# Patient Record
Sex: MALE | Race: WHITE | Employment: OTHER | URBAN - METROPOLITAN AREA
[De-identification: names, ages, dates, MRNs, and addresses within clinical notes are randomized per-mention and may not be internally consistent; named-entity substitution may affect disease eponyms.]

---

## 2017-01-12 RX ORDER — CARVEDILOL 6.25 MG/1
12.5 TABLET ORAL 2 TIMES DAILY WITH MEALS
Qty: 120 TAB | Refills: 6 | Status: SHIPPED | OUTPATIENT
Start: 2017-01-12 | End: 2017-06-14 | Stop reason: SDUPTHER

## 2017-01-31 ENCOUNTER — TELEPHONE (OUTPATIENT)
Dept: CARDIOLOGY CLINIC | Age: 47
End: 2017-01-31

## 2017-03-02 ENCOUNTER — OFFICE VISIT (OUTPATIENT)
Dept: CARDIOLOGY CLINIC | Age: 47
End: 2017-03-02

## 2017-03-02 VITALS
HEIGHT: 70 IN | BODY MASS INDEX: 36.94 KG/M2 | DIASTOLIC BLOOD PRESSURE: 61 MMHG | SYSTOLIC BLOOD PRESSURE: 119 MMHG | OXYGEN SATURATION: 98 % | HEART RATE: 47 BPM | WEIGHT: 258 LBS

## 2017-03-02 DIAGNOSIS — E78.00 PURE HYPERCHOLESTEROLEMIA: ICD-10-CM

## 2017-03-02 DIAGNOSIS — I10 ESSENTIAL HYPERTENSION WITH GOAL BLOOD PRESSURE LESS THAN 140/90: ICD-10-CM

## 2017-03-02 DIAGNOSIS — I42.9 CARDIOMYOPATHY (HCC): ICD-10-CM

## 2017-03-02 DIAGNOSIS — I25.83 CORONARY ARTERY DISEASE DUE TO LIPID RICH PLAQUE: Primary | ICD-10-CM

## 2017-03-02 DIAGNOSIS — I25.10 CORONARY ARTERY DISEASE DUE TO LIPID RICH PLAQUE: Primary | ICD-10-CM

## 2017-03-02 RX ORDER — CLOTRIMAZOLE 10 MG/1
10 LOZENGE ORAL; TOPICAL
COMMUNITY
Start: 2017-02-18 | End: 2017-03-04
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
## 2017-03-02 NOTE — MR AVS SNAPSHOT
Visit Information Date & Time Provider Department Dept. Phone Encounter #  
 3/2/2017 11:15 AM Saumil Loy Dancer, MD [facility] [phone] [id number] Follow-up Instructions Return in about 9 months (around 12/2/2017). Upcoming Health Maintenance Date Due DTaP/Tdap/Td series (1 - Tdap) 6/9/1989 Pneumococcal 19-64 Highest Risk (2 of 3 - PCV13) 9/6/2015 INFLUENZA AGE 9 TO ADULT 8/1/2016 Allergies as of 3/2/2017  Review Complete On: 3/2/2017 By: Elmarie Najjar, LPN Severity Noted Reaction Type Reactions Other Food  05/18/2015    Hives Allergic to tomatoes and tomato sauce Codeine    Nausea and Vomiting Current Immunizations  Reviewed on 11/1/2015 Name Date Influenza Vaccine 9/17/2014 Influenza Vaccine (Quad) PF 11/4/2015 10:21 AM  
 Pneumococcal Polysaccharide (PPSV-23) 9/6/2014 12:59 PM,  Deferred () Not reviewed this visit Vitals BP  
  
  
  
  
  
 119/61 BMI and BSA Data Body Mass Index Body Surface Area 37.02 kg/m 2 2.4 m 2 Preferred Pharmacy Pharmacy Name Phone [id number] [phone] [phone] Your Updated Medication List  
  
   
This list is accurate as of: 3/2/17 11:23 AM.  Always use your most recent med list.  
  
  
  
  
 albuterol 90 mcg/actuation inhaler Commonly known as:  PROVENTIL HFA Take 2 Puffs by inhalation every four (4) hours as needed for Wheezing. amiodarone 400 mg tablet Commonly known as:  Cristopher Headings Take 1 Tab by mouth daily. Indications: LIFE-THREATENING VENTRICULAR TACHYCARDIA  
  
 aspirin 81 mg chewable tablet Take 162 mg by mouth two (2) times a day. atorvastatin 40 mg tablet Commonly known as:  LIPITOR Take 1 Tab by mouth daily. Indications: hypercholesterolemia  
  
 bumetanide 1 mg tablet Commonly known as:  Assunta Brink Take 1 Tab by mouth daily. calcium carbonate 200 mg calcium (500 mg) Chew Commonly known as:  TUMS Take 1 Tab by mouth daily. Indications: HYPOCALCEMIA  
  
 carvedilol 6.25 mg tablet Commonly known as:  Gaylin [place] Take 2 Tabs by mouth two (2) times daily (with meals). clopidogrel 75 mg Tab Commonly known as:  PLAVIX Take 1 Tab by mouth daily. Indications: MYOCARDIAL REINFARCTION PREVENTION  
  
 clotrimazole 10 mg meliton Commonly known as:  Mikael Clos 10 mg.  
  
 fluticasone 50 mcg/actuation nasal spray Commonly known as:  Michaelyn Drought 2 Sprays by Both Nostrils route daily as needed for Rhinitis. isosorbide mononitrate ER 60 mg CR tablet Commonly known as:  IMDUR Take 1.5 Tabs by mouth every morning. ixekizumab 80 mg/mL syringe Commonly known as:  Lawrence Gore 80 mg by SubCUTAneous route every fourteen (14) days. lisinopril 2.5 mg tablet Commonly known as:  Luis Daniel Arreolaman Take 1 Tab by mouth daily. SUBOXONE 8-2 mg Film sublingaul film Generic drug:  buprenorphine-naloxone  
dissolve 2 FILM under the tongue once daily  
  
 tiotropium 18 mcg inhalation capsule Commonly known as:  Shabbir Dux Take 1 Cap by inhalation daily. Follow-up Instructions Return in about 9 months (around 12/2/2017). Introducing [facility]! Dear Joslyn Mata: Thank you for requesting a Gydget account. Our records indicate that you already have an active Gydget account. You can access your account anytime at [email or web address] Did you know that you can access your hospital and ER discharge instructions at any time in Gydget? You can also review all of your test results from your hospital stay or ER visit. Additional Information If you have questions, please visit the Frequently Asked Questions section of the Gydget website at [email or web address] Remember, Gydget is NOT to be used for urgent needs. For medical emergencies, dial 911. Now available from your iPhone and Android! Please provide this summary of care documentation to your next provider. Your primary care clinician is listed as LONNIE FEE. If you have any questions after today's visit, please call [phone].
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
## 2017-03-02 NOTE — PROGRESS NOTES
1. Have you been to the ER, urgent care clinic since your last visit? Hospitalized since your last visit? No    2. Have you seen or consulted any other health care providers outside of the [address] [place] [address] since your last visit? Include any pap smears or colon screening.  No

## 2017-03-03 ENCOUNTER — TELEPHONE (OUTPATIENT)
Dept: CARDIOLOGY CLINIC | Age: 47
End: 2017-03-03

## 2017-03-03 RX ORDER — ALBUTEROL SULFATE 90 UG/1
2 AEROSOL, METERED RESPIRATORY (INHALATION)
Qty: 1 INHALER | Refills: 0 | Status: SHIPPED | OUTPATIENT
Start: 2017-03-03 | End: 2018-01-11 | Stop reason: SDUPTHER

## 2017-03-03 NOTE — TELEPHONE ENCOUNTER
Patient was calling today to ask about a ProAir Inhaler that he states that Dr Katie Ramos was to call in for him. Per Dr Huynh Folds note, I did not see anything about it, but told patient that I would ask Dr Katie Ramos about it. Patient verbalized understanding.         Verbal order and read back per Pradeep Arredondo MD

## 2017-03-03 NOTE — TELEPHONE ENCOUNTER
Consulted with Dr Sukumar Pedersen. Per Dr Sukumar Pedersen, he said we will send in rx for one time fill of ProAir, but after, he will need to retrieve rx from his PCP.         Verbal order and read back per Rick Urbina MD
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
## 2017-04-26 ENCOUNTER — CLINICAL SUPPORT (OUTPATIENT)
Dept: CARDIOLOGY CLINIC | Age: 47
End: 2017-04-26

## 2017-04-26 DIAGNOSIS — Z95.810 AICD (AUTOMATIC CARDIOVERTER/DEFIBRILLATOR) PRESENT: ICD-10-CM

## 2017-04-26 DIAGNOSIS — I42.9 CARDIOMYOPATHY, UNSPECIFIED TYPE (HCC): Primary | ICD-10-CM

## 2017-04-26 DIAGNOSIS — I49.01 VENTRICULAR FIBRILLATION (HCC): ICD-10-CM

## 2017-04-27 RX ORDER — AMIODARONE HYDROCHLORIDE 400 MG/1
400 TABLET ORAL DAILY
Qty: 90 TAB | Refills: 3 | Status: SHIPPED | OUTPATIENT
Start: 2017-04-27 | End: 2018-05-07 | Stop reason: SDUPTHER

## 2017-06-15 RX ORDER — CARVEDILOL 6.25 MG/1
12.5 TABLET ORAL 2 TIMES DAILY WITH MEALS
Qty: 120 TAB | Refills: 6 | Status: SHIPPED | OUTPATIENT
Start: 2017-06-15 | End: 2017-11-22 | Stop reason: SDUPTHER

## 2017-06-15 RX ORDER — ATORVASTATIN CALCIUM 40 MG/1
40 TABLET, FILM COATED ORAL DAILY
Qty: 30 TAB | Refills: 5 | Status: SHIPPED | OUTPATIENT
Start: 2017-06-15 | End: 2017-11-14 | Stop reason: SDUPTHER

## 2017-07-07 ENCOUNTER — HOSPITAL ENCOUNTER (OUTPATIENT)
Dept: LAB | Age: 47
Discharge: HOME | End: 2017-07-07
Payer: MEDICARE

## 2017-07-07 DIAGNOSIS — L40.0 PSORIASIS VULGARIS: ICD-10-CM

## 2017-07-07 PROCEDURE — 87389 HIV-1 AG W/HIV-1&-2 AB AG IA: CPT | Performed by: PHYSICIAN ASSISTANT

## 2017-07-07 PROCEDURE — 80074 ACUTE HEPATITIS PANEL: CPT | Performed by: PHYSICIAN ASSISTANT

## 2017-07-07 PROCEDURE — 36415 COLL VENOUS BLD VENIPUNCTURE: CPT | Performed by: PHYSICIAN ASSISTANT

## 2017-07-07 PROCEDURE — 86480 TB TEST CELL IMMUN MEASURE: CPT | Performed by: PHYSICIAN ASSISTANT

## 2017-07-10 LAB
HAV IGM SERPL QL IA: NEGATIVE
HBV CORE IGM SER QL: NEGATIVE
HBV SURFACE AG SER QL: <0.1 INDEX
HBV SURFACE AG SER QL: NEGATIVE
HCV AB SER IA-ACNC: 0.12 INDEX
HCV AB SERPL QL IA: NEGATIVE
HCV COMMENT,HCGAC: NORMAL
HIV 1+2 AB+HIV1 P24 AG SERPL QL IA: NONREACTIVE
HIV12 RESULT COMMENT, HHIVC: NORMAL
SP1: NORMAL
SP2: NORMAL
SP3: NORMAL

## 2017-07-11 LAB
ANNOTATION COMMENT IMP: NORMAL
M TB IFN-G CD4+ BCKGRND COR BLD-ACNC: 0.1 IU/ML
M TB IFN-G CD4+ T-CELLS BLD-ACNC: 0.27 IU/ML
M TB TUBERC IFN-G BLD QL: NEGATIVE
M TB TUBERC IGNF/MITOGEN IGNF CONTROL: 8.86 IU/ML
QUANTIFERON NIL VALUE: 0.17 IU/ML
SERVICE CMNT-IMP: NORMAL

## 2017-07-13 RX ORDER — ISOSORBIDE MONONITRATE 60 MG/1
90 TABLET, EXTENDED RELEASE ORAL
Qty: 45 TAB | Refills: 6 | Status: SHIPPED | OUTPATIENT
Start: 2017-07-13 | End: 2018-01-18 | Stop reason: SDUPTHER

## 2017-07-17 RX ORDER — CLOPIDOGREL BISULFATE 75 MG/1
75 TABLET ORAL DAILY
Qty: 90 TAB | Refills: 3 | Status: SHIPPED | OUTPATIENT
Start: 2017-07-17 | End: 2018-07-09 | Stop reason: SDUPTHER

## 2017-08-02 ENCOUNTER — HOSPITAL ENCOUNTER (EMERGENCY)
Age: 47
Discharge: HOME | End: 2017-08-02
Attending: EMERGENCY MEDICINE | Admitting: EMERGENCY MEDICINE
Payer: MEDICARE

## 2017-08-02 ENCOUNTER — APPOINTMENT (OUTPATIENT)
Dept: CT IMAGING | Age: 47
End: 2017-08-02
Attending: EMERGENCY MEDICINE
Payer: MEDICARE

## 2017-08-02 VITALS
RESPIRATION RATE: 16 BRPM | TEMPERATURE: 98 F | DIASTOLIC BLOOD PRESSURE: 75 MMHG | HEART RATE: 55 BPM | OXYGEN SATURATION: 95 % | SYSTOLIC BLOOD PRESSURE: 139 MMHG

## 2017-08-02 DIAGNOSIS — M54.6 ACUTE LEFT-SIDED THORACIC BACK PAIN: Primary | ICD-10-CM

## 2017-08-02 DIAGNOSIS — N18.9 CHRONIC RENAL INSUFFICIENCY, UNSPECIFIED STAGE: ICD-10-CM

## 2017-08-02 LAB
ALBUMIN SERPL BCP-MCNC: 3 G/DL (ref 3.4–5)
ALBUMIN/GLOB SERPL: 0.8 {RATIO} (ref 0.8–1.7)
ALP SERPL-CCNC: 96 U/L (ref 45–117)
ALT SERPL-CCNC: 28 U/L (ref 16–61)
ANION GAP BLD CALC-SCNC: 9 MMOL/L (ref 3–18)
APPEARANCE UR: CLEAR
AST SERPL W P-5'-P-CCNC: 19 U/L (ref 15–37)
BACTERIA URNS QL MICRO: NEGATIVE /HPF
BASOPHILS # BLD AUTO: 0 K/UL (ref 0–0.06)
BASOPHILS # BLD: 0 % (ref 0–2)
BILIRUB SERPL-MCNC: 0.4 MG/DL (ref 0.2–1)
BILIRUB UR QL: NEGATIVE
BUN SERPL-MCNC: 24 MG/DL (ref 7–18)
BUN/CREAT SERPL: 10 (ref 12–20)
CALCIUM SERPL-MCNC: 8.4 MG/DL (ref 8.5–10.1)
CHLORIDE SERPL-SCNC: 101 MMOL/L (ref 100–108)
CO2 SERPL-SCNC: 28 MMOL/L (ref 21–32)
COLOR UR: YELLOW
CREAT SERPL-MCNC: 2.45 MG/DL (ref 0.6–1.3)
DIFFERENTIAL METHOD BLD: ABNORMAL
EOSINOPHIL # BLD: 0.1 K/UL (ref 0–0.4)
EOSINOPHIL NFR BLD: 1 % (ref 0–5)
EPITH CASTS URNS QL MICRO: NORMAL /LPF (ref 0–5)
ERYTHROCYTE [DISTWIDTH] IN BLOOD BY AUTOMATED COUNT: 15 % (ref 11.6–14.5)
GLOBULIN SER CALC-MCNC: 3.6 G/DL (ref 2–4)
GLUCOSE SERPL-MCNC: 183 MG/DL (ref 74–99)
GLUCOSE UR STRIP.AUTO-MCNC: 100 MG/DL
HCT VFR BLD AUTO: 40.2 % (ref 36–48)
HGB BLD-MCNC: 13.2 G/DL (ref 13–16)
HGB UR QL STRIP: NEGATIVE
KETONES UR QL STRIP.AUTO: NEGATIVE MG/DL
LEUKOCYTE ESTERASE UR QL STRIP.AUTO: NEGATIVE
LIPASE SERPL-CCNC: 174 U/L (ref 73–393)
LYMPHOCYTES # BLD AUTO: 11 % (ref 21–52)
LYMPHOCYTES # BLD: 0.8 K/UL (ref 0.9–3.6)
MCH RBC QN AUTO: 29.9 PG (ref 24–34)
MCHC RBC AUTO-ENTMCNC: 32.8 G/DL (ref 31–37)
MCV RBC AUTO: 91.2 FL (ref 74–97)
MONOCYTES # BLD: 0.3 K/UL (ref 0.05–1.2)
MONOCYTES NFR BLD AUTO: 4 % (ref 3–10)
NEUTS SEG # BLD: 6.4 K/UL (ref 1.8–8)
NEUTS SEG NFR BLD AUTO: 84 % (ref 40–73)
NITRITE UR QL STRIP.AUTO: NEGATIVE
PH UR STRIP: 6 [PH] (ref 5–8)
PLATELET # BLD AUTO: 214 K/UL (ref 135–420)
PMV BLD AUTO: 9.4 FL (ref 9.2–11.8)
POTASSIUM SERPL-SCNC: 4.4 MMOL/L (ref 3.5–5.5)
PROT SERPL-MCNC: 6.6 G/DL (ref 6.4–8.2)
PROT UR STRIP-MCNC: ABNORMAL MG/DL
RBC # BLD AUTO: 4.41 M/UL (ref 4.7–5.5)
RBC #/AREA URNS HPF: 0 /HPF (ref 0–5)
SODIUM SERPL-SCNC: 138 MMOL/L (ref 136–145)
SP GR UR REFRACTOMETRY: 1.02 (ref 1–1.03)
UROBILINOGEN UR QL STRIP.AUTO: 1 EU/DL (ref 0.2–1)
WBC # BLD AUTO: 7.6 K/UL (ref 4.6–13.2)
WBC URNS QL MICRO: NORMAL /HPF (ref 0–4)

## 2017-08-02 PROCEDURE — 99284 EMERGENCY DEPT VISIT MOD MDM: CPT

## 2017-08-02 PROCEDURE — 85025 COMPLETE CBC W/AUTO DIFF WBC: CPT | Performed by: EMERGENCY MEDICINE

## 2017-08-02 PROCEDURE — 74176 CT ABD & PELVIS W/O CONTRAST: CPT

## 2017-08-02 PROCEDURE — 80053 COMPREHEN METABOLIC PANEL: CPT | Performed by: EMERGENCY MEDICINE

## 2017-08-02 PROCEDURE — 83690 ASSAY OF LIPASE: CPT | Performed by: EMERGENCY MEDICINE

## 2017-08-02 PROCEDURE — 74011250637 HC RX REV CODE- 250/637: Performed by: EMERGENCY MEDICINE

## 2017-08-02 PROCEDURE — 81001 URINALYSIS AUTO W/SCOPE: CPT | Performed by: EMERGENCY MEDICINE

## 2017-08-02 RX ORDER — LIDOCAINE 50 MG/G
1 PATCH TOPICAL
Status: DISCONTINUED | OUTPATIENT
Start: 2017-08-02 | End: 2017-08-03 | Stop reason: HOSPADM

## 2017-08-02 RX ORDER — LIDOCAINE 50 MG/G
PATCH TOPICAL
Qty: 7 EACH | Refills: 1 | Status: SHIPPED | OUTPATIENT
Start: 2017-08-02
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
## 2017-08-03 NOTE — ED NOTES
I have reviewed discharge instructions with the patient. The patient verbalized understanding. Patient armband removed and shredded. 1 prescription given. All questions answered. Pt d/c'd to home awake, alert and in NAD.

## 2017-08-03 NOTE — ED TRIAGE NOTES
Patient arrived by EMS with chief c/o flank pain and nausea that has been going on all day. Patient states \"i think its my kidneys\".  Patient states he [address]

## 2017-08-14 ENCOUNTER — TELEPHONE (OUTPATIENT)
Dept: CARDIOLOGY CLINIC | Age: 47
End: 2017-08-14

## 2017-08-14 NOTE — TELEPHONE ENCOUNTER
Patient wife called the office to let us know that patient is having problems with swelling in his bilateral lower extremities. Per wife, patient has been drinking water and a lot of gatorade. Patient wife states that he doesn't add a lot of salt to his food. Per wife, patient has been drinking a lot of Gatorade, and per nutrition facts, it has a lot of sodium, so made her aware to have him cut back on that a little. Patient wife states that she will have patient weigh daily, and if he continues to have swelling or serious weight gain, they will call us at the office. If patient has any increased shortness of breath, she said she would take him to the ER for evaluation. Will forward to Dr Colton Dunaway to make him aware.

## 2017-08-20 ENCOUNTER — APPOINTMENT (OUTPATIENT)
Dept: GENERAL RADIOLOGY | Age: 47
End: 2017-08-20
Attending: EMERGENCY MEDICINE
Payer: MEDICARE

## 2017-08-20 ENCOUNTER — HOSPITAL ENCOUNTER (EMERGENCY)
Age: 47
Discharge: HOME | End: 2017-08-20
Attending: EMERGENCY MEDICINE
Payer: MEDICARE

## 2017-08-20 VITALS
WEIGHT: 258 LBS | SYSTOLIC BLOOD PRESSURE: 115 MMHG | OXYGEN SATURATION: 97 % | RESPIRATION RATE: 12 BRPM | DIASTOLIC BLOOD PRESSURE: 57 MMHG | BODY MASS INDEX: 37.02 KG/M2 | TEMPERATURE: 98.2 F | HEART RATE: 44 BPM

## 2017-08-20 DIAGNOSIS — R60.9 CHRONIC EDEMA: ICD-10-CM

## 2017-08-20 DIAGNOSIS — I50.9 ACUTE ON CHRONIC CONGESTIVE HEART FAILURE, UNSPECIFIED CONGESTIVE HEART FAILURE TYPE: Primary | ICD-10-CM

## 2017-08-20 DIAGNOSIS — Z87.448 HISTORY OF CHRONIC KIDNEY DISEASE: ICD-10-CM

## 2017-08-20 LAB
ANION GAP BLD CALC-SCNC: 18 MMOL/L (ref 10–20)
ANION GAP SERPL CALC-SCNC: 7 MMOL/L (ref 3–18)
ATRIAL RATE: 48 BPM
BASOPHILS # BLD: 0 K/UL (ref 0–0.06)
BASOPHILS NFR BLD: 0 % (ref 0–2)
BNP SERPL-MCNC: 1034 PG/ML (ref 0–450)
BUN BLD-MCNC: 31 MG/DL (ref 7–18)
BUN SERPL-MCNC: 32 MG/DL (ref 7–18)
BUN/CREAT SERPL: 12 (ref 12–20)
CA-I BLD-MCNC: 1.08 MMOL/L (ref 1.12–1.32)
CALCIUM SERPL-MCNC: 7.5 MG/DL (ref 8.5–10.1)
CALCULATED P AXIS, ECG09: 8 DEGREES
CALCULATED R AXIS, ECG10: -18 DEGREES
CALCULATED T AXIS, ECG11: 80 DEGREES
CHLORIDE BLD-SCNC: 96 MMOL/L (ref 100–108)
CHLORIDE SERPL-SCNC: 100 MMOL/L (ref 100–108)
CO2 BLD-SCNC: 27 MMOL/L (ref 19–24)
CO2 SERPL-SCNC: 30 MMOL/L (ref 21–32)
CREAT SERPL-MCNC: 2.71 MG/DL (ref 0.6–1.3)
CREAT UR-MCNC: 2.5 MG/DL (ref 0.6–1.3)
DIAGNOSIS, 93000: NORMAL
DIFFERENTIAL METHOD BLD: ABNORMAL
EOSINOPHIL # BLD: 0.2 K/UL (ref 0–0.4)
EOSINOPHIL NFR BLD: 2 % (ref 0–5)
ERYTHROCYTE [DISTWIDTH] IN BLOOD BY AUTOMATED COUNT: 15.7 % (ref 11.6–14.5)
GLUCOSE BLD STRIP.AUTO-MCNC: 101 MG/DL (ref 74–106)
GLUCOSE SERPL-MCNC: 94 MG/DL (ref 74–99)
HCT VFR BLD AUTO: 38.8 % (ref 36–48)
HCT VFR BLD CALC: 41 % (ref 36–49)
HGB BLD-MCNC: 12.5 G/DL (ref 13–16)
HGB BLD-MCNC: 13.9 G/DL (ref 12–16)
LYMPHOCYTES # BLD: 1.9 K/UL (ref 0.9–3.6)
LYMPHOCYTES NFR BLD: 23 % (ref 21–52)
MCH RBC QN AUTO: 29.8 PG (ref 24–34)
MCHC RBC AUTO-ENTMCNC: 32.2 G/DL (ref 31–37)
MCV RBC AUTO: 92.4 FL (ref 74–97)
MONOCYTES # BLD: 0.7 K/UL (ref 0.05–1.2)
MONOCYTES NFR BLD: 9 % (ref 3–10)
NEUTS SEG # BLD: 5.4 K/UL (ref 1.8–8)
NEUTS SEG NFR BLD: 66 % (ref 40–73)
P-R INTERVAL, ECG05: 184 MS
PLATELET # BLD AUTO: 169 K/UL (ref 135–420)
PMV BLD AUTO: 9.9 FL (ref 9.2–11.8)
POTASSIUM BLD-SCNC: 3.6 MMOL/L (ref 3.5–5.5)
POTASSIUM SERPL-SCNC: 3.7 MMOL/L (ref 3.5–5.5)
Q-T INTERVAL, ECG07: 442 MS
QRS DURATION, ECG06: 98 MS
QTC CALCULATION (BEZET), ECG08: 394 MS
RBC # BLD AUTO: 4.2 M/UL (ref 4.7–5.5)
SODIUM BLD-SCNC: 137 MMOL/L (ref 136–145)
SODIUM SERPL-SCNC: 137 MMOL/L (ref 136–145)
TROPONIN I SERPL-MCNC: 0.02 NG/ML (ref 0–0.04)
VENTRICULAR RATE, ECG03: 48 BPM
WBC # BLD AUTO: 8.2 K/UL (ref 4.6–13.2)

## 2017-08-20 PROCEDURE — 80047 BASIC METABLC PNL IONIZED CA: CPT

## 2017-08-20 PROCEDURE — 71010 XR CHEST PORT: CPT

## 2017-08-20 PROCEDURE — 84484 ASSAY OF TROPONIN QUANT: CPT | Performed by: EMERGENCY MEDICINE

## 2017-08-20 PROCEDURE — 85025 COMPLETE CBC W/AUTO DIFF WBC: CPT | Performed by: EMERGENCY MEDICINE

## 2017-08-20 PROCEDURE — 80048 BASIC METABOLIC PNL TOTAL CA: CPT | Performed by: EMERGENCY MEDICINE

## 2017-08-20 PROCEDURE — 93005 ELECTROCARDIOGRAM TRACING: CPT

## 2017-08-20 PROCEDURE — 96374 THER/PROPH/DIAG INJ IV PUSH: CPT

## 2017-08-20 PROCEDURE — 99285 EMERGENCY DEPT VISIT HI MDM: CPT

## 2017-08-20 PROCEDURE — 74011000250 HC RX REV CODE- 250: Performed by: EMERGENCY MEDICINE

## 2017-08-20 PROCEDURE — 83880 ASSAY OF NATRIURETIC PEPTIDE: CPT | Performed by: EMERGENCY MEDICINE

## 2017-08-20 RX ORDER — BUMETANIDE 0.25 MG/ML
1 INJECTION INTRAMUSCULAR; INTRAVENOUS ONCE
Status: COMPLETED | OUTPATIENT
Start: 2017-08-20 | End: 2017-08-20

## 2017-08-20 RX ADMIN — BUMETANIDE 1 MG: 0.25 INJECTION INTRAMUSCULAR; INTRAVENOUS at 06:29

## 2017-08-20 NOTE — ED TRIAGE NOTES
Pt c/o bilateral foot swelling and pain that started yesterday and SOB with exertion.  Pt states he has to sleep sitting up

## 2017-08-20 NOTE — ED NOTES
Bedside and Verbal shift change report given to Orlin Cardenas RN (oncoming nurse) by Sohan Dupree RN (offgoing nurse). Report included the following information SBAR.
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
## 2017-08-20 NOTE — DISCHARGE INSTRUCTIONS
Heart Failure: Care Instructions  Your Care Instructions    Heart failure occurs when your heart does not pump as much blood as the body needs. Failure does not mean that the heart has stopped pumping but rather that it is not pumping as well as it should. Over time, this causes fluid buildup in your lungs and other parts of your body. Fluid buildup can cause shortness of breath, fatigue, swollen ankles, and other problems. By taking medicines regularly, reducing sodium (salt) in your diet, checking your weight every day, and making lifestyle changes, you can feel better and live longer. Follow-up care is a key part of your treatment and safety. Be sure to make and go to all appointments, and call your doctor if you are having problems. It's also a good idea to know your test results and keep a list of the medicines you take. How can you care for yourself at home? Medicines  · Be safe with medicines. Take your medicines exactly as prescribed. Call your doctor if you think you are having a problem with your medicine. · Do not take any vitamins, over-the-counter medicine, or herbal products without talking to your doctor first. Roxi Boys not take ibuprofen (Advil or Motrin) and naproxen (Aleve) without talking to your doctor first. They could make your heart failure worse. · You may be taking some of the following medicine. ¨ Beta-blockers can slow heart rate, decrease blood pressure, and improve your condition. Taking a beta-blocker may lower your chance of needing to be hospitalized. ¨ Angiotensin-converting enzyme inhibitors (ACEIs) reduce the heart's workload, lower blood pressure, and reduce swelling. Taking an ACEI may lower your chance of needing to be hospitalized again. ¨ Angiotensin II receptor blockers (ARBs) work like ACEIs. Your doctor may prescribe them instead of ACEIs. ¨ Diuretics, also called water pills, reduce swelling.   ¨ Potassium supplements replace this important mineral, which is sometimes lost with diuretics. ¨ Aspirin and other blood thinners prevent blood clots, which can cause a stroke or heart attack. You will get more details on the specific medicines your doctor prescribes. Diet  · Your doctor may suggest that you limit sodium to 2,000 milligrams (mg) a day or less. That is less than 1 teaspoon of salt a day, including all the salt you eat in cooking or in packaged foods. People get most of their sodium from processed foods. Fast food and restaurant meals also tend to be very high in sodium. · Ask your doctor how much liquid you can drink each day. You may have to limit liquids. Weight  · Weigh yourself without clothing at the same time each day. Record your weight. Call your doctor if you have a sudden weight gain, such as more than 2 to 3 pounds in a day or 5 pounds in a week. (Your doctor may suggest a different range of weight gain.) A sudden weight gain may mean that your heart failure is getting worse. Activity level  · Start light exercise (if your doctor says it is okay). Even if you can only do a small amount, exercise will help you get stronger, have more energy, and manage your weight and your stress. Walking is an easy way to get exercise. Start out by walking a little more than you did before. Bit by bit, increase the amount you walk. · When you exercise, watch for signs that your heart is working too hard. You are pushing yourself too hard if you cannot talk while you are exercising. If you become short of breath or dizzy or have chest pain, stop, sit down, and rest.  · If you feel \"wiped out\" the day after you exercise, walk slower or for a shorter distance until you can work up to a better pace. · Get enough rest at night. Sleeping with 1 or 2 pillows under your upper body and head may help you breathe easier. Lifestyle changes  · Do not smoke. Smoking can make a heart condition worse.  If you need help quitting, talk to your doctor about stop-smoking programs and medicines. These can increase your chances of quitting for good. Quitting smoking may be the most important step you can take to protect your heart. · Limit alcohol to 2 drinks a day for men and 1 drink a day for women. Too much alcohol can cause health problems. · Avoid getting sick from colds and the flu. Get a pneumococcal vaccine shot. If you have had one before, ask your doctor whether you need another dose. Get a flu shot each year. If you must be around people with colds or the flu, wash your hands often. When should you call for help? Call 911 if you have symptoms of sudden heart failure such as:  · You have severe trouble breathing. · You cough up pink, foamy mucus. · You have a new irregular or rapid heartbeat. Call your doctor now or seek immediate medical care if:  · You have new or increased shortness of breath. · You are dizzy or lightheaded, or you feel like you may faint. · You have sudden weight gain, such as more than 2 to 3 pounds in a day or 5 pounds in a week. (Your doctor may suggest a different range of weight gain.)  · You have increased swelling in your legs, ankles, or feet. · You are suddenly so tired or weak that you cannot do your usual activities. Watch closely for changes in your health, and be sure to contact your doctor if:  · You develop new symptoms. Where can you learn more? Go to http://berta-keyla.info/. Enter G264 in the search box to learn more about \"Heart Failure: Care Instructions. \"  Current as of: April 3, 2017  Content Version: 11.3  © 6990-4300 [employer]. Care instructions adapted under license by [employer] (which disclaims liability or warranty for this information). If you have questions about a medical condition or this instruction, always ask your healthcare professional. [place] [address] any warranty or liability for your use of this information.          Low Sodium Diet (2,000 Milligram): Care Instructions  Your Care Instructions  Too much sodium causes your body to hold on to extra water. This can raise your blood pressure and force your heart and kidneys to work harder. In very serious cases, this could cause you to be put in the hospital. It might even be life-threatening. By limiting sodium, you will feel better and lower your risk of serious problems. The most common source of sodium is salt. People get most of the salt in their diet from canned, prepared, and packaged foods. Fast food and restaurant meals also are very high in sodium. Your doctor will probably limit your sodium to less than 2,000 milligrams (mg) a day. This limit counts all the sodium in prepared and packaged foods and any salt you add to your food. Follow-up care is a key part of your treatment and safety. Be sure to make and go to all appointments, and call your doctor if you are having problems. It's also a good idea to know your test results and keep a list of the medicines you take. How can you care for yourself at home? Read food labels  · Read labels on cans and food packages. The labels tell you how much sodium is in each serving. Make sure that you look at the serving size. If you eat more than the serving size, you have eaten more sodium. · Food labels also tell you the Percent Daily Value for sodium. Choose products with low Percent Daily Values for sodium. · Be aware that sodium can come in forms other than salt, including monosodium glutamate (MSG), sodium citrate, and sodium bicarbonate (baking soda). MSG is often added to Asian food. When you eat out, you can sometimes ask for food without MSG or added salt. Buy low-sodium foods  · Buy foods that are labeled \"unsalted\" (no salt added), \"sodium-free\" (less than 5 mg of sodium per serving), or \"low-sodium\" (less than 140 mg of sodium per serving). Foods labeled \"reduced-sodium\" and \"light sodium\" may still have too much sodium.  Be sure to read the label to see how much sodium you are getting. · Buy fresh vegetables, or frozen vegetables without added sauces. Buy low-sodium versions of canned vegetables, soups, and other canned goods. Prepare low-sodium meals  · Cut back on the amount of salt you use in cooking. This will help you adjust to the taste. Do not add salt after cooking. One teaspoon of salt has about 2,300 mg of sodium. · Take the salt shaker off the table. · Flavor your food with garlic, lemon juice, onion, vinegar, herbs, and spices. Do not use soy sauce, lite soy sauce, steak sauce, onion salt, garlic salt, celery salt, mustard, or ketchup on your food. · Use low-sodium salad dressings, sauces, and ketchup. Or make your own salad dressings and sauces without adding salt. · Use less salt (or none) when recipes call for it. You can often use half the salt a recipe calls for without losing flavor. Other foods such as rice, pasta, and grains do not need added salt. · Rinse canned vegetables, and cook them in fresh water. This removes some--but not all--of the salt. · Avoid water that is naturally high in sodium or that has been treated with water softeners, which add sodium. Call your local water company to find out the sodium content of your water supply. If you buy bottled water, read the label and choose a sodium-free brand. Avoid high-sodium foods  · Avoid eating:  ¨ Smoked, cured, salted, and canned meat, fish, and poultry. ¨ Ham, bennett, hot dogs, and luncheon meats. ¨ Regular, hard, and processed cheese and regular peanut butter. ¨ Crackers with salted tops, and other salted snack foods such as pretzels, chips, and salted popcorn. ¨ Frozen prepared meals, unless labeled low-sodium. ¨ Canned and dried soups, broths, and bouillon, unless labeled sodium-free or low-sodium. ¨ Canned vegetables, unless labeled sodium-free or low-sodium. ¨ [place] fries, pizza, tacos, and other fast foods.   ¨ Pickles, olives, ketchup, and other condiments, especially soy sauce, unless labeled sodium-free or low-sodium. Where can you learn more? Go to http://berta-keyla.info/. Enter K213 in the search box to learn more about \"Low Sodium Diet (2,000 Milligram): Care Instructions. \"  Current as of: July 26, 2016  Content Version: 11.3  © 1573-0753 [employer]. Care instructions adapted under license by [employer] (which disclaims liability or warranty for this information). If you have questions about a medical condition or this instruction, always ask your healthcare professional. [place] [address] any warranty or liability for your use of this information.

## 2017-08-20 NOTE — ED NOTES
Patient requesting Ice chips/water. Per Dr. Cantu Listen patient is ok to have small cup of ice chips. Patient given ice chips. Dr. Cantu Listen at bedside educating patient about Gatorade (pt wife recently reported patient has been drinking excess amounts of gatorade as of late).

## 2017-08-20 NOTE — ED NOTES
Pt signed out by Dr. Dori Stallings pending diuresis with plan to discharge home after successful void according to Dr. Roc Connell. Pt voided without difficulty, had no new complaints and reported improvement in his condition. Discharged home with follow-up according to Dr. Roc Connell.

## 2017-08-22 ENCOUNTER — APPOINTMENT (OUTPATIENT)
Dept: GENERAL RADIOLOGY | Age: 47
End: 2017-08-22
Attending: EMERGENCY MEDICINE
Payer: MEDICARE

## 2017-08-22 ENCOUNTER — HOSPITAL ENCOUNTER (EMERGENCY)
Age: 47
Discharge: HOME | End: 2017-08-22
Attending: EMERGENCY MEDICINE
Payer: MEDICARE

## 2017-08-22 VITALS
TEMPERATURE: 98.3 F | SYSTOLIC BLOOD PRESSURE: 136 MMHG | RESPIRATION RATE: 18 BRPM | HEIGHT: 70 IN | DIASTOLIC BLOOD PRESSURE: 72 MMHG | HEART RATE: 47 BPM | BODY MASS INDEX: 40.09 KG/M2 | OXYGEN SATURATION: 98 % | WEIGHT: 280 LBS

## 2017-08-22 DIAGNOSIS — M79.671 RIGHT FOOT PAIN: Primary | ICD-10-CM

## 2017-08-22 PROCEDURE — 73630 X-RAY EXAM OF FOOT: CPT

## 2017-08-22 PROCEDURE — 74011250637 HC RX REV CODE- 250/637: Performed by: EMERGENCY MEDICINE

## 2017-08-22 PROCEDURE — 73610 X-RAY EXAM OF ANKLE: CPT

## 2017-08-22 PROCEDURE — 99283 EMERGENCY DEPT VISIT LOW MDM: CPT

## 2017-08-22 RX ORDER — PREDNISONE 50 MG/1
50 TABLET ORAL DAILY
Qty: 4 TAB | Refills: 0 | Status: SHIPPED | OUTPATIENT
Start: 2017-08-22 | End: 2017-08-27

## 2017-08-22 RX ORDER — ACETAMINOPHEN 500 MG
1000 TABLET ORAL ONCE
Status: COMPLETED | OUTPATIENT
Start: 2017-08-22 | End: 2017-08-22

## 2017-08-22 RX ADMIN — ACETAMINOPHEN 1000 MG: 500 TABLET ORAL at 10:22
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
## 2017-08-22 NOTE — ED TRIAGE NOTES
Patient with pain and swelling to right lower leg, patient stated that the pain is so bad that he passed out

## 2017-08-22 NOTE — DISCHARGE INSTRUCTIONS

## 2017-09-12 RX ORDER — BUMETANIDE 1 MG/1
1 TABLET ORAL DAILY
Qty: 90 TAB | Refills: 2 | Status: SHIPPED | OUTPATIENT
Start: 2017-09-12 | End: 2018-10-24

## 2017-11-14 RX ORDER — ATORVASTATIN CALCIUM 40 MG/1
40 TABLET, FILM COATED ORAL DAILY
Qty: 30 TAB | Refills: 5 | Status: SHIPPED | OUTPATIENT
Start: 2017-11-14 | End: 2018-08-06 | Stop reason: SDUPTHER

## 2017-11-23 RX ORDER — CARVEDILOL 6.25 MG/1
12.5 TABLET ORAL 2 TIMES DAILY WITH MEALS
Qty: 180 TAB | Refills: 3 | Status: SHIPPED | OUTPATIENT
Start: 2017-11-23 | End: 2018-08-22 | Stop reason: SDUPTHER

## 2017-12-07 ENCOUNTER — TELEPHONE (OUTPATIENT)
Dept: CARDIOLOGY CLINIC | Age: 47
End: 2017-12-07

## 2018-01-11 ENCOUNTER — OFFICE VISIT (OUTPATIENT)
Dept: FAMILY MEDICINE CLINIC | Facility: CLINIC | Age: 48
End: 2018-01-11

## 2018-01-11 ENCOUNTER — HOSPITAL ENCOUNTER (OUTPATIENT)
Dept: LAB | Age: 48
Discharge: HOME | End: 2018-01-11
Payer: MEDICARE

## 2018-01-11 VITALS
RESPIRATION RATE: 15 BRPM | HEIGHT: 70 IN | OXYGEN SATURATION: 97 % | BODY MASS INDEX: 40.94 KG/M2 | SYSTOLIC BLOOD PRESSURE: 124 MMHG | HEART RATE: 48 BPM | DIASTOLIC BLOOD PRESSURE: 86 MMHG | WEIGHT: 286 LBS | TEMPERATURE: 98.6 F

## 2018-01-11 DIAGNOSIS — I10 ESSENTIAL HYPERTENSION: ICD-10-CM

## 2018-01-11 DIAGNOSIS — R05.9 COUGH: Primary | ICD-10-CM

## 2018-01-11 DIAGNOSIS — Z86.39 H/O HYPERGLYCEMIA: ICD-10-CM

## 2018-01-11 DIAGNOSIS — N18.30 STAGE 3 CHRONIC KIDNEY DISEASE (HCC): ICD-10-CM

## 2018-01-11 DIAGNOSIS — K59.03 DRUG-INDUCED CONSTIPATION: ICD-10-CM

## 2018-01-11 DIAGNOSIS — I25.5 ISCHEMIC CARDIOMYOPATHY: ICD-10-CM

## 2018-01-11 DIAGNOSIS — Z87.898 HISTORY OF URINARY HESITANCY: ICD-10-CM

## 2018-01-11 DIAGNOSIS — J42 CHRONIC BRONCHITIS, UNSPECIFIED CHRONIC BRONCHITIS TYPE (HCC): ICD-10-CM

## 2018-01-11 PROBLEM — E66.01 OBESITY, MORBID (HCC): Status: ACTIVE | Noted: 2018-01-11

## 2018-01-11 LAB
ANION GAP SERPL CALC-SCNC: 8 MMOL/L (ref 3–18)
BUN SERPL-MCNC: 24 MG/DL (ref 7–18)
BUN/CREAT SERPL: 11 (ref 12–20)
CALCIUM SERPL-MCNC: 8.5 MG/DL (ref 8.5–10.1)
CHLORIDE SERPL-SCNC: 103 MMOL/L (ref 100–108)
CO2 SERPL-SCNC: 28 MMOL/L (ref 21–32)
CREAT SERPL-MCNC: 2.12 MG/DL (ref 0.6–1.3)
GLUCOSE SERPL-MCNC: 89 MG/DL (ref 74–99)
HBA1C MFR BLD: 5.8 % (ref 4.2–5.6)
POTASSIUM SERPL-SCNC: 4.8 MMOL/L (ref 3.5–5.5)
SODIUM SERPL-SCNC: 139 MMOL/L (ref 136–145)

## 2018-01-11 PROCEDURE — 83036 HEMOGLOBIN GLYCOSYLATED A1C: CPT | Performed by: INTERNAL MEDICINE

## 2018-01-11 PROCEDURE — 36415 COLL VENOUS BLD VENIPUNCTURE: CPT | Performed by: INTERNAL MEDICINE

## 2018-01-11 PROCEDURE — 80048 BASIC METABOLIC PNL TOTAL CA: CPT | Performed by: INTERNAL MEDICINE

## 2018-01-11 RX ORDER — DOCUSATE SODIUM 100 MG/1
100 CAPSULE, LIQUID FILLED ORAL 2 TIMES DAILY
Qty: 60 CAP | Refills: 2 | Status: SHIPPED | OUTPATIENT
Start: 2018-01-11 | End: 2018-05-25 | Stop reason: SDUPTHER

## 2018-01-11 RX ORDER — PREDNISONE 10 MG/1
TABLET ORAL
COMMUNITY
Start: 2017-07-06 | End: 2018-01-11

## 2018-01-11 RX ORDER — PREDNISONE 20 MG/1
TABLET ORAL
Qty: 10 TAB | Refills: 0 | Status: SHIPPED | OUTPATIENT
Start: 2018-01-11 | End: 2018-02-08 | Stop reason: ALTCHOICE

## 2018-01-11 RX ORDER — BUPRENORPHINE HYDROCHLORIDE, NALOXONE HYDROCHLORIDE 8; 2 MG/1; MG/1
FILM, SOLUBLE BUCCAL; SUBLINGUAL
Refills: 1 | COMMUNITY
Start: 2017-12-19 | End: 2018-05-08

## 2018-01-11 RX ORDER — ALBUTEROL SULFATE 90 UG/1
2 AEROSOL, METERED RESPIRATORY (INHALATION)
Qty: 1 INHALER | Refills: 0 | Status: SHIPPED | OUTPATIENT
Start: 2018-01-11 | End: 2018-02-08 | Stop reason: ALTCHOICE
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
## 2018-01-11 NOTE — PROGRESS NOTES
Adrienne Montesinos is a 50 y.o.  male presents today for office visit for cough and congestion for 2 weeks. Pt would lik to establish care. Pt is in Room # 4.      1. Have you been to the ER, urgent care clinic since your last visit? Hospitalized since your last visit? No    2. Have you seen or consulted any other health care providers outside of the [address] [place] [address] since your last visit? Include any pap smears or colon screening. No    Health Maintenance reviewed -dayna asked to have pneumoccocal vaccine. Pt has had Tdap vaccine through [address] [place] [address] Dermatology.       Upcoming Appts  1/2018 Cardiology    VORB: No orders of the defined types were placed in this encounter.    Mahad  MD Ramin Schmidt LPN

## 2018-01-11 NOTE — MR AVS SNAPSHOT
Visit Information Date & Time Provider Department Dept. Phone Encounter #  
 1/11/2018  2:00 PM Krupa Plummer MD [facility] [phone] [id number] Follow-up Instructions Return in about 3 months (around 4/11/2018). Upcoming Health Maintenance Date Due DTaP/Tdap/Td series (1 - Tdap) 6/9/1989 Pneumococcal 19-64 Highest Risk (2 of 3 - PCV13) 9/6/2015 Influenza Age 5 to Adult 8/1/2017 Allergies as of 1/11/2018  Review Complete On: 1/11/2018 By: Abbie Hughes Severity Noted Reaction Type Reactions Latex High 11/30/2010    Other (comments)  
 blisters Other Food  05/18/2015    Hives Allergic to tomatoes and tomato sauce Codeine    Nausea and Vomiting Current Immunizations  Reviewed on 11/1/2015 Name Date Influenza Vaccine 9/17/2014 Influenza Vaccine (Quad) PF 11/4/2015 10:21 AM  
 Pneumococcal Polysaccharide (PPSV-23) 9/6/2014 12:59 PM,  Deferred () Not reviewed this visit You Were Diagnosed With   
  
 Codes Comments Essential hypertension    -  Primary ICD-10-CM: I10 
ICD-9-CM: 401.9 Chronic bronchitis, unspecified chronic bronchitis type ([facility]     ICD-10-CM: P69 ICD-9-CM: 491.9 Cough productive of purulent sputum     ICD-10-CM: R05 ICD-9-CM: 786.2 Renal insufficiency     ICD-10-CM: N28.9 ICD-9-CM: 593.9 BMI 40.0-44.9, adult (HCC)     ICD-10-CM: Z68.41 
ICD-9-CM: V85.41   
 JESSICA (obstructive sleep apnea)     ICD-10-CM: G47.33 
ICD-9-CM: 327.23 Vitamin D deficiency     ICD-10-CM: E55.9 ICD-9-CM: 268.9 H/O hyperglycemia     ICD-10-CM: Z86.39 
ICD-9-CM: V12.29 History of urinary hesitancy     ICD-10-CM: B89.165 ICD-9-CM: V13.00 Vitals BP Pulse Temp Resp Height(growth percentile) Weight(growth percentile) 124/86 (BP 1 Location: Left arm, BP Patient Position: Sitting) (!) 48 98.6 °F (37 °C) (Oral) 15 5' 10\" (1.778 m) 286 lb (129.7 kg) SpO2 BMI Smoking Status 97% 41.04 kg/m2 Former Smoker Vitals History BMI and BSA Data Body Mass Index Body Surface Area 41.04 kg/m 2 2.53 m 2 Preferred Pharmacy Pharmacy Name Phone [facility], [place] [address] [phone] Your Updated Medication List  
  
   
This list is accurate as of: 1/11/18  2:58 PM.  Always use your most recent med list.  
  
  
  
  
 albuterol 90 mcg/actuation inhaler Commonly known as:  PROVENTIL HFA Take 2 Puffs by inhalation every four (4) hours as needed for Wheezing. amiodarone 400 mg tablet Commonly known as:  Refugio Aamir Take 1 Tab by mouth daily. Indications: LIFE-THREATENING VENTRICULAR TACHYCARDIA  
  
 aspirin 81 mg chewable tablet Take 162 mg by mouth two (2) times a day. atorvastatin 40 mg tablet Commonly known as:  LIPITOR Take 1 Tab by mouth daily. Indications: hypercholesterolemia  
  
 bumetanide 1 mg tablet Commonly known as:  Jordy Godinez Take 1 Tab by mouth daily. calcium carbonate 200 mg calcium (500 mg) Chew Commonly known as:  TUMS Take 1 Tab by mouth daily. Indications: HYPOCALCEMIA  
  
 carvedilol 6.25 mg tablet Commonly known as:  Lavone Khurram Take 2 Tabs by mouth two (2) times daily (with meals). clopidogrel 75 mg Tab Commonly known as:  PLAVIX Take 1 Tab by mouth daily. Indications: Myocardial Reinfarction Prevention  
  
 fluticasone 50 mcg/actuation nasal spray Commonly known as:  Ester Jorge 2 Sprays by Both Nostrils route daily as needed for Rhinitis. isosorbide mononitrate ER 60 mg CR tablet Commonly known as:  IMDUR Take 1.5 Tabs by mouth every morning. ixekizumab 80 mg/mL syringe Commonly known as:  Megan Tiffanie 80 mg by SubCUTAneous route every fourteen (14) days. lidocaine 5 % Commonly known as:  Sherren Gruber Apply patch to the affected area for 12 hours a day and remove for 12 hours a day. predniSONE 20 mg tablet Commonly known as:  Elizabeth Yan Take 2 tabs daily for 5 days SUBOXONE 8-2 mg Film sublingaul film Generic drug:  buprenorphine-naloxone  
place half of a film on the tongue in the morning, 1/2 a film in the afternoon and 1 film at bedtime  
  
 tiotropium 18 mcg inhalation capsule Commonly known as:  Akilah Arguello Take 1 Cap by inhalation daily. Prescriptions Sent to Pharmacy Refills  
 albuterol (PROVENTIL HFA) 90 mcg/actuation inhaler 0 Sig: Take 2 Puffs by inhalation every four (4) hours as needed for Wheezing. Class: Normal  
 Pharmacy: [address], [address] [place] [address] Ph #: [phone] Route: Inhalation  
 tiotropium (SPIRIVA) 18 mcg inhalation capsule 5 Sig: Take 1 Cap by inhalation daily. Class: Normal  
 Pharmacy: [address] Ph #: [phone] Route: Inhalation  
 predniSONE (DELTASONE) 20 mg tablet 0 Sig: Take 2 tabs daily for 5 days Class: Normal  
 Pharmacy: [address], [address] [place] [address] Ph #: [phone] We Performed the Following AMB POC URINALYSIS DIP STICK AUTO W/O MICRO [01798 CPT(R)] Follow-up Instructions Return in about 3 months (around 4/11/2018). To-Do List   
 01/11/2018 Lab:  HEMOGLOBIN A1C W/O EAG   
  
 01/11/2018 Lab:  METABOLIC PANEL, BASIC   
  
 01/11/2018 Imaging:  XR CHEST PA LAT Introducing [facility]! Dear Teresa Can: Thank you for requesting a [employer] account. Our records indicate that you already have an active [employer] account. You can access your account anytime at [email or web address] Did you know that you can access your hospital and ER discharge instructions at any time in [employer]? You can also review all of your test results from your hospital stay or ER visit. Additional Information If you have questions, please visit the Frequently Asked Questions section of the [employer] website at [email or web address] Remember, [facility] [facility] is NOT to be used for urgent needs. For medical emergencies, dial 911. Now available from your iPhone and Android! Please provide this summary of care documentation to your next provider. Your primary care clinician is listed as Terry Stubbs If you have any questions after today's visit, please call [phone].

## 2018-01-18 RX ORDER — ISOSORBIDE MONONITRATE 60 MG/1
90 TABLET, EXTENDED RELEASE ORAL
Qty: 45 TAB | Refills: 6 | Status: SHIPPED | OUTPATIENT
Start: 2018-01-18 | End: 2018-09-24 | Stop reason: SDUPTHER

## 2018-01-22 DIAGNOSIS — J42 CHRONIC BRONCHITIS, UNSPECIFIED CHRONIC BRONCHITIS TYPE (HCC): ICD-10-CM

## 2018-01-26 RX ORDER — PREDNISONE 20 MG/1
TABLET ORAL
Qty: 10 TAB | Refills: 0 | OUTPATIENT
Start: 2018-01-26

## 2018-01-29 NOTE — TELEPHONE ENCOUNTER
Received staff message stating pt is requesting a refill for  Prednisone. Called pt and left message. Call back number left and I myself or one of the other nurses will attempt to contact again. The call was to inform pt an office visit is advised for further evaluation.

## 2018-01-30 ENCOUNTER — TELEPHONE (OUTPATIENT)
Dept: FAMILY MEDICINE CLINIC | Facility: CLINIC | Age: 48
End: 2018-01-30

## 2018-01-30 NOTE — PROGRESS NOTES
Kidney function showing mild improvement. A1c is still in prediabetic range (unchanged from prior reading). Continue efforts with healthy diet and exercise for weight loss. Follow up with scheduled appointments.

## 2018-01-30 NOTE — TELEPHONE ENCOUNTER
Spoke with pt in regards to lab results and medication refill request for prednisone. Two pt identifier's and permission to release verified. Relayed 's notes, Kidney function showing mild improvement.  A1c is still in prediabetic range (unchanged from prior reading).  Continue efforts with healthy diet and exercise for weight loss. Regarding the refill request for prednisone, an office visit is advised for further evaluation. Pt acknowledges understanding and voices no concerns at this time.

## 2018-02-08 ENCOUNTER — OFFICE VISIT (OUTPATIENT)
Dept: FAMILY MEDICINE CLINIC | Facility: CLINIC | Age: 48
End: 2018-02-08

## 2018-02-08 VITALS
HEIGHT: 70 IN | SYSTOLIC BLOOD PRESSURE: 151 MMHG | WEIGHT: 250 LBS | RESPIRATION RATE: 15 BRPM | TEMPERATURE: 98.7 F | HEART RATE: 60 BPM | DIASTOLIC BLOOD PRESSURE: 80 MMHG | BODY MASS INDEX: 35.79 KG/M2 | OXYGEN SATURATION: 95 %

## 2018-02-08 DIAGNOSIS — L40.50 PSORIATIC ARTHRITIS (HCC): Primary | ICD-10-CM

## 2018-02-08 DIAGNOSIS — Z87.09 HISTORY OF ACUTE BRONCHITIS WITH BRONCHOSPASM: ICD-10-CM

## 2018-02-08 DIAGNOSIS — M25.571 ARTHRALGIA OF RIGHT ANKLE: ICD-10-CM

## 2018-02-08 RX ORDER — PREDNISONE 10 MG/1
TABLET ORAL
Qty: 42 TAB | Refills: 0 | Status: SHIPPED | OUTPATIENT
Start: 2018-02-08 | End: 2018-10-24

## 2018-02-08 RX ORDER — ALBUTEROL SULFATE 90 UG/1
2 AEROSOL, METERED RESPIRATORY (INHALATION)
Qty: 1 INHALER | Refills: 3 | Status: SHIPPED | OUTPATIENT
Start: 2018-02-08 | End: 2018-10-29

## 2018-02-08 NOTE — PROGRESS NOTES
Subjective:   Juan Francisco Edgar is a 50 y.o.  male who presents for Ankle swelling    Pt reports a 3 day history of right ankle pain and swelling. He denies recent trauma and states that symptoms ar recurrent. History is significant for psoriatic arthritis. He reports occasional flare-ups of joint pain and states that his current symptoms are similar to past episodes. He was previously followed by rheumatology, but was lost to follow up. Bronchitis:  Pt reports improvement in cough. He reports occasional wheezing that is relieved with albuterol inhaler. He request refill for albuterol. Review of Systems   Constitutional: Negative for chills and fever. Respiratory: Negative. Cardiovascular: Negative. Musculoskeletal: Positive for joint pain. Skin: Negative. Current Outpatient Prescriptions on File Prior to Visit   Medication Sig Dispense Refill    isosorbide mononitrate ER (IMDUR) 60 mg CR tablet Take 1.5 Tabs by mouth every morning. 45 Tab 6    SUBOXONE 8-2 mg film sublingaul film place half of a film on the tongue in the morning, 1/2 a film in the afternoon and 1 film at bedtime  1    tiotropium (SPIRIVA) 18 mcg inhalation capsule Take 1 Cap by inhalation daily. 30 Cap 5    docusate sodium (COLACE) 100 mg capsule Take 1 Cap by mouth two (2) times a day for 90 days. 60 Cap 2    carvedilol (COREG) 6.25 mg tablet Take 2 Tabs by mouth two (2) times daily (with meals). 180 Tab 3    atorvastatin (LIPITOR) 40 mg tablet Take 1 Tab by mouth daily. Indications: hypercholesterolemia 30 Tab 5    bumetanide (BUMEX) 1 mg tablet Take 1 Tab by mouth daily. (Patient taking differently: Take 1 mg by mouth daily as needed.) 90 Tab 2    lidocaine (LIDODERM) 5 % Apply patch to the affected area for 12 hours a day and remove for 12 hours a day. 7 Each 1    clopidogrel (PLAVIX) 75 mg tab Take 1 Tab by mouth daily.  Indications: Myocardial Reinfarction Prevention 90 Tab 3    amiodarone (PACERONE) 400 mg tablet Take 1 Tab by mouth daily. Indications: LIFE-THREATENING VENTRICULAR TACHYCARDIA 90 Tab 3    ixekizumab (TALTZ) 80 mg/mL syringe 80 mg by SubCUTAneous route every fourteen (14) days.  calcium carbonate (TUMS) 200 mg calcium (500 mg) chew Take 1 Tab by mouth daily. Indications: HYPOCALCEMIA 30 Tab 3    aspirin 81 mg chewable tablet Take 162 mg by mouth two (2) times a day.  fluticasone (FLONASE) 50 mcg/actuation nasal spray 2 Sprays by Both Nostrils route daily as needed for Rhinitis. 1 Bottle 1     No current facility-administered medications on file prior to visit. Reviewed PmHx, RxHx, FmHx, SocHx, AllgHx and updated and dated in the chart. Nurse notes were reviewed and are correct    Objective:     Vitals:    02/08/18 1048   BP: 151/80   Pulse: 60   Resp: 15   Temp: 98.7 °F (37.1 °C)   TempSrc: Oral   SpO2: 95%   Weight: 250 lb (113.4 kg)   Height: 5' 10\" (1.778 m)     Physical Exam   Constitutional: He is oriented to person, place, and time. He appears well-developed and well-nourished. HENT:   Head: Normocephalic and atraumatic. Eyes: EOM are normal. Pupils are equal, round, and reactive to light. Neck: Normal range of motion. Neck supple. Cardiovascular: Normal rate, regular rhythm, normal heart sounds and intact distal pulses. Pulmonary/Chest: Effort normal. He has wheezes (occasional expieratory wheeze ). Abdominal: Soft. Bowel sounds are normal.   Musculoskeletal:   Right ankle demonstrates mild edema, no erythema, decreased ROM, TTP diffusely   Neurological: He is alert and oriented to person, place, and time. Skin: Skin is warm and dry. Nursing note and vitals reviewed. Assessment/ Plan:     Diagnoses and all orders for this visit:    1. Psoriatic arthritis ([facility] [address]  -     REFERRAL TO RHEUMATOLOGY    2. Arthralgia of right ankle  -     predniSONE (DELTASONE) 10 mg tablet;  Take 6 tabs PO x 2 d, then 5 tabs x 2 d, 4 tabs x 2 d, 3 tabs x 2 d, 2 tabs x 2 d, 1 tab x 2 d  Indications: PSORIATIC ARTHRITIS    3. History of acute bronchitis with bronchospasm  -     albuterol (PROVENTIL HFA, VENTOLIN HFA, PROAIR HFA) 90 mcg/actuation inhaler; Take 2 Puffs by inhalation every six (6) hours as needed for Wheezing. Indications: BRONCHOSPASM PREVENTION       I have discussed the diagnosis with the patient and the intended plan as seen in the above orders. The patient verbalized understanding and agrees with the plan. Follow-up Disposition:  Return if symptoms worsen or fail to improve.     Maia Mitchell MD

## 2018-02-08 NOTE — MR AVS SNAPSHOT
[facility] 
 
 
 [address] [facility] [address] [address] 
[phone] Patient: Adrienne Montesinos MRN: [id number] :1970 Visit Information Date & Time Provider Department Dept. Phone Encounter #  
 2018 11:00 AM [address] [phone] [id number] Follow-up Instructions Return if symptoms worsen or fail to improve. Your Appointments 2018  9:30 AM  
Follow Up with Sebastián Sanchez MD  
Cardio Specialist at [facility]/[address] [place] [address]) Appt Note: 9 month f/u -kmc; 9 month f/u/r/s'd with the patient's spouse with device check [address]  
  
   
 [id number] [place] [address] [facility] 2018  9:30 AM  
PROCEDURE with Joselyn Ward Csi Cardio Specialist at [facility]/[address] [place] [address] Road) Appt Note: 9 month f/u/r/s'd with the patient's spouse with device check [address]  
  
   
 [id number] [place], [place] [address] 2018  2:00 PM  
Follow Up with Hoda Santiago MD  
[facility]) Appt Note: Return in about 3 months (around 2018). [facility] [id number]  
[address] [place] [address] Upcoming Health Maintenance Date Due DTaP/Tdap/Td series (1 - Tdap) 1989 Pneumococcal 19-64 Highest Risk (2 of 3 - PCV13) 2015 Influenza Age 5 to Adult 2017 Allergies as of 2018  Review Complete On: 2018 By: Ramin Fleming Severity Noted Reaction Type Reactions Latex High 2010    Other (comments)  
 blisters Other Food  2015    Hives Allergic to tomatoes and tomato sauce Codeine    Nausea and Vomiting Current Immunizations  Reviewed on 2015 Name Date Influenza Vaccine 9/17/2014 Influenza Vaccine (Quad) PF 11/4/2015 10:21 AM  
 Pneumococcal Polysaccharide (PPSV-23) 9/6/2014 12:59 PM,  Deferred () Not reviewed this visit You Were Diagnosed With   
  
 Codes Comments Psoriatic arthritis ([facility]    -  Primary ICD-10-CM: L40.50 ICD-9-CM: 696.0 Arthralgia of right ankle     ICD-10-CM: M25.571 ICD-9-CM: 719.47 History of acute bronchitis with bronchospasm     ICD-10-CM: Z87.09 
ICD-9-CM: V12.69 Vitals BP Pulse Temp Resp Height(growth percentile) Weight(growth percentile) 151/80 (BP 1 Location: Right arm, BP Patient Position: Sitting) 60 98.7 °F (37.1 °C) (Oral) 15 5' 10\" (1.778 m) 250 lb (113.4 kg) SpO2 BMI Smoking Status 95% 35.87 kg/m2 Former Smoker Vitals History BMI and BSA Data Body Mass Index Body Surface Area  
 35.87 kg/m 2 2.37 m 2 Preferred Pharmacy Pharmacy Name Phone [id number] [phone] [facility], [address] [place] [address] [phone] Your Updated Medication List  
  
   
This list is accurate as of: 2/8/18 11:24 AM.  Always use your most recent med list.  
  
  
  
  
 albuterol 90 mcg/actuation inhaler Commonly known as:  PROVENTIL HFA, VENTOLIN HFA, PROAIR HFA Take 2 Puffs by inhalation every six (6) hours as needed for Wheezing. Indications: BRONCHOSPASM PREVENTION  
  
 amiodarone 400 mg tablet Commonly known as:  Carlo Congo Take 1 Tab by mouth daily. Indications: LIFE-THREATENING VENTRICULAR TACHYCARDIA  
  
 aspirin 81 mg chewable tablet Take 162 mg by mouth two (2) times a day. atorvastatin 40 mg tablet Commonly known as:  LIPITOR Take 1 Tab by mouth daily. Indications: hypercholesterolemia  
  
 bumetanide 1 mg tablet Commonly known as:  Alleen Bel Take 1 Tab by mouth daily. calcium carbonate 200 mg calcium (500 mg) Chew Commonly known as:  TUMS Take 1 Tab by mouth daily. Indications: HYPOCALCEMIA carvedilol 6.25 mg tablet Commonly known as:  Selvin Nunesker Take 2 Tabs by mouth two (2) times daily (with meals). clopidogrel 75 mg Tab Commonly known as:  PLAVIX Take 1 Tab by mouth daily. Indications: Myocardial Reinfarction Prevention  
  
 docusate sodium 100 mg capsule Commonly known as:  Telly Betina Take 1 Cap by mouth two (2) times a day for 90 days. fluticasone 50 mcg/actuation nasal spray Commonly known as:  Pieter Lover 2 Sprays by Both Nostrils route daily as needed for Rhinitis. isosorbide mononitrate ER 60 mg CR tablet Commonly known as:  IMDUR Take 1.5 Tabs by mouth every morning. ixekizumab 80 mg/mL syringe Commonly known as:  Industry Cast 80 mg by SubCUTAneous route every fourteen (14) days. lidocaine 5 % Commonly known as:  Maxx Man Apply patch to the affected area for 12 hours a day and remove for 12 hours a day. predniSONE 10 mg tablet Commonly known as:  Hattie Lard Take 6 tabs PO x 2 d, then 5 tabs x 2 d, 4 tabs x 2 d, 3 tabs x 2 d, 2 tabs x 2 d, 1 tab x 2 d  Indications: PSORIATIC ARTHRITIS  
  
 SUBOXONE 8-2 mg Film sublingaul film Generic drug:  buprenorphine-naloxone  
place half of a film on the tongue in the morning, 1/2 a film in the afternoon and 1 film at bedtime  
  
 tiotropium 18 mcg inhalation capsule Commonly known as:  Mariana Ear Take 1 Cap by inhalation daily. Prescriptions Sent to Pharmacy Refills  
 predniSONE (DELTASONE) 10 mg tablet 0 Sig: Take 6 tabs PO x 2 d, then 5 tabs x 2 d, 4 tabs x 2 d, 3 tabs x 2 d, 2 tabs x 2 d, 1 tab x 2 d  Indications: PSORIATIC ARTHRITIS Class: Normal  
 Pharmacy: [id number]- [place], [address] [place] [address]  #: [phone]  
 albuterol (PROVENTIL HFA, VENTOLIN HFA, PROAIR HFA) 90 mcg/actuation inhaler 3 Sig: Take 2 Puffs by inhalation every six (6) hours as needed for Wheezing. Indications: BRONCHOSPASM PREVENTION  Class: Normal  
 Pharmacy: [facility], [address] [place] [address]  #: [phone] Route: Inhalation We Performed the Following REFERRAL TO RHEUMATOLOGY [ITO34 Custom] Comments:  
 Please evaluate for history of psoriatic arthritis. Blanca Pérez Follow-up Instructions Return if symptoms worsen or fail to improve. Referral Information Referral ID Referred By Referred To  
  
 [id number] DEMARCO LOZANO III Not Available Visits Status Start Date End Date 1 New Request 2/8/18 2/8/19 If your referral has a status of pending review or denied, additional information will be sent to support the outcome of this decision. Introducing [facility]! Dear Diann Guzman: Thank you for requesting a [employer] account. Our records indicate that you already have an active [employer] account. You can access your account anytime at [email or web address] Did you know that you can access your hospital and ER discharge instructions at any time in Motor[employer]? You can also review all of your test results from your hospital stay or ER visit. Additional Information If you have questions, please visit the Frequently Asked Questions section of the [employer] website at [email or web address] Remember, Motor2 is NOT to be used for urgent needs. For medical emergencies, dial 911. Now available from your iPhone and Android! Please provide this summary of care documentation to your next provider. Your primary care clinician is listed as Mitali Parra If you have any questions after today's visit, please call [phone].

## 2018-02-08 NOTE — PROGRESS NOTES
Maria Guadalupe Villa is a 50 y.o.  male presents today for acute care visit for right swollen ankle for 3 days. This patient is accompanied in the office by his spouse. Pt is in Room # 4.      1. Have you been to the ER, urgent care clinic since your last visit? Hospitalized since your last visit? No    2. Have you seen or consulted any other health care providers outside of the [address] [place] [address] since your last visit? Include any pap smears or colon screening. No    Health Maintenance deferred per pt.       Upcoming Appts  Dr. Laurence Villalpando - Cardiology 2/2018  Dr. Lynnda Romberg - Neprology 2/2018

## 2018-02-14 ENCOUNTER — OFFICE VISIT (OUTPATIENT)
Dept: CARDIOLOGY CLINIC | Age: 48
End: 2018-02-14

## 2018-02-14 ENCOUNTER — CLINICAL SUPPORT (OUTPATIENT)
Dept: CARDIOLOGY CLINIC | Age: 48
End: 2018-02-14

## 2018-02-14 VITALS
HEART RATE: 51 BPM | OXYGEN SATURATION: 98 % | BODY MASS INDEX: 35.79 KG/M2 | HEIGHT: 70 IN | WEIGHT: 250 LBS | DIASTOLIC BLOOD PRESSURE: 89 MMHG | SYSTOLIC BLOOD PRESSURE: 159 MMHG

## 2018-02-14 DIAGNOSIS — E66.01 MORBID OBESITY, UNSPECIFIED OBESITY TYPE (HCC): ICD-10-CM

## 2018-02-14 DIAGNOSIS — I49.01 VENTRICULAR FIBRILLATION (HCC): ICD-10-CM

## 2018-02-14 DIAGNOSIS — I25.10 CORONARY ARTERY DISEASE DUE TO LIPID RICH PLAQUE: Primary | ICD-10-CM

## 2018-02-14 DIAGNOSIS — I25.83 CORONARY ARTERY DISEASE DUE TO LIPID RICH PLAQUE: Primary | ICD-10-CM

## 2018-02-14 DIAGNOSIS — E78.00 PURE HYPERCHOLESTEROLEMIA: ICD-10-CM

## 2018-02-14 DIAGNOSIS — I10 ESSENTIAL HYPERTENSION WITH GOAL BLOOD PRESSURE LESS THAN 140/90: ICD-10-CM

## 2018-02-14 DIAGNOSIS — I47.20 VENTRICULAR TACHYCARDIA: Primary | ICD-10-CM

## 2018-02-14 DIAGNOSIS — Z45.02 ICD (IMPLANTABLE CARDIOVERTER-DEFIBRILLATOR) DISCHARGE: ICD-10-CM

## 2018-02-14 DIAGNOSIS — I42.9 CARDIOMYOPATHY, UNSPECIFIED TYPE (HCC): ICD-10-CM
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
## 2018-02-14 NOTE — PROGRESS NOTES
1. Have you been to the ER, urgent care clinic since your last visit? Hospitalized since your last visit? No    2. Have you seen or consulted any other health care providers outside of the [address] [place] [address] since your last visit? Include any pap smears or colon screening.  No

## 2018-02-14 NOTE — MR AVS SNAPSHOT
[address] [facility] [address] [address] 
[phone] Patient: Avery Roe MRN: [id number] :1970 Visit Information Date & Time Provider Department Dept. Phone Encounter #  
 2018  9:30 AM Sebastián Winter MD Cardio Specialist at [facility]/[address] [phone] [id number] Follow-up Instructions Return in about 6 months (around 2018). Your Appointments 2018  2:00 PM  
Follow Up with Iglesia Deal MD  
[facility]) Appt Note: Return in about 3 months (around 2018). [address] [facility] [address] [phone]  
[address] [address]  
  
    
 2018  1:00 PM  
Follow Up with Kamryn Ramesh MD  
Cardio Specialist at [facility]/[address]) Appt Note: 6 months [facility] [phone] [place] Upcoming Health Maintenance Date Due DTaP/Tdap/Td series (1 - Tdap) 1989 Pneumococcal 19-64 Highest Risk (2 of 3 - PCV13) 2015 Influenza Age 5 to Adult 2017 Allergies as of 2018  Review Complete On: 2018 By: Armando Cortes RN Severity Noted Reaction Type Reactions Latex High 2010    Other (comments)  
 blisters Other Food  2015    Hives Allergic to tomatoes and tomato sauce Codeine    Nausea and Vomiting Current Immunizations  Reviewed on 2015 Name Date Influenza Vaccine 2014 Influenza Vaccine (Quad) PF 2015 10:21 AM  
 Pneumococcal Polysaccharide (PPSV-23) 2014 12:59 PM,  Deferred () Not reviewed this visit Vitals BP Pulse Height(growth percentile) Weight(growth percentile) SpO2 BMI  
 159/89 (!) 51 5' 10\" (1.778 m) 250 lb (113.4 kg) 98% 35.87 kg/m2 Smoking Status Former Smoker BMI and BSA Data Body Mass Index Body Surface Area  
 35.87 kg/m 2 2.37 m 2 Preferred Pharmacy Pharmacy Name Phone [facility]/PHARMACY #[phone] Your Updated Medication List  
  
   
This list is accurate as of: 2/14/18  9:54 AM.  Always use your most recent med list.  
  
  
  
  
 albuterol 90 mcg/actuation inhaler Commonly known as:  PROVENTIL HFA, VENTOLIN HFA, PROAIR HFA Take 2 Puffs by inhalation every six (6) hours as needed for Wheezing. Indications: BRONCHOSPASM PREVENTION  
  
 amiodarone 400 mg tablet Commonly known as:  Beola Wright Take 1 Tab by mouth daily. Indications: LIFE-THREATENING VENTRICULAR TACHYCARDIA  
  
 aspirin 81 mg chewable tablet Take 162 mg by mouth two (2) times a day. atorvastatin 40 mg tablet Commonly known as:  LIPITOR Take 1 Tab by mouth daily. Indications: hypercholesterolemia  
  
 bumetanide 1 mg tablet Commonly known as:  Schley Sayer Take 1 Tab by mouth daily. calcium carbonate 200 mg calcium (500 mg) Chew Commonly known as:  TUMS Take 1 Tab by mouth daily. Indications: HYPOCALCEMIA  
  
 carvedilol 6.25 mg tablet Commonly known as:  Drena Gavel Take 2 Tabs by mouth two (2) times daily (with meals). clopidogrel 75 mg Tab Commonly known as:  PLAVIX Take 1 Tab by mouth daily. Indications: Myocardial Reinfarction Prevention  
  
 docusate sodium 100 mg capsule Commonly known as:  Annabelle Gowers Take 1 Cap by mouth two (2) times a day for 90 days. fluticasone 50 mcg/actuation nasal spray Commonly known as:  Trini Finders 2 Sprays by Both Nostrils route daily as needed for Rhinitis. isosorbide mononitrate ER 60 mg CR tablet Commonly known as:  IMDUR Take 1.5 Tabs by mouth every morning. ixekizumab 80 mg/mL syringe Commonly known as:  Blandon Guerra 80 mg by SubCUTAneous route every fourteen (14) days. lidocaine 5 % Commonly known as:  Juan Diego Forth Apply patch to the affected area for 12 hours a day and remove for 12 hours a day. predniSONE 10 mg tablet Commonly known as:  Beatris Reed Take 6 tabs PO x 2 d, then 5 tabs x 2 d, 4 tabs x 2 d, 3 tabs x 2 d, 2 tabs x 2 d, 1 tab x 2 d  Indications: PSORIATIC ARTHRITIS  
  
 SUBOXONE 8-2 mg Film sublingaul film Generic drug:  buprenorphine-naloxone  
place half of a film on the tongue in the morning, 1/2 a film in the afternoon and 1 film at bedtime  
  
 tiotropium 18 mcg inhalation capsule Commonly known as:  Hanny Armor Take 1 Cap by inhalation daily. Follow-up Instructions Return in about 6 months (around 8/14/2018). Introducing [facility]! Dear Segundo Durham: Thank you for requesting a [employer] account. Our records indicate that you already have an active [employer] account. You can access your account anytime at [email or web address] Did you know that you can access your hospital and ER discharge instructions at any time in [employer]? You can also review all of your test results from your hospital stay or ER visit. Additional Information If you have questions, please visit the Frequently Asked Questions section of the [employer] website at [email or web address] Remember, [employer] is NOT to be used for urgent needs. For medical emergencies, dial 911. Now available from your iPhone and Android! Please provide this summary of care documentation to your next provider. Your primary care clinician is listed as Celeste Learn If you have any questions after today's visit, please call [phone].

## 2018-03-28 NOTE — TELEPHONE ENCOUNTER
Called pt and left message. Call back number left and I myself or one of the other nurses will attempt to contact again. The call was to inform pt that he had 2 refills left.

## 2018-03-28 NOTE — TELEPHONE ENCOUNTER
Anjelica Borges called to ask if patient Yfn Dela Cruz can switch from albuterol to pro air. She states albuterol is not working as well. I told patient I would call her in the morning after speaking to doctor Emma Garnica.

## 2018-04-03 NOTE — TELEPHONE ENCOUNTER
Received a call from the pt re: medication change. Two pt identifier's verified and permission to release. S/w the pt's spouse re: request for inhaler change.

## 2018-05-08 RX ORDER — AMIODARONE HYDROCHLORIDE 400 MG/1
400 TABLET ORAL DAILY
Qty: 90 TAB | Refills: 3 | Status: SHIPPED | OUTPATIENT
Start: 2018-05-08 | End: 2018-05-14 | Stop reason: SDUPTHER

## 2018-05-14 RX ORDER — AMIODARONE HYDROCHLORIDE 400 MG/1
400 TABLET ORAL DAILY
Qty: 90 TAB | Refills: 3 | Status: SHIPPED | OUTPATIENT
Start: 2018-05-14

## 2018-05-25 DIAGNOSIS — K59.03 DRUG-INDUCED CONSTIPATION: ICD-10-CM

## 2018-05-25 RX ORDER — DOCUSATE SODIUM 100 MG/1
CAPSULE, LIQUID FILLED ORAL
Qty: 60 CAP | Refills: 0 | Status: SHIPPED | OUTPATIENT
Start: 2018-05-25 | End: 2018-07-24 | Stop reason: SDUPTHER

## 2018-07-09 NOTE — TELEPHONE ENCOUNTER
Refill request via fax. Last appt: 2/14/2018  Future Appointments  Date Time Provider Radha Thakkar   8/16/2018 1:00 PM Sebastián Muñiz MD [address] [place] [address]       Requested Prescriptions     Pending Prescriptions Disp Refills    clopidogrel (PLAVIX) 75 mg tab 90 Tab 3     Sig: Take 1 Tab by mouth daily.  Indications: Myocardial Reinfarction Prevention

## 2018-07-10 RX ORDER — CLOPIDOGREL BISULFATE 75 MG/1
75 TABLET ORAL DAILY
Qty: 90 TAB | Refills: 3 | Status: SHIPPED | OUTPATIENT
Start: 2018-07-10 | End: 2018-07-11 | Stop reason: SDUPTHER

## 2018-07-11 RX ORDER — CLOPIDOGREL BISULFATE 75 MG/1
75 TABLET ORAL DAILY
Qty: 90 TAB | Refills: 3 | Status: SHIPPED | OUTPATIENT
Start: 2018-07-11 | End: 2019-02-18

## 2018-07-11 NOTE — TELEPHONE ENCOUNTER
Incoming from pharmacy. Two patient Identifiers confirmed. Medication refill request.      Last appt: 2/14/2018  Future Appointments  Date Time Provider Radha Thakkar   8/16/2018 1:00 PM Sebastián Jeff MD [address] [place] [address]       Requested Prescriptions     Pending Prescriptions Disp Refills    clopidogrel (PLAVIX) 75 mg tab 90 Tab 3     Sig: Take 1 Tab by mouth daily.  Indications: Myocardial Reinfarction Prevention

## 2018-07-17 ENCOUNTER — TELEPHONE (OUTPATIENT)
Dept: CARDIOLOGY CLINIC | Age: 48
End: 2018-07-17

## 2018-07-17 NOTE — TELEPHONE ENCOUNTER
Patient wife calling to see if ok for him to take new psoriasis medicine. Called Otevroberto ? Casandra Jones Advised Dr. Caryn George out until next week and will check with him next week.      She verbalized understanding

## 2018-07-24 ENCOUNTER — TELEPHONE (OUTPATIENT)
Dept: CARDIOLOGY CLINIC | Age: 48
End: 2018-07-24

## 2018-07-24 DIAGNOSIS — K59.03 DRUG-INDUCED CONSTIPATION: ICD-10-CM

## 2018-07-24 RX ORDER — DOCUSATE SODIUM 100 MG
CAPSULE ORAL
Qty: 60 CAP | Refills: 1 | Status: SHIPPED | OUTPATIENT
Start: 2018-07-24 | End: 2018-09-27 | Stop reason: SDUPTHER

## 2018-07-24 NOTE — TELEPHONE ENCOUNTER
Patient's wife called in inquiring about an answer for their proposed question last week. I explained that the nursing staff is simply waiting on a response from Jody Zamora since he was on vacation and that a call will be returned as soon as possible.

## 2018-07-25 NOTE — TELEPHONE ENCOUNTER
Ritesh Mauricio        7/17/18 12:37 PM   Note      Patient wife calling to see if ok for him to take new psoriasis medicine. Called Oteda ?  Abimbola Godoy out until next week and will check with him next week.      She verbalized understanding

## 2018-07-26 NOTE — TELEPHONE ENCOUNTER
Verbal order and read back per Mouna Mcbride MD  From a cardiac stand point no contraindications. Will advise pt.

## 2018-07-26 NOTE — TELEPHONE ENCOUNTER
Attempted to contact pt at /cell number. Either incorrect number or not in service will send [employer] message.

## 2018-07-26 NOTE — TELEPHONE ENCOUNTER
Attempted to contact pt at  number, no answer. Lvm for pt to return call to office at [phone]. Will continue to try to contact pt.

## 2018-08-06 RX ORDER — ATORVASTATIN CALCIUM 40 MG/1
40 TABLET, FILM COATED ORAL DAILY
Qty: 90 TAB | Refills: 3 | Status: SHIPPED | OUTPATIENT
Start: 2018-08-06

## 2018-08-06 NOTE — TELEPHONE ENCOUNTER
Last appt: 2/14/2018  Future Appointments  Date Time Provider Radha Nguyễni   8/16/2018 1:00 PM Neida Galindo  [facility]       Requested Prescriptions     Pending Prescriptions Disp Refills    atorvastatin (LIPITOR) 40 mg tablet 90 Tab 3     Sig: Take 1 Tab by mouth daily.  Indications: hypercholesterolemia

## 2018-08-22 RX ORDER — CARVEDILOL 6.25 MG/1
12.5 TABLET ORAL 2 TIMES DAILY WITH MEALS
Qty: 180 TAB | Refills: 3 | Status: SHIPPED | OUTPATIENT
Start: 2018-08-22 | End: 2018-09-11 | Stop reason: SDUPTHER

## 2018-08-22 NOTE — TELEPHONE ENCOUNTER
Last appt: 2/14/2018  No future appointments. Requested Prescriptions     Pending Prescriptions Disp Refills    carvedilol (COREG) 6.25 mg tablet 180 Tab 3     Sig: Take 2 Tabs by mouth two (2) times daily (with meals).

## 2018-09-11 RX ORDER — CARVEDILOL 6.25 MG/1
12.5 TABLET ORAL 2 TIMES DAILY WITH MEALS
Qty: 180 TAB | Refills: 1 | Status: SHIPPED | OUTPATIENT
Start: 2018-09-11 | End: 2018-10-24

## 2018-09-11 NOTE — TELEPHONE ENCOUNTER
Last appt: 2/14/2018  No future appointments. Requested Prescriptions     Pending Prescriptions Disp Refills    carvedilol (COREG) 6.25 mg tablet 180 Tab 1     Sig: Take 2 Tabs by mouth two (2) times daily (with meals).

## 2018-09-24 NOTE — TELEPHONE ENCOUNTER
PCP: Maritza Odonnell MD    Last appt: 2/14/2018  Future Appointments  Date Time Provider Radha Thakkar   9/24/2018 9:45 AM Sebastián Kee MD [address] [place] [address]       Requested Prescriptions     Pending Prescriptions Disp Refills    isosorbide mononitrate ER (IMDUR) 60 mg CR tablet 45 Tab 0     Sig: Take 1.5 Tabs by mouth every morning. Comments:  Pt given 30 day supply until appt.

## 2018-09-25 RX ORDER — ISOSORBIDE MONONITRATE 60 MG/1
90 TABLET, EXTENDED RELEASE ORAL
Qty: 45 TAB | Refills: 0 | Status: ON HOLD | OUTPATIENT
Start: 2018-09-25 | End: 2020-01-01 | Stop reason: SDUPTHER

## 2018-09-28 ENCOUNTER — HOSPITAL ENCOUNTER (OUTPATIENT)
Dept: LAB | Age: 48
Discharge: HOME | End: 2018-09-28
Payer: MEDICARE

## 2018-09-28 DIAGNOSIS — L40.9 PSORIASIS: ICD-10-CM

## 2018-09-28 DIAGNOSIS — N18.30 KIDNEY DISEASE, CHRONIC, STAGE III (MODERATE, EGFR 30-59 ML/MIN) (HCC): ICD-10-CM

## 2018-09-28 LAB
25(OH)D3 SERPL-MCNC: 8.7 NG/ML (ref 30–100)
ALBUMIN SERPL-MCNC: 2.6 G/DL (ref 3.4–5)
ALBUMIN SERPL-MCNC: 2.6 G/DL (ref 3.4–5)
ALBUMIN/GLOB SERPL: 0.6 {RATIO} (ref 0.8–1.7)
ALP SERPL-CCNC: 97 U/L (ref 45–117)
ALT SERPL-CCNC: 59 U/L (ref 16–61)
ANION GAP SERPL CALC-SCNC: 12 MMOL/L (ref 3–18)
ANION GAP SERPL CALC-SCNC: 12 MMOL/L (ref 3–18)
AST SERPL-CCNC: 29 U/L (ref 15–37)
BASOPHILS # BLD: 0 K/UL (ref 0–0.1)
BASOPHILS # BLD: 0 K/UL (ref 0–0.1)
BASOPHILS NFR BLD: 0 % (ref 0–2)
BASOPHILS NFR BLD: 0 % (ref 0–2)
BILIRUB SERPL-MCNC: 0.2 MG/DL (ref 0.2–1)
BUN SERPL-MCNC: 50 MG/DL (ref 7–18)
BUN SERPL-MCNC: 52 MG/DL (ref 7–18)
BUN/CREAT SERPL: 6 (ref 12–20)
BUN/CREAT SERPL: 6 (ref 12–20)
CALCIUM SERPL-MCNC: 7.9 MG/DL (ref 8.5–10.1)
CHLORIDE SERPL-SCNC: 103 MMOL/L (ref 100–108)
CHLORIDE SERPL-SCNC: 103 MMOL/L (ref 100–108)
CO2 SERPL-SCNC: 22 MMOL/L (ref 21–32)
CO2 SERPL-SCNC: 22 MMOL/L (ref 21–32)
CREAT SERPL-MCNC: 7.89 MG/DL (ref 0.6–1.3)
CREAT SERPL-MCNC: 8.01 MG/DL (ref 0.6–1.3)
CREAT UR-MCNC: 90.1 MG/DL (ref 30–125)
DIFFERENTIAL METHOD BLD: ABNORMAL
DIFFERENTIAL METHOD BLD: ABNORMAL
EOSINOPHIL # BLD: 0.2 K/UL (ref 0–0.4)
EOSINOPHIL # BLD: 0.2 K/UL (ref 0–0.4)
EOSINOPHIL NFR BLD: 3 % (ref 0–5)
EOSINOPHIL NFR BLD: 3 % (ref 0–5)
ERYTHROCYTE [DISTWIDTH] IN BLOOD BY AUTOMATED COUNT: 15.7 % (ref 11.6–14.5)
ERYTHROCYTE [DISTWIDTH] IN BLOOD BY AUTOMATED COUNT: 15.8 % (ref 11.6–14.5)
GLOBULIN SER CALC-MCNC: 4.5 G/DL (ref 2–4)
GLUCOSE SERPL-MCNC: 88 MG/DL (ref 74–99)
GLUCOSE SERPL-MCNC: 88 MG/DL (ref 74–99)
HCT VFR BLD AUTO: 34.4 % (ref 36–48)
HCT VFR BLD AUTO: 34.6 % (ref 36–48)
HGB BLD-MCNC: 11.2 G/DL (ref 13–16)
HGB BLD-MCNC: 11.3 G/DL (ref 13–16)
LYMPHOCYTES # BLD: 1.4 K/UL (ref 0.9–3.6)
LYMPHOCYTES # BLD: 1.6 K/UL (ref 0.9–3.6)
LYMPHOCYTES NFR BLD: 21 % (ref 21–52)
LYMPHOCYTES NFR BLD: 21 % (ref 21–52)
MCH RBC QN AUTO: 27.4 PG (ref 24–34)
MCH RBC QN AUTO: 27.5 PG (ref 24–34)
MCHC RBC AUTO-ENTMCNC: 32.6 G/DL (ref 31–37)
MCHC RBC AUTO-ENTMCNC: 32.7 G/DL (ref 31–37)
MCV RBC AUTO: 84 FL (ref 74–97)
MCV RBC AUTO: 84.3 FL (ref 74–97)
MONOCYTES # BLD: 0.4 K/UL (ref 0.05–1.2)
MONOCYTES # BLD: 0.5 K/UL (ref 0.05–1.2)
MONOCYTES NFR BLD: 6 % (ref 3–10)
MONOCYTES NFR BLD: 7 % (ref 3–10)
NEUTS SEG # BLD: 4.8 K/UL (ref 1.8–8)
NEUTS SEG # BLD: 5.1 K/UL (ref 1.8–8)
NEUTS SEG NFR BLD: 69 % (ref 40–73)
NEUTS SEG NFR BLD: 70 % (ref 40–73)
PHOSPHATE SERPL-MCNC: 5.8 MG/DL (ref 2.5–4.9)
PLATELET # BLD AUTO: 231 K/UL (ref 135–420)
PLATELET # BLD AUTO: 234 K/UL (ref 135–420)
PMV BLD AUTO: 8.8 FL (ref 9.2–11.8)
PMV BLD AUTO: 8.9 FL (ref 9.2–11.8)
POTASSIUM SERPL-SCNC: 4.6 MMOL/L (ref 3.5–5.5)
POTASSIUM SERPL-SCNC: 4.6 MMOL/L (ref 3.5–5.5)
PROT SERPL-MCNC: 7.1 G/DL (ref 6.4–8.2)
PROT UR-MCNC: 311 MG/DL
PTH-INTACT SERPL-MCNC: 511.6 PG/ML (ref 18.4–88)
RBC # BLD AUTO: 4.08 M/UL (ref 4.7–5.5)
RBC # BLD AUTO: 4.12 M/UL (ref 4.7–5.5)
SODIUM SERPL-SCNC: 137 MMOL/L (ref 136–145)
SODIUM SERPL-SCNC: 137 MMOL/L (ref 136–145)
WBC # BLD AUTO: 6.9 K/UL (ref 4.6–13.2)
WBC # BLD AUTO: 7.4 K/UL (ref 4.6–13.2)

## 2018-09-28 PROCEDURE — 82306 VITAMIN D 25 HYDROXY: CPT | Performed by: INTERNAL MEDICINE

## 2018-09-28 PROCEDURE — 80053 COMPREHEN METABOLIC PANEL: CPT | Performed by: NURSE PRACTITIONER

## 2018-09-28 PROCEDURE — 36415 COLL VENOUS BLD VENIPUNCTURE: CPT | Performed by: INTERNAL MEDICINE

## 2018-09-28 PROCEDURE — 84156 ASSAY OF PROTEIN URINE: CPT | Performed by: INTERNAL MEDICINE

## 2018-09-28 PROCEDURE — 82570 ASSAY OF URINE CREATININE: CPT | Performed by: INTERNAL MEDICINE

## 2018-09-28 PROCEDURE — 80069 RENAL FUNCTION PANEL: CPT | Performed by: INTERNAL MEDICINE

## 2018-09-28 PROCEDURE — 85025 COMPLETE CBC W/AUTO DIFF WBC: CPT | Performed by: INTERNAL MEDICINE

## 2018-09-28 PROCEDURE — 86480 TB TEST CELL IMMUN MEASURE: CPT | Performed by: NURSE PRACTITIONER

## 2018-09-28 PROCEDURE — 83970 ASSAY OF PARATHORMONE: CPT | Performed by: INTERNAL MEDICINE

## 2018-10-03 ENCOUNTER — APPOINTMENT (OUTPATIENT)
Dept: CT IMAGING | Age: 48
DRG: 674 | End: 2018-10-03
Attending: EMERGENCY MEDICINE
Payer: MEDICARE

## 2018-10-03 ENCOUNTER — HOSPITAL ENCOUNTER (INPATIENT)
Age: 48
LOS: 21 days | Discharge: HOME HEALTH | DRG: 674 | End: 2018-10-24
Attending: EMERGENCY MEDICINE | Admitting: INTERNAL MEDICINE
Payer: MEDICARE

## 2018-10-03 DIAGNOSIS — N17.9 AKI (ACUTE KIDNEY INJURY) (HCC): Primary | ICD-10-CM

## 2018-10-03 DIAGNOSIS — R11.2 NAUSEA AND VOMITING, INTRACTABILITY OF VOMITING NOT SPECIFIED, UNSPECIFIED VOMITING TYPE: ICD-10-CM

## 2018-10-03 DIAGNOSIS — R10.84 ABDOMINAL PAIN, GENERALIZED: ICD-10-CM

## 2018-10-03 PROBLEM — R82.71 BACTERIA IN URINE: Status: ACTIVE | Noted: 2018-10-03

## 2018-10-03 LAB
ALBUMIN SERPL-MCNC: 2.7 G/DL (ref 3.4–5)
ALBUMIN/GLOB SERPL: 0.5 {RATIO} (ref 0.8–1.7)
ALP SERPL-CCNC: 100 U/L (ref 45–117)
ALT SERPL-CCNC: 56 U/L (ref 16–61)
ANION GAP SERPL CALC-SCNC: 13 MMOL/L (ref 3–18)
ANION GAP SERPL CALC-SCNC: 14 MMOL/L (ref 3–18)
ANNOTATION COMMENT IMP: NORMAL
APPEARANCE UR: ABNORMAL
AST SERPL-CCNC: 34 U/L (ref 15–37)
BACTERIA URNS QL MICRO: ABNORMAL /HPF
BASOPHILS # BLD: 0 K/UL (ref 0–0.1)
BASOPHILS # BLD: 0 K/UL (ref 0–0.1)
BASOPHILS NFR BLD: 0 % (ref 0–2)
BASOPHILS NFR BLD: 0 % (ref 0–2)
BILIRUB SERPL-MCNC: 0.2 MG/DL (ref 0.2–1)
BILIRUB UR QL: NEGATIVE
BUN SERPL-MCNC: 66 MG/DL (ref 7–18)
BUN SERPL-MCNC: 70 MG/DL (ref 7–18)
BUN/CREAT SERPL: 6 (ref 12–20)
BUN/CREAT SERPL: 6 (ref 12–20)
CALCIUM SERPL-MCNC: 7 MG/DL (ref 8.5–10.1)
CALCIUM SERPL-MCNC: 7.8 MG/DL (ref 8.5–10.1)
CHLORIDE SERPL-SCNC: 103 MMOL/L (ref 100–108)
CHLORIDE SERPL-SCNC: 107 MMOL/L (ref 100–108)
CO2 SERPL-SCNC: 18 MMOL/L (ref 21–32)
CO2 SERPL-SCNC: 20 MMOL/L (ref 21–32)
COLOR UR: ABNORMAL
CREAT SERPL-MCNC: 11 MG/DL (ref 0.6–1.3)
CREAT SERPL-MCNC: 11 MG/DL (ref 0.6–1.3)
DIFFERENTIAL METHOD BLD: ABNORMAL
DIFFERENTIAL METHOD BLD: ABNORMAL
EOSINOPHIL # BLD: 0.2 K/UL (ref 0–0.4)
EOSINOPHIL # BLD: 0.2 K/UL (ref 0–0.4)
EOSINOPHIL NFR BLD: 3 % (ref 0–5)
EOSINOPHIL NFR BLD: 3 % (ref 0–5)
EPITH CASTS URNS QL MICRO: ABNORMAL /LPF (ref 0–5)
ERYTHROCYTE [DISTWIDTH] IN BLOOD BY AUTOMATED COUNT: 15.8 % (ref 11.6–14.5)
ERYTHROCYTE [DISTWIDTH] IN BLOOD BY AUTOMATED COUNT: 16 % (ref 11.6–14.5)
GLOBULIN SER CALC-MCNC: 5.7 G/DL (ref 2–4)
GLUCOSE SERPL-MCNC: 107 MG/DL (ref 74–99)
GLUCOSE SERPL-MCNC: 116 MG/DL (ref 74–99)
GLUCOSE UR STRIP.AUTO-MCNC: 100 MG/DL
HCT VFR BLD AUTO: 33.7 % (ref 36–48)
HCT VFR BLD AUTO: 37.9 % (ref 36–48)
HGB BLD-MCNC: 10.9 G/DL (ref 13–16)
HGB BLD-MCNC: 12.2 G/DL (ref 13–16)
HGB UR QL STRIP: ABNORMAL
KETONES UR QL STRIP.AUTO: NEGATIVE MG/DL
LEUKOCYTE ESTERASE UR QL STRIP.AUTO: ABNORMAL
LIPASE SERPL-CCNC: 160 U/L (ref 73–393)
LYMPHOCYTES # BLD: 1.2 K/UL (ref 0.9–3.6)
LYMPHOCYTES # BLD: 1.9 K/UL (ref 0.9–3.6)
LYMPHOCYTES NFR BLD: 16 % (ref 21–52)
LYMPHOCYTES NFR BLD: 24 % (ref 21–52)
M TB IFN-G CD4+ BCKGRND COR BLD-ACNC: 0.1 IU/ML
M TB IFN-G CD4+ T-CELLS BLD-ACNC: 0.26 IU/ML
M TB TUBERC IFN-G BLD QL: NEGATIVE
M TB TUBERC IGNF/MITOGEN IGNF CONTROL: >10 IU/ML
MCH RBC QN AUTO: 27.2 PG (ref 24–34)
MCH RBC QN AUTO: 27.7 PG (ref 24–34)
MCHC RBC AUTO-ENTMCNC: 32.2 G/DL (ref 31–37)
MCHC RBC AUTO-ENTMCNC: 32.3 G/DL (ref 31–37)
MCV RBC AUTO: 84.4 FL (ref 74–97)
MCV RBC AUTO: 85.5 FL (ref 74–97)
MONOCYTES # BLD: 0.4 K/UL (ref 0.05–1.2)
MONOCYTES # BLD: 0.5 K/UL (ref 0.05–1.2)
MONOCYTES NFR BLD: 5 % (ref 3–10)
MONOCYTES NFR BLD: 7 % (ref 3–10)
NEUTS SEG # BLD: 5.2 K/UL (ref 1.8–8)
NEUTS SEG # BLD: 6 K/UL (ref 1.8–8)
NEUTS SEG NFR BLD: 66 % (ref 40–73)
NEUTS SEG NFR BLD: 76 % (ref 40–73)
NITRITE UR QL STRIP.AUTO: NEGATIVE
PH UR STRIP: 5 [PH] (ref 5–8)
PLATELET # BLD AUTO: 198 K/UL (ref 135–420)
PLATELET # BLD AUTO: 227 K/UL (ref 135–420)
PMV BLD AUTO: 8.5 FL (ref 9.2–11.8)
PMV BLD AUTO: 8.5 FL (ref 9.2–11.8)
POTASSIUM SERPL-SCNC: 4.4 MMOL/L (ref 3.5–5.5)
POTASSIUM SERPL-SCNC: 4.7 MMOL/L (ref 3.5–5.5)
PROT SERPL-MCNC: 8.4 G/DL (ref 6.4–8.2)
PROT UR STRIP-MCNC: 300 MG/DL
QUANTIFERON NIL VALUE: 0.16 IU/ML
RBC # BLD AUTO: 3.94 M/UL (ref 4.7–5.5)
RBC # BLD AUTO: 4.49 M/UL (ref 4.7–5.5)
RBC #/AREA URNS HPF: ABNORMAL /HPF (ref 0–5)
SERVICE CMNT-IMP: NORMAL
SODIUM SERPL-SCNC: 136 MMOL/L (ref 136–145)
SODIUM SERPL-SCNC: 139 MMOL/L (ref 136–145)
SP GR UR REFRACTOMETRY: 1.02 (ref 1–1.03)
UROBILINOGEN UR QL STRIP.AUTO: 1 EU/DL (ref 0.2–1)
WBC # BLD AUTO: 7.8 K/UL (ref 4.6–13.2)
WBC # BLD AUTO: 7.9 K/UL (ref 4.6–13.2)
WBC URNS QL MICRO: ABNORMAL /HPF (ref 0–4)

## 2018-10-03 PROCEDURE — 87086 URINE CULTURE/COLONY COUNT: CPT | Performed by: INTERNAL MEDICINE

## 2018-10-03 PROCEDURE — 74011250636 HC RX REV CODE- 250/636: Performed by: EMERGENCY MEDICINE

## 2018-10-03 PROCEDURE — 74011000258 HC RX REV CODE- 258: Performed by: INTERNAL MEDICINE

## 2018-10-03 PROCEDURE — 87040 BLOOD CULTURE FOR BACTERIA: CPT | Performed by: INTERNAL MEDICINE

## 2018-10-03 PROCEDURE — 65660000000 HC RM CCU STEPDOWN

## 2018-10-03 PROCEDURE — 74011250636 HC RX REV CODE- 250/636: Performed by: INTERNAL MEDICINE

## 2018-10-03 PROCEDURE — 83690 ASSAY OF LIPASE: CPT

## 2018-10-03 PROCEDURE — 96374 THER/PROPH/DIAG INJ IV PUSH: CPT

## 2018-10-03 PROCEDURE — 85025 COMPLETE CBC W/AUTO DIFF WBC: CPT

## 2018-10-03 PROCEDURE — 99284 EMERGENCY DEPT VISIT MOD MDM: CPT

## 2018-10-03 PROCEDURE — 80053 COMPREHEN METABOLIC PANEL: CPT

## 2018-10-03 PROCEDURE — 74011250637 HC RX REV CODE- 250/637: Performed by: INTERNAL MEDICINE

## 2018-10-03 PROCEDURE — 74176 CT ABD & PELVIS W/O CONTRAST: CPT

## 2018-10-03 PROCEDURE — 81001 URINALYSIS AUTO W/SCOPE: CPT

## 2018-10-03 PROCEDURE — 36415 COLL VENOUS BLD VENIPUNCTURE: CPT | Performed by: INTERNAL MEDICINE

## 2018-10-03 PROCEDURE — 77030021352 HC CBL LD SYS DISP COVD -B

## 2018-10-03 RX ORDER — ATORVASTATIN CALCIUM 40 MG/1
40 TABLET, FILM COATED ORAL
Status: DISCONTINUED | OUTPATIENT
Start: 2018-10-03 | End: 2018-10-24 | Stop reason: HOSPADM

## 2018-10-03 RX ORDER — BUPRENORPHINE AND NALOXONE 4; 1 MG/1; MG/1
1 FILM, SOLUBLE BUCCAL; SUBLINGUAL 2 TIMES DAILY
Status: DISCONTINUED | OUTPATIENT
Start: 2018-10-04 | End: 2018-10-04 | Stop reason: SDUPTHER

## 2018-10-03 RX ORDER — ONDANSETRON 2 MG/ML
8 INJECTION INTRAMUSCULAR; INTRAVENOUS
Status: DISCONTINUED | OUTPATIENT
Start: 2018-10-03 | End: 2018-10-03

## 2018-10-03 RX ORDER — GUAIFENESIN 100 MG/5ML
162 LIQUID (ML) ORAL 2 TIMES DAILY
Status: DISCONTINUED | OUTPATIENT
Start: 2018-10-03 | End: 2018-10-08

## 2018-10-03 RX ORDER — CALCIUM CARBONATE 200(500)MG
200 TABLET,CHEWABLE ORAL DAILY
Status: DISCONTINUED | OUTPATIENT
Start: 2018-10-04 | End: 2018-10-05

## 2018-10-03 RX ORDER — CLOPIDOGREL BISULFATE 75 MG/1
75 TABLET ORAL DAILY
Status: DISCONTINUED | OUTPATIENT
Start: 2018-10-04 | End: 2018-10-05

## 2018-10-03 RX ORDER — ONDANSETRON 2 MG/ML
8 INJECTION INTRAMUSCULAR; INTRAVENOUS ONCE
Status: COMPLETED | OUTPATIENT
Start: 2018-10-03 | End: 2018-10-03

## 2018-10-03 RX ORDER — BUPRENORPHINE AND NALOXONE 4; 1 MG/1; MG/1
1 FILM, SOLUBLE BUCCAL; SUBLINGUAL 2 TIMES DAILY
Status: DISCONTINUED | OUTPATIENT
Start: 2018-10-04 | End: 2018-10-16 | Stop reason: CLARIF

## 2018-10-03 RX ORDER — BUPRENORPHINE AND NALOXONE 8; 2 MG/1; MG/1
0.5 FILM, SOLUBLE BUCCAL; SUBLINGUAL 2 TIMES DAILY
COMMUNITY
End: 2019-02-18

## 2018-10-03 RX ORDER — BUPRENORPHINE AND NALOXONE 4; 1 MG/1; MG/1
1 FILM, SOLUBLE BUCCAL; SUBLINGUAL 2 TIMES DAILY
Status: DISCONTINUED | OUTPATIENT
Start: 2018-10-03 | End: 2018-10-03

## 2018-10-03 RX ORDER — SODIUM CHLORIDE 9 MG/ML
75 INJECTION, SOLUTION INTRAVENOUS CONTINUOUS
Status: DISCONTINUED | OUTPATIENT
Start: 2018-10-03 | End: 2018-10-04

## 2018-10-03 RX ORDER — AMIODARONE HYDROCHLORIDE 200 MG/1
400 TABLET ORAL DAILY
Status: DISCONTINUED | OUTPATIENT
Start: 2018-10-04 | End: 2018-10-22

## 2018-10-03 RX ORDER — AMLODIPINE BESYLATE 5 MG/1
5 TABLET ORAL DAILY
Status: DISCONTINUED | OUTPATIENT
Start: 2018-10-04 | End: 2018-10-05

## 2018-10-03 RX ORDER — FLUTICASONE PROPIONATE 50 MCG
2 SPRAY, SUSPENSION (ML) NASAL DAILY
Status: DISCONTINUED | OUTPATIENT
Start: 2018-10-04 | End: 2018-10-24 | Stop reason: HOSPADM

## 2018-10-03 RX ORDER — ONDANSETRON 2 MG/ML
4 INJECTION INTRAMUSCULAR; INTRAVENOUS
Status: DISCONTINUED | OUTPATIENT
Start: 2018-10-03 | End: 2018-10-24 | Stop reason: HOSPADM

## 2018-10-03 RX ORDER — IPRATROPIUM BROMIDE AND ALBUTEROL SULFATE 2.5; .5 MG/3ML; MG/3ML
3 SOLUTION RESPIRATORY (INHALATION)
Status: DISCONTINUED | OUTPATIENT
Start: 2018-10-03 | End: 2018-10-24 | Stop reason: HOSPADM

## 2018-10-03 RX ORDER — LIDOCAINE 4 G/100G
1 PATCH TOPICAL EVERY 24 HOURS
Status: DISCONTINUED | OUTPATIENT
Start: 2018-10-03 | End: 2018-10-14

## 2018-10-03 RX ORDER — CARVEDILOL 12.5 MG/1
12.5 TABLET ORAL 2 TIMES DAILY WITH MEALS
Status: DISCONTINUED | OUTPATIENT
Start: 2018-10-03 | End: 2018-10-03

## 2018-10-03 RX ORDER — ISOSORBIDE MONONITRATE 30 MG/1
90 TABLET, EXTENDED RELEASE ORAL
Status: DISCONTINUED | OUTPATIENT
Start: 2018-10-04 | End: 2018-10-04 | Stop reason: SDUPTHER

## 2018-10-03 RX ADMIN — SODIUM CHLORIDE 1000 ML: 900 INJECTION, SOLUTION INTRAVENOUS at 12:59

## 2018-10-03 RX ADMIN — SODIUM CHLORIDE 75 ML/HR: 900 INJECTION, SOLUTION INTRAVENOUS at 14:22

## 2018-10-03 RX ADMIN — ONDANSETRON 8 MG: 2 INJECTION INTRAMUSCULAR; INTRAVENOUS at 11:22

## 2018-10-03 RX ADMIN — ATORVASTATIN CALCIUM 40 MG: 40 TABLET, FILM COATED ORAL at 22:02

## 2018-10-03 RX ADMIN — ASPIRIN 81 MG CHEWABLE TABLET 162 MG: 81 TABLET CHEWABLE at 19:50

## 2018-10-03 RX ADMIN — ONDANSETRON 4 MG: 2 INJECTION INTRAMUSCULAR; INTRAVENOUS at 23:46

## 2018-10-03 RX ADMIN — CEFTRIAXONE SODIUM 2 G: 2 INJECTION, POWDER, FOR SOLUTION INTRAMUSCULAR; INTRAVENOUS at 19:51

## 2018-10-03 RX ADMIN — BUPRENORPHINE AND NALOXONE 1 FILM: 4; 1 FILM, SOLUBLE BUCCAL; SUBLINGUAL at 23:44

## 2018-10-03 NOTE — Clinical Note
TRANSFER - OUT REPORT:  
 
Verbal report given to: Mary Zapata. Report consisted of patient's Situation, Background, Assessment and  
Recommendations(SBAR). Opportunity for questions and clarification was provided. Patient transported with a Cardiac Cath Tech / Patient Care Tech. Patient transported to: 2100.

## 2018-10-03 NOTE — PROGRESS NOTES
met with Patient completed the initial Spiritual Assessment of the patient, and offered Pastoral Care, see flow sheets for interventions. Patient does not have any Evangelical/cultural needs that will affect patients preferences in health care. Charted reviewed. Chaplains will continue to follow and will provide pastoral care on an as needed/requested basis. Rashaun, MPH  
M ZQT
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
## 2018-10-03 NOTE — DISCHARGE INSTRUCTIONS
DISCHARGE SUMMARY from Nurse    PATIENT INSTRUCTIONS:    After general anesthesia or intravenous sedation, for 24 hours or while taking prescription Narcotics:  · Limit your activities  · Do not drive and operate hazardous machinery  · Do not make important personal or business decisions  · Do  not drink alcoholic beverages  · If you have not urinated within 8 hours after discharge, please contact your surgeon on call. Report the following to your surgeon:  · Excessive pain, swelling, redness or odor of or around the surgical area  · Temperature over 100.5  · Nausea and vomiting lasting longer than 4 hours or if unable to take medications  · Any signs of decreased circulation or nerve impairment to extremity: change in color, persistent  numbness, tingling, coldness or increase pain  · Any questions    What to do at Home:  Recommended activity: Activity as tolerated,     If you experience any of the following symptoms as listed under \" When should I call for help? \" in your discharge teaching  , please follow up with your Doctor and/ or call 911. *  Please give a list of your current medications to your Primary Care Provider. *  Please update this list whenever your medications are discontinued, doses are      changed, or new medications (including over-the-counter products) are added. *  Please carry medication information at all times in case of emergency situations. These are general instructions for a healthy lifestyle:    No smoking/ No tobacco products/ Avoid exposure to second hand smoke  Surgeon General's Warning:  Quitting smoking now greatly reduces serious risk to your health.     Obesity, smoking, and sedentary lifestyle greatly increases your risk for illness    A healthy diet, regular physical exercise & weight monitoring are important for maintaining a healthy lifestyle    You may be retaining fluid if you have a history of heart failure or if you experience any of the following symptoms:  Weight gain of 3 pounds or more overnight or 5 pounds in a week, increased swelling in our hands or feet or shortness of breath while lying flat in bed. Please call your doctor as soon as you notice any of these symptoms; do not wait until your next office visit. Recognize signs and symptoms of STROKE:    F-face looks uneven    A-arms unable to move or move unevenly    S-speech slurred or non-existent    T-time-call 911 as soon as signs and symptoms begin-DO NOT go       Back to bed or wait to see if you get better-TIME IS BRAIN. Warning Signs of HEART ATTACK     Call 911 if you have these symptoms:   Chest discomfort. Most heart attacks involve discomfort in the center of the chest that lasts more than a few minutes, or that goes away and comes back. It can feel like uncomfortable pressure, squeezing, fullness, or pain.  Discomfort in other areas of the upper body. Symptoms can include pain or discomfort in one or both arms, the back, neck, jaw, or stomach.  Shortness of breath with or without chest discomfort.  Other signs may include breaking out in a cold sweat, nausea, or lightheadedness. Don't wait more than five minutes to call 911 - MINUTES MATTER! Fast action can save your life. Calling 911 is almost always the fastest way to get lifesaving treatment. Emergency Medical Services staff can begin treatment when they arrive -- up to an hour sooner than if someone gets to the hospital by car. The discharge information has been reviewed with the patient. The patient verbalized understanding. Discharge medications reviewed with the patient and appropriate educational materials and side effects teaching were provided. Patient armband removed and shredded           Managing Side Effects of Chemotherapy: Care Instructions  Your Care Instructions    Cancer is often treated with medicines that destroy the cancer cells (chemotherapy).  These medicines may slow cancer growth and prevent or stop the spread of cancer. Chemotherapy also can affect healthy cells and cause side effects. Most people can work and do their normal activities after and even during chemotherapy, but they may need to limit their schedules. Side effects of chemotherapy may include nausea and vomiting, loss of appetite, pain, and being tired. Some medicines can cause diarrhea or mouth sores. Your doctor may prescribe medicines to treat the side effects. Your doctor will advise you to take extra care to prevent illnesses and infections, because chemotherapy weakens your natural defenses. Follow-up care is a key part of your treatment and safety. Be sure to make and go to all appointments, and call your doctor if you are having problems. It's also a good idea to know your test results and keep a list of the medicines you take. How can you care for yourself at home? Medicines    · Take your medicines exactly as prescribed. Call your doctor if you think you are having a problem with your medicine. You may get medicine for nausea and vomiting if you have these side effects.    Nausea and vomiting    · A light meal or snack before chemotherapy may help prevent nausea. If you do have nausea during your treatment, try eating earlier--at least an hour or two before your next treatment. After your treatment, you may want to wait one or more hours before you eat again.     · Drink fluids with your meals and an hour before or after meals.     · After vomiting has stopped for 1 hour, sip a rehydration drink, such as Powerade or Gatorade.     · Drink plenty of fluids to prevent dehydration. Choose water and other caffeine-free clear liquids until you feel better. Try clear fluids, such as apple or grape juice mixed to half strength with water, rehydration drinks, weak tea with sugar, clear broth, and gelatin dessert. Do not drink citrus juices.  If you have kidney, heart, or liver disease and have to limit fluids, talk with your doctor before you increase the amount of fluids you drink.     · When you are feeling better, begin eating clear soups and mild foods until all symptoms are gone for 12 to 48 hours. Other good choices include dry toast, crackers, cooked cereal, and gelatin dessert, such as Jell-O.     · If your vomiting is not getting better or is getting worse, call your doctor right away.    Loss of appetite    · It's important to eat healthy food. If you do not feel like eating, try to eat food that has protein and extra calories to keep up your strength and prevent weight loss. You can drink liquid meal replacements for extra calories and protein.     · Try eating several smaller meals throughout the day. Set a schedule for meals and snacks, and plan for times when it feels best to eat. Try to eat your main meal early.     · After treatment, you may want to wait for a while to eat. You can also try eating earlier before treatment.     · Try to eat more of the foods you like during the days and times when your appetite is good.     · When you don't feel like eating your normal foods, try clear broths/soups and mild foods like toast, crackers, cooked cereal like oatmeal, and gelatin dessert. Eating soft, bland foods may help.    Pain control    · If your doctor prescribes medicines to control pain, take them as directed. Often your doctor will have you take these medicines regularly to keep your pain under control. Medicine for pain may cause side effects. Let your doctor know if you feel constipated, have trouble urinating, or have nausea.     · Try using relaxation exercises to lower your anxiety and stress, which can increase pain.     · Keep track of your pain so you can tell your doctor what your pain is like. Write down where you feel pain, how long it lasts, what seems to bring it on, and how it feels.  Also note what makes the pain feel better or worse.     · If you have mouth pain, your doctor may prescribe a special mouth rinse that can help relieve the pain.    Weakness and feeling tired    · Get extra rest. Plan ahead so you can take breaks or naps.     · Save your energy for the most important things you want to do.     · Try to get some exercise, such as walking, but stop if you are too tired.     · Eat a balanced diet. Do not skip meals, especially breakfast.     · Do something you enjoy. Do you like to listen to music? Spend some time listening to your favorite music. Or find another way to relax by reading, watching a movie, or playing games.     · Ask family and friends to help with home chores and other tasks.    To prevent infections    · Wash your hands often during the day, especially before you eat and after you use the bathroom.     · Stay away from people who have illnesses that you might catch, such as the flu or a cold.     · Try to stay out of crowds.     · Clean cuts and scrapes right away with warm water and soap. Clean them daily until they are healed.     · Keep track of your temperature, if your doctor recommends it. You can do this by taking your temperature at regular times and writing it down.    Hair loss    · Use a mild shampoo and a soft hair brush.     · Use a low setting on your hair dryer. Do not color or perm your hair.     · Have your hair cut short. It will look thicker and pérez, and it will not be such a shock if you lose hair.     · Use sunscreen and a hat, scarf, or turban to protect your scalp from the sun.     · Ask your doctor about other treatments that you may try to prevent or minimize hair loss. These may include the use of a cooling cap. When should you call for help? Call 911 anytime you think you may need emergency care.  For example, call if:    · You passed out (lost consciousness).    Call your doctor now or seek immediate medical care if:    · You have a fever.     · You have abnormal bleeding.     · You have new or worse pain.     · You think you have an infection.     · You have new symptoms, such as a cough, belly pain, vomiting, diarrhea, or a rash.    Watch closely for changes in your health, and be sure to contact your doctor if:    · You are much more tired than usual.     · You have swollen glands in your armpits, groin, or neck.     · You do not get better as expected. Where can you learn more? Go to http://berta-keyla.info/. Enter [phone] in the search box to learn more about \"Managing Side Effects of Chemotherapy: Care Instructions. \"  Current as of: March 28, 2018  Content Version: 11.8  © 7180-2557 [employer]. Care instructions adapted under license by [employer] (which disclaims liability or warranty for this information). If you have questions about a medical condition or this instruction, always ask your healthcare professional. [address] any warranty or liability for your use of this information. Catheter for Hemodialysis: Care Instructions  Your Care Instructions    To start hemodialysis (also called dialysis) right away, your doctor will insert a soft plastic tube into a vein. This tube will carry your blood to the dialysis machine. The tube is called a central venous catheter, or CV line. It will be your vascular access until your permanent access is ready to use. If you have a kidney injury that can be healed, you may need dialysis only for a short time. But some people will need to have long-term dialysis. This includes people with chronic kidney disease. If you need long-term dialysis, it can take weeks or months for a permanent vascular access to be ready to use. You can use the catheter until a permanent access site is ready. The catheter site will be in a large vein, usually in your chest or neck. It may also be in your groin. A few stitches will hold the catheter in place.   By learning how to care for your access, you will help avoid problems and get the best results from your dialysis treatments. Follow-up care is a key part of your treatment and safety. Be sure to make and go to all appointments, and call your doctor if you are having problems. It's also a good idea to know your test results and keep a list of the medicines you take. How can you care for yourself at home? · Make sure the catheter is secured to your body and not pulling. · Avoid clothes that rub or pull on your catheter. · Never use scissors or other sharp objects around your catheter. · Don't bend or crimp your catheter. · Ask your doctor or dialysis nurse when you can take a bath or shower. You may be able to do these things after the site heals or if you cover the exit site with a waterproof bandage. · You can stay active while the catheter is in. But talk to your doctor about the kind of activities you want to do. · Review emergency instructions with your dialysis team so you know what to do if your catheter comes out. · Keep your bandage and exit site dry and clean. Change a dirty or bloody bandage. Check it every day for signs of infection. · Always wash your hands before you touch your catheter. · Keep the end of the catheter covered when it's not in use. · Wear a mask during your dialysis treatments. It can keep you from breathing germs onto your catheter. When should you call for help? Call 911 anytime you think you may need emergency care. For example, call if:    · The catheter comes out of your body.    Call your doctor now or seek immediate medical care if:    · You have signs of infection, such as:  ? Increased pain, swelling, warmth, or redness around the area. ? Red streaks leading from the area. ? Pus draining from the area.   ? A fever.     · You have liquid leaking from around the catheter.     · There are cracks or leaks in the tube.     · You have pain or swelling in your neck or arm.     · The line becomes clogged.    Watch closely for changes in your health, and be sure to contact your doctor if you have any problems. Where can you learn more? Go to http://berta-keyla.info/. Enter C210 in the search box to learn more about \"Catheter for Hemodialysis: Care Instructions. \"  Current as of: December 10, 2017  Content Version: 11.8  © 8454-5396 [employer]. Care instructions adapted under license by [employer] (which disclaims liability or warranty for this information). If you have questions about a medical condition or this instruction, always ask your healthcare professional. [place] [address] any warranty or liability for your use of this information. End-Stage Renal Disease: Care Instructions  Your Care Instructions    End-stage renal (or kidney) disease happens when your kidneys can no longer do their jobs. They can't remove waste from your blood. And they aren't able to balance your body's fluids and chemicals. This stage of the disease usually occurs after you have chronic kidney disease for years. Now the kidneys work so poorly that you need dialysis or a kidney transplant. Dialysis uses a machine to filter your waste. A transplant is surgery to give you a healthy kidney from another person. Follow-up care is a key part of your treatment and safety. Be sure to make and go to all appointments, and call your doctor if you are having problems. It's also a good idea to know your test results and keep a list of the medicines you take. How can you care for yourself at home?    · Be safe with medicines. Take your medicines exactly as prescribed. Call your doctor if you have any problems with your medicine. You also may take medicine to control your blood pressure or to treat diabetes. Many people who have diabetes take blood pressure medicine.     · If you have diabetes, do your best to keep your blood sugar in your target range.  You may do this by taking medicine, eating healthy food, and exercising.     · Follow your dialysis schedule.     · Do not take ibuprofen (Advil, Motrin), naproxen (Aleve), or similar medicines, unless your doctor tells you to. These may make chronic kidney disease worse.     · Make sure your doctor knows all of the medicines, vitamins, supplements, and herbal remedies you take.     · Do not smoke or use other tobacco products. Smoking can reduce blood flow to the kidneys. If you need help quitting, talk to your doctor about stop-smoking programs and medicines. These can increase your chances of quitting for good.     · Do not drink alcohol or use illegal drugs.     · If your doctor recommends it, get more exercise. Walking is a good choice.     · If you have an advance directive, let your doctor know. It may include a living will and a durable power of  for health care. If you don't have one, you may want to prepare one. It lets your doctor and loved ones know your health care wishes if you become unable to speak for yourself. Diet    · Talk to a registered dietitian. He or she can help you make a meal plan that is right for you. Most people with kidney disease need to limit salt (sodium), fluids, and protein. Some also have to limit potassium and phosphorus. When should you call for help? Call 911 anytime you think you may need emergency care. For example, call if:    · You passed out (lost consciousness).    Call your doctor now or seek immediate medical care if:    · You have new or worse nausea and vomiting.     · You have much less urine than normal, or you have no urine.     · You are feeling confused or cannot think clearly.     · You have new or more blood in your urine.     · You have new swelling.     · You are dizzy or lightheaded, or feel like you may faint.    Watch closely for changes in your health, and be sure to contact your doctor if you have any problems. Where can you learn more? Go to http://berta-keyla.info/.   Enter E817 in the search box to learn more about \"End-Stage Renal Disease: Care Instructions. \"  Current as of: March 15, 2018  Content Version: 11.8  © 2981-5521 [employer]. Care instructions adapted under license by [facility] (which disclaims liability or warranty for this information). If you have questions about a medical condition or this instruction, always ask your healthcare professional. [address] any warranty or liability for your use of this information. ___________________________________________________________________________________________________________________________________     Abdominal Pain: Care Instructions  Your Care Instructions    Abdominal pain has many possible causes. Some aren't serious and get better on their own in a few days. Others need more testing and treatment. If your pain continues or gets worse, you need to be rechecked and may need more tests to find out what is wrong. You may need surgery to correct the problem. Don't ignore new symptoms, such as fever, nausea and vomiting, urination problems, pain that gets worse, and dizziness. These may be signs of a more serious problem. Your doctor may have recommended a follow-up visit in the next 8 to 12 hours. If you are not getting better, you may need more tests or treatment. The doctor has checked you carefully, but problems can develop later. If you notice any problems or new symptoms, get medical treatment right away. Follow-up care is a key part of your treatment and safety. Be sure to make and go to all appointments, and call your doctor if you are having problems. It's also a good idea to know your test results and keep a list of the medicines you take. How can you care for yourself at home? · Rest until you feel better. · To prevent dehydration, drink plenty of fluids, enough so that your urine is light yellow or clear like water.  Choose water and other caffeine-free clear liquids until you feel better. If you have kidney, heart, or liver disease and have to limit fluids, talk with your doctor before you increase the amount of fluids you drink. · If your stomach is upset, eat mild foods, such as rice, dry toast or crackers, bananas, and applesauce. Try eating several small meals instead of two or three large ones. · Wait until 48 hours after all symptoms have gone away before you have spicy foods, alcohol, and drinks that contain caffeine. · Do not eat foods that are high in fat. · Avoid anti-inflammatory medicines such as aspirin, ibuprofen (Advil, Motrin), and naproxen (Aleve). These can cause stomach upset. Talk to your doctor if you take daily aspirin for another health problem. When should you call for help? Call 911 anytime you think you may need emergency care. For example, call if:    · You passed out (lost consciousness).     · You pass maroon or very bloody stools.     · You vomit blood or what looks like coffee grounds.     · You have new, severe belly pain.    Call your doctor now or seek immediate medical care if:    · Your pain gets worse, especially if it becomes focused in one area of your belly.     · You have a new or higher fever.     · Your stools are black and look like tar, or they have streaks of blood.     · You have unexpected vaginal bleeding.     · You have symptoms of a urinary tract infection. These may include:  ¨ Pain when you urinate. ¨ Urinating more often than usual.  ¨ Blood in your urine.     · You are dizzy or lightheaded, or you feel like you may faint.    Watch closely for changes in your health, and be sure to contact your doctor if:    · You are not getting better after 1 day (24 hours). Where can you learn more? Go to http://berta-keyla.info/. Enter B899 in the search box to learn more about \"Abdominal Pain: Care Instructions. \"  Current as of: November 20, 2017  Content Version: 11.7  © 7250-3976 [employer], [facility].  Care instructions adapted under license by [employer] (which disclaims liability or warranty for this information). If you have questions about a medical condition or this instruction, always ask your healthcare professional. [address] any warranty or liability for your use of this information. Suicidal Thoughts in a Family Member: Care Instructions  Your Care Instructions  Most people who think about suicide don't want to die. They think suicide will solve their problems and end their pain. People who consider suicide often feel hopeless, helpless, and worthless. These ideas can make a person feel that there is no other choice. If a person talks about suicide or about wanting to die or disappear, take him or her seriously. Do this even if the person says it in a joking way. If you feel that a family member may be thinking about suicide, don't be afraid to talk to him or her about it. After you know what the person is thinking, you may be able to help. Follow-up care is a key part of your family member's treatment and safety. Be sure to make and go to all appointments, and call your doctor if your family member is having problems. How can you care for your loved one at home? · Encourage your loved one not to drink alcohol. Tell your loved one's doctor if he or she needs help to quit. Counseling, support groups, and sometimes medicines can help your loved one stay sober. · Ask your loved one not to take any medicines unless his or her doctor says to take it. · Talk to the person often so you know how he or she feels. · Encourage the person to go to counseling. You could offer your help for getting to and from the sessions. You can even offer to go to the sessions if that will make him or her more likely to go. · Talk to other family members. Make a schedule so that someone is always with the person who is thinking about suicide. · Put away sharp or dangerous objects. Make sure there are no guns in the house. Also remove medicines that are not being used. · Keep the numbers for these national suicide hotlines: 5-589-068-TALK (5-346.483.8604) and 7-230-VUBKIAP (1-102.204.6711). · Check in with your family member often. Find out if he or she has made a plan for suicide or has figured out how to carry out a plan. If a person has a plan for suicide and a way to carry out that plan, follow these steps:  · Make sure you are safe. · Stay with the person (or ask someone you trust to stay with the person) until the crisis has passed. · Encourage the person to seek professional help. · Do not argue with the person (\"It is not as bad as you think\"). And don't challenge the person (\"You are not the type to attempt suicide\"). · Show understanding and compassion. Tell the person that you do not want him or her to die (or to harm another person). Talk about the situation as openly as you can. · Call 316 (or the police, if 349 is not available) to stop the person from carrying out the threat. When should you call for help? Call 911 anytime you think your loved one may need emergency care. For example, call if:    · Someone you know is about to attempt or is attempting suicide.     · Your family member feels that he or she cannot stop from hurting himself or herself or someone else.   [place] the doctor now or seek immediate medical care if:    · Your family member has one or more warning signs of suicide. For example, call if the person:  ? Starts to give away his or her possessions. ? Uses illegal drugs or drinks alcohol heavily. ? Talks or writes about death. This may include writing suicide notes and talking about guns, knives, or pills. ? Starts to spend a lot of time alone or spends more time alone than usual.  ?  Acts very aggressively or suddenly appears calm.     · Your family member hears voices.     · Your family member seems more depressed than usual.    Watch closely for changes in your family member's health, and be sure to contact the doctor if you have any questions. Where can you learn more? Go to http://berta-keyla.info/. Enter F411 in the search box to learn more about \"Suicidal Thoughts in a Family Member: Care Instructions. \"  Current as of: December 7, 2017  Content Version: 11.8  © 1116-9307 [employer]. Care instructions adapted under license by [employer] (which disclaims liability or warranty for this information). If you have questions about a medical condition or this instruction, always ask your healthcare professional. [place] [address] any warranty or liability for your use of this information.

## 2018-10-03 NOTE — ED NOTES
Spoke with DR Linda Cooley regarding if patient can go to remote telemetry, states that is fine. Order placed and nursing supervisor updated.
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
## 2018-10-03 NOTE — H&P
Hospitalist Admission Note NAME:  Kala Serrano :   1970 MRN:   [id number] Date/Time:  10/3/2018 5:22 PM 
 
Subjective: CHIEF COMPLAINT:   
Chief Complaint Patient presents with  Abdominal Pain  Vomiting  Lethargy HISTORY OF PRESENT ILLNESS:    
James Maloney is a 46 y.o.  male with h/o AICD,cardiac arrest,VT,ischemic cardiomyopathy EF 30%,obesity,cad,presented to ED because of weakness,nausea,vomting,abdominal pain,hematuria. Patient says that he started feeling sick 2 weeks ago when he started feeling weak. Then he started vomiting. Could not tolerate even water. In recent days ,he has had abdominal pain which he attributed to the ongoing vomiting. Patient also reports that he has seen his urine turning red for a while. Had to see his doctor in two weeks but felt very weak today and decided to come to the ED. In ED,lab showed creatinine 11.0 compared to 2.12 on 2018 and 8.01 on 18. UA showed large blood,bacteria+. CT abd/pelvis w/o hydronephrosis or urolithiasis. Patient admitted for OLIVIA,Hematuria,UTI. Started on iv fluid. Nephrolohy consulted from ER. Past Medical History:  
Diagnosis Date  AICD MEDTRONIC (single chamber)  Apical mural thrombus ECHO 3/14  Cardiac arrest ([address] 10/15 VT / VF ( ~20 ICD shocks ). No obstructive CAD on cath  Chronic back pain  Chronic kidney disease, stage III (moderate) (HCC)  Coronary artery disease LAD - 3.0 x 18mm VISION   Degenerative spine disease  Dyslipidemia  Hypertension  Ischemic cardiomyopathy EF 30%(10/15) , 40-45% (03/15),  EF 30-35% (3/14)  Ischemic VF   
  in setting of MI, DC cardioversion x4  Narcotic dependence ([address]  Noncompliance  Obesity  Psoriasis  S/P cardiac cath 10/15  Secondary hyperparathyroidism (of renal origin)  Tobacco abuse Social History Substance Use Topics  Smoking status: Former Smoker   Packs/day: 1.00  
 Years: 30.00  Smokeless tobacco: Former User Comment: Quit cigarettes after 1st MI. Smokes a cigars occasionally, \"Exposed to cigarette smoke in the home\"  Alcohol use No  
   Comment: Quit 8 years ago Family History Problem Relation Age of Onset  Heart Attack Father  Heart Disease Father  Hypertension Other  Asthma Other  Arthritis-osteo Other  Diabetes Other Allergies Allergen Reactions  Latex Other (comments)  
  blisters  Other Food Hives Allergic to tomatoes and tomato sauce  Codeine Nausea and Vomiting Prior to Admission medications Medication Sig Start Date End Date Taking? Authorizing Provider STOOL SOFTENER 100 mg capsule TAKE ONE CAPSULE BY MOUTH TWICE DAILY 9/27/18   Linda Mariano III, MD  
isosorbide mononitrate ER (IMDUR) 60 mg CR tablet Take 1.5 Tabs by mouth every morning. 9/25/18   Cheng Tapia MD  
carvedilol (COREG) 6.25 mg tablet Take 2 Tabs by mouth two (2) times daily (with meals). 9/11/18   Sebastián Greene MD  
atorvastatin (LIPITOR) 40 mg tablet Take 1 Tab by mouth daily. Indications: hypercholesterolemia 8/6/18   Cheng Tapia MD  
clopidogrel (PLAVIX) 75 mg tab Take 1 Tab by mouth daily. Indications: Myocardial Reinfarction Prevention 7/11/18   Cheng Tapia MD  
amiodarone (PACERONE) 400 mg tablet Take 1 Tab by mouth daily. Indications: LIFE-THREATENING VENTRICULAR TACHYCARDIA 5/14/18   Cheng Tapia MD  
predniSONE (DELTASONE) 10 mg tablet Take 6 tabs PO x 2 d, then 5 tabs x 2 d, 4 tabs x 2 d, 3 tabs x 2 d, 2 tabs x 2 d, 1 tab x 2 d  Indications: PSORIATIC ARTHRITIS 2/8/18   Linda Mariano III, MD  
albuterol (PROVENTIL HFA, VENTOLIN HFA, PROAIR HFA) 90 mcg/actuation inhaler Take 2 Puffs by inhalation every six (6) hours as needed for Wheezing.  Indications: BRONCHOSPASM PREVENTION 2/8/18   Linda Mariano III, MD  
tiotropium [facility]) 18 mcg inhalation capsule Take 1 Cap by inhalation daily. 1/11/18   Eunice Yu III, MD  
bumetanide (BUMEX) 1 mg tablet Take 1 Tab by mouth daily. Patient taking differently: Take 1 mg by mouth daily as needed. 9/12/17   Saumil Marice Favre, MD  
lidocaine (LIDODERM) 5 % Apply patch to the affected area for 12 hours a day and remove for 12 hours a day. 8/2/17   Shalini Lr MD  
ixekizumab (TALTZ) 80 mg/mL syringe 80 mg by SubCUTAneous route every fourteen (14) days. Historical Provider  
calcium carbonate (TUMS) 200 mg calcium (500 mg) chew Take 1 Tab by mouth daily. Indications: HYPOCALCEMIA 11/4/15   Brigid Solano MD  
aspirin 81 mg chewable tablet Take 162 mg by mouth two (2) times a day. Historical Provider  
fluticasone (FLONASE) 50 mcg/actuation nasal spray 2 Sprays by Both Nostrils route daily as needed for Rhinitis. 5/25/15   Nivia Cardenas MD  
 
 
REVIEW OF SYSTEMS:   
Constitutional:  No fever or weight loss HEENT:  No headache or visual changes Cardiovascular:  No chest pain, no palpitations. Respiratory:  No coughing, wheezing, or shortness of breath. GI:  Abdominal pain,nausea and vomiting. No diarrhea :  No hematuria or dysuria. No frequency, retention, urinary incontinence. Skin:  No rashes or moles Neuro:  No seizures or syncope Hematological:  No bruising or bleeding Endocrine:  No diabetes or thyroid disease Objective: VITALS:  
 
 
Visit Vitals  /75 (BP 1 Location: Right arm, BP Patient Position: Sitting)  Pulse (!) 47  Temp 97.5 °F (36.4 °C)  Resp 16  SpO2 99% O2 Device: Room air PHYSICAL EXAM:  
General:    Lying in bed in no acute distress. Morbid obesity. HEENT:  Pupils equal.  Sclera anicteric. Conjunctiva pink. Mucous membranes dry. Neck:  Supple. Trachea midline. No accessory muscle use. No thyromegaly. No jugular venous distention CV:                  Regular rate and rhythm. Lungs:   Clear to auscultation bilaterally. No Wheezing or Rhonchi.  No rales. 
Abdomen:   Soft, non-tender. Not distended. Bowel sounds normal. No organomegaly Extremities: No cyanosis. No edema. No clubbing Neurologic: Alert and oriented X 3. Skin:                Warm and dry. No rashes. LAB DATA REVIEWED:   
Recent Results (from the past 24 hour(s)) URINALYSIS W/ RFLX MICROSCOPIC Collection Time: 10/03/18 10:34 AM  
Result Value Ref Range Color HAKEEM Appearance CLOUDY Specific gravity 1.016 1.005 - 1.030    
 pH (UA) 5.0 5.0 - 8.0 Protein 300 (A) NEG mg/dL Glucose 100 (A) NEG mg/dL Ketone NEGATIVE  NEG mg/dL Bilirubin NEGATIVE  NEG Blood LARGE (A) NEG Urobilinogen 1.0 0.2 - 1.0 EU/dL Nitrites NEGATIVE  NEG Leukocyte Esterase TRACE (A) NEG URINE MICROSCOPIC ONLY Collection Time: 10/03/18 10:34 AM  
Result Value Ref Range WBC 4 to 10 0 - 4 /hpf  
 RBC TOO NUMEROUS TO COUNT 0 - 5 /hpf Epithelial cells 1+ 0 - 5 /lpf Bacteria 3+ (A) NEG /hpf  
CBC WITH AUTOMATED DIFF Collection Time: 10/03/18 10:55 AM  
Result Value Ref Range WBC 7.9 4.6 - 13.2 K/uL  
 RBC 4.49 (L) 4.70 - 5.50 M/uL  
 HGB 12.2 (L) 13.0 - 16.0 g/dL HCT 37.9 36.0 - 48.0 % MCV 84.4 74.0 - 97.0 FL  
 MCH 27.2 24.0 - 34.0 PG  
 MCHC 32.2 31.0 - 37.0 g/dL  
 RDW 15.8 (H) 11.6 - 14.5 % PLATELET 506 366 - 543 K/uL MPV 8.5 (L) 9.2 - 11.8 FL  
 NEUTROPHILS 76 (H) 40 - 73 % LYMPHOCYTES 16 (L) 21 - 52 % MONOCYTES 5 3 - 10 % EOSINOPHILS 3 0 - 5 % BASOPHILS 0 0 - 2 %  
 ABS. NEUTROPHILS 6.0 1.8 - 8.0 K/UL  
 ABS. LYMPHOCYTES 1.2 0.9 - 3.6 K/UL  
 ABS. MONOCYTES 0.4 0.05 - 1.2 K/UL  
 ABS. EOSINOPHILS 0.2 0.0 - 0.4 K/UL  
 ABS. BASOPHILS 0.0 0.0 - 0.1 K/UL  
 DF AUTOMATED METABOLIC PANEL, COMPREHENSIVE Collection Time: 10/03/18 10:55 AM  
Result Value Ref Range Sodium 136 136 - 145 mmol/L Potassium 4.4 3.5 - 5.5 mmol/L Chloride 103 100 - 108 mmol/L  
 CO2 20 (L) 21 - 32 mmol/L  Anion gap 13 3.0 - 18 mmol/L  
 Glucose 107 (H) 74 - 99 mg/dL BUN 66 (H) 7.0 - 18 MG/DL Creatinine 11.00 (H) 0.6 - 1.3 MG/DL  
 BUN/Creatinine ratio 6 (L) 12 - 20 GFR est AA 6 (L) >60 ml/min/1.73m2 GFR est non-AA 5 (L) >60 ml/min/1.73m2 Calcium 7.8 (L) 8.5 - 10.1 MG/DL Bilirubin, total 0.2 0.2 - 1.0 MG/DL  
 ALT (SGPT) 56 16 - 61 U/L  
 AST (SGOT) 34 15 - 37 U/L Alk. phosphatase 100 45 - 117 U/L Protein, total 8.4 (H) 6.4 - 8.2 g/dL Albumin 2.7 (L) 3.4 - 5.0 g/dL Globulin 5.7 (H) 2.0 - 4.0 g/dL A-G Ratio 0.5 (L) 0.8 - 1.7 LIPASE Collection Time: 10/03/18 10:55 AM  
Result Value Ref Range Lipase 160 73 - 393 U/L Assessment/Plan: Active Problems: 
  Obesity (1/29/2012) HTN (hypertension) (7/24/2013) Acute on chronic renal failure ([facility] (7/24/2013) JESSICA (obstructive sleep apnea) (12/17/2013) Cardiomyopathy ([facility] (4/23/2014) Overview: EF 25 to 39% this admission ICD (implantable cardioverter-defibrillator) discharge (10/29/2015) Overview: 20 shocks, battery life 8.5 years CAD (coronary artery disease) (10/29/2015) Overview: S/P PCI LAD Bacteria in urine (10/3/2018) Nausea/vomiting/abdominal pain 
  
___________________________________________________ PLAN:   
1. Acute on chronic renal failure 
 -Gentle hydration as pt with significant cardiac history. -Nephrology consulted. CT abdm/pelvis w/o hydronephrosis 2. Bacteria in urine/UTI/Hematuria 
 -Ceftriaxone iv 
 -Blood cx/Ucx 
 -Patient says that he has hematuria for a while:?possibly due Plavix use 
 -Will consult urology 3. Nausea/vomiting/abdominal pain 
 -CT reviewed,no acute process. -Zofran prn 
 
4. HTN 
 -Hydralazine prn for sbp>160 
 
5. JESSICA (obstructive sleep apnea) 
 -On oxygen 6. Cardiomyopathy EF 25-30% 7. ICD (implantable cardioverter-defibrillator) discharge 8. CAD (coronary artery disease) 
 -Continue routine meds 
 -Telemetry monitoring DVT prophylaxis:scds Full code Surrogate:wife Disposition:tbd 
 
___________________________________________________ Admitting Physician: Rosemary Martinez MD

## 2018-10-04 PROBLEM — E87.20 METABOLIC ACIDOSIS: Status: ACTIVE | Noted: 2018-10-04

## 2018-10-04 LAB
ALBUMIN SERPL-MCNC: 2.4 G/DL (ref 3.4–5)
ANION GAP SERPL CALC-SCNC: 13 MMOL/L (ref 3–18)
BASOPHILS # BLD: 0 K/UL (ref 0–0.1)
BASOPHILS NFR BLD: 0 % (ref 0–2)
BUN SERPL-MCNC: 69 MG/DL (ref 7–18)
BUN/CREAT SERPL: 6 (ref 12–20)
CALCIUM SERPL-MCNC: 7.4 MG/DL (ref 8.5–10.1)
CHLORIDE SERPL-SCNC: 105 MMOL/L (ref 100–108)
CO2 SERPL-SCNC: 20 MMOL/L (ref 21–32)
CREAT SERPL-MCNC: 11.4 MG/DL (ref 0.6–1.3)
DIFFERENTIAL METHOD BLD: ABNORMAL
EOSINOPHIL # BLD: 0.2 K/UL (ref 0–0.4)
EOSINOPHIL NFR BLD: 4 % (ref 0–5)
ERYTHROCYTE [DISTWIDTH] IN BLOOD BY AUTOMATED COUNT: 16.3 % (ref 11.6–14.5)
GLUCOSE SERPL-MCNC: 101 MG/DL (ref 74–99)
HBV SURFACE AG SER QL: <0.1 INDEX
HBV SURFACE AG SER QL: NEGATIVE
HCT VFR BLD AUTO: 33.1 % (ref 36–48)
HCV AB SER IA-ACNC: 0.07 INDEX
HCV AB SERPL QL IA: NEGATIVE
HCV COMMENT,HCGAC: NORMAL
HGB BLD-MCNC: 10.5 G/DL (ref 13–16)
LYMPHOCYTES # BLD: 1.6 K/UL (ref 0.9–3.6)
LYMPHOCYTES NFR BLD: 27 % (ref 21–52)
MCH RBC QN AUTO: 27.1 PG (ref 24–34)
MCHC RBC AUTO-ENTMCNC: 31.7 G/DL (ref 31–37)
MCV RBC AUTO: 85.5 FL (ref 74–97)
MONOCYTES # BLD: 0.4 K/UL (ref 0.05–1.2)
MONOCYTES NFR BLD: 7 % (ref 3–10)
NEUTS SEG # BLD: 3.7 K/UL (ref 1.8–8)
NEUTS SEG NFR BLD: 62 % (ref 40–73)
PHOSPHATE SERPL-MCNC: 8.4 MG/DL (ref 2.5–4.9)
PLATELET # BLD AUTO: 210 K/UL (ref 135–420)
PMV BLD AUTO: 8.5 FL (ref 9.2–11.8)
POTASSIUM SERPL-SCNC: 4.6 MMOL/L (ref 3.5–5.5)
RBC # BLD AUTO: 3.87 M/UL (ref 4.7–5.5)
SODIUM SERPL-SCNC: 138 MMOL/L (ref 136–145)
WBC # BLD AUTO: 5.9 K/UL (ref 4.6–13.2)

## 2018-10-04 PROCEDURE — 83520 IMMUNOASSAY QUANT NOS NONAB: CPT | Performed by: INTERNAL MEDICINE

## 2018-10-04 PROCEDURE — C9113 INJ PANTOPRAZOLE SODIUM, VIA: HCPCS | Performed by: INTERNAL MEDICINE

## 2018-10-04 PROCEDURE — 74011250637 HC RX REV CODE- 250/637: Performed by: INTERNAL MEDICINE

## 2018-10-04 PROCEDURE — 36415 COLL VENOUS BLD VENIPUNCTURE: CPT | Performed by: INTERNAL MEDICINE

## 2018-10-04 PROCEDURE — 86225 DNA ANTIBODY NATIVE: CPT | Performed by: INTERNAL MEDICINE

## 2018-10-04 PROCEDURE — 86160 COMPLEMENT ANTIGEN: CPT | Performed by: INTERNAL MEDICINE

## 2018-10-04 PROCEDURE — 74011000250 HC RX REV CODE- 250: Performed by: INTERNAL MEDICINE

## 2018-10-04 PROCEDURE — 87340 HEPATITIS B SURFACE AG IA: CPT | Performed by: INTERNAL MEDICINE

## 2018-10-04 PROCEDURE — 82784 ASSAY IGA/IGD/IGG/IGM EACH: CPT | Performed by: INTERNAL MEDICINE

## 2018-10-04 PROCEDURE — 83516 IMMUNOASSAY NONANTIBODY: CPT | Performed by: INTERNAL MEDICINE

## 2018-10-04 PROCEDURE — 86060 ANTISTREPTOLYSIN O TITER: CPT | Performed by: INTERNAL MEDICINE

## 2018-10-04 PROCEDURE — 80069 RENAL FUNCTION PANEL: CPT | Performed by: INTERNAL MEDICINE

## 2018-10-04 PROCEDURE — 86038 ANTINUCLEAR ANTIBODIES: CPT | Performed by: INTERNAL MEDICINE

## 2018-10-04 PROCEDURE — 85025 COMPLETE CBC W/AUTO DIFF WBC: CPT | Performed by: INTERNAL MEDICINE

## 2018-10-04 PROCEDURE — 74011250636 HC RX REV CODE- 250/636: Performed by: INTERNAL MEDICINE

## 2018-10-04 PROCEDURE — 65660000000 HC RM CCU STEPDOWN

## 2018-10-04 PROCEDURE — 86215 DEOXYRIBONUCLEASE ANTIBODY: CPT | Performed by: INTERNAL MEDICINE

## 2018-10-04 PROCEDURE — 74011000258 HC RX REV CODE- 258: Performed by: INTERNAL MEDICINE

## 2018-10-04 PROCEDURE — 86803 HEPATITIS C AB TEST: CPT | Performed by: INTERNAL MEDICINE

## 2018-10-04 RX ORDER — ISOSORBIDE MONONITRATE 30 MG/1
90 TABLET, EXTENDED RELEASE ORAL DAILY
Status: DISCONTINUED | OUTPATIENT
Start: 2018-10-04 | End: 2018-10-24 | Stop reason: HOSPADM

## 2018-10-04 RX ORDER — SEVELAMER CARBONATE 800 MG/1
800 TABLET, FILM COATED ORAL
Status: DISCONTINUED | OUTPATIENT
Start: 2018-10-04 | End: 2018-10-05

## 2018-10-04 RX ORDER — SODIUM CHLORIDE 450 MG/100ML
75 INJECTION, SOLUTION INTRAVENOUS CONTINUOUS
Status: DISCONTINUED | OUTPATIENT
Start: 2018-10-04 | End: 2018-10-05

## 2018-10-04 RX ORDER — CALCITRIOL 0.25 UG/1
0.25 CAPSULE ORAL DAILY
Status: DISCONTINUED | OUTPATIENT
Start: 2018-10-05 | End: 2018-10-06

## 2018-10-04 RX ADMIN — ONDANSETRON 4 MG: 2 INJECTION INTRAMUSCULAR; INTRAVENOUS at 15:37

## 2018-10-04 RX ADMIN — CLOPIDOGREL BISULFATE 75 MG: 75 TABLET ORAL at 09:33

## 2018-10-04 RX ADMIN — AMIODARONE HYDROCHLORIDE 400 MG: 200 TABLET ORAL at 09:33

## 2018-10-04 RX ADMIN — ONDANSETRON 4 MG: 2 INJECTION INTRAMUSCULAR; INTRAVENOUS at 05:22

## 2018-10-04 RX ADMIN — BUPRENORPHINE AND NALOXONE 1 FILM: 4; 1 FILM, SOLUBLE BUCCAL; SUBLINGUAL at 10:34

## 2018-10-04 RX ADMIN — SODIUM CHLORIDE 40 MG: 9 INJECTION INTRAMUSCULAR; INTRAVENOUS; SUBCUTANEOUS at 15:42

## 2018-10-04 RX ADMIN — BUPRENORPHINE AND NALOXONE 1 FILM: 4; 1 FILM, SOLUBLE BUCCAL; SUBLINGUAL at 17:31

## 2018-10-04 RX ADMIN — ATORVASTATIN CALCIUM 40 MG: 40 TABLET, FILM COATED ORAL at 20:41

## 2018-10-04 RX ADMIN — FLUTICASONE PROPIONATE 2 SPRAY: 50 SPRAY, METERED NASAL at 09:38

## 2018-10-04 RX ADMIN — AMLODIPINE BESYLATE 5 MG: 5 TABLET ORAL at 09:33

## 2018-10-04 RX ADMIN — SODIUM CHLORIDE 75 ML/HR: 450 INJECTION, SOLUTION INTRAVENOUS at 17:33

## 2018-10-04 RX ADMIN — ISOSORBIDE MONONITRATE 90 MG: 30 TABLET, EXTENDED RELEASE ORAL at 09:33

## 2018-10-04 RX ADMIN — ASPIRIN 81 MG CHEWABLE TABLET 162 MG: 81 TABLET CHEWABLE at 17:24

## 2018-10-04 RX ADMIN — ONDANSETRON 4 MG: 2 INJECTION INTRAMUSCULAR; INTRAVENOUS at 20:36

## 2018-10-04 RX ADMIN — CEFTRIAXONE SODIUM 2 G: 2 INJECTION, POWDER, FOR SOLUTION INTRAMUSCULAR; INTRAVENOUS at 18:45

## 2018-10-04 RX ADMIN — SODIUM CHLORIDE 75 ML/HR: 900 INJECTION, SOLUTION INTRAVENOUS at 05:21

## 2018-10-04 RX ADMIN — CALCIUM CARBONATE (ANTACID) CHEW TAB 500 MG 200 MG: 500 CHEW TAB at 09:31

## 2018-10-04 RX ADMIN — TIOTROPIUM BROMIDE 18 MCG: 18 CAPSULE ORAL; RESPIRATORY (INHALATION) at 09:37

## 2018-10-04 RX ADMIN — ONDANSETRON 4 MG: 2 INJECTION INTRAMUSCULAR; INTRAVENOUS at 09:25

## 2018-10-04 RX ADMIN — SEVELAMER CARBONATE 800 MG: 800 TABLET, FILM COATED ORAL at 20:41

## 2018-10-04 RX ADMIN — ASPIRIN 81 MG CHEWABLE TABLET 162 MG: 81 TABLET CHEWABLE at 09:32

## 2018-10-04 NOTE — PROGRESS NOTES
Hospitalist Progress Note Karlie Lucas MD 
Internal medicine/ Hospitalist 
 
Daily Progress Note: 10/4/2018 1:20 PM   
Interval history / Subjective:  
Rendall Baumgarten is a 46 y.o.  male with h/o AICD,cardiac arrest,VT,ischemic cardiomyopathy EF 30%,obesity,cad,presented to ED because of weakness,nausea,vomting,abdominal pain,hematuria. Patient says that he started feeling sick 2 weeks ago when he started feeling weak. Then he started vomiting. Could not tolerate even water. In recent days ,he has had abdominal pain which he attributed to the ongoing vomiting. Patient also reported that he has seen his urine turning red for a while. Had to see his doctor in two weeks but felt very weak and decided to come to the ED. In ED,lab showed creatinine 11.0 compared to 2.12 on 1/11/2018 and 8.01 on 9/28/18. UA showed large blood,bacteria+. CT abd/pelvis w/o hydronephrosis or urolithiasis. Patient admitted for OLIVIA,Hematuria,UTI. Started on iv fluid. Nephrolohy consulted from ER. Current Facility-Administered Medications Medication Dose Route Frequency  influenza vaccine 2018-19 (6 mos+)(PF) (FLUARIX QUAD/FLULAVAL QUAD) injection 0.5 mL  0.5 mL IntraMUSCular PRIOR TO DISCHARGE  isosorbide mononitrate ER (IMDUR) tablet 90 mg  90 mg Oral DAILY  pantoprazole (PROTONIX) 40 mg in sodium chloride 0.9% 10 mL injection  40 mg IntraVENous DAILY  amiodarone (CORDARONE) tablet 400 mg  400 mg Oral DAILY  aspirin chewable tablet 162 mg  162 mg Oral BID  atorvastatin (LIPITOR) tablet 40 mg  40 mg Oral QHS  calcium carbonate (TUMS) chewable tablet 200 mg [elemental]  200 mg Oral DAILY  clopidogrel (PLAVIX) tablet 75 mg  75 mg Oral DAILY  fluticasone (FLONASE) 50 mcg/actuation nasal spray 2 Spray  2 Spray Both Nostrils DAILY  lidocaine 4 % patch 1 Patch  1 Patch TransDERmal Q24H  
 tiotropium (SPIRIVA) inhalation capsule 18 mcg  1 Cap Inhalation DAILY  ondansetron (ZOFRAN) injection 4 mg  4 mg IntraVENous Q4H PRN  
  0.9% sodium chloride infusion  75 mL/hr IntraVENous CONTINUOUS  
 albuterol-ipratropium (DUO-NEB) 2.5 MG-0.5 MG/3 ML  3 mL Nebulization Q4H PRN  
 cefTRIAXone (ROCEPHIN) 2 g in 0.9% sodium chloride (MBP/ADV) 50 mL MBP  2 g IntraVENous Q24H  
 amLODIPine (NORVASC) tablet 5 mg  5 mg Oral DAILY  buprenorphine-naloxone (SUBOXONE) 4mg-1mg SL film (Patient Supplied)  1 Film SubLINGual BID Review of Systems Still weak and reporting ongoing nausea. Objective:  
 
Visit Vitals  /53 (BP 1 Location: Right arm, BP Patient Position: At rest)  Pulse (!) 50  Temp 97.7 °F (36.5 °C)  Resp 18  Ht 5' 10\" (1.778 m)  Wt 118.7 kg (261 lb 11 oz)  SpO2 99%  BMI 37.55 kg/m2 O2 Device: Room air Temp (24hrs), Av.9 °F (36.6 °C), Min:97.5 °F (36.4 °C), Max:98.3 °F (36.8 °C) 10/04 0701 - 10/04 1900 In: 903.8 [I.V.:903.8] Out: -  
10/02 1901 - 10/04 07 In: 411.3 [I.V.:411.3] Out: - PHYSICAL EXAM:  
General:                    Lying in bed in no acute distress. Morbid obesity. HEENT:                     Pupils equal.  Sclera anicteric. Conjunctiva pink. Mouth moist. 
Neck:                                   Supple,no jvd. CV:                  Regular rate and rhythm. Lungs:                       Clear to auscultation bilaterally. No Wheezing or Rhonchi. No rales. Abdomen:                  Soft, non-tender. Not distended. Bowel sounds normal. No organomegaly Extremities:               No cyanosis. No edema. No clubbing Neurologic:                Alert and oriented X 3. Skin:                Warm and dry. No rashes.  
  
  
 
Data Review Recent Results (from the past 12 hour(s)) CBC WITH AUTOMATED DIFF Collection Time: 10/04/18  5:30 AM  
Result Value Ref Range WBC 5.9 4.6 - 13.2 K/uL  
 RBC 3.87 (L) 4.70 - 5.50 M/uL  
 HGB 10.5 (L) 13.0 - 16.0 g/dL HCT 33.1 (L) 36.0 - 48.0 %  MCV 85.5 74.0 - 97.0 FL  
 MCH 27.1 24.0 - 34.0 PG  
 MCHC 31.7 31.0 - 37.0 g/dL  
 RDW 16.3 (H) 11.6 - 14.5 % PLATELET 036 796 - 655 K/uL MPV 8.5 (L) 9.2 - 11.8 FL  
 NEUTROPHILS 62 40 - 73 % LYMPHOCYTES 27 21 - 52 % MONOCYTES 7 3 - 10 % EOSINOPHILS 4 0 - 5 % BASOPHILS 0 0 - 2 %  
 ABS. NEUTROPHILS 3.7 1.8 - 8.0 K/UL  
 ABS. LYMPHOCYTES 1.6 0.9 - 3.6 K/UL  
 ABS. MONOCYTES 0.4 0.05 - 1.2 K/UL  
 ABS. EOSINOPHILS 0.2 0.0 - 0.4 K/UL  
 ABS. BASOPHILS 0.0 0.0 - 0.1 K/UL  
 DF AUTOMATED RENAL FUNCTION PANEL Collection Time: 10/04/18  5:30 AM  
Result Value Ref Range Sodium 138 136 - 145 mmol/L Potassium 4.6 3.5 - 5.5 mmol/L Chloride 105 100 - 108 mmol/L  
 CO2 20 (L) 21 - 32 mmol/L Anion gap 13 3.0 - 18 mmol/L Glucose 101 (H) 74 - 99 mg/dL BUN 69 (H) 7.0 - 18 MG/DL Creatinine 11.40 (H) 0.6 - 1.3 MG/DL  
 BUN/Creatinine ratio 6 (L) 12 - 20 GFR est AA 6 (L) >60 ml/min/1.73m2 GFR est non-AA 5 (L) >60 ml/min/1.73m2 Calcium 7.4 (L) 8.5 - 10.1 MG/DL Phosphorus 8.4 (H) 2.5 - 4.9 MG/DL Albumin 2.4 (L) 3.4 - 5.0 g/dL HEPATITIS C AB Collection Time: 10/04/18  5:30 AM  
Result Value Ref Range Hepatitis C virus Ab 0.07 <0.80 Index Hep C  virus Ab Interp. NEGATIVE  NEG Hep C  virus Ab comment HEP B SURFACE AG Collection Time: 10/04/18  5:30 AM  
Result Value Ref Range Hepatitis B surface Ag <0.10 <1.00 Index Hep B surface Ag Interp. NEGATIVE  NEG Assessment/Plan: Active Problems: 
  Obesity (1/29/2012) HTN (hypertension) (7/24/2013) Acute on chronic renal failure ([facility] [address] (7/24/2013) JESSICA (obstructive sleep apnea) (12/17/2013) Cardiomyopathy ([facility] [address] (4/23/2014) Overview: EF 25 to 39% this admission ICD (implantable cardioverter-defibrillator) discharge (10/29/2015) Overview: 20 shocks, battery life 8.5 years CAD (coronary artery disease) (10/29/2015) Overview: S/P PCI LAD Bacteria in urine (10/3/2018) Metabolic acidosis (88/1/6913) Care Plan 1. Acute on chronic renal failure/Metabolic acidosis -Etiology:volume depletion -?acute GN as pt also hematuria. -Gentle hydration as pt with significant cardiac history. CT abdm/pelvis w/o hydronephrosis -Metabolic acidosis improving. 
 -Nephrology consulted - will follow recommendations 
  2. Bacteria in urine/UTI/Hematuria 
 -Ceftriaxone iv 
 -Blood cx ngtd/Ucx ordered however no results in the chart. 
 -Patient says that he has hematuria for a while:?possibly due Plavix use 
  3. Nausea/vomiting/abdominal pain 
 -CT reviewed,no acute process. -Zofran prn 
 -Still nausea despite receiving zofran,will add phenergan 
  
4. HTN 
 -Hydralazine prn for sbp>160 
  
5. JESSICA (obstructive sleep apnea) 
 -On oxygen 
  
6.Cardiomyopathy EF 25-30% 7. ICD (implantable cardioverter-defibrillator) discharge 8. CAD (coronary artery disease) 
 -Continue routine meds 
 -Telemetry monitoring 
  
DVT prophylaxis:scds Full code Surrogate:wife Disposition:tbd

## 2018-10-04 NOTE — PROGRESS NOTES
Problem: Falls - Risk of 
Goal: *Absence of Falls Document Magdi Jackson Fall Risk and appropriate interventions in the flowsheet. Outcome: Progressing Towards Goal 
Fall Risk Interventions: 
  
 
  
 
Medication Interventions: Evaluate medications/consider consulting pharmacy

## 2018-10-04 NOTE — PROGRESS NOTES
Speech/Physical/Occupational Therapy Screening: 
Services are not indicated at this time. An InBasket screening referral was triggered for therapy based on results obtained during the nursing admission assessment. The patients chart was reviewed and the patient is not appropriate for a skilled therapy evaluation at this time. Please consult speech therapy if any therapy needs arise. Thank you.  
 
Kaylene Sharma, SLP

## 2018-10-04 NOTE — MED STUDENT NOTES
*ATTENTION:  This note has been created by a medical student for educational purposes only. Please do not refer to the content of this note for clinical decision-making, billing, or other purposes. Please see attending physicians note to obtain clinical information on this patient. * Student Progress Note Please refer to attendings daily rounding note for full details Patient: Susan Mann MRN: [id number]  CSN: [id number] YOB: 1970  Age: 46 y.o. Sex: male DOA: 10/3/2018 LOS:  LOS: 1 day Chief Complaint:  Weakness, Nausea, and Vomiting Subjective:  
 Pt is a 47 yo male with a Hx of AICD, Cardiac arrest, VT, ischemic cardiomyopathy EF 30%, Obesity, and CAD who presented to ED yesterday because he was experiencing weakness, N/V, abdominal pain, and hematuria. The weakness started 2 weeks ago and that is when he started to feel unwell. At that time he was vomiting and could not tolerate water. The Pt also indicated that he noticed his urin was turning red. In the ED his creatinine was 11 compared to 2.12 on 1/11/18 and 8.01 on 9/28/18. A urinalysis revealed large blood, + bacteruria, and a CT of the abdomen and pelvis was negative for hydronephrosis and urolithiasis. He was admitted for OLIVIA, hematuria, UTI, and started on IV fluid. Objective:  
  
Visit Vitals  /53 (BP 1 Location: Right arm, BP Patient Position: At rest)  Pulse (!) 50  Temp 97.7 °F (36.5 °C)  Resp 18  Ht 5' 10\" (1.778 m)  Wt 118.7 kg (261 lb 11 oz)  SpO2 99%  BMI 37.55 kg/m2 Physical Exam: 
Gen: Pt well developed, well nourished in no acute distress. HEENT: not preformed CV: RRR with no murmurs, rubs, or clicks appreciated. Resp:Slight rumbling sound in right upper lung field upon exhalation. All other lung fields CTA. Abd: CVA tenderness bilaterally Extrem:  Not preformed Skin: No significant lesions noted Neuro: Not preformed I have reviewed the patient's Labs and Radiology studies. Assessment:  
 
Acute on chronic renal failure Bacteruria Obessity Plan:  
Renal failure: Gentle hydration, consult nephrology Bacteruria: Ceftriaxone IV 1-2 g IV every 24 hrs. Await urinary cultures. Obesity: Lifestyle modification and education Marlin Rodrigues 10/4/2018 11:54 AM 
*ATTENTION:  This note has been created by a medical student for educational purposes only. Please do not refer to the content of this note for clinical decision-making, billing, or other purposes. Please see attending physicians note to obtain clinical information on this patient. *

## 2018-10-04 NOTE — PROGRESS NOTES
Problem: Falls - Risk of 
Goal: *Absence of Falls Document Judy Evens Fall Risk and appropriate interventions in the flowsheet. Outcome: Progressing Towards Goal 
Fall Risk Interventions:

## 2018-10-04 NOTE — PROGRESS NOTES
Problem: Falls - Risk of 
Goal: *Absence of Falls Document April Mina Fall Risk and appropriate interventions in the flowsheet. Outcome: Progressing Towards Goal 
Fall Risk Interventions:

## 2018-10-04 NOTE — PROGRESS NOTES
Pt received to unit via stretcher. He is A&Ox4. No s/s of distress noted. No complaints voiced at this time. Pt oriented to room and use of call bell for assistance; Pt voiced understanding. Bed locked in low position; call bell in reach.

## 2018-10-04 NOTE — PROGRESS NOTES
Reason for Admission:   Renal failure RRAT Score:          5 Plan for utilizing home health:     no  
                 
Likelihood of Readmission:  low Transition of Care Plan:     Spoke with pt, lives with wife. Independent with adls and amb. Uses cane. pcp dr Andrew Mehta. Demographics correct except ph number is  . Disabled. Plan home. Patient has designated ___________spouse_____________ to participate in his/her discharge plan and to receive any needed information. Name: jessica herrera Address: 
Phone number: [phone]

## 2018-10-04 NOTE — PROGRESS NOTES
In Patient Progress note Admit Date: 10/3/2018 Impression: 1. OLIVIA on CKD 3( 1.7-1.9 in 2017) . Etio likely  ischemic atn in the setting of dehydration/hypotension. Gross hematuria /proteinuria make possibility of GN  
HSP/IGA nephropathy may be on differential especially with GI issues, serology send , pending Volume status looks ok , improving urine output Continue 1/2 NS @ 75 cc/hrs 2. CKD 3 due to dm/htn. 3. HTN, suboptimally controlled. 4. met acidosis. Mil;d  
5. Nausea/Vx ,  Etiology unclear , defer to IM 6. Secondary hyperparathyroidism/hyperphsphotemia. pth 515 , add calcitriol 0.25 mcg daily , phos 8.4 Start renvela Please call with questions, 
 
Bradford Berkowitz MD FASN Cell [phone] Pager: [phone] Subjective:  
 
Denies SOB/CP Urinating more Current Facility-Administered Medications:  
  influenza vaccine 2018-19 (6 mos+)(PF) (FLUARIX QUAD/FLULAVAL QUAD) injection 0.5 mL, 0.5 mL, IntraMUSCular, PRIOR TO DISCHARGE, Kenny Luevano MD 
  isosorbide mononitrate ER (IMDUR) tablet 90 mg, 90 mg, Oral, DAILY, Kenny Luevano MD, 90 mg at 10/04/18 5214   pantoprazole (PROTONIX) 40 mg in sodium chloride 0.9% 10 mL injection, 40 mg, IntraVENous, DAILY, Kenyetta Osorio MD, 40 mg at 10/04/18 1542 
  amiodarone (CORDARONE) tablet 400 mg, 400 mg, Oral, DAILY, Kenny Luevano MD, 400 mg at 10/04/18 1475   aspirin chewable tablet 162 mg, 162 mg, Oral, BID, Kenny Luevano MD, 162 mg at 10/04/18 0932 
  atorvastatin (LIPITOR) tablet 40 mg, 40 mg, Oral, QHS, Kenny Luevano MD, 40 mg at 10/03/18 2202   calcium carbonate (TUMS) chewable tablet 200 mg [elemental], 200 mg, Oral, DAILY, Kenny Luevano MD, 200 mg at 10/04/18 1539   clopidogrel (PLAVIX) tablet 75 mg, 75 mg, Oral, DAILY, Kenny Luevano MD, 75 mg at 10/04/18 0347   fluticasone (FLONASE) 50 mcg/actuation nasal spray 2 Spray, 2 Spray, Both Nostrils, DAILY, Samra Marie MD, 2 [place] at 10/04/18 7891   lidocaine 4 % patch 1 Patch, 1 Patch, TransDERmal, Q24H, Samra Marie MD 
  tiotropium (SPIRIVA) inhalation capsule 18 mcg, 1 Cap, Inhalation, DAILY, Samra Marie MD, 18 mcg at 10/04/18 0937 
  ondansetron (ZOFRAN) injection 4 mg, 4 mg, IntraVENous, Q4H PRN, Samra Marie MD, 4 mg at 10/04/18 1537 
  0.9% sodium chloride infusion, 75 mL/hr, IntraVENous, CONTINUOUS, Samra Marie MD, Last Rate: 75 mL/hr at 10/04/18 0521, 75 mL/hr at 10/04/18 0521 
  albuterol-ipratropium (DUO-NEB) 2.5 MG-0.5 MG/3 ML, 3 mL, Nebulization, Q4H PRN, Samra Marie MD 
  cefTRIAXone (ROCEPHIN) 2 g in 0.9% sodium chloride (MBP/ADV) 50 mL MBP, 2 g, IntraVENous, Q24H, Samra Marie MD, Last Rate: 100 mL/hr at 10/03/18 1951, 2 g at 10/03/18 1951   amLODIPine (NORVASC) tablet 5 mg, 5 mg, Oral, DAILY, Charles BATEMAN MD, 5 mg at 10/04/18 6200   buprenorphine-naloxone (SUBOXONE) 4mg-1mg SL film (Patient Supplied), 1 Film, SubLINGual, BID, Samra Marie MD, 1 Film at 10/04/18 1034 Objective:  
 
Visit Vitals  /62 (BP 1 Location: Right arm, BP Patient Position: At rest)  Pulse (!) 51  Temp 98.2 °F (36.8 °C)  Resp 18  Ht 5' 10\" (1.778 m)  Wt 118.7 kg (261 lb 11 oz)  SpO2 98%  BMI 37.55 kg/m2 Intake/Output Summary (Last 24 hours) at 10/04/18 1605 Last data filed at 10/04/18 1523 Gross per 24 hour Intake             1315 ml Output              150 ml Net             1165 ml Physical Exam:  
 
gen NAD HENT mmm RS AEBE clear CVS s1 s2 wnl no JVD 
GI soft BS + Ext no edema Data Review: 
 
Recent Labs 10/04/18 
 0530 WBC  5.9  
RBC  3.87* HCT  33.1* MCV  85.5 MCH  27.1 MCHC  31.7 RDW  16.3* Recent Labs 10/04/18 
 0530  10/03/18 
 1829  10/03/18 
 1055 BUN  69*  70*  66* CREA  11.40*  11.00*  11.00* CA  7.4*  7.0*  7.8*  
 ALB  2.4*   --   2.7*  
K  4.6  4.7  4.4 NA  138  139  136 CL  105  107  103 CO2  20*  18*  20* PHOS  8.4*   --    --   
GLU  101*  116*  107* Arnol Guevara MD

## 2018-10-04 NOTE — PROGRESS NOTES
1955: Bedside verbal shift report received from [facility]. Pt is awake in bed, no signs of distress, instructed to press call light if assistance is needed, call light within reach. 2210: Pt has his suboxone prescription. States he takes it twice daily (one in the morning, another in the evening). States he must take this for opioid withdrawal symptoms. Paged on call hospitalist to see if this is ok for pt to take while admitted. 2220: Dr. Martha Tejeda states ok to use pt supplied suboxone. Pharmacy to verify. 2346: suboxone administered. Pt also complaining of nausea. zofran administered. 0522: Zofran administered for nausea. 8094: Bedside and Verbal shift change report given to JOHN Salcido (oncoming nurse) by Phyllis Gloria RN (offgoing nurse). Report included the following information SBAR, Kardex, Intake/Output, MAR and Recent Results.

## 2018-10-04 NOTE — PROGRESS NOTES
Patient received in bed awake. Patient A&Ox4, denies pain. No distress noted. Frequently use items within reach. Bed locked in low position. Call bell within reach and Patient verbalized understanding of use for assistance and needs. 2209-  Patient c/o nausea; see MAR for prn Zofran IV to be given. Call bell w/in reach. 0930- Dr. Bruna Mckay was called made aware that Patient has been sinus billie HR 42 to 50; has a AICD; /53 and med's as listed to [facility] said \"you can give him his medications. \" (RBV) 5059- Patient resting said that nausea \"it's getting better. \" Call bell w/in reach. 1523- Patient void 150 ml of evonne urine in urinal; urine specimen sent to lab. Patient c/o having nausea; see MAR for prn Zofran IV to be given. Call bell w/in reach. 1550-- Patient resting, no further c/o nausea. Call bell w/in reach.

## 2018-10-05 ENCOUNTER — APPOINTMENT (OUTPATIENT)
Dept: NON INVASIVE DIAGNOSTICS | Age: 48
DRG: 674 | End: 2018-10-05
Attending: INTERNAL MEDICINE
Payer: MEDICARE

## 2018-10-05 LAB
ALBUMIN SERPL-MCNC: 2.3 G/DL (ref 3.4–5)
ALBUMIN/GLOB SERPL: 0.6 {RATIO} (ref 0.8–1.7)
ALP SERPL-CCNC: 74 U/L (ref 45–117)
ALT SERPL-CCNC: 51 U/L (ref 16–61)
ANA SER QL: NEGATIVE
ANION GAP SERPL CALC-SCNC: 15 MMOL/L (ref 3–18)
ASO AB SERPL-ACNC: 49 IU/ML (ref 0–200)
AST SERPL-CCNC: 45 U/L (ref 15–37)
BASOPHILS # BLD: 0 K/UL (ref 0–0.1)
BASOPHILS NFR BLD: 0 % (ref 0–3)
BILIRUB SERPL-MCNC: 0.1 MG/DL (ref 0.2–1)
BUN SERPL-MCNC: 74 MG/DL (ref 7–18)
BUN/CREAT SERPL: 6 (ref 12–20)
C-ANCA TITR SER IF: NORMAL TITER
C3 SERPL-MCNC: 119 MG/DL (ref 82–167)
C4 SERPL-MCNC: 25 MG/DL (ref 14–44)
CALCIUM SERPL-MCNC: 7.2 MG/DL (ref 8.5–10.1)
CHLORIDE SERPL-SCNC: 106 MMOL/L (ref 100–108)
CO2 SERPL-SCNC: 18 MMOL/L (ref 21–32)
CREAT SERPL-MCNC: 11.4 MG/DL (ref 0.6–1.3)
CREAT UR-MCNC: 94.6 MG/DL (ref 30–125)
DIFFERENTIAL METHOD BLD: ABNORMAL
DSDNA AB SER-ACNC: 5 IU/ML (ref 0–9)
EOSINOPHIL # BLD: 0.2 K/UL (ref 0–0.4)
EOSINOPHIL NFR BLD: 3 % (ref 0–5)
ERYTHROCYTE [DISTWIDTH] IN BLOOD BY AUTOMATED COUNT: 16.5 % (ref 11.6–14.5)
GBM IGG SER-ACNC: 4 UNITS (ref 0–20)
GLOBULIN SER CALC-MCNC: 3.6 G/DL (ref 2–4)
GLUCOSE SERPL-MCNC: 84 MG/DL (ref 74–99)
HCT VFR BLD AUTO: 30.3 % (ref 36–48)
HGB BLD-MCNC: 9.6 G/DL (ref 13–16)
IGA SERPL-MCNC: 648 MG/DL (ref 90–386)
IGG SERPL-MCNC: 1090 MG/DL (ref 700–1600)
IGM SERPL-MCNC: 37 MG/DL (ref 20–172)
LYMPHOCYTES # BLD: 1.3 K/UL (ref 0.8–3.5)
LYMPHOCYTES NFR BLD: 18 % (ref 20–51)
MCH RBC QN AUTO: 27.7 PG (ref 24–34)
MCHC RBC AUTO-ENTMCNC: 31.7 G/DL (ref 31–37)
MCV RBC AUTO: 87.3 FL (ref 74–97)
MONOCYTES # BLD: 0.4 K/UL (ref 0–1)
MONOCYTES NFR BLD: 5 % (ref 2–9)
MYELOPEROXIDASE AB SER IA-ACNC: <9 U/ML (ref 0–9)
NEUTS SEG # BLD: 5.3 K/UL (ref 1.8–8)
NEUTS SEG NFR BLD: 74 % (ref 42–75)
P-ANCA ATYPICAL TITR SER IF: NORMAL TITER
P-ANCA TITR SER IF: NORMAL TITER
PLATELET # BLD AUTO: 156 K/UL (ref 135–420)
PLATELET COMMENTS,PCOM: ABNORMAL
PMV BLD AUTO: 8.5 FL (ref 9.2–11.8)
POTASSIUM SERPL-SCNC: 5 MMOL/L (ref 3.5–5.5)
PROT SERPL-MCNC: 5.9 G/DL (ref 6.4–8.2)
PROT UR-MCNC: 351 MG/DL
PROT/CREAT UR-RTO: 3.7
PROTEINASE3 AB SER IA-ACNC: <3.5 U/ML (ref 0–3.5)
RBC # BLD AUTO: 3.47 M/UL (ref 4.7–5.5)
RBC MORPH BLD: ABNORMAL
RBC MORPH BLD: ABNORMAL
SODIUM SERPL-SCNC: 139 MMOL/L (ref 136–145)
WBC # BLD AUTO: 7.2 K/UL (ref 4.6–13.2)

## 2018-10-05 PROCEDURE — 74011000250 HC RX REV CODE- 250: Performed by: INTERNAL MEDICINE

## 2018-10-05 PROCEDURE — 74011250636 HC RX REV CODE- 250/636: Performed by: INTERNAL MEDICINE

## 2018-10-05 PROCEDURE — 74011250637 HC RX REV CODE- 250/637: Performed by: INTERNAL MEDICINE

## 2018-10-05 PROCEDURE — 36415 COLL VENOUS BLD VENIPUNCTURE: CPT | Performed by: INTERNAL MEDICINE

## 2018-10-05 PROCEDURE — 74011000258 HC RX REV CODE- 258: Performed by: INTERNAL MEDICINE

## 2018-10-05 PROCEDURE — C9113 INJ PANTOPRAZOLE SODIUM, VIA: HCPCS | Performed by: INTERNAL MEDICINE

## 2018-10-05 PROCEDURE — 84156 ASSAY OF PROTEIN URINE: CPT | Performed by: INTERNAL MEDICINE

## 2018-10-05 PROCEDURE — 65660000000 HC RM CCU STEPDOWN

## 2018-10-05 PROCEDURE — 80053 COMPREHEN METABOLIC PANEL: CPT | Performed by: INTERNAL MEDICINE

## 2018-10-05 PROCEDURE — 85025 COMPLETE CBC W/AUTO DIFF WBC: CPT | Performed by: INTERNAL MEDICINE

## 2018-10-05 PROCEDURE — 93306 TTE W/DOPPLER COMPLETE: CPT

## 2018-10-05 RX ORDER — PROMETHAZINE HYDROCHLORIDE 25 MG/1
25 TABLET ORAL
Status: DISCONTINUED | OUTPATIENT
Start: 2018-10-05 | End: 2018-10-05 | Stop reason: SDUPTHER

## 2018-10-05 RX ORDER — CALCIUM ACETATE 667 MG/1
2 CAPSULE ORAL
Status: DISCONTINUED | OUTPATIENT
Start: 2018-10-05 | End: 2018-10-24 | Stop reason: HOSPADM

## 2018-10-05 RX ORDER — AMLODIPINE BESYLATE 5 MG/1
10 TABLET ORAL DAILY
Status: DISCONTINUED | OUTPATIENT
Start: 2018-10-06 | End: 2018-10-24 | Stop reason: HOSPADM

## 2018-10-05 RX ORDER — PROMETHAZINE HYDROCHLORIDE 25 MG/1
25 TABLET ORAL
Status: DISCONTINUED | OUTPATIENT
Start: 2018-10-05 | End: 2018-10-24 | Stop reason: HOSPADM

## 2018-10-05 RX ADMIN — SEVELAMER CARBONATE 800 MG: 800 TABLET, FILM COATED ORAL at 08:49

## 2018-10-05 RX ADMIN — SODIUM CHLORIDE: 450 INJECTION, SOLUTION INTRAVENOUS at 18:57

## 2018-10-05 RX ADMIN — ONDANSETRON 4 MG: 2 INJECTION INTRAMUSCULAR; INTRAVENOUS at 08:41

## 2018-10-05 RX ADMIN — BUPRENORPHINE AND NALOXONE 1 FILM: 4; 1 FILM, SOLUBLE BUCCAL; SUBLINGUAL at 17:46

## 2018-10-05 RX ADMIN — CALCIUM ACETATE 1334 MG: 667 CAPSULE ORAL at 17:47

## 2018-10-05 RX ADMIN — PROMETHAZINE HYDROCHLORIDE 25 MG: 25 TABLET ORAL at 20:06

## 2018-10-05 RX ADMIN — PROMETHAZINE HYDROCHLORIDE 25 MG: 25 TABLET ORAL at 11:58

## 2018-10-05 RX ADMIN — CALCITRIOL 0.25 MCG: 0.25 CAPSULE ORAL at 11:57

## 2018-10-05 RX ADMIN — CEFTRIAXONE SODIUM 2 G: 2 INJECTION, POWDER, FOR SOLUTION INTRAMUSCULAR; INTRAVENOUS at 18:30

## 2018-10-05 RX ADMIN — AMLODIPINE BESYLATE 5 MG: 5 TABLET ORAL at 08:48

## 2018-10-05 RX ADMIN — ONDANSETRON 4 MG: 2 INJECTION INTRAMUSCULAR; INTRAVENOUS at 23:55

## 2018-10-05 RX ADMIN — ASPIRIN 81 MG CHEWABLE TABLET 162 MG: 81 TABLET CHEWABLE at 17:47

## 2018-10-05 RX ADMIN — ONDANSETRON 4 MG: 2 INJECTION INTRAMUSCULAR; INTRAVENOUS at 00:55

## 2018-10-05 RX ADMIN — ATORVASTATIN CALCIUM 40 MG: 40 TABLET, FILM COATED ORAL at 20:06

## 2018-10-05 RX ADMIN — BUPRENORPHINE AND NALOXONE 1 FILM: 4; 1 FILM, SOLUBLE BUCCAL; SUBLINGUAL at 08:48

## 2018-10-05 RX ADMIN — ONDANSETRON 4 MG: 2 INJECTION INTRAMUSCULAR; INTRAVENOUS at 15:14

## 2018-10-05 RX ADMIN — SEVELAMER CARBONATE 800 MG: 800 TABLET, FILM COATED ORAL at 11:58

## 2018-10-05 RX ADMIN — FLUTICASONE PROPIONATE 2 SPRAY: 50 SPRAY, METERED NASAL at 08:48

## 2018-10-05 RX ADMIN — SODIUM CHLORIDE 40 MG: 9 INJECTION INTRAMUSCULAR; INTRAVENOUS; SUBCUTANEOUS at 08:49

## 2018-10-05 RX ADMIN — ISOSORBIDE MONONITRATE 90 MG: 30 TABLET, EXTENDED RELEASE ORAL at 08:48

## 2018-10-05 RX ADMIN — ONDANSETRON 4 MG: 2 INJECTION INTRAMUSCULAR; INTRAVENOUS at 04:40

## 2018-10-05 RX ADMIN — ASPIRIN 81 MG CHEWABLE TABLET 162 MG: 81 TABLET CHEWABLE at 08:48

## 2018-10-05 RX ADMIN — TIOTROPIUM BROMIDE 18 MCG: 18 CAPSULE ORAL; RESPIRATORY (INHALATION) at 08:51

## 2018-10-05 RX ADMIN — CLOPIDOGREL BISULFATE 75 MG: 75 TABLET ORAL at 08:48

## 2018-10-05 RX ADMIN — AMIODARONE HYDROCHLORIDE 400 MG: 200 TABLET ORAL at 08:48

## 2018-10-05 RX ADMIN — CALCIUM CARBONATE (ANTACID) CHEW TAB 500 MG 200 MG: 500 CHEW TAB at 08:49

## 2018-10-05 NOTE — PROGRESS NOTES
2000: Assumed patient care. Received report from Allen Correa, [facility] (offgoing nurse). Report included SBAR, Kardex, and MAR. Patient resting in bed in no signs of pain or distress. Call light and possessions within reach, bed in lowest setting. 2115: Emesis episode 0036: Spoke with Dr. Jacki Evans regarding patient's complaints of nausea and emesis episode, doctor instructed to administer next dose of Zofran and continue to monitor 0040: Emesis episode, zofran administered, cold refreshments given

## 2018-10-05 NOTE — PROGRESS NOTES
Assumed care of patient from [facility] (offgoing nurse). Patient is sleeping in bed, no signs of distress or discomfort noted. Bed is locked and in lowest position with call bell and frequently used items in reach. 8075- Patient had small episode of emesis. PRN Zofran IV given, ginger ale and crackers provided. 1200- Patient given PRN Promethazine PO for nausea. Encouraged to call for assistance, call bell and frequently used items in reach. 1850- Patient reports much improvement in nausea with Phenergan. 2000-Bedside and Verbal shift change report given to Ann-Marie Varma RN  (oncoming nurse) by Jacki Houser (offgoing nurse). Report included the following information SBAR, Kardex, Intake/Output, MAR and Recent Results.

## 2018-10-05 NOTE — PROGRESS NOTES
RENAL PROGRESS NOTE Roshni Gordon Assessment/Plan: · OLIVIA. Etio-volume depletion in a setting of recent onset of n/v. Likely has progressed to ischemic atn. Acute GN will be somewhat too conincidental but has to be considered since does have gross hematuria and proteinuria. 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 IgA is elevated, although non diagnostic. At this time renal function is without improvement. GN/vasculitis serologies are pending. Urine prot/creat ratio not done yet. Continue ivf. If no improvement over the weekend will need to start dialysis (may be becoming uremic). D/w patient. May also do kidney biopsy. Agree with holding plavix. Will consider high dose steroids. 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 · HTN with variable control. Increase amlodipine. · Met acidosis. Add Nabicarb to ivf. · N/V. Etio? CT finding noted. Agree with plans for gi evaluation. · Secondary hyperparathyroidism/hyperphsphotemia. Continue calcitriol, add phoslo with meals. · D/w Dr. Alaina Perez. Subjective:Patient complaints off: Continues to c/o n/v, is having difficult time keeping food down. No SOB/CP. Ambulates without difficulties. Is still having gross hematuria. Patient Active Problem List  
Diagnosis Code  ST elevation (STEMI) myocardial infarction involving left anterior descending coronary artery ([facility]) I21.02  
 Cardiac arrest - ventricular fibrillation  HTN (hypertension) I10  
 Hypertensive emergency I16.1  Hyperglycemia R73.9  Acute on chronic renal failure (HCC) N17.9, N18.9  Leukocytosis D72.829  
 Acute pulmonary edema ([facility]) J81.0  Contrast dye induced nephropathy N14.1, T50.8X5A  
 CKD (chronic kidney disease) N18.9  Arachnoiditis G03.9  Postlaminectomy syndrome, lumbar region M96.1  Psoriasis L40.9  Degeneration of lumbar or lumbosacral intervertebral disc M51.37  
 Encounter for long-term (current) use of high-risk medication Z79.899  Obesity E66.9  
 Pain in joint, lower leg M25.569  Psoriatic arthropathy ([facility] L40.50  Chronic low back pain M54.5, G89.29  Chronic pain syndrome G89.4  Lumbosacral spondylosis without myelopathy M47.817  Enthesopathy of hip M76.899  
 Sacroiliitis (HCC) M46.1  Coronary artery disease I25.10  
 JESSICA (obstructive sleep apnea) G47.33  
 UTI (lower urinary tract infection) N39.0  Cardiomyopathy ([facility] I42.9  AICD (automatic cardioverter/defibrillator) present Z95.810  
 OLIVIA (acute kidney injury) ([facility] N17.9  Renal failure N19  
 Chest pain R07.9  
 SOB (shortness of breath) R06.02  Bronchitis J40  Acute renal failure (ARF) (HCC) N17.9  ARF (acute renal failure) (HCC) N17.9  Severe sepsis (HCC) A41.9, R65.20  Epigastric abdominal pain R10.13  
 Anxiety F41.9  Ventricular tachycardia (HCC) I47.2  Ventricular fibrillation (HCC) I49.01  
 ICD (implantable cardioverter-defibrillator) discharge Z45.02  
 CAD (coronary artery disease) I25.10  Obesity, morbid ([facility] E66.01  
 Bacteria in urine E60.65  
 Metabolic acidosis Z26.5 Current Facility-Administered Medications Medication Dose Route Frequency Provider Last Rate Last Dose  promethazine (PHENERGAN) tablet 25 mg  25 mg Oral Q8H PRN Charanjit Rosario MD   25 mg at 10/05/18 1158  influenza vaccine 2018-19 (6 mos+)(PF) (FLUARIX QUAD/FLULAVAL QUAD) injection 0.5 mL  0.5 mL IntraMUSCular PRIOR TO DISCHARGE Charanjit Rosario MD      
 isosorbide mononitrate ER (IMDUR) tablet 90 mg  90 mg Oral DAILY Charanjit Rosario MD   90 mg at 10/05/18 7023  pantoprazole (PROTONIX) 40 mg in sodium chloride 0.9% 10 mL injection  40 mg IntraVENous DAILY Charanjit Rosario MD   40 mg at 10/05/18 1121  calcitRIOL (ROCALTROL) capsule 0.25 mcg  0.25 mcg Oral DAILY Hoda PIKE MD Lynne   0.25 mcg at 10/05/18 1157  sevelamer carbonate (RENVELA) tab 800 mg  800 mg Oral TID WITH MEALS Bobby Sandoval MD   800 mg at 10/05/18 1158  
 0.45% sodium chloride infusion  75 mL/hr IntraVENous CONTINUOUS Bobby Sandoval MD 75 mL/hr at 10/04/18 1733 75 mL/hr at 10/04/18 1733  
 amiodarone (CORDARONE) tablet 400 mg  400 mg Oral DAILY Nikolay Chicas MD   400 mg at 10/05/18 3702  aspirin chewable tablet 162 mg  162 mg Oral BID Nikolay Chicas MD   162 mg at 10/05/18 0848  atorvastatin (LIPITOR) tablet 40 mg  40 mg Oral QHS Nikolay Chicas MD   40 mg at 10/04/18 2041  calcium carbonate (TUMS) chewable tablet 200 mg [elemental]  200 mg Oral DAILY Nikolay Chicas MD   200 mg at 10/05/18 6655  clopidogrel (PLAVIX) tablet 75 mg  75 mg Oral DAILY Nikolay Chicas MD   75 mg at 10/05/18 4719  fluticasone (FLONASE) 50 mcg/actuation nasal spray 2 Spray  2 Spray Both Nostrils DAILY Nikolay Chicas MD   2 Spray at 10/05/18 9428  lidocaine 4 % patch 1 Patch  1 Patch TransDERmal Q24H Nikolay Chicas MD   1 Patch at 10/04/18 1723  tiotropium (SPIRIVA) inhalation capsule 18 mcg  1 Cap Inhalation DAILY Nikolay Chicas MD   18 mcg at 10/05/18 1206  ondansetron (ZOFRAN) injection 4 mg  4 mg IntraVENous Q4H PRN Nikolay Chicas MD   4 mg at 10/05/18 1514  
 albuterol-ipratropium (DUO-NEB) 2.5 MG-0.5 MG/3 ML  3 mL Nebulization Q4H PRN Nikolay Chicas MD      
 cefTRIAXone (ROCEPHIN) 2 g in 0.9% sodium chloride (MBP/ADV) 50 mL MBP  2 g IntraVENous Q24H Nikolay Chicas  mL/hr at 10/04/18 1845 2 g at 10/04/18 1845  
 amLODIPine (NORVASC) tablet 5 mg  5 mg Oral DAILY Mora BATEMAN MD   5 mg at 10/05/18 2588  buprenorphine-naloxone (SUBOXONE) 4mg-1mg SL film (Patient Supplied)  1 Film SubLINGual BID Nikolay Chicas MD   1 Film at 10/05/18 9964 Objective Vitals:  
 10/05/18 0848 10/05/18 1340 10/05/18 1343 10/05/18 1404 BP: 167/89 93/61 (!) 179/91 109/58 Pulse: 65  65 Resp: 16  16 Temp: 99.5 °F (37.5 °C)  98.9 °F (37.2 °C) SpO2: 96%  96% Weight:  118.4 kg (261 lb)  118.4 kg (261 lb) Height:  5' 8\" (1.727 m)  5' 10\" (1.778 m) Intake/Output Summary (Last 24 hours) at 10/05/18 1549 Last data filed at 10/05/18 5497 Gross per 24 hour Intake              570 ml Output              800 ml Net             -230 ml Admission weight: Weight: 118.7 kg (261 lb 11 oz) (10/04/18 4052) Last Weight Metrics: 
Weight Loss Metrics 10/5/2018 2/14/2018 2/8/2018 1/11/2018 8/22/2017 8/20/2017 3/2/2017 Today's Wt 261 lb 250 lb 250 lb 286 lb 280 lb 258 lb 258 lb BMI 37.45 kg/m2 35.87 kg/m2 35.87 kg/m2 41.04 kg/m2 40.18 kg/m2 37.02 kg/m2 37.02 kg/m2 Physical Assessment:  
 
General: NAD, alert and oriented. Neck: No jvd. LUNGS: Clear to Auscultation, No rales, rhonchi or wheezes. CVS EXM: S1, S2  RRR, no murmurs/gallops/rubs. Abdomen: mild periumbilical tenderness to palpation. Lower Extremities: 1+edema. Lab CBC w/Diff Recent Labs 10/05/18 
 0410  10/04/18 
 0530  10/03/18 
 1829 WBC  7.2  5.9  7.8  
RBC  3.47*  3.87*  3.94* HGB  9.6*  10.5*  10.9* HCT  30.3*  33.1*  33.7*  
PLT  156  210  198 GRANS  74  62  66 LYMPH  18*  27  24 EOS  3  4  3 Chemistry Recent Labs 10/05/18 
 0410  10/04/18 
 0530  10/03/18 
 1829  10/03/18 
 1055 GLU  84  101*  116*  107* NA  139  138  139  136  
K  5.0  4.6  4.7  4.4 CL  106  105  107  103 CO2  18*  20*  18*  20* BUN  74*  69*  70*  66* CREA  11.40*  11.40*  11.00*  11.00* CA  7.2*  7.4*  7.0*  7.8* AGAP  15  13  14  13 BUCR  6*  6*  6*  6* AP  74   --    --   100  
TP  5.9*   --    --   8.4* ALB  2.3*  2.4*   --   2.7*  
GLOB  3.6   --    --   5.7* AGRAT  0.6*   --    --   0.5* PHOS   --   8.4*   --    --   
  
  
Lab Results Component Value Date/Time  Iron 86 05/18/2015 03:00 AM  
 TIBC 260 05/18/2015 03:00 AM  
 Iron % saturation 33 05/18/2015 03:00 AM  
 Ferritin 151 05/18/2015 03:00 AM  
 Lab Results Component Value Date/Time Calcium 7.2 (L) 10/05/2018 04:10 AM  
 Phosphorus 8.4 (H) 10/04/2018 05:30 AM  
  
 
Laith Lancaster M.D. [facility] Phone [phone] Pager [phone]

## 2018-10-05 NOTE — PROGRESS NOTES
Problem: Falls - Risk of 
Goal: *Absence of Falls Document Mayito Kins Fall Risk and appropriate interventions in the flowsheet. Outcome: Progressing Towards Goal 
Fall Risk Interventions: 
  
 
  
 
Medication Interventions: Evaluate medications/consider consulting pharmacy History of Falls Interventions: Door open when patient unattended

## 2018-10-05 NOTE — PROGRESS NOTES
Hospitalist Progress Note Matthew Kennedy MD 
Internal medicine/ Hospitalist 
 
Daily Progress Note: 10/5/2018    
Interval history / Subjective:  
Law Robins is a 46 y.o.  male with h/o AICD,cardiac arrest,VT,ischemic cardiomyopathy EF 30%,obesity,cad,presented to ED because of weakness,nausea,vomting,abdominal pain,hematuria. Patient says that he started feeling sick 2 weeks ago when he started feeling weak. Then he started vomiting. Could not tolerate even water. In recent days ,he has had abdominal pain which he attributed to the ongoing vomiting. Patient also reported that he has seen his urine turning red for a while. Had to see his doctor in two weeks but felt very weak and decided to come to the ED. In ED,lab showed creatinine 11.0 compared to 2.12 on 1/11/2018 and 8.01 on 9/28/18. UA showed large blood,bacteria+. CT abd/pelvis w/o hydronephrosis or urolithiasis. Patient admitted for OLIVIA,Hematuria,UTI. Started on iv fluid. Nephrolohy consulted from ER. Since patient has GI complaint and renal involvement,nephrology has indicated that Henoch-Schonlein syndrome needs to be considered. Pt is on iv fluid. Serology for vasculitis work up pending. Current Facility-Administered Medications Medication Dose Route Frequency  promethazine (PHENERGAN) tablet 25 mg  25 mg Oral Q8H PRN  
 [START ON 10/6/2018] amLODIPine (NORVASC) tablet 10 mg  10 mg Oral DAILY  sodium bicarbonate (8.4%) 75 mEq in 0.45% sodium chloride 1,000 mL infusion   IntraVENous CONTINUOUS  
 calcium acetate (PHOSLO) capsule 1,334 mg  2 Cap Oral TID WITH MEALS  influenza vaccine 2018-19 (6 mos+)(PF) (FLUARIX QUAD/FLULAVAL QUAD) injection 0.5 mL  0.5 mL IntraMUSCular PRIOR TO DISCHARGE  isosorbide mononitrate ER (IMDUR) tablet 90 mg  90 mg Oral DAILY  pantoprazole (PROTONIX) 40 mg in sodium chloride 0.9% 10 mL injection  40 mg IntraVENous DAILY  calcitRIOL (ROCALTROL) capsule 0.25 mcg  0.25 mcg Oral DAILY  amiodarone (CORDARONE) tablet 400 mg  400 mg Oral DAILY  aspirin chewable tablet 162 mg  162 mg Oral BID  atorvastatin (LIPITOR) tablet 40 mg  40 mg Oral QHS  clopidogrel (PLAVIX) tablet 75 mg  75 mg Oral DAILY  fluticasone (FLONASE) 50 mcg/actuation nasal spray 2 Spray  2 Spray Both Nostrils DAILY  lidocaine 4 % patch 1 Patch  1 Patch TransDERmal Q24H  
 tiotropium (SPIRIVA) inhalation capsule 18 mcg  1 Cap Inhalation DAILY  ondansetron (ZOFRAN) injection 4 mg  4 mg IntraVENous Q4H PRN  
 albuterol-ipratropium (DUO-NEB) 2.5 MG-0.5 MG/3 ML  3 mL Nebulization Q4H PRN  
 cefTRIAXone (ROCEPHIN) 2 g in 0.9% sodium chloride (MBP/ADV) 50 mL MBP  2 g IntraVENous Q24H  
 buprenorphine-naloxone (SUBOXONE) 4mg-1mg SL film (Patient Supplied)  1 Film SubLINGual BID Review of Systems Still weak and reporting ongoing nausea. Objective:  
 
Visit Vitals  /58  Pulse 65  Temp 98.9 °F (37.2 °C)  Resp 16  
 Ht 5' 10\" (1.778 m)  Wt 118.4 kg (261 lb)  SpO2 96%  BMI 37.45 kg/m2 O2 Device: Room air Temp (24hrs), Av.6 °F (37 °C), Min:97.6 °F (36.4 °C), Max:99.5 °F (37.5 °C) 
 
 
  
10/03 1901 - 10/05 0700 In: [facility] [P.O.:480; I.V.:1405] Out: 950 [Urine:350] PHYSICAL EXAM:  
General:                    Lying in bed in no acute distress. Morbid obesity. HEENT:                     Pupils equal.  Sclera anicteric. Conjunctiva pink. Mouth moist. 
Neck:                                   Supple,no jvd. CV:                  Regular rate and rhythm. Lungs:                       Clear to auscultation bilaterally. No Wheezing or Rhonchi. No rales. Abdomen:                  Soft, non-tender. Not distended. Bowel sounds normal. No organomegaly Extremities:               No cyanosis. No edema. No clubbing Neurologic:                Alert and oriented X 3. Skin:                Warm and dry. No rashes.  
  
  
 
Data Review Recent Results (from the past 12 hour(s)) ECHO ADULT COMPLETE Collection Time: 10/05/18  2:04 PM  
Result Value Ref Range Aortic Valve Systolic Peak Velocity 9.55 cm/s AoV PG 0.0 mmHg LVIDd 6.52 (A) 4.2 - 5.9 cm  
 LVPWd 1.12 (A) 0.6 - 1.0 cm LVIDs 5.41 cm IVSd 0.97 0.6 - 1.0 cm  
 LV ED Vol A2C 223.0 mL  
 LV ES Vol A4C 185.6 mL  
 LV ES Vol .6 (A) 22 - 58 mL  
 LVOT d 2.36 cm  
 LVOT Peak Velocity 70.85 cm/s LVOT Peak Gradient 2.0 mmHg Left Ventricle Isovolumic Relaxation Time 49.9 ms  
 MVA (PHT) 1.9 cm2  
 MV A Chris 87.82 cm/s  
 MV E Chris 0.70 cm/s  
 MV E/A 0.01   
 BP EF 24.4 (A) 55 - 100 % LV Ejection Fraction MOD 4C 28 % LV Ejection Fraction MOD 2C 22 % LA Vol 4C 150.34 (A) 18 - 58 mL  
 LA Vol 2C 110.70 (A) 18 - 58 mL  
 LV Mass .0 (A) 88 - 224 g LV Mass AL Index 154.9 (A) g/m2 LV ES Vol A2C 175.0 mL  
 LVES Vol Index BP 79.0 mL/m2 LV ED Vol A4C 258.0 mL  
 LVED Vol Index .4 mL/m2 Mitral Valve E Wave Deceleration Time 392.1 ms  
 Mitral Valve Pressure Half-time 113.7 ms  
 LV ED Vol .1 (A) 67 - 155 ml  
 LA Vol Index 47.36 ml/m2 LA Vol Index 64.32 ml/m2 LVED Vol Index A4C 110.4 mL/m2 LVED Vol Index A2C 95.4 mL/m2 LVES Vol Index A4C 79.4 mL/m2 LVES Vol Index A2C 74.9 mL/m2 Assessment/Plan: Active Problems: 
  Obesity (1/29/2012) HTN (hypertension) (7/24/2013) [facility] on [facility] failure ([facility] (7/24/2013) JESSICA (obstructive sleep apnea) (12/17/2013) Cardiomyopathy ([facility] (4/23/2014) Overview: EF 25 to 39% this admission ICD (implantable cardioverter-defibrillator) discharge (10/29/2015) Overview: 20 shocks, battery life 8.5 years CAD (coronary artery disease) (10/29/2015) Overview: S/P PCI LAD Bacteria in urine (10/3/2018) Metabolic acidosis (44/3/9317) Care Plan 1. Acute on chronic renal failure/Metabolic acidosis -Etiology:volume depletion -?acute GN as pt also hematuria. -Gentle hydration as pt with significant cardiac history. CT abdm/pelvis w/o hydronephrosis -Metabolic acidosis,bicarb added to iv by nephrology -Nephrology consulted - will follow recommendations 
  2. Bacteria in urine/UTI/Hematuria 
 -Ceftriaxone iv 
 -Blood cx ngtd/Ucx negative. 
 -Patient says that he has hematuria for a while:?possibly due Plavix use or vasculitis. 
 -Will d/c plavix. -Vasculitis serology pending 
  3. Nausea/vomiting/abdominal pain 
 -CT reviewed,no acute process. -Zofran prn 
 -Still nausea despite receiving zofran,added phenergan 
 -Due to persistent GI symptoms,GI consulted - message left through answering service. 
  
4. HTN 
 -Hydralazine prn for sbp>160 
  
5. JESSICA (obstructive sleep apnea) 
 -On oxygen 
  
6.Cardiomyopathy EF 25-30% 7. ICD (implantable cardioverter-defibrillator) discharge 8. CAD (coronary artery disease) 
 -Continue routine meds 
 -Telemetry monitoring 
  
DVT prophylaxis:scds Full code Surrogate:wife Disposition:tbd

## 2018-10-05 NOTE — PROGRESS NOTES
0725: Bedside and Verbal shift change report given to Janeen Johnson RN (oncoming nurse) by Nelly Sadler RN (offgoing nurse). Report included the following information SBAR, Kardex and MAR.

## 2018-10-05 NOTE — PROGRESS NOTES
Problem: Falls - Risk of 
Goal: *Absence of Falls Document Nupur Carreon Fall Risk and appropriate interventions in the flowsheet. Outcome: Progressing Towards Goal 
Fall Risk Interventions: 
  
 
  
 
Medication Interventions: Evaluate medications/consider consulting pharmacy History of Falls Interventions: Door open when patient unattended

## 2018-10-06 LAB
ALBUMIN SERPL-MCNC: 2.2 G/DL (ref 3.4–5)
ALBUMIN/GLOB SERPL: 0.6 {RATIO} (ref 0.8–1.7)
ALP SERPL-CCNC: 73 U/L (ref 45–117)
ALT SERPL-CCNC: 71 U/L (ref 16–61)
ANION GAP SERPL CALC-SCNC: 15 MMOL/L (ref 3–18)
AST SERPL-CCNC: 67 U/L (ref 15–37)
BACTERIA SPEC CULT: NORMAL
BASOPHILS # BLD: 0 K/UL (ref 0–0.1)
BASOPHILS NFR BLD: 0 % (ref 0–2)
BILIRUB SERPL-MCNC: 0.2 MG/DL (ref 0.2–1)
BUN SERPL-MCNC: 73 MG/DL (ref 7–18)
BUN/CREAT SERPL: 6 (ref 12–20)
CALCIUM SERPL-MCNC: 6.9 MG/DL (ref 8.5–10.1)
CHLORIDE SERPL-SCNC: 103 MMOL/L (ref 100–108)
CO2 SERPL-SCNC: 18 MMOL/L (ref 21–32)
CREAT SERPL-MCNC: 11.7 MG/DL (ref 0.6–1.3)
CRP SERPL-MCNC: 10.8 MG/DL (ref 0–0.3)
DIFFERENTIAL METHOD BLD: ABNORMAL
EOSINOPHIL # BLD: 0.3 K/UL (ref 0–0.4)
EOSINOPHIL NFR BLD: 4 % (ref 0–5)
ERYTHROCYTE [DISTWIDTH] IN BLOOD BY AUTOMATED COUNT: 16.1 % (ref 11.6–14.5)
ERYTHROCYTE [SEDIMENTATION RATE] IN BLOOD: 64 MM/HR (ref 0–15)
GLOBULIN SER CALC-MCNC: 3.6 G/DL (ref 2–4)
GLUCOSE SERPL-MCNC: 82 MG/DL (ref 74–99)
HCT VFR BLD AUTO: 29 % (ref 36–48)
HGB BLD-MCNC: 9.2 G/DL (ref 13–16)
LIPASE SERPL-CCNC: 184 U/L (ref 73–393)
LYMPHOCYTES # BLD: 1.4 K/UL (ref 0.9–3.6)
LYMPHOCYTES NFR BLD: 22 % (ref 21–52)
MCH RBC QN AUTO: 26.5 PG (ref 24–34)
MCHC RBC AUTO-ENTMCNC: 31.7 G/DL (ref 31–37)
MCV RBC AUTO: 83.6 FL (ref 74–97)
MONOCYTES # BLD: 0.5 K/UL (ref 0.05–1.2)
MONOCYTES NFR BLD: 8 % (ref 3–10)
NEUTS SEG # BLD: 4.2 K/UL (ref 1.8–8)
NEUTS SEG NFR BLD: 66 % (ref 40–73)
PLATELET # BLD AUTO: 176 K/UL (ref 135–420)
PMV BLD AUTO: 8.6 FL (ref 9.2–11.8)
POTASSIUM SERPL-SCNC: 4.4 MMOL/L (ref 3.5–5.5)
PROT SERPL-MCNC: 5.8 G/DL (ref 6.4–8.2)
RBC # BLD AUTO: 3.47 M/UL (ref 4.7–5.5)
SERVICE CMNT-IMP: NORMAL
SODIUM SERPL-SCNC: 136 MMOL/L (ref 136–145)
WBC # BLD AUTO: 6.3 K/UL (ref 4.6–13.2)

## 2018-10-06 PROCEDURE — 82390 ASSAY OF CERULOPLASMIN: CPT | Performed by: INTERNAL MEDICINE

## 2018-10-06 PROCEDURE — 74011000250 HC RX REV CODE- 250: Performed by: INTERNAL MEDICINE

## 2018-10-06 PROCEDURE — 77030032490 HC SLV COMPR SCD KNE COVD -B

## 2018-10-06 PROCEDURE — 87340 HEPATITIS B SURFACE AG IA: CPT | Performed by: INTERNAL MEDICINE

## 2018-10-06 PROCEDURE — 36415 COLL VENOUS BLD VENIPUNCTURE: CPT | Performed by: INTERNAL MEDICINE

## 2018-10-06 PROCEDURE — C9113 INJ PANTOPRAZOLE SODIUM, VIA: HCPCS | Performed by: INTERNAL MEDICINE

## 2018-10-06 PROCEDURE — 85025 COMPLETE CBC W/AUTO DIFF WBC: CPT | Performed by: INTERNAL MEDICINE

## 2018-10-06 PROCEDURE — 74011000258 HC RX REV CODE- 258: Performed by: INTERNAL MEDICINE

## 2018-10-06 PROCEDURE — 80053 COMPREHEN METABOLIC PANEL: CPT | Performed by: INTERNAL MEDICINE

## 2018-10-06 PROCEDURE — 86140 C-REACTIVE PROTEIN: CPT | Performed by: INTERNAL MEDICINE

## 2018-10-06 PROCEDURE — 74011250637 HC RX REV CODE- 250/637: Performed by: INTERNAL MEDICINE

## 2018-10-06 PROCEDURE — 74011250636 HC RX REV CODE- 250/636: Performed by: INTERNAL MEDICINE

## 2018-10-06 PROCEDURE — 82787 IGG 1 2 3 OR 4 EACH: CPT | Performed by: INTERNAL MEDICINE

## 2018-10-06 PROCEDURE — 83690 ASSAY OF LIPASE: CPT | Performed by: INTERNAL MEDICINE

## 2018-10-06 PROCEDURE — 85652 RBC SED RATE AUTOMATED: CPT | Performed by: INTERNAL MEDICINE

## 2018-10-06 PROCEDURE — 65660000000 HC RM CCU STEPDOWN

## 2018-10-06 PROCEDURE — 83516 IMMUNOASSAY NONANTIBODY: CPT | Performed by: INTERNAL MEDICINE

## 2018-10-06 RX ORDER — CALCITRIOL 0.25 UG/1
0.5 CAPSULE ORAL DAILY
Status: DISCONTINUED | OUTPATIENT
Start: 2018-10-06 | End: 2018-10-23

## 2018-10-06 RX ORDER — HYDRALAZINE HYDROCHLORIDE 25 MG/1
25 TABLET, FILM COATED ORAL 3 TIMES DAILY
Status: DISCONTINUED | OUTPATIENT
Start: 2018-10-06 | End: 2018-10-07

## 2018-10-06 RX ADMIN — AMLODIPINE BESYLATE 10 MG: 5 TABLET ORAL at 09:23

## 2018-10-06 RX ADMIN — CALCITRIOL 0.5 MCG: 0.25 CAPSULE ORAL at 12:18

## 2018-10-06 RX ADMIN — AMIODARONE HYDROCHLORIDE 400 MG: 200 TABLET ORAL at 09:21

## 2018-10-06 RX ADMIN — BUPRENORPHINE AND NALOXONE 1 FILM: 4; 1 FILM, SOLUBLE BUCCAL; SUBLINGUAL at 18:35

## 2018-10-06 RX ADMIN — CEFTRIAXONE SODIUM 2 G: 2 INJECTION, POWDER, FOR SOLUTION INTRAMUSCULAR; INTRAVENOUS at 19:47

## 2018-10-06 RX ADMIN — HYDRALAZINE HYDROCHLORIDE 25 MG: 25 TABLET, FILM COATED ORAL at 11:24

## 2018-10-06 RX ADMIN — ISOSORBIDE MONONITRATE 90 MG: 30 TABLET, EXTENDED RELEASE ORAL at 09:16

## 2018-10-06 RX ADMIN — CALCIUM ACETATE 1334 MG: 667 CAPSULE ORAL at 17:47

## 2018-10-06 RX ADMIN — SODIUM CHLORIDE: 450 INJECTION, SOLUTION INTRAVENOUS at 11:36

## 2018-10-06 RX ADMIN — ASPIRIN 81 MG CHEWABLE TABLET 162 MG: 81 TABLET CHEWABLE at 17:47

## 2018-10-06 RX ADMIN — HYDRALAZINE HYDROCHLORIDE 25 MG: 25 TABLET, FILM COATED ORAL at 23:59

## 2018-10-06 RX ADMIN — ONDANSETRON 4 MG: 2 INJECTION INTRAMUSCULAR; INTRAVENOUS at 22:54

## 2018-10-06 RX ADMIN — BUPRENORPHINE AND NALOXONE 1 FILM: 4; 1 FILM, SOLUBLE BUCCAL; SUBLINGUAL at 09:45

## 2018-10-06 RX ADMIN — SODIUM CHLORIDE 40 MG: 9 INJECTION INTRAMUSCULAR; INTRAVENOUS; SUBCUTANEOUS at 09:23

## 2018-10-06 RX ADMIN — TIOTROPIUM BROMIDE 18 MCG: 18 CAPSULE ORAL; RESPIRATORY (INHALATION) at 09:26

## 2018-10-06 RX ADMIN — CALCIUM ACETATE 1334 MG: 667 CAPSULE ORAL at 12:51

## 2018-10-06 RX ADMIN — FLUTICASONE PROPIONATE 2 SPRAY: 50 SPRAY, METERED NASAL at 09:24

## 2018-10-06 RX ADMIN — ATORVASTATIN CALCIUM 40 MG: 40 TABLET, FILM COATED ORAL at 23:37

## 2018-10-06 RX ADMIN — CALCIUM ACETATE 1334 MG: 667 CAPSULE ORAL at 09:16

## 2018-10-06 RX ADMIN — HYDRALAZINE HYDROCHLORIDE 25 MG: 25 TABLET, FILM COATED ORAL at 16:54

## 2018-10-06 RX ADMIN — ASPIRIN 81 MG CHEWABLE TABLET 162 MG: 81 TABLET CHEWABLE at 09:23

## 2018-10-06 NOTE — PROGRESS NOTES
Patient received in bed awake. Patient alert and oriented 4, denies pain and discomfort. Patient resting quietly. Frequent use items within reach. Bed locked in low position. Call bell within reach and patient verbalized understanding of use for assistance and needs. Dual skin assessment conducted with Sister Feng Baez RN. Skin is intact except for psoriasis on whole body predominantly on the elbows, and legs.

## 2018-10-06 NOTE — PROGRESS NOTES
Problem: Falls - Risk of 
Goal: *Absence of Falls Document Nkechi Sanches Fall Risk and appropriate interventions in the flowsheet. Fall Risk Interventions: 
  
 
  
 
Medication Interventions: Evaluate medications/consider consulting pharmacy History of Falls Interventions: Door open when patient unattended Problem: Acute Renal Failure: Day 1 Goal: *Urinary output within identified parameters Outcome: Progressing Towards Goal

## 2018-10-06 NOTE — PROGRESS NOTES
2000- Assumed care of patient from [place]. Pt in bed awake watching tv. Pt denies pain at the moment. Pt c/o nausea. Assessment completed plan of care for the shift explained pt verbalizes understanding. IVF infusing well, call light and urinal within reach. Will continue with the care. 2006- Pt medicated for nausea with promethazine 25 mg po. 
 
0600- IV infiltrated discontinued new one gauge 22 inserted on the left hand successfully. Pt's wife arrived at bedside.

## 2018-10-06 NOTE — ROUTINE PROCESS
Bedside and Verbal shift change report given to Ngozi Mccain RN (oncoming nurse) by Freddie Jama RN (offgoing nurse). Report included the following information SBAR, Kardex, Intake/Output, MAR and Recent Results.

## 2018-10-06 NOTE — PROGRESS NOTES
Problem: Falls - Risk of 
Goal: *Absence of Falls Document Magdi Jackson Fall Risk and appropriate interventions in the flowsheet. Outcome: Progressing Towards Goal 
Fall Risk Interventions: 
  
 
  
 
Medication Interventions: Evaluate medications/consider consulting pharmacy History of Falls Interventions: Door open when patient unattended

## 2018-10-06 NOTE — CONSULTS
[facility] - Consult Note Consultation request by Thania Bergman MD for advice/opinion related to evaluating Acute on chronic renal failure ([facility] Date of  Admission: 10/3/2018 10:13 AM  
Primary Care Physician:  Bell Valencia MD 
 
 Assessment:  
 
Patient Active Problem List  
Diagnosis Code  ST elevation (STEMI) myocardial infarction involving left anterior descending coronary artery ([facility]) I21.02  
 Cardiac arrest - ventricular fibrillation  HTN (hypertension) I10  
 Hypertensive emergency I16.1  Hyperglycemia R73.9  Acute on chronic renal failure (HCC) N17.9, N18.9  Leukocytosis D72.829  
 Acute pulmonary edema ([facility]) J81.0  Contrast dye induced nephropathy N14.1, T50.8X5A  
 CKD (chronic kidney disease) N18.9  Arachnoiditis G03.9  Postlaminectomy syndrome, lumbar region M96.1  Psoriasis L40.9  Degeneration of lumbar or lumbosacral intervertebral disc M51.37  
 Encounter for long-term (current) use of high-risk medication Z79.899  Obesity E66.9  
 Pain in joint, lower leg M25.569  Psoriatic arthropathy ([facility] L40.50  Chronic low back pain M54.5, G89.29  Chronic pain syndrome G89.4  Lumbosacral spondylosis without myelopathy M47.817  Enthesopathy of hip M76.899  
 Sacroiliitis ([facility]) M46.1  Coronary artery disease I25.10  
 JESSICA (obstructive sleep apnea) G47.33  
 UTI (lower urinary tract infection) N39.0  Cardiomyopathy ([facility] I42.9  AICD (automatic cardioverter/defibrillator) present Z95.810  
 OLIVIA (acute kidney injury) ([facility] N17.9  Renal failure N19  
 Chest pain R07.9  
 SOB (shortness of breath) R06.02  Bronchitis J40  Acute renal failure (ARF) ([facility]) N17.9  ARF (acute renal failure) ([facility]) N17.9  Severe sepsis ([facility]) A41.9, R65.20  Epigastric abdominal pain R10.13  
 Anxiety F41.9  Ventricular tachycardia ([facility]) I47.2  Ventricular fibrillation ([facility]) I49.01  
  ICD (implantable cardioverter-defibrillator) discharge Z45.02  
 CAD (coronary artery disease) I25.10  Obesity, morbid ([facility] E66.01  
 Bacteria in urine N39.87  
 Metabolic acidosis U60.4 Plan:  
 
His cardiac status is stable. I would continue his home cardiac medication regimen. No new recommendations at this time. We will follow along with you. History of Present Illness: This is a 46 y.o. male admitted for Acute on chronic renal failure ([facility] Patient complains of: Nausea Patient is a 70-year-old gentleman with known history of CAD, ischemic cardiomyopathy, AICD. He presented with nausea, vomiting, and abdominal discomfort. He also had hematuria likely from UTI. From cardiac standpoint, he has no complaints. He has baseline renal insufficiency with acute renal failure. From cardiac standpoint, he has remained stable. Cardiac risk factors: dyslipidemia, diabetes mellitus, obesity, hypertension Review of Symptoms:  Except as stated above include: 
Constitutional:  negative Respiratory:  negative Cardiovascular:  negative Gastrointestinal: negative Genitourinary:  negative Musculoskeletal:  Negative Neurological:  Negative Dermatological:  Negative Endocrinological: Negative Psychological:  Negative A comprehensive review of systems was negative except for that written in the HPI. Past Medical History:  
 
Past Medical History:  
Diagnosis Date  AICD MEDTRONIC (single chamber)  Apical mural thrombus ECHO 3/14  Cardiac arrest ([facility] 10/15 VT / VF ( ~20 ICD shocks ). No obstructive CAD on cath  Chronic back pain  Chronic kidney disease, stage III (moderate) (formerly [facility])  Coronary artery disease LAD - 3.0 x 18mm VISION 7/13  Degenerative spine disease  Dyslipidemia  Hypertension  Ischemic cardiomyopathy EF 30%(10/15) , 40-45% (03/15),  EF 30-35% (3/14)  Ischemic VF   
 07/13 in setting of MI, DC cardioversion x4  Narcotic dependence ([address]  Noncompliance  Obesity  Psoriasis  S/P cardiac cath 10/15  Secondary hyperparathyroidism (of renal origin)  Tobacco abuse Social History:  
 
Social History Social History  Marital status: LEGALLY  Spouse name: N/A  
 Number of children: N/A  
 Years of education: N/A Social History Main Topics  Smoking status: Former Smoker Packs/day: 1.00 Years: 30.00  Smokeless tobacco: Former User Comment: Quit cigarettes after 1st MI. Smokes a cigars occasionally, \"Exposed to cigarette smoke in the home\"  Alcohol use No  
   Comment: Quit 8 years ago  Drug use: No  
 Sexual activity: Not Asked Other Topics Concern  None Social History Narrative ** Merged History Encounter ** Family History:  
 
Family History Problem Relation Age of Onset  Heart Attack Father  Heart Disease Father  Hypertension Other  Asthma Other  Arthritis-osteo Other  Diabetes Other Medications: Allergies Allergen Reactions  Latex Other (comments)  
  blisters  Other Food Hives Allergic to tomatoes and tomato sauce  Codeine Nausea and Vomiting Current Facility-Administered Medications Medication Dose Route Frequency  calcitRIOL (ROCALTROL) capsule 0.5 mcg  0.5 mcg Oral DAILY  hydrALAZINE (APRESOLINE) tablet 25 mg  25 mg Oral TID  promethazine (PHENERGAN) tablet 25 mg  25 mg Oral Q8H PRN  
 amLODIPine (NORVASC) tablet 10 mg  10 mg Oral DAILY  sodium bicarbonate (8.4%) 75 mEq in 0.45% sodium chloride 1,000 mL infusion   IntraVENous CONTINUOUS  
 calcium acetate (PHOSLO) capsule 1,334 mg  2 Cap Oral TID WITH MEALS  influenza vaccine 2018-19 (6 mos+)(PF) (FLUARIX QUAD/FLULAVAL QUAD) injection 0.5 mL  0.5 mL IntraMUSCular PRIOR TO DISCHARGE  
  isosorbide mononitrate ER (IMDUR) tablet 90 mg  90 mg Oral DAILY  pantoprazole (PROTONIX) 40 mg in sodium chloride 0.9% 10 mL injection  40 mg IntraVENous DAILY  amiodarone (CORDARONE) tablet 400 mg  400 mg Oral DAILY  aspirin chewable tablet 162 mg  162 mg Oral BID  atorvastatin (LIPITOR) tablet 40 mg  40 mg Oral QHS  fluticasone (FLONASE) 50 mcg/actuation nasal spray 2 Spray  2 Spray Both Nostrils DAILY  lidocaine 4 % patch 1 Patch  1 Patch TransDERmal Q24H  
 tiotropium (SPIRIVA) inhalation capsule 18 mcg  1 Cap Inhalation DAILY  ondansetron (ZOFRAN) injection 4 mg  4 mg IntraVENous Q4H PRN  
 albuterol-ipratropium (DUO-NEB) 2.5 MG-0.5 MG/3 ML  3 mL Nebulization Q4H PRN  
 cefTRIAXone (ROCEPHIN) 2 g in 0.9% sodium chloride (MBP/ADV) 50 mL MBP  2 g IntraVENous Q24H  
 buprenorphine-naloxone (SUBOXONE) 4mg-1mg SL film (Patient Supplied)  1 Film SubLINGual BID Physical Exam:  
 
Visit Vitals  /82 (BP 1 Location: Right arm, BP Patient Position: Sitting)  Pulse 64  Temp 98 °F (36.7 °C)  Resp 15  Ht 5' 10\" (1.778 m)  Wt 118.4 kg (261 lb)  SpO2 96%  BMI 37.45 kg/m2 BP Readings from Last 3 Encounters:  
10/06/18 176/82  
02/14/18 159/89  
02/08/18 151/80 Pulse Readings from Last 3 Encounters:  
10/06/18 64  
02/14/18 (!) 51  
02/08/18 60 Wt Readings from Last 3 Encounters:  
10/05/18 118.4 kg (261 lb) 02/14/18 113.4 kg (250 lb) 02/08/18 113.4 kg (250 lb) General:  alert, cooperative, no distress, appears stated age Neck:  no JVD Lungs:  clear to auscultation bilaterally Heart:  regular rate and rhythm Abdomen:  no guarding or rigidity Extremities:  no edema Skin: Warm and dry. no hyperpigmentation, vitiligo, or suspicious lesions Neuro: alert, oriented x3, affect appropriate, no focal neurological deficits, moves all extremities well, no involuntary movements Psych: non focal 
 
 Data Review:  
 
Recent Labs 10/06/18 0408  10/05/18 
 0410  10/04/18 
 0530 WBC  6.3  7.2  5.9 HGB  9.2*  9.6*  10.5* HCT  29.0*  30.3*  33.1*  
PLT  176  156  210 Recent Labs 10/06/18 
 0408  10/05/18 
 0410  10/04/18 
 0530 NA  136  139  138  
K  4.4  5.0  4.6 CL  103  106  105 CO2  18*  18*  20* GLU  82  84  101* BUN  73*  74*  69* CREA  11.70*  11.40*  11.40* CA  6.9*  7.2*  7.4* PHOS   --    --   8.4* ALB  2.2*  2.3*  2.4* SGOT  67*  45*   --   
ALT  71*  51   -- Results for orders placed or performed during the hospital encounter of 08/20/17 EKG, 12 LEAD, INITIAL Result Value Ref Range Ventricular Rate 48 BPM  
 Atrial Rate 48 BPM  
 P-R Interval 184 ms QRS Duration 98 ms Q-T Interval 442 ms QTC Calculation (Bezet) 394 ms Calculated P Axis 8 degrees Calculated R Axis -18 degrees Calculated T Axis 80 degrees Diagnosis Marked sinus bradycardia Septal infarct (cited on or before 01-DEC-2015) T wave abnormality, consider lateral ischemia Abnormal ECG When compared with ECG of 14-SEP-2016 20:57, 
Questionable change in initial forces of Anterior leads T wave inversion more evident in Lateral leads QT has shortened Confirmed by Angela Solis MD, --- ([id number]) on 8/20/2017 12:11:50 PM 
  
 
 
All Cardiac Markers in the last 24 hours:  No results found for: CPK, CK, CKMMB, CKMB, RCK3, CKMBT, CKNDX, CKND1, MARQUES, TROPT, TROIQ, NOELLE, TROPT, TNIPOC, BNP, BNPP Last Lipid:   
Lab Results Component Value Date/Time Cholesterol, total 136 04/21/2015 04:55 AM  
 HDL Cholesterol 28 (L) 04/21/2015 04:55 AM  
 LDL, calculated 60.2 04/21/2015 04:55 AM  
 Triglyceride 239 (H) 04/21/2015 04:55 AM  
 CHOL/HDL Ratio 4.9 04/21/2015 04:55 AM  
 
 
Signed By: Brady Sidhu MD   
 October 6, 2018

## 2018-10-06 NOTE — PROGRESS NOTES
Hospitalist Progress Note Sang Cee MD 
Internal medicine/ Hospitalist 
 
Daily Progress Note: 10/6/2018    
Interval history / Subjective:  
Raya Stockton is a 46 y.o.  male with h/o AICD,cardiac arrest,VT,ischemic cardiomyopathy EF 30%,obesity,cad,presented to ED because of weakness,nausea,vomting,abdominal pain,hematuria. Patient says that he started feeling sick 2 weeks ago when he started feeling weak. Then he started vomiting. Could not tolerate even water. In recent days ,he has had abdominal pain which he attributed to the ongoing vomiting. Patient also reported that he has seen his urine turning red for a while. Had to see his doctor in two weeks but felt very weak and decided to come to the ED. In ED,lab showed creatinine 11.0 compared to 2.12 on 1/11/2018 and 8.01 on 9/28/18. UA showed large blood,bacteria+. CT abd/pelvis w/o hydronephrosis or urolithiasis. Patient admitted for OLIVIA,Hematuria,UTI. Started on iv fluid. Nephrolohy consulted from ER. Since patient has GI complaint and renal involvement,nephrology has indicated that Henoch-Schonlein syndrome needs to be considered. Pt is on iv fluid. IgA elevated 648. Other serology for vasculitis including nathaniel,GBM ab,ANCA,C3,C4,heptitis profile not abnormal.Nephrology has indicated that if creatinine continue to rise over the weekend,patient will need dialysis. Today creat up to 11.70. Patient may also need kidney biopsy. Cardiology has been consulted as pt has significant cardiac history. Current Facility-Administered Medications Medication Dose Route Frequency  calcitRIOL (ROCALTROL) capsule 0.5 mcg  0.5 mcg Oral DAILY  promethazine (PHENERGAN) tablet 25 mg  25 mg Oral Q8H PRN  
 amLODIPine (NORVASC) tablet 10 mg  10 mg Oral DAILY  sodium bicarbonate (8.4%) 75 mEq in 0.45% sodium chloride 1,000 mL infusion   IntraVENous CONTINUOUS  
 calcium acetate (PHOSLO) capsule 1,334 mg  2 Cap Oral TID WITH MEALS  
  influenza vaccine 2018- (6 mos+)(PF) (FLUARIX QUAD/FLULAVAL QUAD) injection 0.5 mL  0.5 mL IntraMUSCular PRIOR TO DISCHARGE  isosorbide mononitrate ER (IMDUR) tablet 90 mg  90 mg Oral DAILY  pantoprazole (PROTONIX) 40 mg in sodium chloride 0.9% 10 mL injection  40 mg IntraVENous DAILY  amiodarone (CORDARONE) tablet 400 mg  400 mg Oral DAILY  aspirin chewable tablet 162 mg  162 mg Oral BID  atorvastatin (LIPITOR) tablet 40 mg  40 mg Oral QHS  fluticasone (FLONASE) 50 mcg/actuation nasal spray 2 Spray  2 Spray Both Nostrils DAILY  lidocaine 4 % patch 1 Patch  1 Patch TransDERmal Q24H  
 tiotropium (SPIRIVA) inhalation capsule 18 mcg  1 Cap Inhalation DAILY  ondansetron (ZOFRAN) injection 4 mg  4 mg IntraVENous Q4H PRN  
 albuterol-ipratropium (DUO-NEB) 2.5 MG-0.5 MG/3 ML  3 mL Nebulization Q4H PRN  
 cefTRIAXone (ROCEPHIN) 2 g in 0.9% sodium chloride (MBP/ADV) 50 mL MBP  2 g IntraVENous Q24H  
 buprenorphine-naloxone (SUBOXONE) 4mg-1mg SL film (Patient Supplied)  1 Film SubLINGual BID Review of Systems Still weak and reporting less nausea. Objective:  
 
Visit Vitals  BP (!) 176/99 (BP 1 Location: Right arm, BP Patient Position: At rest)  Pulse (!) 53  Temp 98.8 °F (37.1 °C)  Resp 16  
 Ht 5' 10\" (1.778 m)  Wt 118.4 kg (261 lb)  SpO2 91%  BMI 37.45 kg/m2 O2 Device: Room air Temp (24hrs), Av.6 °F (37 °C), Min:97.9 °F (36.6 °C), Max:99 °F (37.2 °C) 10/04 1901 - 10/06 0700 In: 1308.8 [P.O.:480; I.V.:828.8] Out: 1300 [Urine:700] PHYSICAL EXAM:  
General:                    Sitting in bed,eating breakfast.Morbid obesity. HEENT:                     Pupils equal.  Sclera anicteric. Conjunctiva pink. Mouth moist. 
Neck:                                   Supple,no jvd. CV:                  Regular rate and rhythm. Lungs:                       Clear to auscultation bilaterally. No Wheezing or Rhonchi. No rales. Abdomen:                  Soft, non-tender. Not distended. Bowel sounds normal. No organomegaly Extremities:               No cyanosis. No edema. No clubbing Neurologic:                Alert and oriented X 3. Skin:                Warm and dry. No rashes.  
  
  
 
Data Review Recent Results (from the past 12 hour(s)) METABOLIC PANEL, COMPREHENSIVE Collection Time: 10/06/18  4:08 AM  
Result Value Ref Range Sodium 136 136 - 145 mmol/L Potassium 4.4 3.5 - 5.5 mmol/L Chloride 103 100 - 108 mmol/L  
 CO2 18 (L) 21 - 32 mmol/L Anion gap 15 3.0 - 18 mmol/L Glucose 82 74 - 99 mg/dL BUN 73 (H) 7.0 - 18 MG/DL Creatinine 11.70 (H) 0.6 - 1.3 MG/DL  
 BUN/Creatinine ratio 6 (L) 12 - 20 GFR est AA 6 (L) >60 ml/min/1.73m2 GFR est non-AA 5 (L) >60 ml/min/1.73m2 Calcium 6.9 (L) 8.5 - 10.1 MG/DL Bilirubin, total 0.2 0.2 - 1.0 MG/DL  
 ALT (SGPT) 71 (H) 16 - 61 U/L  
 AST (SGOT) 67 (H) 15 - 37 U/L Alk. phosphatase 73 45 - 117 U/L Protein, total 5.8 (L) 6.4 - 8.2 g/dL Albumin 2.2 (L) 3.4 - 5.0 g/dL Globulin 3.6 2.0 - 4.0 g/dL A-G Ratio 0.6 (L) 0.8 - 1.7    
CBC WITH AUTOMATED DIFF Collection Time: 10/06/18  4:08 AM  
Result Value Ref Range WBC 6.3 4.6 - 13.2 K/uL  
 RBC 3.47 (L) 4.70 - 5.50 M/uL HGB 9.2 (L) 13.0 - 16.0 g/dL HCT 29.0 (L) 36.0 - 48.0 % MCV 83.6 74.0 - 97.0 FL  
 MCH 26.5 24.0 - 34.0 PG  
 MCHC 31.7 31.0 - 37.0 g/dL  
 RDW 16.1 (H) 11.6 - 14.5 % PLATELET 899 086 - 229 K/uL MPV 8.6 (L) 9.2 - 11.8 FL  
 NEUTROPHILS 66 40 - 73 % LYMPHOCYTES 22 21 - 52 % MONOCYTES 8 3 - 10 % EOSINOPHILS 4 0 - 5 % BASOPHILS 0 0 - 2 %  
 ABS. NEUTROPHILS 4.2 1.8 - 8.0 K/UL  
 ABS. LYMPHOCYTES 1.4 0.9 - 3.6 K/UL  
 ABS. MONOCYTES 0.5 0.05 - 1.2 K/UL  
 ABS. EOSINOPHILS 0.3 0.0 - 0.4 K/UL  
 ABS. BASOPHILS 0.0 0.0 - 0.1 K/UL  
 DF AUTOMATED Assessment/Plan: Active Problems: 
  Obesity (1/29/2012) HTN (hypertension) (7/24/2013) Acute on chronic renal failure ([facility] (7/24/2013) JESSICA (obstructive sleep apnea) (12/17/2013) [facility] (4/23/2014) Overview: EF 25 to 39% this admission ICD (implantable cardioverter-defibrillator) discharge (10/29/2015) Overview: 20 shocks, battery life 8.5 years CAD (coronary artery disease) (10/29/2015) Overview: S/P PCI LAD Bacteria in urine (10/3/2018) Metabolic acidosis (07/1/4445) Care Plan 1. Acute on chronic renal failure/Metabolic acidosis -Etiology:volume depletion -?acute GN as pt also hematuria. -Gentle hydration as pt with significant cardiac history. CT abdm/pelvis w/o hydronephrosis -Metabolic acidosis,bicarb added to iv by nephrology -Nephrology consulted - plaining dialysis if creat continue to rise this weekend. 
 -May need kidney biopsy. Plavix on hold 
 -Serology:IgA elevated. Other normal such as MANUELITO,ANCA,C3,C4,Hep profile. 
  
2.Bacteria in urine/UTI/Hematuria 
 -Ceftriaxone iv 
 -Blood cx ngtd/Ucx negative. 
 -Patient says that he has hematuria for a while:?possibly due Plavix use or vasculitis. 
 -Will d/c plavix. -Vasculitis serology pending 
  3. Nausea/vomiting/abdominal pain 
 -CT reviewed,no acute process. -Zofran prn 
 -Still nausea despite receiving zofran,added phenergan 
 -Due to persistent GI symptoms,GI consulted - Dr Tracie López will see patient 
  
4. HTN 
 -Hydralazine prn for sbp>160 
  
5. JESSICA (obstructive sleep apnea) 
 -On oxygen 
  
6.Cardiomyopathy EF 25-30% 7. ICD (implantable cardioverter-defibrillator) discharge 8. CAD (coronary artery disease) 
 -Continue routine meds 
 -Telemetry monitoring 
 -Cardiology has been consulted since patient will likely need dialysis and has significant heart history 
  
DVT prophylaxis:scds Full code Surrogate:wife Disposition:tbd
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
## 2018-10-06 NOTE — PROGRESS NOTES
RENAL PROGRESS NOTE [place] Number Assessment/Plan: · OLIVIA. Most likely due to prerenal azotemia/intractable N/V x 2 wks, /ATN- currently has subtle uremic signs-will need dialysis -in next day or so- called vascular surg for tdcc on Monday. Also will need renal biopsy - plan for Monday. Explained to the pt- he understands and agreeable.    
     has gross hematuria and proteinuria,  ANCA , MANUELITO- neg, complement NL. 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 · HTN- b.p elevated add Hydralaizne. · Met acidosis. Add oral bicarb. · N/V. needs gi evaluation. · Secondary hyperparathyroidism/hyperphsphotemia. increased calcitriol dose. Subjective:C/O Still nauseated and whatever he drinks comes back up after some time. no diarrhea. Patient Active Problem List  
Diagnosis Code  ST elevation (STEMI) myocardial infarction involving left anterior descending coronary artery ([facility]) I21.02  
 Cardiac arrest - ventricular fibrillation  HTN (hypertension) I10  
 Hypertensive emergency I16.1  Hyperglycemia R73.9  Acute on chronic renal failure (HCC) N17.9, N18.9  Leukocytosis D72.829  
 Acute pulmonary edema ([facility]) J81.0  Contrast dye induced nephropathy N14.1, T50.8X5A  
 CKD (chronic kidney disease) N18.9  Arachnoiditis G03.9  Postlaminectomy syndrome, lumbar region M96.1  Psoriasis L40.9  Degeneration of lumbar or lumbosacral intervertebral disc M51.37  
 Encounter for long-term (current) use of high-risk medication Z79.899  Obesity E66.9  
 Pain in joint, lower leg M25.569  Psoriatic arthropathy ([facility] L40.50  Chronic low back pain M54.5, G89.29  Chronic pain syndrome G89.4  Lumbosacral spondylosis without myelopathy M47.817  Enthesopathy of hip M76.899  
 Sacroiliitis ([facility]) M46.1  Coronary artery disease I25.10  JESSICA (obstructive sleep apnea) G47.33  
 UTI (lower urinary tract infection) N39.0  Cardiomyopathy ([facility] I42.9  AICD (automatic cardioverter/defibrillator) present Z95.810  
 OLIVIA (acute kidney injury) ([facility] N17.9  Renal failure N19  
 Chest pain R07.9  
 SOB (shortness of breath) R06.02  Bronchitis J40  Acute renal failure (ARF) ([facility] N17.9  ARF (acute renal failure) ([facility]) N17.9  Severe sepsis ([facility]) A41.9, R65.20  Epigastric abdominal pain R10.13  
 Anxiety F41.9  Ventricular tachycardia ([facility]) I47.2  Ventricular fibrillation ([facility]) I49.01  
 ICD (implantable cardioverter-defibrillator) discharge Z45.02  
 CAD (coronary artery disease) I25.10  Obesity, morbid ([facility] E66.01  
 Bacteria in urine B60.74  
 Metabolic acidosis D48.6 Current Facility-Administered Medications Medication Dose Route Frequency  promethazine (PHENERGAN) tablet 25 mg  25 mg Oral Q8H PRN  
 amLODIPine (NORVASC) tablet 10 mg  10 mg Oral DAILY  sodium bicarbonate (8.4%) 75 mEq in 0.45% sodium chloride 1,000 mL infusion   IntraVENous CONTINUOUS  
 calcium acetate (PHOSLO) capsule 1,334 mg  2 Cap Oral TID WITH MEALS  influenza vaccine 2018-19 (6 mos+)(PF) (FLUARIX QUAD/FLULAVAL QUAD) injection 0.5 mL  0.5 mL IntraMUSCular PRIOR TO DISCHARGE  isosorbide mononitrate ER (IMDUR) tablet 90 mg  90 mg Oral DAILY  pantoprazole (PROTONIX) 40 mg in sodium chloride 0.9% 10 mL injection  40 mg IntraVENous DAILY  calcitRIOL (ROCALTROL) capsule 0.25 mcg  0.25 mcg Oral DAILY  amiodarone (CORDARONE) tablet 400 mg  400 mg Oral DAILY  aspirin chewable tablet 162 mg  162 mg Oral BID  atorvastatin (LIPITOR) tablet 40 mg  40 mg Oral QHS  fluticasone (FLONASE) 50 mcg/actuation nasal spray 2 Spray  2 Spray Both Nostrils DAILY  lidocaine 4 % patch 1 Patch  1 Patch TransDERmal Q24H  
 tiotropium (SPIRIVA) inhalation capsule 18 mcg  1 Cap Inhalation DAILY  ondansetron (ZOFRAN) injection 4 mg  4 mg IntraVENous Q4H PRN  
 albuterol-ipratropium (DUO-NEB) 2.5 MG-0.5 MG/3 ML  3 mL Nebulization Q4H PRN  
 cefTRIAXone (ROCEPHIN) 2 g in 0.9% sodium chloride (MBP/ADV) 50 mL MBP  2 g IntraVENous Q24H  
 buprenorphine-naloxone (SUBOXONE) 4mg-1mg SL film (Patient Supplied)  1 Film SubLINGual BID Objective Vitals:  
 10/05/18 2256 10/06/18 0220 10/06/18 5408 10/06/18 2101 BP: 164/84 162/85 149/73 (!) 176/99 Pulse: (!) 56 (!) 53 (!) 54 (!) 53 Resp: 18 17 17 16 Temp: 97.9 °F (36.6 °C) 98.7 °F (37.1 °C) 98 °F (36.7 °C) 98.8 °F (37.1 °C) SpO2: 95% 97% 94% 91% Weight:      
Height:      
 
 
 
Intake/Output Summary (Last 24 hours) at 10/06/18 1002 Last data filed at 10/06/18 0600 Gross per 24 hour Intake           828.75 ml Output              500 ml Net           328.75 ml Admit weight: ? ??? Wt Readings from Last 3 Encounters:  
10/05/18 118.4 kg (261 lb) 02/14/18 113.4 kg (250 lb) 02/08/18 113.4 kg (250 lb) Last recorded weight: Weight: 118.4 kg (261 lb) Physical Assessment:  
 
General: NAD, alert and oriented. Neck: No jvd. LUNGS: Clear to Auscultation, No rales, rhonchi or wheezes. AICD at left side. CVS EXM: S1, S2  RRR, no murmurs/gallops/rubs. Abdomen: soft, non tender. Lower Extremities:  no edema. CNS: +Astrexis. Lab CBC w/Diff Recent Labs 10/06/18 
 0408  10/05/18 
 0410  10/04/18 
 0530 WBC  6.3  7.2  5.9  
RBC  3.47*  3.47*  3.87* HGB  9.2*  9.6*  10.5* HCT  29.0*  30.3*  33.1*  
PLT  176  156  210 GRANS  66  74  62 LYMPH  22  18*  27 EOS  4  3  4 Chemistry Recent Labs 10/06/18 
 0408  10/05/18 
 0410  10/04/18 
 0530   10/03/18 
 1055 GLU  82  84  101*   < >  107* NA  136  139  138   < >  136  
K  4.4  5.0  4.6   < >  4.4 CL  103  106  105   < >  103 CO2  18*  18*  20*   < >  20* BUN  73*  74*  69*   < >  66* CREA  11.70*  11.40*  11.40*   < >  11.00* CA  6.9*  7.2*  7.4*   < >  7.8* AGAP  15  15  13   < >  13 BUCR  6*  6*  6*   < >  6* AP  73  74   --    --   100  
TP  5.8*  5.9*   --    --   8.4* ALB  2.2*  2.3*  2.4*   --   2.7*  
GLOB  3.6  3.6   --    --   5.7* AGRAT  0.6*  0.6*   --    --   0.5* PHOS   --    --   8.4*   --    --   
 < > = values in this interval not displayed. Recent Labs 10/06/18 
 0408  10/05/18 
 0410  10/04/18 
 0530  10/03/18 
 1829  10/03/18 
 1055 BUN  73*  74*  69*  70*  66* CREA  11.70*  11.40*  11.40*  11.00*  11.00* CA  6.9*  7.2*  7.4*  7.0*  7.8* ALB  2.2*  2.3*  2.4*   --   2.7*  
K  4.4  5.0  4.6  4.7  4.4 NA  136  139  138  139  136 CL  103  106  105  107  103 CO2  18*  18*  20*  18*  20* PHOS   --    --   8.4*   --    --   
GLU  82  84  101*  116*  107* Ray Giordano MD

## 2018-10-07 ENCOUNTER — APPOINTMENT (OUTPATIENT)
Dept: ULTRASOUND IMAGING | Age: 48
DRG: 674 | End: 2018-10-07
Attending: INTERNAL MEDICINE
Payer: MEDICARE

## 2018-10-07 LAB
ALBUMIN SERPL-MCNC: 1.9 G/DL (ref 3.4–5)
ALBUMIN/GLOB SERPL: 0.5 {RATIO} (ref 0.8–1.7)
ALP SERPL-CCNC: 69 U/L (ref 45–117)
ALT SERPL-CCNC: 81 U/L (ref 16–61)
ANION GAP SERPL CALC-SCNC: 14 MMOL/L (ref 3–18)
AST SERPL-CCNC: 69 U/L (ref 15–37)
BASOPHILS # BLD: 0 K/UL (ref 0–0.1)
BASOPHILS NFR BLD: 0 % (ref 0–2)
BILIRUB SERPL-MCNC: 0.2 MG/DL (ref 0.2–1)
BUN SERPL-MCNC: 75 MG/DL (ref 7–18)
BUN/CREAT SERPL: 6 (ref 12–20)
CALCIUM SERPL-MCNC: 7.1 MG/DL (ref 8.5–10.1)
CHLORIDE SERPL-SCNC: 103 MMOL/L (ref 100–108)
CO2 SERPL-SCNC: 20 MMOL/L (ref 21–32)
CREAT SERPL-MCNC: 12 MG/DL (ref 0.6–1.3)
DIFFERENTIAL METHOD BLD: ABNORMAL
EOSINOPHIL # BLD: 0.3 K/UL (ref 0–0.4)
EOSINOPHIL NFR BLD: 5 % (ref 0–5)
ERYTHROCYTE [DISTWIDTH] IN BLOOD BY AUTOMATED COUNT: 16.2 % (ref 11.6–14.5)
GLOBULIN SER CALC-MCNC: 3.5 G/DL (ref 2–4)
GLUCOSE SERPL-MCNC: 81 MG/DL (ref 74–99)
HCT VFR BLD AUTO: 26.4 % (ref 36–48)
HGB BLD-MCNC: 8.5 G/DL (ref 13–16)
LYMPHOCYTES # BLD: 1.3 K/UL (ref 0.9–3.6)
LYMPHOCYTES NFR BLD: 22 % (ref 21–52)
MCH RBC QN AUTO: 26.7 PG (ref 24–34)
MCHC RBC AUTO-ENTMCNC: 32.2 G/DL (ref 31–37)
MCV RBC AUTO: 83 FL (ref 74–97)
MONOCYTES # BLD: 0.4 K/UL (ref 0.05–1.2)
MONOCYTES NFR BLD: 7 % (ref 3–10)
NEUTS SEG # BLD: 3.9 K/UL (ref 1.8–8)
NEUTS SEG NFR BLD: 66 % (ref 40–73)
PLATELET # BLD AUTO: 155 K/UL (ref 135–420)
PMV BLD AUTO: 8.9 FL (ref 9.2–11.8)
POTASSIUM SERPL-SCNC: 4.3 MMOL/L (ref 3.5–5.5)
PROT SERPL-MCNC: 5.4 G/DL (ref 6.4–8.2)
RBC # BLD AUTO: 3.18 M/UL (ref 4.7–5.5)
SODIUM SERPL-SCNC: 137 MMOL/L (ref 136–145)
STREP DNASE B SER-ACNC: <78 U/ML (ref 0–120)
WBC # BLD AUTO: 5.9 K/UL (ref 4.6–13.2)

## 2018-10-07 PROCEDURE — 74011250636 HC RX REV CODE- 250/636: Performed by: INTERNAL MEDICINE

## 2018-10-07 PROCEDURE — 65660000000 HC RM CCU STEPDOWN

## 2018-10-07 PROCEDURE — 74011250637 HC RX REV CODE- 250/637: Performed by: INTERNAL MEDICINE

## 2018-10-07 PROCEDURE — 80053 COMPREHEN METABOLIC PANEL: CPT | Performed by: INTERNAL MEDICINE

## 2018-10-07 PROCEDURE — 74011000250 HC RX REV CODE- 250: Performed by: INTERNAL MEDICINE

## 2018-10-07 PROCEDURE — 85025 COMPLETE CBC W/AUTO DIFF WBC: CPT | Performed by: INTERNAL MEDICINE

## 2018-10-07 PROCEDURE — 76705 ECHO EXAM OF ABDOMEN: CPT

## 2018-10-07 PROCEDURE — 74011000258 HC RX REV CODE- 258: Performed by: INTERNAL MEDICINE

## 2018-10-07 PROCEDURE — 36415 COLL VENOUS BLD VENIPUNCTURE: CPT | Performed by: INTERNAL MEDICINE

## 2018-10-07 RX ORDER — HYDRALAZINE HYDROCHLORIDE 50 MG/1
50 TABLET, FILM COATED ORAL 3 TIMES DAILY
Status: DISCONTINUED | OUTPATIENT
Start: 2018-10-07 | End: 2018-10-24 | Stop reason: HOSPADM

## 2018-10-07 RX ORDER — PANTOPRAZOLE SODIUM 40 MG/1
40 TABLET, DELAYED RELEASE ORAL
Status: DISCONTINUED | OUTPATIENT
Start: 2018-10-08 | End: 2018-10-24 | Stop reason: HOSPADM

## 2018-10-07 RX ADMIN — ASPIRIN 81 MG CHEWABLE TABLET 162 MG: 81 TABLET CHEWABLE at 18:10

## 2018-10-07 RX ADMIN — SODIUM CHLORIDE: 450 INJECTION, SOLUTION INTRAVENOUS at 14:45

## 2018-10-07 RX ADMIN — TIOTROPIUM BROMIDE 18 MCG: 18 CAPSULE ORAL; RESPIRATORY (INHALATION) at 12:47

## 2018-10-07 RX ADMIN — ATORVASTATIN CALCIUM 40 MG: 40 TABLET, FILM COATED ORAL at 22:01

## 2018-10-07 RX ADMIN — SODIUM CHLORIDE: 450 INJECTION, SOLUTION INTRAVENOUS at 01:05

## 2018-10-07 RX ADMIN — AMIODARONE HYDROCHLORIDE 400 MG: 200 TABLET ORAL at 12:44

## 2018-10-07 RX ADMIN — ONDANSETRON 4 MG: 2 INJECTION INTRAMUSCULAR; INTRAVENOUS at 22:02

## 2018-10-07 RX ADMIN — CALCIUM ACETATE 1334 MG: 667 CAPSULE ORAL at 12:44

## 2018-10-07 RX ADMIN — ASPIRIN 81 MG CHEWABLE TABLET 162 MG: 81 TABLET CHEWABLE at 12:42

## 2018-10-07 RX ADMIN — BUPRENORPHINE AND NALOXONE 1 FILM: 4; 1 FILM, SOLUBLE BUCCAL; SUBLINGUAL at 09:38

## 2018-10-07 RX ADMIN — CALCITRIOL 0.5 MCG: 0.25 CAPSULE ORAL at 12:48

## 2018-10-07 RX ADMIN — ISOSORBIDE MONONITRATE 90 MG: 30 TABLET, EXTENDED RELEASE ORAL at 12:43

## 2018-10-07 RX ADMIN — HYDRALAZINE HYDROCHLORIDE 50 MG: 50 TABLET, FILM COATED ORAL at 22:06

## 2018-10-07 RX ADMIN — BUPRENORPHINE AND NALOXONE 1 FILM: 4; 1 FILM, SOLUBLE BUCCAL; SUBLINGUAL at 18:11

## 2018-10-07 RX ADMIN — HYDRALAZINE HYDROCHLORIDE 50 MG: 50 TABLET, FILM COATED ORAL at 16:42

## 2018-10-07 RX ADMIN — CEFTRIAXONE SODIUM 2 G: 2 INJECTION, POWDER, FOR SOLUTION INTRAMUSCULAR; INTRAVENOUS at 18:19

## 2018-10-07 RX ADMIN — CALCIUM ACETATE 1334 MG: 667 CAPSULE ORAL at 18:09

## 2018-10-07 RX ADMIN — AMLODIPINE BESYLATE 10 MG: 5 TABLET ORAL at 12:43

## 2018-10-07 RX ADMIN — FLUTICASONE PROPIONATE 2 SPRAY: 50 SPRAY, METERED NASAL at 09:37

## 2018-10-07 NOTE — ROUTINE PROCESS
Bedside and Verbal shift change report given to [employer] (oncoming nurse) by stefany pabon rn (offgoing nurse). Report given with SBAR, Kardex, Intake/Output and Recent Results.

## 2018-10-07 NOTE — PROGRESS NOTES
Hospitalist Progress Note Charanjit Rosario MD 
Internal medicine/ Hospitalist 
 
Daily Progress Note: 10/7/2018    
Interval history / Subjective:  
Ventura Kohler is a 46 y.o.  male with h/o AICD,cardiac arrest,VT,ischemic cardiomyopathy EF 30%,obesity,cad,presented to ED because of weakness,nausea,vomting,abdominal pain,hematuria. Patient says that he started feeling sick 2 weeks ago when he started feeling weak. Then he started vomiting. Could not tolerate even water. In recent days ,he has had abdominal pain which he attributed to the ongoing vomiting. Patient also reported that he has seen his urine turning red for a while. Had to see his doctor in two weeks but felt very weak and decided to come to the ED. In ED,lab showed creatinine 11.0 compared to 2.12 on 1/11/2018 and 8.01 on 9/28/18. UA showed large blood,bacteria+. CT abd/pelvis w/o hydronephrosis or urolithiasis. Patient admitted for OLIVIA,Hematuria,UTI. Started on iv fluid. Nephrolohy consulted from ER. Since patient has GI complaint and renal involvement,nephrology has indicated that Henoch-Schonlein syndrome needs to be considered. Pt is on iv fluid. IgA elevated 648. Other serology for vasculitis including nathaniel,GBM ab,ANCA,C3,C4,heptitis profile not abnormal.Nephrology has indicated that if creatinine continue to rise over the weekend,patient will need dialysis. Today creat up to 12.00. Patient may also need kidney biopsy. Cardiology consulted significant cardiac history anticipating that he will have dialysis. Cardiology has determined that his cardiac status is stable. GI saw patient on 10/6 for ongoing symptoms and decided to do EGD next week. Current Facility-Administered Medications Medication Dose Route Frequency  [START ON 10/8/2018] pantoprazole (PROTONIX) tablet 40 mg  40 mg Oral ACB  calcitRIOL (ROCALTROL) capsule 0.5 mcg  0.5 mcg Oral DAILY  hydrALAZINE (APRESOLINE) tablet 25 mg  25 mg Oral TID  promethazine (PHENERGAN) tablet 25 mg  25 mg Oral Q8H PRN  
 amLODIPine (NORVASC) tablet 10 mg  10 mg Oral DAILY  sodium bicarbonate (8.4%) 75 mEq in 0.45% sodium chloride 1,000 mL infusion   IntraVENous CONTINUOUS  
 calcium acetate (PHOSLO) capsule 1,334 mg  2 Cap Oral TID WITH MEALS  influenza vaccine 2018- (6 mos+)(PF) (FLUARIX QUAD/FLULAVAL QUAD) injection 0.5 mL  0.5 mL IntraMUSCular PRIOR TO DISCHARGE  isosorbide mononitrate ER (IMDUR) tablet 90 mg  90 mg Oral DAILY  amiodarone (CORDARONE) tablet 400 mg  400 mg Oral DAILY  aspirin chewable tablet 162 mg  162 mg Oral BID  atorvastatin (LIPITOR) tablet 40 mg  40 mg Oral QHS  fluticasone (FLONASE) 50 mcg/actuation nasal spray 2 Spray  2 Spray Both Nostrils DAILY  lidocaine 4 % patch 1 Patch  1 Patch TransDERmal Q24H  
 tiotropium (SPIRIVA) inhalation capsule 18 mcg  1 Cap Inhalation DAILY  ondansetron (ZOFRAN) injection 4 mg  4 mg IntraVENous Q4H PRN  
 albuterol-ipratropium (DUO-NEB) 2.5 MG-0.5 MG/3 ML  3 mL Nebulization Q4H PRN  
 cefTRIAXone (ROCEPHIN) 2 g in 0.9% sodium chloride (MBP/ADV) 50 mL MBP  2 g IntraVENous Q24H  
 buprenorphine-naloxone (SUBOXONE) 4mg-1mg SL film (Patient Supplied)  1 Film SubLINGual BID Review of Systems Feeling better today Objective:  
 
Visit Vitals  /80 (BP 1 Location: Right arm, BP Patient Position: At rest)  Pulse (!) 56  Temp 98.2 °F (36.8 °C)  Resp 16  
 Ht 5' 10\" (1.778 m)  Wt 118.4 kg (261 lb)  SpO2 95%  BMI 37.45 kg/m2 O2 Device: Room air Temp (24hrs), Av.5 °F (36.9 °C), Min:98.2 °F (36.8 °C), Max:98.9 °F (37.2 °C) 
 
 
10/07 0701 - 10/07 1900 In: -  
Out: 750 [Urine:750] 10/05 1901 - 10/07 0700 In: 3653.8 [P.O.:960; I.V.:2693.8] Out: 2975 [Urine:2975] PHYSICAL EXAM:  
General:                    Sitting in bed,eating breakfast.Morbid obesity. HEENT:                     Pupils equal.  Sclera anicteric.   Conjunctiva pink.Mouth moist. 
Neck:                                   Supple,no jvd. CV:                  Regular rate and rhythm. Lungs:                       Clear to auscultation bilaterally. No Wheezing or Rhonchi. No rales. Abdomen:                  Soft, non-tender. Not distended. Bowel sounds normal. No organomegaly Extremities:               No cyanosis. No edema. No clubbing Neurologic:                Alert and oriented X 3. Skin:                Warm and dry. No rashes.  
  
  
 
Data Review Recent Results (from the past 12 hour(s)) METABOLIC PANEL, COMPREHENSIVE Collection Time: 10/07/18  4:20 AM  
Result Value Ref Range Sodium 137 136 - 145 mmol/L Potassium 4.3 3.5 - 5.5 mmol/L Chloride 103 100 - 108 mmol/L  
 CO2 20 (L) 21 - 32 mmol/L Anion gap 14 3.0 - 18 mmol/L Glucose 81 74 - 99 mg/dL BUN 75 (H) 7.0 - 18 MG/DL Creatinine 12.00 (H) 0.6 - 1.3 MG/DL  
 BUN/Creatinine ratio 6 (L) 12 - 20 GFR est AA 5 (L) >60 ml/min/1.73m2 GFR est non-AA 4 (L) >60 ml/min/1.73m2 Calcium 7.1 (L) 8.5 - 10.1 MG/DL Bilirubin, total 0.2 0.2 - 1.0 MG/DL  
 ALT (SGPT) 81 (H) 16 - 61 U/L  
 AST (SGOT) 69 (H) 15 - 37 U/L Alk. phosphatase 69 45 - 117 U/L Protein, total 5.4 (L) 6.4 - 8.2 g/dL Albumin 1.9 (L) 3.4 - 5.0 g/dL Globulin 3.5 2.0 - 4.0 g/dL A-G Ratio 0.5 (L) 0.8 - 1.7    
CBC WITH AUTOMATED DIFF Collection Time: 10/07/18  4:20 AM  
Result Value Ref Range WBC 5.9 4.6 - 13.2 K/uL  
 RBC 3.18 (L) 4.70 - 5.50 M/uL HGB 8.5 (L) 13.0 - 16.0 g/dL HCT 26.4 (L) 36.0 - 48.0 % MCV 83.0 74.0 - 97.0 FL  
 MCH 26.7 24.0 - 34.0 PG  
 MCHC 32.2 31.0 - 37.0 g/dL  
 RDW 16.2 (H) 11.6 - 14.5 % PLATELET 587 385 - 566 K/uL MPV 8.9 (L) 9.2 - 11.8 FL  
 NEUTROPHILS 66 40 - 73 % LYMPHOCYTES 22 21 - 52 % MONOCYTES 7 3 - 10 % EOSINOPHILS 5 0 - 5 % BASOPHILS 0 0 - 2 %  
 ABS. NEUTROPHILS 3.9 1.8 - 8.0 K/UL  
 ABS. LYMPHOCYTES 1.3 0.9 - 3.6 K/UL ABS. MONOCYTES 0.4 0.05 - 1.2 K/UL  
 ABS. EOSINOPHILS 0.3 0.0 - 0.4 K/UL  
 ABS. BASOPHILS 0.0 0.0 - 0.1 K/UL  
 DF AUTOMATED Assessment/Plan: Active Problems: 
  Obesity (1/29/2012) HTN (hypertension) (7/24/2013) Acute on chronic renal failure ([facility] (7/24/2013) JESSICA (obstructive sleep apnea) (12/17/2013) [facility] (4/23/2014) Overview: EF 25 to 39% this admission ICD (implantable cardioverter-defibrillator) discharge (10/29/2015) Overview: 20 shocks, battery life 8.5 years CAD (coronary artery disease) (10/29/2015) Overview: S/P PCI LAD Bacteria in urine (10/3/2018) Metabolic acidosis (88/0/0592) Care Plan 1. Acute on chronic renal failure/Metabolic acidosis - creatinine 11.00 POA 
 -Etiology:volume depletion -?acute GN as pt also hematuria. -Gentle hydration as pt with significant cardiac history. CT abdm/pelvis w/o hydronephrosis -Metabolic acidosis,bicarb added to iv by nephrology -Nephrology consulted - plaining dialysis if creat continue to rise this weekend. So far creatinine 12.00 today 10/7 
 -May need kidney biopsy. Plavix on hold 
 -Serology:IgA elevated. Other normal such as MANUELITO,ANCA,C3,C4,Hep profile. 
 -Vascular surgery consulted for [facility] on Monday 
  2. Bacteria in urine/UTI/Hematuria 
 -Ceftriaxone iv - last dose 10/7 
 -Blood cx ng/Ucx negative. 
 -Patient says that he has hematuria for a while:?possibly due Plavix use or vasculitis. -D/darwin plavix. -Vasculitis serology non-revealing 3. Secondary hyperparathyroidism  
 -On calcitriol 
  4. Nausea/vomiting/abdominal pain 
 -CT reviewed,no acute process. -Zofran prn 
 -Still nausea despite receiving zofran,added phenergan 
 -GI,Dr Carol Mendoza saw patient on 10/6 - plan is EGD next week. 
  
5. HTN 
 -Hydralazine prn for sbp>160 
  
6. JESSICA (obstructive sleep apnea) 
 -On oxygen 
  
7.Cardiomyopathy EF 25-30% 8.ICD (implantable cardioverter-defibrillator) discharge 9. CAD (coronary artery disease) 
 -Continue routine meds 
 -Telemetry monitoring 
 -Cardiology saw patient on 10/6 and determined that patient's cardiac status is stable and advised to continue his home meds. 
  
DVT prophylaxis:scds Full code Surrogate:wife Disposition:tbd

## 2018-10-07 NOTE — PROGRESS NOTES
RENAL PROGRESS NOTE Wale Sensor Assessment/Plan: · OLIVIA. Most likely due to prerenal azotemia/intractable N/V x 2 wks, /ATN-  Has uremic symptoms and signs-D/W Dr. Wing Aguilar for tdcc tomorrow-then will dialyze him. · Pt agreeable with plan of care. will need dialysis -in next day or so- called vascular surg for tdcc on Monday. Also will need renal biopsy -   
                has gross hematuria and proteinuria,  ANCA , MANUELITO- neg, complement NL. · CKD 3 due to dm/htn. · HTN- b.p elevated add Hydralaizne. · Met acidosis. Add oral bicarb. · N/V. Clerance Riis · Secondary hyperparathyroidism/hyperphsphotemia. increased calcitriol dose. Subjective:C/O Pt c/o nausea food tastes bad -mother at bedside. Patient Active Problem List  
Diagnosis Code  ST elevation (STEMI) myocardial infarction involving left anterior descending coronary artery ([facility]) I21.02  
 Cardiac arrest - ventricular fibrillation  HTN (hypertension) I10  
 Hypertensive emergency I16.1  Hyperglycemia R73.9  Acute on chronic renal failure (HCC) N17.9, N18.9  Leukocytosis D72.829  
 Acute pulmonary edema ([facility]) J81.0  Contrast dye induced nephropathy N14.1, T50.8X5A  
 CKD (chronic kidney disease) N18.9  Arachnoiditis G03.9  Postlaminectomy syndrome, lumbar region M96.1  Psoriasis L40.9  Degeneration of lumbar or lumbosacral intervertebral disc M51.37  
 Encounter for long-term (current) use of high-risk medication Z79.899  Obesity E66.9  
 Pain in joint, lower leg M25.569  Psoriatic arthropathy ([facility] L40.50  Chronic low back pain M54.5, G89.29  Chronic pain syndrome G89.4  Lumbosacral spondylosis without myelopathy M47.817  Enthesopathy of hip M76.899  
 Sacroiliitis ([facility]) M46.1  Coronary artery disease I25.10  JESSICA (obstructive sleep apnea) G47.33  
 UTI (lower urinary tract infection) N39.0  Cardiomyopathy ([facility] I42.9  AICD (automatic cardioverter/defibrillator) present Z95.810  
 OLIVIA (acute kidney injury) ([facility] N17.9  Renal failure N19  
 Chest pain R07.9  
 SOB (shortness of breath) R06.02  Bronchitis J40  Acute renal failure (ARF) ([facility]) N17.9  ARF (acute renal failure) ([facility]) N17.9  Severe sepsis ([facility]) A41.9, R65.20  Epigastric abdominal pain R10.13  
 Anxiety F41.9  Ventricular tachycardia ([facility]) I47.2  Ventricular fibrillation ([facility]) I49.01  
 ICD (implantable cardioverter-defibrillator) discharge Z45.02  
 CAD (coronary artery disease) I25.10  Obesity, morbid ([facility] E66.01  
 Bacteria in urine R64.17  
 Metabolic acidosis Z30.5 Current Facility-Administered Medications Medication Dose Route Frequency  [START ON 10/8/2018] pantoprazole (PROTONIX) tablet 40 mg  40 mg Oral ACB  hydrALAZINE (APRESOLINE) tablet 50 mg  50 mg Oral TID  calcitRIOL (ROCALTROL) capsule 0.5 mcg  0.5 mcg Oral DAILY  promethazine (PHENERGAN) tablet 25 mg  25 mg Oral Q8H PRN  
 amLODIPine (NORVASC) tablet 10 mg  10 mg Oral DAILY  sodium bicarbonate (8.4%) 75 mEq in 0.45% sodium chloride 1,000 mL infusion   IntraVENous CONTINUOUS  
 calcium acetate (PHOSLO) capsule 1,334 mg  2 Cap Oral TID WITH MEALS  influenza vaccine 2018-19 (6 mos+)(PF) (FLUARIX QUAD/FLULAVAL QUAD) injection 0.5 mL  0.5 mL IntraMUSCular PRIOR TO DISCHARGE  isosorbide mononitrate ER (IMDUR) tablet 90 mg  90 mg Oral DAILY  amiodarone (CORDARONE) tablet 400 mg  400 mg Oral DAILY  aspirin chewable tablet 162 mg  162 mg Oral BID  atorvastatin (LIPITOR) tablet 40 mg  40 mg Oral QHS  fluticasone (FLONASE) 50 mcg/actuation nasal spray 2 Spray  2 Spray Both Nostrils DAILY  lidocaine 4 % patch 1 Patch  1 Patch TransDERmal Q24H  
 tiotropium (SPIRIVA) inhalation capsule 18 mcg  1 Cap Inhalation DAILY  ondansetron (ZOFRAN) injection 4 mg  4 mg IntraVENous Q4H PRN  
 albuterol-ipratropium (DUO-NEB) 2.5 MG-0.5 MG/3 ML  3 mL Nebulization Q4H PRN  
 cefTRIAXone (ROCEPHIN) 2 g in 0.9% sodium chloride (MBP/ADV) 50 mL MBP  2 g IntraVENous Q24H  
 buprenorphine-naloxone (SUBOXONE) 4mg-1mg SL film (Patient Supplied)  1 Film SubLINGual BID Objective Vitals:  
 10/07/18 0155 10/07/18 0554 10/07/18 1013 10/07/18 1240 BP: 150/73 149/71 (!) 168/95 148/80 Pulse: (!) 48 (!) 52 (!) 56 (!) 56 Resp: 18 17 18 16 Temp: 98.7 °F (37.1 °C) 98.3 °F (36.8 °C) 98.3 °F (36.8 °C) 98.2 °F (36.8 °C) SpO2: 91% 91% 96% 95% Weight:      
Height:      
 
 
 
Intake/Output Summary (Last 24 hours) at 10/07/18 1413 Last data filed at 10/07/18 1353 Gross per 24 hour Intake             2705 ml Output             1550 ml Net             1155 ml Admit weight: ? ??? Wt Readings from Last 3 Encounters:  
10/05/18 118.4 kg (261 lb) 02/14/18 113.4 kg (250 lb) 02/08/18 113.4 kg (250 lb) Last recorded weight: Weight: 118.4 kg (261 lb) Physical Assessment:  
 
General: NAD, alert and oriented. Neck: No jvd. LUNGS: Clear to Auscultation, No rales, rhonchi or wheezes. CVS EXM: S1, S2  RRR, no murmurs/gallops/rubs. Abdomen: soft, non tender. Lower Extremities:  + edema. Astrexis+_ Lab CBC w/Diff Recent Labs 10/07/18 
 5804  10/06/18 
 0408  10/05/18 
 0410 WBC  5.9  6.3  7.2 RBC  3.18*  3.47*  3.47* HGB  8.5*  9.2*  9.6* HCT  26.4*  29.0*  30.3* PLT  155  176  156 GRANS  66  66  74 LYMPH  22  22  18* EOS  5  4  3 Chemistry Recent Labs 10/07/18 
 3670  10/06/18 
 0408  10/05/18 
 0410 GLU  81  82  84 NA  137  136  139  
K  4.3  4.4  5.0  
CL  103  103  106 CO2  20*  18*  18* BUN  75*  73*  74* CREA  12.00*  11.70*  11.40* CA  7.1*  6.9*  7.2* AGAP  14  15  15 BUCR  6*  6*  6* AP  69  73  74  
TP  5.4*  5.8*  5.9* ALB  1.9*  2.2*  2.3*  
 GLOB  3.5  3.6  3.6 AGRAT  0.5*  0.6*  0.6* Recent Labs 10/07/18 
 4711  10/06/18 
 0408  10/05/18 
 0410 BUN  75*  73*  74* CREA  12.00*  11.70*  11.40* CA  7.1*  6.9*  7.2* ALB  1.9*  2.2*  2.3*  
K  4.3  4.4  5.0 NA  137  136  139 CL  103  103  106 CO2  20*  18*  18* GLU  81  82  84 Alicia Amos MD

## 2018-10-07 NOTE — ROUTINE PROCESS
Bedside and Verbal shift change report given to Mayte RN (oncoming nurse) by Brooklyn Moscoso RN 
 (offgoing nurse). Report included the following information SBAR, Kardex, MAR and Recent Results.

## 2018-10-07 NOTE — PROGRESS NOTES
36- Assumed care of patient from [place], [address] [place] [address]. Pt in bed resting watching tv not in any distress. Pt denies pain  Nausea and vomiting. Pt advised to call incase he feels nauseous verbalized understanding. IVF infusing well call light and urinal within reach. 2202- Pt medicated for nausea with Zofran 4 mg iv  
/85- Pt given scheduled hydralazine 50 mg po will reassess. 0800- Notified pharmacy about pt's medication suboxone which need refill. In patient does not carry the strength, will try to call out patient pharmacy when it opens at 9 am and call back to floor with the feedback. Pt's wife present at beside made aware. 1010-Bedside and Verbal shift change report given to Madison Champion RN (oncoming nurse) by Brenna Villanueva RN (offgoing nurse). Report included the following information SBAR, Kardex, Intake/Output, MAR and Recent Results.

## 2018-10-07 NOTE — ROUTINE PROCESS
Bedside and Verbal shift change report given to Pamela Santos RN (oncoming nurse) by Rosibel Mata RN 
 (offgoing nurse). Report included the following information SBAR, Kardex, MAR and Recent Results.

## 2018-10-07 NOTE — PROGRESS NOTES
Patient received in bed awake. Patient alert and oriented 4, denies pain and discomfort. Patient resting quietly. Frequent use items within reach. Bed locked in low position. Call bell within reach and patient verbalized understanding of use for assistance and needs. Dual skin assessment completed with Mayte LOPEZ.

## 2018-10-07 NOTE — PROGRESS NOTES
Problem: Falls - Risk of 
Goal: *Absence of Falls Document Patito Ward Fall Risk and appropriate interventions in the flowsheet. Outcome: Progressing Towards Goal 
Fall Risk Interventions: 
  
 
  
 
Medication Interventions: Evaluate medications/consider consulting pharmacy History of Falls Interventions: Door open when patient unattended

## 2018-10-07 NOTE — PROGRESS NOTES
Problem: Falls - Risk of 
Goal: *Absence of Falls Document Colleen Leonardo Fall Risk and appropriate interventions in the flowsheet. Outcome: Progressing Towards Goal 
Fall Risk Interventions: 
  
 
  
 
Medication Interventions: Teach patient to arise slowly History of Falls Interventions: Door open when patient unattended

## 2018-10-07 NOTE — PROGRESS NOTES
Clinical Pharmacy Note: IV to PO Automatic Conversion Please note: Chang Raman medication (pantoprazole 40 mg) has been changed from IV to PO based on the following critiera: 
 
? Patient is taking scheduled oral medications ? Patient is tolerating tube feeds at goal rate or a full liquid, soft or regular diet This IV to PO conversion is based on the P&[employer] approved automatic conversion policy for eligible patients. Please call with questions.  
 
Thanks, 
Cele Topete, PHARMD

## 2018-10-08 LAB
ALBUMIN SERPL-MCNC: 2 G/DL (ref 3.4–5)
ALBUMIN/GLOB SERPL: 0.6 {RATIO} (ref 0.8–1.7)
ALP SERPL-CCNC: 64 U/L (ref 45–117)
ALT SERPL-CCNC: 87 U/L (ref 16–61)
ANION GAP SERPL CALC-SCNC: 14 MMOL/L (ref 3–18)
AST SERPL-CCNC: 67 U/L (ref 15–37)
BILIRUB SERPL-MCNC: 0.2 MG/DL (ref 0.2–1)
BUN SERPL-MCNC: 74 MG/DL (ref 7–18)
BUN/CREAT SERPL: 6 (ref 12–20)
CALCIUM SERPL-MCNC: 7.2 MG/DL (ref 8.5–10.1)
CHLORIDE SERPL-SCNC: 101 MMOL/L (ref 100–108)
CO2 SERPL-SCNC: 22 MMOL/L (ref 21–32)
CREAT SERPL-MCNC: 11.7 MG/DL (ref 0.6–1.3)
GLOBULIN SER CALC-MCNC: 3.1 G/DL (ref 2–4)
GLUCOSE SERPL-MCNC: 92 MG/DL (ref 74–99)
HBV SURFACE AG SER QL: <0.1 INDEX
HBV SURFACE AG SER QL: NEGATIVE
INR PPP: 1.2 (ref 0.8–1.2)
POTASSIUM SERPL-SCNC: 4.2 MMOL/L (ref 3.5–5.5)
PROT SERPL-MCNC: 5.1 G/DL (ref 6.4–8.2)
PROTHROMBIN TIME: 14.5 SEC (ref 11.5–15.2)
SODIUM SERPL-SCNC: 137 MMOL/L (ref 136–145)

## 2018-10-08 PROCEDURE — 76937 US GUIDE VASCULAR ACCESS: CPT | Performed by: SURGERY

## 2018-10-08 PROCEDURE — 74011250636 HC RX REV CODE- 250/636

## 2018-10-08 PROCEDURE — 77030035694 HC MSK BIPAP FLL FAC PERFMAX PHIL -B

## 2018-10-08 PROCEDURE — 77030002916 HC SUT ETHLN J&J -A: Performed by: SURGERY

## 2018-10-08 PROCEDURE — 85610 PROTHROMBIN TIME: CPT | Performed by: INTERNAL MEDICINE

## 2018-10-08 PROCEDURE — 74011000258 HC RX REV CODE- 258: Performed by: INTERNAL MEDICINE

## 2018-10-08 PROCEDURE — 74011000250 HC RX REV CODE- 250: Performed by: INTERNAL MEDICINE

## 2018-10-08 PROCEDURE — 74011250637 HC RX REV CODE- 250/637: Performed by: INTERNAL MEDICINE

## 2018-10-08 PROCEDURE — 02HV33Z INSERTION OF INFUSION DEVICE INTO SUPERIOR VENA CAVA, PERCUTANEOUS APPROACH: ICD-10-PCS | Performed by: SURGERY

## 2018-10-08 PROCEDURE — C1750 CATH, HEMODIALYSIS,LONG-TERM: HCPCS | Performed by: SURGERY

## 2018-10-08 PROCEDURE — 74011250636 HC RX REV CODE- 250/636: Performed by: INTERNAL MEDICINE

## 2018-10-08 PROCEDURE — 77030018719 HC DRSG PTCH ANTIMIC J&J -A: Performed by: SURGERY

## 2018-10-08 PROCEDURE — 36558 INSERT TUNNELED CV CATH: CPT | Performed by: SURGERY

## 2018-10-08 PROCEDURE — 74011250636 HC RX REV CODE- 250/636: Performed by: SURGERY

## 2018-10-08 PROCEDURE — 80053 COMPREHEN METABOLIC PANEL: CPT | Performed by: INTERNAL MEDICINE

## 2018-10-08 PROCEDURE — 77030002933 HC SUT MCRYL J&J -A: Performed by: SURGERY

## 2018-10-08 PROCEDURE — 36415 COLL VENOUS BLD VENIPUNCTURE: CPT | Performed by: INTERNAL MEDICINE

## 2018-10-08 PROCEDURE — 77030039266 HC ADH SKN EXOFIN S2SG -A: Performed by: SURGERY

## 2018-10-08 PROCEDURE — 90935 HEMODIALYSIS ONE EVALUATION: CPT

## 2018-10-08 PROCEDURE — 99152 MOD SED SAME PHYS/QHP 5/>YRS: CPT | Performed by: SURGERY

## 2018-10-08 PROCEDURE — 0JH63XZ INSERTION OF TUNNELED VASCULAR ACCESS DEVICE INTO CHEST SUBCUTANEOUS TISSUE AND FASCIA, PERCUTANEOUS APPROACH: ICD-10-PCS | Performed by: SURGERY

## 2018-10-08 PROCEDURE — 99153 MOD SED SAME PHYS/QHP EA: CPT | Performed by: SURGERY

## 2018-10-08 PROCEDURE — 65660000000 HC RM CCU STEPDOWN

## 2018-10-08 PROCEDURE — 77001 FLUOROGUIDE FOR VEIN DEVICE: CPT | Performed by: SURGERY

## 2018-10-08 PROCEDURE — 5A1D70Z PERFORMANCE OF URINARY FILTRATION, INTERMITTENT, LESS THAN 6 HOURS PER DAY: ICD-10-PCS | Performed by: FAMILY MEDICINE

## 2018-10-08 RX ORDER — SODIUM CHLORIDE 9 MG/ML
50 INJECTION, SOLUTION INTRAVENOUS
Status: DISCONTINUED | OUTPATIENT
Start: 2018-10-08 | End: 2018-10-24 | Stop reason: HOSPADM

## 2018-10-08 RX ORDER — LIDOCAINE HYDROCHLORIDE 10 MG/ML
INJECTION INFILTRATION; PERINEURAL AS NEEDED
Status: DISCONTINUED | OUTPATIENT
Start: 2018-10-08 | End: 2018-10-08 | Stop reason: HOSPADM

## 2018-10-08 RX ORDER — HEPARIN SODIUM 1000 [USP'U]/ML
INJECTION, SOLUTION INTRAVENOUS; SUBCUTANEOUS AS NEEDED
Status: DISCONTINUED | OUTPATIENT
Start: 2018-10-08 | End: 2018-10-08 | Stop reason: HOSPADM

## 2018-10-08 RX ORDER — HEPARIN SODIUM 1000 [USP'U]/ML
1000 INJECTION, SOLUTION INTRAVENOUS; SUBCUTANEOUS
Status: DISCONTINUED | OUTPATIENT
Start: 2018-10-08 | End: 2018-10-24 | Stop reason: HOSPADM

## 2018-10-08 RX ORDER — MIDAZOLAM HYDROCHLORIDE 1 MG/ML
INJECTION, SOLUTION INTRAMUSCULAR; INTRAVENOUS AS NEEDED
Status: DISCONTINUED | OUTPATIENT
Start: 2018-10-08 | End: 2018-10-08 | Stop reason: HOSPADM

## 2018-10-08 RX ADMIN — TIOTROPIUM BROMIDE 18 MCG: 18 CAPSULE ORAL; RESPIRATORY (INHALATION) at 10:16

## 2018-10-08 RX ADMIN — ONDANSETRON 4 MG: 2 INJECTION INTRAMUSCULAR; INTRAVENOUS at 05:27

## 2018-10-08 RX ADMIN — ATORVASTATIN CALCIUM 40 MG: 40 TABLET, FILM COATED ORAL at 20:49

## 2018-10-08 RX ADMIN — ISOSORBIDE MONONITRATE 90 MG: 30 TABLET, EXTENDED RELEASE ORAL at 10:13

## 2018-10-08 RX ADMIN — CALCIUM ACETATE 1334 MG: 667 CAPSULE ORAL at 16:41

## 2018-10-08 RX ADMIN — SODIUM CHLORIDE: 450 INJECTION, SOLUTION INTRAVENOUS at 05:06

## 2018-10-08 RX ADMIN — AMLODIPINE BESYLATE 10 MG: 5 TABLET ORAL at 10:14

## 2018-10-08 RX ADMIN — HYDRALAZINE HYDROCHLORIDE 50 MG: 50 TABLET, FILM COATED ORAL at 16:41

## 2018-10-08 RX ADMIN — PANTOPRAZOLE SODIUM 40 MG: 40 TABLET, DELAYED RELEASE ORAL at 10:13

## 2018-10-08 RX ADMIN — AMIODARONE HYDROCHLORIDE 400 MG: 200 TABLET ORAL at 10:14

## 2018-10-08 RX ADMIN — HYDRALAZINE HYDROCHLORIDE 50 MG: 50 TABLET, FILM COATED ORAL at 10:13

## 2018-10-08 RX ADMIN — ONDANSETRON 4 MG: 2 INJECTION INTRAMUSCULAR; INTRAVENOUS at 16:50

## 2018-10-08 RX ADMIN — ONDANSETRON 4 MG: 2 INJECTION INTRAMUSCULAR; INTRAVENOUS at 20:49

## 2018-10-08 RX ADMIN — CALCITRIOL 0.5 MCG: 0.25 CAPSULE ORAL at 10:14

## 2018-10-08 RX ADMIN — HYDRALAZINE HYDROCHLORIDE 50 MG: 50 TABLET, FILM COATED ORAL at 21:19

## 2018-10-08 RX ADMIN — ASPIRIN 81 MG CHEWABLE TABLET 162 MG: 81 TABLET CHEWABLE at 10:12

## 2018-10-08 RX ADMIN — FLUTICASONE PROPIONATE 2 SPRAY: 50 SPRAY, METERED NASAL at 10:14

## 2018-10-08 NOTE — ROUTINE PROCESS
. 
Acute HEmodialysis flow sheet Patient information Suzanna Plascencia MRN: [id number]      XIU:[id number]  [place]# UNEG#[id number] Hospital: [place] [address] DX: unknown [x]1st Time Acute  []Stat[x] Routine []Urgent []Chronic Unit  
       []Acute Room []Bedside  []ICU/CCU []ER Isolation Precautions: [x]Dialysis[] Airborne []Contact []Droplet []Reverse Special Considerations:    [] Blood Consent Verified  []N/A Allergies: 
[] NKA  [x] Reviewed Allergies Allergen Reactions  Latex Other (comments)  
  blisters  Other Food Hives Allergic to tomatoes and tomato sauce  Codeine Nausea and Vomiting Code Status [x]Full Code [] DNR  []Other Diet: renal Diabetic: []Yes []No 
  
 Hemodialysis Orders Physician: Nadia Esquivel Dialyzer Revaclear 300 Duration 2 BFR: 300 DFR:600 Dialysate: K 3 CA 2.5 Na 140 Bicarb 35 Dry Weight: UF Goal: 500 ml Heparin:  []Bolus   Units    []Hourly  Units      [x]None BP Tx:  []NS  200ml/Bolus    []Other     [x]N/A  
    Labs: Pre:  Post: [x]N/A Additional Orders: [x]N/A [x]Signed Treatment Consent   [x] Verified   [] Obtained Primary Nurse Report: First initial/ Last Name/Title Pre Dialysis: K    Time: 12 Access CATHETER ACCESS:  [] N/A  [x] RIGHT  [] LEFT  [x] IJ  [] SUBCL [] FEM [] First use X-ray  [] Tunnel     [x] Non-Tunneled [x] No S/S infection  [] Redness [] Drainage  [] Cultured [] Swelling 
                       [] Pain  
                       [x] Medical Aseptic [] Prep Dressing Changed  
                       [] Clotted [] Patent []      Flows: [x] Good [] Poor [] Reversed          If Access Problem Dr. Estrada Mckay: [] Yes [] No    Date:    [] N/A  
     GRAFT/FISTULA ACCESS:  [] N/A  [] RIGHT  [] LEFT  [] UE  [] LE   
 [] AVG  [] AVF [] BUTTONHOLE  [] +BRUIT/THRILL 
     [] MEDICAL ASEPTIC PREP [] No S/S infection  [] Redness [] Drainage  [] Cultured [] Swelling 
                       [] Pain Needle Gauge                              Length: If Access Problem Dr. Flip Sweet: [] Yes [] No    Date:     [x] N/A General Assessment LUNGS:  SaO2%  [x] Clear [] Coarse [] Crackles [] Wheezing  
            [] Diminished Location: [] RLL [] LLL [] RUL [] NIKOS   
     COUGH:  [] Productive [] Dry [x] N/A  RESPIRATIONS: [x] Easy [] Labored THERAPY: [x] RA   [] NC     L/min    Mask: [] NRB [] Venti  O2%    
             [] Ventilator [] Intubated [] Trach [] BiPap [] CPap CARDIAC: [x] Regular [] Irregular [] Pericardial Rub [] JVD [x] Monitored Rhythm: EDEMA: [] None [x] Generalized [] Facial [] Pedal [] UE [] LE 
           [] Pitting [] 1 [] 2 [] 3 [] 4    [] Right [] Left [] Bilateral  
    SKIN:    [x] Warm [] Hot [] Cold  [] Dry [] Pale [] Diaphoretic  
           [] Flushed [] Jaundiced [] Cyanotic [] Rash [] Weeping LOC:    [x] Alert  Oriented to: [x] Person [x] Place [x] Time  
          [] Confused [] Lethargic [] Medically sedated [] Non-responsive GI/ABDOMEN: [] Flat [x] Distended [x] Soft [] Firm [] Obese [] Diarrhea 
 [] Bowel Sounds     []Nausea [] Vomiting PAIN: [x] 0 [] 1 [] 2 [] 3 [] 4 [] 5 [] 6 [] 7 [] 8 [] 9 [] 10 Scale 1-10 Action/Follow Up MOBILITY: [] Amb [] Amb/Assist [x] Bed  [] Wheelchair CUrrent LABS HBsAg ONLY: Date Drawn 10/6/18 [x] Negative [] Positive  [] Unknown. HBsAb: Date Drawn 10/6/18 [x] Susceptible <10 [] Immune ?10 [] Unknown Date of Current Labs:  
    
Lab Results Component Value Date/Time  Sodium 137 10/08/2018 05:21 AM  
 Potassium 4.2 10/08/2018 05:21 AM  
 Chloride 101 10/08/2018 05:21 AM  
 CO2 22 10/08/2018 05:21 AM  
 BUN 74 (H) 10/08/2018 05:21 AM  
 Creatinine 11.70 (H) 10/08/2018 05:21 AM  
 Calcium 7.2 (L) 10/08/2018 05:21 AM  
 Magnesium 2.0 12/27/2015 04:35 AM  
 Phosphorus 8.4 (H) 10/04/2018 05:30 AM  
 HCT 26.4 (L) 10/07/2018 04:20 AM  
 HGB 8.5 (L) 10/07/2018 04:20 AM  
 PLATELET 333 64/06/5993 04:20 AM  
 INR 1.2 10/08/2018 05:21 AM  
  
Education Person Educated: [x] Patient [] Family [] Other Knowledge base: [x] None [] Minimal [] Substantial   
     Barriers to learning  [x] None Preferred method of learning: [] Written [] Oral [] Visual [] Hands on Topic: [] Access Care [] S&S of infection [] Fluid Management [] K+ 
               [x] Procedural    [] Albumin [] Medications [] Tx Options [] Transplant 
                [] Diet [] Other Teaching Tools: [x] Explain [] Demonstration [] Handout [] Teachback Ro/hemodiaylsis machine safety checks- before each treatment Machine Serial # 11 
 [] # 1 [id number] [] # 2 [id number] [] # 3 [id number] [] # 4 [id number] [] # 5 [id number] [] # [id number] [] # [phone] Alarm Test: [x] Pass Time 8060       RO Serial # 11  
[] 77789 (Large dual station RO) [] # 1  [id number]  (Portable # 1) [] # 2  [id number]  (Portable # 2) [] # 3  [id number]  (Portable # 3) [] # 4  [id number]  (Portable # 4) [] # 5  [id number]  (Portable # 5) Lot #s: Dialyzer [id number] exp. Tubing 14r82-4 exp. Saline -jt exp. [x] RO/Machine Log Complete    [x] Extracorporeal circuit Tested for integrity Dialysate: pH 7.4 Temp. 36  Conductivity: Meter 14 HD Machine 14  
chlorine testing- before each treatment and every 4 hours Total Chlorine: [x] Less than 0.1 ppm Time: 1245 2nd Check Time:  
(If greater than 0.1 ppm from Primary then every 30 minutes from Secondary) treatment Iniation-with dialysis precautions [x] All Connections Secured   [x] Saline Line Double Clamped [x] Venous Parameters Set    [x] Arterial Parameters Set    [x] Prime Given 250 ml            [x] Air Foam Detector Engaged Lizeth Nurse Signature/Title_Shiraz Ernst__ Pre-Treatment Time 1245 B/P 140/80 HR 53 Temp. 97.3 Resp Rate 18 Pre Wt  
UF Calculations: Wt to lose:500 ml(+) Oral:  ml(+) IV Meds/Fluids/Blood prods  ml(+) Prime/Rinse 500 ml 
(=)Total UF Goal 1000 mL Scale Type:[] Bed scale [] Sling Scale [] Wheel Chair Scale [x]  Not Ordered [] Unable to obtain pt on stretcher Tx Initiation Note: right IJ catheter accessed and treatment started without complication  
[x] Time Out/Safety Check  Time: 2662 Lizeth Signature/Title_Shiraz Ernst INTRADIALYTIC MONITORING 
(SEE ATTACHED FLOWSHEET) POST TREATMENT Time 1515 B/P 156/93 HR 57 Temp 97.3 Resp. Rate 18 Post wt Time Medication Dose Volume Route Initials Lizeth Signatures Title Initials Time 52 [facility] RN PL 52 [facility] Lizeth Signature/Title:_Shiraz Ernst_ Dialyzer cleared: [] Good [x] Fair [] Poor    Blood Processed 36 Liters Net UF Removed 500 mL Post Tx Access: AVF/AVG: Bleeding Stop                   Art. min Ashok min []+bruit/thrill Catheter: Locking Solution heparin     Art. 2.0 ml Ashok 2.1 ml 
 
Post Assessment:  Skin: [x]Warm []Dry []Diaphoretic []Flushed [] Pale []Cyanotic Lungs: [x]Clear []Coarse []Crackles []Wheezing Cardiac: [x]Regular []Irregular  [x]Monitored rhythm Edema: [x]None []General []Facial []Pedal  []UE []LE []RIGHT []LEFT    []Bilateral  
   Pain: [x]0 []1[]2 []3 []4 []5 []6 []7 []8 []9 []10  
POST Tx Note:removed 500cc fluid catheter locked as per MD order patient tolerated treatment Primary Nurse Report: First initial/Last name/Title Post Dialysis:_K Seal_         Time:_1515_ Abbreviations: AVG-arterial venous graft, AVF-arterial venous fistula, IJ-Internal Jugular, Subcl-Subclavian, Fem-Femoral, Tx-treatment, AP/HR-apical heart rate, DFR-dialysate flow rate, BFR-blood flow rate, AP-arterial pressure, -venous pressure, UF-ultrafiltrate, TMP-transmembrane pressure, Ashok-Venous, Art-Arterial, RO-Reverse Osmosis

## 2018-10-08 NOTE — PROGRESS NOTES
[facility]  -  Progress Note Patient: Yefri Medina MRN: [id number]  SSN: [id number] YOB: 1970  Age: 46 y.o. Sex: male Admit Date: 10/3/2018 I saw, evaluated, interviewed and examined the patient personally. I agree with the findings and plan of care as documented below with PALizbethC note Acute on CKD, now approaching to point of HD, ? ? Temporary. Defer to renal 
BP stable on current cardiac medications. Would continue current care. Kat Aranda MD 
 
 
 
Assessment: - Presented with N/V, hematuria, abdominal pain, weakness - Acute renal failure on CKD - Coronary artery disease: LAD - 3.0 x 18mm VISION 7/13  
- Ischemic VF: 07/13 in setting of MI, DC cardioversion x4 - Ischemic cardiomyopathy: EF 40-45% (03/15), EF 30-35% (ECHO 3/14) - S/p Medtronic single-chamber AICD 05/2014 (Dr. Callie Graham) - VT and ventricular fibrillation 10/27/15, approximately 20 shocks were administered via AICD. Interrogation at the time noted appropriate device function. 
- Hypertension - Dyslipidemia  
- JESSICA - Tobacco abuse - Apical mural thrombus ([address]): ECHO 3/14 
- Degenerative spine disease - Chronic back pain Plan:  
 
-Continue current cardiac medication regimen to include Amiodarone, Lipitor, Imdur. 
-Not on ACEi/ARB due to renal function, pending dialysis, appreciate nephrology assistance. 
-Coreg stopped earlier this admission due to bradycardia. Subjective:  
 
States vomiting is improving, denies any new cardiac complaints. Objective:  
  
Patient Vitals for the past 8 hrs: 
 Temp Pulse Resp BP SpO2  
10/08/18 1008 97.6 °F (36.4 °C) (!) 53 16 143/77 96 % 10/08/18 0627 97.9 °F (36.6 °C) 72 18 130/84 91 % 10/08/18 0320 97.8 °F (36.6 °C) 67 18 134/65 94 % Patient Vitals for the past 96 hrs: 
 Weight 10/05/18 1404 261 lb (118.4 kg) 10/05/18 1340 261 lb (118.4 kg) Intake/Output Summary (Last 24 hours) at 10/08/18 1059 Last data filed at 10/08/18 1021 Gross per 24 hour Intake             1580 ml Output             1750 ml Net             -170 ml Physical Exam: 
General:  alert, cooperative, no distress, appears stated age Neck:  No JVD Lungs:  clear to auscultation bilaterally Heart:  Bradycardic regular rhythm Abdomen:  abdomen is soft without significant tenderness, masses, organomegaly or guarding Extremities:  Atraumatic, no appreciable edema Data Review:  
 
Labs: Results:  
   
Chemistry Recent Labs 10/08/18 
 1840  10/07/18 
 8813  10/06/18 
 0408 GLU  92  81  82 NA  137  137  136  
K  4.2  4.3  4.4  
CL  101  103  103 CO2  22  20*  18* BUN  74*  75*  73* CREA  11.70*  12.00*  11.70* CA  7.2*  7.1*  6.9* AGAP  14  14  15 BUCR  6*  6*  6* AP  64  69  73  
TP  5.1*  5.4*  5.8* ALB  2.0*  1.9*  2.2*  
GLOB  3.1  3.5  3.6 AGRAT  0.6*  0.5*  0.6* CBC w/Diff Recent Labs 10/07/18 
 0420  10/06/18 
 0408 WBC  5.9  6.3  
RBC  3.18*  3.47* HGB  8.5*  9.2* HCT  26.4*  29.0*  
PLT  155  176 GRANS  66  66 LYMPH  22  22 EOS  5  4 Coagulation Recent Labs 10/08/18 
 0374 PTP  14.5 INR  1.2 Lipid Panel Lab Results Component Value Date/Time Cholesterol, total 136 04/21/2015 04:55 AM  
 HDL Cholesterol 28 (L) 04/21/2015 04:55 AM  
 LDL, calculated 60.2 04/21/2015 04:55 AM  
 VLDL, calculated 47.8 04/21/2015 04:55 AM  
 Triglyceride 239 (H) 04/21/2015 04:55 AM  
 CHOL/HDL Ratio 4.9 04/21/2015 04:55 AM  
  
Liver Enzymes Recent Labs 10/08/18 
 9580 TP  5.1* ALB  2.0* AP  64 SGOT  67* Thyroid Studies Lab Results Component Value Date/Time  T4, Total 7.1 04/04/2013 12:00 AM  
 T3 Uptake 27 04/04/2013 12:00 AM  
 TSH 1.42 12/27/2015 04:35 AM

## 2018-10-08 NOTE — ROUTINE PROCESS
Bedside and Verbal shift change report given to Imani Valdez RN (oncoming nurse) by Claire Jaramillo RN, BSN (offgoing nurse). Report given with SBAR, Kardex, Intake/Output, MAR and Recent Results.

## 2018-10-08 NOTE — PROGRESS NOTES
Problem: Falls - Risk of 
Goal: *Absence of Falls Document Xavier Shadow Fall Risk and appropriate interventions in the flowsheet. Outcome: Progressing Towards Goal 
Fall Risk Interventions: 
  
 
  
 
Medication Interventions: Patient to call before getting OOB History of Falls Interventions: Consult care management for discharge planning

## 2018-10-08 NOTE — PROGRESS NOTES
Patient received in bed awake. Patient A&Ox4, denies pain and discomfort. Patient NPO for [address] today. No distress noted. Frequently use items within reach. Bed locked in low position. Call bell within reach and Patient verbalized understanding of use for assistance and needs. [address] (Pharmacist) called said wife to get Suboxone prescription filled. 1145- Patient return to room from Cath lab, nurse at bedside along with Kennedy Ramirez (tech). TDC at right chest double lumen; CDI. Call bell w/in reach. [address] (Dialysis nurse) called made aware of [address] in place said would send for Patient. 1629- Patient and wife asking about Suboxone 4mg- 1mg PO twice daily. Patients wife gave information for Ms. Ida Ayon, NP for Milagrosradhika Nuñezthania Schmidt 1949 Psych[facility] # [phone] for Dr. Eusebio Torres to contact to let NP know that Patient is admitted. Dr. Eusebio Torres was called made aware said will address tomorrow. Dr. Eusebio Torres made aware that Suboxone dose not available to [facility] pharmacy, that Patient wife said could substitute SL form with Subutex 4 mg pill form T.O. received to order Subutex 4 mg PO twice daily, however [facility] pharmacy does not carry this med either; Dr. Eusebio Torres was called made aware said again will address tomorrow. 1648-  Patient c/o nausea; see MAR for prn Zofran IV to be given. Call bell w/in reach. 1715- Patient resting said that nausea \"it's getting better, some. \" Call bell w/in reach.   
 
Isauro Mcgee, RN spoke with Dr. Eusebio Torres and informed him of Patient having bloody stool per this nurse request Julio C Garcia went to Dr. Preethi Herrera location) V.O. received to for H&H in am.

## 2018-10-08 NOTE — OP NOTES
[facility], [facility] Lucio Espinosa MD 
 
Date of Surgery: 10/8/2018 Hospital: [facility] Surgeon(s): Lucio Espinosa MD 
Assistant(s): NonePre-operative Diagnosis: ESRD Post-operative Diagnosis: ESRD Procedure(s) Performed: Tunneled dialysis catheter placement with ultrasound and fluoroscopic guidance Anesthesia:  Local + Mod sedation Findings:  Catheter flushed and jaswinder well at conclusion of the procedure. Complications: None Estimated Blood Loss:  Minimal 
Tubes and Drains:  None Specimens: * No specimens in log * Procedure in Detail: After informed consent was obtained, the patient taken to the procedure room and  placed supine on the table. A surgical time-out was performed. The patient was identified and the procedure verified. The right chest was prepped with chlorhexidine and draped in a sterile manner. 0.50% Lidocaine with epinephrine and bicarbonate was used to anesthetize the skin. The right jugular vein was punctured under ultrasound guidance with an micropuncture needle. An 0.018in microwire was then placed into the superior vena cava. C-arm fluoroscopy was employed to demonstrate the position of the microwire. The skin of the pectoral area  was anesthetized and incised and the intended tunnel track anesthetized as well. The 19 cm catheter was tunneled under the skin and the Dacron cuff positioned in the subcutaneous tissue 2 cm from the skin incision. The puncture site at the neck was enlarged with a small incision. A Bentson wire was then placed to the inferior vena cava under fluorscopic guidance. The #12 Amharic sheath was inserted over the wire. The wire was removed. Through the sheath, the catheter tip was inserted, and the peel-away sheath removed. The catheter tip position within the superior vena cava was verified by fluoroscopy. The images were saved and transferred to PACS.  The catheter aspirated blood easily, was flushed with saline, and each port locked with 2mL of 1000 units/mL Heparin. The catheter was sutured to the skin with4-0 and nylon. The right incision was closed with 4-0 monocryl. The incision was covered with Dermabond. Biopatch was applied to the catheter exit site. The surgical sites were covered with gauze and Op-Site. The patient tolerated the procedure well without complications.   
 
 
Abhijit Dupree MD

## 2018-10-08 NOTE — PROGRESS NOTES
Hospitalist Progress Note Internal medicine/ Hospitalist 
 
Daily Progress Note: 10/8/2018    
Interval history / Subjective:  
Reno Rothman is a 46 y.o.  male with h/o AICD,cardiac arrest,VT,ischemic cardiomyopathy EF 30%,obesity,cad,presented to ED because of weakness,nausea,vomting,abdominal pain,hematuria. Patient says that he started feeling sick 2 weeks ago when he started feeling weak. Then he started vomiting. Could not tolerate even water. In recent days ,he has had abdominal pain which he attributed to the ongoing vomiting. Patient also reported that he has seen his urine turning red for a while. Had to see his doctor in two weeks but felt very weak and decided to come to the ED. In ED,lab showed creatinine 11.0 compared to 2.12 on 1/11/2018 and 8.01 on 9/28/18. UA showed large blood,bacteria+. CT abd/pelvis w/o hydronephrosis or urolithiasis. Patient admitted for OLIVIA,Hematuria,UTI. Started on iv fluid. Nephrolohy consulted from ER. Since patient has GI complaint and renal involvement,nephrology has indicated that Henoch-Schonlein syndrome needs to be considered. Pt is on iv fluid. IgA elevated 648. Other serology for vasculitis including nathaniel,GBM ab,ANCA,C3,C4,heptitis profile not abnormal.Nephrology has indicated that if creatinine continue to rise over the weekend,patient will need dialysis. Today creat up to 12.00. Patient may also need kidney biopsy. Cardiology consulted significant cardiac history anticipating that he will have dialysis. Cardiology has determined that his cardiac status is stable. GI saw patient on 10/6 for ongoing symptoms and decided to do EGD next week. 10/8, placed TDC catheter. Current Facility-Administered Medications Medication Dose Route Frequency  0.9% sodium chloride infusion  50 mL/hr IntraVENous DIALYSIS PRN  
 heparin (porcine) 1,000 unit/mL injection 1,000 Units  1,000 Units InterCATHeter DIALYSIS PRN  pantoprazole (PROTONIX) tablet 40 mg  40 mg Oral ACB  hydrALAZINE (APRESOLINE) tablet 50 mg  50 mg Oral TID  calcitRIOL (ROCALTROL) capsule 0.5 mcg  0.5 mcg Oral DAILY  promethazine (PHENERGAN) tablet 25 mg  25 mg Oral Q8H PRN  
 amLODIPine (NORVASC) tablet 10 mg  10 mg Oral DAILY  calcium acetate (PHOSLO) capsule 1,334 mg  2 Cap Oral TID WITH MEALS  influenza vaccine 2018- (6 mos+)(PF) (FLUARIX QUAD/FLULAVAL QUAD) injection 0.5 mL  0.5 mL IntraMUSCular PRIOR TO DISCHARGE  isosorbide mononitrate ER (IMDUR) tablet 90 mg  90 mg Oral DAILY  amiodarone (CORDARONE) tablet 400 mg  400 mg Oral DAILY  atorvastatin (LIPITOR) tablet 40 mg  40 mg Oral QHS  fluticasone (FLONASE) 50 mcg/actuation nasal spray 2 Spray  2 Spray Both Nostrils DAILY  lidocaine 4 % patch 1 Patch  1 Patch TransDERmal Q24H  
 tiotropium (SPIRIVA) inhalation capsule 18 mcg  1 Cap Inhalation DAILY  ondansetron (ZOFRAN) injection 4 mg  4 mg IntraVENous Q4H PRN  
 albuterol-ipratropium (DUO-NEB) 2.5 MG-0.5 MG/3 ML  3 mL Nebulization Q4H PRN  
 buprenorphine-naloxone (SUBOXONE) 4mg-1mg SL film (Patient Supplied)  1 Film SubLINGual BID Feeling weak otherwise no other complaints Objective:  
 
Visit Vitals  /77 (BP 1 Location: Right arm, BP Patient Position: At rest)  Pulse (!) 53  Temp 97.6 °F (36.4 °C)  Resp 16  
 Ht 5' 10\" (1.778 m)  Wt 118.4 kg (261 lb)  SpO2 96%  BMI 37.45 kg/m2 O2 Device: Room air Temp (24hrs), Av.2 °F (36.8 °C), Min:97.6 °F (36.4 °C), Max:99 °F (37.2 °C) 10/08 0701 - 10/08 1900 In: -  
Out: 427 [IAOLP:315] 10/06 1901 - 10/08 0700 In: 26 [P.O.:840; I.V.:1760] Out: 9781 [HDZVD:[id number]] PHYSICAL EXAM:  
General:                    Sitting in bed,eating breakfast.Morbid obesity. HEENT:                     Pupils equal.  Sclera anicteric. Conjunctiva pink. Mouth moist. 
Neck:                                   Supple,no jvd. CV:                  Regular rate and rhythm. Lungs:                       Clear to auscultation bilaterally. No Wheezing or Rhonchi. No rales. Abdomen:                  Soft, non-tender. Not distended. Bowel sounds normal. No organomegaly Extremities:               No cyanosis. No edema. No clubbing Neurologic:                Alert and oriented X 3. Skin:                Warm and dry. No rashes.  
  
  
 
Data Review Recent Results (from the past 12 hour(s)) METABOLIC PANEL, COMPREHENSIVE Collection Time: 10/08/18  5:21 AM  
Result Value Ref Range Sodium 137 136 - 145 mmol/L Potassium 4.2 3.5 - 5.5 mmol/L Chloride 101 100 - 108 mmol/L  
 CO2 22 21 - 32 mmol/L Anion gap 14 3.0 - 18 mmol/L Glucose 92 74 - 99 mg/dL BUN 74 (H) 7.0 - 18 MG/DL Creatinine 11.70 (H) 0.6 - 1.3 MG/DL  
 BUN/Creatinine ratio 6 (L) 12 - 20 GFR est AA 6 (L) >60 ml/min/1.73m2 GFR est non-AA 5 (L) >60 ml/min/1.73m2 Calcium 7.2 (L) 8.5 - 10.1 MG/DL Bilirubin, total 0.2 0.2 - 1.0 MG/DL  
 ALT (SGPT) 87 (H) 16 - 61 U/L  
 AST (SGOT) 67 (H) 15 - 37 U/L Alk. phosphatase 64 45 - 117 U/L Protein, total 5.1 (L) 6.4 - 8.2 g/dL Albumin 2.0 (L) 3.4 - 5.0 g/dL Globulin 3.1 2.0 - 4.0 g/dL A-G Ratio 0.6 (L) 0.8 - 1.7 PROTHROMBIN TIME + INR Collection Time: 10/08/18  5:21 AM  
Result Value Ref Range Prothrombin time 14.5 11.5 - 15.2 sec INR 1.2 0.8 - 1.2 Assessment/Plan: Active Problems: 
  Obesity (1/29/2012) HTN (hypertension) (7/24/2013) Acute on chronic renal failure ([facility] (7/24/2013) JESSICA (obstructive sleep apnea) (12/17/2013) Cardiomyopathy ([facility] (4/23/2014) Overview: EF 25 to 39% this admission ICD (implantable cardioverter-defibrillator) discharge (10/29/2015) Overview: 20 shocks, battery life 8.5 years CAD (coronary artery disease) (10/29/2015) Overview: S/P PCI LAD Bacteria in urine (10/3/2018) Metabolic acidosis (17/6/1934) Care Plan 1.Acute on chronic renal failure/Metabolic acidosis - creatinine 11.00 POA 
 -Etiology:volume depletion -?acute GN as pt also hematuria. -May need kidney biopsy. Plavix on hold 
 -Serology:IgA elevated. Other normal such as MANUELITO,ANCA,C3,C4,Hep profile. -s/p [facility] nephrology to schedule dialysis.   
  
2.Bacteria in urine/UTI/Hematuria 
 -Ceftriaxone iv - completed 
 -Blood cx ng/Ucx negative. -Vasculitis serology non-revealing 3. Secondary hyperparathyroidism  
 -On calcitriol 
  4. Nausea/vomiting/abdominal pain 
 -CT reviewed,no acute process. -GI,Dr Flanagan saw patient on 10/6 - plan is EGD as OP 
  
5. HTN 
 -Hydralazine prn for sbp>160 
  
6. JESSICA (obstructive sleep apnea) 
 -On oxygen 
  
7.Cardiomyopathy EF 25-30% 8. ICD (implantable cardioverter-defibrillator) discharge 9. CAD (coronary artery disease) 
 -Continue routine meds 
 -Telemetry monitoring 
 -Cardiology saw patient on 10/6 and determined that patient's cardiac status is stable and advised to continue his home meds.

## 2018-10-08 NOTE — PROGRESS NOTES
RENAL PROGRESS NOTE [facility] Assessment/Plan: · OLIVIA. (ischemic atn in a  setting of recent onset of n/v). Acute GN is also possible given gross hematuria and nephrotic proteinuria. GN/vasculitis serologies are neg. May need kidney biopsy- antiplt agents are on hold. Meanwhile renal function is without improvement, tdc placed this am, will proceed with 1st dialysis, 2nd dialysis tomorrow. 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 · HTN with variable control. Continue amlodipine. · Met acidosis. Corrected, d/c ivf with [place]. Dialysis will also address · N/V. Etio? CT finding noted. GI evaluation is in progress. EGD tomorrow. · Secondary hyperparathyroidism/hyperphsphotemia. Continue calcitriol and phoslo. Subjective:Patient complaints off: Feel better. No SOB/CP. No vomiting, nausea is better. Tolerates diet but was npo today for tdc placement. Is still having gross hematuria. Patient Active Problem List  
Diagnosis Code  ST elevation (STEMI) myocardial infarction involving left anterior descending coronary artery (HCC) I21.02  
 Cardiac arrest - ventricular fibrillation  HTN (hypertension) I10  
 Hypertensive emergency I16.1  Hyperglycemia R73.9  Acute on chronic renal failure (HCC) N17.9, N18.9  Leukocytosis D72.829  
 Acute pulmonary edema (HCC) J81.0  Contrast dye induced nephropathy N14.1, T50.8X5A  
 CKD (chronic kidney disease) N18.9  Arachnoiditis G03.9  Postlaminectomy syndrome, lumbar region M96.1  Psoriasis L40.9  Degeneration of lumbar or lumbosacral intervertebral disc M51.37  
 Encounter for long-term (current) use of high-risk medication Z79.899  Obesity E66.9  
 Pain in joint, lower leg M25.569  Psoriatic arthropathy ([address] L40.50  Chronic low back pain M54.5, G89.29  
  Chronic pain syndrome G89.4  Lumbosacral spondylosis without myelopathy M47.817  Enthesopathy of hip M76.899  
 Sacroiliitis (HCC) M46.1  Coronary artery disease I25.10  
 JESSICA (obstructive sleep apnea) G47.33  
 UTI (lower urinary tract infection) N39.0  Cardiomyopathy ([facility] I42.9  AICD (automatic cardioverter/defibrillator) present Z95.810  
 OLIVIA (acute kidney injury) ([facility] N17.9  Renal failure N19  
 Chest pain R07.9  
 SOB (shortness of breath) R06.02  Bronchitis J40  Acute renal failure (ARF) (HCC) N17.9  ARF (acute renal failure) ([facility]) N17.9  Severe sepsis ([facility]) A41.9, R65.20  Epigastric abdominal pain R10.13  
 Anxiety F41.9  Ventricular tachycardia ([facility]) I47.2  Ventricular fibrillation ([facility]) I49.01  
 ICD (implantable cardioverter-defibrillator) discharge Z45.02  
 CAD (coronary artery disease) I25.10  Obesity, morbid ([facility] E66.01  
 Bacteria in urine E95.13  
 Metabolic acidosis W50.7 Current Facility-Administered Medications Medication Dose Route Frequency Provider Last Rate Last Dose  
 0.9% sodium chloride infusion  50 mL/hr IntraVENous DIALYSIS PRN Jessie Cardenas MD      
 heparin (porcine) 1,000 unit/mL injection 1,000 Units  1,000 Units InterCATHeter DIALYSIS PRN Jessie Cardenas MD      
 pantoprazole (PROTONIX) tablet 40 mg  40 mg Oral ACB Diana Lepe MD   40 mg at 10/08/18 1013  hydrALAZINE (APRESOLINE) tablet 50 mg  50 mg Oral TID Valerie Solano MD   50 mg at 10/08/18 1641  calcitRIOL (ROCALTROL) capsule 0.5 mcg  0.5 mcg Oral DAILY Valerie Solano MD   0.5 mcg at 10/08/18 1014  promethazine (PHENERGAN) tablet 25 mg  25 mg Oral Q8H PRN Diana Lepe MD   25 mg at 10/05/18 2006  amLODIPine (NORVASC) tablet 10 mg  10 mg Oral DAILY Margrette Myke BATEMAN MD   10 mg at 10/08/18 1014  calcium acetate (PHOSLO) capsule 1,334 mg  2 Cap Oral TID WITH MEALS Juve BATEMAN MD   1,334 mg at 10/08/18 0066  influenza vaccine 2018-19 (6 mos+)(PF) (FLUARIX QUAD/FLULAVAL QUAD) injection 0.5 mL  0.5 mL IntraMUSCular PRIOR TO DISCHARGE Brendan Cole MD      
 isosorbide mononitrate ER (IMDUR) tablet 90 mg  90 mg Oral DAILY Brendan Cole MD   90 mg at 10/08/18 1013  
 amiodarone (CORDARONE) tablet 400 mg  400 mg Oral DAILY Brendan Cole MD   400 mg at 10/08/18 1014  
 atorvastatin (LIPITOR) tablet 40 mg  40 mg Oral QHS Brendan Cole MD   40 mg at 10/07/18 2201  fluticasone (FLONASE) 50 mcg/actuation nasal spray 2 Spray  2 Spray Both Nostrils DAILY Brendan Cole MD   2 Spray at 10/08/18 1014  lidocaine 4 % patch 1 Patch  1 Patch TransDERmal Q24H Brendan Cole MD   1 Patch at 10/07/18 1811  tiotropium (SPIRIVA) inhalation capsule 18 mcg  1 Cap Inhalation DAILY Brendan Cole MD   18 mcg at 10/08/18 1016  ondansetron (ZOFRAN) injection 4 mg  4 mg IntraVENous Q4H PRN Brendan Cole MD   4 mg at 10/08/18 1650  
 albuterol-ipratropium (DUO-NEB) 2.5 MG-0.5 MG/3 ML  3 mL Nebulization Q4H PRN Brendan Cole MD      
 buprenorphine-naloxone (SUBOXONE) 4mg-1mg SL film (Patient Supplied)  1 Film SubLINGual BID Brendan Cole MD   Stopped at 10/08/18 1014 Objective Vitals:  
 10/08/18 1445 10/08/18 1500 10/08/18 1512 10/08/18 1635 BP: 120/67 143/87 (!) 156/93 150/80 Pulse: (!) 55 71 (!) 57 82 Resp: 18 18 18 18 Temp:   97.3 °F (36.3 °C) 97.7 °F (36.5 °C) TempSrc:   Oral   
SpO2:    96% Weight:      
Height:      
 
 
 
Intake/Output Summary (Last 24 hours) at 10/08/18 1743 Last data filed at 10/08/18 1500 Gross per 24 hour Intake             1100 ml Output             1100 ml Net                0 ml Admission weight: Weight: 118.7 kg (261 lb 11 oz) (10/04/18 9468) Last Weight Metrics: 
Weight Loss Metrics 10/5/2018 2/14/2018 2/8/2018 1/11/2018 8/22/2017 8/20/2017 3/2/2017 Today's Wt 261 lb 250 lb 250 lb 286 lb 280 lb 258 lb 258 lb BMI 37.45 kg/m2 35.87 kg/m2 35.87 kg/m2 41.04 kg/m2 40.18 kg/m2 37.02 kg/m2 37.02 kg/m2 Physical Assessment:  
 
General: NAD, alert and oriented. Neck: No jvd. Rt ij tdc. LUNGS: Clear to Auscultation, No rales, rhonchi or wheezes. CVS EXM: S1, S2  RRR, no murmurs/gallops/rubs. Abdomen: mild periumbilical tenderness to palpation. Lower Extremities: 1+edema. Lab CBC w/Diff Recent Labs 10/07/18 
 0420  10/06/18 
 0408 WBC  5.9  6.3  
RBC  3.18*  3.47* HGB  8.5*  9.2* HCT  26.4*  29.0*  
PLT  155  176 GRANS  66  66 LYMPH  22  22 EOS  5  4 Chemistry Recent Labs 10/08/18 
 9261  10/07/18 
 5971  10/06/18 
 0408 GLU  92  81  82 NA  137  137  136  
K  4.2  4.3  4.4  
CL  101  103  103 CO2  22  20*  18* BUN  74*  75*  73* CREA  11.70*  12.00*  11.70* CA  7.2*  7.1*  6.9* AGAP  14  14  15 BUCR  6*  6*  6* AP  64  69  73  
TP  5.1*  5.4*  5.8* ALB  2.0*  1.9*  2.2*  
GLOB  3.1  3.5  3.6 AGRAT  0.6*  0.5*  0.6* Lab Results Component Value Date/Time Iron 86 05/18/2015 03:00 AM  
 TIBC 260 05/18/2015 03:00 AM  
 Iron % saturation 33 05/18/2015 03:00 AM  
 Ferritin 151 05/18/2015 03:00 AM  
  
Lab Results Component Value Date/Time Calcium 7.2 (L) 10/08/2018 05:21 AM  
 Phosphorus 8.4 (H) 10/04/2018 05:30 AM  
  
 
Magdalena Pichardo M.D. [facility] Phone [phone] Pager [phone]
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
## 2018-10-08 NOTE — PROGRESS NOTES
GI follow-up. Patient was in procedures and dialysis today and unable to complete EGD. Plan to keep NPO after midnight for EGD tomorrow.

## 2018-10-09 ENCOUNTER — ANESTHESIA EVENT (OUTPATIENT)
Dept: ENDOSCOPY | Age: 48
DRG: 674 | End: 2018-10-09
Payer: MEDICARE

## 2018-10-09 ENCOUNTER — ANESTHESIA (OUTPATIENT)
Dept: ENDOSCOPY | Age: 48
DRG: 674 | End: 2018-10-09
Payer: MEDICARE

## 2018-10-09 LAB
ACTIN IGG SERPL-ACNC: 14 UNITS (ref 0–19)
ALBUMIN SERPL-MCNC: 1.9 G/DL (ref 3.4–5)
ANION GAP SERPL CALC-SCNC: 11 MMOL/L (ref 3–18)
BACTERIA SPEC CULT: NORMAL
BACTERIA SPEC CULT: NORMAL
BUN SERPL-MCNC: 52 MG/DL (ref 7–18)
BUN/CREAT SERPL: 6 (ref 12–20)
CALCIUM SERPL-MCNC: 7.2 MG/DL (ref 8.5–10.1)
CERULOPLASMIN SERPL-MCNC: 19.8 MG/DL (ref 16–31)
CHLORIDE SERPL-SCNC: 103 MMOL/L (ref 100–108)
CO2 SERPL-SCNC: 26 MMOL/L (ref 21–32)
CREAT SERPL-MCNC: 9.43 MG/DL (ref 0.6–1.3)
ECHO AV PEAK GRADIENT: 0 MMHG
ECHO AV PEAK VELOCITY: 0 CM/S
ECHO LA VOL 2C: 110.7 ML (ref 18–58)
ECHO LA VOL 4C: 150.34 ML (ref 18–58)
ECHO LA VOLUME INDEX A2C: 47.36 ML/M2
ECHO LA VOLUME INDEX A4C: 64.32 ML/M2
ECHO LV EDV A2C: 223 ML
ECHO LV EDV A4C: 258 ML
ECHO LV EDV BP: 244.1 ML (ref 67–155)
ECHO LV EDV INDEX A4C: 110.4 ML/M2
ECHO LV EDV INDEX BP: 104.4 ML/M2
ECHO LV EDV NDEX A2C: 95.4 ML/M2
ECHO LV EJECTION FRACTION A2C: 22 %
ECHO LV EJECTION FRACTION A4C: 28 %
ECHO LV EJECTION FRACTION BIPLANE: 24.4 % (ref 55–100)
ECHO LV ESV A2C: 175 ML
ECHO LV ESV A4C: 185.6 ML
ECHO LV ESV BP: 184.6 ML (ref 22–58)
ECHO LV ESV INDEX A2C: 74.9 ML/M2
ECHO LV ESV INDEX A4C: 79.4 ML/M2
ECHO LV ESV INDEX BP: 79 ML/M2
ECHO LV INTERNAL DIMENSION DIASTOLIC: 6.52 CM (ref 4.2–5.9)
ECHO LV INTERNAL DIMENSION SYSTOLIC: 5.41 CM
ECHO LV ISOVOLUMETRIC RELAXATION TIME (IVRT): 49.9 MS
ECHO LV IVSD: 0.97 CM (ref 0.6–1)
ECHO LV MASS 2D: 362 G (ref 88–224)
ECHO LV MASS INDEX 2D: 154.9 G/M2
ECHO LV POSTERIOR WALL DIASTOLIC: 1.12 CM (ref 0.6–1)
ECHO LVOT DIAM: 2.36 CM
ECHO LVOT PEAK GRADIENT: 2 MMHG
ECHO LVOT PEAK VELOCITY: 70.85 CM/S
ECHO MV A VELOCITY: 87.82 CM/S
ECHO MV AREA PHT: 1.9 CM2
ECHO MV E DECELERATION TIME (DT): 392.1 MS
ECHO MV E VELOCITY: 0.7 CM/S
ECHO MV E/A RATIO: 0.01
ECHO MV PRESSURE HALF TIME (PHT): 113.7 MS
GLUCOSE SERPL-MCNC: 96 MG/DL (ref 74–99)
HCT VFR BLD AUTO: 26.9 % (ref 36–48)
HGB BLD-MCNC: 8.7 G/DL (ref 13–16)
IGG4 SER-MCNC: 50 MG/DL (ref 2–96)
PHOSPHATE SERPL-MCNC: 6.2 MG/DL (ref 2.5–4.9)
POTASSIUM SERPL-SCNC: 4.2 MMOL/L (ref 3.5–5.5)
SERVICE CMNT-IMP: NORMAL
SERVICE CMNT-IMP: NORMAL
SODIUM SERPL-SCNC: 140 MMOL/L (ref 136–145)

## 2018-10-09 PROCEDURE — 65660000000 HC RM CCU STEPDOWN

## 2018-10-09 PROCEDURE — 77030009426 HC FCPS BIOP ENDOSC BSC -B: Performed by: INTERNAL MEDICINE

## 2018-10-09 PROCEDURE — 74011250636 HC RX REV CODE- 250/636: Performed by: INTERNAL MEDICINE

## 2018-10-09 PROCEDURE — 74011250637 HC RX REV CODE- 250/637: Performed by: INTERNAL MEDICINE

## 2018-10-09 PROCEDURE — 74011250637 HC RX REV CODE- 250/637: Performed by: HOSPITALIST

## 2018-10-09 PROCEDURE — 74011250636 HC RX REV CODE- 250/636: Performed by: HOSPITALIST

## 2018-10-09 PROCEDURE — 76040000019: Performed by: INTERNAL MEDICINE

## 2018-10-09 PROCEDURE — 0DB68ZX EXCISION OF STOMACH, VIA NATURAL OR ARTIFICIAL OPENING ENDOSCOPIC, DIAGNOSTIC: ICD-10-PCS | Performed by: INTERNAL MEDICINE

## 2018-10-09 PROCEDURE — 36415 COLL VENOUS BLD VENIPUNCTURE: CPT | Performed by: INTERNAL MEDICINE

## 2018-10-09 PROCEDURE — 76060000031 HC ANESTHESIA FIRST 0.5 HR: Performed by: INTERNAL MEDICINE

## 2018-10-09 PROCEDURE — 90935 HEMODIALYSIS ONE EVALUATION: CPT

## 2018-10-09 PROCEDURE — 88342 IMHCHEM/IMCYTCHM 1ST ANTB: CPT | Performed by: INTERNAL MEDICINE

## 2018-10-09 PROCEDURE — 85018 HEMOGLOBIN: CPT | Performed by: INTERNAL MEDICINE

## 2018-10-09 PROCEDURE — 88305 TISSUE EXAM BY PATHOLOGIST: CPT | Performed by: INTERNAL MEDICINE

## 2018-10-09 PROCEDURE — 74011250636 HC RX REV CODE- 250/636

## 2018-10-09 PROCEDURE — 74011000250 HC RX REV CODE- 250: Performed by: INTERNAL MEDICINE

## 2018-10-09 PROCEDURE — 80069 RENAL FUNCTION PANEL: CPT | Performed by: INTERNAL MEDICINE

## 2018-10-09 RX ORDER — SODIUM CHLORIDE 0.9 % (FLUSH) 0.9 %
5-10 SYRINGE (ML) INJECTION EVERY 8 HOURS
Status: DISCONTINUED | OUTPATIENT
Start: 2018-10-09 | End: 2018-10-24 | Stop reason: HOSPADM

## 2018-10-09 RX ORDER — LORAZEPAM 1 MG/1
2 TABLET ORAL
Status: DISCONTINUED | OUTPATIENT
Start: 2018-10-09 | End: 2018-10-24 | Stop reason: HOSPADM

## 2018-10-09 RX ORDER — EPINEPHRINE 0.1 MG/ML
1 INJECTION INTRACARDIAC; INTRAVENOUS
Status: CANCELLED | OUTPATIENT
Start: 2018-10-09 | End: 2018-10-10

## 2018-10-09 RX ORDER — SODIUM CHLORIDE 0.9 % (FLUSH) 0.9 %
5-10 SYRINGE (ML) INJECTION AS NEEDED
Status: CANCELLED | OUTPATIENT
Start: 2018-10-09 | End: 2018-10-09

## 2018-10-09 RX ORDER — DEXTROMETHORPHAN/PSEUDOEPHED 2.5-7.5/.8
1.2 DROPS ORAL
Status: CANCELLED | OUTPATIENT
Start: 2018-10-09

## 2018-10-09 RX ORDER — PROPOFOL 10 MG/ML
INJECTION, EMULSION INTRAVENOUS AS NEEDED
Status: DISCONTINUED | OUTPATIENT
Start: 2018-10-09 | End: 2018-10-09 | Stop reason: HOSPADM

## 2018-10-09 RX ORDER — NALOXONE HYDROCHLORIDE 0.4 MG/ML
0.4 INJECTION, SOLUTION INTRAMUSCULAR; INTRAVENOUS; SUBCUTANEOUS
Status: CANCELLED | OUTPATIENT
Start: 2018-10-09 | End: 2018-10-09

## 2018-10-09 RX ORDER — FLUMAZENIL 0.1 MG/ML
0.2 INJECTION INTRAVENOUS
Status: CANCELLED | OUTPATIENT
Start: 2018-10-09 | End: 2018-10-09

## 2018-10-09 RX ORDER — SODIUM CHLORIDE 0.9 % (FLUSH) 0.9 %
5-10 SYRINGE (ML) INJECTION EVERY 8 HOURS
Status: CANCELLED | OUTPATIENT
Start: 2018-10-09 | End: 2018-10-09

## 2018-10-09 RX ORDER — MORPHINE SULFATE 4 MG/ML
2 INJECTION INTRAVENOUS
Status: DISCONTINUED | OUTPATIENT
Start: 2018-10-09 | End: 2018-10-15

## 2018-10-09 RX ORDER — LORAZEPAM 1 MG/1
1 TABLET ORAL
Status: DISCONTINUED | OUTPATIENT
Start: 2018-10-09 | End: 2018-10-24 | Stop reason: HOSPADM

## 2018-10-09 RX ORDER — LORAZEPAM 2 MG/ML
1 INJECTION INTRAMUSCULAR
Status: DISCONTINUED | OUTPATIENT
Start: 2018-10-09 | End: 2018-10-24 | Stop reason: HOSPADM

## 2018-10-09 RX ORDER — LORAZEPAM 2 MG/ML
3 INJECTION INTRAMUSCULAR
Status: DISCONTINUED | OUTPATIENT
Start: 2018-10-09 | End: 2018-10-24 | Stop reason: HOSPADM

## 2018-10-09 RX ORDER — ATROPINE SULFATE 0.1 MG/ML
0.5 INJECTION INTRAVENOUS
Status: CANCELLED | OUTPATIENT
Start: 2018-10-09 | End: 2018-10-10

## 2018-10-09 RX ORDER — LORAZEPAM 2 MG/ML
2 INJECTION INTRAMUSCULAR
Status: DISCONTINUED | OUTPATIENT
Start: 2018-10-09 | End: 2018-10-24 | Stop reason: HOSPADM

## 2018-10-09 RX ORDER — SODIUM CHLORIDE 0.9 % (FLUSH) 0.9 %
5-10 SYRINGE (ML) INJECTION AS NEEDED
Status: DISCONTINUED | OUTPATIENT
Start: 2018-10-09 | End: 2018-10-24 | Stop reason: HOSPADM

## 2018-10-09 RX ORDER — OXYCODONE HYDROCHLORIDE 5 MG/1
10 TABLET ORAL
Status: DISCONTINUED | OUTPATIENT
Start: 2018-10-09 | End: 2018-10-15

## 2018-10-09 RX ADMIN — ATORVASTATIN CALCIUM 40 MG: 40 TABLET, FILM COATED ORAL at 20:07

## 2018-10-09 RX ADMIN — ONDANSETRON 4 MG: 2 INJECTION INTRAMUSCULAR; INTRAVENOUS at 21:16

## 2018-10-09 RX ADMIN — PROPOFOL 20 MG: 10 INJECTION, EMULSION INTRAVENOUS at 12:03

## 2018-10-09 RX ADMIN — PROPOFOL 80 MG: 10 INJECTION, EMULSION INTRAVENOUS at 11:58

## 2018-10-09 RX ADMIN — Medication 10 ML: at 23:00

## 2018-10-09 RX ADMIN — HYDRALAZINE HYDROCHLORIDE 50 MG: 50 TABLET, FILM COATED ORAL at 22:57

## 2018-10-09 RX ADMIN — PANTOPRAZOLE SODIUM 40 MG: 40 TABLET, DELAYED RELEASE ORAL at 13:28

## 2018-10-09 RX ADMIN — ONDANSETRON 4 MG: 2 INJECTION INTRAMUSCULAR; INTRAVENOUS at 04:56

## 2018-10-09 RX ADMIN — LORAZEPAM 2 MG: 2 INJECTION, SOLUTION INTRAMUSCULAR; INTRAVENOUS at 23:25

## 2018-10-09 RX ADMIN — PROPOFOL 30 MG: 10 INJECTION, EMULSION INTRAVENOUS at 12:01

## 2018-10-09 RX ADMIN — SODIUM CHLORIDE: 900 INJECTION, SOLUTION INTRAVENOUS at 11:52

## 2018-10-09 RX ADMIN — CALCIUM ACETATE 1334 MG: 667 CAPSULE ORAL at 13:27

## 2018-10-09 RX ADMIN — PROMETHAZINE HYDROCHLORIDE 25 MG: 25 TABLET ORAL at 00:18

## 2018-10-09 RX ADMIN — CALCITRIOL 0.5 MCG: 0.25 CAPSULE ORAL at 13:28

## 2018-10-09 RX ADMIN — CALCIUM ACETATE 1334 MG: 667 CAPSULE ORAL at 19:40

## 2018-10-09 RX ADMIN — OXYCODONE HYDROCHLORIDE 10 MG: 5 TABLET ORAL at 20:07

## 2018-10-09 RX ADMIN — MORPHINE SULFATE 2 MG: 4 INJECTION INTRAVENOUS at 22:49

## 2018-10-09 RX ADMIN — FLUTICASONE PROPIONATE 2 SPRAY: 50 SPRAY, METERED NASAL at 10:23

## 2018-10-09 NOTE — H&P
Date of Surgery Update: 
Pati Morales was seen and examined. History and physical has been reviewed. Significant clinical changes have occurred as noted:  Improvement in nausea/vomiting. S/p HD catheter placement and initial episode of dialysis yesterday.   
 
Signed By: Roberta Hernandez MD   
 October 9, 2018 11:52 AM

## 2018-10-09 NOTE — PROGRESS NOTES
Seems to be some difficulty getting patient's suboxone, pharmacy does not have this or other similar medication. I have now talked to  at psychotherapist office twice to relay message that patient needs new script so it can be brought in by family= thus patient would be able to take the home medication. I have not had a return call from NP Georgie Alston but the  assured me she would relay the message. I can order opiates if need be but would likely break contract. However as is medically known the lack of the medication will only cause discomfort and is not life threatening

## 2018-10-09 NOTE — PROGRESS NOTES
Problem: Falls - Risk of 
Goal: *Absence of Falls Document Judy Evens Fall Risk and appropriate interventions in the flowsheet. Outcome: Progressing Towards Goal 
Fall Risk Interventions: 
  
 
  
 
Medication Interventions: Patient to call before getting OOB History of Falls Interventions: Door open when patient unattended

## 2018-10-09 NOTE — ROUTINE PROCESS
Bedside and Verbal shift change report given to Kaity Mejía RN (oncoming nurse) by Marichuy Salomon RN, BSN (offgoing nurse). Report given with SBAR, Kardex, Intake/Output, MAR and Recent Results.

## 2018-10-09 NOTE — PERIOP NOTES
Phase 2 Recovery Summary Patient arrived to Phase 2 at 1212 Report received from Gale Fontana, [address] and CRNA Vitals:  
 10/09/18 0622 10/09/18 6853 10/09/18 1212 10/09/18 1214 BP: 123/67 139/60 131/72 131/72 Pulse: (!) 54 (!) 57 (!) 50 (!) 47 Resp: 16 18 16 16 Temp: 98.1 °F (36.7 °C) 97.8 °F (36.6 °C) TempSrc:      
SpO2: 94% 95% 96% 95% Weight:      
Height:      
 
 
oriented to time, place, person and situation Lines and Drains Peripheral Intravenous Line:  
Peripheral IV 10/06/18 Anterior; Left Hand (Active) Site Assessment Clean, dry, & intact 10/9/2018 12:25 PM  
Phlebitis Assessment 0 10/9/2018 12:25 PM  
Infiltration Assessment 0 10/9/2018 12:25 PM  
Dressing Status Clean, dry, & intact 10/9/2018 12:25 PM  
Dressing Type Tape;Transparent 10/9/2018 12:25 PM  
Hub Color/Line Status Blue; Infusing 10/9/2018 12:25 PM  
Action Taken Open ports on tubing capped 10/9/2018  1:20 AM  
Alcohol Cap Used Yes 10/9/2018  1:20 AM  
 
 
Wound Patient discharged to [facility] with transport  at [facility] 31 [phone] 02 [facility]

## 2018-10-09 NOTE — PROGRESS NOTES
conducted a Follow up consultation and Spiritual Assessment for David Nathan, who is a 46 y.o.,male. The  provided the following Interventions: 
Continued the relationship of care and support. Listened empathically. Offered prayer and assurance of continued prayer on patients behalf. Chart reviewed. The following outcomes were achieved: 
Patient expressed gratitude for 's visit. Assessment: 
There are no spiritual or Rastafarian issues which require intervention at this time. Plan: 
Chaplains will continue to follow and will provide pastoral care on an as needed/requested basis.  recommends bedside caregivers page  on duty if patient shows signs of acute spiritual or emotional distress. Kel Booker M.Div. , [address] [address] [facility]: [phone]/[facility]: [phone]

## 2018-10-09 NOTE — ANESTHESIA PREPROCEDURE EVALUATION
Anesthetic History No history of anesthetic complications Review of Systems / Medical History Patient summary reviewed and pertinent labs reviewed Pulmonary Sleep apnea: CPAP Neuro/Psych Within defined limits Cardiovascular Hypertension: well controlled Dysrhythmias Pacemaker and CAD Exercise tolerance: <4 METS 
  
GI/Hepatic/Renal 
  
 
 
Renal disease: CRI Endo/Other Morbid obesity, arthritis and anemia Other Findings Comments: Documentation of current medication Current medications obtained, documented and obtained? YES Risk Factors for Postoperative nausea/vomiting: 
     History of postoperative nausea/vomiting? NO Female? NO Motion sickness? NO Intended opioid administration for postoperative analgesia? NO Smoking Abstinence: 
Current Smoker? NO Elective Surgery? YES Seen preoperatively by anesthesiologist or proxy prior to day of surgery? YES Pt abstained from smoking 24 hours prior to anesthesia? N/A Preventive care/screening for High Blood Pressure: 
Aged 18 years and older: YES Screened for high blood pressure: YES Patients with high blood pressure referred to primary care provider 
 for BP management: YES Physical Exam 
 
Airway Mallampati: III 
TM Distance: 4 - 6 cm Neck ROM: decreased range of motion Mouth opening: Normal 
 
 Cardiovascular Rhythm: regular Rate: normal 
 
 
 
 Dental 
 
Dentition: Poor dentition Pulmonary Breath sounds clear to auscultation Abdominal 
GI exam deferred Other Findings Anesthetic Plan ASA: 3 Anesthesia type: MAC Anesthetic plan and risks discussed with: Patient

## 2018-10-09 NOTE — ROUTINE PROCESS
. 
Acute HEmodialysis flow sheet Patient information Vidal Castro MRN: [id number]      HKD:[id number]  [place]# NXHN#[id number] Hospital: [place] [address] DX:   
       []1st Time Acute  []Stat[x] Routine []Urgent []Chronic Unit  
       []Acute Room []Bedside  []ICU/CCU []ER Isolation Precautions: [x]Dialysis[] Airborne []Contact []Droplet []Reverse Special Considerations:    [] Blood Consent Verified  []N/A Allergies: 
[] NKA  [x] Reviewed Allergies Allergen Reactions  Latex Other (comments)  
  blisters  Other Food Hives Allergic to tomatoes and tomato sauce  Codeine Nausea and Vomiting Code Status [x]Full Code [] DNR  []Other Diet: renal Diabetic: []Yes []No 
  
 Hemodialysis Orders Physician: Chelsey Mckeon Dialyzer Revaclear 300 Duration 3 BFR: 300 DFR:600 Dialysate: K 3 CA 2.5 Na 140 Bicarb 35 Dry Weight: UF Goal: 500 ml Heparin:  []Bolus   Units    []Hourly  Units      [x]None BP Tx:  []NS  200ml/Bolus    []Other     [x]N/A  
    Labs: Pre:  Post: [x]N/A Additional Orders: [x]N/A [x]Signed Treatment Consent   [x] Verified   [] Obtained Primary Nurse Report: First initial/ Last Name/Title Pre Dialysis: Madeline Duty    Time: 1400 Access CATHETER ACCESS:  [] N/A  [x] RIGHT  [] LEFT  [x] IJ  [] SUBCL [] FEM [] First use X-ray  [x] Tunnel     [] Non-Tunneled [x] No S/S infection  [] Redness [] Drainage  [] Cultured [] Swelling 
                       [] Pain  
                       [x] Medical Aseptic [] Prep Dressing Changed  
                       [] Clotted [x] Patent [x]      Flows: [x] Good [] Poor [] Reversed          If Access Problem Dr. Betsey Stein: [] Yes [] No    Date:    [x] N/A  
     GRAFT/FISTULA ACCESS:  [] N/A  [] RIGHT  [] LEFT  [] UE  [] LE   
 [] AVG  [] AVF [] BUTTONHOLE  [] +BRUIT/THRILL 
     [] MEDICAL ASEPTIC PREP [] No S/S infection  [] Redness [] Drainage  [] Cultured [] Swelling 
                       [] Pain Needle Gauge                              Length: If Access Problem Dr. Garcia Form: [] Yes [] No    Date:     [] N/A General Assessment LUNGS:  SaO2%  [x] Clear [] Coarse [] Crackles [] Wheezing  
            [] Diminished Location: [] RLL [] LLL [] RUL [] NIKOS   
     COUGH:  [] Productive [] Dry [x] N/A  RESPIRATIONS: [x] Easy [] Labored THERAPY: [x] RA   [] NC     L/min    Mask: [] NRB [] Venti  O2%    
             [] Ventilator [] Intubated [] Trach [] BiPap [] CPap CARDIAC: [x] Regular [] Irregular [] Pericardial Rub [] JVD [] Monitored Rhythm: EDEMA: [] None [x] Generalized [] Facial [] Pedal [] UE [] LE 
           [] Pitting [] 1 [] 2 [] 3 [] 4    [] Right [] Left [] Bilateral  
    SKIN:    [x] Warm [] Hot [] Cold  [] Dry [] Pale [] Diaphoretic  
           [] Flushed [] Jaundiced [] Cyanotic [] Rash [] Weeping LOC:    [x] Alert  Oriented to: [x] Person [x] Place [x] Time  
          [] Confused [] Lethargic [] Medically sedated [] Non-responsive GI/ABDOMEN: [] Flat [x] Distended [x] Soft [] Firm [] Obese [] Diarrhea 
 [] Bowel Sounds     []Nausea [] Vomiting PAIN: [x] 0 [] 1 [] 2 [] 3 [] 4 [] 5 [] 6 [] 7 [] 8 [] 9 [] 10 Scale 1-10 Action/Follow Up MOBILITY: [] Amb [] Amb/Assist [x] Bed  [] Wheelchair CUrrent LABS HBsAg ONLY: Date Drawn 10/6/18 [x] Negative [] Positive  [] Unknown. HBsAb: Date Drawn 10/6/18 [x] Susceptible <10 [] Immune ?10 [] Unknown Date of Current Labs:  
    
Lab Results Component Value Date/Time  Sodium 140 10/09/2018 07:45 AM  
 Potassium 4.2 10/09/2018 07:45 AM  
 Chloride 103 10/09/2018 07:45 AM  
 CO2 26 10/09/2018 07:45 AM  
 BUN 52 (H) 10/09/2018 07:45 AM  
 Creatinine 9.43 (H) 10/09/2018 07:45 AM  
 Calcium 7.2 (L) 10/09/2018 07:45 AM  
 Magnesium 2.0 12/27/2015 04:35 AM  
 Phosphorus 6.2 (H) 10/09/2018 07:45 AM  
 HCT 26.9 (L) 10/09/2018 07:45 AM  
 HGB 8.7 (L) 10/09/2018 07:45 AM  
 PLATELET 837 26/89/8950 04:20 AM  
 INR 1.2 10/08/2018 05:21 AM  
  
Education Person Educated: [x] Patient [] Family [] Other Knowledge base: [x] None [] Minimal [] Substantial   
     Barriers to learning  [x] None Preferred method of learning: [] Written [x] Oral [] Visual [] Hands on Topic: [x] Access Care [] S&S of infection [] Fluid Management [] K+ 
               [] Procedural    [] Albumin [] Medications [] Tx Options [] Transplant [x] Diet [] Other Teaching Tools: [x] Explain [] Demonstration [] Handout [] Teachback Ro/hemodiaylsis machine safety checks- before each treatment Machine Serial #13 [] # 1 [id number] [] # 2 [id number] [] # 3 [id number] [] # 4 [id number] [] # 5 [id number] [] # [phone] [] # [phone] Alarm Test: [x] Pass Time 2594       RO Serial # 13 
[] [id number] (Large dual station RO) [] # 1  [id number]  (Portable # 1) [] # 2  [id number]  (Portable # 2) [] # 3  [id number]  (Portable # 3) [] # 4  [id number]  (Portable # 4) [] # 5  [id number]  (Portable # 5) Lot #s: Dialyzer [id number] exp. Tubing [id number] exp. Saline -jt exp. [x] RO/Machine Log Complete    [x] Extracorporeal circuit Tested for integrity Dialysate: pH 7.4 Temp. 36  Conductivity: Meter 14 HD Machine 14  
chlorine testing- before each treatment and every 4 hours Total Chlorine: [x] Less than 0.1 ppm Time: 1530 2nd Check Time: 6109 (If greater than 0.1 ppm from Primary then every 30 minutes from Secondary) treatment Iniation-with dialysis precautions [x] All Connections Secured   [x] Saline Line Double Clamped [x] Venous Parameters Set    [x] Arterial Parameters Set    [x] Prime Given 250 ml            [x] Air Foam Detector Engaged Lizeth Nurse Signature/Title_Shiraz Ernts__ Pre-Treatment Time 1530 B/P 163/80 HR 61 Temp. 98.3 Resp Rate 18 Pre Wt  
UF Calculations: Wt to lose:500 ml(+) Oral:  ml(+) IV Meds/Fluids/Blood prods  ml(+) Prime/Rinse 500 ml 
(=)Total UF Goal 1000 mL Scale Type:[x] Bed scale [] Sling Scale [] Wheel Chair Scale [x]  Not Ordered [] Unable to obtain pt on stretcher Tx Initiation Note: right IJ catheter accessed and treatment started without complication  
[x] Time Out/Safety Check  Time: 7517 Lizeth Signature/Title_Shiraz Ernst INTRADIALYTIC MONITORING 
(SEE ATTACHED FLOWSHEET) POST TREATMENT Time 1845 B/P 150/80 HR 72 Temp 98.2 Resp. Rate 18 Post wt Time Medication Dose Volume Route Initials [facility] Signatures Title Initials Time 52 [facility] RN PL [id number] Alfredoita Signature/Title:_Shiraz Ernst_ Dialyzer cleared: [] Good [] Fair [] Poor    Blood Processed 62 Liters Net UF Removed 1000 mL Post Tx Access: AVF/AVG: Bleeding Stop                   Art. min Ashok min []+bruit/thrill Catheter: Locking Solution heparin     Art. 2.0 ml Ashok 2.1 ml 
 
Post Assessment:  Skin: []Warm []Dry []Diaphoretic []Flushed [] Pale []Cyanotic Lungs: [x]Clear []Coarse []Crackles []Wheezing Cardiac: [x]Regular []Irregular  []Monitored rhythm Edema: []None [x]General []Facial []Pedal  []UE []LE []RIGHT []LEFT    []Bilateral  
   Pain: [x]0 []1[]2 []3 []4 []5 []6 []7 []8 []9 []10  
POST Tx Note:removed 500cc as per order catheter locked with heparin and patient transported back to room Primary Nurse Report: First initial/Last name/Title Post Dialysis:_NIGEL Tirado_         Time:_1900_ Abbreviations: AVG-arterial venous graft, AVF-arterial venous fistula, IJ-Internal Jugular, Subcl-Subclavian, Fem-Femoral, Tx-treatment, AP/HR-apical heart rate, DFR-dialysate flow rate, BFR-blood flow rate, AP-arterial pressure, -venous pressure, UF-ultrafiltrate, TMP-transmembrane pressure, Ashok-Venous, Art-Arterial, RO-Reverse Osmosis

## 2018-10-09 NOTE — PROGRESS NOTES
Patient received in bed awake. A&Ox4, denies pain and discomfort. No distress noted. Frequently use items within reach. Bed locked in low position. Call bell within reach and Patient verbalized understanding of use for assistance and needs. 36- Patient's wife called said to have Dr. Halle Hernandez call [phone]  to speak with Dr. Gary Grubbs. Dr. Halle Hernandez to floor made aware said that [address] (DON) assisting. 1315- Patient upset about Suboxone said that he would leave AMA when his wife returns because he doesn't want to go into withdrawals. Laurence Bernstein (Nurse Manager) to nursing station already addressing issue with Suboxone.

## 2018-10-09 NOTE — ANESTHESIA POSTPROCEDURE EVALUATION
Post-Anesthesia Evaluation and Assessment Patient: Blossom Root MRN: [id number]  SSN: [id number] YOB: 1970  Age: 46 y.o. Sex: male Data from PACU flowsheet Cardiovascular Function/Vital Signs Visit Vitals  /72  Pulse (!) 47  Temp 36.6 °C (97.8 °F)  Resp 16  
 Ht 5' 10\" (1.778 m)  Wt 118.4 kg (261 lb)  SpO2 95%  BMI 37.45 kg/m2 Patient is status post anesthesia Nausea/Vomiting: controlled Postoperative hydration reviewed and adequate. Pain: 
Managed Neurological Status: At baseline Mental Status and Level of Consciousness: Alert and oriented Pulmonary Status:  
Adequate oxygenation and airway patent Complications related to anesthesia: None Post-anesthesia assessment completed. No concerns Signed By: Stevie Bartholomew CRNA October 9, 2018

## 2018-10-09 NOTE — BRIEF OP NOTE
TRANSFER - OUT REPORT: 
 
Verbal report given to Monika Muff on Jaqueline Nicole  being transferred to [facility](unit) for routine post - op Report consisted of patients Situation, Background, Assessment and  
Recommendations(SBAR). Information from the following report(s) SBAR and Procedure Summary was reviewed with the receiving nurse. Lines:  
Peripheral IV 10/06/18 Anterior; Left Hand (Active) Site Assessment Clean, dry, & intact 10/9/2018 12:25 PM  
Phlebitis Assessment 0 10/9/2018 12:25 PM  
Infiltration Assessment 0 10/9/2018 12:25 PM  
Dressing Status Clean, dry, & intact 10/9/2018 12:25 PM  
Dressing Type Tape;Transparent 10/9/2018 12:25 PM  
Hub Color/Line Status Blue; Infusing 10/9/2018 12:25 PM  
Action Taken Open ports on tubing capped 10/9/2018  1:20 AM  
Alcohol Cap Used Yes 10/9/2018  1:20 AM  
  
 
Opportunity for questions and clarification was provided. Patient transported with: 
 [employer]

## 2018-10-09 NOTE — PROCEDURES
Endoscopy Procedure Note Patient: David Coreas MRN: [id number]  SSN: [id number] YOB: 1970  Age: 46 y.o. Sex: male Date/Time:  10/9/2018 12:10 PMEsophagogastroduodenoscopy (EGD) Procedure Note Procedure: Esophagogastroduodenoscopy with biopsy IMPRESSION:  
1. Diffuse gastritis; biopsied 2. Small amount of retained food in the stomach 3. Otherwise unremarkable examination RECOMMENDATIONS: 
1. Diet as tolerated 2. Continue protonix daily 3. Await biopsies 4. Follow-up as an outpatient with Dr Caitlin Fall 5. Please call with additional questions or concerns Indication: Vomiting, persitent of unclear etilogy (now improved) :  Dana Brown MD 
Assistants: Endoscopy Technician-1: Colt Nichols Endoscopy RN-1: Loyda Stallings RN Referring Provider:   Terry Stubbs MD 
History: The history and physical exam were reviewed and updated. Endoscope: Olympus GIF-190 diagnostic endoscope Extent of Exam: third portion of the duodenum ASA: Per Anesthesia Anethesia/Sedation:  MAC anesthesia Description of the procedure: The procedure was discussed with the patient including risks, benefits, alternatives including risks of iv sedation, bleeding, perforation and aspiration. A safety timeout was performed. The patient was placed in the left lateral decubitus position. A bite block was placed. The patient was given incremental doses of intravenous sedation until moderate sedation was achieved. The patients vital signs were monitored at all times including heart rate/rhythm, blood pressure and oxygen saturation. The endoscope was then passed under direct visualization to the third portion of the duodenum. The endoscope was then slowly withdrawn while visualizing the mucosa. In the stomach a retroflexion was performed and gastric fundus and cardia visualized.   The endoscope was then slowly withdrawn. The patient was then transferred to recovery in stable condition. Findings:  
 Esophagus: The esophageal mucosa was normal with no ulceration, mass or stricture. There was no evidence of Velarde's esophagus or reflux esophagitis. Stomach: There was diffuse gastritis noted. In the antrum, there were linear erythematous streaks with nodular mucosa and few erosions without loulou ulceration. In the body, there was an erythematous mosaic pattern with mild adherent exudate. Biopsies were takne form the antrum, body, and incisura for Hpylori and from the body along the mosaic patterned mucosa. No ulceration, mass, stricture. Duodenum: The duodenum mucosa was normal with no ulceration, mass, stricture and no evidence of villous atrophy. Therapies:  None Specimens:  
ID Type Source Tests Collected by Time Destination 1 : bx r/o H. Pylori  Preservative Stomach, Antrum  Xiao Hein MD 10/9/2018 1204 Pathology 2 : bx r/o gastritis  Preservative Stomach, Body  Xiao Hein MD 10/9/2018 1205 Pathology Complications:   None; patient tolerated the procedure well. EBL:Minimal 
 
Discharge disposition:  Return to the floor Xiao Hein MD 
October 9, 2018 12:10 PM

## 2018-10-09 NOTE — PROGRESS NOTES
1945: Assumed patient care. Received report from Allen Correa, [facility] (offgoing nurse). Report included SBAR, Kardex, and MAR. Patient resting in bed, asleep at this time, in no signs of pain or distress. Call light and possessions within reach, bed in lowest setting. 2049: Zofran administered for complaints of nausea 2110: Patient's heart rate 59, patient states he has not taken suboxone  In 24 hours and is concerned of the withdrawal effects which in the past have increases and decreases in heart rate and blood pressure. This has already occurred based on 2017 vital signs compared to 2101 vital signs. Dr. Edis Green paged to consult on appropriateness of hydralazine. 2115: Spoke with Dr. Edis Green, informed doctor of patient's statement of withdrawal effects of heart rate and blood pressure, the fact that patient has not taken suboxone today, and most recent two sets of vital signs (2017 and 2101). Doctor stated it is appropriate to give hydralazine. Repeat back provided 
 
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

## 2018-10-09 NOTE — PROGRESS NOTES
Patient is not in the room- undergoing egd. Plan is to dialyze for second time later today. Will f/u tomorrow. Plan for kidney biopsy by the end of the week unless kidney function is better.

## 2018-10-09 NOTE — PROGRESS NOTES
Hospitalist Progress Note Internal medicine/ Hospitalist 
 
Daily Progress Note: 10/9/2018    
Interval history / Subjective:  
Unruly Patten is a 46 y.o.  male with h/o AICD,cardiac arrest,VT,ischemic cardiomyopathy EF 30%,obesity,cad,presented to ED because of weakness,nausea,vomting,abdominal pain,hematuria. Patient says that he started feeling sick 2 weeks ago when he started feeling weak. Then he started vomiting. Could not tolerate even water. In recent days ,he has had abdominal pain which he attributed to the ongoing vomiting. Patient also reported that he has seen his urine turning red for a while. Had to see his doctor in two weeks but felt very weak and decided to come to the ED. In ED,lab showed creatinine 11.0 compared to 2.12 on 1/11/2018 and 8.01 on 9/28/18. UA showed large blood,bacteria+. CT abd/pelvis w/o hydronephrosis or urolithiasis. Patient admitted for OLIVIA,Hematuria,UTI. Started on iv fluid. Nephrolohy consulted from ER. Since patient has GI complaint and renal involvement,nephrology has indicated that Henoch-Schonlein syndrome needs to be considered. Pt is on iv fluid. IgA elevated 648. Other serology for vasculitis including nathaniel,GBM ab,ANCA,C3,C4,heptitis profile not abnormal.Nephrology has indicated that if creatinine continue to rise over the weekend,patient will need dialysis. Today creat up to 12.00. Patient may also need kidney biopsy. Cardiology consulted significant cardiac history anticipating that he will have dialysis. Cardiology has determined that his cardiac status is stable. GI saw patient on 10/6 for ongoing symptoms and decided to do EGD next week. 10/8, placed TDC catheter. EGD decided to be done inpatient and completed on 10/9 , findings of chronic gastritis. Will need OP f/u. Current Facility-Administered Medications Medication Dose Route Frequency  0.9% sodium chloride infusion  50 mL/hr IntraVENous DIALYSIS PRN  
 heparin (porcine) 1,000 unit/mL injection 1,000 Units  1,000 Units InterCATHeter DIALYSIS PRN  pantoprazole (PROTONIX) tablet 40 mg  40 mg Oral ACB  hydrALAZINE (APRESOLINE) tablet 50 mg  50 mg Oral TID  calcitRIOL (ROCALTROL) capsule 0.5 mcg  0.5 mcg Oral DAILY  promethazine (PHENERGAN) tablet 25 mg  25 mg Oral Q8H PRN  
 amLODIPine (NORVASC) tablet 10 mg  10 mg Oral DAILY  calcium acetate (PHOSLO) capsule 1,334 mg  2 Cap Oral TID WITH MEALS  influenza vaccine 2018- (6 mos+)(PF) (FLUARIX QUAD/FLULAVAL QUAD) injection 0.5 mL  0.5 mL IntraMUSCular PRIOR TO DISCHARGE  isosorbide mononitrate ER (IMDUR) tablet 90 mg  90 mg Oral DAILY  amiodarone (CORDARONE) tablet 400 mg  400 mg Oral DAILY  atorvastatin (LIPITOR) tablet 40 mg  40 mg Oral QHS  fluticasone (FLONASE) 50 mcg/actuation nasal spray 2 Spray  2 Spray Both Nostrils DAILY  lidocaine 4 % patch 1 Patch  1 Patch TransDERmal Q24H  
 tiotropium (SPIRIVA) inhalation capsule 18 mcg  1 Cap Inhalation DAILY  ondansetron (ZOFRAN) injection 4 mg  4 mg IntraVENous Q4H PRN  
 albuterol-ipratropium (DUO-NEB) 2.5 MG-0.5 MG/3 ML  3 mL Nebulization Q4H PRN  
 buprenorphine-naloxone (SUBOXONE) 4mg-1mg SL film (Patient Supplied)  1 Film SubLINGual BID Feeling weak all over, + Nausea Objective:  
 
Visit Vitals  /90 (BP 1 Location: Right arm, BP Patient Position: At rest)  Pulse 62  Temp 98.3 °F (36.8 °C)  Resp 16  
 Ht 5' 10\" (1.778 m)  Wt 118.4 kg (261 lb)  SpO2 95%  BMI 37.45 kg/m2 O2 Device: Room air Temp (24hrs), Av.2 °F (36.8 °C), Min:97.7 °F (36.5 °C), Max:98.8 °F (37.1 °C) 
 
 
10/09 0701 - 10/09 1900 In: 26 [P.O.:360; I.V.:100] Out: 200 [Urine:200] 10/07 1901 - 10/09 0700 In: 240 [P.O.:240] Out: 1050 [Urine:550] PHYSICAL EXAM:  
General:                    Sitting in bed,eating breakfast.Morbid obesity. HEENT:                     Pupils equal.  Sclera anicteric. Conjunctiva pink. Mouth moist. 
 Neck:                                   Supple,no jvd. CV:                  Regular rate and rhythm. Lungs:                       Clear to auscultation bilaterally. No Wheezing or Rhonchi. No rales. Abdomen:                  Soft, non-tender. Not distended. Bowel sounds normal. No organomegaly Extremities:               No cyanosis. No edema. No clubbing Neurologic:                Alert and oriented X 3. Skin:                Warm and dry. No rashes.  
  
  
 
Data Review Recent Results (from the past 12 hour(s)) RENAL FUNCTION PANEL Collection Time: 10/09/18  7:45 AM  
Result Value Ref Range Sodium 140 136 - 145 mmol/L Potassium 4.2 3.5 - 5.5 mmol/L Chloride 103 100 - 108 mmol/L  
 CO2 26 21 - 32 mmol/L Anion gap 11 3.0 - 18 mmol/L Glucose 96 74 - 99 mg/dL BUN 52 (H) 7.0 - 18 MG/DL Creatinine 9.43 (H) 0.6 - 1.3 MG/DL  
 BUN/Creatinine ratio 6 (L) 12 - 20 GFR est AA 7 (L) >60 ml/min/1.73m2 GFR est non-AA 6 (L) >60 ml/min/1.73m2 Calcium 7.2 (L) 8.5 - 10.1 MG/DL Phosphorus 6.2 (H) 2.5 - 4.9 MG/DL Albumin 1.9 (L) 3.4 - 5.0 g/dL HGB & HCT Collection Time: 10/09/18  7:45 AM  
Result Value Ref Range HGB 8.7 (L) 13.0 - 16.0 g/dL HCT 26.9 (L) 36.0 - 48.0 % Assessment/Plan: Active Problems: 
  Obesity (1/29/2012) HTN (hypertension) (7/24/2013) Acute on chronic renal failure ([facility] [address] (7/24/2013) JESSICA (obstructive sleep apnea) (12/17/2013) Cardiomyopathy ([facility] [address] (4/23/2014) Overview: EF 25 to 39% this admission ICD (implantable cardioverter-defibrillator) discharge (10/29/2015) Overview: 20 shocks, battery life 8.5 years CAD (coronary artery disease) (10/29/2015) Overview: S/P PCI LAD Bacteria in urine (10/3/2018) Metabolic acidosis (73/2/6209) Care Plan 1. Acute on chronic renal failure/Metabolic acidosis - creatinine 11.00 POA 
 -Etiology:volume depletion -?acute GN as pt also hematuria. -May need kidney biopsy. Plavix on hold 
 -Serology:IgA elevated. Other normal such as MANUELITO,ANCA,C3,C4,Hep profile. -s/p [facility] nephrology to schedule dialysis.   
  
2.Bacteria in urine/UTI/Hematuria 
 -Ceftriaxone iv - completed 
 -Blood cx ng/Ucx negative. -Vasculitis serology non-revealing 3. Secondary hyperparathyroidism  
 -On calcitriol 
  4. Nausea/vomiting/abdominal pain 
 -CT reviewed,no acute process. -GI, [facility] saw patient on 10/6 - EGD today 10/9, chronic gastritis 
  
5. HTN 
 -Hydralazine prn for sbp>160 
  
6. JESSICA (obstructive sleep apnea) 
 -On oxygen 
  
7.Cardiomyopathy EF 25-30% 8. ICD (implantable cardioverter-defibrillator) discharge 9. CAD (coronary artery disease) 
 -Continue routine meds 
 -Telemetry monitoring 
 -Cardiology saw patient on 10/6 and determined that patient's cardiac status is stable and advised to continue his home meds.

## 2018-10-09 NOTE — PROGRESS NOTES
Problem: Falls - Risk of 
Goal: *Absence of Falls Document Gary Ovalle Fall Risk and appropriate interventions in the flowsheet. Outcome: Progressing Towards Goal 
Fall Risk Interventions: 
  
 
  
 
Medication Interventions: Patient to call before getting OOB History of Falls Interventions: Door open when patient unattended

## 2018-10-10 LAB
ALBUMIN SERPL-MCNC: 1.9 G/DL (ref 3.4–5)
ANION GAP SERPL CALC-SCNC: 9 MMOL/L (ref 3–18)
BUN SERPL-MCNC: 31 MG/DL (ref 7–18)
BUN/CREAT SERPL: 5 (ref 12–20)
CALCIUM SERPL-MCNC: 7.1 MG/DL (ref 8.5–10.1)
CHLORIDE SERPL-SCNC: 102 MMOL/L (ref 100–108)
CO2 SERPL-SCNC: 27 MMOL/L (ref 21–32)
CREAT SERPL-MCNC: 6.66 MG/DL (ref 0.6–1.3)
GLUCOSE SERPL-MCNC: 118 MG/DL (ref 74–99)
PHOSPHATE SERPL-MCNC: 4.6 MG/DL (ref 2.5–4.9)
POTASSIUM SERPL-SCNC: 3.7 MMOL/L (ref 3.5–5.5)
SODIUM SERPL-SCNC: 138 MMOL/L (ref 136–145)

## 2018-10-10 PROCEDURE — 77030029684 HC NEB SM VOL KT MONA -A

## 2018-10-10 PROCEDURE — 74011250637 HC RX REV CODE- 250/637: Performed by: INTERNAL MEDICINE

## 2018-10-10 PROCEDURE — 65660000000 HC RM CCU STEPDOWN

## 2018-10-10 PROCEDURE — 74011250636 HC RX REV CODE- 250/636: Performed by: HOSPITALIST

## 2018-10-10 PROCEDURE — 74011000250 HC RX REV CODE- 250: Performed by: INTERNAL MEDICINE

## 2018-10-10 PROCEDURE — 90935 HEMODIALYSIS ONE EVALUATION: CPT

## 2018-10-10 PROCEDURE — 80069 RENAL FUNCTION PANEL: CPT | Performed by: INTERNAL MEDICINE

## 2018-10-10 PROCEDURE — 74011250637 HC RX REV CODE- 250/637: Performed by: HOSPITALIST

## 2018-10-10 RX ADMIN — HYDRALAZINE HYDROCHLORIDE 50 MG: 50 TABLET, FILM COATED ORAL at 16:00

## 2018-10-10 RX ADMIN — PROMETHAZINE HYDROCHLORIDE 25 MG: 25 TABLET ORAL at 14:41

## 2018-10-10 RX ADMIN — CALCIUM ACETATE 1334 MG: 667 CAPSULE ORAL at 17:00

## 2018-10-10 RX ADMIN — CALCIUM ACETATE 1334 MG: 667 CAPSULE ORAL at 14:18

## 2018-10-10 RX ADMIN — LORAZEPAM 1 MG: 2 INJECTION, SOLUTION INTRAMUSCULAR; INTRAVENOUS at 05:46

## 2018-10-10 RX ADMIN — Medication 10 ML: at 06:00

## 2018-10-10 RX ADMIN — Medication 10 ML: at 14:00

## 2018-10-10 RX ADMIN — LORAZEPAM 1 MG: 1 TABLET ORAL at 18:26

## 2018-10-10 RX ADMIN — PROMETHAZINE HYDROCHLORIDE 25 MG: 25 TABLET ORAL at 04:09

## 2018-10-10 RX ADMIN — HYDRALAZINE HYDROCHLORIDE 50 MG: 50 TABLET, FILM COATED ORAL at 23:30

## 2018-10-10 RX ADMIN — MORPHINE SULFATE 2 MG: 4 INJECTION INTRAVENOUS at 05:04

## 2018-10-10 RX ADMIN — IPRATROPIUM BROMIDE AND ALBUTEROL SULFATE 3 ML: .5; 3 SOLUTION RESPIRATORY (INHALATION) at 20:37

## 2018-10-10 RX ADMIN — LORAZEPAM 2 MG: 2 INJECTION, SOLUTION INTRAMUSCULAR; INTRAVENOUS at 00:54

## 2018-10-10 RX ADMIN — CALCITRIOL 0.5 MCG: 0.25 CAPSULE ORAL at 14:21

## 2018-10-10 RX ADMIN — LORAZEPAM 2 MG: 1 TABLET ORAL at 14:40

## 2018-10-10 RX ADMIN — OXYCODONE HYDROCHLORIDE 10 MG: 5 TABLET ORAL at 04:09

## 2018-10-10 RX ADMIN — HYDRALAZINE HYDROCHLORIDE 50 MG: 50 TABLET, FILM COATED ORAL at 14:20

## 2018-10-10 RX ADMIN — ATORVASTATIN CALCIUM 40 MG: 40 TABLET, FILM COATED ORAL at 20:30

## 2018-10-10 RX ADMIN — MORPHINE SULFATE 2 MG: 4 INJECTION INTRAVENOUS at 14:11

## 2018-10-10 RX ADMIN — OXYCODONE HYDROCHLORIDE 10 MG: 5 TABLET ORAL at 14:40

## 2018-10-10 RX ADMIN — AMLODIPINE BESYLATE 10 MG: 5 TABLET ORAL at 14:19

## 2018-10-10 RX ADMIN — AMIODARONE HYDROCHLORIDE 400 MG: 200 TABLET ORAL at 14:19

## 2018-10-10 RX ADMIN — FLUTICASONE PROPIONATE 2 SPRAY: 50 SPRAY, METERED NASAL at 14:23

## 2018-10-10 RX ADMIN — Medication 10 ML: at 23:31

## 2018-10-10 RX ADMIN — ISOSORBIDE MONONITRATE 90 MG: 30 TABLET, EXTENDED RELEASE ORAL at 14:20

## 2018-10-10 RX ADMIN — OXYCODONE HYDROCHLORIDE 10 MG: 5 TABLET ORAL at 20:30

## 2018-10-10 RX ADMIN — TIOTROPIUM BROMIDE 18 MCG: 18 CAPSULE ORAL; RESPIRATORY (INHALATION) at 14:26

## 2018-10-10 NOTE — PROGRESS NOTES
0730: Bedside and Verbal shift change report given to Linette Victoria RN (oncoming nurse) by Yael Heath RN (offgoing nurse). Report included the following information SBAR, Kardex and MAR.

## 2018-10-10 NOTE — ROUTINE PROCESS
. 
Acute HEmodialysis flow sheet Patient information Marybeth Ballard MRN: [id number]      BVQ:[id number]  [place]# REPS#[id number] Hospital: [place] [address] DX: unknown  
       []1st Time Acute  []Stat[x] Routine []Urgent []Chronic Unit  
       []Acute Room []Bedside  []ICU/CCU []ER Isolation Precautions: [x]Dialysis[] Airborne []Contact []Droplet []Reverse Special Considerations:    [] Blood Consent Verified  [x]N/A Allergies: 
[] NKA  [x] Reviewed Allergies Allergen Reactions  Latex Other (comments)  
  blisters  Other Food Hives Allergic to tomatoes and tomato sauce  Codeine Nausea and Vomiting Code Status [x]Full Code [] DNR  []Other Diet: renal Diabetic: []Yes []No 
  
 Hemodialysis Orders Physician: Elvi Chaudhary Dialyzer Revaclear 300 Duration 3.5 BFR: 300 DFR:600 Dialysate: K 3 CA 2.5 Na 140 Bicarb 35 Dry Weight: UF Goal: 500 ml Heparin:  []Bolus   Units    []Hourly  Units      [x]None BP Tx:  []NS  200ml/Bolus    []Other     [x]N/A  
    Labs: Pre:  Post: [x]N/A Additional Orders: []N/A [x]Signed Treatment Consent   [x] Verified   [] Obtained Primary Nurse Report: First initial/ Last Name/Title Pre Dialysis: JASMIN Mosquera    Time: 900 Access CATHETER ACCESS:  [] N/A  [x] RIGHT  [] LEFT  [x] IJ  [] SUBCL [] FEM [] First use X-ray  [] Tunnel     [] Non-Tunneled [x] No S/S infection  [] Redness [] Drainage  [] Cultured [] Swelling 
                       [] Pain  
                       [x] Medical Aseptic [] Prep Dressing Changed  
                       [] Clotted [x] Patent []      Flows: [x] Good [] Poor [] Reversed          If Access Problem Dr. Daniel Cedeno: [] Yes [] No    Date:    [x] N/A  
     GRAFT/FISTULA ACCESS:  [x] N/A  [] RIGHT  [] LEFT  [] UE  [] LE   
 [] AVG  [] AVF [] BUTTONHOLE  [] +BRUIT/THRILL 
     [] MEDICAL ASEPTIC PREP [] No S/S infection  [] Redness [] Drainage  [] Cultured [] Swelling 
                       [] Pain Needle Gauge                              Length: If Access Problem Dr. Ag Ball: [] Yes [] No    Date:     [] N/A General Assessment LUNGS:  SaO2%  [x] Clear [] Coarse [] Crackles [] Wheezing  
            [] Diminished Location: [] RLL [] LLL [] RUL [] NIKOS   
     COUGH:  [] Productive [] Dry [x] N/A  RESPIRATIONS: [x] Easy [] Labored THERAPY: [x] RA   [] NC     L/min    Mask: [] NRB [] Venti  O2%    
             [] Ventilator [] Intubated [] Trach [] BiPap [] CPap CARDIAC: [x] Regular [] Irregular [] Pericardial Rub [] JVD [] Monitored Rhythm: EDEMA: [] None [x] Generalized [] Facial [] Pedal [] UE [] LE 
           [] Pitting [] 1 [] 2 [] 3 [] 4    [] Right [] Left [] Bilateral  
    SKIN:    [x] Warm [] Hot [] Cold  [] Dry [] Pale [] Diaphoretic  
           [] Flushed [] Jaundiced [] Cyanotic [] Rash [] Weeping LOC:    [x] Alert  Oriented to: [x] Person [x] Place [x] Time  
          [] Confused [] Lethargic [] Medically sedated [] Non-responsive GI/ABDOMEN: [] Flat [x] Distended [x] Soft [] Firm [] Obese [] Diarrhea 
 [] Bowel Sounds     []Nausea [] Vomiting PAIN: [x] 0 [] 1 [] 2 [] 3 [] 4 [] 5 [] 6 [] 7 [] 8 [] 9 [] 10 Scale 1-10 Action/Follow Up MOBILITY: [] Amb [] Amb/Assist [x] Bed  [] Wheelchair CUrrent LABS HBsAg ONLY: Date Drawn 10/6/18 [x] Negative [] Positive  [] Unknown. HBsAb: Date Drawn 10/6/18 [x] Susceptible <10 [] Immune ?10 [] Unknown Date of Current Labs:  
    
Lab Results Component Value Date/Time  Sodium 138 10/10/2018 05:19 AM  
 Potassium 3.7 10/10/2018 05:19 AM  
 Chloride 102 10/10/2018 05:19 AM  
 CO2 27 10/10/2018 05:19 AM  
 BUN 31 (H) 10/10/2018 05:19 AM  
 Creatinine 6.66 (H) 10/10/2018 05:19 AM  
 Calcium 7.1 (L) 10/10/2018 05:19 AM  
 Magnesium 2.0 12/27/2015 04:35 AM  
 Phosphorus 4.6 10/10/2018 05:19 AM  
 HCT 26.9 (L) 10/09/2018 07:45 AM  
 HGB 8.7 (L) 10/09/2018 07:45 AM  
 PLATELET 369 20/63/4455 04:20 AM  
 INR 1.2 10/08/2018 05:21 AM  
  
Education Person Educated: [x] Patient [] Family [] Other Knowledge base: [x] None [] Minimal [] Substantial   
     Barriers to learning  [x] None Preferred method of learning: [] Written [x] Oral [] Visual [] Hands on Topic: [] Access Care [] S&S of infection [] Fluid Management [] K+ 
               [x] Procedural    [] Albumin [] Medications [] Tx Options [] Transplant 
                [] Diet [] Other Teaching Tools: [x] Explain [] Demonstration [] Handout [] Teachback Ro/hemodiaylsis machine safety checks- before each treatment Machine Serial #12 [] # 1 [id number] [] # 2 [id number] [] # 3 [id number] [] # 4 [id number] [] # 5 [id number] [] # [phone] [] # [phone] Alarm Test: [x] Pass Time        RO Serial # 12 
[] [id number] (Large dual station RO) [] # 1  [id number]  (Portable # 1) [] # 2  [id number]  (Portable # 2) [] # 3  [id number]  (Portable # 3) [] # 4  [id number]  (Portable # 4) [] # 5  [id number]  (Portable # 5) Lot #s: Dialyzer [id number] exp. Tubing [id number] exp. Saline -jt exp. [x] RO/Machine Log Complete    [x] Extracorporeal circuit Tested for integrity Dialysate: pH 7.4 Temp. 36  Conductivity: Meter 14 HD Machine 14  
chlorine testing- before each treatment and every 4 hours Total Chlorine: [x] Less than 0.1 ppm Time: 930 2nd Check Time: 2864 (If greater than 0.1 ppm from Primary then every 30 minutes from Secondary) treatment Iniation-with dialysis precautions [x] All Connections Secured   [x] Saline Line Double Clamped [x] Venous Parameters Set    [x] Arterial Parameters Set    [x] Prime Given 250 ml            [x] Air Foam Detector Engaged Lizeth Nurse Signature/Title_Shiraz Ernst__ Pre-Treatment Time 930 B/P 145/66 HR 54 Temp. 97.6 Resp Rate 18 Pre Wt  
UF Calculations: Wt to lose:500 ml(+) Oral:  ml(+) IV Meds/Fluids/Blood prods  ml(+) Prime/Rinse 500 ml 
(=)Total UF Goal 1000 mL Scale Type:[] Bed scale [] Sling Scale [] Wheel Chair Scale [x]  Not Ordered [] Unable to obtain pt on stretcher Tx Initiation Note: right IJ catheter accessed and treatment started without complication  
[x] Time Out/Safety Check  Time: 930 Lizeth Signature/Title_Shiraz Ernst INTRADIALYTIC MONITORING 
(SEE ATTACHED FLOWSHEET) POST TREATMENT Time 1330 B/P 169/66 HR 61 Temp 97.6 Resp. Rate 18 Post wt Time Medication Dose Volume Route Initials [facility] Signatures Title Initials Time 52 [facility] RN [address] Alfredoita Signature/Title:_Shiraz Ernst_ Dialyzer cleared: [] Good [x] Fair [] Poor    Blood Processed 63 Liters Net UF Removed 500 mL Post Tx Access: AVF/AVG: Bleeding Stop                   Art. min Ashok min []+bruit/thrill Catheter: Locking Solution heparin     Art. 2.0 ml Ashok 2.1 ml 
 
Post Assessment:  Skin: [x]Warm []Dry []Diaphoretic []Flushed [] Pale []Cyanotic Lungs: [x]Clear []Coarse []Crackles []Wheezing Cardiac: [x]Regular []Irregular  []Monitored rhythm Edema: []None [x]General []Facial []Pedal  []UE []LE []RIGHT []LEFT    []Bilateral  
   Pain: [x]0 []1[]2 []3 []4 []5 []6 []7 []8 []9 []10  
POST Tx Note:removed 500cc locked catheter as per order patient transported back to room Primary Nurse Report: First initial/Last name/Title Post Dialysis:_J PINNIx_         Time:_1330_ Abbreviations: AVG-arterial venous graft, AVF-arterial venous fistula, IJ-Internal Jugular, Subcl-Subclavian, Fem-Femoral, Tx-treatment, AP/HR-apical heart rate, DFR-dialysate flow rate, BFR-blood flow rate, AP-arterial pressure, -venous pressure, UF-ultrafiltrate, TMP-transmembrane pressure, Ashok-Venous, Art-Arterial, RO-Reverse Osmosis

## 2018-10-10 NOTE — PROGRESS NOTES
Assumed patient care from [address]. Received patient asleep. No s/s of pain/ discomfort noted. . Bed is locked and in lowest position and call bell is within reach. Not in acute distress.

## 2018-10-10 NOTE — PROGRESS NOTES
Problem: Falls - Risk of 
Goal: *Absence of Falls Document April Mina Fall Risk and appropriate interventions in the flowsheet. Outcome: Progressing Towards Goal 
Fall Risk Interventions: 
  
 
  
 
Medication Interventions: Patient to call before getting OOB History of Falls Interventions: Door open when patient unattended

## 2018-10-10 NOTE — PROGRESS NOTES
[address] [place], [place] [address]ty [facility] Hospitalist Division Inpatient Daily Progress Note Daily progress Note Patient: Wale Urbina MRN: [id number]  [facility]: [id number] YOB: 1970  Age: 46 y.o. Sex: male DOA: 10/3/2018 LOS:  LOS: 7 days Chief Complaint:  Acute on chronic renal failure Interval History: 46 y.o.  male with h/o AICD,cardiac arrest,VT,ischemic cardiomyopathy EF 30%,obesity,cad,presented to ED because of weakness,nausea,vomting,abdominal pain,hematuria. Patient says that he started feeling sick 2 weeks ago when he started feeling weak. Then he started vomiting. Could not tolerate even water. In recent days ,he has had abdominal pain which he attributed to the ongoing vomiting. Patient also reported that he has seen his urine turning red for a while. Had to see his doctor in two weeks but felt very weak and decided to come to the ED. In ED,lab showed creatinine 11.0 compared to 2.12 on 1/11/2018 and 8.01 on 9/28/18. UA showed large blood,bacteria+. CT abd/pelvis w/o hydronephrosis or urolithiasis. Patient admitted for OLIVIA,Hematuria,UTI. Started on iv fluid. Nephrolohy consulted from ER. Since patient has GI complaint and renal involvement,nephrology has indicated that Henoch-Schonlein syndrome needs to be considered. Pt is on iv fluid. IgA elevated 648. Other serology for vasculitis including nathaniel,GBM ab,ANCA,C3,C4,heptitis profile not abnormal.Nephrology has indicated that if creatinine continue to rise over the weekend,patient will need dialysis. Today creat up to 12.00. Patient may also need kidney biopsy. Cardiology consulted significant cardiac history anticipating that he will have dialysis. Cardiology has determined that his cardiac status is stable. GI saw patient on 10/6 for ongoing symptoms. TDC catheter placed on 10/8. EGD on 10/9 showed chronic gastritis. HD on 10/9 and 10/10, next HD session on 10/12. Assessment/Plan: Patient Active Problem List  
Diagnosis Code  ST elevation (STEMI) myocardial infarction involving left anterior descending coronary artery ([facility]) I21.02  
 Cardiac arrest - ventricular fibrillation  HTN (hypertension) I10  
 Hypertensive emergency I16.1  Hyperglycemia R73.9  Acute on chronic renal failure (HCC) N17.9, N18.9  Leukocytosis D72.829  
 Acute pulmonary edema ([facility]) J81.0  Contrast dye induced nephropathy N14.1, T50.8X5A  
 CKD (chronic kidney disease) N18.9  Arachnoiditis G03.9  Postlaminectomy syndrome, lumbar region M96.1  Psoriasis L40.9  Degeneration of lumbar or lumbosacral intervertebral disc M51.37  
 Encounter for long-term (current) use of high-risk medication Z79.899  Obesity E66.9  
 Pain in joint, lower leg M25.569  Psoriatic arthropathy ([facility] L40.50  Chronic low back pain M54.5, G89.29  Chronic pain syndrome G89.4  Lumbosacral spondylosis without myelopathy M47.817  Enthesopathy of hip M76.899  
 Sacroiliitis ([facility]) M46.1  Coronary artery disease I25.10  
 JESSICA (obstructive sleep apnea) G47.33  
 UTI (lower urinary tract infection) N39.0  Cardiomyopathy ([facility] I42.9  AICD (automatic cardioverter/defibrillator) present Z95.810  
 OLIVIA (acute kidney injury) ([facility] N17.9  Renal failure N19  
 Chest pain R07.9  
 SOB (shortness of breath) R06.02  Bronchitis J40  Acute renal failure (ARF) ([facility]) N17.9  ARF (acute renal failure) ([facility]) N17.9  Severe sepsis ([facility]) A41.9, R65.20  Epigastric abdominal pain R10.13  
 Anxiety F41.9  Ventricular tachycardia ([facility]) I47.2  Ventricular fibrillation ([facility]) I49.01  
 ICD (implantable cardioverter-defibrillator) discharge Z45.02  
 CAD (coronary artery disease) I25.10  Obesity, morbid ([facility] E66.01  
 Bacteria in urine S64.88  
 Metabolic acidosis S60.9 A/P: 
Acute on chronic renal failure/Metabolic acidosis  
- nephrology following, appreciate assistance - creatinine 11.00 POA 
 -Etiology:volume depletion -?acute GN as pt also hematuria. -May need kidney biopsy. Plavix on hold 
 -Serology:IgA elevated. Other normal such as MANUELITO,ANCA,C3,C4,Hep profile. -s/p [facility] 10/8   
- nephrology to schedule dialysis - sp dialysis 10/9 with 500 ml fluid removed, HD today and next on Friday 
- plan for kidney biopsy at end of week unless kidney function is better- per nephrology 
   
Bacteria in urine/UTI/Hematuria 
 -Ceftriaxone iv - completed 
 -Blood cx ng/Ucx negative. -Vasculitis serology non-revealing 
  
Secondary hyperparathyroidism  
 -On calcitriol 
   
Nausea/vomiting/abdominal pain 
 -CT reviewed,no acute process. -GI, [facility] saw patient on 10/6 - EGD 10/9, chronic gastritis 
   
HTN 
 -Hydralazine prn for sbp>160 
   
JESSICA (obstructive sleep apnea) 
 -On oxygen 
   
Cardiomyopathy 
- EF 25-30% - ICD (implantable cardioverter-defibrillator) discharge CAD (coronary artery disease) 
 -Continue routine meds 
 -Telemetry monitoring 
 -Cardiology saw patient on 10/6 and determined that patient's cardiac status is stable and advised to continue his home meds. DVT Prophylaxis - SCD's BLE Page Courser, FNP-C [facility] Hospitalist Division Pager:  [phone] Office:  [phone] Subjective:  
  
Pt seen in dialysis. No acute events overnight. Objective:  
  
Visit Vitals  /87 (BP 1 Location: Right arm, BP Patient Position: At rest)  Pulse 65  Temp 97.6 °F (36.4 °C)  Resp 16  
 Ht 5' 10\" (1.778 m)  Wt 118.4 kg (261 lb)  SpO2 97%  BMI 37.45 kg/m2 Physical Exam: 
General appearance: alert, cooperative, no distress, appears stated age, [facility] cath right Head: Normocephalic, without obvious abnormality, atraumatic Lungs: clear to auscultation bilaterally Heart: regular rate and rhythm, S1, S2 normal, no murmur, click, rub or gallop Abdomen: soft, non tender, non distended. Normoactive bowel sounds. Extremities: extremities normal, atraumatic, no cyanosis or edema Skin: flaky Neurologic: Grossly normal 
PSY: Mood and affect normal, appropriately behaved Intake and Output: 
Current Shift:    
Last three shifts:  10/08 1901 - 10/10 0700 In: 8819 [P.O.:1320; I.V.:100] Out: 5650 [Urine:650] Recent Results (from the past 24 hour(s)) RENAL FUNCTION PANEL Collection Time: 10/10/18  5:19 AM  
Result Value Ref Range Sodium 138 136 - 145 mmol/L Potassium 3.7 3.5 - 5.5 mmol/L Chloride 102 100 - 108 mmol/L  
 CO2 27 21 - 32 mmol/L Anion gap 9 3.0 - 18 mmol/L Glucose 118 (H) 74 - 99 mg/dL BUN 31 (H) 7.0 - 18 MG/DL Creatinine 6.66 (H) 0.6 - 1.3 MG/DL  
 BUN/Creatinine ratio 5 (L) 12 - 20 GFR est AA 11 (L) >60 ml/min/1.73m2 GFR est non-AA 9 (L) >60 ml/min/1.73m2 Calcium 7.1 (L) 8.5 - 10.1 MG/DL Phosphorus 4.6 2.5 - 4.9 MG/DL Albumin 1.9 (L) 3.4 - 5.0 g/dL Lab Results Component Value Date/Time Glucose 118 (H) 10/10/2018 05:19 AM  
 Glucose 96 10/09/2018 07:45 AM  
 Glucose 92 10/08/2018 05:21 AM  
 Glucose 81 10/07/2018 04:20 AM  
 Glucose 82 10/06/2018 04:08 AM  
  
 
Imaging: No results found. Medication List Reviewed: 
Current Facility-Administered Medications Medication Dose Route Frequency  oxyCODONE IR (ROXICODONE) tablet 10 mg  10 mg Oral Q6H PRN  
 morphine injection 2 mg  2 mg IntraVENous Q6H PRN  
 sodium chloride (NS) flush 5-10 mL  5-10 mL IntraVENous Q8H  
 sodium chloride (NS) flush 5-10 mL  5-10 mL IntraVENous PRN  
 LORazepam (ATIVAN) tablet 1 mg  1 mg Oral Q1H PRN Or  
 LORazepam (ATIVAN) injection 1 mg  1 mg IntraVENous Q1H PRN  
 LORazepam (ATIVAN) tablet 2 mg  2 mg Oral Q1H PRN  Or  
 LORazepam (ATIVAN) injection 2 mg  2 mg IntraVENous Q1H PRN  
 LORazepam (ATIVAN) injection 3 mg  3 mg IntraVENous Q15MIN PRN  
  0.9% sodium chloride infusion  50 mL/hr IntraVENous DIALYSIS PRN  
 heparin (porcine) 1,000 unit/mL injection 1,000 Units  1,000 Units InterCATHeter DIALYSIS PRN  pantoprazole (PROTONIX) tablet 40 mg  40 mg Oral ACB  hydrALAZINE (APRESOLINE) tablet 50 mg  50 mg Oral TID  calcitRIOL (ROCALTROL) capsule 0.5 mcg  0.5 mcg Oral DAILY  promethazine (PHENERGAN) tablet 25 mg  25 mg Oral Q8H PRN  
 amLODIPine (NORVASC) tablet 10 mg  10 mg Oral DAILY  calcium acetate (PHOSLO) capsule 1,334 mg  2 Cap Oral TID WITH MEALS  influenza vaccine 2018-19 (6 mos+)(PF) (FLUARIX QUAD/FLULAVAL QUAD) injection 0.5 mL  0.5 mL IntraMUSCular PRIOR TO DISCHARGE  isosorbide mononitrate ER (IMDUR) tablet 90 mg  90 mg Oral DAILY  amiodarone (CORDARONE) tablet 400 mg  400 mg Oral DAILY  atorvastatin (LIPITOR) tablet 40 mg  40 mg Oral QHS  fluticasone (FLONASE) 50 mcg/actuation nasal spray 2 Spray  2 Spray Both Nostrils DAILY  lidocaine 4 % patch 1 Patch  1 Patch TransDERmal Q24H  
 tiotropium (SPIRIVA) inhalation capsule 18 mcg  1 Cap Inhalation DAILY  ondansetron (ZOFRAN) injection 4 mg  4 mg IntraVENous Q4H PRN  
 albuterol-ipratropium (DUO-NEB) 2.5 MG-0.5 MG/3 ML  3 mL Nebulization Q4H PRN  
 buprenorphine-naloxone (SUBOXONE) 4mg-1mg SL film (Patient Supplied)  1 Film SubLINGual BID

## 2018-10-10 NOTE — PROGRESS NOTES
Problem: Falls - Risk of 
Goal: *Absence of Falls Document Lindsay Restrepo Fall Risk and appropriate interventions in the flowsheet. Outcome: Progressing Towards Goal 
Fall Risk Interventions: 
  
 
  
 
Medication Interventions: Patient to call before getting OOB History of Falls Interventions: Door open when patient unattended

## 2018-10-10 NOTE — PROGRESS NOTES
RENAL PROGRESS NOTE Elena Osorio Assessment/Plan: · OLIVIA. (ischemic atn in a  setting of recent onset of n/v). Acute GN is also possible given gross hematuria and nephrotic proteinuria. GN/vasculitis serologies are neg. May need kidney biopsy- antiplt agents are on hold. Meanwhile continue dialysis- 3rd treatemtn today, next Friday. 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 · HTN with variable control. Continue amlodipine. Monitor bp with dialysis, volume removal.  
· N/V. Etio? CT finding noted. GI evaluation is in progress. EGD results noted- diffuse gastritis. · Secondary hyperparathyroidism/hyperphsphotemia. Continue calcitriol and phoslo. Subjective:Patient complaints off: Feel better, main problem is withdrawal from subaxon with muscle aches, nausea, fatigue. No SOB/CP. No vomiting. Patient Active Problem List  
Diagnosis Code  ST elevation (STEMI) myocardial infarction involving left anterior descending coronary artery ([facility]) I21.02  
 Cardiac arrest - ventricular fibrillation  HTN (hypertension) I10  
 Hypertensive emergency I16.1  Hyperglycemia R73.9  Acute on chronic renal failure (HCC) N17.9, N18.9  Leukocytosis D72.829  
 Acute pulmonary edema ([facility]) J81.0  Contrast dye induced nephropathy N14.1, T50.8X5A  
 CKD (chronic kidney disease) N18.9  Arachnoiditis G03.9  Postlaminectomy syndrome, lumbar region M96.1  Psoriasis L40.9  Degeneration of lumbar or lumbosacral intervertebral disc M51.37  
 Encounter for long-term (current) use of high-risk medication Z79.899  Obesity E66.9  
 Pain in joint, lower leg M25.569  Psoriatic arthropathy ([address] L40.50  Chronic low back pain M54.5, G89.29  Chronic pain syndrome G89.4  Lumbosacral spondylosis without myelopathy M47.817  Enthesopathy of hip M76.899  
  Sacroiliitis ([facility] M46.1  Coronary artery disease I25.10  
 JESSICA (obstructive sleep apnea) G47.33  
 UTI (lower urinary tract infection) N39.0  Cardiomyopathy ([facility] I42.9  AICD (automatic cardioverter/defibrillator) present Z95.810  
 OLIVIA (acute kidney injury) ([facility] N17.9  Renal failure N19  
 Chest pain R07.9  
 SOB (shortness of breath) R06.02  Bronchitis J40  Acute renal failure (ARF) ([facility]) N17.9  ARF (acute renal failure) ([facility]) N17.9  Severe sepsis ([facility]) A41.9, R65.20  Epigastric abdominal pain R10.13  
 Anxiety F41.9  Ventricular tachycardia ([facility]) I47.2  Ventricular fibrillation ([facility]) I49.01  
 ICD (implantable cardioverter-defibrillator) discharge Z45.02  
 CAD (coronary artery disease) I25.10  Obesity, morbid ([facility] E66.01  
 Bacteria in urine H75.63  
 Metabolic acidosis X53.7 Current Facility-Administered Medications Medication Dose Route Frequency Provider Last Rate Last Dose  oxyCODONE IR (ROXICODONE) tablet 10 mg  10 mg Oral Q6H PRN Yulissa Patel MD   10 mg at 10/10/18 5732  morphine injection 2 mg  2 mg IntraVENous Q6H PRN Rita Alonso MD   2 mg at 10/10/18 4461  sodium chloride (NS) flush 5-10 mL  5-10 mL IntraVENous Q8H Rita Alonso MD   10 mL at 10/10/18 0600  
 sodium chloride (NS) flush 5-10 mL  5-10 mL IntraVENous PRN Rita Alonso MD      
 LORazepam (ATIVAN) tablet 1 mg  1 mg Oral Q1H PRN Rita Alonso MD      
 Or  
 LORazepam (ATIVAN) injection 1 mg  1 mg IntraVENous Q1H PRN Rita Alonso MD   1 mg at 10/10/18 6780  LORazepam (ATIVAN) tablet 2 mg  2 mg Oral Q1H PRN Rita Alonso MD      
 Or  
 LORazepam (ATIVAN) injection 2 mg  2 mg IntraVENous Q1H PRN Rita Alonso MD   2 mg at 10/10/18 4913  LORazepam (ATIVAN) injection 3 mg  3 mg IntraVENous Q15MIN PRN Rita Alonso MD      
 0.9% sodium chloride infusion  50 mL/hr IntraVENous DIALYSIS PRN Inder Grimes MD      
  heparin (porcine) 1,000 unit/mL injection 1,000 Units  1,000 Units InterCATHeter DIALYSIS PRN Ruby Kemp MD      
 pantoprazole (PROTONIX) tablet 40 mg  40 mg Oral ACB Crystal Hammond MD   40 mg at 10/09/18 1328  hydrALAZINE (APRESOLINE) tablet 50 mg  50 mg Oral TID Kishore Negrete MD   50 mg at 10/09/18 2257  calcitRIOL (ROCALTROL) capsule 0.5 mcg  0.5 mcg Oral DAILY Kishore Negrete MD   0.5 mcg at 10/09/18 1328  promethazine (PHENERGAN) tablet 25 mg  25 mg Oral Q8H PRN Crystal Hammond MD   25 mg at 10/10/18 0409  amLODIPine (NORVASC) tablet 10 mg  10 mg Oral DAILY Ruby Kemp MD   Stopped at 10/09/18 0900  calcium acetate (PHOSLO) capsule 1,334 mg  2 Cap Oral TID WITH MEALS Emmanuel BATEMAN MD   1,334 mg at 10/09/18 1940  influenza vaccine 2018-19 (6 mos+)(PF) (FLUARIX QUAD/FLULAVAL QUAD) injection 0.5 mL  0.5 mL IntraMUSCular PRIOR TO DISCHARGE Crystal Hammond MD      
 isosorbide mononitrate ER (IMDUR) tablet 90 mg  90 mg Oral DAILY Crystal Hammond MD   Stopped at 10/09/18 0900  
 amiodarone (CORDARONE) tablet 400 mg  400 mg Oral DAILY Crystal Hammond MD   Stopped at 10/09/18 0900  
 atorvastatin (LIPITOR) tablet 40 mg  40 mg Oral QHS Crystal Hammond MD   40 mg at 10/09/18 2007  fluticasone (FLONASE) 50 mcg/actuation nasal spray 2 Spray  2 Spray Both Nostrils DAILY Crystal Hammond MD   2 [place] at 10/09/18 1023  lidocaine 4 % patch 1 Patch  1 Patch TransDERmal Q24H Crystal Hammond MD   1 Patch at 10/09/18 1940  tiotropium (SPIRIVA) inhalation capsule 18 mcg  1 Cap Inhalation DAILY Crystal Hammond MD   18 mcg at 10/08/18 1016  ondansetron (ZOFRAN) injection 4 mg  4 mg IntraVENous Q4H PRN Crystal Hammond MD   4 mg at 10/09/18 2116  albuterol-ipratropium (DUO-NEB) 2.5 MG-0.5 MG/3 ML  3 mL Nebulization Q4H PRN Crystal Hammond MD      
 buprenorphine-naloxone (SUBOXONE) 4mg-1mg SL film (Patient Supplied)  1 Film SubLINGual BID Melissa Howell MD   Stopped at 10/08/18 1014 Objective Vitals:  
 10/10/18 0939 10/10/18 1000 10/10/18 1015 10/10/18 1030 BP: 147/68 178/80 138/58 145/77 Pulse: (!) 55 (!) 52 (!) 54 (!) 56 Resp: 18 18 18 18 Temp:      
TempSrc:      
SpO2:      
Weight:      
Height:      
 
 
 
Intake/Output Summary (Last 24 hours) at 10/10/18 1109 Last data filed at 10/10/18 0149 Gross per 24 hour Intake             1180 ml Output             5200 ml Net            -4020 ml Admission weight: Weight: 118.7 kg (261 lb 11 oz) (10/04/18 9738) Last Weight Metrics: 
Weight Loss Metrics 10/5/2018 2/14/2018 2/8/2018 1/11/2018 8/22/2017 8/20/2017 3/2/2017 Today's Wt 261 lb 250 lb 250 lb 286 lb 280 lb 258 lb 258 lb BMI 37.45 kg/m2 35.87 kg/m2 35.87 kg/m2 41.04 kg/m2 40.18 kg/m2 37.02 kg/m2 37.02 kg/m2 Physical Assessment:  
 
General: NAD, alert and oriented. Neck: No jvd. Rt ij tdc. LUNGS: Clear to Auscultation, No rales, rhonchi or wheezes. CVS EXM: S1, S2  RRR, no murmurs/gallops/rubs. Abdomen: nt/nd. Lower Extremities: 1+edema. Lab CBC w/Diff Recent Labs 10/09/18 
 0745 HGB  8.7* HCT  26.9* Chemistry Recent Labs 10/10/18 
 5273  10/09/18 
 0745   10/08/18 
 0030 GLU  118*  96   --   92 NA  138  140   --   137  
K  3.7  4.2   --   4.2 CL  102  103   --   101 CO2  27  26   --   22 BUN  31*  52*   --   74* CREA  6.66*  9.43*   --   11.70* CA  7.1*  7.2*   --   7.2* AGAP  9  11   --   14  
BUCR  5*  6*   --   6* AP   --    --    --   64  
TP   --    --    --   5.1* ALB  1.9*  1.9*   --   2.0*  
GLOB   --    --    --   3.1 AGRAT   --    --    --   0.6* PHOS  4.6  6.2*   < >   --   
 < > = values in this interval not displayed. Lab Results Component Value Date/Time  Iron 86 05/18/2015 03:00 AM  
 TIBC 260 05/18/2015 03:00 AM  
 Iron % saturation 33 05/18/2015 03:00 AM  
 Ferritin 151 05/18/2015 03:00 AM  
  
Lab Results Component Value Date/Time Calcium 7.1 (L) 10/10/2018 05:19 AM  
 Phosphorus 4.6 10/10/2018 05:19 AM  
  
 
Kulwinder Yanez M.D. [facility] Phone [phone] Pager [phone]

## 2018-10-10 NOTE — PROGRESS NOTES
1950: Assumed patient care. [facility] Report included SBAR, Kardex, and MAR. Patient resting in bed, in no signs of distress. States he has pain 6/10 in neck, buttocks, and back area. Call light and possessions within reach, bed in lowest setting 2007: Roxicodone administered for pain 2105: patient states pain has not improved from roxicodone administration. States pain has actually increased to 8. 2113: States he is starting to feel \"withdrawal-y\" when asked to clarify patient states chills, nausea, nervousness. Visible tremors noted. States has mild light sensitivity. States he has been taking suboxone for two years, prior to this he was taking roxicodone 30 mg. zofran given for nausea 26: Spoke with Dr. Kelsie Gilbert, informed doctor of patient's history with suboxone and not being able to obtain while in hospital, informed doctor of chills, nausea, tremors, and anxiety. Doctor stated to place patient on CIWA protocol and additionally ordered morphine 2 mg every six hours for pain 2249: Patient given morphine for pain 7/10, CIWA score currently 24, will reassess. Patient hallucinating stating he sees a cat in the hallway 2316: Patient states pain is reduced to 6/10,  Patient scored 19 on CIWA, 2 mg ativan administered 0016: CIWA reassessment: Patient states still feeling nausea however no longer dry heaving, tremors have decreased but are still visible, no longer sweating, sensitivity to light, when asked if anxious patient states \"yeah I feel like someone is out to get me almost\", states he has headache. Chip Burton to discuss CIWA with charge nurse and retrieve ativan, when this nurse returned patient stated he saw a cat in the hallway once again. CIWA score changed to 19 
 
0150: patient sleeping, CIWA not performed 0250: CIWA score 6 
 
0409: Patient complaint of pain 8/10 in lower back, roxicodone administered 2166: Morphine given for pain 8/10 not relieved by roxicodone

## 2018-10-11 LAB
ALBUMIN SERPL-MCNC: 1.8 G/DL (ref 3.4–5)
ANION GAP SERPL CALC-SCNC: 7 MMOL/L (ref 3–18)
BUN SERPL-MCNC: 20 MG/DL (ref 7–18)
BUN/CREAT SERPL: 4 (ref 12–20)
CALCIUM SERPL-MCNC: 7.2 MG/DL (ref 8.5–10.1)
CHLORIDE SERPL-SCNC: 102 MMOL/L (ref 100–108)
CO2 SERPL-SCNC: 29 MMOL/L (ref 21–32)
CREAT SERPL-MCNC: 5.7 MG/DL (ref 0.6–1.3)
GLUCOSE SERPL-MCNC: 101 MG/DL (ref 74–99)
PHOSPHATE SERPL-MCNC: 3.4 MG/DL (ref 2.5–4.9)
POTASSIUM SERPL-SCNC: 3.8 MMOL/L (ref 3.5–5.5)
SODIUM SERPL-SCNC: 138 MMOL/L (ref 136–145)

## 2018-10-11 PROCEDURE — 36415 COLL VENOUS BLD VENIPUNCTURE: CPT | Performed by: INTERNAL MEDICINE

## 2018-10-11 PROCEDURE — 74011250637 HC RX REV CODE- 250/637: Performed by: INTERNAL MEDICINE

## 2018-10-11 PROCEDURE — 74011250637 HC RX REV CODE- 250/637: Performed by: HOSPITALIST

## 2018-10-11 PROCEDURE — 74011000250 HC RX REV CODE- 250: Performed by: INTERNAL MEDICINE

## 2018-10-11 PROCEDURE — 97162 PT EVAL MOD COMPLEX 30 MIN: CPT

## 2018-10-11 PROCEDURE — 74011250636 HC RX REV CODE- 250/636: Performed by: HOSPITALIST

## 2018-10-11 PROCEDURE — 65660000000 HC RM CCU STEPDOWN

## 2018-10-11 PROCEDURE — 80069 RENAL FUNCTION PANEL: CPT | Performed by: INTERNAL MEDICINE

## 2018-10-11 RX ADMIN — OXYCODONE HYDROCHLORIDE 10 MG: 5 TABLET ORAL at 21:36

## 2018-10-11 RX ADMIN — MORPHINE SULFATE 2 MG: 4 INJECTION INTRAVENOUS at 00:25

## 2018-10-11 RX ADMIN — HYDRALAZINE HYDROCHLORIDE 50 MG: 50 TABLET, FILM COATED ORAL at 10:09

## 2018-10-11 RX ADMIN — LORAZEPAM 2 MG: 1 TABLET ORAL at 04:36

## 2018-10-11 RX ADMIN — PANTOPRAZOLE SODIUM 40 MG: 40 TABLET, DELAYED RELEASE ORAL at 10:09

## 2018-10-11 RX ADMIN — OXYCODONE HYDROCHLORIDE 10 MG: 5 TABLET ORAL at 04:36

## 2018-10-11 RX ADMIN — MORPHINE SULFATE 2 MG: 4 INJECTION INTRAVENOUS at 17:32

## 2018-10-11 RX ADMIN — CALCIUM ACETATE 1334 MG: 667 CAPSULE ORAL at 17:28

## 2018-10-11 RX ADMIN — FLUTICASONE PROPIONATE 2 SPRAY: 50 SPRAY, METERED NASAL at 10:13

## 2018-10-11 RX ADMIN — HYDRALAZINE HYDROCHLORIDE 50 MG: 50 TABLET, FILM COATED ORAL at 21:35

## 2018-10-11 RX ADMIN — Medication 10 ML: at 21:46

## 2018-10-11 RX ADMIN — CALCITRIOL 0.5 MCG: 0.25 CAPSULE ORAL at 10:11

## 2018-10-11 RX ADMIN — ATORVASTATIN CALCIUM 40 MG: 40 TABLET, FILM COATED ORAL at 21:35

## 2018-10-11 RX ADMIN — OXYCODONE HYDROCHLORIDE 10 MG: 5 TABLET ORAL at 13:04

## 2018-10-11 RX ADMIN — HYDRALAZINE HYDROCHLORIDE 50 MG: 50 TABLET, FILM COATED ORAL at 16:40

## 2018-10-11 RX ADMIN — ISOSORBIDE MONONITRATE 90 MG: 30 TABLET, EXTENDED RELEASE ORAL at 10:09

## 2018-10-11 RX ADMIN — Medication 10 ML: at 14:16

## 2018-10-11 RX ADMIN — AMIODARONE HYDROCHLORIDE 400 MG: 200 TABLET ORAL at 10:09

## 2018-10-11 RX ADMIN — CALCIUM ACETATE 1334 MG: 667 CAPSULE ORAL at 10:09

## 2018-10-11 RX ADMIN — LORAZEPAM 2 MG: 2 INJECTION, SOLUTION INTRAMUSCULAR; INTRAVENOUS at 05:56

## 2018-10-11 RX ADMIN — Medication 10 ML: at 10:21

## 2018-10-11 RX ADMIN — TIOTROPIUM BROMIDE 18 MCG: 18 CAPSULE ORAL; RESPIRATORY (INHALATION) at 10:13

## 2018-10-11 RX ADMIN — AMLODIPINE BESYLATE 10 MG: 5 TABLET ORAL at 10:09

## 2018-10-11 RX ADMIN — MORPHINE SULFATE 2 MG: 4 INJECTION INTRAVENOUS at 04:51

## 2018-10-11 RX ADMIN — CALCIUM ACETATE 1334 MG: 667 CAPSULE ORAL at 13:00

## 2018-10-11 NOTE — PROGRESS NOTES
Patient received in bed awake. Patient alert and oriented 4, has some back pain will recheck and medicate. Patient dozing on and off. Patient resting quietly. Frequent use items within reach. Bed locked in low position. Call bell within reach and patient verbalized understanding of use for assistance and needs. Dual skin assessment completed with Radha Sanchez. Patient's skin is intact except for psoriasis that covers most of his body.

## 2018-10-11 NOTE — PROGRESS NOTES
Assumed care of pt from Brandon Soto. Alert and oriented x 4. Report included SBAR, MAR, kardex. Frequent use items within reach. Bed locked in lowest position. Call bell within reach. Pt verbalizes understanding to call for assistance. 2030 medicated for pain and also for wheezing with neb tx,with relief. Patient fell asleep. 2130 Sleeping. 0030 Morphine for pain. 0100 Patient sleeping. 
0200 Sleeping 
0300 sleeping, 
0400 Patient,s wife called and said patient was not receiving any meds for pain. Patient did not push callbell and when checking in room patient was asleep. Patient said he was having severe pain and anxiety. Medicated for both. IV infiltrated and restarted. 0600 Patient still having anxiety,medicated with relief. 
0755 Bedside shift change report given to [address] (oncoming nurse) by Sue Cates (offgoing nurse). Report included the following information SBAR, Kardex and MAR.

## 2018-10-11 NOTE — ROUTINE PROCESS
Bedside and Verbal shift change report given to Kenzie Ball RN (oncoming nurse) by Nilo Ambrosio RN, BSN (offgoing nurse). Report given with SBAR, Kardex, Intake/Output, MAR and Recent Results.

## 2018-10-11 NOTE — PROGRESS NOTES
attempted multiple times to complete a  follow up visit with patient in room 2104 today but each time found patient to be resting peacefully. No family seen at this visit. Chaplains will continue to follow and will provide pastoral care on an as needed/requested basis Gen 3 Board Certified [employer] [facility]  
[phone]
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
## 2018-10-11 NOTE — ROUTINE PROCESS
Bedside and Verbal shift change report given to [address] (oncoming nurse) by Garret Wilcox RN 
 (offgoing nurse). Report included the following information SBAR, Kardex, MAR and Recent Results.

## 2018-10-11 NOTE — PROGRESS NOTES
Nutrition initial assessment/Plan of care RECOMMENDATIONS:  
 
1. Renal diet 2. Monitor weight, labs and PO intake 3. RD to follow GOALS:  
 
1. PO intake meets >75% of protein/calorie needs by 10/16 2. Weight Maintenance (+/- 1-2 lb by 10/16) ASSESSMENT:  
 
Weight status is classified as obese per BMI of 37.5. PO intake is fair. Labs noted. Patient with hypoalbuminemia. Elevated BUN/Creatinine levels but trending down; GFR: 11. Nutrition recommendations listed. RD to follow. Nutrition Diagnoses: Altered nutrition related lab values related to ESRD as evidenced by GFR of 11. Nutrition Risk:  [] High  [x] Moderate []  Low SUBJECTIVE/OBJECTIVE:  
 LOS. Admitted with OLIVIA. S/P TDC placement on 10/8 to start HD. S/P EGD on 10/9. Plan for kidney biopsy on 10/15. Patient reports having nausea/vomiting due to withdrawal as he hasn't been able to take medication (Suboxone) while in hospital as he does at home. Observed 50% intake of sandwich for dinner meal. Patient reports at home patient was eating well and not having any nausea/vomiting. Reports weight has been stable at 265 lb. Has food allergy to tomatoes. States having a little trouble with chewing but food has been soft enough for him to tolerate. Will monitor. Information Obtained from:  
 [x] Chart Review [x] Patient 
 [] Family/Caregiver [x] Nurse/Physician 
 [] Interdisciplinary Meeting/Rounds Diet:Renal diet Medications: [x] Reviewed Allergies: [x] Reviewed (Tomatoes) Encounter Diagnoses ICD-10-CM ICD-9-CM 1. OLIVIA (acute kidney injury) ([facility] N17.9 584.9 2. Abdominal pain, generalized R10.84 789.07  
3. Nausea and vomiting, intractability of vomiting not specified, unspecified vomiting type R11.2 787.01 Past Medical History:  
Diagnosis Date  AICD MEDTRONIC (single chamber)  Apical mural thrombus ECHO 3/14  Cardiac arrest ([facility] 10/15 VT / VF ( ~20 ICD shocks ). No obstructive CAD on cath  Chronic back pain  Chronic kidney disease, stage III (moderate) ([facility])  Coronary artery disease LAD - 3.0 x 18mm VISION 7/13  Degenerative spine disease  Dyslipidemia  Hypertension  Ischemic cardiomyopathy EF 30%(10/15) , 40-45% (03/15),  EF 30-35% (3/14)  Ischemic VF   
 07/13 in setting of MI, DC cardioversion x4  Narcotic dependence ([address]  Noncompliance  Obesity  Psoriasis  S/P cardiac cath 10/15  Secondary hyperparathyroidism (of renal origin)  Tobacco abuse Labs:  Lab Results Component Value Date/Time Sodium 138 10/11/2018 06:50 AM  
 Potassium 3.8 10/11/2018 06:50 AM  
 Chloride 102 10/11/2018 06:50 AM  
 CO2 29 10/11/2018 06:50 AM  
 Anion gap 7 10/11/2018 06:50 AM  
 Glucose 101 (H) 10/11/2018 06:50 AM  
 BUN 20 (H) 10/11/2018 06:50 AM  
 Creatinine 5.70 (H) 10/11/2018 06:50 AM  
 Calcium 7.2 (L) 10/11/2018 06:50 AM  
 Magnesium 2.0 12/27/2015 04:35 AM  
 Phosphorus 3.4 10/11/2018 06:50 AM  
 Albumin 1.8 (L) 10/11/2018 06:50 AM  
 
Anthropometrics: BMI (calculated): 37.5 Last 3 Recorded Weights in this Encounter 10/04/18 5401 10/05/18 1340 10/05/18 1404 Weight: 118.7 kg (261 lb 11 oz) 118.4 kg (261 lb) 118.4 kg (261 lb) Ht Readings from Last 1 Encounters:  
10/05/18 5' 10\" (1.778 m) Patient Vitals for the past 100 hrs: 
 % Diet Eaten 10/07/18 1828 0 % 10/07/18 1353 15 % IBW: 166 lb %IBW: 157% [] Weight Loss [] Weight Gain [x] Weight Stable Estimated Nutrition Needs: [x] MSJ Calories: 2370 Kcal Based on:   [x] Actual BW   
Protein:   95 g Based on:   [x] Actual BW Fluid:       5830-6212 ml Based on:   [x] Actual BW  
 
 [x] No Cultural, Hinduism or ethnic dietary need identified. [] Cultural, Hinduism and ethnic food preferences identified and addressed Wt Status:  [] Normal (18.6 - 24.9) [] Underweight (<18.5) [] Overweight (25 - 29.9) [] Mild Obesity (30 - 34.9)  [x] Moderate Obesity (35 - 39.9) [] Morbid Obesity (40+) Nutrition Problems Identified:  
[x] Fair PO intake [x] Food Allergies (Tomatoes) [x] Difficulty chewing/swallowing/poor dentition ( declined change in diet texture) 
[] Constipation/Diarrhea [x] Nausea/Vomiting  
[] None 
[] Other:  
 
Plan:  
[x] Therapeutic Diet 
[]  Obtained/adjusted food preferences/tolerances and/or snacks options  
[]  Supplements added  
[] Occupational therapy following for feeding techniques []  HS snack added  
[]  Modify diet texture  
[x]  Modify diet for food allergies []  Assist with menu selection  
[x]  Monitor PO intake on meal rounds  
[x]  Continue inpatient monitoring and intervention  
[]  Participated in discharge planning/Interdisciplinary rounds/Team meetings  
[]  Other:  
 
Education Needs: 
 [] Not appropriate for teaching at this time due to: 
 [x] Identified and addressed Nutrition Monitoring and Evaluation: 
[x] Continue ongoing monitoring and intervention 
[] Melina Goldsmith 27

## 2018-10-11 NOTE — PROGRESS NOTES
RENAL PROGRESS NOTE Wale Sensor Assessment/Plan: · OLIVIA. (ischemic atn in a  setting of recent onset of n/v). Acute GN is also possible given gross hematuria and nephrotic proteinuria. GN/vasculitis serologies are neg. May need kidney biopsy- antiplt agents are on hold. Will tentatively plan biopsy for Monday (unless renal function is better over the weekend), Meanwhile continue dialysis- next one tomorrow. 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 · HTN with variable control. Continue amlodipine. Monitor bp with dialysis, volume removal.  
· N/V. Etio? CT finding noted. GI evaluation is in progress. EGD results noted- diffuse gastritis. Improving clinically. · Secondary hyperparathyroidism/hyperphsphotemia. Continue calcitriol and phoslo. Subjective:Patient complaints off: Feel better, main problem is withdrawal from subaxon with muscle aches, sweats, fatigue. No SOB/CP. No n/v. Tolerates diet. Patient Active Problem List  
Diagnosis Code  ST elevation (STEMI) myocardial infarction involving left anterior descending coronary artery ([facility]) I21.02  
 Cardiac arrest - ventricular fibrillation  HTN (hypertension) I10  
 Hypertensive emergency I16.1  Hyperglycemia R73.9  Acute on chronic renal failure (HCC) N17.9, N18.9  Leukocytosis D72.829  
 Acute pulmonary edema (HCC) J81.0  Contrast dye induced nephropathy N14.1, T50.8X5A  
 CKD (chronic kidney disease) N18.9  Arachnoiditis G03.9  Postlaminectomy syndrome, lumbar region M96.1  Psoriasis L40.9  Degeneration of lumbar or lumbosacral intervertebral disc M51.37  
 Encounter for long-term (current) use of high-risk medication Z79.899  Obesity E66.9  
 Pain in joint, lower leg M25.569  Psoriatic arthropathy ([address] L40.50  Chronic low back pain M54.5, G89.29  
  Chronic pain syndrome G89.4  Lumbosacral spondylosis without myelopathy M47.817  Enthesopathy of hip M76.899  
 Sacroiliitis (HCC) M46.1  Coronary artery disease I25.10  
 JESSICA (obstructive sleep apnea) G47.33  
 UTI (lower urinary tract infection) N39.0  Cardiomyopathy ([facility] I42.9  AICD (automatic cardioverter/defibrillator) present Z95.810  
 OLIVIA (acute kidney [facility]) ([facility] N17.9  Renal failure N19  
 Chest pain R07.9  
 SOB (shortness of breath) R06.02  Bronchitis J40  Acute renal failure (ARF) (HCC) N17.9  ARF (acute renal failure) ([facility]) N17.9  Severe sepsis ([facility]) A41.9, R65.20  Epigastric abdominal pain R10.13  
 Anxiety F41.9  Ventricular tachycardia ([facility]) I47.2  Ventricular fibrillation ([facility]) I49.01  
 ICD (implantable cardioverter-defibrillator) discharge Z45.02  
 CAD (coronary artery disease) I25.10  Obesity, morbid ([facility] E66.01  
 Bacteria in urine Q95.39  
 Metabolic acidosis S02.4 Current Facility-Administered Medications Medication Dose Route Frequency Provider Last Rate Last Dose  oxyCODONE IR (ROXICODONE) tablet 10 mg  10 mg Oral Q6H PRN Rachna Kirby MD   10 mg at 10/11/18 1304  morphine injection 2 mg  2 mg IntraVENous Q6H PRN Ama Wiley MD   2 mg at 10/11/18 0451  sodium chloride (NS) flush 5-10 mL  5-10 mL IntraVENous Q8H Ama Wiley MD   10 mL at 10/11/18 1416  sodium chloride (NS) flush 5-10 mL  5-10 mL IntraVENous PRN Ama Wiley MD      
 LORazepam (ATIVAN) tablet 1 mg  1 mg Oral Q1H PRN Ama Wiley MD   1 mg at 10/10/18 1826 Or  LORazepam (ATIVAN) injection 1 mg  1 mg IntraVENous Q1H PRN Ama Wiley MD   1 mg at 10/10/18 0101  LORazepam (ATIVAN) tablet 2 mg  2 mg Oral Q1H PRN Ama Wiley MD   2 mg at 10/11/18 4267 Or  LORazepam (ATIVAN) injection 2 mg  2 mg IntraVENous Q1H PRN Ama Wiley MD   2 mg at 10/11/18 4643  LORazepam (ATIVAN) injection 3 mg  3 mg IntraVENous Q15MIN PRN Genna Olmedo MD      
 0.9% sodium chloride infusion  50 mL/hr IntraVENous DIALYSIS PRN Abril Pina MD      
 heparin (porcine) 1,000 unit/mL injection 1,000 Units  1,000 Units InterCATHeter DIALYSIS PRN Abril Pina MD      
 pantoprazole (PROTONIX) tablet 40 mg  40 mg Oral ACB Kenny Luevano MD   40 mg at 10/11/18 1009  hydrALAZINE (APRESOLINE) tablet 50 mg  50 mg Oral TID Anita Randall MD   50 mg at 10/11/18 1009  calcitRIOL (ROCALTROL) capsule 0.5 mcg  0.5 mcg Oral DAILY Anita Randall MD   0.5 mcg at 10/11/18 1011  promethazine (PHENERGAN) tablet 25 mg  25 mg Oral Q8H PRN Kenny Luevano MD   25 mg at 10/10/18 1441  
 amLODIPine (NORVASC) tablet 10 mg  10 mg Oral DAILY Luis Antonio BATEMAN MD   10 mg at 10/11/18 1009  calcium acetate (PHOSLO) capsule 1,334 mg  2 Cap Oral TID WITH MEALS Luis Antonio BATEMNA MD   1,334 mg at 10/11/18 1300  
 influenza vaccine 2018-19 (6 mos+)(PF) (FLUARIX QUAD/FLULAVAL QUAD) injection 0.5 mL  0.5 mL IntraMUSCular PRIOR TO DISCHARGE Kenny Luevano MD      
 isosorbide mononitrate ER (IMDUR) tablet 90 mg  90 mg Oral DAILY Kenny Luevano MD   90 mg at 10/11/18 1009  amiodarone (CORDARONE) tablet 400 mg  400 mg Oral DAILY Kenny Luevano MD   400 mg at 10/11/18 1009  atorvastatin (LIPITOR) tablet 40 mg  40 mg Oral QHS Kenny Luevano MD   40 mg at 10/10/18 2030  fluticasone (FLONASE) 50 mcg/actuation nasal spray 2 Spray  2 Spray Both Nostrils DAILY Kenny Luevano MD   2 Spray at 10/11/18 1013  lidocaine 4 % patch 1 Patch  1 Patch TransDERmal Q24H Kenny Luevano MD   1 Patch at 10/10/18 1809  tiotropium (SPIRIVA) inhalation capsule 18 mcg  1 Cap Inhalation DAILY Kenny Luevano MD   18 mcg at 10/11/18 1013  ondansetron (ZOFRAN) injection 4 mg  4 mg IntraVENous Q4H PRN Kenny Luevano MD   4 mg at 10/09/18 4325  albuterol-ipratropium (DUO-NEB) 2.5 MG-0.5 MG/3 ML  3 mL Nebulization Q4H PRN Brendan Cole MD   3 mL at 10/10/18 2037  buprenorphine-naloxone (SUBOXONE) 4mg-1mg SL film (Patient Supplied)  1 Film SubLINGual BID Brendan Cole MD   Stopped at 10/08/18 1014 Objective Vitals:  
 10/11/18 0252 10/11/18 0704 10/11/18 0941 10/11/18 1413 BP: 151/69 162/62 160/64 129/71 Pulse: 72 62 61 60 Resp: 17 19 18 18 Temp: 98.4 °F (36.9 °C) 98.6 °F (37 °C) 98.6 °F (37 °C) 99.1 °F (37.3 °C) TempSrc:      
SpO2: 90% 92% 93% 90% Weight:      
Height:      
 
 
 
Intake/Output Summary (Last 24 hours) at 10/11/18 1603 Last data filed at 10/11/18 1350 Gross per 24 hour Intake                0 ml Output              850 ml Net             -850 ml Admission weight: Weight: 118.7 kg (261 lb 11 oz) (10/04/18 1927) Last Weight Metrics: 
Weight Loss Metrics 10/5/2018 2/14/2018 2/8/2018 1/11/2018 8/22/2017 8/20/2017 3/2/2017 Today's Wt 261 lb 250 lb 250 lb 286 lb 280 lb 258 lb 258 lb BMI 37.45 kg/m2 35.87 kg/m2 35.87 kg/m2 41.04 kg/m2 40.18 kg/m2 37.02 kg/m2 37.02 kg/m2 Physical Assessment:  
 
General: NAD, alert and oriented. Neck: No jvd. Rt ij tdc. LUNGS: Clear to Auscultation, No rales, rhonchi or wheezes. CVS EXM: S1, S2  RRR, no murmurs/gallops/rubs. Abdomen: nt/nd. Lower Extremities: 1+edema. Skin: extensive scaly rash ue/le. Lab CBC w/Diff Recent Labs 10/09/18 
 0745 HGB  8.7* HCT  26.9* Chemistry Recent Labs 10/11/18 
 3259  10/10/18 
 3955  10/09/18 
 0745 GLU  101*  118*  96 NA  138  138  140  
K  3.8  3.7  4.2 CL  102  102  103 CO2  29  27  26 BUN  20*  31*  52* CREA  5.70*  6.66*  9.43* CA  7.2*  7.1*  7.2* AGAP  7  9  11 BUCR  4*  5*  6* ALB  1.8*  1.9*  1.9*  
PHOS  3.4  4.6  6.2* Lab Results Component Value Date/Time  Iron 86 05/18/2015 03:00 AM  
 TIBC 260 05/18/2015 03:00 AM  
 Iron % saturation 33 05/18/2015 03:00 AM  
 Ferritin 151 05/18/2015 03:00 AM  
  
Lab Results Component Value Date/Time Calcium 7.2 (L) 10/11/2018 06:50 AM  
 Phosphorus 3.4 10/11/2018 06:50 AM  
  
 
Ju Prince M.D. [facility] Phone [phone] Pager [phone]

## 2018-10-11 NOTE — PROGRESS NOTES
Problem: Falls - Risk of 
Goal: *Absence of Falls Document Jill Ash Fall Risk and appropriate interventions in the flowsheet. Outcome: Progressing Towards Goal 
Fall Risk Interventions: 
  
 
  
 
Medication Interventions: Evaluate medications/consider consulting pharmacy, Patient to call before getting OOB History of Falls Interventions: Consult care management for discharge planning, Door open when patient unattended

## 2018-10-11 NOTE — PROGRESS NOTES
Problem: Falls - Risk of 
Goal: *Absence of Falls Document Colleen Leonardo Fall Risk and appropriate interventions in the flowsheet. Outcome: Progressing Towards Goal 
Fall Risk Interventions: 
  
 
  
 
Medication Interventions: Evaluate medications/consider consulting pharmacy, Patient to call before getting OOB History of Falls Interventions: Consult care management for discharge planning, Door open when patient unattended

## 2018-10-11 NOTE — PROGRESS NOTES
[address] [place], [place] [address]ty [facility] Hospitalist Division Inpatient Daily Progress Note Daily progress Note Patient: Maricarmen Ortega MRN: [id number]  [facility]: [id number] YOB: 1970  Age: 46 y.o. Sex: male DOA: 10/3/2018 LOS:  LOS: 8 days Chief Complaint:  Acute on chronic renal failure Interval History: 46 y.o.  male with h/o AICD,cardiac arrest,VT,ischemic cardiomyopathy EF 30%,obesity,cad,presented to ED because of weakness,nausea,vomting,abdominal pain,hematuria. Patient says that he started feeling sick 2 weeks ago when he started feeling weak. Then he started vomiting. Could not tolerate even water. In recent days ,he has had abdominal pain which he attributed to the ongoing vomiting. Patient also reported that he has seen his urine turning red for a while. Had to see his doctor in two weeks but felt very weak and decided to come to the ED. In ED,lab showed creatinine 11.0 compared to 2.12 on 1/11/2018 and 8.01 on 9/28/18. UA showed large blood,bacteria+. CT abd/pelvis w/o hydronephrosis or urolithiasis. Patient admitted for OLIVIA,Hematuria,UTI. Started on iv fluid. Nephrolohy consulted from ER. Since patient has GI complaint and renal involvement,nephrology has indicated that Henoch-Schonlein syndrome needs to be considered. Pt is on iv fluid. IgA elevated 648. Other serology for vasculitis including nathaniel,GBM ab,ANCA,C3,C4,heptitis profile not abnormal. Nephrology has indicated that if creatinine continue to rise over the weekend,patient will need dialysis. Today creat up to 12.00. Patient may also need kidney biopsy. Cardiology consulted significant cardiac history anticipating that he will have dialysis. Cardiology has determined that his cardiac status is stable. GI saw patient on 10/6 for ongoing symptoms. TDC catheter placed on 10/8. EGD on 10/9 showed chronic gastritis. HD on 10/9 and 10/10, next HD session on 10/12. Also plans for kidney biopsy this week. Assessment/Plan:  
 
Patient Active Problem List  
Diagnosis Code  ST elevation (STEMI) myocardial infarction involving left anterior descending coronary artery ([facility]) I21.02  
 Cardiac arrest - ventricular fibrillation  HTN (hypertension) I10  
 Hypertensive emergency I16.1  Hyperglycemia R73.9  Acute on chronic renal failure (HCC) N17.9, N18.9  Leukocytosis D72.829  
 Acute pulmonary edema ([facility]) J81.0  Contrast dye induced nephropathy N14.1, T50.8X5A  
 CKD (chronic kidney disease) N18.9  Arachnoiditis G03.9  Postlaminectomy syndrome, lumbar region M96.1  Psoriasis L40.9  Degeneration of lumbar or lumbosacral intervertebral disc M51.37  
 Encounter for long-term (current) use of high-risk medication Z79.899  Obesity E66.9  
 Pain in joint, lower leg M25.569  Psoriatic arthropathy ([facility] [address] L40.50  Chronic low back pain M54.5, G89.29  Chronic pain syndrome G89.4  Lumbosacral spondylosis without myelopathy M47.817  Enthesopathy of hip M76.899  
 Sacroiliitis ([facility]) M46.1  Coronary artery disease I25.10  
 JESSICA (obstructive sleep apnea) G47.33  
 UTI (lower urinary tract infection) N39.0  Cardiomyopathy ([address] I42.9  AICD (automatic cardioverter/defibrillator) present Z95.810  
 OLIVIA (acute kidney injury) ([facility] [address] N17.9  Renal failure N19  
 Chest pain R07.9  
 SOB (shortness of breath) R06.02  Bronchitis J40  Acute renal failure (ARF) ([facility]) N17.9  ARF (acute renal failure) ([facility]) N17.9  Severe sepsis ([facility]) A41.9, R65.20  Epigastric abdominal pain R10.13  
 Anxiety F41.9  Ventricular tachycardia ([facility]) I47.2  Ventricular fibrillation ([facility]) I49.01  
 ICD (implantable cardioverter-defibrillator) discharge Z45.02  
 CAD (coronary artery disease) I25.10  Obesity, morbid ([facility] [address] E66.01  
 Bacteria in urine K17.12  
 Metabolic acidosis D07.0 A/P: 
Acute on chronic renal failure/Metabolic acidosis - nephrology following, appreciate assistance 
- creatinine 11.00 POA 
 -Etiology:volume depletion -?acute GN as pt also hematuria. -May need kidney biopsy. Plavix on hold 
 -Serology:IgA elevated. Other normal such as MANUELITO,ANCA,C3,C4,Hep profile. -s/p [facility] 10/8   
- nephrology to schedule dialysis - sp dialysis 10/9 with 500 ml fluid removed, HD today and next on Friday 
- plan for kidney biopsy at end of week unless kidney function is better- per nephrology 
   
Bacteria in urine/UTI/Hematuria 
 -Ceftriaxone iv - completed 
 -Blood cx ng/Ucx negative. -Vasculitis serology non-revealing 
  
Secondary hyperparathyroidism  
 -On calcitriol 
   
Nausea/vomiting/abdominal pain 
 -CT reviewed,no acute process. -[facility] - EGD 10/9, chronic gastritis 
   
HTN 
 -Hydralazine prn for sbp>160 
   
JESSICA (obstructive sleep apnea) 
 -On oxygen 
   
Cardiomyopathy 
- EF 25-30% - ICD (implantable cardioverter-defibrillator) discharge CAD (coronary artery disease) 
 -Continue routine meds 
 -Telemetry monitoring 
 -Cardiology saw patient on 10/6 and determined that patient's cardiac status is stable and advised to continue his home meds. DVT Prophylaxis - SCD's BLE Page Courser, MONALISA-C [address] [place] [address] Hospitalist Division Pager:  [phone] Office:  [phone] Subjective: Interval notes reviewed. Pt c/o pain, meds not working. Understands plan for kidney biopsy this week. Objective:  
  
Visit Vitals  /62 (BP 1 Location: Right arm, BP Patient Position: At rest)  Pulse 62  Temp 98.6 °F (37 °C)  Resp 19  
 Ht 5' 10\" (1.778 m)  Wt 118.4 kg (261 lb)  SpO2 92%  BMI 37.45 kg/m2 Physical Exam: 
General appearance: alert, cooperative, no distress, appears stated age, [facility] cath right Head: Normocephalic, without obvious abnormality, atraumatic Lungs: clear to auscultation bilaterally Heart: regular rate and rhythm, S1, S2 normal, no murmur, click, rub or gallop Abdomen: soft, non tender, non distended. Normoactive bowel sounds. Extremities: extremities normal, atraumatic, no cyanosis or edema Skin: flaky Neurologic: Grossly normal 
PSY: Mood and affect normal, appropriately behaved Intake and Output: 
Current Shift:    
Last three shifts:  10/09 1901 - 10/11 0700 In: 720 [P.O.:720] Out: 700 [Urine:200] No results found for this or any previous visit (from the past 24 hour(s)). Lab Results Component Value Date/Time Glucose 118 (H) 10/10/2018 05:19 AM  
 Glucose 96 10/09/2018 07:45 AM  
 Glucose 92 10/08/2018 05:21 AM  
 Glucose 81 10/07/2018 04:20 AM  
 Glucose 82 10/06/2018 04:08 AM  
  
 
Imaging: No results found. Medication List Reviewed: 
Current Facility-Administered Medications Medication Dose Route Frequency  oxyCODONE IR (ROXICODONE) tablet 10 mg  10 mg Oral Q6H PRN  
 morphine injection 2 mg  2 mg IntraVENous Q6H PRN  
 sodium chloride (NS) flush 5-10 mL  5-10 mL IntraVENous Q8H  
 sodium chloride (NS) flush 5-10 mL  5-10 mL IntraVENous PRN  
 LORazepam (ATIVAN) tablet 1 mg  1 mg Oral Q1H PRN Or  
 LORazepam (ATIVAN) injection 1 mg  1 mg IntraVENous Q1H PRN  
 LORazepam (ATIVAN) tablet 2 mg  2 mg Oral Q1H PRN Or  
 LORazepam (ATIVAN) injection 2 mg  2 mg IntraVENous Q1H PRN  
 LORazepam (ATIVAN) injection 3 mg  3 mg IntraVENous Q15MIN PRN  
 0.9% sodium chloride infusion  50 mL/hr IntraVENous DIALYSIS PRN  
 heparin (porcine) 1,000 unit/mL injection 1,000 Units  1,000 Units InterCATHeter DIALYSIS PRN  pantoprazole (PROTONIX) tablet 40 mg  40 mg Oral ACB  hydrALAZINE (APRESOLINE) tablet 50 mg  50 mg Oral TID  calcitRIOL (ROCALTROL) capsule 0.5 mcg  0.5 mcg Oral DAILY  promethazine (PHENERGAN) tablet 25 mg  25 mg Oral Q8H PRN  
 amLODIPine (NORVASC) tablet 10 mg  10 mg Oral DAILY  calcium acetate (PHOSLO) capsule 1,334 mg  2 Cap Oral TID WITH MEALS  influenza vaccine 2018-19 (6 mos+)(PF) (FLUARIX QUAD/FLULAVAL QUAD) injection 0.5 mL  0.5 mL IntraMUSCular PRIOR TO DISCHARGE  isosorbide mononitrate ER (IMDUR) tablet 90 mg  90 mg Oral DAILY  amiodarone (CORDARONE) tablet 400 mg  400 mg Oral DAILY  atorvastatin (LIPITOR) tablet 40 mg  40 mg Oral QHS  fluticasone (FLONASE) 50 mcg/actuation nasal spray 2 Spray  2 Spray Both Nostrils DAILY  lidocaine 4 % patch 1 Patch  1 Patch TransDERmal Q24H  
 tiotropium (SPIRIVA) inhalation capsule 18 mcg  1 Cap Inhalation DAILY  ondansetron (ZOFRAN) injection 4 mg  4 mg IntraVENous Q4H PRN  
 albuterol-ipratropium (DUO-NEB) 2.5 MG-0.5 MG/3 ML  3 mL Nebulization Q4H PRN  
 buprenorphine-naloxone (SUBOXONE) 4mg-1mg SL film (Patient Supplied)  1 Film SubLINGual BID

## 2018-10-11 NOTE — PROGRESS NOTES
Patient received in bed resting, easily awaken. Patient A&Ox4, denies pain and discomfort. No distress noted. Frequently use items within reach. Bed locked in low position. Call bell within reach and Patient verbalized understanding of use for assistance and needs.

## 2018-10-12 LAB
ALBUMIN SERPL-MCNC: 1.8 G/DL (ref 3.4–5)
ANION GAP SERPL CALC-SCNC: 9 MMOL/L (ref 3–18)
BUN SERPL-MCNC: 28 MG/DL (ref 7–18)
BUN/CREAT SERPL: 4 (ref 12–20)
CALCIUM SERPL-MCNC: 7.7 MG/DL (ref 8.5–10.1)
CHLORIDE SERPL-SCNC: 99 MMOL/L (ref 100–108)
CO2 SERPL-SCNC: 27 MMOL/L (ref 21–32)
CREAT SERPL-MCNC: 7.19 MG/DL (ref 0.6–1.3)
ERYTHROCYTE [DISTWIDTH] IN BLOOD BY AUTOMATED COUNT: 16.5 % (ref 11.6–14.5)
GLUCOSE SERPL-MCNC: 93 MG/DL (ref 74–99)
HCT VFR BLD AUTO: 26.9 % (ref 36–48)
HGB BLD-MCNC: 8.4 G/DL (ref 13–16)
MCH RBC QN AUTO: 27 PG (ref 24–34)
MCHC RBC AUTO-ENTMCNC: 31.2 G/DL (ref 31–37)
MCV RBC AUTO: 86.5 FL (ref 74–97)
PHOSPHATE SERPL-MCNC: 5.1 MG/DL (ref 2.5–4.9)
PLATELET # BLD AUTO: 150 K/UL (ref 135–420)
PMV BLD AUTO: 8.3 FL (ref 9.2–11.8)
POTASSIUM SERPL-SCNC: 4 MMOL/L (ref 3.5–5.5)
RBC # BLD AUTO: 3.11 M/UL (ref 4.7–5.5)
SODIUM SERPL-SCNC: 135 MMOL/L (ref 136–145)
WBC # BLD AUTO: 7.7 K/UL (ref 4.6–13.2)

## 2018-10-12 PROCEDURE — 74011250637 HC RX REV CODE- 250/637: Performed by: INTERNAL MEDICINE

## 2018-10-12 PROCEDURE — 74011250637 HC RX REV CODE- 250/637: Performed by: HOSPITALIST

## 2018-10-12 PROCEDURE — 85027 COMPLETE CBC AUTOMATED: CPT | Performed by: INTERNAL MEDICINE

## 2018-10-12 PROCEDURE — 36415 COLL VENOUS BLD VENIPUNCTURE: CPT | Performed by: INTERNAL MEDICINE

## 2018-10-12 PROCEDURE — 74011250636 HC RX REV CODE- 250/636: Performed by: HOSPITALIST

## 2018-10-12 PROCEDURE — 80069 RENAL FUNCTION PANEL: CPT | Performed by: INTERNAL MEDICINE

## 2018-10-12 PROCEDURE — 74011250636 HC RX REV CODE- 250/636: Performed by: INTERNAL MEDICINE

## 2018-10-12 PROCEDURE — 65660000000 HC RM CCU STEPDOWN

## 2018-10-12 PROCEDURE — 74011000250 HC RX REV CODE- 250: Performed by: INTERNAL MEDICINE

## 2018-10-12 RX ADMIN — CALCIUM ACETATE 1334 MG: 667 CAPSULE ORAL at 12:23

## 2018-10-12 RX ADMIN — Medication 10 ML: at 14:00

## 2018-10-12 RX ADMIN — ONDANSETRON 4 MG: 2 INJECTION INTRAMUSCULAR; INTRAVENOUS at 05:23

## 2018-10-12 RX ADMIN — CALCIUM ACETATE 1334 MG: 667 CAPSULE ORAL at 16:25

## 2018-10-12 RX ADMIN — MORPHINE SULFATE 2 MG: 4 INJECTION INTRAVENOUS at 17:26

## 2018-10-12 RX ADMIN — HYDRALAZINE HYDROCHLORIDE 50 MG: 50 TABLET, FILM COATED ORAL at 09:29

## 2018-10-12 RX ADMIN — HYDRALAZINE HYDROCHLORIDE 50 MG: 50 TABLET, FILM COATED ORAL at 16:25

## 2018-10-12 RX ADMIN — Medication 10 ML: at 21:19

## 2018-10-12 RX ADMIN — Medication 10 ML: at 06:35

## 2018-10-12 RX ADMIN — CALCITRIOL 0.5 MCG: 0.25 CAPSULE ORAL at 09:30

## 2018-10-12 RX ADMIN — CALCIUM ACETATE 1334 MG: 667 CAPSULE ORAL at 09:29

## 2018-10-12 RX ADMIN — ATORVASTATIN CALCIUM 40 MG: 40 TABLET, FILM COATED ORAL at 21:15

## 2018-10-12 RX ADMIN — AMIODARONE HYDROCHLORIDE 400 MG: 200 TABLET ORAL at 09:29

## 2018-10-12 RX ADMIN — AMLODIPINE BESYLATE 10 MG: 5 TABLET ORAL at 09:29

## 2018-10-12 RX ADMIN — TIOTROPIUM BROMIDE 18 MCG: 18 CAPSULE ORAL; RESPIRATORY (INHALATION) at 09:31

## 2018-10-12 RX ADMIN — MORPHINE SULFATE 2 MG: 4 INJECTION INTRAVENOUS at 02:15

## 2018-10-12 RX ADMIN — MORPHINE SULFATE 2 MG: 4 INJECTION INTRAVENOUS at 09:41

## 2018-10-12 RX ADMIN — OXYCODONE HYDROCHLORIDE 10 MG: 5 TABLET ORAL at 05:23

## 2018-10-12 RX ADMIN — OXYCODONE HYDROCHLORIDE 10 MG: 5 TABLET ORAL at 13:43

## 2018-10-12 RX ADMIN — ISOSORBIDE MONONITRATE 90 MG: 30 TABLET, EXTENDED RELEASE ORAL at 09:29

## 2018-10-12 RX ADMIN — FLUTICASONE PROPIONATE 2 SPRAY: 50 SPRAY, METERED NASAL at 09:30

## 2018-10-12 RX ADMIN — OXYCODONE HYDROCHLORIDE 10 MG: 5 TABLET ORAL at 21:14

## 2018-10-12 RX ADMIN — PANTOPRAZOLE SODIUM 40 MG: 40 TABLET, DELAYED RELEASE ORAL at 12:20

## 2018-10-12 RX ADMIN — HYDRALAZINE HYDROCHLORIDE 50 MG: 50 TABLET, FILM COATED ORAL at 21:15

## 2018-10-12 NOTE — PROGRESS NOTES
0800 Patient is alert and oriented x4 Moves all extremities Complains of back pain Meds given Patient remained stable over shift 
1900. Bedside and Verbal shift change report given to 2W nurse (oncoming nurse) by Tabitha Ruiz (offgoing nurse). Report included the following information SBAR, Kardex and MAR.

## 2018-10-12 NOTE — PROGRESS NOTES
2137- In patient's chart to document vitals, pain med and other scheduled med given. Primary nurse busy with another patient will notify him.

## 2018-10-12 NOTE — PROGRESS NOTES
2670 Vincent Lai pt care from Denisse Francisco, [facility]. Pt in bed, alert and oriented x 4. Not in any form of distress. Denies pain. Frequent use items and call bell within reach. Verbalized understanding to call for assistance. Bed locked in lowest position. 0800 - Bedside and Verbal shift change report given to Hesham Doty RN (oncoming nurse) by Humaira Avilez RN (offgoing nurse). Report included the following information SBAR, Kardex, Intake/Output, MAR and Recent Results.

## 2018-10-12 NOTE — PROGRESS NOTES
[address] [place] [address] Hospitalist Division Inpatient Daily Progress Note Daily progress Note Patient: Elida Guillen MRN: [id number]  CSN: [id number] YOB: 1970  Age: 46 y.o. Sex: male DOA: 10/3/2018 LOS:  LOS: 9 days Chief Complaint:  Acute on chronic renal failure Interval History: 46 y.o.  male with h/o AICD,cardiac arrest,VT,ischemic cardiomyopathy EF 30%,obesity,cad,presented to ED because of weakness,nausea,vomting,abdominal pain,hematuria. Patient says that he started feeling sick 2 weeks ago when he started feeling weak. Then he started vomiting. Could not tolerate even water. In recent days ,he has had abdominal pain which he attributed to the ongoing vomiting. Patient also reported that he has seen his urine turning red for a while. Had to see his doctor in two weeks but felt very weak and decided to come to the ED. In ED,lab showed creatinine 11.0 compared to 2.12 on 1/11/2018 and 8.01 on 9/28/18. UA showed large blood,bacteria+. CT abd/pelvis w/o hydronephrosis or urolithiasis. Patient admitted for OLIVIA,Hematuria,UTI. Started on iv fluid. Nephrolohy consulted from ER. Since patient has GI complaint and renal involvement,nephrology has indicated that Henoch-Schonlein syndrome needs to be considered. Pt is on iv fluid. IgA elevated 648. Other serology for vasculitis including nathaniel,GBM ab,ANCA,C3,C4,heptitis profile not abnormal. Nephrology has indicated that if creatinine continue to rise over the weekend,patient will need dialysis. Today creat up to 12.00. Patient may also need kidney biopsy. Cardiology consulted significant cardiac history anticipating that he will have dialysis. Cardiology has determined that his cardiac status is stable. GI saw patient on 10/6 for ongoing symptoms. TDC catheter placed on 10/8. EGD on 10/9 showed chronic gastritis. HD on 10/9 and 10/10, next HD session on 10/12. Also plans for kidney biopsy this week. Assessment/Plan:  
 
Patient Active Problem List  
Diagnosis Code  ST elevation (STEMI) myocardial infarction involving left anterior descending coronary artery ([facility]) I21.02  
 Cardiac arrest - ventricular fibrillation  HTN (hypertension) I10  
 Hypertensive emergency I16.1  Hyperglycemia R73.9  Acute on chronic renal failure (HCC) N17.9, N18.9  Leukocytosis D72.829  
 Acute pulmonary edema ([facility]) J81.0  Contrast dye induced nephropathy N14.1, T50.8X5A  
 CKD (chronic kidney disease) N18.9  Arachnoiditis G03.9  Postlaminectomy syndrome, lumbar region M96.1  Psoriasis L40.9  Degeneration of lumbar or lumbosacral intervertebral disc M51.37  
 Encounter for long-term (current) use of high-risk medication Z79.899  Obesity E66.9  
 Pain in joint, lower leg M25.569  Psoriatic arthropathy ([facility] [address] L40.50  Chronic low back pain M54.5, G89.29  Chronic pain syndrome G89.4  Lumbosacral spondylosis without myelopathy M47.817  Enthesopathy of hip M76.899  
 Sacroiliitis ([facility]) M46.1  Coronary artery disease I25.10  
 JESSCIA (obstructive sleep apnea) G47.33  
 UTI (lower urinary tract infection) N39.0  Cardiomyopathy ([address] I42.9  AICD (automatic cardioverter/defibrillator) present Z95.810  
 OLIVIA (acute kidney injury) ([facility] [address] N17.9  Renal failure N19  
 Chest pain R07.9  
 SOB (shortness of breath) R06.02  Bronchitis J40  Acute renal failure (ARF) ([facility]) N17.9  ARF (acute renal failure) ([facility]) N17.9  Severe sepsis ([facility]) A41.9, R65.20  Epigastric abdominal pain R10.13  
 Anxiety F41.9  Ventricular tachycardia ([facility]) I47.2  Ventricular fibrillation ([facility]) I49.01  
 ICD (implantable cardioverter-defibrillator) discharge Z45.02  
 CAD (coronary artery disease) I25.10  Obesity, morbid ([facility] [address] E66.01  
 Bacteria in urine S33.64  
 Metabolic acidosis B49.8 A/P: 
Acute on chronic renal failure/Metabolic acidosis - nephrology following, appreciate assistance 
- creatinine 11.00 POA 
 -Etiology:volume depletion -?acute GN as pt also hematuria. -May need kidney biopsy. Plavix on hold 
 -Serology:IgA elevated. Other normal such as MANUELITO,ANCA,C3,C4,Hep profile. -s/p [facility] 10/8 - sp dialysis 10/9 with 500 ml fluid removed, 10/10 nd next today 10/12 
- plan for kidney biopsy tentatively on Monday, unless renal function is better over the weekend 
   
Bacteria in urine/UTI/Hematuria 
 -Ceftriaxone iv - completed 
 -Blood cx ng/Ucx negative. -Vasculitis serology non-revealing 
  
Secondary hyperparathyroidism  
 -On calcitriol 
   
Nausea/vomiting/abdominal pain 
 -CT reviewed,no acute process. -GI, [facility] saw patient on 10/6 - EGD 10/9, chronic gastritis 
   
HTN 
 -Hydralazine prn for sbp>160 
   
JESSICA (obstructive sleep apnea) 
 -On oxygen 
   
Cardiomyopathy 
- EF 25-30% - ICD (implantable cardioverter-defibrillator) discharge CAD (coronary artery disease) 
 -Continue routine meds 
 -Telemetry monitoring 
 -Cardiology saw patient on 10/6 and determined that patient's cardiac status is stable and advised to continue his home meds. DVT Prophylaxis - SCD's BLE MONALISA [facility] Hospitalist Division Pager:  [phone] Office:  [phone] Subjective:  
  
Asking for increased pain meds. For dialysis today. Family at bedside and updated on plan of care. Objective:  
  
Visit Vitals  /51  Pulse 65  Temp 98.3 °F (36.8 °C)  Resp 16  
 Ht 5' 10\" (1.778 m)  Wt 118.4 kg (261 lb)  SpO2 95%  BMI 37.45 kg/m2 Physical Exam: 
General appearance: alert, cooperative, no distress, appears stated age, [facility] cath right Head: Normocephalic, without obvious abnormality, atraumatic Lungs: clear to auscultation bilaterally Heart: regular rate and rhythm, S1, S2 normal, no murmur, click, rub or gallop Abdomen: soft, non tender, non distended. Normoactive bowel sounds. Extremities: extremities normal, atraumatic, no cyanosis or edema Skin: flaky Neurologic: Grossly normal 
PSY: Mood and affect normal, appropriately behaved Intake and Output: 
Current Shift:    
Last three shifts:  10/10 1901 - 10/12 0700 In: -  
Out: 850 [Urine:850] No results found for this or any previous visit (from the past 24 hour(s)). Lab Results Component Value Date/Time Glucose 101 (H) 10/11/2018 06:50 AM  
 Glucose 118 (H) 10/10/2018 05:19 AM  
 Glucose 96 10/09/2018 07:45 AM  
 Glucose 92 10/08/2018 05:21 AM  
 Glucose 81 10/07/2018 04:20 AM  
  
 
Imaging: No results found. Medication List Reviewed: 
Current Facility-Administered Medications Medication Dose Route Frequency  oxyCODONE IR (ROXICODONE) tablet 10 mg  10 mg Oral Q6H PRN  
 morphine injection 2 mg  2 mg IntraVENous Q6H PRN  
 sodium chloride (NS) flush 5-10 mL  5-10 mL IntraVENous Q8H  
 sodium chloride (NS) flush 5-10 mL  5-10 mL IntraVENous PRN  
 LORazepam (ATIVAN) tablet 1 mg  1 mg Oral Q1H PRN Or  
 LORazepam (ATIVAN) injection 1 mg  1 mg IntraVENous Q1H PRN  
 LORazepam (ATIVAN) tablet 2 mg  2 mg Oral Q1H PRN Or  
 LORazepam (ATIVAN) injection 2 mg  2 mg IntraVENous Q1H PRN  
 LORazepam (ATIVAN) injection 3 mg  3 mg IntraVENous Q15MIN PRN  
 0.9% sodium chloride infusion  50 mL/hr IntraVENous DIALYSIS PRN  
 heparin (porcine) 1,000 unit/mL injection 1,000 Units  1,000 Units InterCATHeter DIALYSIS PRN  pantoprazole (PROTONIX) tablet 40 mg  40 mg Oral ACB  hydrALAZINE (APRESOLINE) tablet 50 mg  50 mg Oral TID  calcitRIOL (ROCALTROL) capsule 0.5 mcg  0.5 mcg Oral DAILY  promethazine (PHENERGAN) tablet 25 mg  25 mg Oral Q8H PRN  
 amLODIPine (NORVASC) tablet 10 mg  10 mg Oral DAILY  calcium acetate (PHOSLO) capsule 1,334 mg  2 Cap Oral TID WITH MEALS  
  influenza vaccine 2018-19 (6 mos+)(PF) (FLUARIX QUAD/FLULAVAL QUAD) injection 0.5 mL  0.5 mL IntraMUSCular PRIOR TO DISCHARGE  isosorbide mononitrate ER (IMDUR) tablet 90 mg  90 mg Oral DAILY  amiodarone (CORDARONE) tablet 400 mg  400 mg Oral DAILY  atorvastatin (LIPITOR) tablet 40 mg  40 mg Oral QHS  fluticasone (FLONASE) 50 mcg/actuation nasal spray 2 Spray  2 Spray Both Nostrils DAILY  lidocaine 4 % patch 1 Patch  1 Patch TransDERmal Q24H  
 tiotropium (SPIRIVA) inhalation capsule 18 mcg  1 Cap Inhalation DAILY  ondansetron (ZOFRAN) injection 4 mg  4 mg IntraVENous Q4H PRN  
 albuterol-ipratropium (DUO-NEB) 2.5 MG-0.5 MG/3 ML  3 mL Nebulization Q4H PRN  
 buprenorphine-naloxone (SUBOXONE) 4mg-1mg SL film (Patient Supplied)  1 Film SubLINGual BID

## 2018-10-12 NOTE — PROGRESS NOTES
RENAL PROGRESS NOTE Roshni Gordon Assessment/Plan: · OLIVIA. (ischemic atn in a  setting of recent onset of n/v). Acute GN is also possible given gross hematuria and nephrotic proteinuria. GN/vasculitis serologies are neg. May need kidney biopsy- antiplt agents are on hold. Will tentatively plan biopsy for Monday (unless renal function is better over the weekend), Meanwhile continue dialysis- today and tomorrow. 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 · HTN with variable control. Continue amlodipine. Monitor bp with dialysis, volume removal.  
· N/V. Etio? CT finding noted. EGD results noted- diffuse gastritis. Improving clinically. · Secondary hyperparathyroidism/hyperphsphotemia. Continue calcitriol and phoslo. Subjective:Patient complaints off: Feel better, main problem is withdrawal from subaxon with muscle aches, sweats, fatigue. No SOB/CP. No n/v. Tolerates diet. Getting stronger. Patient Active Problem List  
Diagnosis Code  ST elevation (STEMI) myocardial infarction involving left anterior descending coronary artery ([facility]) I21.02  
 Cardiac arrest - ventricular fibrillation  HTN (hypertension) I10  
 Hypertensive emergency I16.1  Hyperglycemia R73.9  Acute on chronic renal failure (HCC) N17.9, N18.9  Leukocytosis D72.829  
 Acute pulmonary edema (HCC) J81.0  Contrast dye induced nephropathy N14.1, T50.8X5A  
 CKD (chronic kidney disease) N18.9  Arachnoiditis G03.9  Postlaminectomy syndrome, lumbar region M96.1  Psoriasis L40.9  Degeneration of lumbar or lumbosacral intervertebral disc M51.37  
 Encounter for long-term (current) use of high-risk medication Z79.899  Obesity E66.9  
 Pain in joint, lower leg M25.569  Psoriatic arthropathy ([facility] [address] L40.50  Chronic low back pain M54.5, G89.29  Chronic pain syndrome G89.4  Lumbosacral spondylosis without myelopathy M47.817  Enthesopathy of hip M76.899  
 Sacroiliitis (HCC) M46.1  Coronary artery disease I25.10  
 JESSICA (obstructive sleep apnea) G47.33  
 UTI (lower urinary tract infection) N39.0  Cardiomyopathy ([facility] I42.9  AICD (automatic cardioverter/defibrillator) present Z95.810  
 OLIVIA (acute kidney injury) ([facility] N17.9  Renal failure N19  
 Chest pain R07.9  
 SOB (shortness of breath) R06.02  Bronchitis J40  Acute renal failure (ARF) (HCC) N17.9  ARF (acute renal failure) ([facility]) N17.9  Severe sepsis ([facility]) A41.9, R65.20  Epigastric abdominal pain R10.13  
 Anxiety F41.9  Ventricular tachycardia ([facility]) I47.2  Ventricular fibrillation ([facility]) I49.01  
 ICD (implantable cardioverter-defibrillator) discharge Z45.02  
 CAD (coronary artery disease) I25.10  Obesity, morbid ([facility] E66.01  
 Bacteria in urine P96.44  
 Metabolic acidosis G34.4 Current Facility-Administered Medications Medication Dose Route Frequency Provider Last Rate Last Dose  oxyCODONE IR (ROXICODONE) tablet 10 mg  10 mg Oral Q6H PRN Kandy Gotti MD   10 mg at 10/12/18 1343  morphine injection 2 mg  2 mg IntraVENous Q6H PRN Debra Santiago MD   2 mg at 10/12/18 0941  
 sodium chloride (NS) flush 5-10 mL  5-10 mL IntraVENous Q8H Debra Santiago MD   10 mL at 10/12/18 1836  sodium chloride (NS) flush 5-10 mL  5-10 mL IntraVENous PRN Debra Santiago MD      
 LORazepam (ATIVAN) tablet 1 mg  1 mg Oral Q1H PRN Debra Santiago MD   1 mg at 10/10/18 1826 Or  LORazepam (ATIVAN) injection 1 mg  1 mg IntraVENous Q1H PRN Debra Santiago MD   1 mg at 10/10/18 9909  LORazepam (ATIVAN) tablet 2 mg  2 mg Oral Q1H PRN Debra Santiago MD   2 mg at 10/11/18 8442 Or  LORazepam (ATIVAN) injection 2 mg  2 mg IntraVENous Q1H PRN Debra Santiago MD   2 mg at 10/11/18 8292  LORazepam (ATIVAN) injection 3 mg  3 mg IntraVENous Q15MIN PRN Rashaad Robles MD Isabel      
 0.9% sodium chloride infusion  50 mL/hr IntraVENous DIALYSIS PRN Cleo Carrillo MD      
 heparin (porcine) 1,000 unit/mL injection 1,000 Units  1,000 Units InterCATHeter DIALYSIS PRN Cleo Carrillo MD      
 pantoprazole (PROTONIX) tablet 40 mg  40 mg Oral ACB Pam Child MD   40 mg at 10/12/18 1220  hydrALAZINE (APRESOLINE) tablet 50 mg  50 mg Oral TID Keena Garnica MD   50 mg at 10/12/18 8115  calcitRIOL (ROCALTROL) capsule 0.5 mcg  0.5 mcg Oral DAILY Keena Garnica MD   0.5 mcg at 10/12/18 0930  promethazine (PHENERGAN) tablet 25 mg  25 mg Oral Q8H PRN Pam Child MD   25 mg at 10/10/18 1441  
 amLODIPine (NORVASC) tablet 10 mg  10 mg Oral DAILY Cleo Carrillo MD   10 mg at 10/12/18 0025  calcium acetate (PHOSLO) capsule 1,334 mg  2 Cap Oral TID WITH MEALS Rosa Maria BATEMAN MD   1,334 mg at 10/12/18 1223  
 influenza vaccine 2018-19 (6 mos+)(PF) (FLUARIX QUAD/FLULAVAL QUAD) injection 0.5 mL  0.5 mL IntraMUSCular PRIOR TO DISCHARGE Pam Child MD      
 isosorbide mononitrate ER (IMDUR) tablet 90 mg  90 mg Oral DAILY Pam Child MD   90 mg at 10/12/18 0929  
 amiodarone (CORDARONE) tablet 400 mg  400 mg Oral DAILY Pam Child MD   400 mg at 10/12/18 0929  
 atorvastatin (LIPITOR) tablet 40 mg  40 mg Oral QHS Pam Child MD   40 mg at 10/11/18 2135  fluticasone (FLONASE) 50 mcg/actuation nasal spray 2 Spray  2 Spray Both Nostrils DAILY Pam Child MD   2 Spray at 10/12/18 0930  lidocaine 4 % patch 1 Patch  1 Patch TransDERmal Q24H Pam Child MD   1 Patch at 10/11/18 1730  tiotropium (SPIRIVA) inhalation capsule 18 mcg  1 Cap Inhalation DAILY Pam Child MD   18 mcg at 10/12/18 0931  
 ondansetron (ZOFRAN) injection 4 mg  4 mg IntraVENous Q4H PRN Pam Child MD   4 mg at 10/12/18 9821  albuterol-ipratropium (DUO-NEB) 2.5 MG-0.5 MG/3 ML  3 mL Nebulization Q4H PRN Paris Thompson MD   3 mL at 10/10/18 2037  buprenorphine-naloxone (SUBOXONE) 4mg-1mg SL film (Patient Supplied)  1 Film SubLINGual BID Paris Thompson MD   Stopped at 10/08/18 1014 Objective Vitals:  
 10/12/18 6693 10/12/18 8176 10/12/18 0933 10/12/18 1328 BP: 125/51  143/79 140/78 Pulse: 65  (!) 113 90 Resp: 16  16 16 Temp: 98.3 °F (36.8 °C)  98.8 °F (37.1 °C) 98.5 °F (36.9 °C) TempSrc:      
SpO2: (!) 88% 95% 93% 92% Weight:      
Height:      
 
 
 
Intake/Output Summary (Last 24 hours) at 10/12/18 1537 Last data filed at 10/12/18 1001 Gross per 24 hour Intake              720 ml Output              400 ml Net              320 ml Admission weight: Weight: 118.7 kg (261 lb 11 oz) (10/04/18 4063) Last Weight Metrics: 
Weight Loss Metrics 10/5/2018 2/14/2018 2/8/2018 1/11/2018 8/22/2017 8/20/2017 3/2/2017 Today's Wt 261 lb 250 lb 250 lb 286 lb 280 lb 258 lb 258 lb BMI 37.45 kg/m2 35.87 kg/m2 35.87 kg/m2 41.04 kg/m2 40.18 kg/m2 37.02 kg/m2 37.02 kg/m2 Physical Assessment:  
 
General: NAD, alert and oriented. Neck: No jvd. Rt ij tdc. LUNGS: Clear to Auscultation, No rales, rhonchi or wheezes. CVS EXM: S1, S2  RRR, no murmurs/gallops/rubs. Abdomen: nt/nd. Lower Extremities: 1+edema. Skin: extensive scaly rash ue/le. Lab CBC w/Diff No results for input(s): WBC, RBC, HGB, HCT, PLT, GRANS, LYMPH, EOS, HGBEXT, HCTEXT, PLTEXT, HGBEXT, HCTEXT, PLTEXT in the last 72 hours. Chemistry Recent Labs 10/12/18 
 2123  10/11/18 
 2271  10/10/18 
 5077 GLU  93  101*  118* NA  135*  138  138  
K  4.0  3.8  3.7 CL  99*  102  102 CO2  27  29  27 BUN  28*  20*  31* CREA  7.19*  5.70*  6.66* CA  7.7*  7.2*  7.1* AGAP  9  7  9 BUCR  4*  4*  5* ALB  1.8*  1.8*  1.9*  
PHOS  5.1*  3.4  4.6 Lab Results Component Value Date/Time  Iron 86 05/18/2015 03:00 AM  
 TIBC 260 05/18/2015 03:00 AM  
 Iron % saturation 33 05/18/2015 03:00 AM  
 Ferritin 151 05/18/2015 03:00 AM  
  
Lab Results Component Value Date/Time Calcium 7.7 (L) 10/12/2018 07:55 AM  
 Phosphorus 5.1 (H) 10/12/2018 07:55 AM  
  
 
Keena Morrow M.D. [facility] Phone [phone] Pager [phone]

## 2018-10-12 NOTE — PROGRESS NOTES
1920 - Assumed pt care from Cate Weiner, [address] [place] [address]. Pt in bed, sleeping. Not in any form of distress. No signs of pain. Frequent use items and call bell within reach. Verbalized understanding to call for assistance. Bed locked in lowest position. 0450 - IV on the R upper arm infiltrated and removed. 24 gauge IV line inserted on the left hand. Flushes with blood return. 0740 - Bedside and Verbal shift change report given to West Rodriguez RN (oncoming nurse) by Edwige Garza RN (offgoing nurse). Report included the following information SBAR, Kardex, Intake/Output, MAR and Recent Results.

## 2018-10-12 NOTE — PROGRESS NOTES
Problem: Falls - Risk of 
Goal: *Absence of Falls Document Nathan Cha Fall Risk and appropriate interventions in the flowsheet. Outcome: Progressing Towards Goal 
Fall Risk Interventions: 
  
 
  
 
Medication Interventions: Evaluate medications/consider consulting pharmacy History of Falls Interventions: Door open when patient unattended

## 2018-10-13 LAB
ALBUMIN SERPL-MCNC: 1.8 G/DL (ref 3.4–5)
ANION GAP SERPL CALC-SCNC: 9 MMOL/L (ref 3–18)
BUN SERPL-MCNC: 37 MG/DL (ref 7–18)
BUN/CREAT SERPL: 5 (ref 12–20)
CALCIUM SERPL-MCNC: 7.7 MG/DL (ref 8.5–10.1)
CHLORIDE SERPL-SCNC: 98 MMOL/L (ref 100–108)
CO2 SERPL-SCNC: 27 MMOL/L (ref 21–32)
CREAT SERPL-MCNC: 7.83 MG/DL (ref 0.6–1.3)
GLUCOSE SERPL-MCNC: 99 MG/DL (ref 74–99)
PHOSPHATE SERPL-MCNC: 5.5 MG/DL (ref 2.5–4.9)
POTASSIUM SERPL-SCNC: 4.6 MMOL/L (ref 3.5–5.5)
SODIUM SERPL-SCNC: 134 MMOL/L (ref 136–145)

## 2018-10-13 PROCEDURE — 74011000250 HC RX REV CODE- 250: Performed by: INTERNAL MEDICINE

## 2018-10-13 PROCEDURE — 90935 HEMODIALYSIS ONE EVALUATION: CPT

## 2018-10-13 PROCEDURE — 74011250636 HC RX REV CODE- 250/636: Performed by: HOSPITALIST

## 2018-10-13 PROCEDURE — 74011250637 HC RX REV CODE- 250/637: Performed by: INTERNAL MEDICINE

## 2018-10-13 PROCEDURE — 74011250637 HC RX REV CODE- 250/637: Performed by: HOSPITALIST

## 2018-10-13 PROCEDURE — 80069 RENAL FUNCTION PANEL: CPT | Performed by: INTERNAL MEDICINE

## 2018-10-13 PROCEDURE — 36415 COLL VENOUS BLD VENIPUNCTURE: CPT | Performed by: INTERNAL MEDICINE

## 2018-10-13 PROCEDURE — 65660000000 HC RM CCU STEPDOWN

## 2018-10-13 PROCEDURE — 74011250636 HC RX REV CODE- 250/636: Performed by: INTERNAL MEDICINE

## 2018-10-13 RX ORDER — DEXTROSE MONOHYDRATE AND SODIUM CHLORIDE 5; .45 G/100ML; G/100ML
50 INJECTION, SOLUTION INTRAVENOUS CONTINUOUS
Status: DISCONTINUED | OUTPATIENT
Start: 2018-10-15 | End: 2018-10-15

## 2018-10-13 RX ADMIN — CALCIUM ACETATE 1334 MG: 667 CAPSULE ORAL at 09:38

## 2018-10-13 RX ADMIN — TIOTROPIUM BROMIDE 18 MCG: 18 CAPSULE ORAL; RESPIRATORY (INHALATION) at 09:49

## 2018-10-13 RX ADMIN — Medication 10 ML: at 14:15

## 2018-10-13 RX ADMIN — OXYCODONE HYDROCHLORIDE 10 MG: 5 TABLET ORAL at 10:10

## 2018-10-13 RX ADMIN — Medication 10 ML: at 06:42

## 2018-10-13 RX ADMIN — PANTOPRAZOLE SODIUM 40 MG: 40 TABLET, DELAYED RELEASE ORAL at 09:38

## 2018-10-13 RX ADMIN — CALCIUM ACETATE 1334 MG: 667 CAPSULE ORAL at 14:18

## 2018-10-13 RX ADMIN — Medication 10 ML: at 22:00

## 2018-10-13 RX ADMIN — MORPHINE SULFATE 2 MG: 4 INJECTION INTRAVENOUS at 14:15

## 2018-10-13 RX ADMIN — FLUTICASONE PROPIONATE 2 SPRAY: 50 SPRAY, METERED NASAL at 09:49

## 2018-10-13 RX ADMIN — ONDANSETRON 4 MG: 2 INJECTION INTRAMUSCULAR; INTRAVENOUS at 22:25

## 2018-10-13 RX ADMIN — MORPHINE SULFATE 2 MG: 4 INJECTION INTRAVENOUS at 21:16

## 2018-10-13 RX ADMIN — ATORVASTATIN CALCIUM 40 MG: 40 TABLET, FILM COATED ORAL at 21:15

## 2018-10-13 RX ADMIN — OXYCODONE HYDROCHLORIDE 10 MG: 5 TABLET ORAL at 04:01

## 2018-10-13 RX ADMIN — MORPHINE SULFATE 2 MG: 4 INJECTION INTRAVENOUS at 00:44

## 2018-10-13 RX ADMIN — ISOSORBIDE MONONITRATE 90 MG: 30 TABLET, EXTENDED RELEASE ORAL at 09:37

## 2018-10-13 RX ADMIN — MORPHINE SULFATE 2 MG: 4 INJECTION INTRAVENOUS at 06:42

## 2018-10-13 RX ADMIN — ONDANSETRON 4 MG: 2 INJECTION INTRAMUSCULAR; INTRAVENOUS at 04:04

## 2018-10-13 RX ADMIN — CALCITRIOL 0.5 MCG: 0.25 CAPSULE ORAL at 09:48

## 2018-10-13 RX ADMIN — HYDRALAZINE HYDROCHLORIDE 50 MG: 50 TABLET, FILM COATED ORAL at 21:16

## 2018-10-13 NOTE — PROGRESS NOTES
[address] [place] [address] Hospitalist Division Inpatient Daily Progress Note Daily progress Note Patient: Inge Stout MRN: [id number]  [facility]: [id number] YOB: 1970  Age: 46 y.o. Sex: male DOA: 10/3/2018 LOS:  LOS: 10 days Chief Complaint:  Acute on chronic renal failure Interval History: 46 y.o.  male with h/o AICD,cardiac arrest,VT,ischemic cardiomyopathy EF 30%,obesity,cad,presented to ED because of weakness,nausea,vomting,abdominal pain,hematuria. Patient says that he started feeling sick 2 weeks ago when he started feeling weak. Then he started vomiting. Could not tolerate even water. In recent days ,he has had abdominal pain which he attributed to the ongoing vomiting. Patient also reported that he has seen his urine turning red for a while. Had to see his doctor in two weeks but felt very weak and decided to come to the ED. In ED,lab showed creatinine 11.0 compared to 2.12 on 1/11/2018 and 8.01 on 9/28/18. UA showed large blood,bacteria+. CT abd/pelvis w/o hydronephrosis or urolithiasis. Patient admitted for OLIVIA,Hematuria,UTI. Started on iv fluid. Nephrolohy consulted from ER. Since patient has GI complaint and renal involvement,nephrology has indicated that Henoch-Schonlein syndrome needs to be considered. Pt is on iv fluid. IgA elevated 648. Other serology for vasculitis including nathaniel,GBM ab,ANCA,C3,C4,heptitis profile not abnormal. Nephrology has indicated that if creatinine continue to rise over the weekend,patient will need dialysis. Today creat up to 12.00. Patient may also need kidney biopsy. Cardiology consulted significant cardiac history anticipating that he will have dialysis. Cardiology has determined that his cardiac status is stable. GI saw patient on 10/6 for ongoing symptoms. TDC catheter placed on 10/8. EGD on 10/9 showed chronic gastritis. HD on 10/9 and 10/10, next HD session on 10/12.   Also plans for kidney biopsy on Monday 10/15. Assessment/Plan:  
 
Patient Active Problem List  
Diagnosis Code  ST elevation (STEMI) myocardial infarction involving left anterior descending coronary artery ([facility]) I21.02  
 Cardiac arrest - ventricular fibrillation  HTN (hypertension) I10  
 Hypertensive emergency I16.1  Hyperglycemia R73.9  Acute on chronic renal failure (HCC) N17.9, N18.9  Leukocytosis D72.829  
 Acute pulmonary edema ([facility]) J81.0  Contrast dye induced nephropathy N14.1, T50.8X5A  
 CKD (chronic kidney disease) N18.9  Arachnoiditis G03.9  Postlaminectomy syndrome, lumbar region M96.1  Psoriasis L40.9  Degeneration of lumbar or lumbosacral intervertebral disc M51.37  
 Encounter for long-term (current) use of high-risk medication Z79.899  Obesity E66.9  
 Pain in joint, lower leg M25.569  Psoriatic arthropathy ([facility] L40.50  Chronic low back pain M54.5, G89.29  Chronic pain syndrome G89.4  Lumbosacral spondylosis without myelopathy M47.817  Enthesopathy of hip M76.899  
 Sacroiliitis ([facility]) M46.1  Coronary artery disease I25.10  
 JESSICA (obstructive sleep apnea) G47.33  
 UTI (lower urinary tract infection) N39.0  Cardiomyopathy ([address] I42.9  AICD (automatic cardioverter/defibrillator) present Z95.810  
 OLIVIA (acute kidney injury) ([facility] [facility] N17.9  Renal failure N19  
 Chest pain R07.9  
 SOB (shortness of breath) R06.02  Bronchitis J40  Acute renal failure (ARF) ([facility]) N17.9  ARF (acute renal failure) ([facility]) N17.9  Severe sepsis ([facility]) A41.9, R65.20  Epigastric abdominal pain R10.13  
 Anxiety F41.9  Ventricular tachycardia ([facility]) I47.2  Ventricular fibrillation ([facility]) I49.01  
 ICD (implantable cardioverter-defibrillator) discharge Z45.02  
 CAD (coronary artery disease) I25.10  Obesity, morbid ([address] E66.01  
 Bacteria in urine C07.38  
 Metabolic acidosis H21.3 A/P: 
 Acute on chronic renal failure/Metabolic acidosis  
- nephrology following, appreciate assistance 
- creatinine 11.00 POA 
 -Etiology:volume depletion -?acute GN as pt also hematuria. -May need kidney biopsy. Plavix on hold 
 -Serology:IgA elevated. Other normal such as MANUELITO,ANCA,C3,C4,Hep profile. -s/p [facility] 10/8 - sp dialysis 10/9 with 500 ml fluid removed, 10/10 nd next today 10/12 
- plan for kidney biopsy tentatively on Monday, unless renal function is better over the weekend 
   
Bacteria in urine/UTI/Hematuria 
 -Ceftriaxone iv - completed 
 -Blood cx ng/Ucx negative. -Vasculitis serology non-revealing 
  
Secondary hyperparathyroidism  
 -On calcitriol 
   
Nausea/vomiting/abdominal pain 
 -CT reviewed,no acute process. -GI, [facility] saw patient on 10/6 - EGD 10/9, chronic gastritis 
   
HTN 
 -Hydralazine prn for sbp>160 
   
JESSICA (obstructive sleep apnea) 
 -On oxygen 
   
Cardiomyopathy 
- EF 25-30% - ICD (implantable cardioverter-defibrillator) discharge CAD (coronary artery disease) 
 -Continue routine meds 
 -Telemetry monitoring 
 -Cardiology saw patient on 10/6 and determined that patient's cardiac status is stable and advised to continue his home meds. DVT Prophylaxis - SCD's BLE MIRIAM Barajas [facility] Hospitalist Division Pager:  [phone] Office:  [phone] Subjective: Interval notes reviewed. No acute events overnight. No complaints at this time. Was unable to do dialysis yesterday, planning for today. CT guided biopsy ordered for Monday. Objective:  
  
Visit Vitals  /67  Pulse (!) 56  Temp 98.5 °F (36.9 °C)  Resp 16  
 Ht 5' 10\" (1.778 m)  Wt 118.4 kg (261 lb)  SpO2 95%  BMI 37.45 kg/m2 Physical Exam: 
General appearance: alert, cooperative, no distress, appears stated age, [facility] cath right Head: Normocephalic, without obvious abnormality, atraumatic Lungs: clear to auscultation bilaterally Heart: regular rate and rhythm, S1, S2 normal, no murmur, click, rub or gallop Abdomen: soft, non tender, non distended. Normoactive bowel sounds. Extremities: extremities normal, atraumatic, no cyanosis or edema Skin: flaky Neurologic: Grossly normal 
PSY: Mood and affect normal, appropriately behaved Intake and Output: 
Current Shift:    
Last three shifts:  10/11 1901 - 10/13 0700 In: 720 [P.O.:720] Out: 400 [Urine:400] Recent Results (from the past 24 hour(s)) RENAL FUNCTION PANEL Collection Time: 10/12/18  7:55 AM  
Result Value Ref Range Sodium 135 (L) 136 - 145 mmol/L Potassium 4.0 3.5 - 5.5 mmol/L Chloride 99 (L) 100 - 108 mmol/L  
 CO2 27 21 - 32 mmol/L Anion gap 9 3.0 - 18 mmol/L Glucose 93 74 - 99 mg/dL BUN 28 (H) 7.0 - 18 MG/DL Creatinine 7.19 (H) 0.6 - 1.3 MG/DL  
 BUN/Creatinine ratio 4 (L) 12 - 20 GFR est AA 10 (L) >60 ml/min/1.73m2 GFR est non-AA 8 (L) >60 ml/min/1.73m2 Calcium 7.7 (L) 8.5 - 10.1 MG/DL Phosphorus 5.1 (H) 2.5 - 4.9 MG/DL Albumin 1.8 (L) 3.4 - 5.0 g/dL CBC W/O DIFF Collection Time: 10/12/18  5:02 PM  
Result Value Ref Range WBC 7.7 4.6 - 13.2 K/uL  
 RBC 3.11 (L) 4.70 - 5.50 M/uL HGB 8.4 (L) 13.0 - 16.0 g/dL HCT 26.9 (L) 36.0 - 48.0 % MCV 86.5 74.0 - 97.0 FL  
 MCH 27.0 24.0 - 34.0 PG  
 MCHC 31.2 31.0 - 37.0 g/dL  
 RDW 16.5 (H) 11.6 - 14.5 % PLATELET 055 604 - 140 K/uL MPV 8.3 (L) 9.2 - 11.8 FL  
RENAL FUNCTION PANEL Collection Time: 10/13/18  4:50 AM  
Result Value Ref Range Sodium 134 (L) 136 - 145 mmol/L Potassium 4.6 3.5 - 5.5 mmol/L Chloride 98 (L) 100 - 108 mmol/L  
 CO2 27 21 - 32 mmol/L Anion gap 9 3.0 - 18 mmol/L Glucose 99 74 - 99 mg/dL BUN 37 (H) 7.0 - 18 MG/DL Creatinine 7.83 (H) 0.6 - 1.3 MG/DL  
 BUN/Creatinine ratio 5 (L) 12 - 20 GFR est AA 9 (L) >60 ml/min/1.73m2 GFR est non-AA 7 (L) >60 ml/min/1.73m2 Calcium 7.7 (L) 8.5 - 10.1 MG/DL Phosphorus 5.5 (H) 2.5 - 4.9 MG/DL Albumin 1.8 (L) 3.4 - 5.0 g/dL Lab Results Component Value Date/Time Glucose 99 10/13/2018 04:50 AM  
 Glucose 93 10/12/2018 07:55 AM  
 Glucose 101 (H) 10/11/2018 06:50 AM  
 Glucose 118 (H) 10/10/2018 05:19 AM  
 Glucose 96 10/09/2018 07:45 AM  
  
 
Imaging: No results found. Medication List Reviewed: 
Current Facility-Administered Medications Medication Dose Route Frequency  oxyCODONE IR (ROXICODONE) tablet 10 mg  10 mg Oral Q6H PRN  
 morphine injection 2 mg  2 mg IntraVENous Q6H PRN  
 sodium chloride (NS) flush 5-10 mL  5-10 mL IntraVENous Q8H  
 sodium chloride (NS) flush 5-10 mL  5-10 mL IntraVENous PRN  
 LORazepam (ATIVAN) tablet 1 mg  1 mg Oral Q1H PRN Or  
 LORazepam (ATIVAN) injection 1 mg  1 mg IntraVENous Q1H PRN  
 LORazepam (ATIVAN) tablet 2 mg  2 mg Oral Q1H PRN Or  
 LORazepam (ATIVAN) injection 2 mg  2 mg IntraVENous Q1H PRN  
 LORazepam (ATIVAN) injection 3 mg  3 mg IntraVENous Q15MIN PRN  
 0.9% sodium chloride infusion  50 mL/hr IntraVENous DIALYSIS PRN  
 heparin (porcine) 1,000 unit/mL injection 1,000 Units  1,000 Units InterCATHeter DIALYSIS PRN  pantoprazole (PROTONIX) tablet 40 mg  40 mg Oral ACB  hydrALAZINE (APRESOLINE) tablet 50 mg  50 mg Oral TID  calcitRIOL (ROCALTROL) capsule 0.5 mcg  0.5 mcg Oral DAILY  promethazine (PHENERGAN) tablet 25 mg  25 mg Oral Q8H PRN  
 amLODIPine (NORVASC) tablet 10 mg  10 mg Oral DAILY  calcium acetate (PHOSLO) capsule 1,334 mg  2 Cap Oral TID WITH MEALS  influenza vaccine 2018-19 (6 mos+)(PF) (FLUARIX QUAD/FLULAVAL QUAD) injection 0.5 mL  0.5 mL IntraMUSCular PRIOR TO DISCHARGE  isosorbide mononitrate ER (IMDUR) tablet 90 mg  90 mg Oral DAILY  amiodarone (CORDARONE) tablet 400 mg  400 mg Oral DAILY  atorvastatin (LIPITOR) tablet 40 mg  40 mg Oral QHS  fluticasone (FLONASE) 50 mcg/actuation nasal spray 2 Spray  2 Spray Both Nostrils DAILY  lidocaine 4 % patch 1 Patch  1 Patch TransDERmal Q24H  
 tiotropium (SPIRIVA) inhalation capsule 18 mcg  1 Cap Inhalation DAILY  ondansetron (ZOFRAN) injection 4 mg  4 mg IntraVENous Q4H PRN  
 albuterol-ipratropium (DUO-NEB) 2.5 MG-0.5 MG/3 ML  3 mL Nebulization Q4H PRN  
 buprenorphine-naloxone (SUBOXONE) 4mg-1mg SL film (Patient Supplied)  1 Film SubLINGual BID

## 2018-10-13 NOTE — DIALYSIS
ACUTE HEMODIALYSIS FLOW SHEET 
 
HEMODIALYSIS ORDERS: Physician: RAFAEL Garcia MD 
  
Dialyzer: revaclear   Duration: 4 hr  BFR: 300   DFR: 800 Dialysate:  Temp 36 K+   3    Ca+  2.5 Na 140 Bicarb 35 Weight:  118.4 kg    Patient Chart []     Unable to Obtain []   Dry weight/UF Goal: 1000 ml Access Right chest catheter Needle Gauge NA Heparin []  Bolus      Units    [] Hourly       Units    []None Catheter locking solution Heparin Pre BP:   144/72    Pulse:     57     Temperature:   99.8  Respirations: 18  Tx: NS   250    ml/Bolus  Other        [] N/A Labs: Pre        Post:        [] N/A Additional Orders(medications, blood products, hypotension management):       [x] N/A [x] [facility] Consent Verified CATHETER ACCESS: []N/A   [x]Right chest  []Left   []IJ     []Fem [] First use X-ray verified     []Tunnel                [] Non Tunneled [x]No S/S infection  []Redness  []Drainage []Cultured []Swelling []Pain []Medical Aseptic Prep Utilized   []Dressing Changed  [] Biopatch  Date:      
[]Clotted   []Patent   Flows: [x]Good  []Poor  []Reversed If access problem,  notified: []Yes    [x]N/A  Date:        
 
GRAFT/FISTULA ACCESS:  [x]N/A     []Right     []Left     []UE     []LE []AVG   []AVF        []Buttonhole    []Medical Aseptic Prep Utilized []No S/S infection  []Redness  []Drainage []Cultured []Swelling []Pain Bruit:   [] Strong    [] Weak       Thrill :   [] Strong    [] Weak Needle Gauge: NA   Length: NA If access problem,  notified: []Yes     [x]N/A  Date:       
Please describe access if present and not used:  
 
 
GENERAL ASSESSMENT:   
LUNGS:  Rate 18 SaO2%        [x] N/A    [] Clear  [] Coarse  [] Crackles  [] Wheezing 
      [] Diminished     Location : []RLL   []LLL    []RUL  []NIKOS Cough: []Productive  []Dry  [x]N/A   Respirations:  []Easy  []Labored Therapy:  []RA  []NC  l/min    Mask: []NRB []Venti       O2% []Ventilator  []Intubated  [] Trach  [] BiPaP CARDIAC: [x]Regular      [] Irregular   [] Pericardial Rub  [] JVD []  Monitored  [] Bedside  [] Remotely monitored [] N/A  Rhythm: EDEMA: [] None  [x]Generalized  [] Pitting [] 1    [] 2    [] 3    [] 4 [] Facial  [] Pedal  []  UE  [] LE  
SKIN:   [x] Warm  [] Hot     [] Cold   [x] Dry (Dry, crusted lesions all over his body). [] Pale   [] Diaphoretic    
             [] Flushed  [] Jaundiced  [] Cyanotic  [x] Rash  [] Weeping LOC:    [x] Alert      [x]Oriented:    [x] Person     [x] Place  [x]Time 
             [] Confused  [] Lethargic  [] Medicated  [] Non-responsive GI / ABDOMEN:  [] Flat    [] Distended    [x] Soft    [] Firm   []  Obese 
                           [] Diarrhea  [] Bowel Sounds  [] Nausea  [] Vomiting  / URINE ASSESSMENT:[] Voiding   [] Oliguria  [] Anuria   []  Alonzo [] Incontinent    []  Incontinent Brief      []  Bathroom Privileges PAIN: [x] 0 []1  []2   []3   []4   []5   []6   []7   []8   []9   []10 Scale 0-10  Action/Follow Up: Nurse reports recent back pain. No complaints in dialysis. MOBILITY:  [] Amb    [] Amb/Assist    [x] Bed    [] Wheelchair  [] Stretcher All Vitals and Treatment Details on Attached Flowsheet Hospital: [facility] Room # 2104 [] 1st Time Acute  [] Stat  [x] Routine  [] Urgent [x] Acute Room  []  Bedside  [] ICU/CCU  [] ER Isolation Precautions:  [x] Dialysis   [] Airborne   [] Contact    [] Reverse Special Considerations:         [] Blood Consent Verified []N/A ALLERGIES:  
Latex Other (comments) High  11/30/2010  Past Updates. ..  
blisters Other Food Hives   5/18/2015  Past Updates. .. Allergic to tomatoes and tomato sauce Codeine Nausea and Vomiting     Past Updates. .. [] NKA Code Status:  [x] Full Code  [] DNR  [] Other HBsAg ONLY: Date Drawn 10/6/2018         [x]Negative []Positive []Unknown HBsAb: Date 10/6/2018    [x] Susceptible   [] [id number] []Not Drawn  [] Drawn Current Labs:    Date of Labs: 10/13/2018          Today [x] Cut and paste current labs here. Results for Romi Dumont (MRN [id number]) as of 10/13/2018 17:55 Ref. Range 10/12/2018 17:02 10/13/2018 04:50 WBC Latest Ref Range: 4.6 - 13.2 K/uL 7.7 RBC Latest Ref Range: 4.70 - 5.50 M/uL 3.11 (L) HGB Latest Ref Range: 13.0 - 16.0 g/dL 8.4 (L) HCT Latest Ref Range: 36.0 - 48.0 % 26.9 (L) MCV Latest Ref Range: 74.0 - 97.0 FL 86.5 MCH Latest Ref Range: 24.0 - 34.0 PG 27.0 MCHC Latest Ref Range: 31.0 - 37.0 g/dL 31.2 RDW Latest Ref Range: 11.6 - 14.5 % 16.5 (H) PLATELET Latest Ref Range: 135 - 420 K/uL 150 MPV Latest Ref Range: 9.2 - 11.8 FL 8.3 (L) Sodium Latest Ref Range: 136 - 145 mmol/L  134 (L) Potassium Latest Ref Range: 3.5 - 5.5 mmol/L  4.6 Chloride Latest Ref Range: 100 - 108 mmol/L  98 (L)  
CO2 Latest Ref Range: 21 - 32 mmol/L  27 Anion gap Latest Ref Range: 3.0 - 18 mmol/L  9 Glucose Latest Ref Range: 74 - 99 mg/dL  99 BUN Latest Ref Range: 7.0 - 18 MG/DL  37 (H) Creatinine Latest Ref Range: 0.6 - 1.3 MG/DL  7.83 (H) BUN/Creatinine ratio Latest Ref Range: 12 - 20    5 (L) Calcium Latest Ref Range: 8.5 - 10.1 MG/DL  7.7 (L) Phosphorus Latest Ref Range: 2.5 - 4.9 MG/DL  5.5 (H) GFR est non-AA Latest Ref Range: >60 ml/min/1.73m2  7 (L) GFR est AA Latest Ref Range: >60 ml/min/1.73m2  9 (L) Albumin Latest Ref Range: 3.4 - 5.0 g/dL  1.8 (L) DIET:  [x] Renal    [] Other     [] NPO     []  Diabetic PRIMARY NURSE REPORT: First initial/Last name/Title Pre Dialysis: Ahsan Leiva RN    Time: 3118 EDUCATION:   
[x] Patient [] Other         Knowledge Basis: []None [x]Minimal [] Substantial  
 Barriers to learning  []N/A  
[] Access Care     [] S&S of infection     [] Fluid Management     []K+     [x]Procedural   
[]Albumin     [] Medications     [] Tx Options     [] Transplant     [] Diet     [] Other Teaching Tools:  [] Explain  [] Demo  [] Handouts [] Video Patient response:   [] Verbalized understanding  [] Teach back  [] Return demonstration [] Requires follow up Inappropriate due to         
 
[x] Time Out/Safety Check  [x]Extracorporeal Circuit Tested for integrity RO/HEMODIALYSIS MACHINE SAFETY CHECKS  Before each treatment:    
Machine Number:                   [facility] 
                                [] Unit Machine #  with centralized RO 
                                [] Portable Machine #1/RO serial # [id number] [] Portable Machine #2/RO serial # [id number] [] Portable Machine #4/RO serial # [id number] [address] [place] [address] [x] Portable Machine #11/RO serial # [id number] [] Portable Machine #12/RO serial # [id number] [] Portable Machine #13/RO serial #  [id number] Alarm Test:  Pass time 1349         Other:        
[x] RO/Machine Log Complete Temp    36 Dialysate: pH  7.4 Conductivity: Meter   14     HD Machine   14.3                  TCD: 14 
Dialyzer Lot # [id number]            Blood Tubing Lot # [id number]          Saline Lot #  [id number]  
 
CHLORINE TESTING-Before each treatment and every 4 hours Total Chlorine: [] less than 0.1 ppm  Time: 1545 4 Hr/2nd Check Time: 1945  
(if greater than 0.1 ppm from Primary then every 30 minutes from Secondary) TREATMENT INITIATION  with Dialysis Precautions:  
[x] All Connections Secured                 [x] Saline Line Double Clamped [x] Venous Parameters Set                  [x] Arterial Parameters Set [x] Prime Given 250 ml                          [x]Air Foam Detector Engaged Treatment Initiation Note:Patient was brought to dialysis by nursing staff. Stable to start dialysis treatment. His catheter flows well. During Treatment Notes:  Patient voided 350 ml of evonne urine in a urinal.  
 
Medication Dose Volume Route Initials Dialyzer Cleared: [] Good [] Fair  [] Poor Blood processed:  68.5 L 
UF Removed  987 Ml Post Wt:     kg 
POst BP:   159/67       Pulse: 63      Respirations: 16  Temperature: 100.0 Post Tx Vascular Access:    N/A Catheter: Locking solution: Heparin 1:1000 Art. 2 ml  Ashok. 2.1 ml    
                              Post Assessment:  
  
                              Skin:  [x] Warm  [x] Dry (Crusted lesions all over body) [] Diaphoretic    [] Flushed  [] Pale [] Cyanotic [facility] Signatures Title Initials  Time Lungs: [x] Clear    [] Course  [] Crackles  [] Wheezing [] Diminished Brice Baptise RN SH  Cardiac: [x] Regular   [] Irregular   [] Monitor  [] N/A  Rhythm:      
    Edema:  [] None    [x] General     [] Facial   [] Pedal    [] UE    [] LE  
    Pain: [x]0  []1  []2   []3  []4   []5   []6   []7   []8   []9   []10 Post Treatment Note: 
 
 Patient tolerated dialysis well, despite a temperature of 100 at the end of treatment. His primary nurse was informed. His catheter was locked with Heparin and he was returned to his room by his primary nurse. POST TREATMENT PRIMARY NURSE HANDOFF REPORT:  
 
First initial/Last name/Title Post Dialysis: Tri Larsen RN Time:  2045 Abbreviations: AVG-arterial venous graft, AVF-arterial venous fistula, IJ-Internal Jugular, Subcl-Subclavian, Fem-Femoral, Tx-treatment, AP/HR-apical heart rate, DFR-dialysate flow rate, BFR-blood flow rate, AP-arterial pressure, -venous pressure, UF-ultrafiltrate, TMP-transmembrane pressure, Ashok-Venous, Art-Arterial, RO-Reverse Osmosis

## 2018-10-13 NOTE — PROGRESS NOTES
Patient received in bed awake. Patient A&Ox4. Reassessment of back pain 5/10; see MAR prn Morphine 2 mg given this am per night nurse. No distress noted. Frequently use items within reach. Bed locked in low position. Call bell within reach and Patient verbalized understanding of use for assistance and needs. 40 Rosalina Travis, NP was called to ask if ordered IVF was meant to start on 10/15; awaiting call back.

## 2018-10-13 NOTE — PROGRESS NOTES
Problem: Falls - Risk of 
Goal: *Absence of Falls Document [place] Fall Risk and appropriate interventions in the flowsheet. Outcome: Progressing Towards Goal 
Fall Risk Interventions: 
  
 
  
 
Medication Interventions: Bed/chair exit alarm History of Falls Interventions: Bed/chair exit alarm

## 2018-10-13 NOTE — ROUTINE PROCESS
Bedside and Verbal shift change report given to Jennifer Keyes RN (oncoming nurse) by Marion Lara RN, BSN (offgoing nurse). Report given with SBAR, Kardex, Intake/Output, MAR and Recent Results.

## 2018-10-13 NOTE — PROGRESS NOTES
Problem: Falls - Risk of 
Goal: *Absence of Falls Document Nathan Cha Fall Risk and appropriate interventions in the flowsheet. Outcome: Progressing Towards Goal 
Fall Risk Interventions: 
  
 
  
 
Medication Interventions: Bed/chair exit alarm History of Falls Interventions: Bed/chair exit alarm

## 2018-10-13 NOTE — PROGRESS NOTES
Patient dialyzing presently as already ordered by Dr. Nadia Esquivel. He is making urine (950cc recorded yesterday) but GFR is still poor with Cr rising today even in the setting of him getting HD yesterday. We'll plan on continuing dialysis support in the short term. RN notes that he's stable and tolerating the treatment just fine today. He is tentatively slated for a kidney biopsy on Monday. No plans for dialysis tomorrow.

## 2018-10-13 NOTE — PROGRESS NOTES
1920: Assumed patient care. Received report from Gagan Tomas, [facility] (offgoing nurse). Report included SBAR, Kardex, and MAR. Patient currently not on unit, patient is at dialysis at this time. 2044: Spoke to yoon in dialysis who stated 987 mls removed 
 
2100: Patient back on floor 2116: Morphine given for pain 8/10 
2210: Pain states pain now 6/10 
 
0115: Roxicodone given for pain 8/10 
0144: Patient states pain now 7/10 
0240: Patient sleeping

## 2018-10-13 NOTE — PROGRESS NOTES
Received OT eval and treat orders. Screened patient to identify level of assistance needed for ADLs and functional mobility/transfers. Patient presents to be at baseline and is independent in performing ADLs and functional mobility/transfers. Pt is not appropriate for skilled OT interventions at this time. Current OT orders weill be discontinued.  
 
Thank you for this referral. 
 
Geoff Kwon, OTR/L

## 2018-10-14 LAB
ALBUMIN SERPL-MCNC: 1.8 G/DL (ref 3.4–5)
ANION GAP SERPL CALC-SCNC: 5 MMOL/L (ref 3–18)
BUN SERPL-MCNC: 23 MG/DL (ref 7–18)
BUN/CREAT SERPL: 4 (ref 12–20)
CALCIUM SERPL-MCNC: 7.7 MG/DL (ref 8.5–10.1)
CHLORIDE SERPL-SCNC: 101 MMOL/L (ref 100–108)
CO2 SERPL-SCNC: 31 MMOL/L (ref 21–32)
CREAT SERPL-MCNC: 5.45 MG/DL (ref 0.6–1.3)
GLUCOSE SERPL-MCNC: 110 MG/DL (ref 74–99)
PHOSPHATE SERPL-MCNC: 3.4 MG/DL (ref 2.5–4.9)
POTASSIUM SERPL-SCNC: 3.9 MMOL/L (ref 3.5–5.5)
SODIUM SERPL-SCNC: 137 MMOL/L (ref 136–145)

## 2018-10-14 PROCEDURE — 65660000000 HC RM CCU STEPDOWN

## 2018-10-14 PROCEDURE — 74011000250 HC RX REV CODE- 250: Performed by: HOSPITALIST

## 2018-10-14 PROCEDURE — 74011250637 HC RX REV CODE- 250/637: Performed by: INTERNAL MEDICINE

## 2018-10-14 PROCEDURE — 74011250636 HC RX REV CODE- 250/636: Performed by: HOSPITALIST

## 2018-10-14 PROCEDURE — 74011250637 HC RX REV CODE- 250/637: Performed by: HOSPITALIST

## 2018-10-14 PROCEDURE — 36415 COLL VENOUS BLD VENIPUNCTURE: CPT | Performed by: INTERNAL MEDICINE

## 2018-10-14 PROCEDURE — 80069 RENAL FUNCTION PANEL: CPT | Performed by: INTERNAL MEDICINE

## 2018-10-14 RX ORDER — LIDOCAINE 4 G/100G
1 PATCH TOPICAL EVERY 24 HOURS
Status: DISCONTINUED | OUTPATIENT
Start: 2018-10-14 | End: 2018-10-24 | Stop reason: HOSPADM

## 2018-10-14 RX ORDER — HYDROMORPHONE HYDROCHLORIDE 1 MG/ML
1 INJECTION, SOLUTION INTRAMUSCULAR; INTRAVENOUS; SUBCUTANEOUS
Status: COMPLETED | OUTPATIENT
Start: 2018-10-14 | End: 2018-10-14

## 2018-10-14 RX ADMIN — ISOSORBIDE MONONITRATE 90 MG: 30 TABLET, EXTENDED RELEASE ORAL at 09:05

## 2018-10-14 RX ADMIN — Medication 10 ML: at 23:09

## 2018-10-14 RX ADMIN — MORPHINE SULFATE 2 MG: 4 INJECTION INTRAVENOUS at 10:03

## 2018-10-14 RX ADMIN — HYDRALAZINE HYDROCHLORIDE 50 MG: 50 TABLET, FILM COATED ORAL at 22:15

## 2018-10-14 RX ADMIN — OXYCODONE HYDROCHLORIDE 10 MG: 5 TABLET ORAL at 07:40

## 2018-10-14 RX ADMIN — CALCIUM ACETATE 1334 MG: 667 CAPSULE ORAL at 18:16

## 2018-10-14 RX ADMIN — Medication 10 ML: at 13:50

## 2018-10-14 RX ADMIN — AMIODARONE HYDROCHLORIDE 400 MG: 200 TABLET ORAL at 09:05

## 2018-10-14 RX ADMIN — HYDRALAZINE HYDROCHLORIDE 50 MG: 50 TABLET, FILM COATED ORAL at 18:16

## 2018-10-14 RX ADMIN — OXYCODONE HYDROCHLORIDE 10 MG: 5 TABLET ORAL at 01:15

## 2018-10-14 RX ADMIN — CALCIUM ACETATE 1334 MG: 667 CAPSULE ORAL at 11:53

## 2018-10-14 RX ADMIN — HYDROMORPHONE HYDROCHLORIDE 1 MG: 1 INJECTION, SOLUTION INTRAMUSCULAR; INTRAVENOUS; SUBCUTANEOUS at 23:31

## 2018-10-14 RX ADMIN — MORPHINE SULFATE 2 MG: 4 INJECTION INTRAVENOUS at 03:41

## 2018-10-14 RX ADMIN — CALCIUM ACETATE 1334 MG: 667 CAPSULE ORAL at 07:41

## 2018-10-14 RX ADMIN — ATORVASTATIN CALCIUM 40 MG: 40 TABLET, FILM COATED ORAL at 20:35

## 2018-10-14 RX ADMIN — FLUTICASONE PROPIONATE 2 SPRAY: 50 SPRAY, METERED NASAL at 09:05

## 2018-10-14 RX ADMIN — CALCITRIOL 0.5 MCG: 0.25 CAPSULE ORAL at 09:05

## 2018-10-14 RX ADMIN — MORPHINE SULFATE 2 MG: 4 INJECTION INTRAVENOUS at 22:05

## 2018-10-14 RX ADMIN — AMLODIPINE BESYLATE 10 MG: 5 TABLET ORAL at 09:05

## 2018-10-14 RX ADMIN — HYDRALAZINE HYDROCHLORIDE 50 MG: 50 TABLET, FILM COATED ORAL at 09:06

## 2018-10-14 RX ADMIN — MORPHINE SULFATE 2 MG: 4 INJECTION INTRAVENOUS at 16:34

## 2018-10-14 RX ADMIN — PANTOPRAZOLE SODIUM 40 MG: 40 TABLET, DELAYED RELEASE ORAL at 07:41

## 2018-10-14 RX ADMIN — OXYCODONE HYDROCHLORIDE 10 MG: 5 TABLET ORAL at 20:35

## 2018-10-14 RX ADMIN — TIOTROPIUM BROMIDE 18 MCG: 18 CAPSULE ORAL; RESPIRATORY (INHALATION) at 09:04

## 2018-10-14 RX ADMIN — OXYCODONE HYDROCHLORIDE 10 MG: 5 TABLET ORAL at 13:46

## 2018-10-14 NOTE — ROUTINE PROCESS
Bedside and Verbal shift change report given to tate (oncoming nurse) by Mikael Goodson RN 
 (offgoing nurse). Report included the following information Kardex, Intake/Output and Recent Results.

## 2018-10-14 NOTE — PROGRESS NOTES
[facility] Hospitalist Division Inpatient Daily Progress Note Daily progress Note Patient: Artis Jose MRN: [id number]  CSN: [id number] YOB: 1970  Age: 46 y.o. Sex: male DOA: 10/3/2018 LOS:  LOS: 11 days Chief Complaint:  Acute on chronic renal failure Interval History: 46 y.o.  male with h/o AICD,cardiac arrest,VT,ischemic cardiomyopathy EF 30%,obesity,cad,presented to ED because of weakness,nausea,vomting,abdominal pain,hematuria. Patient says that he started feeling sick 2 weeks ago when he started feeling weak. Then he started vomiting. Could not tolerate even water. In recent days ,he has had abdominal pain which he attributed to the ongoing vomiting. Patient also reported that he has seen his urine turning red for a while. Had to see his doctor in two weeks but felt very weak and decided to come to the ED. In ED,lab showed creatinine 11.0 compared to 2.12 on 1/11/2018 and 8.01 on 9/28/18. UA showed large blood,bacteria+. CT abd/pelvis w/o hydronephrosis or urolithiasis. Patient admitted for OLIVIA,Hematuria,UTI. Started on iv fluid. Nephrolohy consulted from ER. Since patient has GI complaint and renal involvement,nephrology has indicated that Henoch-Schonlein syndrome needs to be considered. Pt is on iv fluid. IgA elevated 648. Other serology for vasculitis including nathaniel,GBM ab,ANCA,C3,C4,heptitis profile not abnormal. Nephrology has indicated that if creatinine continue to rise over the weekend,patient will need dialysis. Today creat up to 12.00. Patient may also need kidney biopsy. Cardiology consulted significant cardiac history anticipating that he will have dialysis. Cardiology has determined that his cardiac status is stable. GI saw patient on 10/6 for ongoing symptoms. TDC catheter placed on 10/8. EGD on 10/9 showed chronic gastritis. HD on 10/9 and 10/10, next HD session on 10/12.   Also plans for kidney biopsy on Monday 10/15. Assessment/Plan:  
 
Patient Active Problem List  
Diagnosis Code  ST elevation (STEMI) myocardial infarction involving left anterior descending coronary artery ([facility]) I21.02  
 Cardiac arrest - ventricular fibrillation  HTN (hypertension) I10  
 Hypertensive emergency I16.1  Hyperglycemia R73.9  Acute on chronic renal failure (HCC) N17.9, N18.9  Leukocytosis D72.829  
 Acute pulmonary edema ([facility]) J81.0  Contrast dye induced nephropathy N14.1, T50.8X5A  
 CKD (chronic kidney disease) N18.9  Arachnoiditis G03.9  Postlaminectomy syndrome, lumbar region M96.1  Psoriasis L40.9  Degeneration of lumbar or lumbosacral intervertebral disc M51.37  
 Encounter for long-term (current) use of high-risk medication Z79.899  Obesity E66.9  
 Pain in joint, lower leg M25.569  Psoriatic arthropathy ([facility] L40.50  Chronic low back pain M54.5, G89.29  Chronic pain syndrome G89.4  Lumbosacral spondylosis without myelopathy M47.817  Enthesopathy of hip M76.899  
 Sacroiliitis ([facility]) M46.1  Coronary artery disease I25.10  
 JESSICA (obstructive sleep apnea) G47.33  
 UTI (lower urinary tract infection) N39.0  Cardiomyopathy ([address] I42.9  AICD (automatic cardioverter/defibrillator) present Z95.810  
 OLIVIA (acute kidney injury) ([facility] N17.9  Renal failure N19  
 Chest pain R07.9  
 SOB (shortness of breath) R06.02  Bronchitis J40  Acute renal failure (ARF) ([facility]) N17.9  ARF (acute renal failure) ([facility]) N17.9  Severe sepsis ([facility]) A41.9, R65.20  Epigastric abdominal pain R10.13  
 Anxiety F41.9  Ventricular tachycardia ([facility]) I47.2  Ventricular fibrillation ([facility]) I49.01  
 ICD (implantable cardioverter-defibrillator) discharge Z45.02  
 CAD (coronary artery disease) I25.10  Obesity, morbid ([address] E66.01  
 Bacteria in urine L62.33  
 Metabolic acidosis Z35.5 A/P: 
 Acute on chronic renal failure/Metabolic acidosis  
- nephrology following, appreciate assistance 
- creatinine 11.00 POA 
 -Etiology:volume depletion -?acute GN as pt also hematuria. -May need kidney biopsy. Plavix on hold 
 -Serology:IgA elevated. Other normal such as MANUELITO,ANCA,C3,C4,Hep profile. -s/p [facility] 10/8 - sp dialysis 10/9 with 500 ml fluid removed, 10/10 nd next today 10/12 
- plan for kidney biopsy tentatively on 10/15, unless renal function is better over the weekend 
   
Bacteria in urine/UTI/Hematuria 
 -Ceftriaxone iv - completed 
 -Blood cx ng/Ucx negative. -Vasculitis serology non-revealing 
  
Secondary hyperparathyroidism  
 -On calcitriol 
   
Nausea/vomiting/abdominal pain 
 -CT reviewed,no acute process. -GI, [facility] saw patient on 10/6 - EGD 10/9, chronic gastritis 
   
HTN 
 -Hydralazine prn for sbp>160 
   
JESSICA (obstructive sleep apnea) 
 -On oxygen 
   
Cardiomyopathy 
- EF 25-30% - ICD (implantable cardioverter-defibrillator) discharge CAD (coronary artery disease) 
 -Continue routine meds 
 -Telemetry monitoring 
 -Cardiology saw patient on 10/6 and determined that patient's cardiac status is stable and advised to continue his home meds. DVT Prophylaxis - SCD's BLE Disposition 
- no recommendations from PT/OT, plan is home 
- need to determine if pt will continue with HD at discharge, need outpatient chair secured- defer to nephrology  
- tentatively scheduled for renal biopsy tomorrow MONAL[facility] [address] [place] [address] Hospitalist Division Pager:  [phone] Office:  [phone] Subjective:  
 
Wants to take a shower- advised wash up due to right TDC. Pt agreed. Plan is for home at d/c, need to determine if will continue with HD after d/c and will need outpatient chair- defer to nephrology. No other complaints or concerns at this time. Objective:  
  
Visit Vitals  /71  Pulse 83  Temp 98.2 °F (36.8 °C)  Resp 18  Ht 5' 10\" (1.778 m)  Wt 118.4 kg (261 lb)  SpO2 97%  BMI 37.45 kg/m2 Physical Exam: 
General appearance: alert, cooperative, no distress, appears stated age, [facility] cath right Head: Normocephalic, without obvious abnormality, atraumatic Lungs: clear to auscultation bilaterally Heart: regular rate and rhythm, S1, S2 normal, no murmur, click, rub or gallop Abdomen: soft, non tender, non distended. Normoactive bowel sounds. Extremities: extremities normal, atraumatic, no cyanosis or edema Skin: flaky Neurologic: Grossly normal 
PSY: Mood and affect normal, appropriately behaved Intake and Output: 
Current Shift:    
Last three shifts:  10/12 1901 - 10/14 0700 In: 9324 [P.O.:1620] Out: 2237 [[id number]] Recent Results (from the past 24 hour(s)) RENAL FUNCTION PANEL Collection Time: 10/14/18  4:52 AM  
Result Value Ref Range Sodium 137 136 - 145 mmol/L Potassium 3.9 3.5 - 5.5 mmol/L Chloride 101 100 - 108 mmol/L  
 CO2 31 21 - 32 mmol/L Anion gap 5 3.0 - 18 mmol/L Glucose 110 (H) 74 - 99 mg/dL BUN 23 (H) 7.0 - 18 MG/DL Creatinine 5.45 (H) 0.6 - 1.3 MG/DL  
 BUN/Creatinine ratio 4 (L) 12 - 20 GFR est AA 14 (L) >60 ml/min/1.73m2 GFR est non-AA 11 (L) >60 ml/min/1.73m2 Calcium 7.7 (L) 8.5 - 10.1 MG/DL Phosphorus 3.4 2.5 - 4.9 MG/DL Albumin 1.8 (L) 3.4 - 5.0 g/dL Lab Results Component Value Date/Time Glucose 110 (H) 10/14/2018 04:52 AM  
 Glucose 99 10/13/2018 04:50 AM  
 Glucose 93 10/12/2018 07:55 AM  
 Glucose 101 (H) 10/11/2018 06:50 AM  
 Glucose 118 (H) 10/10/2018 05:19 AM  
  
 
Imaging: No results found. Medication List Reviewed: 
Current Facility-Administered Medications Medication Dose Route Frequency  [START ON 10/15/2018] dextrose 5 % - 0.45% NaCl infusion  50 mL/hr IntraVENous CONTINUOUS  
 oxyCODONE IR (ROXICODONE) tablet 10 mg  10 mg Oral Q6H PRN  
  morphine injection 2 mg  2 mg IntraVENous Q6H PRN  
 sodium chloride (NS) flush 5-10 mL  5-10 mL IntraVENous Q8H  
 sodium chloride (NS) flush 5-10 mL  5-10 mL IntraVENous PRN  
 LORazepam (ATIVAN) tablet 1 mg  1 mg Oral Q1H PRN Or  
 LORazepam (ATIVAN) injection 1 mg  1 mg IntraVENous Q1H PRN  
 LORazepam (ATIVAN) tablet 2 mg  2 mg Oral Q1H PRN Or  
 LORazepam (ATIVAN) injection 2 mg  2 mg IntraVENous Q1H PRN  
 LORazepam (ATIVAN) injection 3 mg  3 mg IntraVENous Q15MIN PRN  
 0.9% sodium chloride infusion  50 mL/hr IntraVENous DIALYSIS PRN  
 heparin (porcine) 1,000 unit/mL injection 1,000 Units  1,000 Units InterCATHeter DIALYSIS PRN  pantoprazole (PROTONIX) tablet 40 mg  40 mg Oral ACB  hydrALAZINE (APRESOLINE) tablet 50 mg  50 mg Oral TID  calcitRIOL (ROCALTROL) capsule 0.5 mcg  0.5 mcg Oral DAILY  promethazine (PHENERGAN) tablet 25 mg  25 mg Oral Q8H PRN  
 amLODIPine (NORVASC) tablet 10 mg  10 mg Oral DAILY  calcium acetate (PHOSLO) capsule 1,334 mg  2 Cap Oral TID WITH MEALS  influenza vaccine 2018-19 (6 mos+)(PF) (FLUARIX QUAD/FLULAVAL QUAD) injection 0.5 mL  0.5 mL IntraMUSCular PRIOR TO DISCHARGE  isosorbide mononitrate ER (IMDUR) tablet 90 mg  90 mg Oral DAILY  amiodarone (CORDARONE) tablet 400 mg  400 mg Oral DAILY  atorvastatin (LIPITOR) tablet 40 mg  40 mg Oral QHS  fluticasone (FLONASE) 50 mcg/actuation nasal spray 2 Spray  2 Spray Both Nostrils DAILY  lidocaine 4 % patch 1 Patch  1 Patch TransDERmal Q24H  
 tiotropium (SPIRIVA) inhalation capsule 18 mcg  1 Cap Inhalation DAILY  ondansetron (ZOFRAN) injection 4 mg  4 mg IntraVENous Q4H PRN  
 albuterol-ipratropium (DUO-NEB) 2.5 MG-0.5 MG/3 ML  3 mL Nebulization Q4H PRN  
 buprenorphine-naloxone (SUBOXONE) 4mg-1mg SL film (Patient Supplied)  1 Film SubLINGual BID

## 2018-10-14 NOTE — PROGRESS NOTES
Problem: Falls - Risk of 
Goal: *Absence of Falls Document [facility] Fall Risk and appropriate interventions in the flowsheet. Outcome: Progressing Towards Goal 
Fall Risk Interventions: 
  
 
  
 
Medication Interventions: Patient to call before getting OOB History of Falls Interventions: Door open when patient unattended

## 2018-10-14 NOTE — PROGRESS NOTES
Problem: Falls - Risk of 
Goal: *Absence of Falls Document Suman Kelley Fall Risk and appropriate interventions in the flowsheet. Outcome: Progressing Towards Goal 
Fall Risk Interventions: 
  
 
  
 
Medication Interventions: Patient to call before getting OOB History of Falls Interventions: Door open when patient unattended

## 2018-10-14 NOTE — PROGRESS NOTES
Problem: Falls - Risk of 
Goal: *Absence of Falls Document Jill Ash Fall Risk and appropriate interventions in the flowsheet. Outcome: Progressing Towards Goal 
Fall Risk Interventions: 
  
 
  
 
Medication Interventions: Patient to call before getting OOB History of Falls Interventions: Door open when patient unattended

## 2018-10-14 NOTE — PROGRESS NOTES
0720: Bedside and Verbal shift change report given to JOHN Paulson (oncoming nurse) by Salome Monreal RN (offgoing nurse). Report included the following information SBAR, Kardex and MAR.

## 2018-10-15 ENCOUNTER — APPOINTMENT (OUTPATIENT)
Dept: CT IMAGING | Age: 48
DRG: 674 | End: 2018-10-15
Attending: INTERNAL MEDICINE
Payer: MEDICARE

## 2018-10-15 LAB
ALBUMIN SERPL-MCNC: 1.8 G/DL (ref 3.4–5)
ANION GAP SERPL CALC-SCNC: 8 MMOL/L (ref 3–18)
BUN SERPL-MCNC: 31 MG/DL (ref 7–18)
BUN/CREAT SERPL: 4 (ref 12–20)
CALCIUM SERPL-MCNC: 7.5 MG/DL (ref 8.5–10.1)
CHLORIDE SERPL-SCNC: 99 MMOL/L (ref 100–108)
CO2 SERPL-SCNC: 28 MMOL/L (ref 21–32)
CREAT SERPL-MCNC: 7.07 MG/DL (ref 0.6–1.3)
GLUCOSE SERPL-MCNC: 95 MG/DL (ref 74–99)
PHOSPHATE SERPL-MCNC: 4.5 MG/DL (ref 2.5–4.9)
POTASSIUM SERPL-SCNC: 3.9 MMOL/L (ref 3.5–5.5)
SODIUM SERPL-SCNC: 135 MMOL/L (ref 136–145)

## 2018-10-15 PROCEDURE — 50200 RENAL BIOPSY PERQ: CPT

## 2018-10-15 PROCEDURE — 74011250637 HC RX REV CODE- 250/637: Performed by: INTERNAL MEDICINE

## 2018-10-15 PROCEDURE — 74011000258 HC RX REV CODE- 258: Performed by: NURSE PRACTITIONER

## 2018-10-15 PROCEDURE — 80069 RENAL FUNCTION PANEL: CPT | Performed by: INTERNAL MEDICINE

## 2018-10-15 PROCEDURE — 74011250637 HC RX REV CODE- 250/637: Performed by: HOSPITALIST

## 2018-10-15 PROCEDURE — 88313 SPECIAL STAINS GROUP 2: CPT | Performed by: INTERNAL MEDICINE

## 2018-10-15 PROCEDURE — 0TB03ZX EXCISION OF RIGHT KIDNEY, PERCUTANEOUS APPROACH, DIAGNOSTIC: ICD-10-PCS | Performed by: RADIOLOGY

## 2018-10-15 PROCEDURE — 74011250636 HC RX REV CODE- 250/636: Performed by: RADIOLOGY

## 2018-10-15 PROCEDURE — 88333 PATH CONSLTJ SURG CYTO XM 1: CPT | Performed by: INTERNAL MEDICINE

## 2018-10-15 PROCEDURE — 74011000250 HC RX REV CODE- 250: Performed by: HOSPITALIST

## 2018-10-15 PROCEDURE — 88350 IMFLUOR EA ADDL 1ANTB STN PX: CPT | Performed by: INTERNAL MEDICINE

## 2018-10-15 PROCEDURE — 88305 TISSUE EXAM BY PATHOLOGIST: CPT | Performed by: INTERNAL MEDICINE

## 2018-10-15 PROCEDURE — 88334 PATH CONSLTJ SURG CYTO XM EA: CPT | Performed by: INTERNAL MEDICINE

## 2018-10-15 PROCEDURE — 74011250636 HC RX REV CODE- 250/636: Performed by: HOSPITALIST

## 2018-10-15 PROCEDURE — 88348 ELECTRON MICROSCOPY DX: CPT | Performed by: INTERNAL MEDICINE

## 2018-10-15 PROCEDURE — 65660000000 HC RM CCU STEPDOWN

## 2018-10-15 PROCEDURE — 88346 IMFLUOR 1ST 1ANTB STAIN PX: CPT | Performed by: INTERNAL MEDICINE

## 2018-10-15 PROCEDURE — 36415 COLL VENOUS BLD VENIPUNCTURE: CPT | Performed by: INTERNAL MEDICINE

## 2018-10-15 RX ORDER — HYDROCODONE BITARTRATE AND ACETAMINOPHEN 7.5; 325 MG/1; MG/1
1 TABLET ORAL
Status: DISCONTINUED | OUTPATIENT
Start: 2018-10-15 | End: 2018-10-24 | Stop reason: HOSPADM

## 2018-10-15 RX ORDER — MIDAZOLAM HYDROCHLORIDE 1 MG/ML
.5-4 INJECTION, SOLUTION INTRAMUSCULAR; INTRAVENOUS
Status: DISCONTINUED | OUTPATIENT
Start: 2018-10-15 | End: 2018-10-15 | Stop reason: ALTCHOICE

## 2018-10-15 RX ORDER — LIDOCAINE HYDROCHLORIDE 10 MG/ML
1-20 INJECTION, SOLUTION EPIDURAL; INFILTRATION; INTRACAUDAL; PERINEURAL
Status: COMPLETED | OUTPATIENT
Start: 2018-10-15 | End: 2018-10-15

## 2018-10-15 RX ORDER — FENTANYL CITRATE 50 UG/ML
25-200 INJECTION, SOLUTION INTRAMUSCULAR; INTRAVENOUS
Status: DISCONTINUED | OUTPATIENT
Start: 2018-10-15 | End: 2018-10-15 | Stop reason: ALTCHOICE

## 2018-10-15 RX ORDER — OXYCODONE HYDROCHLORIDE 5 MG/1
10 TABLET ORAL
Status: DISCONTINUED | OUTPATIENT
Start: 2018-10-15 | End: 2018-10-24 | Stop reason: HOSPADM

## 2018-10-15 RX ADMIN — DEXTROSE MONOHYDRATE AND SODIUM CHLORIDE 50 ML/HR: 5; .45 INJECTION, SOLUTION INTRAVENOUS at 00:11

## 2018-10-15 RX ADMIN — MIDAZOLAM 1 MG: 1 INJECTION INTRAMUSCULAR; INTRAVENOUS at 14:30

## 2018-10-15 RX ADMIN — HYDRALAZINE HYDROCHLORIDE 50 MG: 50 TABLET, FILM COATED ORAL at 22:05

## 2018-10-15 RX ADMIN — HYDRALAZINE HYDROCHLORIDE 50 MG: 50 TABLET, FILM COATED ORAL at 10:26

## 2018-10-15 RX ADMIN — ISOSORBIDE MONONITRATE 90 MG: 30 TABLET, EXTENDED RELEASE ORAL at 10:35

## 2018-10-15 RX ADMIN — OXYCODONE HYDROCHLORIDE 10 MG: 5 TABLET ORAL at 22:03

## 2018-10-15 RX ADMIN — AMLODIPINE BESYLATE 10 MG: 5 TABLET ORAL at 10:26

## 2018-10-15 RX ADMIN — Medication 10 ML: at 22:05

## 2018-10-15 RX ADMIN — MIDAZOLAM 1 MG: 1 INJECTION INTRAMUSCULAR; INTRAVENOUS at 14:15

## 2018-10-15 RX ADMIN — OXYCODONE HYDROCHLORIDE 10 MG: 5 TABLET ORAL at 02:53

## 2018-10-15 RX ADMIN — FENTANYL CITRATE 50 MCG: 50 INJECTION, SOLUTION INTRAMUSCULAR; INTRAVENOUS at 14:30

## 2018-10-15 RX ADMIN — MIDAZOLAM 1 MG: 1 INJECTION INTRAMUSCULAR; INTRAVENOUS at 14:24

## 2018-10-15 RX ADMIN — Medication 15 ML: at 14:22

## 2018-10-15 RX ADMIN — ATORVASTATIN CALCIUM 40 MG: 40 TABLET, FILM COATED ORAL at 22:05

## 2018-10-15 RX ADMIN — MORPHINE SULFATE 2 MG: 4 INJECTION INTRAVENOUS at 04:16

## 2018-10-15 RX ADMIN — CALCIUM ACETATE 1334 MG: 667 CAPSULE ORAL at 16:30

## 2018-10-15 RX ADMIN — MIDAZOLAM 1 MG: 1 INJECTION INTRAMUSCULAR; INTRAVENOUS at 14:09

## 2018-10-15 RX ADMIN — FENTANYL CITRATE 50 MCG: 50 INJECTION, SOLUTION INTRAMUSCULAR; INTRAVENOUS at 14:09

## 2018-10-15 RX ADMIN — AMIODARONE HYDROCHLORIDE 400 MG: 200 TABLET ORAL at 10:26

## 2018-10-15 RX ADMIN — Medication 10 ML: at 06:00

## 2018-10-15 RX ADMIN — TIOTROPIUM BROMIDE 18 MCG: 18 CAPSULE ORAL; RESPIRATORY (INHALATION) at 10:24

## 2018-10-15 RX ADMIN — FLUTICASONE PROPIONATE 2 SPRAY: 50 SPRAY, METERED NASAL at 10:25

## 2018-10-15 RX ADMIN — FENTANYL CITRATE 50 MCG: 50 INJECTION, SOLUTION INTRAMUSCULAR; INTRAVENOUS at 14:24

## 2018-10-15 RX ADMIN — OXYCODONE HYDROCHLORIDE 10 MG: 5 TABLET ORAL at 16:30

## 2018-10-15 RX ADMIN — FENTANYL CITRATE 50 MCG: 50 INJECTION, SOLUTION INTRAMUSCULAR; INTRAVENOUS at 14:15

## 2018-10-15 RX ADMIN — PANTOPRAZOLE SODIUM 40 MG: 40 TABLET, DELAYED RELEASE ORAL at 10:26

## 2018-10-15 RX ADMIN — HYDRALAZINE HYDROCHLORIDE 50 MG: 50 TABLET, FILM COATED ORAL at 16:30

## 2018-10-15 RX ADMIN — OXYCODONE HYDROCHLORIDE 10 MG: 5 TABLET ORAL at 10:26

## 2018-10-15 NOTE — PROGRESS NOTES
[address] [place], [place] [address]ty [facility] Hospitalist Division Inpatient Daily Progress Note Daily progress Note Patient: Adrienne Glover MRN: [id number]  CSN: [id number] YOB: 1970  Age: 46 y.o. Sex: male DOA: 10/3/2018 LOS:  LOS: 12 days Chief Complaint:  Acute on chronic renal failure Interval History: 46 y.o.  male with h/o AICD,cardiac arrest,VT,ischemic cardiomyopathy EF 30%,obesity,cad,presented to ED because of weakness,nausea,vomting,abdominal pain,hematuria. Patient says that he started feeling sick 2 weeks ago when he started feeling weak. Then he started vomiting. Could not tolerate even water. In recent days ,he has had abdominal pain which he attributed to the ongoing vomiting. Patient also reported that he has seen his urine turning red for a while. Had to see his doctor in two weeks but felt very weak and decided to come to the ED. In ED,lab showed creatinine 11.0 compared to 2.12 on 1/11/2018 and 8.01 on 9/28/18. UA showed large blood,bacteria+. CT abd/pelvis w/o hydronephrosis or urolithiasis. Patient admitted for OLIVIA,Hematuria,UTI. Started on iv fluid. Nephrolohy consulted from ER. Since patient has GI complaint and renal involvement,nephrology has indicated that Henoch-Schonlein syndrome needs to be considered. Pt is on iv fluid. IgA elevated 648. Other serology for vasculitis including nathaniel,GBM ab,ANCA,C3,C4,heptitis profile not abnormal. Nephrology has indicated that if creatinine continue to rise over the weekend,patient will need dialysis. Today creat up to 12.00. Patient may also need kidney biopsy. Cardiology consulted significant cardiac history anticipating that he will have dialysis. Cardiology has determined that his cardiac status is stable. GI saw patient on 10/6 for ongoing symptoms. TDC catheter placed on 10/8. EGD on 10/9 showed chronic gastritis. HD on 10/9 and 10/10, next HD session on 10/12.   Also plans for kidney biopsy on Monday 10/15. Assessment/Plan:  
 
Patient Active Problem List  
Diagnosis Code  ST elevation (STEMI) myocardial infarction involving left anterior descending coronary artery ([facility]) I21.02  
 Cardiac arrest - ventricular fibrillation  HTN (hypertension) I10  
 Hypertensive emergency I16.1  Hyperglycemia R73.9  Acute on chronic renal failure (HCC) N17.9, N18.9  Leukocytosis D72.829  
 Acute pulmonary edema ([facility]) J81.0  Contrast dye induced nephropathy N14.1, T50.8X5A  
 CKD (chronic kidney disease) N18.9  Arachnoiditis G03.9  Postlaminectomy syndrome, lumbar region M96.1  Psoriasis L40.9  Degeneration of lumbar or lumbosacral intervertebral disc M51.37  
 Encounter for long-term (current) use of high-risk medication Z79.899  Obesity E66.9  
 Pain in joint, lower leg M25.569  Psoriatic arthropathy ([facility] L40.50  Chronic low back pain M54.5, G89.29  Chronic pain syndrome G89.4  Lumbosacral spondylosis without myelopathy M47.817  Enthesopathy of hip M76.899  
 Sacroiliitis ([facility]) M46.1  Coronary artery disease I25.10  
 JESSICA (obstructive sleep apnea) G47.33  
 UTI (lower urinary tract infection) N39.0  Cardiomyopathy ([address] I42.9  AICD (automatic cardioverter/defibrillator) present Z95.810  
 OLIVIA (acute kidney injury) ([facility] N17.9  Renal failure N19  
 Chest pain R07.9  
 SOB (shortness of breath) R06.02  Bronchitis J40  Acute renal failure (ARF) ([facility]) N17.9  ARF (acute renal failure) ([facility]) N17.9  Severe sepsis ([facility]) A41.9, R65.20  Epigastric abdominal pain R10.13  
 Anxiety F41.9  Ventricular tachycardia ([facility]) I47.2  Ventricular fibrillation ([facility]) I49.01  
 ICD (implantable cardioverter-defibrillator) discharge Z45.02  
 CAD (coronary artery disease) I25.10  Obesity, morbid ([address] E66.01  
 Bacteria in urine E09.05  
 Metabolic acidosis D94.5 A/P: 
 Acute on chronic renal failure/Metabolic acidosis  
- nephrology following, appreciate assistance 
- creatinine 11.00 POA 
 -Etiology:volume depletion -?acute GN as pt also hematuria. -May need kidney biopsy. Plavix on hold 
 -Serology:IgA elevated. Other normal such as MANUELITO,ANCA,C3,C4,Hep profile. -s/p [facility] 10/8 - sp dialysis 10/9 with 500 ml fluid removed, 10/10 nd next today 10/12 
- plan for kidney biopsy tentatively on 10/15 
   
Bacteria in urine/UTI/Hematuria 
 -Ceftriaxone iv - completed 
 -Blood cx ng/Ucx negative. -Vasculitis serology non-revealing 
  
Secondary hyperparathyroidism  
 -On calcitriol 
   
Nausea/vomiting/abdominal pain 
 -CT reviewed,no acute process. -GI,Dr Issac Jose saw patient on 10/6 - EGD 10/9, chronic gastritis 
   
HTN 
 -Hydralazine prn for sbp>160 
   
JESSICA (obstructive sleep apnea) 
 -On oxygen 
   
Cardiomyopathy 
- EF 25-30% - ICD (implantable cardioverter-defibrillator) discharge CAD (coronary artery disease) 
 -Continue routine meds 
 -Telemetry monitoring 
 -Cardiology saw patient on 10/6 and determined that patient's cardiac status is stable and advised to continue his home meds. DVT Prophylaxis - SCD's BLE Disposition 
- no recommendations from PT/OT, plan is home 
- need to determine if pt will continue with HD at discharge, need outpatient chair secured- defer to nephrology  
- tentatively scheduled for renal biopsy today MIRIAM Mendoza [address] [place] [address] Hospitalist Division Pager:  [phone] Office:  [phone] Subjective: Interval notes reviewed. Awaiting kidney biopsy. Currently NPO. Objective:  
  
Visit Vitals  /76  Pulse 61  Temp 98 °F (36.7 °C)  Resp 18  Ht 5' 10\" (1.778 m)  Wt 118.4 kg (261 lb)  SpO2 93%  BMI 37.45 kg/m2 Physical Exam: 
General appearance: alert, cooperative, no distress, appears stated age, [facility] cath right Head: Normocephalic, without obvious abnormality, atraumatic Lungs: clear to auscultation bilaterally Heart: regular rate and rhythm, S1, S2 normal, no murmur, click, rub or gallop Abdomen: soft, non tender, non distended. Normoactive bowel sounds. Extremities: extremities normal, atraumatic, no cyanosis or edema Skin: flaky Neurologic: Grossly normal 
PSY: Mood and affect normal, appropriately behaved Intake and Output: 
Current Shift:    
Last three shifts:  10/13 1901 - 10/15 0700 In: 1310 [P.O.:1310] Out: Regla Glass No results found for this or any previous visit (from the past 24 hour(s)). Lab Results Component Value Date/Time Glucose 110 (H) 10/14/2018 04:52 AM  
 Glucose 99 10/13/2018 04:50 AM  
 Glucose 93 10/12/2018 07:55 AM  
 Glucose 101 (H) 10/11/2018 06:50 AM  
 Glucose 118 (H) 10/10/2018 05:19 AM  
  
 
Imaging: No results found. Medication List Reviewed: 
Current Facility-Administered Medications Medication Dose Route Frequency  lidocaine 4 % patch 1 Patch  1 Patch TransDERmal Q24H  
 dextrose 5 % - 0.45% NaCl infusion  50 mL/hr IntraVENous CONTINUOUS  
 oxyCODONE IR (ROXICODONE) tablet 10 mg  10 mg Oral Q6H PRN  
 morphine injection 2 mg  2 mg IntraVENous Q6H PRN  
 sodium chloride (NS) flush 5-10 mL  5-10 mL IntraVENous Q8H  
 sodium chloride (NS) flush 5-10 mL  5-10 mL IntraVENous PRN  
 LORazepam (ATIVAN) tablet 1 mg  1 mg Oral Q1H PRN Or  
 LORazepam (ATIVAN) injection 1 mg  1 mg IntraVENous Q1H PRN  
 LORazepam (ATIVAN) tablet 2 mg  2 mg Oral Q1H PRN Or  
 LORazepam (ATIVAN) injection 2 mg  2 mg IntraVENous Q1H PRN  
 LORazepam (ATIVAN) injection 3 mg  3 mg IntraVENous Q15MIN PRN  
 0.9% sodium chloride infusion  50 mL/hr IntraVENous DIALYSIS PRN  
 heparin (porcine) 1,000 unit/mL injection 1,000 Units  1,000 Units InterCATHeter DIALYSIS PRN  pantoprazole (PROTONIX) tablet 40 mg  40 mg Oral ACB  hydrALAZINE (APRESOLINE) tablet 50 mg  50 mg Oral TID  calcitRIOL (ROCALTROL) capsule 0.5 mcg  0.5 mcg Oral DAILY  promethazine (PHENERGAN) tablet 25 mg  25 mg Oral Q8H PRN  
 amLODIPine (NORVASC) tablet 10 mg  10 mg Oral DAILY  calcium acetate (PHOSLO) capsule 1,334 mg  2 Cap Oral TID WITH MEALS  influenza vaccine 2018-19 (6 mos+)(PF) (FLUARIX QUAD/FLULAVAL QUAD) injection 0.5 mL  0.5 mL IntraMUSCular PRIOR TO DISCHARGE  isosorbide mononitrate ER (IMDUR) tablet 90 mg  90 mg Oral DAILY  amiodarone (CORDARONE) tablet 400 mg  400 mg Oral DAILY  atorvastatin (LIPITOR) tablet 40 mg  40 mg Oral QHS  fluticasone (FLONASE) 50 mcg/actuation nasal spray 2 Spray  2 Spray Both Nostrils DAILY  tiotropium (SPIRIVA) inhalation capsule 18 mcg  1 Cap Inhalation DAILY  ondansetron (ZOFRAN) injection 4 mg  4 mg IntraVENous Q4H PRN  
 albuterol-ipratropium (DUO-NEB) 2.5 MG-0.5 MG/3 ML  3 mL Nebulization Q4H PRN  
 buprenorphine-naloxone (SUBOXONE) 4mg-1mg SL film (Patient Supplied)  1 Film SubLINGual BID

## 2018-10-15 NOTE — PROCEDURES
Vascular & Interventional Radiology Brief Procedure Note Interventional Radiologist: Ramirez Cardozo MD 
Pre-operative Diagnosis:  OLIVIA on CRF. Post-operative Diagnosis: Same as pre-op dx Procedure(s) Performed:  Core bx lower pole right kidney. Assistant:  none Anesthesia:  Local and Moderate Sedation Findings:  3 X 16g cores, LP right kidney. [place] per pathologist. 
 
Complications: None Estimated Blood Loss:  minimal 
 
Tubes and Drains: None Specimens: to pathology. Condition: Good Plan: obseravation on floor. May resume anticoagulation after 24 hrs if no sign or symptoms of bleeding. Ramirez Cardozo MD, MD 
[facility] Vascular & Interventional Radiology 10/15/2018

## 2018-10-15 NOTE — PROGRESS NOTES
In patient's chart d/t this nurse assisting with placing peripheral IV. Wife at bedside. Call bell w/in reach.

## 2018-10-15 NOTE — PROGRESS NOTES
Problem: Falls - Risk of 
Goal: *Absence of Falls Document Jaiden Cam Fall Risk and appropriate interventions in the flowsheet. Outcome: Progressing Towards Goal 
Fall Risk Interventions: 
  
 
  
 
Medication Interventions: Patient to call before getting OOB History of Falls Interventions: Door open when patient unattended

## 2018-10-15 NOTE — PROGRESS NOTES
Pt educated about procedure and sedation, verbalizes understanding. Denies complications from sedation/anesthesia in the past. Medical history reviewed and noted, when asked how pt is doing, pt states \"I have been better\". Last dose of Plavix was on 10/7/18, and ASA 10/8/18. Lung sounds and heart tones WNL per RN Pineda. C/o back pain rated 8.5/10. Will continue to monitor. 840 [facility] imaging in progress [facility] 37 for pathologist, spoke with Lisette Mix. 1- Dr. Glenna Bowden arrived from pathology, timeout performed with pathologist 
 
5676- Needle out, manual pressure applied to right flank by RN Pineda 12- Dr. Glenna Bowden left room, all specimens with Dr. Glenna Bowden 1337- Hemostasis achieved, 4x4 with tegaderm applied to site by RN Pineda. Also, writing RN attempted to give report but primary RN discharging pt, will call back in 10 min per unit's request 
 
1444- TRANSFER - OUT REPORT: 
 
Verbal report given to [facility] RN(name) on [place] Number  being transferred to (unit) for routine progression of care Report consisted of patients Situation, Background, Assessment and  
Recommendations(SBAR). Information from the following report(s) SBAR, Procedure Summary, MAR and Cardiac Rhythm SR/SB was reviewed with the receiving nurse. Lines:  
Peripheral IV 10/15/18 Right Antecubital (Active) Site Assessment Clean, dry, & intact 10/15/2018 11:07 AM  
Phlebitis Assessment 0 10/15/2018 11:07 AM  
Infiltration Assessment 0 10/15/2018 11:07 AM  
Dressing Status New 10/15/2018 11:07 AM  
Dressing Type Tape;Transparent 10/15/2018 11:07 AM  
Hub Color/Line Status Blue;Flushed;Capped 10/15/2018 11:07 AM  
Action Taken Open ports on tubing capped 10/15/2018 11:07 AM  
Alcohol Cap Used Yes 10/15/2018 11:07 AM  
  
 
Opportunity for questions and clarification was provided.    
 
Patient transported with: 
 [phone]- Also reviewed post procedure orders with receiving RN

## 2018-10-15 NOTE — PROGRESS NOTES
RENAL PROGRESS NOTE Adrienne Medicus Assessment/Plan: · OLIVIA. (ischemic atn in a  setting of recent onset of n/v). Acute GN is also possible given gross hematuria and nephrotic proteinuria. No recovery of renal function at this time. GN/vasculitis serologies are neg. Kidney biopsy later today. Meanwhile continue dialysis, next one tomorrow. 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 · HTN with variable control. Continue current regimen. Monitor bp with dialysis, volume removal.  
· N/V. Etio? CT finding noted. EGD results noted- diffuse gastritis. Improving clinically. · Secondary hyperparathyroidism/hyperphsphotemia. Continue calcitriol and phoslo. Subjective:Patient complaints off: Feel better, main problem is withdrawal from subaxon with muscle aches, sweats, fatigue- slowly getting better. No SOB/CP. No n/v. Tolerates diet. Getting stronger. Patient Active Problem List  
Diagnosis Code  ST elevation (STEMI) myocardial infarction involving left anterior descending coronary artery (HCC) I21.02  
 Cardiac arrest - ventricular fibrillation  HTN (hypertension) I10  
 Hypertensive emergency I16.1  Hyperglycemia R73.9  Acute on chronic renal failure (HCC) N17.9, N18.9  Leukocytosis D72.829  
 Acute pulmonary edema (HCC) J81.0  Contrast dye induced nephropathy N14.1, T50.8X5A  
 CKD (chronic kidney disease) N18.9  Arachnoiditis G03.9  Postlaminectomy syndrome, lumbar region M96.1  Psoriasis L40.9  Degeneration of lumbar or lumbosacral intervertebral disc M51.37  
 Encounter for long-term (current) use of high-risk medication Z79.899  Obesity E66.9  
 Pain in joint, lower leg M25.569  Psoriatic arthropathy ([facility] L40.50  Chronic low back pain M54.5, G89.29  Chronic pain syndrome G89.4  Lumbosacral spondylosis without myelopathy M47.817  Enthesopathy of hip M76.899  
 Sacroiliitis (HCC) M46.1  Coronary artery disease I25.10  
 JESSICA (obstructive sleep apnea) G47.33  
 UTI (lower urinary tract infection) N39.0  Cardiomyopathy ([facility] I42.9  AICD (automatic cardioverter/defibrillator) present Z95.810  
 OLIVIA (acute kidney injury) ([facility] N17.9  Renal failure N19  
 Chest pain R07.9  
 SOB (shortness of breath) R06.02  Bronchitis J40  Acute renal failure (ARF) (HCC) N17.9  ARF (acute renal failure) ([facility]) N17.9  Severe sepsis ([facility]) A41.9, R65.20  Epigastric abdominal pain R10.13  
 Anxiety F41.9  Ventricular tachycardia ([facility]) I47.2  Ventricular fibrillation ([facility]) I49.01  
 ICD (implantable cardioverter-defibrillator) discharge Z45.02  
 CAD (coronary artery disease) I25.10  Obesity, morbid ([facility] E66.01  
 Bacteria in urine X35.02  
 Metabolic acidosis M78.0 Current Facility-Administered Medications Medication Dose Route Frequency Provider Last Rate Last Dose  oxyCODONE IR (ROXICODONE) tablet 10 mg  10 mg Oral Q4H PRN Idamae Loll, DO   10 mg at 10/15/18 1026  
 HYDROcodone-acetaminophen (NORCO) 7.5-325 mg per tablet 1 Tab  1 Tab Oral Q4H PRN Argenis Shaw MD      
 lidocaine 4 % patch 1 Patch  1 Patch TransDERmal Q24H Jazmin Mena MD   1 Patch at 10/14/18 2035  dextrose 5 % - 0.45% NaCl infusion  50 mL/hr IntraVENous CONTINUOUS Jordy Angel NP   Stopped at 10/15/18 1345  sodium chloride (NS) flush 5-10 mL  5-10 mL IntraVENous Q8H Ned Kat MD   Stopped at 10/15/18 1400  
 sodium chloride (NS) flush 5-10 mL  5-10 mL IntraVENous PRN Ned Kat MD      
 LORazepam (ATIVAN) tablet 1 mg  1 mg Oral Q1H PRN Ned Kat MD   1 mg at 10/10/18 1826 Or  LORazepam (ATIVAN) injection 1 mg  1 mg IntraVENous Q1H PRN Ned Kat MD   1 mg at 10/10/18 7927  LORazepam (ATIVAN) tablet 2 mg  2 mg Oral Q1H PRN Sadie Richardson MD   2 mg at 10/11/18 4917 Or  LORazepam (ATIVAN) injection 2 mg  2 mg IntraVENous Q1H PRN Sadie Richardson MD   2 mg at 10/11/18 3655  LORazepam (ATIVAN) injection 3 mg  3 mg IntraVENous Q15MIN PRN Sadie Richardson MD      
 0.9% sodium chloride infusion  50 mL/hr IntraVENous DIALYSIS PRN Laura Paige MD      
 heparin (porcine) 1,000 unit/mL injection 1,000 Units  1,000 Units InterCATHeter DIALYSIS PRN Laura Paige MD      
 pantoprazole (PROTONIX) tablet 40 mg  40 mg Oral ACB Fauzia Darden MD   40 mg at 10/15/18 1026  hydrALAZINE (APRESOLINE) tablet 50 mg  50 mg Oral TID Joanna Chino MD   50 mg at 10/15/18 1026  calcitRIOL (ROCALTROL) capsule 0.5 mcg  0.5 mcg Oral DAILY Joanna Chino MD   Stopped at 10/15/18 0900  promethazine (PHENERGAN) tablet 25 mg  25 mg Oral Q8H PRN Fauzia Darden MD   25 mg at 10/10/18 1441  
 amLODIPine (NORVASC) tablet 10 mg  10 mg Oral DAILY Tammi Kocher V, MD   10 mg at 10/15/18 1026  calcium acetate (PHOSLO) capsule 1,334 mg  2 Cap Oral TID WITH MEALS Tammi Kocher V, MD   Stopped at 10/15/18 0800  
 influenza vaccine 2018-19 (6 mos+)(PF) (FLUARIX QUAD/FLULAVAL QUAD) injection 0.5 mL  0.5 mL IntraMUSCular PRIOR TO DISCHARGE Fauzia Darden MD      
 isosorbide mononitrate ER (IMDUR) tablet 90 mg  90 mg Oral DAILY Fauzia Darden MD   90 mg at 10/15/18 1035  
 amiodarone (CORDARONE) tablet 400 mg  400 mg Oral DAILY Fauzia Darden MD   400 mg at 10/15/18 1026  
 atorvastatin (LIPITOR) tablet 40 mg  40 mg Oral QHS Fauzia Darden MD   40 mg at 10/14/18 2035  fluticasone (FLONASE) 50 mcg/actuation nasal spray 2 Spray  2 Spray Both Nostrils DAILY Fauzia Darden MD   2 Turner at 10/15/18 1025  tiotropium (SPIRIVA) inhalation capsule 18 mcg  1 Cap Inhalation DAILY Fauzia Darden MD   18 mcg at 10/15/18 1024  ondansetron (ZOFRAN) injection 4 mg  4 mg IntraVENous Q4H PRN Lan Joya MD   4 mg at 10/13/18 2225  albuterol-ipratropium (DUO-NEB) 2.5 MG-0.5 MG/3 ML  3 mL Nebulization Q4H PRN Lan Joya MD   3 mL at 10/10/18 2037  buprenorphine-naloxone (SUBOXONE) 4mg-1mg SL film (Patient Supplied)  1 Film SubLINGual BID Lan Joya MD   Stopped at 10/08/18 1014 Objective Vitals:  
 10/15/18 1502 10/15/18 1519 10/15/18 1547 10/15/18 1602 BP: 133/67 142/78 142/77 (!) 159/94 Pulse: (!) 56 (!) 51 (!) 53 (!) 58 Resp: 14 12 15 14 Temp: 98.2 °F (36.8 °C)  98.3 °F (36.8 °C) TempSrc:      
SpO2: 92% 96% 97% 100% Weight:      
Height:      
 
 
 
Intake/Output Summary (Last 24 hours) at 10/15/18 1606 Last data filed at 10/15/18 4799 Gross per 24 hour Intake                0 ml Output              650 ml Net             -650 ml Admission weight: Weight: 118.7 kg (261 lb 11 oz) (10/04/18 1436) Last Weight Metrics: 
Weight Loss Metrics 10/5/2018 2/14/2018 2/8/2018 1/11/2018 8/22/2017 8/20/2017 3/2/2017 Today's Wt 261 lb 250 lb 250 lb 286 lb 280 lb 258 lb 258 lb BMI 37.45 kg/m2 35.87 kg/m2 35.87 kg/m2 41.04 kg/m2 40.18 kg/m2 37.02 kg/m2 37.02 kg/m2 Physical Assessment:  
 
General: NAD, alert and oriented. Neck: No jvd. Rt ij tdc. LUNGS: Clear to Auscultation, No rales, rhonchi or wheezes. CVS EXM: S1, S2  RRR, no murmurs/gallops/rubs. Abdomen: nt/nd. Lower Extremities: 1+edema. Skin: extensive scaly rash ue/le. Lab CBC w/Diff Recent Labs 10/12/18 
 1702 WBC  7.7 RBC  3.11* HGB  8.4* HCT  26.9*  
PLT  150 Chemistry Recent Labs 10/15/18 
 0745  10/14/18 
 6391  10/13/18 
 4813 GLU  95  110*  99 NA  135*  137  134* K  3.9  3.9  4.6 CL  99*  101  98* CO2  28  31  27 BUN  31*  23*  37* CREA  7.07*  5.45*  7.83* CA  7.5*  7.7*  7.7* AGAP  8  5  9 BUCR  4*  4*  5* ALB  1.8*  1.8*  1.8*  
 PHOS  4.5  3.4  5.5* Lab Results Component Value Date/Time Iron 86 05/18/2015 03:00 AM  
 TIBC 260 05/18/2015 03:00 AM  
 Iron % saturation 33 05/18/2015 03:00 AM  
 Ferritin 151 05/18/2015 03:00 AM  
  
Lab Results Component Value Date/Time Calcium 7.5 (L) 10/15/2018 07:45 AM  
 Phosphorus 4.5 10/15/2018 07:45 AM  
  
 
Bertin Billings M.D. [facility] Phone [phone] Pager [phone]

## 2018-10-15 NOTE — INTERVAL H&P NOTE
VASCULAR & INTERVENTIONAL RADIOLOGY PROGRESS NOTE History and Physical reviewed; I have examined the patient and there are no pertinent changes. Pt is an appropriate candidate to undergo renal bx under moderate sedation. Ariel Bosch MD, MD 
Vascular & Interventional Radiology [facility] 10/15/2018

## 2018-10-15 NOTE — PROGRESS NOTES
1925: Assumed patient care. Received report from [place], [place] (offgoing nurse). Report included SBAR, Kardex, and MAR. Patient resting in bed, in no signs of pain or distress. Call light and possessions within reach. 2035: Roxicodone given for pain 8/10 lower back 2130: Patient states pain has reduced to 7/10- morphine not due until 2200 2205: Morphine given for pain 7/10 lower back 2317: Patient states pain level remains at 7 despite administration of Roxicodone and Morphine. Additionally telemetry called stating patient has a prolonged QT. Paged Dr. Edilma Joyce. 2319: Spoke with Dr. Edilma Joyce, informed doctor of prolonged QT and patient's pain level. Doctor ordered 1 mg Dilaudid IV once. Repeat back provided 2331: Dilaudid given for pain rated at this time 8/10 in lower back 0030: Patient now rates pain 6/10 stating pain is \"much better\". 2459: Morphine given for pain 8/10 
0445: Pain reassessment: patient states pain is doing \"better\", when asked to rate pain on scale patient states \"it still hurts a lot but it's manageable\" 0820: Bedside and Verbal shift change report given to Raya Stockton RN (oncoming nurse) by Kishore Garza RN (offgoing nurse). Report included the following information SBAR, Kardex and MAR.

## 2018-10-16 LAB
ALBUMIN SERPL-MCNC: 2 G/DL (ref 3.4–5)
ANION GAP SERPL CALC-SCNC: 9 MMOL/L (ref 3–18)
BUN SERPL-MCNC: 36 MG/DL (ref 7–18)
BUN/CREAT SERPL: 4 (ref 12–20)
CALCIUM SERPL-MCNC: 7.7 MG/DL (ref 8.5–10.1)
CHLORIDE SERPL-SCNC: 100 MMOL/L (ref 100–108)
CK SERPL-CCNC: 45 U/L (ref 39–308)
CO2 SERPL-SCNC: 28 MMOL/L (ref 21–32)
CREAT SERPL-MCNC: 8.18 MG/DL (ref 0.6–1.3)
ERYTHROCYTE [DISTWIDTH] IN BLOOD BY AUTOMATED COUNT: 16.5 % (ref 11.6–14.5)
GLUCOSE SERPL-MCNC: 90 MG/DL (ref 74–99)
HCT VFR BLD AUTO: 26.1 % (ref 36–48)
HGB BLD-MCNC: 8 G/DL (ref 13–16)
MCH RBC QN AUTO: 26.4 PG (ref 24–34)
MCHC RBC AUTO-ENTMCNC: 30.7 G/DL (ref 31–37)
MCV RBC AUTO: 86.1 FL (ref 74–97)
PHOSPHATE SERPL-MCNC: 4.8 MG/DL (ref 2.5–4.9)
PLATELET # BLD AUTO: 194 K/UL (ref 135–420)
PMV BLD AUTO: 8.8 FL (ref 9.2–11.8)
POTASSIUM SERPL-SCNC: 4 MMOL/L (ref 3.5–5.5)
RBC # BLD AUTO: 3.03 M/UL (ref 4.7–5.5)
SODIUM SERPL-SCNC: 137 MMOL/L (ref 136–145)
WBC # BLD AUTO: 5.9 K/UL (ref 4.6–13.2)

## 2018-10-16 PROCEDURE — 74011250636 HC RX REV CODE- 250/636: Performed by: HOSPITALIST

## 2018-10-16 PROCEDURE — 82550 ASSAY OF CK (CPK): CPT | Performed by: INTERNAL MEDICINE

## 2018-10-16 PROCEDURE — 74011000250 HC RX REV CODE- 250: Performed by: HOSPITALIST

## 2018-10-16 PROCEDURE — 74011250637 HC RX REV CODE- 250/637: Performed by: INTERNAL MEDICINE

## 2018-10-16 PROCEDURE — 85027 COMPLETE CBC AUTOMATED: CPT | Performed by: INTERNAL MEDICINE

## 2018-10-16 PROCEDURE — 65660000000 HC RM CCU STEPDOWN

## 2018-10-16 PROCEDURE — 80069 RENAL FUNCTION PANEL: CPT | Performed by: INTERNAL MEDICINE

## 2018-10-16 PROCEDURE — 36415 COLL VENOUS BLD VENIPUNCTURE: CPT | Performed by: INTERNAL MEDICINE

## 2018-10-16 PROCEDURE — 90935 HEMODIALYSIS ONE EVALUATION: CPT

## 2018-10-16 PROCEDURE — 74011250637 HC RX REV CODE- 250/637: Performed by: HOSPITALIST

## 2018-10-16 RX ORDER — BUPRENORPHINE AND NALOXONE 4; 1 MG/1; MG/1
1 FILM, SOLUBLE BUCCAL; SUBLINGUAL 2 TIMES DAILY
Status: DISCONTINUED | OUTPATIENT
Start: 2018-10-16 | End: 2018-10-24 | Stop reason: HOSPADM

## 2018-10-16 RX ORDER — BUPRENORPHINE AND NALOXONE 4; 1 MG/1; MG/1
1 FILM, SOLUBLE BUCCAL; SUBLINGUAL 2 TIMES DAILY
Status: DISCONTINUED | OUTPATIENT
Start: 2018-10-16 | End: 2018-10-16

## 2018-10-16 RX ORDER — POLYETHYLENE GLYCOL 3350 17 G/17G
17 POWDER, FOR SOLUTION ORAL DAILY PRN
Status: DISCONTINUED | OUTPATIENT
Start: 2018-10-16 | End: 2018-10-20

## 2018-10-16 RX ADMIN — TIOTROPIUM BROMIDE 18 MCG: 18 CAPSULE ORAL; RESPIRATORY (INHALATION) at 09:11

## 2018-10-16 RX ADMIN — FLUTICASONE PROPIONATE 2 SPRAY: 50 SPRAY, METERED NASAL at 09:10

## 2018-10-16 RX ADMIN — CALCITRIOL 0.5 MCG: 0.25 CAPSULE ORAL at 09:09

## 2018-10-16 RX ADMIN — ATORVASTATIN CALCIUM 40 MG: 40 TABLET, FILM COATED ORAL at 21:15

## 2018-10-16 RX ADMIN — HYDRALAZINE HYDROCHLORIDE 50 MG: 50 TABLET, FILM COATED ORAL at 16:41

## 2018-10-16 RX ADMIN — CALCIUM ACETATE 1334 MG: 667 CAPSULE ORAL at 09:08

## 2018-10-16 RX ADMIN — ISOSORBIDE MONONITRATE 90 MG: 30 TABLET, EXTENDED RELEASE ORAL at 09:08

## 2018-10-16 RX ADMIN — AMLODIPINE BESYLATE 10 MG: 5 TABLET ORAL at 09:08

## 2018-10-16 RX ADMIN — HYDRALAZINE HYDROCHLORIDE 50 MG: 50 TABLET, FILM COATED ORAL at 09:08

## 2018-10-16 RX ADMIN — POLYETHYLENE GLYCOL 3350 17 G: 17 POWDER, FOR SOLUTION ORAL at 15:29

## 2018-10-16 RX ADMIN — HYDRALAZINE HYDROCHLORIDE 50 MG: 50 TABLET, FILM COATED ORAL at 21:15

## 2018-10-16 RX ADMIN — Medication 10 ML: at 15:32

## 2018-10-16 RX ADMIN — CALCIUM ACETATE 1334 MG: 667 CAPSULE ORAL at 16:42

## 2018-10-16 RX ADMIN — OXYCODONE HYDROCHLORIDE 10 MG: 5 TABLET ORAL at 06:15

## 2018-10-16 RX ADMIN — Medication 10 ML: at 21:16

## 2018-10-16 RX ADMIN — OXYCODONE HYDROCHLORIDE 10 MG: 5 TABLET ORAL at 15:29

## 2018-10-16 RX ADMIN — OXYCODONE HYDROCHLORIDE 10 MG: 5 TABLET ORAL at 10:37

## 2018-10-16 RX ADMIN — BUPRENORPHINE HYDROCHLORIDE, NALOXONE HYDROCHLORIDE 1 FILM: 4; 1 FILM, SOLUBLE BUCCAL; SUBLINGUAL at 21:15

## 2018-10-16 RX ADMIN — PANTOPRAZOLE SODIUM 40 MG: 40 TABLET, DELAYED RELEASE ORAL at 09:08

## 2018-10-16 RX ADMIN — BUPRENORPHINE HYDROCHLORIDE, NALOXONE HYDROCHLORIDE 1 FILM: 4; 1 FILM, SOLUBLE BUCCAL; SUBLINGUAL at 16:43

## 2018-10-16 RX ADMIN — OXYCODONE HYDROCHLORIDE 10 MG: 5 TABLET ORAL at 02:06

## 2018-10-16 RX ADMIN — AMIODARONE HYDROCHLORIDE 400 MG: 200 TABLET ORAL at 09:08

## 2018-10-16 NOTE — PROGRESS NOTES
. 
Acute HEmodialysis flow sheet Patient information Malik Fields MRN: [id number]      VBY:[id number]  [place]# [id number] Hospital: [place] [address] DX: resp fail []1st Time Acute  []Stat[x] Routine []Urgent []Chronic Unit  
       []Acute Room []Bedside  []ICU/CCU []ER Isolation Precautions: [x]Dialysis[] Airborne []Contact []Droplet []Reverse Special Considerations:    [] Blood Consent Verified  []N/A Allergies: 
[] NKA  [] Reviewed Allergies Allergen Reactions  Latex Other (comments)  
  blisters  Other Food Hives Allergic to tomatoes and tomato sauce  Codeine Nausea and Vomiting Code Status []Full Code [] DNR  []Other Diet: renal Diabetic: []Yes []No 
  
 Hemodialysis Orders Physician: Mely Muonz Dialyzer Revaclear 300 Duration 4 BFR: 300 DFR:800 Dialysate: K 3 CA 2.5 Na 140 Bicarb 35 Dry Weight: UF Goal: 2000 ml Heparin:  []Bolus   Units    []Hourly  Units      [x]None BP Tx:  []NS  200ml/Bolus    []Other     [x]N/A  
    Labs: Pre:  Post: [x]N/A Additional Orders: []N/A [x]Signed Treatment Consent   [x] Verified   [] Obtained Primary Nurse Report: First initial/ Last Name/Title Pre Dialysis: Cornelia Garcia    Time: 611 Access CATHETER ACCESS:  [] N/A  [x] RIGHT  [] LEFT  [x] IJ  [] SUBCL [] FEM [] First use X-ray  [x] Tunnel     [] Non-Tunneled [x] No S/S infection  [] Redness [] Drainage  [] Cultured [] Swelling 
                       [] Pain  
                       [x] Medical Aseptic [x] Prep Dressing Changed  
                       [] Clotted [x] Patent []      Flows: [x] Good [] Poor [] Reversed          If Access Problem Dr. Simona Ferguson: [] Yes [] No    Date:    [x] N/A  
     GRAFT/FISTULA ACCESS:  [x] N/A  [] RIGHT  [] LEFT  [] UE  [] LE   
 [] AVG  [] AVF [] BUTTONHOLE  [] +BRUIT/THRILL 
     [] MEDICAL ASEPTIC PREP [] No S/S infection  [] Redness [] Drainage  [] Cultured [] Swelling 
                       [] Pain Needle Gauge                              Length: If Access Problem Dr. Burns [place]: [] Yes [] No    Date:     [] N/A General Assessment LUNGS:  SaO2%  [x] Clear [] Coarse [] Crackles [] Wheezing  
            [] Diminished Location: [] RLL [] LLL [] RUL [] NIKOS   
     COUGH:  [] Productive [] Dry [x] N/A  RESPIRATIONS: [x] Easy [] Labored THERAPY: [x] RA   [] NC     L/min    Mask: [] NRB [] Venti  O2%    
             [] Ventilator [] Intubated [] Trach [] BiPap [] CPap CARDIAC: [x] Regular [] Irregular [] Pericardial Rub [] JVD [] Monitored Rhythm: EDEMA: [] None [x] Generalized [] Facial [] Pedal [] UE [] LE 
           [] Pitting [] 1 [] 2 [] 3 [] 4    [] Right [] Left [] Bilateral  
    SKIN:    [x] Warm [] Hot [] Cold  [] Dry [] Pale [] Diaphoretic  
           [] Flushed [] Jaundiced [] Cyanotic [] Rash [] Weeping LOC:    [x] Alert  Oriented to: [x] Person [x] Place [x] Time  
          [] Confused [] Lethargic [] Medically sedated [] Non-responsive GI/ABDOMEN: [] Flat [x] Distended [x] Soft [] Firm [] Obese [] Diarrhea 
 [] Bowel Sounds     []Nausea [] Vomiting PAIN: [x] 0 [] 1 [] 2 [] 3 [] 4 [] 5 [] 6 [] 7 [] 8 [] 9 [] 10 Scale 1-10 Action/Follow Up MOBILITY: [] Amb [] Amb/Assist [x] Bed  [] Wheelchair CUrrent LABS HBsAg ONLY: Date Drawn 10/6/18 [x] Negative [] Positive  [] Unknown. HBsAb: Date Drawn 10/6/18 [x] Susceptible <10 [] Immune ?10 [] Unknown Date of Current Labs:  
    
Lab Results Component Value Date/Time  Sodium 137 10/16/2018 06:10 AM  
 Potassium 4.0 10/16/2018 06:10 AM  
 Chloride 100 10/16/2018 06:10 AM  
 CO2 28 10/16/2018 06:10 AM  
 BUN 36 (H) 10/16/2018 06:10 AM  
 Creatinine 8.18 (H) 10/16/2018 06:10 AM  
 Calcium 7.7 (L) 10/16/2018 06:10 AM  
 Magnesium 2.0 12/27/2015 04:35 AM  
 Phosphorus 4.8 10/16/2018 06:10 AM  
 HCT 26.9 (L) 10/12/2018 05:02 PM  
 HGB 8.4 (L) 10/12/2018 05:02 PM  
 PLATELET 746 08/97/9144 05:02 PM  
 INR 1.2 10/08/2018 05:21 AM  
  
Education Person Educated: [] Patient [] Family [] Other Knowledge base: [] None [] Minimal [] Substantial   
     Barriers to learning  [x] None Preferred method of learning: [] Written [] Oral [] Visual [] Hands on Topic: [] Access Care [] S&S of infection [] Fluid Management [] K+ 
               [] Procedural    [] Albumin [] Medications [] Tx Options [] Transplant 
                [] Diet [] Other Teaching Tools: [] Explain [] Demonstration [] Handout [] Teachback Ro/hemodiaylsis machine safety checks- before each treatment Machine Serial #13 [] # 1 [id number] [] # 2 [id number] [] # 3 [id number] [] # 4 [id number] [] # 5 [id number] [] # [id number] [] # [phone] Alarm Test: [x] Pass Time 1020       RO Serial # 13 
[] [id number] (Large dual station RO) [] # 1  [id number]  (Portable # 1) [] # 2  [id number]  (Portable # 2) [] # 3  [id number]  (Portable # 3) [] # 4  [id number]  (Portable # 4) [] # 5  [id number]  (Portable # 5) Lot #s: Dialyzer [id number] exp. Tubing [id number] exp. Saline -jt exp. [x] RO/Machine Log Complete    [x] Extracorporeal circuit Tested for integrity Dialysate: pH 7.4 Temp. 36  Conductivity: Meter 14 HD Machine 14  
chlorine testing- before each treatment and every 4 hours Total Chlorine: [x] Less than 0.1 ppm Time: 1015 2nd Check Time: 9823 (If greater than 0.1 ppm from Primary then every 30 minutes from Secondary) treatment Iniation-with dialysis precautions [x] All Connections Secured   [x] Saline Line Double Clamped [x] Venous Parameters Set    [x] Arterial Parameters Set    [x] Prime Given 250 ml            [x] Air Foam Detector Engaged Lizeth Nurse Signature/Title_Shiraz Ernst__ Pre-Treatment Time 1015 B/P 143/71 HR 61 Temp. 98.5 Resp Rate 18 Pre Wt  
UF Calculations: Wt to lose:2000 ml(+) Oral:  ml(+) IV Meds/Fluids/Blood prods  ml(+) Prime/Rinse 500 ml 
(=)Total UF Goal 250 mL Scale Type:[] Bed scale [] Sling Scale [] Wheel Chair Scale [x]  Not Ordered [] Unable to obtain pt on stretcher Tx Initiation Note: right IJ catheter accessed and treatment started without complicaiton  
[x] Time Out/Safety Check  Time: 6885 Lizeth Signature/Title_Shiraz Ernst INTRADIALYTIC MONITORING 
(SEE ATTACHED FLOWSHEET) POST TREATMENT Time 1445 B/P 135/60 HR 60 Temp 98.2 Resp. Rate 18 Post wt Time Medication Dose Volume Route Initials [facility] Signatures Title Initials Time 52 [facility] RN  848 14 90 Alfredoita Signature/Title:_Shiraz Ernst_ Dialyzer cleared: [] Good [] Fair [] Poor    Blood Processed 68 Liters Net UF Removed 2000 mL Post Tx Access: AVF/AVG: Bleeding Stop                   Art. min Ashok min []+bruit/thrill Catheter: Locking Solution heparin     Art. 2.0 ml Ashok 2.0 ml 
 
Post Assessment:  Skin: [x]Warm []Dry []Diaphoretic []Flushed [] Pale []Cyanotic Lungs: [x]Clear []Coarse []Crackles []Wheezing Cardiac: [x]Regular []Irregular  []Monitored rhythm Edema: []None [x]General []Facial []Pedal  []UE []LE []RIGHT []LEFT    []Bilateral  
   Pain: [x]0 []1[]2 []3 []4 []5 []6 []7 []8 []9 []10  
POST Tx Note: removed 2 liters locked catheter as per order and patient transported back to room Primary Nurse Report: First initial/Last name/Title Post Dialysis:_S Eusebio Agarwal RN_         Time:_1450_ Abbreviations: AVG-arterial venous graft, AVF-arterial venous fistula, IJ-Internal Jugular, Subcl-Subclavian, Fem-Femoral, Tx-treatment, AP/HR-apical heart rate, DFR-dialysate flow rate, BFR-blood flow rate, AP-arterial pressure, -venous pressure, UF-ultrafiltrate, TMP-transmembrane pressure, Ashok-Venous, Art-Arterial, RO-Reverse Osmosis

## 2018-10-16 NOTE — PROGRESS NOTES
0700 Bedside and Verbal shift change report given to Pema Cash (oncoming nurse) by Candida Winslow RN (offgoing nurse). Report included the following information SBAR, Kardex, ED Summary, Intake/Output, MAR and Recent Results. 0930 Patient off floor to Dialysis 1434 Patient off the floor in dialysis 1500 spoke with pharmacy about suboxone dose. Medication coming from another facility. 1529 Back on unit from Dialysis. 1900 Bedside and verbal shift change report given to [address] (oncoming nurse) by Pema Cash (offgoing nurse). Report included the following information SBAR, Kardex, ED Summary, Intake/Output, MAR and Recent Results.

## 2018-10-16 NOTE — PROGRESS NOTES
completed follow up visit with patient and family  In room 2104 this morning and a Spiritual assessment of patient was done. Patient feels that he has been beat up and left to die. Has lots of pain and itching. Prayer and support offered. Chaplains will continue to follow and will provide pastoral care on an as needed/requested basis Reiseñor 3 Board Certified [employer] Spiritual Care  
[phone]

## 2018-10-16 NOTE — PROGRESS NOTES
Problem: Falls - Risk of 
Goal: *Absence of Falls Document Cristiane Rivas Fall Risk and appropriate interventions in the flowsheet. Outcome: Progressing Towards Goal 
Fall Risk Interventions: 
  
 
  
 
Medication Interventions: Patient to call before getting OOB History of Falls Interventions: Door open when patient unattended

## 2018-10-16 NOTE — PROGRESS NOTES
Nutrition follow up/Plan of care RECOMMENDATIONS:  
 
1. Renal diet 2. Monitor weight, labs and PO intake 3. RD to follow GOALS:  
 
1. Ongoing: PO intake meets >75% of protein/calorie needs by 10/23 2. Ongoing: Weight Maintenance (+/- 1-2 lb by 10/23) ASSESSMENT: 
 
Weight status is classified as obese per BMI of 37.5. PO intake is fair per vitals. Labs noted. Patient with hypoalbuminemia. Elevated BUN/Creatinine levels; GFR: 7. Nutrition recommendations listed. RD to follow. Nutrition Risk:  [] High  [x] Moderate []  Low SUBJECTIVE/OBJECTIVE:  
 (10/11): LOS. Admitted with OLIVIA. S/P TDC placement on 10/8 to start HD. S/P EGD on 10/9. Plan for kidney biopsy on 10/15. Patient reports having nausea/vomiting due to withdrawal as he hasn't been able to take medication (Suboxone) while in hospital as he does at home. Observed 50% intake of sandwich for dinner meal. Patient reports at home patient was eating well and not having any nausea/vomiting. Reports weight has been stable at 265 lb. Has food allergy to tomatoes. States having a little trouble with chewing but food has been soft enough for him to tolerate. Will monitor. (10/16): S/P renal biopsy on 10/15. Patient off unit receiving HD treatment x 2 visits. 25-50% intake of meals per vitals. Will monitor. Information Obtained from:  
 [x] Chart Review 
 [] Patient 
 [] Family/Caregiver 
 [] Nurse/Physician 
 [] Interdisciplinary Meeting/Rounds Diet: Renal diet Medications: [x] Reviewed (Norvasc, Lipitor, Calcitriol, Phoslo, Protonix) Allergies: [x] Reviewed (Tomatoes) Encounter Diagnoses ICD-10-CM ICD-9-CM 1. OLIVIA (acute kidney injury) ([facility] [address] N17.9 584.9 2. Abdominal pain, generalized R10.84 789.07  
3. Nausea and vomiting, intractability of vomiting not specified, unspecified vomiting type R11.2 787.01 Past Medical History:  
Diagnosis Date  AICD MEDTRONIC (single chamber)  Apical mural thrombus ECHO 3/14  Cardiac arrest ([facility] [address] 10/15 VT / VF ( ~20 ICD shocks ). No obstructive CAD on cath  Chronic back pain  Chronic kidney disease, stage III (moderate) (HCC)  Coronary artery disease LAD - 3.0 x 18mm VISION 7/13  Degenerative spine disease  Dyslipidemia  Hypertension  Ischemic cardiomyopathy EF 30%(10/15) , 40-45% (03/15),  EF 30-35% (3/14)  Ischemic VF   
 07/13 in setting of MI, DC cardioversion x4  Narcotic dependence ([facility] [address]  Noncompliance  Obesity  Psoriasis  S/P cardiac cath 10/15  Secondary hyperparathyroidism (of renal origin)  Tobacco abuse Labs:   
Lab Results Component Value Date/Time Sodium 137 10/16/2018 06:10 AM  
 Potassium 4.0 10/16/2018 06:10 AM  
 Chloride 100 10/16/2018 06:10 AM  
 CO2 28 10/16/2018 06:10 AM  
 Anion gap 9 10/16/2018 06:10 AM  
 Glucose 90 10/16/2018 06:10 AM  
 BUN 36 (H) 10/16/2018 06:10 AM  
 Creatinine 8.18 (H) 10/16/2018 06:10 AM  
 Calcium 7.7 (L) 10/16/2018 06:10 AM  
 Magnesium 2.0 12/27/2015 04:35 AM  
 Phosphorus 4.8 10/16/2018 06:10 AM  
 Albumin 2.0 (L) 10/16/2018 06:10 AM  
 
Anthropometrics: BMI (calculated): 37.5 Last 3 Recorded Weights in this Encounter 10/04/18 5333 10/05/18 1340 10/05/18 1404 Weight: 118.7 kg (261 lb 11 oz) 118.4 kg (261 lb) 118.4 kg (261 lb) Ht Readings from Last 1 Encounters:  
10/05/18 5' 10\" (1.778 m) Patient Vitals for the past 100 hrs: 
 % Diet Eaten 10/16/18 0800 0 % 10/14/18 1411 50 % 10/14/18 1043 25 % 10/13/18 0952 25 % [x] New weight n/a on record. Estimated Nutrition Needs: [x] MSJ Calories: 2370 Kcal Based on:   [x] Actual BW   
Protein:   95 g Based on:   [x] Actual BW Fluid:       0420-6863 ml Based on:   [x] Actual BW  
 
 [x] No Cultural, Methodist or ethnic dietary need identified. [] Cultural, Methodist and ethnic food preferences identified and addressed Wt Status:  [] Normal (18.6 - 24.9) [] Underweight (<18.5) [] Overweight (25 - 29.9) [] Mild Obesity (30 - 34.9)  [x] Moderate Obesity (35 - 39.9) [] Morbid Obesity (40+) Nutrition Problems Identified:  
[x] Fair PO intake [x] Food Allergies (Tomatoes) [x] Difficulty chewing/swallowing/poor dentition ( declined change in diet texture) 
[] Constipation/Diarrhea  
[] Nausea/Vomiting  
[] None 
[] Other:  
 
Plan:  
[x] Therapeutic Diet 
[]  Obtained/adjusted food preferences/tolerances and/or snacks options  
[]  Supplements added  
[] Occupational therapy following for feeding techniques []  HS snack added  
[]  Modify diet texture  
[x]  Modify diet for food allergies []  Assist with menu selection  
[x]  Monitor PO intake on meal rounds  
[x]  Continue inpatient monitoring and intervention  
[]  Participated in discharge planning/Interdisciplinary rounds/Team meetings  
[]  Other:  
 
Education Needs: 
 [] Not appropriate for teaching at this time due to: 
 [x] Identified and addressed Nutrition Monitoring and Evaluation: 
[x] Continue ongoing monitoring and intervention 
[] Other [address]

## 2018-10-16 NOTE — PROGRESS NOTES
[address] [place], [place] [address]ty [facility] Hospitalist Division Inpatient Daily Progress Note Daily progress Note Patient: Pari Parrish MRN: [id number]  [facility]: [id number] YOB: 1970  Age: 46 y.o. Sex: male DOA: 10/3/2018 LOS:  LOS: 13 days Chief Complaint:  Acute on chronic renal failure Interval History: 46 y.o.  male with h/o AICD,cardiac arrest,VT,ischemic cardiomyopathy EF 30%,obesity,cad,presented to ED because of weakness,nausea,vomting,abdominal pain,hematuria. Patient says that he started feeling sick 2 weeks ago when he started feeling weak. Then he started vomiting. Could not tolerate even water. In recent days ,he has had abdominal pain which he attributed to the ongoing vomiting. Patient also reported that he has seen his urine turning red for a while. Had to see his doctor in two weeks but felt very weak and decided to come to the ED. In ED,lab showed creatinine 11.0 compared to 2.12 on 1/11/2018 and 8.01 on 9/28/18. UA showed large blood,bacteria+. CT abd/pelvis w/o hydronephrosis or urolithiasis. Patient admitted for OLIVIA,Hematuria,UTI. Started on iv fluid. Nephrolohy consulted from ER. Since patient has GI complaint and renal involvement,nephrology has indicated that Henoch-Schonlein syndrome needs to be considered. Pt is on iv fluid. IgA elevated 648. Other serology for vasculitis including nathaniel,GBM ab,ANCA,C3,C4,heptitis profile not abnormal. Nephrology has indicated that if creatinine continue to rise over the weekend,patient will need dialysis. Today creat up to 12.00. Patient may also need kidney biopsy. Cardiology consulted significant cardiac history anticipating that he will have dialysis. Cardiology has determined that his cardiac status is stable. GI saw patient on 10/6 for ongoing symptoms. TDC catheter placed on 10/8. EGD on 10/9 showed chronic gastritis. HD on 10/9 and 10/10, next HD session on 10/12.   Also plans for kidney biopsy on Monday 10/15. Assessment/Plan:  
 
Patient Active Problem List  
Diagnosis Code  ST elevation (STEMI) myocardial infarction involving left anterior descending coronary artery ([facility]) I21.02  
 Cardiac arrest - ventricular fibrillation  HTN (hypertension) I10  
 Hypertensive emergency I16.1  Hyperglycemia R73.9  Acute on chronic renal failure (HCC) N17.9, N18.9  Leukocytosis D72.829  
 Acute pulmonary edema ([facility]) J81.0  Contrast dye induced nephropathy N14.1, T50.8X5A  
 CKD (chronic kidney disease) N18.9  Arachnoiditis G03.9  Postlaminectomy syndrome, lumbar region M96.1  Psoriasis L40.9  Degeneration of lumbar or lumbosacral intervertebral disc M51.37  
 Encounter for long-term (current) use of high-risk medication Z79.899  Obesity E66.9  
 Pain in joint, lower leg M25.569  Psoriatic arthropathy ([address] L40.50  Chronic low back pain M54.5, G89.29  Chronic pain syndrome G89.4  Lumbosacral spondylosis without myelopathy M47.817  Enthesopathy of hip M76.899  
 Sacroiliitis ([facility]) M46.1  Coronary artery disease I25.10  
 JESSICA (obstructive sleep apnea) G47.33  
 UTI (lower urinary tract infection) N39.0  Cardiomyopathy ([address] I42.9  AICD (automatic cardioverter/defibrillator) present Z95.810  
 OLIVIA (acute kidney injury) ([address] N17.9  Renal failure N19  
 Chest pain R07.9  
 SOB (shortness of breath) R06.02  Bronchitis J40  Acute renal failure (ARF) ([facility]) N17.9  ARF (acute renal failure) ([facility]) N17.9  Severe sepsis ([facility]) A41.9, R65.20  Epigastric abdominal pain R10.13  
 Anxiety F41.9  Ventricular tachycardia ([facility]) I47.2  Ventricular fibrillation ([facility]) I49.01  
 ICD (implantable cardioverter-defibrillator) discharge Z45.02  
 CAD (coronary artery disease) I25.10  Obesity, morbid ([address] E66.01  
 Bacteria in urine O17.42  
 Metabolic acidosis P34.1 A/P: 
 Acute on chronic renal failure/Metabolic acidosis  
- nephrology following, appreciate assistance 
- creatinine 11.00 POA 
 -Etiology:volume depletion -?acute GN as pt also hematuria. -Serology:IgA elevated. Other normal such as MANUELITO,ANCA,C3,C4,Hep profile. -s/p [facility] 10/8   
- hemodialysis - s/p core biopsy lower pole right kidney on 10/15 - pathology report pending 
   
Bacteria in urine/UTI/Hematuria 
 -Ceftriaxone iv - completed 
 -Blood cx ng/Ucx negative 
 -Vasculitis serology non-revealing 
  
Secondary hyperparathyroidism  
 -On calcitriol 
   
Nausea/vomiting/abdominal pain 
 -CT reviewed,no acute process. -GI, [facility] saw patient on 10/6 - EGD 10/9, chronic gastritis 
   
HTN 
 -Hydralazine prn for sbp>160 
   
JESSICA (obstructive sleep apnea) 
 -On oxygen 
   
Cardiomyopathy 
- EF 25-30% - ICD (implantable cardioverter-defibrillator) discharge CAD (coronary artery disease) 
 -Continue routine meds 
 -Telemetry monitoring 
 -Cardiology saw patient on 10/6 and determined that patient's cardiac status is stable and advised to continue his home meds. DVT Prophylaxis - SCD's BLE Disposition 
- no recommendations from PT/OT, plan is home 
- need to determine if pt will continue with HD at discharge pending renal biopsy, if so would need outpatient chair secured- defer to nephrology Sharrell Carrel, FNP-NIGEL [address] [place] [address] Hospitalist Division Pager:  [phone] Office:  [phone] Subjective:  
 
Seen during HD. No complaints. Awaiting biopsy results. Objective:  
  
Visit Vitals  /74 (BP 1 Location: Right arm, BP Patient Position: At rest)  Pulse 66  Temp 98.5 °F (36.9 °C)  Resp 18  Ht 5' 10\" (1.778 m)  Wt 118.4 kg (261 lb)  SpO2 93%  BMI 37.45 kg/m2 Physical Exam: 
General appearance: alert, cooperative, no distress, appears stated age, [facility] cath right Head: Normocephalic, without obvious abnormality, atraumatic Lungs: clear to auscultation bilaterally Heart: regular rate and rhythm, S1, S2 normal, no murmur, click, rub or gallop Abdomen: soft, non tender, non distended. Normoactive bowel sounds. Extremities: extremities normal, atraumatic, no cyanosis or edema Skin: flaky Neurologic: Grossly normal 
PSY: Mood and affect normal, appropriately behaved Intake and Output: 
Current Shift:    
Last three shifts:  10/14 1901 - 10/16 0700 In: -  
Out: [facility] Recent Results (from the past 24 hour(s)) RENAL FUNCTION PANEL Collection Time: 10/15/18  7:45 AM  
Result Value Ref Range Sodium 135 (L) 136 - 145 mmol/L Potassium 3.9 3.5 - 5.5 mmol/L Chloride 99 (L) 100 - 108 mmol/L  
 CO2 28 21 - 32 mmol/L Anion gap 8 3.0 - 18 mmol/L Glucose 95 74 - 99 mg/dL BUN 31 (H) 7.0 - 18 MG/DL Creatinine 7.07 (H) 0.6 - 1.3 MG/DL  
 BUN/Creatinine ratio 4 (L) 12 - 20 GFR est AA 10 (L) >60 ml/min/1.73m2 GFR est non-AA 8 (L) >60 ml/min/1.73m2 Calcium 7.5 (L) 8.5 - 10.1 MG/DL Phosphorus 4.5 2.5 - 4.9 MG/DL Albumin 1.8 (L) 3.4 - 5.0 g/dL RENAL FUNCTION PANEL Collection Time: 10/16/18  6:10 AM  
Result Value Ref Range Sodium 137 136 - 145 mmol/L Potassium 4.0 3.5 - 5.5 mmol/L Chloride 100 100 - 108 mmol/L  
 CO2 28 21 - 32 mmol/L Anion gap 9 3.0 - 18 mmol/L Glucose 90 74 - 99 mg/dL BUN 36 (H) 7.0 - 18 MG/DL Creatinine 8.18 (H) 0.6 - 1.3 MG/DL  
 BUN/Creatinine ratio 4 (L) 12 - 20 GFR est AA 8 (L) >60 ml/min/1.73m2 GFR est non-AA 7 (L) >60 ml/min/1.73m2 Calcium 7.7 (L) 8.5 - 10.1 MG/DL Phosphorus 4.8 2.5 - 4.9 MG/DL Albumin 2.0 (L) 3.4 - 5.0 g/dL CK Collection Time: 10/16/18  6:10 AM  
Result Value Ref Range CK 45 39 - 308 U/L Lab Results Component Value Date/Time  Glucose 90 10/16/2018 06:10 AM  
 Glucose 95 10/15/2018 07:45 AM  
 Glucose 110 (H) 10/14/2018 04:52 AM  
 Glucose 99 10/13/2018 04:50 AM  
 Glucose 93 10/12/2018 07:55 AM  
  
 
Imaging: 
Ct Bx Renal Rt Result Date: 10/15/2018 CT GUIDED MEDICAL RENAL CORE BIOPSY Indication: Acute on chronic renal insufficiency. Technique/findings: After the procedure, as well as its risks, benefits and alternatives were explained to the patient, and all questions answered, written informed consent was obtained and witnessed. The procedure was performed under conscious sedation with constant monitoring by radiology nurse, Theo Brody, from 14:09 until 14:33 hours. Patient was placed prone and lower pole right kidney was localized with CT. Time out was performed. The skin was marked, prepped and draped in sterile fashion and lidocaine was used for local anesthesia. Using a [employer] coaxial system, a 15 gauge introducer needle was advanced into the lower pole right kidney. 3-16 gauge core biopsies were obtained. The specimen was reviewed by the pathologist and was deemed adequate and satisfactory for diagnosis. Needle was removed. Sterile dressing applied. The patient tolerated the procedure well and there were no immediate complications. GUIDANCE: CT guidance was used to position (and confirm the position of) the needle. Image(s) saved in PACS: CT. One or more dose reduction techniques were used on this CT: automated exposure control, adjustment of the mAs and/or kVp according to patient size, and iterative reconstruction techniques. The specific techniques used on this CT exam have been documented in the patient's electronic medical record. Sedation:  Versed 4 mg IV. Fentanyl 200 mcg IV. Impression: Successful CT guided medical renal core biopsy lower pole right kidney. Medication List Reviewed: 
Current Facility-Administered Medications Medication Dose Route Frequency  oxyCODONE IR (ROXICODONE) tablet 10 mg  10 mg Oral Q4H PRN  
 HYDROcodone-acetaminophen (NORCO) 7.5-325 mg per tablet 1 Tab  1 Tab Oral Q4H PRN  
  lidocaine 4 % patch 1 Patch  1 Patch TransDERmal Q24H  
 sodium chloride (NS) flush 5-10 mL  5-10 mL IntraVENous Q8H  
 sodium chloride (NS) flush 5-10 mL  5-10 mL IntraVENous PRN  
 LORazepam (ATIVAN) tablet 1 mg  1 mg Oral Q1H PRN Or  
 LORazepam (ATIVAN) injection 1 mg  1 mg IntraVENous Q1H PRN  
 LORazepam (ATIVAN) tablet 2 mg  2 mg Oral Q1H PRN Or  
 LORazepam (ATIVAN) injection 2 mg  2 mg IntraVENous Q1H PRN  
 LORazepam (ATIVAN) injection 3 mg  3 mg IntraVENous Q15MIN PRN  
 0.9% sodium chloride infusion  50 mL/hr IntraVENous DIALYSIS PRN  
 heparin (porcine) 1,000 unit/mL injection 1,000 Units  1,000 Units InterCATHeter DIALYSIS PRN  pantoprazole (PROTONIX) tablet 40 mg  40 mg Oral ACB  hydrALAZINE (APRESOLINE) tablet 50 mg  50 mg Oral TID  calcitRIOL (ROCALTROL) capsule 0.5 mcg  0.5 mcg Oral DAILY  promethazine (PHENERGAN) tablet 25 mg  25 mg Oral Q8H PRN  
 amLODIPine (NORVASC) tablet 10 mg  10 mg Oral DAILY  calcium acetate (PHOSLO) capsule 1,334 mg  2 Cap Oral TID WITH MEALS  influenza vaccine 2018-19 (6 mos+)(PF) (FLUARIX QUAD/FLULAVAL QUAD) injection 0.5 mL  0.5 mL IntraMUSCular PRIOR TO DISCHARGE  isosorbide mononitrate ER (IMDUR) tablet 90 mg  90 mg Oral DAILY  amiodarone (CORDARONE) tablet 400 mg  400 mg Oral DAILY  atorvastatin (LIPITOR) tablet 40 mg  40 mg Oral QHS  fluticasone (FLONASE) 50 mcg/actuation nasal spray 2 Spray  2 Spray Both Nostrils DAILY  tiotropium (SPIRIVA) inhalation capsule 18 mcg  1 Cap Inhalation DAILY  ondansetron (ZOFRAN) injection 4 mg  4 mg IntraVENous Q4H PRN  
 albuterol-ipratropium (DUO-NEB) 2.5 MG-0.5 MG/3 ML  3 mL Nebulization Q4H PRN  
 buprenorphine-naloxone (SUBOXONE) 4mg-1mg SL film (Patient Supplied)  1 Film SubLINGual BID

## 2018-10-16 NOTE — PROGRESS NOTES
2000-Bedside side report received from [place], [facility]. Pt in bed awake resting no s/s of distress noted. Assessment completed plan of care for the shift explained pt verbalized understanding. Right back flank ( biopsy site)  dressing intact. Pt made aware the time pain med will be due verbalized understanding. Call light and personal items within reach. 2203- Pt given scheduled med + his pain med Roxicodone 2 tab. When taking the med the  nurse noted pt holding the pain pills in his hands and was asked to take the pills. 80- Pt's wife called the primary nurse and asked how he was doing. Nurse asked her if she has called and talked to him, she said not yet. She added saying that she just wanted to check on him and also let me know that she will be coming in AM to see him. Nurse informed her that pt is awake resting watching movie and he is aware the time pain med will be due at 2 am. She verbalized understanding and hung up the phone. 5 - Informed by another RN that pt's wife called again to the floor reported that I told her nothing I could do about  patient's pain. ( not true) phone call was on hold for me to speak to her but hung up before I got to the phone. I told the patient what had happened on how the wife called the unit and the conversations I had with her. Pt said\" my wife has mental problem and so she can say what she want to say when she doesn't take her medicine. \" Pt a apologized. 1474- Pt medicated for pain. 0839- Bedside and Verbal shift change report given to  JOHN awad (oncoming nurse) by Deb Galo RN (offgoing nurse). Report included the following information SBAR, Kardex, Intake/Output, MAR and Recent Results.

## 2018-10-16 NOTE — PROGRESS NOTES
RENAL PROGRESS NOTE Arian Rodriguez Assessment/Plan: · OLIVIA. (ischemic atn in a  setting of recent onset of n/v). Acute GN is also possible given gross hematuria and nephrotic proteinuria. No recovery of renal function at this time. GN/vasculitis serologies are neg. Kidney biopsy was done 10/15, hopefully will get results tomorrow. Meanwhile continue dialysis tts, 2-3 liters today. Will be looking at op dialysis unit. 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 · HTN with variable control. Continue current regimen. Monitor bp with dialysis, volume removal.  
· N/V. Etio? CT finding noted. EGD results noted- diffuse gastritis. Resolving. · Secondary hyperparathyroidism/hyperphsphotemia. Continue calcitriol and phoslo. · Anemia of renal failure. Check cbc today. Subjective: 
Seen on dialysis. Patient complaints off: Feel better, main problem is withdrawal from subaxon with muscle aches, sweats, fatigue- slowly getting better. No SOB/CP. No n/v. Tolerates diet. Tolerated rt kidney biopsy well yesterday, biopsy site is a little sore. Patient Active Problem List  
Diagnosis Code  ST elevation (STEMI) myocardial infarction involving left anterior descending coronary artery (HCC) I21.02  
 Cardiac arrest - ventricular fibrillation  HTN (hypertension) I10  
 Hypertensive emergency I16.1  Hyperglycemia R73.9  Acute on chronic renal failure (HCC) N17.9, N18.9  Leukocytosis D72.829  
 Acute pulmonary edema (HCC) J81.0  Contrast dye induced nephropathy N14.1, T50.8X5A  
 CKD (chronic kidney disease) N18.9  Arachnoiditis G03.9  Postlaminectomy syndrome, lumbar region M96.1  Psoriasis L40.9  Degeneration of lumbar or lumbosacral intervertebral disc M51.37  
 Encounter for long-term (current) use of high-risk medication Z79.899  Obesity E66.9  Pain in joint, lower leg M25.569  Psoriatic arthropathy ([facility] L40.50  Chronic low back pain M54.5, G89.29  Chronic pain syndrome G89.4  Lumbosacral spondylosis without myelopathy M47.817  Enthesopathy of hip M76.899  
 Sacroiliitis (HCC) M46.1  Coronary artery disease I25.10  
 JESSICA (obstructive sleep apnea) G47.33  
 UTI (lower urinary tract infection) N39.0  Cardiomyopathy ([facility] I42.9  AICD (automatic cardioverter/defibrillator) present Z95.810  
 OLIVIA (acute kidney injury) ([facility] N17.9  Renal failure N19  
 Chest pain R07.9  
 SOB (shortness of breath) R06.02  Bronchitis J40  Acute renal failure (ARF) ([facility]) N17.9  ARF (acute renal failure) ([facility]) N17.9  Severe sepsis ([facility]) A41.9, R65.20  Epigastric abdominal pain R10.13  
 Anxiety F41.9  Ventricular tachycardia ([facility]) I47.2  Ventricular fibrillation ([facility]) I49.01  
 ICD (implantable cardioverter-defibrillator) discharge Z45.02  
 CAD (coronary artery disease) I25.10  Obesity, morbid ([facility] E66.01  
 Bacteria in urine S17.15  
 Metabolic acidosis L59.9 Current Facility-Administered Medications Medication Dose Route Frequency Provider Last Rate Last Dose  buprenorphine-naloxone (SUBOXONE) 4mg-1mg SL film  1 Film SubLINGual BID Orestes Lozoya DO      
 oxyCODONE IR (ROXICODONE) tablet 10 mg  10 mg Oral Q4H PRN Orestes Lozoya DO   10 mg at 10/16/18 1037  
 HYDROcodone-acetaminophen (NORCO) 7.5-325 mg per tablet 1 Tab  1 Tab Oral Q4H PRN Kaylaumar Allie Swann MD      
 lidocaine 4 % patch 1 Patch  1 Patch TransDERmal Q24H Kalyani Clemente MD   1 Patch at 10/15/18 2203  sodium chloride (NS) flush 5-10 mL  5-10 mL IntraVENous Q8H Samson Uriostegui MD   10 mL at 10/15/18 2205  sodium chloride (NS) flush 5-10 mL  5-10 mL IntraVENous PRN Samson Uriostegui MD      
 LORazepam (ATIVAN) tablet 1 mg  1 mg Oral Q1H PRN Samson Uriostegui MD   1 mg at 10/10/18 8926  Or  
  LORazepam (ATIVAN) injection 1 mg  1 mg IntraVENous Q1H PRN Dino Souza MD   1 mg at 10/10/18 2004  LORazepam (ATIVAN) tablet 2 mg  2 mg Oral Q1H PRN Dino Souza MD   2 mg at 10/11/18 0323 Or  LORazepam (ATIVAN) injection 2 mg  2 mg IntraVENous Q1H PRN Dino Souza MD   2 mg at 10/11/18 1978  LORazepam (ATIVAN) injection 3 mg  3 mg IntraVENous Q15MIN PRN Dino Souza MD      
 0.9% sodium chloride infusion  50 mL/hr IntraVENous DIALYSIS PRN Felipe Maya MD      
 heparin (porcine) 1,000 unit/mL injection 1,000 Units  1,000 Units InterCATHeter DIALYSIS PRN Felipe Maya MD      
 pantoprazole (PROTONIX) tablet 40 mg  40 mg Oral ACB Samra Marie MD   40 mg at 10/16/18 0818  hydrALAZINE (APRESOLINE) tablet 50 mg  50 mg Oral TID Fco Luis MD   50 mg at 10/16/18 4787  calcitRIOL (ROCALTROL) capsule 0.5 mcg  0.5 mcg Oral DAILY Fco Luis MD   0.5 mcg at 10/16/18 9580  promethazine (PHENERGAN) tablet 25 mg  25 mg Oral Q8H PRN Samra Marie MD   25 mg at 10/10/18 1441  
 amLODIPine (NORVASC) tablet 10 mg  10 mg Oral DAILY Felipe Maya MD   10 mg at 10/16/18 8247  calcium acetate (PHOSLO) capsule 1,334 mg  2 Cap Oral TID WITH MEALS Charles BATEMAN MD   1,334 mg at 10/16/18 0586  influenza vaccine 2018-19 (6 mos+)(PF) (FLUARIX QUAD/FLULAVAL QUAD) injection 0.5 mL  0.5 mL IntraMUSCular PRIOR TO DISCHARGE Samra Marie MD      
 isosorbide mononitrate ER (IMDUR) tablet 90 mg  90 mg Oral DAILY Samra Marie MD   90 mg at 10/16/18 9006  amiodarone (CORDARONE) tablet 400 mg  400 mg Oral DAILY Samra Marie MD   400 mg at 10/16/18 4025  atorvastatin (LIPITOR) tablet 40 mg  40 mg Oral QHS Samra Marie MD   40 mg at 10/15/18 8451  fluticasone (FLONASE) 50 mcg/actuation nasal spray 2 Spray  2 Spray Both Nostrils DAILY Samra Marie MD   2 Spray at 10/16/18 9074  tiotropium (SPIRIVA) inhalation capsule 18 mcg  1 Cap Inhalation DAILY Crystal Hammond MD   18 mcg at 10/16/18 0911  
 ondansetron (ZOFRAN) injection 4 mg  4 mg IntraVENous Q4H PRN Crystal Hammond MD   4 mg at 10/13/18 31 75 62  albuterol-ipratropium (DUO-NEB) 2.5 MG-0.5 MG/3 ML  3 mL Nebulization Q4H PRN Crystal Hammond MD   3 mL at 10/10/18 2037 Objective Vitals:  
 10/15/18 1800 10/15/18 2200 10/16/18 0214 10/16/18 9697 BP: 144/77 176/81 141/71 164/74 Pulse: (!) 55 80 60 66 Resp: 16 16 16 18 Temp: 98.4 °F (36.9 °C) 99.2 °F (37.3 °C) 98.6 °F (37 °C) 98.5 °F (36.9 °C) TempSrc:      
SpO2: 98% 98% 91% 93% Weight:      
Height:      
 
 
 
Intake/Output Summary (Last 24 hours) at 10/16/18 1147 Last data filed at 10/16/18 2549 Gross per 24 hour Intake              240 ml Output              800 ml Net             -560 ml Admission weight: Weight: 118.7 kg (261 lb 11 oz) (10/04/18 6562) Last Weight Metrics: 
Weight Loss Metrics 10/5/2018 2/14/2018 2/8/2018 1/11/2018 8/22/2017 8/20/2017 3/2/2017 Today's Wt 261 lb 250 lb 250 lb 286 lb 280 lb 258 lb 258 lb BMI 37.45 kg/m2 35.87 kg/m2 35.87 kg/m2 41.04 kg/m2 40.18 kg/m2 37.02 kg/m2 37.02 kg/m2 Physical Assessment:  
 
General: NAD, alert and oriented. Neck: No jvd. Rt ij tdc. LUNGS: Clear to Auscultation, No rales, rhonchi or wheezes. CVS EXM: S1, S2  RRR, no murmurs/gallops/rubs. Abdomen: nt/nd. Lower Extremities: 1+edema. Skin: extensive scaly rash ue/le. Lab CBC w/Diff No results for input(s): WBC, RBC, HGB, HCT, PLT, GRANS, LYMPH, EOS, HGBEXT, HCTEXT, PLTEXT, HGBEXT, HCTEXT, PLTEXT in the last 72 hours. Chemistry Recent Labs 10/16/18 
 0384  10/15/18 
 0745  10/14/18 
 8602 GLU  90  95  110* NA  137  135*  137  
K  4.0  3.9  3.9 CL  100  99*  101 CO2  28  28  31 BUN  36*  31*  23* CREA  8.18*  7.07*  5.45* CA  7.7*  7.5*  7.7* AGAP  9  8  5 BUCR  4*  4*  4*  
 ALB  2.0*  1.8*  1.8* PHOS  4.8  4.5  3.4 Lab Results Component Value Date/Time Iron 86 05/18/2015 03:00 AM  
 TIBC 260 05/18/2015 03:00 AM  
 Iron % saturation 33 05/18/2015 03:00 AM  
 Ferritin 151 05/18/2015 03:00 AM  
  
Lab Results Component Value Date/Time Calcium 7.7 (L) 10/16/2018 06:10 AM  
 Phosphorus 4.8 10/16/2018 06:10 AM  
  
 
Xander Ybarra M.D. [facility] Phone [phone] Pager [phone]

## 2018-10-16 NOTE — PROGRESS NOTES
0140: received a call from patient's wife. Pt states that her  has severe pain and needs pain medication at the moment. Patient also has some questions for this patient's nurse. Call transferred to primary RN,  [place].

## 2018-10-17 ENCOUNTER — APPOINTMENT (OUTPATIENT)
Dept: GENERAL RADIOLOGY | Age: 48
DRG: 674 | End: 2018-10-17
Attending: INTERNAL MEDICINE
Payer: MEDICARE

## 2018-10-17 LAB
ALBUMIN SERPL-MCNC: 1.9 G/DL (ref 3.4–5)
ANION GAP SERPL CALC-SCNC: 8 MMOL/L (ref 3–18)
BUN SERPL-MCNC: 21 MG/DL (ref 7–18)
BUN/CREAT SERPL: 4 (ref 12–20)
CALCIUM SERPL-MCNC: 7.6 MG/DL (ref 8.5–10.1)
CHLORIDE SERPL-SCNC: 101 MMOL/L (ref 100–108)
CO2 SERPL-SCNC: 30 MMOL/L (ref 21–32)
CREAT SERPL-MCNC: 5.64 MG/DL (ref 0.6–1.3)
GLUCOSE SERPL-MCNC: 85 MG/DL (ref 74–99)
PHOSPHATE SERPL-MCNC: 3.7 MG/DL (ref 2.5–4.9)
POTASSIUM SERPL-SCNC: 4.1 MMOL/L (ref 3.5–5.5)
SODIUM SERPL-SCNC: 139 MMOL/L (ref 136–145)

## 2018-10-17 PROCEDURE — 74011250637 HC RX REV CODE- 250/637: Performed by: INTERNAL MEDICINE

## 2018-10-17 PROCEDURE — 74011250636 HC RX REV CODE- 250/636: Performed by: INTERNAL MEDICINE

## 2018-10-17 PROCEDURE — 80069 RENAL FUNCTION PANEL: CPT | Performed by: INTERNAL MEDICINE

## 2018-10-17 PROCEDURE — 74011000258 HC RX REV CODE- 258: Performed by: INTERNAL MEDICINE

## 2018-10-17 PROCEDURE — 74011250636 HC RX REV CODE- 250/636: Performed by: HOSPITALIST

## 2018-10-17 PROCEDURE — 65660000000 HC RM CCU STEPDOWN

## 2018-10-17 PROCEDURE — 36415 COLL VENOUS BLD VENIPUNCTURE: CPT | Performed by: INTERNAL MEDICINE

## 2018-10-17 PROCEDURE — 74011000250 HC RX REV CODE- 250: Performed by: HOSPITALIST

## 2018-10-17 PROCEDURE — 71046 X-RAY EXAM CHEST 2 VIEWS: CPT

## 2018-10-17 RX ADMIN — ISOSORBIDE MONONITRATE 90 MG: 30 TABLET, EXTENDED RELEASE ORAL at 08:50

## 2018-10-17 RX ADMIN — BUPRENORPHINE HYDROCHLORIDE, NALOXONE HYDROCHLORIDE 1 FILM: 4; 1 FILM, SOLUBLE BUCCAL; SUBLINGUAL at 08:50

## 2018-10-17 RX ADMIN — ATORVASTATIN CALCIUM 40 MG: 40 TABLET, FILM COATED ORAL at 23:07

## 2018-10-17 RX ADMIN — CALCIUM ACETATE 1334 MG: 667 CAPSULE ORAL at 08:50

## 2018-10-17 RX ADMIN — FLUTICASONE PROPIONATE 2 SPRAY: 50 SPRAY, METERED NASAL at 08:55

## 2018-10-17 RX ADMIN — Medication 10 ML: at 23:06

## 2018-10-17 RX ADMIN — HYDRALAZINE HYDROCHLORIDE 50 MG: 50 TABLET, FILM COATED ORAL at 08:50

## 2018-10-17 RX ADMIN — Medication 10 ML: at 07:12

## 2018-10-17 RX ADMIN — PANTOPRAZOLE SODIUM 40 MG: 40 TABLET, DELAYED RELEASE ORAL at 08:50

## 2018-10-17 RX ADMIN — SODIUM CHLORIDE 1000 MG: 900 INJECTION, SOLUTION INTRAVENOUS at 23:06

## 2018-10-17 RX ADMIN — HYDRALAZINE HYDROCHLORIDE 50 MG: 50 TABLET, FILM COATED ORAL at 23:07

## 2018-10-17 RX ADMIN — AMIODARONE HYDROCHLORIDE 400 MG: 200 TABLET ORAL at 08:50

## 2018-10-17 RX ADMIN — AMLODIPINE BESYLATE 10 MG: 5 TABLET ORAL at 08:50

## 2018-10-17 RX ADMIN — CALCIUM ACETATE 1334 MG: 667 CAPSULE ORAL at 17:27

## 2018-10-17 RX ADMIN — TIOTROPIUM BROMIDE 18 MCG: 18 CAPSULE ORAL; RESPIRATORY (INHALATION) at 08:52

## 2018-10-17 RX ADMIN — BUPRENORPHINE HYDROCHLORIDE, NALOXONE HYDROCHLORIDE 1 FILM: 4; 1 FILM, SOLUBLE BUCCAL; SUBLINGUAL at 23:07

## 2018-10-17 RX ADMIN — HYDRALAZINE HYDROCHLORIDE 50 MG: 50 TABLET, FILM COATED ORAL at 17:28

## 2018-10-17 RX ADMIN — CALCITRIOL 0.5 MCG: 0.25 CAPSULE ORAL at 09:00

## 2018-10-17 RX ADMIN — CALCIUM ACETATE 1334 MG: 667 CAPSULE ORAL at 12:44

## 2018-10-17 RX ADMIN — Medication 10 ML: at 15:57

## 2018-10-17 NOTE — PROGRESS NOTES
Problem: Pressure Injury - Risk of 
Goal: *Prevention of pressure injury Document Pranav Scale and appropriate interventions in the flowsheet. Outcome: Progressing Towards Goal 
Pressure Injury Interventions: Activity Interventions: Increase time out of bed Mobility Interventions: Pressure redistribution bed/mattress (bed type) Nutrition Interventions: Document food/fluid/supplement intake Friction and Shear Interventions: Apply protective barrier, creams and emollients

## 2018-10-17 NOTE — PROGRESS NOTES
[address] [place] [address] Hospitalist Division Inpatient Daily Progress Note Daily progress Note Patient: Elena Osorio MRN: [id number]  CSN: [id number] YOB: 1970  Age: 46 y.o. Sex: male DOA: 10/3/2018 LOS:  LOS: 14 days Chief Complaint:  Acute on chronic renal failure Interval History: 46 y.o.  male with h/o AICD,cardiac arrest,VT,ischemic cardiomyopathy EF 30%,obesity,cad,presented to ED because of weakness,nausea,vomting,abdominal pain,hematuria. Patient says that he started feeling sick 2 weeks ago when he started feeling weak. Then he started vomiting. Could not tolerate even water. In recent days ,he has had abdominal pain which he attributed to the ongoing vomiting. Patient also reported that he has seen his urine turning red for a while. Had to see his doctor in two weeks but felt very weak and decided to come to the ED. In ED,lab showed creatinine 11.0 compared to 2.12 on 1/11/2018 and 8.01 on 9/28/18. UA showed large blood,bacteria+. CT abd/pelvis w/o hydronephrosis or urolithiasis. Patient admitted for OLIVIA,Hematuria,UTI. Started on iv fluid. Nephrolohy consulted from ER. Since patient has GI complaint and renal involvement,nephrology has indicated that Henoch-Schonlein syndrome needs to be considered. Pt is on iv fluid. IgA elevated 648. Other serology for vasculitis including nathaniel,GBM ab,ANCA,C3,C4,heptitis profile not abnormal. Nephrology has indicated that if creatinine continue to rise over the weekend,patient will need dialysis. Today creat up to 12.00. Patient may also need kidney biopsy. Cardiology consulted significant cardiac history anticipating that he will have dialysis. Cardiology has determined that his cardiac status is stable. GI saw patient on 10/6 for ongoing symptoms. TDC catheter placed on 10/8. EGD on 10/9 showed chronic gastritis. HD on 10/9 and 10/10, next HD session on 10/12.  S/p kidney biopsy on 10/15- awaiting results to determine if pt will continue with HD. Awaiting path. Assessment/Plan:  
 
Patient Active Problem List  
Diagnosis Code  ST elevation (STEMI) myocardial infarction involving left anterior descending coronary artery ([facility]) I21.02  
 Cardiac arrest - ventricular fibrillation  HTN (hypertension) I10  
 Hypertensive emergency I16.1  Hyperglycemia R73.9  Acute on chronic renal failure (HCC) N17.9, N18.9  Leukocytosis D72.829  
 Acute pulmonary edema ([facility]) J81.0  Contrast dye induced nephropathy N14.1, T50.8X5A  
 CKD (chronic kidney disease) N18.9  Arachnoiditis G03.9  Postlaminectomy syndrome, lumbar region M96.1  Psoriasis L40.9  Degeneration of lumbar or lumbosacral intervertebral disc M51.37  
 Encounter for long-term (current) use of high-risk medication Z79.899  Obesity E66.9  
 Pain in joint, lower leg M25.569  Psoriatic arthropathy ([facility] L40.50  Chronic low back pain M54.5, G89.29  Chronic pain syndrome G89.4  Lumbosacral spondylosis without myelopathy M47.817  Enthesopathy of hip M76.899  
 Sacroiliitis ([facility]) M46.1  Coronary artery disease I25.10  
 JESSICA (obstructive sleep apnea) G47.33  
 UTI (lower urinary tract infection) N39.0  Cardiomyopathy ([facility] I42.9  AICD (automatic cardioverter/defibrillator) present Z95.810  
 OLIVAI (acute kidney injury) ([facility] N17.9  Renal failure N19  
 Chest pain R07.9  
 SOB (shortness of breath) R06.02  Bronchitis J40  Acute renal failure (ARF) ([facility]) N17.9  ARF (acute renal failure) ([facility]) N17.9  Severe sepsis ([facility]) A41.9, R65.20  Epigastric abdominal pain R10.13  
 Anxiety F41.9  Ventricular tachycardia ([facility]) I47.2  Ventricular fibrillation ([facility]) I49.01  
 ICD (implantable cardioverter-defibrillator) discharge Z45.02  
 CAD (coronary artery disease) I25.10  Obesity, morbid ([facility] E66.01  
 Bacteria in urine R25.90  
 Metabolic acidosis J70.6 A/P: 
Acute on chronic renal failure/Metabolic acidosis  
- nephrology following, appreciate assistance 
- creatinine 11.00 POA 
 -Etiology:volume depletion -?acute GN as pt also hematuria. -Serology:IgA elevated. Other normal such as MANUELITO,ANCA,C3,C4,Hep profile. -s/p [facility] 10/8   
- hemodialysis - s/p core biopsy lower pole right kidney on 10/15 - pathology report pending 
   
Bacteria in urine/UTI/Hematuria 
 -Ceftriaxone iv - completed 
 -Blood cx ng/Ucx negative 
 -Vasculitis serology non-revealing 
  
Secondary hyperparathyroidism  
 -On calcitriol 
   
Nausea/vomiting/abdominal pain 
 -CT reviewed,no acute process. -GI, [facility] saw patient on 10/6 - EGD 10/9, chronic gastritis 
   
HTN 
 -Hydralazine prn for sbp>160 
   
JESSICA (obstructive sleep apnea) 
 -On oxygen 
   
Cardiomyopathy 
- EF 25-30% - ICD (implantable cardioverter-defibrillator) discharge CAD (coronary artery disease) 
 -Continue routine meds 
 -Telemetry monitoring 
 -Cardiology saw patient on 10/6 and determined that patient's cardiac status is stable and advised to continue his home meds. DVT Prophylaxis - SCD's BLE Disposition 
- no recommendations from PT/OT, plan is home 
- need to determine if pt will continue with HD at discharge pending renal biopsy, if so would need outpatient chair secured- defer to nephrology MIRIAM Man [address] [place] [address] Hospitalist Division Pager:  [phone] Office:  [phone] Subjective: Tolerated dialysis yesterday. No acute events overnight. Anticipating discharge once path results obtained. Objective:  
  
Visit Vitals /70 (BP 1 Location: Right arm, BP Patient Position: At rest) Pulse 64 Temp 98.8 °F (37.1 °C) Resp 17 Ht 5' 10\" (1.778 m) Wt 118.4 kg (261 lb) SpO2 92% BMI 37.45 kg/m² Physical Exam: 
General appearance: alert, cooperative, no distress, appears stated age, [facility] cath right Head: Normocephalic, without obvious abnormality, atraumatic Lungs: clear to auscultation bilaterally Heart: regular rate and rhythm, S1, S2 normal, no murmur, click, rub or gallop Abdomen: soft, non tender, non distended. Normoactive bowel sounds. Extremities: extremities normal, atraumatic, no cyanosis or edema Skin: flaky Neurologic: Grossly normal 
PSY: Mood and affect normal, appropriately behaved Intake and Output: 
Current Shift:  No intake/output data recorded. Last three shifts:  10/15 1901 - 10/17 0700 In: 720 [P.O.:720] Out: 3660 [HMLPT:1849] Recent Results (from the past 24 hour(s)) CBC W/O DIFF Collection Time: 10/16/18 11:30 AM  
Result Value Ref Range WBC 5.9 4.6 - 13.2 K/uL  
 RBC 3.03 (L) 4.70 - 5.50 M/uL HGB 8.0 (L) 13.0 - 16.0 g/dL HCT 26.1 (L) 36.0 - 48.0 % MCV 86.1 74.0 - 97.0 FL  
 MCH 26.4 24.0 - 34.0 PG  
 MCHC 30.7 (L) 31.0 - 37.0 g/dL  
 RDW 16.5 (H) 11.6 - 14.5 % PLATELET 992 755 - 876 K/uL MPV 8.8 (L) 9.2 - 11.8 FL  
RENAL FUNCTION PANEL Collection Time: 10/17/18  3:50 AM  
Result Value Ref Range Sodium 139 136 - 145 mmol/L Potassium 4.1 3.5 - 5.5 mmol/L Chloride 101 100 - 108 mmol/L  
 CO2 30 21 - 32 mmol/L Anion gap 8 3.0 - 18 mmol/L Glucose 85 74 - 99 mg/dL BUN 21 (H) 7.0 - 18 MG/DL Creatinine 5.64 (H) 0.6 - 1.3 MG/DL  
 BUN/Creatinine ratio 4 (L) 12 - 20 GFR est AA 13 (L) >60 ml/min/1.73m2 GFR est non-AA 11 (L) >60 ml/min/1.73m2 Calcium 7.6 (L) 8.5 - 10.1 MG/DL Phosphorus 3.7 2.5 - 4.9 MG/DL Albumin 1.9 (L) 3.4 - 5.0 g/dL Lab Results Component Value Date/Time Glucose 85 10/17/2018 03:50 AM  
 Glucose 90 10/16/2018 06:10 AM  
 Glucose 95 10/15/2018 07:45 AM  
 Glucose 110 (H) 10/14/2018 04:52 AM  
 Glucose 99 10/13/2018 04:50 AM  
  
 
Imaging: No results found. Medication List Reviewed: 
Current Facility-Administered Medications Medication Dose Route Frequency  buprenorphine-naloxone (SUBOXONE) 4mg-1mg SL film  1 Film SubLINGual BID  polyethylene glycol (MIRALAX) packet 17 g  17 g Oral DAILY PRN  
 oxyCODONE IR (ROXICODONE) tablet 10 mg  10 mg Oral Q4H PRN  
 HYDROcodone-acetaminophen (NORCO) 7.5-325 mg per tablet 1 Tab  1 Tab Oral Q4H PRN  
 lidocaine 4 % patch 1 Patch  1 Patch TransDERmal Q24H  
 sodium chloride (NS) flush 5-10 mL  5-10 mL IntraVENous Q8H  
 sodium chloride (NS) flush 5-10 mL  5-10 mL IntraVENous PRN  
 LORazepam (ATIVAN) tablet 1 mg  1 mg Oral Q1H PRN Or  
 LORazepam (ATIVAN) injection 1 mg  1 mg IntraVENous Q1H PRN  
 LORazepam (ATIVAN) tablet 2 mg  2 mg Oral Q1H PRN Or  
 LORazepam (ATIVAN) injection 2 mg  2 mg IntraVENous Q1H PRN  
 LORazepam (ATIVAN) injection 3 mg  3 mg IntraVENous Q15MIN PRN  
 0.9% sodium chloride infusion  50 mL/hr IntraVENous DIALYSIS PRN  
 heparin (porcine) 1,000 unit/mL injection 1,000 Units  1,000 Units InterCATHeter DIALYSIS PRN  pantoprazole (PROTONIX) tablet 40 mg  40 mg Oral ACB  hydrALAZINE (APRESOLINE) tablet 50 mg  50 mg Oral TID  calcitRIOL (ROCALTROL) capsule 0.5 mcg  0.5 mcg Oral DAILY  promethazine (PHENERGAN) tablet 25 mg  25 mg Oral Q8H PRN  
 amLODIPine (NORVASC) tablet 10 mg  10 mg Oral DAILY  calcium acetate (PHOSLO) capsule 1,334 mg  2 Cap Oral TID WITH MEALS  influenza vaccine 2018-19 (6 mos+)(PF) (FLUARIX QUAD/FLULAVAL QUAD) injection 0.5 mL  0.5 mL IntraMUSCular PRIOR TO DISCHARGE  isosorbide mononitrate ER (IMDUR) tablet 90 mg  90 mg Oral DAILY  amiodarone (CORDARONE) tablet 400 mg  400 mg Oral DAILY  atorvastatin (LIPITOR) tablet 40 mg  40 mg Oral QHS  fluticasone (FLONASE) 50 mcg/actuation nasal spray 2 Spray  2 Spray Both Nostrils DAILY  tiotropium (SPIRIVA) inhalation capsule 18 mcg  1 Cap Inhalation DAILY  ondansetron (ZOFRAN) injection 4 mg  4 mg IntraVENous Q4H PRN  
 albuterol-ipratropium (DUO-NEB) 2.5 MG-0.5 MG/3 ML  3 mL Nebulization Q4H PRN

## 2018-10-17 NOTE — PROGRESS NOTES
Plan is home. Will continue to follow and assess for possible home health needs. Waiting for outpatient dialysis center

## 2018-10-17 NOTE — PROGRESS NOTES
Bedside report received from  [facility]. Pt in bed resting watching tv not in any distress. Pt expressed how worried he is because of the new diagnosis. Pt reassured and verbalized understanding. Pt denies pain at the moment. Pt stated that he will call for pain med whenever he has pain. Assessment completed. Plan of care for the shift explained pt verbalizes understanding. Call light, urinal and personal items for use within reach. 0430- Pt took a shower bed linen changed clean gown provided. Pt appreciative. No concerns voiced. 3230- Bedside and Verbal shift change report given to JOHN Gauthier (oncoming nurse) by Renard Moya RN (offgoing nurse). Report included the following information SBAR, Kardex, Intake/Output, MAR and Recent Results.

## 2018-10-17 NOTE — PROGRESS NOTES
Problem: Falls - Risk of 
Goal: *Absence of Falls Document Magdiolga Jackson Fall Risk and appropriate interventions in the flowsheet. Outcome: Progressing Towards Goal 
Fall Risk Interventions: 
Mobility Interventions: Bed/chair exit alarm Medication Interventions: Bed/chair exit alarm Elimination Interventions: Call light in reach History of Falls Interventions: Bed/chair exit alarm

## 2018-10-17 NOTE — ROUTINE PROCESS
1915 Bedside and Verbal shift change report given to sister ARCENIO rn (oncoming nurse) by Anderson Magaña RN 
 
 (offgoing nurse). Report included the following information Kardex, MAR and Med Rec Status.

## 2018-10-17 NOTE — PROGRESS NOTES
[address]  pt care from [address]. Pt in bed, alert and oriented x 4. Not in any form of distress. Denies pain. Frequent use items and call bell within reach. Verbalized understanding to call for assistance. Bed locked in lowest position. Pt is peacefully sleeping throughout the night. Denies any pain for for all pain assessment after administration of sublingual Subaxone 4 mg -1 mg film. 0710 - Bedside and Verbal shift change report given to Jag Napier RN (oncoming nurse) by Thierry Adair RN (offgoing nurse). Report included the following information SBAR, Kardex, Intake/Output, MAR and Recent Results.

## 2018-10-17 NOTE — PROGRESS NOTES
RENAL PROGRESS NOTE [facility] Assessment/Plan: · OLIVIA. (ischemic atn in a  setting of recent onset of n/v). Acute GN is also possible given gross hematuria and nephrotic proteinuria. No recovery of renal function at this time. GN/vasculitis serologies are neg. Kidney biopsy was done 10/15, hopefully will get results later today. Meanwhile continue dialysis tts. We are looking for op dialysis unit. 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 · HTN with variable control. Continue current regimen. Monitor bp with dialysis, volume removal.  
· N/V. Etio? CT finding noted. EGD results noted- diffuse gastritis. Resolving. · Secondary hyperparathyroidism/hyperphsphotemia. Continue calcitriol and phoslo. · Anemia of renal failure. Add epogen. Subjective: 
Patient complaints off: Feel better, main problem is withdrawal from subaxon with muscle aches, sweats, fatigue- slowly getting better. No SOB/CP. No n/v. Tolerates diet. Ambulates in the room. Patient Active Problem List  
Diagnosis Code  ST elevation (STEMI) myocardial infarction involving left anterior descending coronary artery ([facility]) I21.02  
 Cardiac arrest - ventricular fibrillation  HTN (hypertension) I10  
 Hypertensive emergency I16.1  Hyperglycemia R73.9  Acute on chronic renal failure (HCC) N17.9, N18.9  Leukocytosis D72.829  
 Acute pulmonary edema (HCC) J81.0  Contrast dye induced nephropathy N14.1, T50.8X5A  
 CKD (chronic kidney disease) N18.9  Arachnoiditis G03.9  Postlaminectomy syndrome, lumbar region M96.1  Psoriasis L40.9  Degeneration of lumbar or lumbosacral intervertebral disc M51.37  
 Encounter for long-term (current) use of high-risk medication Z79.899  Obesity E66.9  
 Pain in joint, lower leg M25.569  Psoriatic arthropathy ([facility] [address] L40.50  Chronic low back pain M54.5, G89.29  Chronic pain syndrome G89.4  Lumbosacral spondylosis without myelopathy M47.817  Enthesopathy of hip M76.899  
 Sacroiliitis (HCC) M46.1  Coronary artery disease I25.10  
 JESSICA (obstructive sleep apnea) G47.33  
 UTI (lower urinary tract infection) N39.0  Cardiomyopathy ([facility] [address] [address] I42.9  AICD (automatic cardioverter/defibrillator) present Z95.810  
 OLIVIA (acute kidney injury) ([facility] [facility] N17.9  Renal failure N19  
 Chest pain R07.9  
 SOB (shortness of breath) R06.02  Bronchitis J40  Acute renal failure (ARF) (HCC) N17.9  ARF (acute renal failure) (HCC) N17.9  Severe sepsis ([facility]) A41.9, R65.20  Epigastric abdominal pain R10.13  
 Anxiety F41.9  Ventricular tachycardia ([facility]) I47.2  Ventricular fibrillation ([facility]) I49.01  
 ICD (implantable cardioverter-defibrillator) discharge Z45.02  
 CAD (coronary artery disease) I25.10  Obesity, morbid ([facility] E66.01  
 Bacteria in urine G67.48  
 Metabolic acidosis G10.0 Current Facility-Administered Medications Medication Dose Route Frequency Provider Last Rate Last Dose  buprenorphine-naloxone (SUBOXONE) 4mg-1mg SL film  1 Film SubLINGual BID Zondra Bonnet, DO   1 Film at 10/17/18 4836  polyethylene glycol (MIRALAX) packet 17 g  17 g Oral DAILY PRN Zondra Twannet, DO   17 g at 10/16/18 1529  
 oxyCODONE IR (ROXICODONE) tablet 10 mg  10 mg Oral Q4H PRN Franciso Rockers H, DO   10 mg at 10/16/18 1529  
 HYDROcodone-acetaminophen (NORCO) 7.5-325 mg per tablet 1 Tab  1 Tab Oral Q4H PRN Argenis Buckley MD      
 lidocaine 4 % patch 1 Patch  1 Patch TransDERmal Q24H Rose Suazo MD   1 Patch at 10/16/18 2115  sodium chloride (NS) flush 5-10 mL  5-10 mL IntraVENous Q8H Jazmyn Brown MD   10 mL at 10/17/18 4790  sodium chloride (NS) flush 5-10 mL  5-10 mL IntraVENous PRN Jazmyn Brown MD      
  LORazepam (ATIVAN) tablet 1 mg  1 mg Oral Q1H PRN Jeff Feliz MD   1 mg at 10/10/18 1826 Or  LORazepam (ATIVAN) injection 1 mg  1 mg IntraVENous Q1H PRN Jeff Feliz MD   1 mg at 10/10/18 1385  LORazepam (ATIVAN) tablet 2 mg  2 mg Oral Q1H PRN Jeff Feliz MD   2 mg at 10/11/18 5567 Or  LORazepam (ATIVAN) injection 2 mg  2 mg IntraVENous Q1H PRN Jeff Feliz MD   2 mg at 10/11/18 4634  LORazepam (ATIVAN) injection 3 mg  3 mg IntraVENous Q15MIN PRN Jeff Feliz MD      
 0.9% sodium chloride infusion  50 mL/hr IntraVENous DIALYSIS PRN Ramos Douglass MD      
 heparin (porcine) 1,000 unit/mL injection 1,000 Units  1,000 Units InterCATHeter DIALYSIS PRN Ramos Douglass MD      
 pantoprazole (PROTONIX) tablet 40 mg  40 mg Oral ACB Kaz Santacruz MD   40 mg at 10/17/18 9294  hydrALAZINE (APRESOLINE) tablet 50 mg  50 mg Oral TID Nurys Oliva MD   50 mg at 10/17/18 9885  calcitRIOL (ROCALTROL) capsule 0.5 mcg  0.5 mcg Oral DAILY Batool Kovacs MD   0.5 mcg at 10/17/18 0900  promethazine (PHENERGAN) tablet 25 mg  25 mg Oral Q8H PRN Kaz Santacruz MD   25 mg at 10/10/18 1441  
 amLODIPine (NORVASC) tablet 10 mg  10 mg Oral DAILY Jayde Bradley MD   10 mg at 10/17/18 9078  calcium acetate (PHOSLO) capsule 1,334 mg  2 Cap Oral TID WITH MEALS Ramos Douglass MD   1,334 mg at 10/17/18 1244  influenza vaccine 2018-19 (6 mos+)(PF) (FLUARIX QUAD/FLULAVAL QUAD) injection 0.5 mL  0.5 mL IntraMUSCular PRIOR TO DISCHARGE Kaz Santacruz MD      
 isosorbide mononitrate ER (IMDUR) tablet 90 mg  90 mg Oral DAILY Kaz Santacruz MD   90 mg at 10/17/18 0850  
 amiodarone (CORDARONE) tablet 400 mg  400 mg Oral DAILY Kaz Santacruz MD   400 mg at 10/17/18 0779  atorvastatin (LIPITOR) tablet 40 mg  40 mg Oral QHS Kaz Santacruz MD   40 mg at 10/16/18 4657  fluticasone (FLONASE) 50 mcg/actuation nasal spray 2 Spray  2 Spray Both Nostrils DAILY Cassie James MD   2 Spray at 10/17/18 4041  tiotropium (SPIRIVA) inhalation capsule 18 mcg  1 Cap Inhalation DAILY Cassie James MD   18 mcg at 10/17/18 9026  ondansetron (ZOFRAN) injection 4 mg  4 mg IntraVENous Q4H PRN Cassie James MD   4 mg at 10/13/18 2225  albuterol-ipratropium (DUO-NEB) 2.5 MG-0.5 MG/3 ML  3 mL Nebulization Q4H PRN Cassie James MD   3 mL at 10/10/18 2037 Objective Vitals:  
 10/16/18 2123 10/17/18 0052 10/17/18 0605 10/17/18 1314 BP: 162/78 122/68 150/70 135/70 Pulse: (!) 56 62 64 60 Resp: 16 18 17 16 Temp: 98.8 °F (37.1 °C) 98.1 °F (36.7 °C) 98.8 °F (37.1 °C) 98.8 °F (37.1 °C) TempSrc:      
SpO2: 93% 95% 92% 95% Weight:      
Height:      
 
 
 
Intake/Output Summary (Last 24 hours) at 10/17/2018 1524 Last data filed at 10/17/2018 6046 Gross per 24 hour Intake 480 ml Output 860 ml Net -380 ml Admission weight: Weight: 118.7 kg (261 lb 11 oz) (10/04/18 8047) Last Weight Metrics: 
Weight Loss Metrics 10/5/2018 2/14/2018 2/8/2018 1/11/2018 8/22/2017 8/20/2017 3/2/2017 Today's Wt 261 lb 250 lb 250 lb 286 lb 280 lb 258 lb 258 lb BMI 37.45 kg/m2 35.87 kg/m2 35.87 kg/m2 41.04 kg/m2 40.18 kg/m2 37.02 kg/m2 37.02 kg/m2 Physical Assessment:  
 
General: NAD, alert and oriented. Neck: No jvd. Rt ij tdc. LUNGS: Clear to Auscultation, No rales, rhonchi or wheezes. CVS EXM: S1, S2  RRR, no murmurs/gallops/rubs. Abdomen: nt/nd. Lower Extremities: 1+edema. Skin: extensive scaly rash ue/le. Lab CBC w/Diff Recent Labs 10/16/18 
1130 WBC 5.9  
RBC 3.03* HGB 8.0*  
HCT 26.1*  
 Chemistry Recent Labs 10/17/18 
0350 10/16/18 
8802 10/15/18 
0745 GLU 85 90 95  137 135* K 4.1 4.0 3.9  100 99* CO2 30 28 28 BUN 21* 36* 31* CREA 5.64* 8.18* 7.07* CA 7.6* 7.7* 7.5*  
 AGAP 8 9 8 BUCR 4* 4* 4* ALB 1.9* 2.0* 1.8* PHOS 3.7 4.8 4.5 Lab Results Component Value Date/Time Iron 86 05/18/2015 03:00 AM  
 TIBC 260 05/18/2015 03:00 AM  
 Iron % saturation 33 05/18/2015 03:00 AM  
 Ferritin 151 05/18/2015 03:00 AM  
  
Lab Results Component Value Date/Time Calcium 7.6 (L) 10/17/2018 03:50 AM  
 Phosphorus 3.7 10/17/2018 03:50 AM  
  
 
Joseph Mora M.D. [facility] Phone [phone] Pager [phone]

## 2018-10-17 NOTE — PROGRESS NOTES
Problem: Falls - Risk of 
Goal: *Absence of Falls Document Nupur Carreon Fall Risk and appropriate interventions in the flowsheet. Outcome: Progressing Towards Goal 
Fall Risk Interventions: 
Mobility Interventions: Bed/chair exit alarm Medication Interventions: Bed/chair exit alarm Elimination Interventions: Call light in reach History of Falls Interventions: Bed/chair exit alarm Problem: Pressure Injury - Risk of 
Goal: *Prevention of pressure injury Document Pranav Scale and appropriate interventions in the flowsheet. Outcome: Progressing Towards Goal 
Pressure Injury Interventions: Activity Interventions: Increase time out of bed Mobility Interventions: Pressure redistribution bed/mattress (bed type) Nutrition Interventions: Document food/fluid/supplement intake Friction and Shear Interventions: Apply protective barrier, creams and emollients

## 2018-10-17 NOTE — PROGRESS NOTES
I was called by [facility] pathology with kidney biopsy results: small specimen, only two glomeruli for light microscopy, none for immunofluorescence. 2 glomeruli with severe crescentic GN. 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 No significant chronic tubulointerstitial disease. At this time the plan is to start IV steroids, I cytoxan likely on Friday, especially if electron microscopy (will be available on Friday) is also consistent with IgA nephropathy. D/w patient.

## 2018-10-18 LAB
ALBUMIN SERPL-MCNC: 2.2 G/DL (ref 3.4–5)
ANION GAP SERPL CALC-SCNC: 8 MMOL/L (ref 3–18)
BUN SERPL-MCNC: 31 MG/DL (ref 7–18)
BUN/CREAT SERPL: 4 (ref 12–20)
CALCIUM SERPL-MCNC: 7.8 MG/DL (ref 8.5–10.1)
CHLORIDE SERPL-SCNC: 99 MMOL/L (ref 100–108)
CO2 SERPL-SCNC: 28 MMOL/L (ref 21–32)
CREAT SERPL-MCNC: 7.3 MG/DL (ref 0.6–1.3)
ERYTHROCYTE [DISTWIDTH] IN BLOOD BY AUTOMATED COUNT: 16.4 % (ref 11.6–14.5)
FERRITIN SERPL-MCNC: 325 NG/ML (ref 8–388)
GLUCOSE SERPL-MCNC: 158 MG/DL (ref 74–99)
HCT VFR BLD AUTO: 28.9 % (ref 36–48)
HGB BLD-MCNC: 9 G/DL (ref 13–16)
IRON SATN MFR SERPL: 33 %
IRON SERPL-MCNC: 60 UG/DL (ref 50–175)
MCH RBC QN AUTO: 26.8 PG (ref 24–34)
MCHC RBC AUTO-ENTMCNC: 31.1 G/DL (ref 31–37)
MCV RBC AUTO: 86 FL (ref 74–97)
PHOSPHATE SERPL-MCNC: 4 MG/DL (ref 2.5–4.9)
PLATELET # BLD AUTO: 235 K/UL (ref 135–420)
PMV BLD AUTO: 8.5 FL (ref 9.2–11.8)
POTASSIUM SERPL-SCNC: 4.5 MMOL/L (ref 3.5–5.5)
RBC # BLD AUTO: 3.36 M/UL (ref 4.7–5.5)
SODIUM SERPL-SCNC: 135 MMOL/L (ref 136–145)
TIBC SERPL-MCNC: 184 UG/DL (ref 250–450)
WBC # BLD AUTO: 6.7 K/UL (ref 4.6–13.2)

## 2018-10-18 PROCEDURE — 74011000250 HC RX REV CODE- 250: Performed by: HOSPITALIST

## 2018-10-18 PROCEDURE — 82728 ASSAY OF FERRITIN: CPT | Performed by: INTERNAL MEDICINE

## 2018-10-18 PROCEDURE — 77030029684 HC NEB SM VOL KT MONA -A

## 2018-10-18 PROCEDURE — 74011000250 HC RX REV CODE- 250: Performed by: INTERNAL MEDICINE

## 2018-10-18 PROCEDURE — 74011250637 HC RX REV CODE- 250/637: Performed by: INTERNAL MEDICINE

## 2018-10-18 PROCEDURE — 83540 ASSAY OF IRON: CPT | Performed by: INTERNAL MEDICINE

## 2018-10-18 PROCEDURE — 80069 RENAL FUNCTION PANEL: CPT | Performed by: INTERNAL MEDICINE

## 2018-10-18 PROCEDURE — 85027 COMPLETE CBC AUTOMATED: CPT | Performed by: INTERNAL MEDICINE

## 2018-10-18 PROCEDURE — 65660000000 HC RM CCU STEPDOWN

## 2018-10-18 PROCEDURE — 36415 COLL VENOUS BLD VENIPUNCTURE: CPT | Performed by: INTERNAL MEDICINE

## 2018-10-18 PROCEDURE — 90935 HEMODIALYSIS ONE EVALUATION: CPT

## 2018-10-18 PROCEDURE — 74011250636 HC RX REV CODE- 250/636: Performed by: INTERNAL MEDICINE

## 2018-10-18 PROCEDURE — 74011250636 HC RX REV CODE- 250/636: Performed by: HOSPITALIST

## 2018-10-18 RX ADMIN — IPRATROPIUM BROMIDE AND ALBUTEROL SULFATE 3 ML: .5; 3 SOLUTION RESPIRATORY (INHALATION) at 09:26

## 2018-10-18 RX ADMIN — PANTOPRAZOLE SODIUM 40 MG: 40 TABLET, DELAYED RELEASE ORAL at 08:25

## 2018-10-18 RX ADMIN — BUPRENORPHINE HYDROCHLORIDE, NALOXONE HYDROCHLORIDE 1 FILM: 4; 1 FILM, SOLUBLE BUCCAL; SUBLINGUAL at 21:00

## 2018-10-18 RX ADMIN — HYDRALAZINE HYDROCHLORIDE 50 MG: 50 TABLET, FILM COATED ORAL at 08:25

## 2018-10-18 RX ADMIN — CALCIUM ACETATE 1334 MG: 667 CAPSULE ORAL at 08:24

## 2018-10-18 RX ADMIN — ISOSORBIDE MONONITRATE 90 MG: 30 TABLET, EXTENDED RELEASE ORAL at 08:24

## 2018-10-18 RX ADMIN — ERYTHROPOIETIN 10000 UNITS: 10000 INJECTION, SOLUTION INTRAVENOUS; SUBCUTANEOUS at 17:50

## 2018-10-18 RX ADMIN — AMIODARONE HYDROCHLORIDE 400 MG: 200 TABLET ORAL at 08:24

## 2018-10-18 RX ADMIN — CALCIUM ACETATE 1334 MG: 667 CAPSULE ORAL at 17:47

## 2018-10-18 RX ADMIN — Medication 10 ML: at 22:00

## 2018-10-18 RX ADMIN — FLUTICASONE PROPIONATE 2 SPRAY: 50 SPRAY, METERED NASAL at 08:31

## 2018-10-18 RX ADMIN — TIOTROPIUM BROMIDE 18 MCG: 18 CAPSULE ORAL; RESPIRATORY (INHALATION) at 08:25

## 2018-10-18 RX ADMIN — Medication 10 ML: at 08:26

## 2018-10-18 RX ADMIN — AMLODIPINE BESYLATE 10 MG: 5 TABLET ORAL at 08:24

## 2018-10-18 RX ADMIN — ATORVASTATIN CALCIUM 40 MG: 40 TABLET, FILM COATED ORAL at 21:00

## 2018-10-18 RX ADMIN — HYDRALAZINE HYDROCHLORIDE 50 MG: 50 TABLET, FILM COATED ORAL at 15:13

## 2018-10-18 RX ADMIN — HYDRALAZINE HYDROCHLORIDE 50 MG: 50 TABLET, FILM COATED ORAL at 23:14

## 2018-10-18 RX ADMIN — BUPRENORPHINE HYDROCHLORIDE, NALOXONE HYDROCHLORIDE 1 FILM: 4; 1 FILM, SOLUBLE BUCCAL; SUBLINGUAL at 08:25

## 2018-10-18 RX ADMIN — CALCITRIOL 0.5 MCG: 0.25 CAPSULE ORAL at 09:00

## 2018-10-18 RX ADMIN — Medication 10 ML: at 17:50

## 2018-10-18 NOTE — PROGRESS NOTES
*ATTENTION:  This note has been created by a medical student for educational purposes only. Please do not refer to the content of this note for clinical decision-making, billing, or other purposes. Please see attending physicians note to obtain clinical information on this patient. * Student Progress Note Please refer to attendings daily rounding note for full details Patient: Roshni Gordon MRN: [id number]  CSN: [id number] YOB: 1970  Age: 46 y.o. Sex: male DOA: 10/3/2018 LOS:  LOS: 15 days Chief Complaint:  Acute on chronic renal failure Subjective: HPI:  
Silas Meek is a 47 y/o male with a history of AICD, cardiac arrest, VT, ischemic cardiomyopathy, EF30%, obesity, CAD, chronic kidney disease that presented to the ED on 10/3 for presistent weakness, nausea, vomiting, abdominal pain, and hematuria. Patient stated that he hadn't been feeling well for about 2 weeks, and a few days before he presented he stated with vomiting and being unable to tolerate any PO intake. Creatinine in the ED was 11.0, most recent result was 8.01 on 9/28. UA in the ED showed large blood, bacteria +. CT abd/pelvis w/o hydronephrosis or urolithiasis. Patient was admitted for further treatment and evaluation with a diagnosis of OLIVIA, hematuria, and UTI. Nephrology was consulted in the ED. Patient had [address] placed on 10/8 and HD on 10/9, 10/10. 10/12, 10/13, 10/16. Plan for HM today. 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 PMH:  
AICD 
cardiac arrest 
VT 
ischemic cardiomyopathy EF30% Obesity CAD 
chronic kidney disease Allergies:  
Latex Codeine ROS: 
Patient denies any current nausea, vomiting. He states that he was able to shower today and is feeling much better. Objective:  
  
Visit Vitals /78 Pulse 68 Temp 98.2 °F (36.8 °C) Resp 17  
 Ht 5' 10\" (1.778 m) Wt 118.4 kg (261 lb) SpO2 94% BMI 37.45 kg/m² Physical Exam: 
General: Well appearing, well nourished. Alert and cooperative, in NAD. Appropriate mood and affect. Skin: Eczematous skin of extremities, more prominent of the RUE. HEENT: Normocephalic, atraumatic. PERRL, EOMI, vision grossly intact. Hearing grossly intact. Neck supple, without lymphadenopathy or thyromegaly. Trachea midline. Cardiovascular: RRR. No peripheral edema, cyanosis, or pallor. Radial and dorsalis pedis pulses strong and equal bilaterally. Capillary refill <2 seconds. Respiratory: CTAB without wheezes. Diminished slightly in the bases Abdominal: Soft, non-distended, non-tender. Normoactive bowel sounds. No guarding or rebound tenderness. No CVA tenderness. Musculoskeletal: Normal muscular development. ROM spine and extremities grossly intact. Neurologic: CN II-XII intact. Strength and sensation intact and symmetric throughout. I have reviewed the patient's Labs and Radiology studies. Assessment/Plan:  
Linking Statement: 
 
1) acute on chronic renal failure/metabolic acidosis 
-HM ordered, another treatment today 10/18 
-kidney biopsy performed 10/15 prelim results are in, final should be in Friday  
-creatinine 10/18 7.30  
-BUN 10/18 31 
-nephrology onboard and managing, appreciated. -once results are back, decision for further treatment/HM will be decided by nephrology and chair will need to be obtained outpatient prior to discharge.  
-phoslo ordered  
-calcitriol ordered  
-protonix ordered 2) bacteria in urine 
-ceftriaxone completed 
-blood cultures and urine culture are negative 3) cardiomyopathy/HTN/CAD 
-ICD implanted 
 -tele monitoring  
-cardiology saw patient and determined to continue home medications. 
-amiodarone 400mg daily 
-norvasc 10mg daily  
-lipitor 40mg every bedtime  
-imdur 90mg daily  
-hydralazine 50mg TID 4) DVT prophylaxis -SCDs bilat 5) nausea/vomiting/abdominal pain 
-CT reviewed, no acute processes 
-EGD on 10/9 revealed chronic gastritis 6) JESSICA/obesity  
-on oxygen Sonny Barakat 10/18/2018 
8:44 AM

## 2018-10-18 NOTE — DIALYSIS
ACUTE HEMODIALYSIS FLOW SHEET 
 
HEMODIALYSIS ORDERS: Physician: RAFAEL Pabon MD 
  
Dialyzer: revaclear   Duration: 4 hr  BFR: 300   DFR: 800 Dialysate:  Temp 37 K+   3    Ca+  2.5 Na 140 Bicarb 35 Weight:  118.4 kg    Patient Chart [x]     Unable to Obtain []   Dry weight/UF Goal: 2000 ml Access Right chest catheter Needle Gauge NA Heparin []  Bolus      Units    [] Hourly       Units    [x]None Catheter locking solution Heparin Pre BP:   129/67    Pulse:     83     Temperature:   98  Respirations: 18  Tx: NS   250    ml/Bolus  Other        [] N/A Labs: Pre        Post:        [] N/A Additional Orders(medications, blood products, hypotension management):       [x] N/A [x] Alfredoita Consent Verified CATHETER ACCESS: []N/A   [x]Right chest   []Left   []IJ     []Fem [] First use X-ray verified     []Tunnel                [] Non Tunneled []No S/S infection  []Redness  []Drainage []Cultured []Swelling []Pain []Medical Aseptic Prep Utilized   []Dressing Changed  [] Biopatch  Date:      
[]Clotted   []Patent   Flows: [x]Good  []Poor  []Reversed If access problem,  notified: []Yes    [x]N/A  Date:        
 
GRAFT/FISTULA ACCESS:  [x]N/A     []Right     []Left     []UE     []LE []AVG   []AVF        []Buttonhole    []Medical Aseptic Prep Utilized []No S/S infection  []Redness  []Drainage []Cultured []Swelling []Pain Bruit:   [] Strong    [] Weak       Thrill :   [] Strong    [] Weak Needle Gauge:  NA  Length: NA If access problem,  notified: []Yes     [x]N/A  Date:       
Please describe access if present and not used:  
 
 
GENERAL ASSESSMENT:   
LUNGS:  Rate 18 SaO2%        [] N/A    [x] Clear  [] Coarse  [] Crackles  [] Wheezing 
      [] Diminished     Location : []RLL   []LLL    []RUL  []NIKOS Cough: []Productive  []Dry  [x]N/A   Respirations:  []Easy  []Labored Therapy:  [x]RA  []NC  l/min    Mask: []NRB []Venti       O2% []Ventilator  []Intubated  [] Trach  [] BiPaP CARDIAC: [x]Regular      [] Irregular   [] Pericardial Rub  [] JVD []  Monitored  [] Bedside  [] Remotely monitored [] N/A  Rhythm: EDEMA: [] None  [x]Generalized  [] Pitting [] 1    [] 2    [] 3    [] 4 [] Facial  [] Pedal  []  UE  [] LE  
SKIN:   [x] Warm  [] Hot     [] Cold   [x] Dry     [] Pale   [] Diaphoretic    
             [] Flushed  [] Jaundiced  [] Cyanotic  [x] Rash (Patient has several lesions in differen  [] Weeping LOC:    [x] Alert      [x]Oriented:    [x] Person     [x] Place  [x]Time 
             [] Confused  [] Lethargic  [] Medicated  [] Non-responsive GI / ABDOMEN:  [] Flat    [] Distended    [x] Soft    [] Firm   []  Obese 
                           [] Diarrhea  [] Bowel Sounds  [] Nausea  [] Vomiting  / URINE ASSESSMENT:[] Voiding   [] Oliguria  [] Anuria   []  Alonzo [] Incontinent    []  Incontinent Brief      []  Bathroom Privileges PAIN: [x] 0 []1  []2   []3   []4   []5   []6   []7   []8   []9   []10 Scale 0-10  Action/Follow Up: MOBILITY:  [] Amb    [] Amb/Assist    [x] Bed    [] Wheelchair  [] Stretcher All Vitals and Treatment Details on Attached Flowsheet Hospital: [facility] Room # 2104 [] 1st Time Acute  [] Stat  [x] Routine  [] Urgent [x] Acute Room  []  Bedside  [] ICU/CCU  [] ER Isolation Precautions:  [x] Dialysis   [] Airborne   [] Contact    [] Reverse Special Considerations:         [] Blood Consent Verified []N/A ALLERGIES:  
Latex Other (comments) High  11/30/2010  Past Updates. ..  
blisters Other Food Hives Not Specified  5/18/2015  Past Updates. .. Allergic to tomatoes and tomato sauce Codeine Nausea and Vomiting Not Specified    Past Updates. .. [] NKA Code Status:  [x] Full Code  [] DNR  [] Other HBsAg ONLY: Date Drawn 10/6/2018         [x]Negative []Positive []Unknown HBsAb: Date 10/6/2018    [x] Susceptible   [] [id number] []Not Drawn  [] Drawn Current Labs:    Date of Labs: 10/18/2018          Today [x] Cut and paste current labs here. Results for Anastasia Ramirez (MRN [id number]) as of 10/18/2018 11:35 Ref. Range 10/18/2018 08:10 WBC Latest Ref Range: 4.6 - 13.2 K/uL 6.7 RBC Latest Ref Range: 4.70 - 5.50 M/uL 3.36 (L) HGB Latest Ref Range: 13.0 - 16.0 g/dL 9.0 (L) HCT Latest Ref Range: 36.0 - 48.0 % 28.9 (L) MCV Latest Ref Range: 74.0 - 97.0 FL 86.0 MCH Latest Ref Range: 24.0 - 34.0 PG 26.8 MCHC Latest Ref Range: 31.0 - 37.0 g/dL 31.1 RDW Latest Ref Range: 11.6 - 14.5 % 16.4 (H) PLATELET Latest Ref Range: 135 - 420 K/uL 235 MPV Latest Ref Range: 9.2 - 11.8 FL 8.5 (L) Sodium Latest Ref Range: 136 - 145 mmol/L 135 (L) Potassium Latest Ref Range: 3.5 - 5.5 mmol/L 4.5 Chloride Latest Ref Range: 100 - 108 mmol/L 99 (L)  
CO2 Latest Ref Range: 21 - 32 mmol/L 28 Anion gap Latest Ref Range: 3.0 - 18 mmol/L 8 Glucose Latest Ref Range: 74 - 99 mg/dL 158 (H) BUN Latest Ref Range: 7.0 - 18 MG/DL 31 (H) Creatinine Latest Ref Range: 0.6 - 1.3 MG/DL 7.30 (H) BUN/Creatinine ratio Latest Ref Range: 12 - 20   4 (L) Calcium Latest Ref Range: 8.5 - 10.1 MG/DL 7.8 (L) Phosphorus Latest Ref Range: 2.5 - 4.9 MG/DL 4.0  
GFR est non-AA Latest Ref Range: >60 ml/min/1.73m2 8 (L) GFR est AA Latest Ref Range: >60 ml/min/1.73m2 10 (L) Albumin Latest Ref Range: 3.4 - 5.0 g/dL 2.2 (L) Iron Latest Ref Range: 50 - 175 ug/dL 60 TIBC Latest Ref Range: 250 - 450 ug/dL 184 (L) Iron % saturation Latest Units: % 33 Ferritin Latest Ref Range: 8 - 388 NG/ DIET:  [x] Renal    [] Other     [] NPO     []  Diabetic PRIMARY NURSE REPORT: First initial/Last name/Title Pre Dialysis: Castillo Angel RN    Time: 0236 EDUCATION:   
 [x] Patient [] Other         Knowledge Basis: []None [x]Minimal [] Substantial  
Barriers to learning  []N/A [x] Access Care     [] S&S of infection     [] Fluid Management     []K+     [x]Procedural   
[]Albumin     [] Medications     [] Tx Options     [] Transplant     [] Diet     [] Other Teaching Tools:  [x] Explain  [] Demo  [] Handouts [] Video Patient response:   [x] Verbalized understanding  [] Teach back  [] Return demonstration [] Requires follow up Inappropriate due to         
 
[x] Time Out/Safety Check  [x]Extracorporeal Circuit Tested for integrity RO/HEMODIALYSIS MACHINE SAFETY CHECKS  Before each treatment:    
Machine Number:                   [facility] 
                                [] Unit Machine #  with centralized RO 
                                [] Portable Machine #1/RO serial # [id number] [] Portable Machine #2/RO serial # [id number] [] Portable Machine #4/RO serial # [id number] [facility] [x] Portable Machine #11/RO serial # [id number] [] Portable Machine #12/RO serial # [id number] [] Portable Machine #13/RO serial #  [id number] Alarm Test:  Pass time 1000         Other:        
[] RO/Machine Log Complete Temp    37 Dialysate: pH  [id number] Conductivity: Meter   14.2     HD Machine   14.3                  TCD: 14 
Dialyzer Lot # [id number]            Blood Tubing Lot # [id number]          Saline Lot #  [id number]  
 
CHLORINE TESTING-Before each treatment and every 4 hours Total Chlorine: [x] less than 0.1 ppm  Time: 1000 4 Hr/2nd Check Time: 1400  
(if greater than 0.1 ppm from Primary then every 30 minutes from Secondary) TREATMENT INITIATION  with Dialysis Precautions: [x] All Connections Secured                 [x] Saline Line Double Clamped  
[x] Venous Parameters Set                  [x] Arterial Parameters Set [x] Prime Given 250                          [x]Air Foam Detector Engaged Treatment Initiation Note:Patient was brought to dialysis unit by hospital transport personnel. He is stable to begin dialysis treatment. During Treatment Notes: At 1120, patient was sitting upright in his hospital bed using his cell phone. Medication Dose Volume Route Initials Dialyzer Cleared: [] Good [] Fair  [] Poor Blood processed:  69.6 L 
UF Removed  1983 Ml Post Wt:     kg 
POst BP:   157/66       Pulse: 78      Respirations: 18  Temperature: 97.7 Post Tx Vascular Access:  N/A Catheter: Locking solution: Heparin 1:1000 Art. 2 ml  Ashok. 2 ml Post Assessment:  
  
                              Skin:  [x] Warm  [x] Dry (Dry, crusted lesions widespread) [] Diaphoretic    [] Flushed  [] Pale [] Cyanotic [facility] Signatures Title Initials  Time Lungs: [x] Clear    [] Course  [] Crackles  [] Wheezing [] Diminished Jaylen Lightning JOHN SH  Cardiac: [x] Regular   [] Irregular   [] Monitor  [] N/A  Rhythm:      
    Edema:  [] None    [x] General     [] Facial   [] Pedal    [] UE    [] LE  
    Pain: [x]0  []1  []2   []3  []4   []5   []6   []7   []8   []9   []10 Post Treatment Note: 
 
 Patient tolerated dialysis well. His catheter was locked with Heparin. He was returned to his room by hospital transport. POST TREATMENT PRIMARY NURSE HANDOFF REPORT:  
 
First initial/Last name/Title Post Dialysis: Castillo Angel RN Time:  6773 Abbreviations: AVG-arterial venous graft, AVF-arterial venous fistula, IJ-Internal Jugular, Subcl-Subclavian, Fem-Femoral, Tx-treatment, AP/HR-apical heart rate, DFR-dialysate flow rate, BFR-blood flow rate, AP-arterial pressure, -venous pressure, UF-ultrafiltrate, TMP-transmembrane pressure, Ashok-Venous, Art-Arterial, RO-Reverse Osmosis

## 2018-10-18 NOTE — PROGRESS NOTES
In Patient Progress note Admit Date: 10/3/2018 Impression: 1. OLIVIA. D/t crescentic GN, serology negative , EM pending , possibility of cresentic IgA On pulse steroids , cytoxan starting Friday HD #4 today , and TTS , tolerating well 2. CKD 3 due to dm/htn. 3. HTN, controlled 4. Secondary hyperparathyroidism/hyperphsphotemia. Continue calcitriol and phoslo. 5. Anemia of renal failure. On epogen. Please call with questions, 
 
Porter Prabhakar MD FASN Cell [phone] Pager: [phone] Subjective:  
 
Denies SOB, CP Seen on HD Current Facility-Administered Medications:  
  epoetin natanael (EPOGEN;PROCRIT) injection 10,000 Units, 10,000 Units, IntraVENous, Q TUE, THU & SAT, Jonas Douglass MD 
  methylPREDNISolone (PF) (SOLU-MEDROL) 1,000 mg in 0.9% sodium chloride 100 mL IVPB, 1,000 mg, IntraVENous, DAILY, Ronny Douglass MD, Last Rate: 100 mL/hr at 10/17/18 2306, 1,000 mg at 10/17/18 2306   buprenorphine-naloxone (SUBOXONE) 4mg-1mg SL film, 1 Film, SubLINGual, BID, Giancarlo Lund, DO, 1 Film at 10/18/18 6591   polyethylene glycol (MIRALAX) packet 17 g, 17 g, Oral, DAILY PRN, Mallissa Sloan H, DO, 17 g at 10/16/18 1529 
  oxyCODONE IR (ROXICODONE) tablet 10 mg, 10 mg, Oral, Q4H PRN, Mallissa Sloan H, DO, 10 mg at 10/16/18 1529 
  HYDROcodone-acetaminophen (NORCO) 7.5-325 mg per tablet 1 Tab, 1 Tab, Oral, Q4H PRN, Hermenia Olszewski, Ashishkumar A, MD 
  lidocaine 4 % patch 1 Patch, 1 Patch, TransDERmal, Q24H, Karlie Messer MD, 1 Patch at 10/17/18 2307   sodium chloride (NS) flush 5-10 mL, 5-10 mL, IntraVENous, Q8H, Flip Hall MD, 10 mL at 10/18/18 0826 
  sodium chloride (NS) flush 5-10 mL, 5-10 mL, IntraVENous, PRN, Miah Donis MD 
  LORazepam (ATIVAN) tablet 1 mg, 1 mg, Oral, Q1H PRN, 1 mg at 10/10/18 1826 **OR** LORazepam (ATIVAN) injection 1 mg, 1 mg, IntraVENous, Q1H PRN, Eugene Luna MD, 1 mg at 10/10/18 1215   LORazepam (ATIVAN) tablet 2 mg, 2 mg, Oral, Q1H PRN, 2 mg at 10/11/18 0436 **OR** LORazepam (ATIVAN) injection 2 mg, 2 mg, IntraVENous, Q1H PRN, Eugene Luna MD, 2 mg at 10/11/18 0239   LORazepam (ATIVAN) injection 3 mg, 3 mg, IntraVENous, Q15MIN PRN, Eugene Luna MD 
  0.9% sodium chloride infusion, 50 mL/hr, IntraVENous, DIALYSIS PRN, Denis Douglass MD 
  heparin (porcine) 1,000 unit/mL injection 1,000 Units, 1,000 Units, InterCATHeter, DIALYSIS PRN, Denis Douglass MD 
  pantoprazole (PROTONIX) tablet 40 mg, 40 mg, Oral, ACB, Kenyetta Osorio MD, 40 mg at 10/18/18 0825   hydrALAZINE (APRESOLINE) tablet 50 mg, 50 mg, Oral, TID, Benitez Kovacs MD, 50 mg at 10/18/18 0825   calcitRIOL (ROCALTROL) capsule 0.5 mcg, 0.5 mcg, Oral, DAILY, Benitez Kovacs MD, 0.5 mcg at 10/18/18 0900   promethazine (PHENERGAN) tablet 25 mg, 25 mg, Oral, Q8H PRN, Kenyetta Osorio MD, 25 mg at 10/10/18 1441 
  amLODIPine (NORVASC) tablet 10 mg, 10 mg, Oral, DAILY, Denis Douglass MD, 10 mg at 10/18/18 8350   calcium acetate (PHOSLO) capsule 1,334 mg, 2 Cap, Oral, TID WITH MEALS, Denis Douglass MD, 1,334 mg at 10/18/18 6408   influenza vaccine 2018-19 (6 mos+)(PF) (FLUARIX QUAD/FLULAVAL QUAD) injection 0.5 mL, 0.5 mL, IntraMUSCular, PRIOR TO DISCHARGE, Kenyetta Osorio MD 
  isosorbide mononitrate ER (IMDUR) tablet 90 mg, 90 mg, Oral, DAILY, Kenyetta Osorio MD, 90 mg at 10/18/18 0824 
  amiodarone (CORDARONE) tablet 400 mg, 400 mg, Oral, DAILY, Kenyetta Osorio MD, 400 mg at 10/18/18 0824 
  atorvastatin (LIPITOR) tablet 40 mg, 40 mg, Oral, QHS, Kenyetta Osorio MD, 40 mg at 10/17/18 2307   fluticasone (FLONASE) 50 mcg/actuation nasal spray 2 Spray, 2 Spray, Both Nostrils, DAILY, Kenyetta Osorio MD, 2 Russell at 10/18/18 2165   tiotropium (SPIRIVA) inhalation capsule 18 mcg, 1 Cap, Inhalation, DAILY, Ava Alves MD, 18 mcg at 10/18/18 0666   ondansetron (ZOFRAN) injection 4 mg, 4 mg, IntraVENous, Q4H PRN, Kenyetta Alvarez MD, 4 mg at 10/13/18 2225   albuterol-ipratropium (DUO-NEB) 2.5 MG-0.5 MG/3 ML, 3 mL, Nebulization, Q4H PRN, Ava Alves MD, 3 mL at 10/18/18 0282 Objective:  
 
Visit Vitals /67 Pulse 65 Temp 98 °F (36.7 °C) (Oral) Resp 18 Ht 5' 10\" (1.778 m) Wt 118.4 kg (261 lb) SpO2 94% BMI 37.45 kg/m² Intake/Output Summary (Last 24 hours) at 10/18/2018 1401 Last data filed at 10/18/2018 8772 Gross per 24 hour Intake 1080 ml Output 2050 ml Net -970 ml Physical Exam:  
 
gen NAD HENT mmm RS AEBE clear CVS s1 s2 wnl no JVD 
GI soft BS + Ext no edema Data Review: 
 
Recent Labs 10/18/18 
0810 WBC 6.7  
RBC 3.36* HCT 28.9* MCV 86.0 MCH 26.8 MCHC 31.1 RDW 16.4* Recent Labs 10/18/18 
2373 10/17/18 
0350 10/16/18 
3008 BUN 31* 21* 36* CREA 7.30* 5.64* 8.18* CA 7.8* 7.6* 7.7* ALB 2.2* 1.9* 2.0*  
K 4.5 4.1 4.0  
* 139 137 CL 99* 101 100 CO2 28 30 28 PHOS 4.0 3.7 4.8  
* 85 90 Elgin Garza MD

## 2018-10-18 NOTE — PROGRESS NOTES
Problem: Falls - Risk of 
Goal: *Absence of Falls Document Juan Manuel Bender Fall Risk and appropriate interventions in the flowsheet. Outcome: Progressing Towards Goal 
Fall Risk Interventions: 
Mobility Interventions: Bed/chair exit alarm Medication Interventions: Bed/chair exit alarm Elimination Interventions: Call light in reach History of Falls Interventions: Bed/chair exit alarm Problem: Pressure Injury - Risk of 
Goal: *Prevention of pressure injury Document Pranav Scale and appropriate interventions in the flowsheet. Outcome: Progressing Towards Goal 
Pressure Injury Interventions: Activity Interventions: Increase time out of bed Mobility Interventions: Pressure redistribution bed/mattress (bed type) Nutrition Interventions: Document food/fluid/supplement intake Friction and Shear Interventions: Apply protective barrier, creams and emollients

## 2018-10-18 NOTE — PROGRESS NOTES
[address] [place], [place] [address]ty [facility] Hospitalist Division Inpatient Daily Progress Note Daily progress Note Patient: Claudean Favor MRN: [id number]  [facility]: [id number] YOB: 1970  Age: 46 y.o. Sex: male DOA: 10/3/2018 LOS:  LOS: 15 days Chief Complaint:  Acute on chronic renal failure Interval History: 46 y.o.  male with h/o AICD,cardiac arrest,VT,ischemic cardiomyopathy EF 30%,obesity,cad,presented to ED because of weakness,nausea,vomting,abdominal pain,hematuria. Patient says that he started feeling sick 2 weeks ago when he started feeling weak. Then he started vomiting. Could not tolerate even water. In recent days ,he has had abdominal pain which he attributed to the ongoing vomiting. Patient also reported that he has seen his urine turning red for a while. Had to see his doctor in two weeks but felt very weak and decided to come to the ED. In ED,lab showed creatinine 11.0 compared to 2.12 on 1/11/2018 and 8.01 on 9/28/18. UA showed large blood,bacteria+. CT abd/pelvis w/o hydronephrosis or urolithiasis. Patient admitted for OLIVIA,Hematuria,UTI. Started on iv fluid. Nephrolohy consulted from ER. Since patient has GI complaint and renal involvement,nephrology has indicated that Henoch-Schonlein syndrome needs to be considered. Pt is on iv fluid. IgA elevated 648. Other serology for vasculitis including nathaniel,GBM ab,ANCA,C3,C4,heptitis profile not abnormal. Nephrology has indicated that if creatinine continue to rise over the weekend,patient will need dialysis. Today creat up to 12.00. Patient may also need kidney biopsy. Cardiology consulted significant cardiac history anticipating that he will have dialysis. Cardiology has determined that his cardiac status is stable. GI saw patient on 10/6 for ongoing symptoms. TDC catheter placed on 10/8. EGD on 10/9 showed chronic gastritis. HD on 10/9 and 10/10, next HD session on 10/12.  S/p kidney biopsy on 10/15- per Nephrology results- small specimen, only two glomeruli for light microscopy, none for immunofluorescence. 2 glomeruli with severe crescentic GN. 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 Patient to begin treatment as inpatient Assessment/Plan:  
 
Patient Active Problem List  
Diagnosis Code  ST elevation (STEMI) myocardial infarction involving left anterior descending coronary artery ([facility]) I21.02  
 Cardiac arrest - ventricular fibrillation  HTN (hypertension) I10  
 Hypertensive emergency I16.1  Hyperglycemia R73.9  Acute on chronic renal failure (HCC) N17.9, N18.9  Leukocytosis D72.829  
 Acute pulmonary edema ([facility]) J81.0  Contrast dye induced nephropathy N14.1, T50.8X5A  
 CKD (chronic kidney disease) N18.9  Arachnoiditis G03.9  Postlaminectomy syndrome, lumbar region M96.1  Psoriasis L40.9  Degeneration of lumbar or lumbosacral intervertebral disc M51.37  
 Encounter for long-term (current) use of high-risk medication Z79.899  Obesity E66.9  
 Pain in joint, lower leg M25.569  Psoriatic arthropathy ([facility] [address] L40.50  Chronic low back pain M54.5, G89.29  Chronic pain syndrome G89.4  Lumbosacral spondylosis without myelopathy M47.817  Enthesopathy of hip M76.899  
 Sacroiliitis ([facility]) M46.1  Coronary artery disease I25.10  
 JESSICA (obstructive sleep apnea) G47.33  
 UTI (lower urinary tract infection) N39.0  Cardiomyopathy ([address] I42.9  AICD (automatic cardioverter/defibrillator) present Z95.810  
 OLIVIA (acute kidney injury) ([facility] [address] N17.9  Renal failure N19  
 Chest pain R07.9  
 SOB (shortness of breath) R06.02  Bronchitis J40  Acute renal failure (ARF) ([facility]) N17.9  ARF (acute renal failure) ([facility]) N17.9  Severe sepsis ([facility]) A41.9, R65.20  Epigastric abdominal pain R10.13  
 Anxiety F41.9  Ventricular tachycardia ([facility]) I47.2  Ventricular fibrillation ([facility]) I49.01  
  ICD (implantable cardioverter-defibrillator) discharge Z45.02  
 CAD (coronary artery disease) I25.10  Obesity, morbid ([facility] [address] E66.01  
 Bacteria in urine D66.45  
 Metabolic acidosis D88.9 A/P: 
Acute on chronic renal failure/Metabolic acidosis  
- nephrology following, appreciate assistance 
- creatinine 11.00 POA 
 -Etiology:volume depletion -?acute GN as pt also hematuria. -Serology:IgA elevated. Other normal such as MANUELITO,ANCA,C3,C4,Hep profile. -s/p [facility] 10/8   
- hemodialysis - s/p core biopsy lower pole right kidney on 10/15 - Per Nephrology small specimen, only two glomeruli for light microscopy, none for immunofluorescence. 2 glomeruli with severe crescentic GN. 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 
 
   
Bacteria in urine/UTI/Hematuria 
 -Ceftriaxone iv - completed 
 -Blood cx ng/Ucx negative 
 -Vasculitis serology non-revealing 
  
Secondary hyperparathyroidism  
 -On calcitriol 
   
Nausea/vomiting/abdominal pain 
 -CT reviewed,no acute process. -GI, Dr Nida Moses saw patient on 10/6 - EGD 10/9, chronic gastritis 
   
HTN 
 -Hydralazine prn for sbp>160 
   
JESSICA (obstructive sleep apnea) 
 -On oxygen 
   
Cardiomyopathy 
- EF 25-30% - ICD (implantable cardioverter-defibrillator) discharge CAD (coronary artery disease) 
 -Continue routine meds 
 -Telemetry monitoring 
 -Cardiology saw patient on 10/6 and determined that patient's cardiac status is stable and advised to continue his home meds DVT Prophylaxis - SCD's BLE Disposition 
- no recommendations from PT/OT, plan is home 
- need to determine if pt will continue with HD at discharge pending renal biopsy, if so would need outpatient chair secured- defer to nephrology [facility] Pager:  [phone] Office:  [phone] Subjective: No complaints. No events overnight Objective:  
  
Visit Vitals /78 Pulse 68 Temp 98.2 °F (36.8 °C) Resp 17 Ht 5' 10\" (1.778 m) Wt 118.4 kg (261 lb) SpO2 94% BMI 37.45 kg/m² Physical Exam: 
General appearance: alert, cooperative, no distress, appears stated age, [address] cath right Lungs: clear to auscultation throughout Heart: regular rate and rhythm, S1, S2 normal, no murmur, click, rub or gallop Abdomen: soft, non tender, non distended. Normoactive bowel sounds. Extremities: extremities normal, atraumatic, no cyanosis or edema Skin: flaky, dry Neurologic: Grossly normal 
PSY: Mood and affect normal, appropriately behaved Intake and Output: 
Current Shift:  No intake/output data recorded. Last three shifts:  10/16 1901 - 10/18 0700 In: 1800 [P.O.:1800] Out: 2410 [Urine:2410] Recent Results (from the past 24 hour(s)) RENAL FUNCTION PANEL Collection Time: 10/18/18  8:10 AM  
Result Value Ref Range Sodium 135 (L) 136 - 145 mmol/L Potassium 4.5 3.5 - 5.5 mmol/L Chloride 99 (L) 100 - 108 mmol/L  
 CO2 28 21 - 32 mmol/L Anion gap 8 3.0 - 18 mmol/L Glucose 158 (H) 74 - 99 mg/dL BUN 31 (H) 7.0 - 18 MG/DL Creatinine 7.30 (H) 0.6 - 1.3 MG/DL  
 BUN/Creatinine ratio 4 (L) 12 - 20 GFR est AA 10 (L) >60 ml/min/1.73m2 GFR est non-AA 8 (L) >60 ml/min/1.73m2 Calcium 7.8 (L) 8.5 - 10.1 MG/DL Phosphorus 4.0 2.5 - 4.9 MG/DL Albumin 2.2 (L) 3.4 - 5.0 g/dL CBC W/O DIFF Collection Time: 10/18/18  8:10 AM  
Result Value Ref Range WBC 6.7 4.6 - 13.2 K/uL  
 RBC 3.36 (L) 4.70 - 5.50 M/uL HGB 9.0 (L) 13.0 - 16.0 g/dL HCT 28.9 (L) 36.0 - 48.0 % MCV 86.0 74.0 - 97.0 FL  
 MCH 26.8 24.0 - 34.0 PG  
 MCHC 31.1 31.0 - 37.0 g/dL  
 RDW 16.4 (H) 11.6 - 14.5 % PLATELET 767 571 - 725 K/uL MPV 8.5 (L) 9.2 - 11.8 FL  
IRON PROFILE Collection Time: 10/18/18  8:10 AM  
Result Value Ref Range Iron 60 50 - 175 ug/dL TIBC 184 (L) 250 - 450 ug/dL Iron % saturation 33 % FERRITIN  
 Collection Time: 10/18/18  8:10 AM  
Result Value Ref Range Ferritin 325 8 - 388 NG/ML Lab Results Component Value Date/Time Glucose 158 (H) 10/18/2018 08:10 AM  
 Glucose 85 10/17/2018 03:50 AM  
 Glucose 90 10/16/2018 06:10 AM  
 Glucose 95 10/15/2018 07:45 AM  
 Glucose 110 (H) 10/14/2018 04:52 AM  
  
 
Imaging: Xr Chest Pa Lat Result Date: 10/18/2018 EXAM: XR CHEST PA LAT CLINICAL INDICATION/HISTORY: CHF. -Additional: None COMPARISON: 8/20/2017 TECHNIQUE: Portable frontal view of the chest _______________ FINDINGS: SUPPORT DEVICES: Dialysis catheter tip at mid SVC. ACDF noted. HEART AND MEDIASTINUM: Stable cardiomediastinal silhouette. LUNGS AND PLEURAL SPACES: Evidence of small left-sided pleural effusion and mild subsegmental atelectasis in the base of the left lung. Right lung and pleural space appear clear. No overt edema. BONY THORAX AND SOFT TISSUES: Unremarkable. _______________ IMPRESSION: No evidence of edema. Evidence of small left-sided pleural effusion and mild basilar atelectasis. Medication List Reviewed: 
Current Facility-Administered Medications Medication Dose Route Frequency  epoetin natanael (EPOGEN;PROCRIT) injection 10,000 Units  10,000 Units IntraVENous Q TUE, THU & SAT  methylPREDNISolone (PF) (SOLU-MEDROL) 1,000 mg in 0.9% sodium chloride 100 mL IVPB  1,000 mg IntraVENous DAILY  buprenorphine-naloxone (SUBOXONE) 4mg-1mg SL film  1 Film SubLINGual BID  polyethylene glycol (MIRALAX) packet 17 g  17 g Oral DAILY PRN  
 oxyCODONE IR (ROXICODONE) tablet 10 mg  10 mg Oral Q4H PRN  
 HYDROcodone-acetaminophen (NORCO) 7.5-325 mg per tablet 1 Tab  1 Tab Oral Q4H PRN  
 lidocaine 4 % patch 1 Patch  1 Patch TransDERmal Q24H  
 sodium chloride (NS) flush 5-10 mL  5-10 mL IntraVENous Q8H  
 sodium chloride (NS) flush 5-10 mL  5-10 mL IntraVENous PRN  
 LORazepam (ATIVAN) tablet 1 mg  1 mg Oral Q1H PRN  Or  
  LORazepam (ATIVAN) injection 1 mg  1 mg IntraVENous Q1H PRN  
 LORazepam (ATIVAN) tablet 2 mg  2 mg Oral Q1H PRN Or  
 LORazepam (ATIVAN) injection 2 mg  2 mg IntraVENous Q1H PRN  
 LORazepam (ATIVAN) injection 3 mg  3 mg IntraVENous Q15MIN PRN  
 0.9% sodium chloride infusion  50 mL/hr IntraVENous DIALYSIS PRN  
 heparin (porcine) 1,000 unit/mL injection 1,000 Units  1,000 Units InterCATHeter DIALYSIS PRN  pantoprazole (PROTONIX) tablet 40 mg  40 mg Oral ACB  hydrALAZINE (APRESOLINE) tablet 50 mg  50 mg Oral TID  calcitRIOL (ROCALTROL) capsule 0.5 mcg  0.5 mcg Oral DAILY  promethazine (PHENERGAN) tablet 25 mg  25 mg Oral Q8H PRN  
 amLODIPine (NORVASC) tablet 10 mg  10 mg Oral DAILY  calcium acetate (PHOSLO) capsule 1,334 mg  2 Cap Oral TID WITH MEALS  influenza vaccine 2018-19 (6 mos+)(PF) (FLUARIX QUAD/FLULAVAL QUAD) injection 0.5 mL  0.5 mL IntraMUSCular PRIOR TO DISCHARGE  isosorbide mononitrate ER (IMDUR) tablet 90 mg  90 mg Oral DAILY  amiodarone (CORDARONE) tablet 400 mg  400 mg Oral DAILY  atorvastatin (LIPITOR) tablet 40 mg  40 mg Oral QHS  fluticasone (FLONASE) 50 mcg/actuation nasal spray 2 Spray  2 Spray Both Nostrils DAILY  tiotropium (SPIRIVA) inhalation capsule 18 mcg  1 Cap Inhalation DAILY  ondansetron (ZOFRAN) injection 4 mg  4 mg IntraVENous Q4H PRN  
 albuterol-ipratropium (DUO-NEB) 2.5 MG-0.5 MG/3 ML  3 mL Nebulization Q4H PRN

## 2018-10-18 NOTE — PROGRESS NOTES
Assumed care of pt from Sister , RN pt awake in bed A&O x 4 , no distress noted and denies pain. Frequently used items and call bell within reach. Patient verbalized understanding to use call bell for any needs or assistance. Bed locked in lowest position. Wife at bedside. 1925 Bedside and Verbal shift change report given to Katie Coffman RN (oncoming nurse) by Cheli Mccormick RN (offgoing nurse). Report included the following information SBAR, Kardex, Intake/Output, MAR and Recent Results.

## 2018-10-18 NOTE — PROGRESS NOTES
Into patient's chart, asked by primary RN Sister Jessica Kraft to help in administering meds. 2136: suboxone SL film if grayed out in the pyxis, sent a message to pharmacy for refill. 2140: called pharmacy talked to [address], pharmMD, notified him that the suboxone SL film is grayed out in the pt's controlled specific bin, he stated that they will bring some up, this nurse verbalized understanding. 2144: went into pt's room, pt resting in bed, informed pt that his primary RN Sister Jessica Kraft asked me to help in giving his medication, informed him that the suboxone Sl film is out of stock in the pyxis and that I have just called pharmacy to refill it, pt verbalized understanding. Asked pt if he wants to take his other medications that I already have in hand, pt stated he wants to wait until the suboxone is here to take his meds, this nurse verbalized understanding.

## 2018-10-19 LAB
ABO + RH BLD: NORMAL
ALBUMIN SERPL-MCNC: 2.1 G/DL (ref 3.4–5)
ANION GAP SERPL CALC-SCNC: 8 MMOL/L (ref 3–18)
BLOOD GROUP ANTIBODIES SERPL: NORMAL
BUN SERPL-MCNC: 32 MG/DL (ref 7–18)
BUN/CREAT SERPL: 6 (ref 12–20)
CALCIUM SERPL-MCNC: 7.7 MG/DL (ref 8.5–10.1)
CHLORIDE SERPL-SCNC: 100 MMOL/L (ref 100–108)
CO2 SERPL-SCNC: 28 MMOL/L (ref 21–32)
CREAT SERPL-MCNC: 5.72 MG/DL (ref 0.6–1.3)
GLUCOSE SERPL-MCNC: 162 MG/DL (ref 74–99)
PHOSPHATE SERPL-MCNC: 3.7 MG/DL (ref 2.5–4.9)
POTASSIUM SERPL-SCNC: 4.6 MMOL/L (ref 3.5–5.5)
SODIUM SERPL-SCNC: 136 MMOL/L (ref 136–145)
SPECIMEN EXP DATE BLD: NORMAL

## 2018-10-19 PROCEDURE — 74011250636 HC RX REV CODE- 250/636: Performed by: INTERNAL MEDICINE

## 2018-10-19 PROCEDURE — 74011250637 HC RX REV CODE- 250/637: Performed by: INTERNAL MEDICINE

## 2018-10-19 PROCEDURE — 86900 BLOOD TYPING SEROLOGIC ABO: CPT | Performed by: INTERNAL MEDICINE

## 2018-10-19 PROCEDURE — 36415 COLL VENOUS BLD VENIPUNCTURE: CPT | Performed by: INTERNAL MEDICINE

## 2018-10-19 PROCEDURE — 74011250637 HC RX REV CODE- 250/637: Performed by: HOSPITALIST

## 2018-10-19 PROCEDURE — 65660000000 HC RM CCU STEPDOWN

## 2018-10-19 PROCEDURE — 74011000258 HC RX REV CODE- 258: Performed by: INTERNAL MEDICINE

## 2018-10-19 PROCEDURE — 80069 RENAL FUNCTION PANEL: CPT | Performed by: INTERNAL MEDICINE

## 2018-10-19 PROCEDURE — 74011250636 HC RX REV CODE- 250/636: Performed by: HOSPITALIST

## 2018-10-19 PROCEDURE — 74011000250 HC RX REV CODE- 250: Performed by: HOSPITALIST

## 2018-10-19 RX ORDER — DIPHENHYDRAMINE HCL 25 MG
25 CAPSULE ORAL
Status: DISCONTINUED | OUTPATIENT
Start: 2018-10-19 | End: 2018-10-24 | Stop reason: HOSPADM

## 2018-10-19 RX ORDER — ALBUMIN HUMAN 50 G/1000ML
100 SOLUTION INTRAVENOUS ONCE
Status: DISPENSED | OUTPATIENT
Start: 2018-10-20 | End: 2018-10-20

## 2018-10-19 RX ORDER — PREDNISONE 20 MG/1
60 TABLET ORAL
Status: DISCONTINUED | OUTPATIENT
Start: 2018-10-20 | End: 2018-10-19

## 2018-10-19 RX ORDER — PREDNISONE 20 MG/1
60 TABLET ORAL
Status: DISCONTINUED | OUTPATIENT
Start: 2018-10-21 | End: 2018-10-24 | Stop reason: HOSPADM

## 2018-10-19 RX ADMIN — CALCIUM ACETATE 1334 MG: 667 CAPSULE ORAL at 12:32

## 2018-10-19 RX ADMIN — HYDRALAZINE HYDROCHLORIDE 50 MG: 50 TABLET, FILM COATED ORAL at 16:40

## 2018-10-19 RX ADMIN — CALCITRIOL 0.5 MCG: 0.25 CAPSULE ORAL at 09:00

## 2018-10-19 RX ADMIN — CALCIUM ACETATE 1334 MG: 667 CAPSULE ORAL at 09:23

## 2018-10-19 RX ADMIN — CALCIUM ACETATE 1334 MG: 667 CAPSULE ORAL at 16:40

## 2018-10-19 RX ADMIN — FLUTICASONE PROPIONATE 2 SPRAY: 50 SPRAY, METERED NASAL at 09:31

## 2018-10-19 RX ADMIN — TIOTROPIUM BROMIDE 18 MCG: 18 CAPSULE ORAL; RESPIRATORY (INHALATION) at 09:30

## 2018-10-19 RX ADMIN — ATORVASTATIN CALCIUM 40 MG: 40 TABLET, FILM COATED ORAL at 22:30

## 2018-10-19 RX ADMIN — DIPHENHYDRAMINE HYDROCHLORIDE 25 MG: 25 CAPSULE ORAL at 12:32

## 2018-10-19 RX ADMIN — BUPRENORPHINE HYDROCHLORIDE, NALOXONE HYDROCHLORIDE 1 FILM: 4; 1 FILM, SOLUBLE BUCCAL; SUBLINGUAL at 00:55

## 2018-10-19 RX ADMIN — ISOSORBIDE MONONITRATE 90 MG: 30 TABLET, EXTENDED RELEASE ORAL at 09:22

## 2018-10-19 RX ADMIN — Medication 10 ML: at 09:38

## 2018-10-19 RX ADMIN — PANTOPRAZOLE SODIUM 40 MG: 40 TABLET, DELAYED RELEASE ORAL at 09:23

## 2018-10-19 RX ADMIN — AMIODARONE HYDROCHLORIDE 400 MG: 200 TABLET ORAL at 09:23

## 2018-10-19 RX ADMIN — HYDRALAZINE HYDROCHLORIDE 50 MG: 50 TABLET, FILM COATED ORAL at 22:30

## 2018-10-19 RX ADMIN — AMLODIPINE BESYLATE 10 MG: 5 TABLET ORAL at 09:23

## 2018-10-19 RX ADMIN — BUPRENORPHINE HYDROCHLORIDE, NALOXONE HYDROCHLORIDE 1 FILM: 4; 1 FILM, SOLUBLE BUCCAL; SUBLINGUAL at 09:32

## 2018-10-19 RX ADMIN — Medication 10 ML: at 22:30

## 2018-10-19 RX ADMIN — SODIUM CHLORIDE 1000 MG: 900 INJECTION, SOLUTION INTRAVENOUS at 09:30

## 2018-10-19 RX ADMIN — Medication 10 ML: at 16:40

## 2018-10-19 RX ADMIN — HYDRALAZINE HYDROCHLORIDE 50 MG: 50 TABLET, FILM COATED ORAL at 09:23

## 2018-10-19 NOTE — PROGRESS NOTES
0730: Bedside and Verbal shift change report given to JOHN Gauthier (oncoming nurse) by Imani Valdez RN (offgoing nurse). Report included the following information SBAR, Kardex and MAR.

## 2018-10-19 NOTE — PROGRESS NOTES
Problem: Falls - Risk of 
Goal: *Absence of Falls Document Clifm Furth Fall Risk and appropriate interventions in the flowsheet. Outcome: Progressing Towards Goal 
Fall Risk Interventions: 
Mobility Interventions: Patient to call before getting OOB Medication Interventions: Evaluate medications/consider consulting pharmacy Elimination Interventions: Call light in reach History of Falls Interventions: Evaluate medications/consider consulting pharmacy Problem: Pressure Injury - Risk of 
Goal: *Prevention of pressure injury Document Pranav Scale and appropriate interventions in the flowsheet. Outcome: Progressing Towards Goal 
Pressure Injury Interventions: Activity Interventions: Pressure redistribution bed/mattress(bed type) Mobility Interventions: Pressure redistribution bed/mattress (bed type) Nutrition Interventions: Document food/fluid/supplement intake Friction and Shear Interventions: HOB 30 degrees or less

## 2018-10-19 NOTE — PROGRESS NOTES
Assumed care of pt from Satish Ellison RN pt awake in bed A&O x 4 , no distress noted and denies pain. Frequently used items and call bell within reach. Patient verbalized understanding to use call bell for any needs or assistance. Bed locked in lowest position. 1155 Pt complained of itching and some pain at his dialysis cathter site. There was no redness or warmth to site. MD made aware. Orders received for Benadryl 25 mg PO q 6 hr.  
 
1540 Orders received from [facility] for albumin 5% 2000 ml.  
 
1818 Blood bank called and stated they need a type and screen on pt. Orders received from [facility] for a type and screen RBV.  
 
1822 Give chemo medication after dialysis and blood products. RBV 
 
1918 Bedside and Verbal shift change report given to Satish Ellison RN (oncoming nurse) by Leodan Farrell RN (offgoing nurse). Report included the following information SBAR, Kardex, Intake/Output, MAR and Recent Results.

## 2018-10-19 NOTE — PROGRESS NOTES
[address] [place] [address] Hospitalist Division Inpatient Daily Progress Note Daily progress Note Patient: Elena Osorio MRN: [id number]  CSN: [id number] YOB: 1970  Age: 46 y.o. Sex: male DOA: 10/3/2018 LOS:  LOS: 16 days Chief Complaint:  Acute on chronic renal failure Interval History: 46 y.o.  male with h/o AICD,cardiac arrest,VT,ischemic cardiomyopathy EF 30%,obesity,cad,presented to ED because of weakness,nausea,vomting,abdominal pain,hematuria. Patient says that he started feeling sick 2 weeks ago when he started feeling weak. Then he started vomiting. Could not tolerate even water. In recent days ,he has had abdominal pain which he attributed to the ongoing vomiting. Patient also reported that he has seen his urine turning red for a while. Had to see his doctor in two weeks but felt very weak and decided to come to the ED. In ED,lab showed creatinine 11.0 compared to 2.12 on 1/11/2018 and 8.01 on 9/28/18. UA showed large blood,bacteria+. CT abd/pelvis w/o hydronephrosis or urolithiasis. Patient admitted for OLIVIA,Hematuria,UTI. Started on iv fluid. Nephrolohy consulted from ER. Since patient has GI complaint and renal involvement,nephrology has indicated that Henoch-Schonlein syndrome needs to be considered. Pt is on iv fluid. IgA elevated 648. Other serology for vasculitis including nathaniel,GBM ab,ANCA,C3,C4,heptitis profile not abnormal. Nephrology has indicated that if creatinine continue to rise over the weekend,patient will need dialysis. Today creat up to 12.00. Patient may also need kidney biopsy. Cardiology consulted significant cardiac history anticipating that he will have dialysis. Cardiology has determined that his cardiac status is stable. GI saw patient on 10/6 for ongoing symptoms. TDC catheter placed on 10/8. EGD on 10/9 showed chronic gastritis. HD on 10/9 and 10/10, next HD session on 10/12.  S/p kidney biopsy on 10/15- per Nephrology results- small specimen, only two glomeruli for light microscopy, none for immunofluorescence. 2 glomeruli with severe crescentic GN. 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 Patient to begin Cytoxan today per Nephrology Assessment/Plan:  
 
Patient Active Problem List  
Diagnosis Code  ST elevation (STEMI) myocardial infarction involving left anterior descending coronary artery ([facility]) I21.02  
 Cardiac arrest - ventricular fibrillation  HTN (hypertension) I10  
 Hypertensive emergency I16.1  Hyperglycemia R73.9  Acute on chronic renal failure (HCC) N17.9, N18.9  Leukocytosis D72.829  
 Acute pulmonary edema ([facility]) J81.0  Contrast dye induced nephropathy N14.1, T50.8X5A  
 CKD (chronic kidney disease) N18.9  Arachnoiditis G03.9  Postlaminectomy syndrome, lumbar region M96.1  Psoriasis L40.9  Degeneration of lumbar or lumbosacral intervertebral disc M51.37  
 Encounter for long-term (current) use of high-risk medication Z79.899  Obesity E66.9  
 Pain in joint, lower leg M25.569  Psoriatic arthropathy ([facility] L40.50  Chronic low back pain M54.5, G89.29  Chronic pain syndrome G89.4  Lumbosacral spondylosis without myelopathy M47.817  Enthesopathy of hip M76.899  
 Sacroiliitis ([facility]) M46.1  Coronary artery disease I25.10  
 JESSICA (obstructive sleep apnea) G47.33  
 UTI (lower urinary tract infection) N39.0  Cardiomyopathy ([address] I42.9  AICD (automatic cardioverter/defibrillator) present Z95.810  
 OLIVIA (acute kidney injury) ([address] N17.9  Renal failure N19  
 Chest pain R07.9  
 SOB (shortness of breath) R06.02  Bronchitis J40  Acute renal failure (ARF) ([facility]) N17.9  ARF (acute renal failure) ([facility]) N17.9  Severe sepsis ([facility]) A41.9, R65.20  Epigastric abdominal pain R10.13  
 Anxiety F41.9  Ventricular tachycardia ([facility]) I47.2  Ventricular fibrillation ([facility]) I49.01  
  ICD (implantable cardioverter-defibrillator) discharge Z45.02  
 CAD (coronary artery disease) I25.10  Obesity, morbid ([address] E66.01  
 Bacteria in urine Q85.18  
 Metabolic acidosis L38.8 A/P: 
Acute on chronic renal failure/Metabolic acidosis  
- nephrology following, appreciate assistance 
- creatinine 11.00 POA 
 -Etiology:volume depletion -?acute GN as pt also hematuria. -Serology:IgA elevated. Other normal such as MANUELITO,ANCA,C3,C4,Hep profile. -s/p [facility] 10/8   
- hemodialysis - s/p core biopsy lower pole right kidney on 10/15 - Per Nephrology small specimen, only two glomeruli for light microscopy, none for immunofluorescence. 2 glomeruli with severe crescentic GN. 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  
   
Bacteria in urine/UTI/Hematuria 
 -Ceftriaxone iv - completed 
 -Blood cx ng/Ucx negative 
 -Vasculitis serology non-revealing 
  
Secondary hyperparathyroidism  
 -On calcitriol 
   
Nausea/vomiting/abdominal pain 
 -CT reviewed,no acute process. -GI, Dr Lobo Boss saw patient on 10/6 - EGD 10/9, chronic gastritis 
   
HTN 
 -Hydralazine prn for sbp>160 
   
JESSICA (obstructive sleep apnea) 
 -On oxygen 
   
Cardiomyopathy 
- EF 25-30% - ICD (implantable cardioverter-defibrillator) discharge CAD (coronary artery disease) 
 -Continue routine meds 
 -Telemetry monitoring 
 -Cardiology saw patient on 10/6 and determined that patient's cardiac status is stable and advised to continue his home meds DVT Prophylaxis - SCD's BLE Disposition 
- no recommendations from PT/OT, plan is home 
- need to determine if pt will continue with HD at discharge pending renal biopsy, if so would need outpatient chair secured- defer to nephrology MONALISA [facility] Pager:  [phone] Office:  [phone] Subjective: Eating breakfast. No complaints of pain. Objective:  
  
Visit Vitals /69 Pulse 85 Temp 97.7 °F (36.5 °C) Resp 16 Ht 5' 10\" (1.778 m) Wt 118.4 kg (261 lb) SpO2 90% BMI 37.45 kg/m² Physical Exam: 
General appearance: alert, cooperative, no distress, appears stated age, [facility] cath right Lungs: clear to auscultation bilaterally Heart: regular rate and rhythm, S1, S2 normal, no murmur, click, rub or gallop Abdomen: soft, non tender, non distended. Normoactive bowel sounds. Extremities: extremities normal, atraumatic, no cyanosis or edema Skin: flaky, dry Neurologic: Grossly normal 
PSY: Mood and affect normal, appropriately behaved Intake and Output: 
Current Shift:  No intake/output data recorded. Last three shifts:  10/17 1901 - 10/19 0700 In: 960 [P.O.:960] Out: 4377 [Urine:1200] Recent Results (from the past 24 hour(s)) RENAL FUNCTION PANEL Collection Time: 10/18/18  8:10 AM  
Result Value Ref Range Sodium 135 (L) 136 - 145 mmol/L Potassium 4.5 3.5 - 5.5 mmol/L Chloride 99 (L) 100 - 108 mmol/L  
 CO2 28 21 - 32 mmol/L Anion gap 8 3.0 - 18 mmol/L Glucose 158 (H) 74 - 99 mg/dL BUN 31 (H) 7.0 - 18 MG/DL Creatinine 7.30 (H) 0.6 - 1.3 MG/DL  
 BUN/Creatinine ratio 4 (L) 12 - 20 GFR est AA 10 (L) >60 ml/min/1.73m2 GFR est non-AA 8 (L) >60 ml/min/1.73m2 Calcium 7.8 (L) 8.5 - 10.1 MG/DL Phosphorus 4.0 2.5 - 4.9 MG/DL Albumin 2.2 (L) 3.4 - 5.0 g/dL CBC W/O DIFF Collection Time: 10/18/18  8:10 AM  
Result Value Ref Range WBC 6.7 4.6 - 13.2 K/uL  
 RBC 3.36 (L) 4.70 - 5.50 M/uL HGB 9.0 (L) 13.0 - 16.0 g/dL HCT 28.9 (L) 36.0 - 48.0 % MCV 86.0 74.0 - 97.0 FL  
 MCH 26.8 24.0 - 34.0 PG  
 MCHC 31.1 31.0 - 37.0 g/dL  
 RDW 16.4 (H) 11.6 - 14.5 % PLATELET 100 473 - 581 K/uL MPV 8.5 (L) 9.2 - 11.8 FL  
IRON PROFILE Collection Time: 10/18/18  8:10 AM  
Result Value Ref Range Iron 60 50 - 175 ug/dL TIBC 184 (L) 250 - 450 ug/dL Iron % saturation 33 % FERRITIN Collection Time: 10/18/18  8:10 AM  
Result Value Ref Range Ferritin 325 8 - 388 NG/ML  
RENAL FUNCTION PANEL Collection Time: 10/19/18  6:40 AM  
Result Value Ref Range Sodium 136 136 - 145 mmol/L Potassium 4.6 3.5 - 5.5 mmol/L Chloride 100 100 - 108 mmol/L  
 CO2 28 21 - 32 mmol/L Anion gap 8 3.0 - 18 mmol/L Glucose 162 (H) 74 - 99 mg/dL BUN 32 (H) 7.0 - 18 MG/DL Creatinine 5.72 (H) 0.6 - 1.3 MG/DL  
 BUN/Creatinine ratio 6 (L) 12 - 20 GFR est AA 13 (L) >60 ml/min/1.73m2 GFR est non-AA 11 (L) >60 ml/min/1.73m2 Calcium 7.7 (L) 8.5 - 10.1 MG/DL Phosphorus 3.7 2.5 - 4.9 MG/DL Albumin 2.1 (L) 3.4 - 5.0 g/dL Lab Results Component Value Date/Time Glucose 162 (H) 10/19/2018 06:40 AM  
 Glucose 158 (H) 10/18/2018 08:10 AM  
 Glucose 85 10/17/2018 03:50 AM  
 Glucose 90 10/16/2018 06:10 AM  
 Glucose 95 10/15/2018 07:45 AM  
  
 
Imaging: No results found. Medication List Reviewed: 
Current Facility-Administered Medications Medication Dose Route Frequency  epoetin natanael (EPOGEN;PROCRIT) injection 10,000 Units  10,000 Units IntraVENous Q TUE, THU & SAT  methylPREDNISolone (PF) (SOLU-MEDROL) 1,000 mg in 0.9% sodium chloride 100 mL IVPB  1,000 mg IntraVENous DAILY  buprenorphine-naloxone (SUBOXONE) 4mg-1mg SL film  1 Film SubLINGual BID  polyethylene glycol (MIRALAX) packet 17 g  17 g Oral DAILY PRN  
 oxyCODONE IR (ROXICODONE) tablet 10 mg  10 mg Oral Q4H PRN  
 HYDROcodone-acetaminophen (NORCO) 7.5-325 mg per tablet 1 Tab  1 Tab Oral Q4H PRN  
 lidocaine 4 % patch 1 Patch  1 Patch TransDERmal Q24H  
 sodium chloride (NS) flush 5-10 mL  5-10 mL IntraVENous Q8H  
 sodium chloride (NS) flush 5-10 mL  5-10 mL IntraVENous PRN  
 LORazepam (ATIVAN) tablet 1 mg  1 mg Oral Q1H PRN  Or  
 LORazepam (ATIVAN) injection 1 mg  1 mg IntraVENous Q1H PRN  
  LORazepam (ATIVAN) tablet 2 mg  2 mg Oral Q1H PRN Or  
 LORazepam (ATIVAN) injection 2 mg  2 mg IntraVENous Q1H PRN  
 LORazepam (ATIVAN) injection 3 mg  3 mg IntraVENous Q15MIN PRN  
 0.9% sodium chloride infusion  50 mL/hr IntraVENous DIALYSIS PRN  
 heparin (porcine) 1,000 unit/mL injection 1,000 Units  1,000 Units InterCATHeter DIALYSIS PRN  pantoprazole (PROTONIX) tablet 40 mg  40 mg Oral ACB  hydrALAZINE (APRESOLINE) tablet 50 mg  50 mg Oral TID  calcitRIOL (ROCALTROL) capsule 0.5 mcg  0.5 mcg Oral DAILY  promethazine (PHENERGAN) tablet 25 mg  25 mg Oral Q8H PRN  
 amLODIPine (NORVASC) tablet 10 mg  10 mg Oral DAILY  calcium acetate (PHOSLO) capsule 1,334 mg  2 Cap Oral TID WITH MEALS  influenza vaccine 2018-19 (6 mos+)(PF) (FLUARIX QUAD/FLULAVAL QUAD) injection 0.5 mL  0.5 mL IntraMUSCular PRIOR TO DISCHARGE  isosorbide mononitrate ER (IMDUR) tablet 90 mg  90 mg Oral DAILY  amiodarone (CORDARONE) tablet 400 mg  400 mg Oral DAILY  atorvastatin (LIPITOR) tablet 40 mg  40 mg Oral QHS  fluticasone (FLONASE) 50 mcg/actuation nasal spray 2 Spray  2 Spray Both Nostrils DAILY  tiotropium (SPIRIVA) inhalation capsule 18 mcg  1 Cap Inhalation DAILY  ondansetron (ZOFRAN) injection 4 mg  4 mg IntraVENous Q4H PRN  
 albuterol-ipratropium (DUO-NEB) 2.5 MG-0.5 MG/3 ML  3 mL Nebulization Q4H PRN

## 2018-10-19 NOTE — PROGRESS NOTES
1925: Assumed patient care. [facility] [address] [facility] Report included SBAR, Kardex, and MAR. Patient resting in bed, denies pain, in no signs of pain or distress.

## 2018-10-19 NOTE — PROGRESS NOTES
RENAL PROGRESS NOTE Luis Baker Assessment/Plan: · OLIVIA with nephrotic proteinuria and hematuria. S/p Kidney biopsy on 10/15, I have talked to pathologist at [facility] on 10/17 and today (after electron microscopy was read). Biopsy size was small with only 2 glomeruli on light microscopy, none on immunofluorescence and good em sample. Light microscopy is with crescentic GN and little chronicity. 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 Patient was wondering if it could have been precipitated by him recently starting otezla for psoriasis but I couldn't find any cases in the literature. He has recived solumedrol 1 g daily x3, will start oral prednisone tomorrow. Will arrange plasmapheresis starting tomorrow with total course of 7 treatments- O called [facility]. Will also start rituximab 375 mg/m2 with first infusion tomorrow after plasmapheresis or on Sunday. Risks/benefits d/w patient, rituxin patient information given to the patient. Meanwhile continue dialysis tts- as an op he is set up to continue dialysis mwf at [facility] Dr. Marcus Must to be able to go home beginning next week and continue dialysis/plasmapheresis as op. 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 Will also order screening for Fabry's disease. · HTN, controlled. Continue current regimen. Monitor bp with dialysis, volume removal.  
· N/V. Etio? CT finding noted. EGD results noted- diffuse gastritis. Resolved. · Secondary hyperparathyroidism/hyperphsphotemia. Continue calcitriol and phoslo. · Anemia of renal failure. Continue epogen. Subjective: 
Patient complaints off: Feel better, states has more energy with steroids. Withdrawal symptoms have resolved with getting restarted on suboxan. No SOB/CP. No n/v. Tolerates diet. Ambulates in the room. Patient Active Problem List  
Diagnosis Code  ST elevation (STEMI) myocardial infarction involving left anterior descending coronary artery ([facility]) I21.02  
 Cardiac arrest - ventricular fibrillation  HTN (hypertension) I10  
 Hypertensive emergency I16.1  Hyperglycemia R73.9  Acute on chronic renal failure (HCC) N17.9, N18.9  Leukocytosis D72.829  
 Acute pulmonary edema ([facility]) J81.0  Contrast dye induced nephropathy N14.1, T50.8X5A  
 CKD (chronic kidney disease) N18.9  Arachnoiditis G03.9  Postlaminectomy syndrome, lumbar region M96.1  Psoriasis L40.9  Degeneration of lumbar or lumbosacral intervertebral disc M51.37  
 Encounter for long-term (current) use of high-risk medication Z79.899  Obesity E66.9  
 Pain in joint, lower leg M25.569  Psoriatic arthropathy ([facility] L40.50  Chronic low back pain M54.5, G89.29  Chronic pain syndrome G89.4  Lumbosacral spondylosis without myelopathy M47.817  Enthesopathy of hip M76.899  
 Sacroiliitis ([facility]) M46.1  Coronary artery disease I25.10  
 JESSICA (obstructive sleep apnea) G47.33  
 UTI (lower urinary tract infection) N39.0  Cardiomyopathy ([address] I42.9  AICD (automatic cardioverter/defibrillator) present Z95.810  
 OLIVIA (acute kidney injury) ([facility] N17.9  Renal failure N19  
 Chest pain R07.9  
 SOB (shortness of breath) R06.02  Bronchitis J40  Acute renal failure (ARF) ([facility]) N17.9  ARF (acute renal failure) ([facility]) N17.9  Severe sepsis ([facility]) A41.9, R65.20  Epigastric abdominal pain R10.13  
 Anxiety F41.9  Ventricular tachycardia ([facility]) I47.2  Ventricular fibrillation (HCC) I49.01  
 ICD (implantable cardioverter-defibrillator) discharge Z45.02  
 CAD (coronary artery disease) I25.10  Obesity, morbid ([address] E66.01  
 Bacteria in urine K23.75  
 Metabolic acidosis K58.0 Current Facility-Administered Medications Medication Dose Route Frequency Provider Last Rate Last Dose  diphenhydrAMINE (BENADRYL) capsule 25 mg  25 mg Oral Q6H PRN Tenisha Bear H, DO   25 mg at 10/19/18 1232  epoetin natanael (EPOGEN;PROCRIT) injection 10,000 Units  10,000 Units IntraVENous Q TUE, THU & SAT Allyson Lu MD   10,000 Units at 10/18/18 1750  methylPREDNISolone (PF) (SOLU-MEDROL) 1,000 mg in 0.9% sodium chloride 100 mL IVPB  1,000 mg IntraVENous DAILY Ronny Douglass  mL/hr at 10/19/18 0930 1,000 mg at 10/19/18 0930  buprenorphine-naloxone (SUBOXONE) 4mg-1mg SL film  1 Film SubLINGual BID [place] , DO   1 Film at 10/19/18 4713  polyethylene glycol (MIRALAX) packet 17 g  17 g Oral DAILY PRN [place] , DO   17 g at 10/16/18 1529  
 oxyCODONE IR (ROXICODONE) tablet 10 mg  10 mg Oral Q4H PRN Tenisha Bear H, DO   10 mg at 10/16/18 1529  
 HYDROcodone-acetaminophen (NORCO) 7.5-325 mg per tablet 1 Tab  1 Tab Oral Q4H PRN Argenis Byrd MD      
 lidocaine 4 % patch 1 Patch  1 Patch TransDERmal Q24H Aiden Lai MD   1 Patch at 10/18/18 2100  
 sodium chloride (NS) flush 5-10 mL  5-10 mL IntraVENous Q8H Esthela Babcock MD   10 mL at 10/19/18 1640  
 sodium chloride (NS) flush 5-10 mL  5-10 mL IntraVENous PRN Esthela Babcock MD      
 LORazepam (ATIVAN) tablet 1 mg  1 mg Oral Q1H PRN Esthela Babcock MD   1 mg at 10/10/18 1826 Or  LORazepam (ATIVAN) injection 1 mg  1 mg IntraVENous Q1H PRN Esthela Babcock MD   1 mg at 10/10/18 4324  LORazepam (ATIVAN) tablet 2 mg  2 mg Oral Q1H PRN Esthela Babcock MD   2 mg at 10/11/18 1020  Or  
  LORazepam (ATIVAN) injection 2 mg  2 mg IntraVENous Q1H PRN Morgan Painter MD   2 mg at 10/11/18 7852  LORazepam (ATIVAN) injection 3 mg  3 mg IntraVENous Q15MIN PRN Morgan Painter MD      
 0.9% sodium chloride infusion  50 mL/hr IntraVENous DIALYSIS PRN Felton Douglass MD      
 heparin (porcine) 1,000 unit/mL injection 1,000 Units  1,000 Units InterCATHeter DIALYSIS PRN Felton Douglass MD      
 pantoprazole (PROTONIX) tablet 40 mg  40 mg Oral ACB Drake Werner MD   40 mg at 10/19/18 3922  hydrALAZINE (APRESOLINE) tablet 50 mg  50 mg Oral TID Chavez Kovacs MD   50 mg at 10/19/18 1640  calcitRIOL (ROCALTROL) capsule 0.5 mcg  0.5 mcg Oral DAILY Chavez Kovacs MD   0.5 mcg at 10/19/18 0900  promethazine (PHENERGAN) tablet 25 mg  25 mg Oral Q8H PRN Drake Werner MD   25 mg at 10/10/18 1441  
 amLODIPine (NORVASC) tablet 10 mg  10 mg Oral DAILY Cyndee Moreno MD   10 mg at 10/19/18 5459  calcium acetate (PHOSLO) capsule 1,334 mg  2 Cap Oral TID WITH MEALS Felton Douglass MD   1,334 mg at 10/19/18 1640  
 influenza vaccine 2018-19 (6 mos+)(PF) (FLUARIX QUAD/FLULAVAL QUAD) injection 0.5 mL  0.5 mL IntraMUSCular PRIOR TO DISCHARGE Drake Werner MD      
 isosorbide mononitrate ER (IMDUR) tablet 90 mg  90 mg Oral DAILY Drake Werner MD   90 mg at 10/19/18 2932  
 amiodarone (CORDARONE) tablet 400 mg  400 mg Oral DAILY Drake Werner MD   400 mg at 10/19/18 0923  
 atorvastatin (LIPITOR) tablet 40 mg  40 mg Oral QHS Drake Werner MD   40 mg at 10/18/18 2100  fluticasone (FLONASE) 50 mcg/actuation nasal spray 2 Spray  2 Spray Both Nostrils DAILY Drake Werner MD   2 [place] at 10/19/18 4221  tiotropium (SPIRIVA) inhalation capsule 18 mcg  1 Cap Inhalation DAILY Drake Werner MD   18 mcg at 10/19/18 6364  
 ondansetron (ZOFRAN) injection 4 mg  4 mg IntraVENous Q4H PRN Drake Werner MD   4 mg at 10/13/18 9356  albuterol-ipratropium (DUO-NEB) 2.5 MG-0.5 MG/3 ML  3 mL Nebulization Q4H PRN Cassie James MD   3 mL at 10/18/18 0838 Objective Vitals:  
 10/19/18 4420 10/19/18 4124 10/19/18 1313 10/19/18 1638 BP: 118/69 123/73 125/73 139/70 Pulse: 85 82 80 83 Resp: 16 17 16 17 Temp: 97.7 °F (36.5 °C) 97.8 °F (36.6 °C) 97.7 °F (36.5 °C) 97.8 °F (36.6 °C) TempSrc:      
SpO2: 90% 92% 92% 94% Weight:      
Height:      
 
 
 
Intake/Output Summary (Last 24 hours) at 10/19/2018 1701 Last data filed at 10/19/2018 1026 Gross per 24 hour Intake 240 ml Output  Net 240 ml Admission weight: Weight: 118.7 kg (261 lb 11 oz) (10/04/18 1146) Last Weight Metrics: 
Weight Loss Metrics 10/5/2018 2/14/2018 2/8/2018 1/11/2018 8/22/2017 8/20/2017 3/2/2017 Today's Wt 261 lb 250 lb 250 lb 286 lb 280 lb 258 lb 258 lb BMI 37.45 kg/m2 35.87 kg/m2 35.87 kg/m2 41.04 kg/m2 40.18 kg/m2 37.02 kg/m2 37.02 kg/m2 Physical Assessment:  
 
General: NAD, alert and oriented. Neck: No jvd. Rt ij tdc. LUNGS: Clear to Auscultation, No rales, rhonchi or wheezes. CVS EXM: S1, S2  RRR, no murmurs/gallops/rubs. Abdomen: nt/nd. Lower Extremities: 1+edema. Skin: extensive scaly rash ue/le. Lab CBC w/Diff Recent Labs 10/18/18 
0810 WBC 6.7  
RBC 3.36* HGB 9.0*  
HCT 28.9*  
 Chemistry Recent Labs 10/19/18 
5018 10/18/18 
0810 10/17/18 
0350 * 158* 85  135* 139  
K 4.6 4.5 4.1  99* 101 CO2 28 28 30 BUN 32* 31* 21* CREA 5.72* 7.30* 5.64* CA 7.7* 7.8* 7.6* AGAP 8 8 8 BUCR 6* 4* 4* ALB 2.1* 2.2* 1.9*  
PHOS 3.7 4.0 3.7 Lab Results Component Value Date/Time Iron 60 10/18/2018 08:10 AM  
 TIBC 184 (L) 10/18/2018 08:10 AM  
 Iron % saturation 33 10/18/2018 08:10 AM  
 Ferritin 325 10/18/2018 08:10 AM  
  
Lab Results Component Value Date/Time  Calcium 7.7 (L) 10/19/2018 06:40 AM  
 Phosphorus 3.7 10/19/2018 06:40 AM  
  
 Bertin Billings M.D. [facility] Phone [phone] Pager [phone]

## 2018-10-19 NOTE — PROGRESS NOTES
Problem: Falls - Risk of 
Goal: *Absence of Falls Document Dorrene Evens Fall Risk and appropriate interventions in the flowsheet. Outcome: Progressing Towards Goal 
Fall Risk Interventions: 
Mobility Interventions: Patient to call before getting OOB Medication Interventions: Evaluate medications/consider consulting pharmacy Elimination Interventions: Call light in reach History of Falls Interventions: Evaluate medications/consider consulting pharmacy Problem: Pressure Injury - Risk of 
Goal: *Prevention of pressure injury Document Pranav Scale and appropriate interventions in the flowsheet. Outcome: Progressing Towards Goal 
Pressure Injury Interventions: Activity Interventions: Pressure redistribution bed/mattress(bed type) Mobility Interventions: Pressure redistribution bed/mattress (bed type) Nutrition Interventions: Document food/fluid/supplement intake Friction and Shear Interventions: HOB 30 degrees or less

## 2018-10-19 NOTE — PROGRESS NOTES
Nutrition follow up/Plan of care RECOMMENDATIONS:  
 
1. Renal diet 2. Monitor weight, labs and PO intake 3. RD to follow GOALS:  
 
1. Ongoing: PO intake meets >75% of protein/calorie needs by 10/24  
2. Ongoing: Weight Maintenance (+/- 1-2 lb) by 10/26 ASSESSMENT: 
 
Weight status is classified as obese per BMI of 37.5. PO intake is fair per vitals. Labs noted. Pt w/ hypocalcemia,  hypoalbuminemia and elevated BUN/Cr; GFR (11). Nutrition recommendations listed. RD to follow. Nutrition Risk:  [] High  [x] Moderate []  Low SUBJECTIVE/OBJECTIVE: 
 (10/11): LOS. Admitted with OLIVIA. S/P TDC placement on 10/8 to start HD. S/P EGD on 10/9. Plan for kidney biopsy on 10/15. Patient reports having nausea/vomiting due to withdrawal as he hasn't been able to take medication (Suboxone) while in hospital as he does at home. Observed 50% intake of sandwich for dinner meal. Patient reports at home patient was eating well and not having any nausea/vomiting. Reports weight has been stable at 265 lb. Has food allergy to tomatoes. States having a little trouble with chewing but food has been soft enough for him to tolerate. Will monitor. (10/16): S/P renal biopsy on 10/15. Patient off unit receiving HD treatment x 2 visits. 25-50% intake of meals per vitals. Will monitor. (10/19): PO intake is still fair per vitals. Pt seen in room. Noted to have junk food (candy, cup cakes etc) and regular soda in room while on renal diet. Explained some parameters of the renal diet and why he was placed on this diet while receiving HD. Suggested that he comply to the diet prescribed to ensure best possible outcome. Pt has had diet education for a low Na, low fat, DM diet multiple times. Reports his appetite has been fair since in the hospital. Denies N/V/ABD pain. Stated at home he usually consumes only 2 meals per day. Will monitor. Information Obtained from:  
 [x] Chart Review [x] Patient 
 [] Family/Caregiver [] Nurse/Physician 
 [] Interdisciplinary Meeting/Rounds Diet: Renal diet Medications: [x] Reviewed (Norvasc, Lipitor, Calcitriol, Phoslo, Protonix) Allergies: [x] Reviewed (Tomatoes) Encounter Diagnoses ICD-10-CM ICD-9-CM 1. OLIVIA (acute kidney injury) ([facility] [address] N17.9 584.9 2. Abdominal pain, generalized R10.84 789.07  
3. Nausea and vomiting, intractability of vomiting not specified, unspecified vomiting type R11.2 787.01 Past Medical History:  
Diagnosis Date  AICD MEDTRONIC (single chamber)  Apical mural thrombus ECHO 3/14  Cardiac arrest ([facility] [address] 10/15 VT / VF ( ~20 ICD shocks ). No obstructive CAD on cath  Chronic back pain  Chronic kidney disease, stage III (moderate) (HCC)  Coronary artery disease LAD - 3.0 x 18mm VISION 7/13  Degenerative spine disease  Dyslipidemia  Hypertension  Ischemic cardiomyopathy EF 30%(10/15) , 40-45% (03/15),  EF 30-35% (3/14)  Ischemic VF   
 07/13 in setting of MI, DC cardioversion x4  Narcotic dependence ([facility] [address]  Noncompliance  Obesity  Psoriasis  S/P cardiac cath 10/15  Secondary hyperparathyroidism (of renal origin)  Tobacco abuse Labs:   
Lab Results Component Value Date/Time Sodium 136 10/19/2018 06:40 AM  
 Potassium 4.6 10/19/2018 06:40 AM  
 Chloride 100 10/19/2018 06:40 AM  
 CO2 28 10/19/2018 06:40 AM  
 Anion gap 8 10/19/2018 06:40 AM  
 Glucose 162 (H) 10/19/2018 06:40 AM  
 BUN 32 (H) 10/19/2018 06:40 AM  
 Creatinine 5.72 (H) 10/19/2018 06:40 AM  
 Calcium 7.7 (L) 10/19/2018 06:40 AM  
 Magnesium 2.0 12/27/2015 04:35 AM  
 Phosphorus 3.7 10/19/2018 06:40 AM  
 Albumin 2.1 (L) 10/19/2018 06:40 AM  
 
Anthropometrics: BMI (calculated): 37.5 Last 3 Recorded Weights in this Encounter 10/04/18 5670 10/05/18 1340 10/05/18 1404 Weight: 118.7 kg (261 lb 11 oz) 118.4 kg (261 lb) 118.4 kg (261 lb) Ht Readings from Last 1 Encounters:  
10/05/18 5' 10\" (1.778 m) Patient Vitals for the past 100 hrs: 
 % Diet Eaten 10/17/18 1829 50 % 10/17/18 1314 0 % 10/17/18 0800 50 % 10/16/18 0800 0 % [x] New weight n/a on record. Estimated Nutrition Needs: [x] MSJ Calories: 2370 Kcal Based on:   [x] Actual BW   
Protein:   95 g Based on:   [x] Actual BW Fluid:       7675-3301 ml Based on:   [x] Actual BW  
 
 [x] No Cultural, Protestant or ethnic dietary need identified. [] Cultural, Protestant and ethnic food preferences identified and addressed Wt Status:  [] Normal (18.6 - 24.9) [] Underweight (<18.5) [] Overweight (25 - 29.9) [] Mild Obesity (30 - 34.9)  [x] Moderate Obesity (35 - 39.9) [] Morbid Obesity (40+) Nutrition Problems Identified:  
[x] Fair PO intake [x] Food Allergies (Tomatoes) [x] Difficulty chewing/swallowing/poor dentition ( declined change in diet texture) 
[] Constipation/Diarrhea  
[] Nausea/Vomiting  
[] None 
[] Other:  
 
Plan:  
[x] Therapeutic Diet 
[]  Obtained/adjusted food preferences/tolerances and/or snacks options  
[]  Supplements added  
[] Occupational therapy following for feeding techniques []  HS snack added  
[]  Modify diet texture  
[x]  Modify diet for food allergies []  Assist with menu selection  
[x]  Monitor PO intake on meal rounds  
[x]  Continue inpatient monitoring and intervention  
[]  Participated in discharge planning/Interdisciplinary rounds/Team meetings  
[]  Other:  
 
Education Needs: 
 [] Not appropriate for teaching at this time due to: 
 [x] Identified and addressed Nutrition Monitoring and Evaluation: 
[x] Continue ongoing monitoring and intervention 
[] Melina Butts

## 2018-10-20 LAB
ALBUMIN SERPL-MCNC: 2.4 G/DL (ref 3.4–5)
ANION GAP SERPL CALC-SCNC: 11 MMOL/L (ref 3–18)
BUN SERPL-MCNC: 49 MG/DL (ref 7–18)
BUN/CREAT SERPL: 7 (ref 12–20)
CA-I SERPL-SCNC: 1.06 MMOL/L (ref 1.12–1.32)
CALCIUM SERPL-MCNC: 7.7 MG/DL (ref 8.5–10.1)
CHLORIDE SERPL-SCNC: 98 MMOL/L (ref 100–108)
CO2 SERPL-SCNC: 25 MMOL/L (ref 21–32)
CREAT SERPL-MCNC: 6.84 MG/DL (ref 0.6–1.3)
ERYTHROCYTE [DISTWIDTH] IN BLOOD BY AUTOMATED COUNT: 16.9 % (ref 11.6–14.5)
FIBRINOGEN PPP-MCNC: 184 MG/DL (ref 210–451)
FIBRINOGEN PPP-MCNC: 277 MG/DL (ref 210–451)
GLUCOSE SERPL-MCNC: 152 MG/DL (ref 74–99)
HCT VFR BLD AUTO: 25.9 % (ref 36–48)
HGB BLD-MCNC: 7.9 G/DL (ref 13–16)
MCH RBC QN AUTO: 26.3 PG (ref 24–34)
MCHC RBC AUTO-ENTMCNC: 30.5 G/DL (ref 31–37)
MCV RBC AUTO: 86.3 FL (ref 74–97)
PHOSPHATE SERPL-MCNC: 4.8 MG/DL (ref 2.5–4.9)
PLATELET # BLD AUTO: 295 K/UL (ref 135–420)
PMV BLD AUTO: 8.8 FL (ref 9.2–11.8)
POTASSIUM SERPL-SCNC: 4.6 MMOL/L (ref 3.5–5.5)
RBC # BLD AUTO: 3 M/UL (ref 4.7–5.5)
SODIUM SERPL-SCNC: 134 MMOL/L (ref 136–145)
WBC # BLD AUTO: 11.6 K/UL (ref 4.6–13.2)

## 2018-10-20 PROCEDURE — 74011000250 HC RX REV CODE- 250: Performed by: HOSPITALIST

## 2018-10-20 PROCEDURE — 74011250636 HC RX REV CODE- 250/636: Performed by: HOSPITALIST

## 2018-10-20 PROCEDURE — 74011000258 HC RX REV CODE- 258: Performed by: HOSPITALIST

## 2018-10-20 PROCEDURE — P9059 PLASMA, FRZ BETWEEN 8-24HOUR: HCPCS | Performed by: HOSPITALIST

## 2018-10-20 PROCEDURE — 74011250637 HC RX REV CODE- 250/637: Performed by: INTERNAL MEDICINE

## 2018-10-20 PROCEDURE — 36415 COLL VENOUS BLD VENIPUNCTURE: CPT | Performed by: INTERNAL MEDICINE

## 2018-10-20 PROCEDURE — 74011250637 HC RX REV CODE- 250/637: Performed by: HOSPITALIST

## 2018-10-20 PROCEDURE — 85384 FIBRINOGEN ACTIVITY: CPT | Performed by: HOSPITALIST

## 2018-10-20 PROCEDURE — 65660000000 HC RM CCU STEPDOWN

## 2018-10-20 PROCEDURE — 80069 RENAL FUNCTION PANEL: CPT | Performed by: INTERNAL MEDICINE

## 2018-10-20 PROCEDURE — 90935 HEMODIALYSIS ONE EVALUATION: CPT

## 2018-10-20 PROCEDURE — 85027 COMPLETE CBC AUTOMATED: CPT | Performed by: INTERNAL MEDICINE

## 2018-10-20 PROCEDURE — 82330 ASSAY OF CALCIUM: CPT | Performed by: INTERNAL MEDICINE

## 2018-10-20 PROCEDURE — 85384 FIBRINOGEN ACTIVITY: CPT | Performed by: INTERNAL MEDICINE

## 2018-10-20 RX ORDER — DOCUSATE SODIUM 100 MG/1
CAPSULE, LIQUID FILLED ORAL
Status: DISPENSED
Start: 2018-10-20 | End: 2018-10-20

## 2018-10-20 RX ORDER — POLYETHYLENE GLYCOL 3350 17 G/17G
17 POWDER, FOR SOLUTION ORAL 2 TIMES DAILY
Status: DISCONTINUED | OUTPATIENT
Start: 2018-10-20 | End: 2018-10-21

## 2018-10-20 RX ORDER — DEXTROMETHORPHAN HYDROBROMIDE, GUAIFENESIN 5; 100 MG/5ML; MG/5ML
650 LIQUID ORAL ONCE
Status: COMPLETED | OUTPATIENT
Start: 2018-10-20 | End: 2018-10-20

## 2018-10-20 RX ORDER — BISACODYL 5 MG
10 TABLET, DELAYED RELEASE (ENTERIC COATED) ORAL
Status: COMPLETED | OUTPATIENT
Start: 2018-10-20 | End: 2018-10-20

## 2018-10-20 RX ORDER — DIPHENHYDRAMINE HCL 25 MG
50 CAPSULE ORAL
Status: COMPLETED | OUTPATIENT
Start: 2018-10-20 | End: 2018-10-20

## 2018-10-20 RX ORDER — DOCUSATE SODIUM 100 MG/1
100 CAPSULE, LIQUID FILLED ORAL 2 TIMES DAILY
Status: DISCONTINUED | OUTPATIENT
Start: 2018-10-20 | End: 2018-10-24 | Stop reason: HOSPADM

## 2018-10-20 RX ADMIN — HYDRALAZINE HYDROCHLORIDE 50 MG: 50 TABLET, FILM COATED ORAL at 14:41

## 2018-10-20 RX ADMIN — ISOSORBIDE MONONITRATE 90 MG: 30 TABLET, EXTENDED RELEASE ORAL at 14:36

## 2018-10-20 RX ADMIN — BUPRENORPHINE HYDROCHLORIDE, NALOXONE HYDROCHLORIDE 1 FILM: 4; 1 FILM, SOLUBLE BUCCAL; SUBLINGUAL at 22:58

## 2018-10-20 RX ADMIN — DIPHENHYDRAMINE HYDROCHLORIDE 50 MG: 25 CAPSULE ORAL at 14:52

## 2018-10-20 RX ADMIN — HYDRALAZINE HYDROCHLORIDE 50 MG: 50 TABLET, FILM COATED ORAL at 23:00

## 2018-10-20 RX ADMIN — POLYETHYLENE GLYCOL 3350 17 G: 17 POWDER, FOR SOLUTION ORAL at 03:44

## 2018-10-20 RX ADMIN — BUPRENORPHINE HYDROCHLORIDE, NALOXONE HYDROCHLORIDE 1 FILM: 4; 1 FILM, SOLUBLE BUCCAL; SUBLINGUAL at 14:36

## 2018-10-20 RX ADMIN — CALCITRIOL 0.5 MCG: 0.25 CAPSULE ORAL at 09:00

## 2018-10-20 RX ADMIN — AMLODIPINE BESYLATE 10 MG: 5 TABLET ORAL at 14:41

## 2018-10-20 RX ADMIN — CALCIUM ACETATE 1334 MG: 667 CAPSULE ORAL at 18:19

## 2018-10-20 RX ADMIN — AMIODARONE HYDROCHLORIDE 400 MG: 200 TABLET ORAL at 14:39

## 2018-10-20 RX ADMIN — CALCIUM ACETATE 1334 MG: 667 CAPSULE ORAL at 14:40

## 2018-10-20 RX ADMIN — ATORVASTATIN CALCIUM 40 MG: 40 TABLET, FILM COATED ORAL at 23:00

## 2018-10-20 RX ADMIN — BISACODYL 10 MG: 5 TABLET, COATED ORAL at 14:37

## 2018-10-20 RX ADMIN — DOCUSATE SODIUM 100 MG: 100 CAPSULE, LIQUID FILLED ORAL at 14:42

## 2018-10-20 RX ADMIN — HYDRALAZINE HYDROCHLORIDE 50 MG: 50 TABLET, FILM COATED ORAL at 18:19

## 2018-10-20 RX ADMIN — ACETAMINOPHEN 650 MG: 650 TABLET, FILM COATED, EXTENDED RELEASE ORAL at 14:45

## 2018-10-20 RX ADMIN — Medication 10 ML: at 14:00

## 2018-10-20 RX ADMIN — CALCIUM GLUCONATE 2 G: 94 INJECTION, SOLUTION INTRAVENOUS at 15:31

## 2018-10-20 RX ADMIN — DOCUSATE SODIUM 100 MG: 100 CAPSULE, LIQUID FILLED ORAL at 01:32

## 2018-10-20 NOTE — PROGRESS NOTES
RENAL DAILY PROGRESS NOTE Assessment: 1. 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 CKDIII-DN 3. HTN 
4.Obesity 5. Anemia of CKD , Iron stores OK 6.constipation X1 wk Plan:  
· HD TTS-HD today · Plex daily X7--first Tx today-arranged · Rituximab first dose after plex today · DINESH on dialysis days · BT to keep Hb>7 · Miralax bid · Hog enema if no BM by am  
 
 
 
Sherie Cordova MD 
10/20/2018 
1:06 PM 
Subjective:  
 
Constipated Breathing Ok Objective:  
 
Patient Vitals for the past 24 hrs: 
 Temp Pulse Resp BP SpO2  
10/20/18 1245  66 18 158/87   
10/20/18 1230  70 18 143/76   
10/20/18 1215  86 18 174/74   
10/20/18 1200  83 18 160/90   
10/20/18 1145  65 18 134/69   
10/20/18 1130  86 18 134/67   
10/20/18 1115  86 18 121/60   
10/20/18 1100  66 18 127/72   
10/20/18 1045  66 18 115/72   
10/20/18 1030  66 18 114/71   
10/20/18 1015  67 18 118/65   
10/20/18 1000  68 18 116/74   
10/20/18 0945  70 18 110/63   
10/20/18 0915  90 18 110/70   
10/20/18 0900  97 18 124/59   
10/20/18 0854  72 18 121/58   
10/20/18 0849 98.3 °F (36.8 °C) 72 18 122/65 94 % 10/20/18 0845 98.3 °F (36.8 °C) 72 18 120/66 94 % 10/20/18 0536 98.6 °F (37 °C) 81 17 147/77 92 % 10/20/18 0115  86 18    
10/19/18 2126 98 °F (36.7 °C) 71 17 131/78 92 % 10/19/18 1638 97.8 °F (36.6 °C) 83 17 139/70 94 % 10/19/18 1313 97.7 °F (36.5 °C) 80 16 125/73 92 % 10/18 1901 - 10/20 0700 In: 720 [P.O.:720] Out: - Intake/Output Summary (Last 24 hours) at 10/20/2018 1306 Last data filed at 10/20/2018 6597 Gross per 24 hour Intake 480 ml Output  Net 480 ml Physical Exam: 
NAD HEENT: No JVD Cardiovascular: S1/S2 regular HB 
C/L: CTA bhanu Abdomen: soft NT/ND Ext: no Edema Data Review:  
 
LABS:  
Hematology:  
Recent Labs 10/20/18 
5728 10/18/18 
5854 WBC 11.6 6.7 HGB 7.9* 9.0*  
HCT 25.9* 28.9* Chemistry: Recent Labs 10/20/18 
1035 10/19/18 
6412 10/18/18 
3159 BUN 49* 32* 31* CREA 6.84* 5.72* 7.30* CA 7.7* 7.7* 7.8* ALB 2.4* 2.1* 2.2*  
K 4.6 4.6 4.5 * 136 135* CL 98* 100 99* CO2 25 28 28 PHOS 4.8 3.7 4.0  
* 162* 158*

## 2018-10-20 NOTE — PROGRESS NOTES
Problem: Pressure Injury - Risk of 
Goal: *Prevention of pressure injury Document Pranav Scale and appropriate interventions in the flowsheet. Outcome: Progressing Towards Goal 
Pressure Injury Interventions: Activity Interventions: Pressure redistribution bed/mattress(bed type) Mobility Interventions: Pressure redistribution bed/mattress (bed type) Nutrition Interventions: Document food/fluid/supplement intake Friction and Shear Interventions: HOB 30 degrees or less

## 2018-10-20 NOTE — PROGRESS NOTES
Called by nurse over concern regarding abdominal pain. Patient has some mild abdominal distention. He has bowel sounds, although they are quiet. He has passed flatus. He has constipation, likely contributed to by pain medication. Will add stool softener. No evidence for bowel obstruction currently. Follow

## 2018-10-20 NOTE — PROGRESS NOTES
Problem: Falls - Risk of 
Goal: *Absence of Falls Document Katevenessa Vasquez Fall Risk and appropriate interventions in the flowsheet. Outcome: Progressing Towards Goal 
Fall Risk Interventions: 
Mobility Interventions: Patient to call before getting OOB Medication Interventions: Evaluate medications/consider consulting pharmacy Elimination Interventions: Call light in reach History of Falls Interventions: Evaluate medications/consider consulting pharmacy Problem: Pressure Injury - Risk of 
Goal: *Prevention of pressure injury Document Pranav Scale and appropriate interventions in the flowsheet. Outcome: Progressing Towards Goal 
Pressure Injury Interventions: Activity Interventions: Pressure redistribution bed/mattress(bed type) Mobility Interventions: Pressure redistribution bed/mattress (bed type) Nutrition Interventions: Document food/fluid/supplement intake Friction and Shear Interventions: HOB 30 degrees or less

## 2018-10-20 NOTE — PROGRESS NOTES
[address] [place], [place] [address]ty [facility] Hospitalist Division Inpatient Daily Progress Note Daily progress Note Patient: Jack Caro MRN: [id number]  CSN: [id number] YOB: 1970  Age: 46 y.o. Sex: male DOA: 10/3/2018 LOS:  LOS: 17 days Chief Complaint:  Acute on chronic renal failure Interval History: 46 y.o.  male with h/o AICD,cardiac arrest,VT,ischemic cardiomyopathy EF 30%,obesity,cad,presented to ED because of weakness,nausea,vomting,abdominal pain,hematuria. Patient says that he started feeling sick 2 weeks ago when he started feeling weak. Then he started vomiting. Could not tolerate even water. In recent days ,he has had abdominal pain which he attributed to the ongoing vomiting. Patient also reported that he has seen his urine turning red for a while. Had to see his doctor in two weeks but felt very weak and decided to come to the ED. In ED,lab showed creatinine 11.0 compared to 2.12 on 1/11/2018 and 8.01 on 9/28/18. UA showed large blood,bacteria+. CT abd/pelvis w/o hydronephrosis or urolithiasis. Patient admitted for OLIVIA,Hematuria,UTI. Started on iv fluid. Nephrolohy consulted from ER. Since patient has GI complaint and renal involvement,nephrology has indicated that Henoch-Schonlein syndrome needs to be considered. Pt is on iv fluid. IgA elevated 648. Other serology for vasculitis including nathaniel,GBM ab,ANCA,C3,C4,heptitis profile not abnormal. Nephrology has indicated that if creatinine continue to rise over the weekend,patient will need dialysis. Today creat up to 12.00. Patient may also need kidney biopsy. Cardiology consulted significant cardiac history anticipating that he will have dialysis. Cardiology has determined that his cardiac status is stable. GI saw patient on 10/6 for ongoing symptoms. TDC catheter placed on 10/8. EGD on 10/9 showed chronic gastritis. HD on 10/9 and 10/10, next HD session on 10/12.  S/p kidney biopsy on 10/15- per Nephrology results- small specimen, only two glomeruli for light microscopy, none for immunofluorescence. 2 glomeruli with severe crescentic GN. 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 Started Cytoxan per Nephrology Assessment/Plan:  
 
Patient Active Problem List  
Diagnosis Code  ST elevation (STEMI) myocardial infarction involving left anterior descending coronary artery ([facility]) I21.02  
 Cardiac arrest - ventricular fibrillation  HTN (hypertension) I10  
 Hypertensive emergency I16.1  Hyperglycemia R73.9  Acute on chronic renal failure (HCC) N17.9, N18.9  Leukocytosis D72.829  
 Acute pulmonary edema ([facility]) J81.0  Contrast dye induced nephropathy N14.1, T50.8X5A  
 CKD (chronic kidney disease) N18.9  Arachnoiditis G03.9  Postlaminectomy syndrome, lumbar region M96.1  Psoriasis L40.9  Degeneration of lumbar or lumbosacral intervertebral disc M51.37  
 Encounter for long-term (current) use of high-risk medication Z79.899  Obesity E66.9  
 Pain in joint, lower leg M25.569  Psoriatic arthropathy ([facility] [address] L40.50  Chronic low back pain M54.5, G89.29  Chronic pain syndrome G89.4  Lumbosacral spondylosis without myelopathy M47.817  Enthesopathy of hip M76.899  
 Sacroiliitis ([facility]) M46.1  Coronary artery disease I25.10  
 JESSICA (obstructive sleep apnea) G47.33  
 UTI (lower urinary tract infection) N39.0  Cardiomyopathy ([address] I42.9  AICD (automatic cardioverter/defibrillator) present Z95.810  
 OLIVIA (acute kidney injury) ([address] N17.9  Renal failure N19  
 Chest pain R07.9  
 SOB (shortness of breath) R06.02  Bronchitis J40  Acute renal failure (ARF) ([facility]) N17.9  ARF (acute renal failure) ([facility]) N17.9  Severe sepsis ([facility]) A41.9, R65.20  Epigastric abdominal pain R10.13  
 Anxiety F41.9  Ventricular tachycardia ([facility]) I47.2  Ventricular fibrillation ([facility]) I49.01  
  ICD (implantable cardioverter-defibrillator) discharge Z45.02  
 CAD (coronary artery disease) I25.10  Obesity, morbid ([address] E66.01  
 Bacteria in urine C33.67  
 Metabolic acidosis O97.4 A/P: 
Acute on chronic renal failure/Metabolic acidosis  
- nephrology following, appreciate assistance 
- creatinine 11.00 POA 
 -Etiology:volume depletion -?acute GN as pt also hematuria. -Serology:IgA elevated. Other normal such as MANUELITO,ANCA,C3,C4,Hep profile. -s/p [facility] 10/8   
- hemodialysis - s/p core biopsy lower pole right kidney on 10/15 - Per Nephrology small specimen, only two glomeruli for light microscopy, none for immunofluorescence. 2 glomeruli with severe crescentic GN. 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  
   
Bacteria in urine/UTI/Hematuria 
 -Ceftriaxone iv - completed 
 -Blood cx ng/Ucx negative 
 -Vasculitis serology non-revealing 
  
Secondary hyperparathyroidism  
 -On calcitriol 
   
Nausea/vomiting/abdominal pain 
 -CT reviewed,no acute process. -GI, Dr Rina Mac saw patient on 10/6 - EGD 10/9, chronic gastritis 
   
HTN 
 -Hydralazine prn for sbp>160 
   
JESSICA (obstructive sleep apnea) 
 -On oxygen 
   
Cardiomyopathy 
- EF 25-30% - ICD (implantable cardioverter-defibrillator) discharge CAD (coronary artery disease) 
 -Continue routine meds 
 -Telemetry monitoring 
 -Cardiology saw patient on 10/6 and determined that patient's cardiac status is stable and advised to continue his home meds DVT Prophylaxis - SCD's BLE Disposition 
- no recommendations from PT/OT, plan is home 
- need to determine if pt will continue with HD at discharge pending renal biopsy, if so would need outpatient chair secured- defer to nephrology [facility] [facility] Pager:  [phone] Office:  [phone] Subjective: No complaints Objective: Visit Vitals /69 Pulse 65 Temp 98.3 °F (36.8 °C) Resp 18 Ht 5' 10\" (1.778 m) Wt 118.4 kg (261 lb) SpO2 94% BMI 37.45 kg/m² Physical Exam: 
General appearance: alert, cooperative, no distress, appears stated age, [facility] cath right Lungs: clear to auscultation bilaterally Heart: regular rate and rhythm, S1, S2 normal, no murmur, click, rub or gallop Abdomen: soft, non tender, non distended. Normoactive bowel sounds. Extremities: extremities normal, atraumatic, no cyanosis or edema Skin: flaky, dry Neurologic: Grossly normal 
PSY: Mood and affect normal, appropriately behaved Intake and Output: 
Current Shift:  No intake/output data recorded. Last three shifts:  10/18 1901 - 10/20 0700 In: 720 [P.O.:720] Out: - Recent Results (from the past 24 hour(s)) TYPE & SCREEN Collection Time: 10/19/18  7:46 PM  
Result Value Ref Range Crossmatch Expiration 10/22/2018 ABO/Rh(D) A NEGATIVE Antibody screen NEG   
FFP, ALLOCATE Collection Time: 10/19/18  9:00 PM  
Result Value Ref Range Unit number [id number] Blood component type FP 24h, Thaw Unit division 00 Status of unit ALLOCATED Unit number [id number] Blood component type FP 24h, Thaw Unit division 00 Status of unit ALLOCATED Unit number [id number] Blood component type FP 24h, Thaw Unit division 00 Status of unit ALLOCATED Unit number [id number] Blood component type FP 24h, Thaw Unit division 00 Status of unit ALLOCATED Unit number [id number] Blood component type FP 24h, Thaw Unit division 00 Status of unit ALLOCATED Unit number [id number] Blood component type FP 24h, Thaw Unit division 00 Status of unit ALLOCATED RENAL FUNCTION PANEL Collection Time: 10/20/18  4:28 AM  
Result Value Ref Range Sodium 134 (L) 136 - 145 mmol/L  Potassium 4.6 3.5 - 5.5 mmol/L  
 Chloride 98 (L) 100 - 108 mmol/L  
 CO2 25 21 - 32 mmol/L Anion gap 11 3.0 - 18 mmol/L Glucose 152 (H) 74 - 99 mg/dL BUN 49 (H) 7.0 - 18 MG/DL Creatinine 6.84 (H) 0.6 - 1.3 MG/DL  
 BUN/Creatinine ratio 7 (L) 12 - 20 GFR est AA 10 (L) >60 ml/min/1.73m2 GFR est non-AA 9 (L) >60 ml/min/1.73m2 Calcium 7.7 (L) 8.5 - 10.1 MG/DL Phosphorus 4.8 2.5 - 4.9 MG/DL Albumin 2.4 (L) 3.4 - 5.0 g/dL FIBRINOGEN Collection Time: 10/20/18  4:28 AM  
Result Value Ref Range Fibrinogen 277 210 - 451 mg/dL CALCIUM, IONIZED Collection Time: 10/20/18  4:28 AM  
Result Value Ref Range Ionized Calcium 1.06 (L) 1.12 - 1.32 MMOL/L  
CBC W/O DIFF Collection Time: 10/20/18  4:28 AM  
Result Value Ref Range WBC 11.6 4.6 - 13.2 K/uL  
 RBC 3.00 (L) 4.70 - 5.50 M/uL HGB 7.9 (L) 13.0 - 16.0 g/dL HCT 25.9 (L) 36.0 - 48.0 % MCV 86.3 74.0 - 97.0 FL  
 MCH 26.3 24.0 - 34.0 PG  
 MCHC 30.5 (L) 31.0 - 37.0 g/dL  
 RDW 16.9 (H) 11.6 - 14.5 % PLATELET 380 924 - 633 K/uL MPV 8.8 (L) 9.2 - 11.8 FL Lab Results Component Value Date/Time Glucose 152 (H) 10/20/2018 04:28 AM  
 Glucose 162 (H) 10/19/2018 06:40 AM  
 Glucose 158 (H) 10/18/2018 08:10 AM  
 Glucose 85 10/17/2018 03:50 AM  
 Glucose 90 10/16/2018 06:10 AM  
  
 
Imaging: No results found. Medication List Reviewed: 
Current Facility-Administered Medications Medication Dose Route Frequency  docusate sodium (COLACE) 100 mg capsule  docusate sodium (COLACE) capsule 100 mg  100 mg Oral BID  diphenhydrAMINE (BENADRYL) capsule 25 mg  25 mg Oral Q6H PRN  
 [START ON 10/23/2018] riTUXimab (RITUXAN) 908 mg in 0.9% sodium chloride 908 mL infusion  375 mg/m2 IntraVENous ONCE TITR  
 albumin human 5% (BUMINATE) solution 100 g  100 g IntraVENous ONCE  
 [START ON 10/21/2018] predniSONE (DELTASONE) tablet 60 mg  60 mg Oral DAILY WITH BREAKFAST  epoetin natanael (EPOGEN;PROCRIT) injection 10,000 Units  10,000 Units IntraVENous Q TUE, THU & SAT  methylPREDNISolone (PF) (SOLU-MEDROL) 1,000 mg in 0.9% sodium chloride 100 mL IVPB  1,000 mg IntraVENous DAILY  buprenorphine-naloxone (SUBOXONE) 4mg-1mg SL film  1 Film SubLINGual BID  polyethylene glycol (MIRALAX) packet 17 g  17 g Oral DAILY PRN  
 oxyCODONE IR (ROXICODONE) tablet 10 mg  10 mg Oral Q4H PRN  
 HYDROcodone-acetaminophen (NORCO) 7.5-325 mg per tablet 1 Tab  1 Tab Oral Q4H PRN  
 lidocaine 4 % patch 1 Patch  1 Patch TransDERmal Q24H  
 sodium chloride (NS) flush 5-10 mL  5-10 mL IntraVENous Q8H  
 sodium chloride (NS) flush 5-10 mL  5-10 mL IntraVENous PRN  
 LORazepam (ATIVAN) tablet 1 mg  1 mg Oral Q1H PRN Or  
 LORazepam (ATIVAN) injection 1 mg  1 mg IntraVENous Q1H PRN  
 LORazepam (ATIVAN) tablet 2 mg  2 mg Oral Q1H PRN Or  
 LORazepam (ATIVAN) injection 2 mg  2 mg IntraVENous Q1H PRN  
 LORazepam (ATIVAN) injection 3 mg  3 mg IntraVENous Q15MIN PRN  
 0.9% sodium chloride infusion  50 mL/hr IntraVENous DIALYSIS PRN  
 heparin (porcine) 1,000 unit/mL injection 1,000 Units  1,000 Units InterCATHeter DIALYSIS PRN  pantoprazole (PROTONIX) tablet 40 mg  40 mg Oral ACB  hydrALAZINE (APRESOLINE) tablet 50 mg  50 mg Oral TID  calcitRIOL (ROCALTROL) capsule 0.5 mcg  0.5 mcg Oral DAILY  promethazine (PHENERGAN) tablet 25 mg  25 mg Oral Q8H PRN  
 amLODIPine (NORVASC) tablet 10 mg  10 mg Oral DAILY  calcium acetate (PHOSLO) capsule 1,334 mg  2 Cap Oral TID WITH MEALS  influenza vaccine 2018-19 (6 mos+)(PF) (FLUARIX QUAD/FLULAVAL QUAD) injection 0.5 mL  0.5 mL IntraMUSCular PRIOR TO DISCHARGE  isosorbide mononitrate ER (IMDUR) tablet 90 mg  90 mg Oral DAILY  amiodarone (CORDARONE) tablet 400 mg  400 mg Oral DAILY  atorvastatin (LIPITOR) tablet 40 mg  40 mg Oral QHS  fluticasone (FLONASE) 50 mcg/actuation nasal spray 2 Spray  2 Spray Both Nostrils DAILY  tiotropium (SPIRIVA) inhalation capsule 18 mcg  1 Cap Inhalation DAILY  ondansetron (ZOFRAN) injection 4 mg  4 mg IntraVENous Q4H PRN  
 albuterol-ipratropium (DUO-NEB) 2.5 MG-0.5 MG/3 ML  3 mL Nebulization Q4H PRN

## 2018-10-20 NOTE — PROGRESS NOTES
0750: Bedside and Verbal shift change report given to Ric Romero RN (oncoming nurse) by Imani Valdez RN (offgoing nurse). Report included the following information SBAR, Kardex and MAR.

## 2018-10-20 NOTE — PROGRESS NOTES
1918: Assumed patient care. Received report from [facility], [phone] [facility] (offgoing nurse). Report included SBAR, Kardex, and MAR. Patient resting in bed, in no signs of pain or distress. Call light and possessions within reach. Bed in lowest setting.  
 
2227: Patient's abdomen distended, complaints of nausea, absent bowel sounds. IT is listed as 10/13 as paient's las bowel movement. Dr. Chilango Paz paged [phone]: [phone]: Spoke with Dr. Chilango Paz who stated he will come to evaluate the patient 2315: Patient passing gas, states there is some relief on stomach

## 2018-10-20 NOTE — PROGRESS NOTES
. 
Acute HEmodialysis flow sheet Patient information Vidal Castro MRN: [id number]      QIX:[id number]  [place]# [facility]#[id number] Hospital: [place] [address] DX:   
       []1st Time Acute  []Stat[x] Routine []Urgent []Chronic Unit  
       []Acute Room []Bedside  []ICU/CCU []ER Isolation Precautions: [x]Dialysis[] Airborne []Contact []Droplet []Reverse Special Considerations:    [] Blood Consent Verified  []N/A Allergies: 
[] NKA  [] Reviewed Allergies Allergen Reactions  Latex Other (comments)  
  blisters  Other Food Hives Allergic to tomatoes and tomato sauce  Codeine Nausea and Vomiting Code Status []Full Code [] DNR  []Other Diet: Renal Diabetic: []Yes []No 
  
 Hemodialysis Orders Physician: Chlesey Mckeon Dialyzer Revaclear 300 Duration 4 BFR: 300 DFR:800 Dialysate: K 3 CA 2.5 Na 140 Bicarb 35 Dry Weight: UF Goal: 4000 ml Heparin:  []Bolus   Units    []Hourly  Units      [x]None BP Tx:  []NS  200ml/Bolus    []Other     [x]N/A  
    Labs: Pre:  Post: [x]N/A Additional Orders: [x]N/A [x]Signed Treatment Consent   [x] Verified   [] Obtained Primary Nurse Report: First initial/ Last Name/Title Pre Dialysis: phillip Mosquera    Time: 161 Access CATHETER ACCESS:  [] N/A  [x] RIGHT  [] LEFT  [x] IJ  [] SUBCL [] FEM [] First use X-ray  [] Tunnel     [] Non-Tunneled [x] No S/S infection  [] Redness [] Drainage  [] Cultured [] Swelling 
                       [] Pain  
                       [x] Medical Aseptic [] Prep Dressing Changed  
                       [] Clotted [x] Patent []      Flows: [x] Good [] Poor [] Reversed          If Access Problem Dr. Betsey Stein: [] Yes [] No    Date:    [] N/A  
     GRAFT/FISTULA ACCESS:  [] N/A  [] RIGHT  [] LEFT  [] UE  [] LE   
 [] AVG  [] AVF [] BUTTONHOLE  [] +BRUIT/THRILL 
     [] MEDICAL ASEPTIC PREP [] No S/S infection  [] Redness [] Drainage  [] Cultured [] Swelling 
                       [] Pain Needle Gauge                              Length: If Access Problem Dr. Wayne Rivera: [] Yes [] No    Date:     [] N/A General Assessment LUNGS:  SaO2% 94 [x] Clear [] Coarse [] Crackles [] Wheezing  
            [] Diminished Location: [] RLL [] LLL [] RUL [] NIKOS   
     COUGH:  [] Productive [] Dry [x] N/A  RESPIRATIONS: [x] Easy [] Labored THERAPY: [x] RA   [] NC     L/min    Mask: [] NRB [] Venti  O2%    
             [] Ventilator [] Intubated [] Trach [] BiPap [] CPap CARDIAC: [x] Regular [] Irregular [] Pericardial Rub [] JVD [] Monitored Rhythm: EDEMA: [] None [x] Generalized [] Facial [] Pedal [] UE [] LE 
           [] Pitting [] 1 [] 2 [] 3 [] 4    [] Right [] Left [] Bilateral  
    SKIN:    [x] Warm [] Hot [] Cold  [] Dry [] Pale [] Diaphoretic  
           [] Flushed [] Jaundiced [] Cyanotic [] Rash [] Weeping LOC:    [x] Alert  Oriented to: [x] Person [x] Place [x] Time  
          [] Confused [] Lethargic [] Medically sedated [] Non-responsive GI/ABDOMEN: [] Flat [x] Distended [x] Soft [] Firm [] Obese [] Diarrhea 
 [] Bowel Sounds     []Nausea [] Vomiting PAIN: [x] 0 [] 1 [] 2 [] 3 [] 4 [] 5 [] 6 [] 7 [] 8 [] 9 [] 10 Scale 1-10 Action/Follow Up MOBILITY: [] Amb [] Amb/Assist [x] Bed  [] Wheelchair CUrrent LABS HBsAg ONLY: Date Drawn 10/6/18 [x] Negative [] Positive  [] Unknown. HBsAb: Date Drawn 10/6/18 [x] Susceptible <10 [] Immune ?10 [] Unknown Date of Current Labs:  
    
Lab Results Component Value Date/Time  Sodium 134 (L) 10/20/2018 04:28 AM  
 Potassium 4.6 10/20/2018 04:28 AM  
 Chloride 98 (L) 10/20/2018 04:28 AM  
 CO2 25 10/20/2018 04:28 AM  
 BUN 49 (H) 10/20/2018 04:28 AM  
 Creatinine 6.84 (H) 10/20/2018 04:28 AM  
 Calcium 7.7 (L) 10/20/2018 04:28 AM  
 Magnesium 2.0 12/27/2015 04:35 AM  
 Phosphorus 4.8 10/20/2018 04:28 AM  
 HCT 25.9 (L) 10/20/2018 04:28 AM  
 HGB 7.9 (L) 10/20/2018 04:28 AM  
 PLATELET 085 64/91/1541 04:28 AM  
 INR 1.2 10/08/2018 05:21 AM  
  
Education Person Educated: [x] Patient [] Family [] Other Knowledge base: [x] None [] Minimal [] Substantial   
     Barriers to learning  [x] None Preferred method of learning: [] Written [x] Oral [] Visual [] Hands on Topic: [x] Access Care [] S&S of infection [] Fluid Management [] K+ 
               [] Procedural    [] Albumin [] Medications [] Tx Options [] Transplant 
                [] Diet [] Other Teaching Tools: [x] Explain [] Demonstration [] Handout [] Teachback Ro/hemodiaylsis machine safety checks- before each treatment Machine Serial #13 [] # 1 [id number] [] # 2 [id number] [] # 3 [id number] [] # 4 [id number] [] # 5 [id number] [] # [phone] [] # [phone] Alarm Test: [x] Pass Time        RO Serial # 13 
[] [id number] (Large dual station RO) [] # 1  [id number]  (Portable # 1) [] # 2  [id number]  (Portable # 2) [] # 3  [id number]  (Portable # 3) [] # 4  [id number]  (Portable # 4) [] # 5  [id number]  (Portable # 5) Lot #s: Dialyzer [id number] exp. Tubing [id number] exp. Saline -jt exp. [x] RO/Machine Log Complete    [x] Extracorporeal circuit Tested for integrity Dialysate: pH 7.4 Temp. 36  Conductivity: Meter 14 HD Machine 14  
chlorine testing- before each treatment and every 4 hours Total Chlorine: [x] Less than 0.1 ppm Time: 830 2nd Check Time: 2964 (If greater than 0.1 ppm from Primary then every 30 minutes from Secondary) treatment Iniation-with dialysis precautions [x] All Connections Secured   [x] Saline Line Double Clamped [x] Venous Parameters Set    [x] Arterial Parameters Set    [x] Prime Given 250 ml            [x] Air Foam Detector Engaged Lizeth Nurse Signature/Title_Shiraz Ernst__ Pre-Treatment Time 845 B/P 120/66 HR 72 Temp. 98.4 Resp Rate 18 Pre Wt  
UF Calculations: Wt to lose:4000 ml(+) Oral:  ml(+) IV Meds/Fluids/Blood prods  ml(+) Prime/Rinse 500 ml 
(=)Total UF Goal 4500 mL Scale Type:[] Bed scale [] Sling Scale [] Wheel Chair Scale [x]  Not Ordered [] Unable to obtain pt on stretcher Tx Initiation Note: right IJ catheter accessed and treatment started   
[x] Time Out/Safety Check  Time: 793 Lizeth Signature/Title_Shiraz Ernst INTRADIALYTIC MONITORING 
(SEE ATTACHED FLOWSHEET) POST TREATMENT Time 1310 B/P 158/78 HR 64 Temp 98.5 Resp. Rate 18 Post wt Time Medication Dose Volume Route Initials [facility] Signatures Title Initials Time 52 [facility] RN [address] Lizeth Signature/Title:_Shiraz Ernst_ Dialyzer cleared: [] Good [] Fair [] Poor    Blood Processed 65 Liters Net UF Removed 4000 mL Post Tx Access: AVF/AVG: Bleeding Stop                   Art. min V min []+bruit/thrill Catheter: Locking Solution heparin     Art. 2.0 ml Ashok 2.1 ml 
 
Post Assessment:  Skin: [x]Warm []Dry []Diaphoretic []Flushed [] Pale []Cyanotic Lungs: [x]Clear []Coarse []Crackles []Wheezing Cardiac: [x]Regular []Irregular  []Monitored rhythm Edema: []None [x]General []Facial []Pedal  []UE []LE []RIGHT []LEFT    []Bilateral  
   Pain: [x]0 []1[]2 []3 []4 []5 []6 []7 []8 []9 []10  
POST Tx Note: removed 4 liters patient tolerated treatement catheter locked patient transported back to room Primary Nurse Report: First initial/Last name/Title Post Dialysis:JUAN Mosquera RN_         Time:_1320_ Abbreviations: AVG-arterial venous graft, AVF-arterial venous fistula, IJ-Internal Jugular, Subcl-Subclavian, Fem-Femoral, Tx-treatment, AP/HR-apical heart rate, DFR-dialysate flow rate, BFR-blood flow rate, AP-arterial pressure, -venous pressure, UF-ultrafiltrate, TMP-transmembrane pressure, Ashok-Venous, Art-Arterial, RO-Reverse Osmosis

## 2018-10-21 LAB
ALBUMIN SERPL-MCNC: 3.1 G/DL (ref 3.4–5)
ANION GAP SERPL CALC-SCNC: 10 MMOL/L (ref 3–18)
BLD PROD TYP BPU: NORMAL
BPU ID: NORMAL
BUN SERPL-MCNC: 44 MG/DL (ref 7–18)
BUN/CREAT SERPL: 8 (ref 12–20)
CALCIUM SERPL-MCNC: 7.3 MG/DL (ref 8.5–10.1)
CHLORIDE SERPL-SCNC: 100 MMOL/L (ref 100–108)
CO2 SERPL-SCNC: 27 MMOL/L (ref 21–32)
CREAT SERPL-MCNC: 5.4 MG/DL (ref 0.6–1.3)
FIBRINOGEN PPP-MCNC: 220 MG/DL (ref 210–451)
GLUCOSE SERPL-MCNC: 138 MG/DL (ref 74–99)
PHOSPHATE SERPL-MCNC: 4.3 MG/DL (ref 2.5–4.9)
POTASSIUM SERPL-SCNC: 4 MMOL/L (ref 3.5–5.5)
SODIUM SERPL-SCNC: 137 MMOL/L (ref 136–145)
STATUS OF UNIT,%ST: NORMAL
UNIT DIVISION, %UDIV: 0

## 2018-10-21 PROCEDURE — 74011000250 HC RX REV CODE- 250: Performed by: HOSPITALIST

## 2018-10-21 PROCEDURE — 74011250637 HC RX REV CODE- 250/637: Performed by: INTERNAL MEDICINE

## 2018-10-21 PROCEDURE — 82657 ENZYME CELL ACTIVITY: CPT

## 2018-10-21 PROCEDURE — 36415 COLL VENOUS BLD VENIPUNCTURE: CPT | Performed by: INTERNAL MEDICINE

## 2018-10-21 PROCEDURE — 73060999999 HC MISC LAB CHARGE

## 2018-10-21 PROCEDURE — 74011250637 HC RX REV CODE- 250/637: Performed by: HOSPITALIST

## 2018-10-21 PROCEDURE — 80069 RENAL FUNCTION PANEL: CPT | Performed by: INTERNAL MEDICINE

## 2018-10-21 PROCEDURE — 85384 FIBRINOGEN ACTIVITY: CPT | Performed by: INTERNAL MEDICINE

## 2018-10-21 PROCEDURE — 65660000000 HC RM CCU STEPDOWN

## 2018-10-21 PROCEDURE — 74011636637 HC RX REV CODE- 636/637: Performed by: INTERNAL MEDICINE

## 2018-10-21 PROCEDURE — 74011250636 HC RX REV CODE- 250/636: Performed by: HOSPITALIST

## 2018-10-21 RX ORDER — POLYETHYLENE GLYCOL 3350 17 G/17G
17 POWDER, FOR SOLUTION ORAL DAILY PRN
Status: DISCONTINUED | OUTPATIENT
Start: 2018-10-21 | End: 2018-10-22

## 2018-10-21 RX ADMIN — CALCIUM ACETATE 1334 MG: 667 CAPSULE ORAL at 12:16

## 2018-10-21 RX ADMIN — ATORVASTATIN CALCIUM 40 MG: 40 TABLET, FILM COATED ORAL at 21:50

## 2018-10-21 RX ADMIN — BUPRENORPHINE HYDROCHLORIDE, NALOXONE HYDROCHLORIDE 1 FILM: 4; 1 FILM, SOLUBLE BUCCAL; SUBLINGUAL at 09:00

## 2018-10-21 RX ADMIN — BUPRENORPHINE HYDROCHLORIDE, NALOXONE HYDROCHLORIDE 1 FILM: 4; 1 FILM, SOLUBLE BUCCAL; SUBLINGUAL at 21:53

## 2018-10-21 RX ADMIN — CALCIUM ACETATE 1334 MG: 667 CAPSULE ORAL at 09:44

## 2018-10-21 RX ADMIN — FLUTICASONE PROPIONATE 2 SPRAY: 50 SPRAY, METERED NASAL at 09:00

## 2018-10-21 RX ADMIN — PREDNISONE 60 MG: 20 TABLET ORAL at 12:16

## 2018-10-21 RX ADMIN — PANTOPRAZOLE SODIUM 40 MG: 40 TABLET, DELAYED RELEASE ORAL at 09:46

## 2018-10-21 RX ADMIN — DOCUSATE SODIUM 100 MG: 100 CAPSULE, LIQUID FILLED ORAL at 21:50

## 2018-10-21 RX ADMIN — AMIODARONE HYDROCHLORIDE 400 MG: 200 TABLET ORAL at 09:46

## 2018-10-21 RX ADMIN — CALCITRIOL 0.5 MCG: 0.25 CAPSULE ORAL at 09:00

## 2018-10-21 RX ADMIN — CALCIUM ACETATE 1334 MG: 667 CAPSULE ORAL at 16:56

## 2018-10-21 RX ADMIN — ISOSORBIDE MONONITRATE 90 MG: 30 TABLET, EXTENDED RELEASE ORAL at 09:45

## 2018-10-21 RX ADMIN — HYDRALAZINE HYDROCHLORIDE 50 MG: 50 TABLET, FILM COATED ORAL at 21:50

## 2018-10-21 RX ADMIN — AMLODIPINE BESYLATE 10 MG: 5 TABLET ORAL at 09:46

## 2018-10-21 RX ADMIN — HYDRALAZINE HYDROCHLORIDE 50 MG: 50 TABLET, FILM COATED ORAL at 16:57

## 2018-10-21 RX ADMIN — Medication 10 ML: at 14:00

## 2018-10-21 RX ADMIN — HYDRALAZINE HYDROCHLORIDE 50 MG: 50 TABLET, FILM COATED ORAL at 09:46

## 2018-10-21 NOTE — PROGRESS NOTES
Problem: Falls - Risk of 
Goal: *Absence of Falls Document Celsa Shrestha Fall Risk and appropriate interventions in the flowsheet. Outcome: Progressing Towards Goal 
Fall Risk Interventions: 
Mobility Interventions: Bed/chair exit alarm, Patient to call before getting OOB Medication Interventions: Bed/chair exit alarm, Patient to call before getting OOB, Teach patient to arise slowly Elimination Interventions: Call light in reach History of Falls Interventions: Evaluate medications/consider consulting pharmacy Problem: Pressure Injury - Risk of 
Goal: *Prevention of pressure injury Document Pranav Scale and appropriate interventions in the flowsheet. Outcome: Progressing Towards Goal 
Pressure Injury Interventions: Activity Interventions: Increase time out of bed, Pressure redistribution bed/mattress(bed type) Mobility Interventions: Pressure redistribution bed/mattress (bed type), HOB 30 degrees or less Nutrition Interventions: Document food/fluid/supplement intake, Offer support with meals,snacks and hydration Friction and Shear Interventions: HOB 30 degrees or less

## 2018-10-21 NOTE — PROGRESS NOTES
RENAL DAILY PROGRESS NOTE Assessment: 1. 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 CKDIII-DN 3. HTN 
4.Obesity 5. Anemia of CKD , Iron stores OK 6.constipation X1 wk Plan:  
· HD TTS-HD Tuesday · Plex X7, next plex is tomorrow · Rituximab first dose scheduled on Tuesday · DINESH on dialysis days · BT to keep Hb>7 · Miralax bid prn  
 
 
 
Concetta Rahman MD 
10/21/2018 
1:06 PM 
Subjective:  
 
Constipated Breathing Ok Objective:  
 
Patient Vitals for the past 24 hrs: 
 Temp Pulse Resp BP SpO2  
10/21/18 0821 98.4 °F (36.9 °C) 74 18 140/68 99 % 10/21/18 0526 98.1 °F (36.7 °C) 78 18 122/63 96 % 10/21/18 0015 98.7 °F (37.1 °C) 72 18 136/73 94 % 10/20/18 2300    133/72   
10/20/18 2222 98.9 °F (37.2 °C) 67 18 124/69 96 % 10/20/18 1302 97.9 °F (36.6 °C) 64 18 158/78   
10/20/18 1300  64 18 166/87   
10/20/18 1245  66 18 158/87   
10/20/18 1230  70 18 143/76   
10/20/18 1215  86 18 174/74   
10/20/18 1200  83 18 160/90   
10/20/18 1145  65 18 134/69   
10/20/18 1130  86 18 134/67   
  
 
 
 10/19 1901 - 10/21 0700 In: 960 [P.O.:960] Out: 4000 Intake/Output Summary (Last 24 hours) at 10/21/2018 1116 Last data filed at 10/21/2018 1000 Gross per 24 hour Intake 630 ml Output 4000 ml Net -3370 ml Physical Exam: 
NAD HEENT: No JVD Cardiovascular: S1/S2 regular HB 
C/L: CTA bhanu Abdomen: soft NT/ND Ext: no Edema Data Review:  
 
LABS:  
Hematology:  
Recent Labs 10/20/18 
7559 WBC 11.6 HGB 7.9*  
HCT 25.9* Chemistry:  
Recent Labs 10/21/18 
9792 10/20/18 
1461 10/19/18 
7155 BUN 44* 49* 32* CREA 5.40* 6.84* 5.72* CA 7.3* 7.7* 7.7* ALB 3.1* 2.4* 2.1*  
K 4.0 4.6 4.6  134* 136  98* 100 CO2 27 25 28 PHOS 4.3 4.8 3.7 * 152* 162*

## 2018-10-21 NOTE — PROGRESS NOTES
[address] [place], [place] [address]ty [facility] Hospitalist Division Inpatient Daily Progress Note Daily progress Note Patient: Yefri Medina MRN: [id number]  CSN: [id number] YOB: 1970  Age: 46 y.o. Sex: male DOA: 10/3/2018 LOS:  LOS: 18 days Chief Complaint:  Acute on chronic renal failure Interval History: 46 y.o.  male with h/o AICD,cardiac arrest,VT,ischemic cardiomyopathy EF 30%,obesity,cad,presented to ED because of weakness,nausea,vomting,abdominal pain,hematuria. Patient says that he started feeling sick 2 weeks ago when he started feeling weak. Then he started vomiting. Could not tolerate even water. In recent days ,he has had abdominal pain which he attributed to the ongoing vomiting. Patient also reported that he has seen his urine turning red for a while. Had to see his doctor in two weeks but felt very weak and decided to come to the ED. In ED,lab showed creatinine 11.0 compared to 2.12 on 1/11/2018 and 8.01 on 9/28/18. UA showed large blood,bacteria+. CT abd/pelvis w/o hydronephrosis or urolithiasis. Patient admitted for OLIVIA,Hematuria,UTI. Started on iv fluid. Nephrolohy consulted from ER. Since patient has GI complaint and renal involvement,nephrology has indicated that Henoch-Schonlein syndrome needs to be considered. Pt is on iv fluid. IgA elevated 648. Other serology for vasculitis including nathaniel,GBM ab,ANCA,C3,C4,heptitis profile not abnormal. Nephrology has indicated that if creatinine continue to rise over the weekend,patient will need dialysis. Today creat up to 12.00. Patient may also need kidney biopsy. Cardiology consulted significant cardiac history anticipating that he will have dialysis. Cardiology has determined that his cardiac status is stable. GI saw patient on 10/6 for ongoing symptoms. TDC catheter placed on 10/8. EGD on 10/9 showed chronic gastritis. HD on 10/9 and 10/10, next HD session on 10/12.  S/p kidney biopsy on 10/15- per Nephrology results- small specimen, only two glomeruli for light microscopy, none for immunofluorescence. 2 glomeruli with severe crescentic GN. 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 Started Cytoxan per Nephrology. Continues with plasmaphereses. Assessment/Plan:  
 
Patient Active Problem List  
Diagnosis Code  ST elevation (STEMI) myocardial infarction involving left anterior descending coronary artery ([facility]) I21.02  
 Cardiac arrest - ventricular fibrillation  HTN (hypertension) I10  
 Hypertensive emergency I16.1  Hyperglycemia R73.9  Acute on chronic renal failure (HCC) N17.9, N18.9  Leukocytosis D72.829  
 Acute pulmonary edema ([facility]) J81.0  Contrast dye induced nephropathy N14.1, T50.8X5A  
 CKD (chronic kidney disease) N18.9  Arachnoiditis G03.9  Postlaminectomy syndrome, lumbar region M96.1  Psoriasis L40.9  Degeneration of lumbar or lumbosacral intervertebral disc M51.37  
 Encounter for long-term (current) use of high-risk medication Z79.899  Obesity E66.9  
 Pain in joint, lower leg M25.569  Psoriatic arthropathy ([facility] L40.50  Chronic low back pain M54.5, G89.29  Chronic pain syndrome G89.4  Lumbosacral spondylosis without myelopathy M47.817  Enthesopathy of hip M76.899  
 Sacroiliitis ([facility]) M46.1  Coronary artery disease I25.10  
 JESSICA (obstructive sleep apnea) G47.33  
 UTI (lower urinary tract infection) N39.0  Cardiomyopathy ([address] I42.9  AICD (automatic cardioverter/defibrillator) present Z95.810  
 OLIVIA (acute kidney injury) ([facility] [address] N17.9  Renal failure N19  
 Chest pain R07.9  
 SOB (shortness of breath) R06.02  Bronchitis J40  Acute renal failure (ARF) ([facility]) N17.9  ARF (acute renal failure) ([facility]) N17.9  Severe sepsis ([facility]) A41.9, R65.20  Epigastric abdominal pain R10.13  
 Anxiety F41.9  Ventricular tachycardia ([facility]) I47.2  Ventricular fibrillation (HCC) I49.01  
 ICD (implantable cardioverter-defibrillator) discharge Z45.02  
 CAD (coronary artery disease) I25.10  Obesity, morbid ([address] E66.01  
 Bacteria in urine T17.31  
 Metabolic acidosis T29.0 A/P: 
Acute on chronic renal failure/Metabolic acidosis  
- nephrology following, appreciate assistance 
- creatinine 11.00 POA 
 -Etiology:volume depletion -?acute GN as pt also hematuria. -Serology:IgA elevated. Other normal such as MANUELITO,ANCA,C3,C4,Hep profile. -s/p [facility] 10/8   
- hemodialysis - s/p core biopsy lower pole right kidney on 10/15 - Per Nephrology small specimen, only two glomeruli for light microscopy, none for immunofluorescence. 2 glomeruli with severe crescentic GN. 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  
   
Bacteria in urine/UTI/Hematuria 
 -Ceftriaxone iv - completed 
 -Blood cx ng/Ucx negative 
 -Vasculitis serology non-revealing 
  
Secondary hyperparathyroidism  
 -On calcitriol 
   
Nausea/vomiting/abdominal pain 
 -CT reviewed,no acute process. -GI, Dr Shereen Marcial saw patient on 10/6 - EGD 10/9, chronic gastritis 
   
HTN 
 -Hydralazine prn for sbp>160 
   
JESSICA (obstructive sleep apnea) 
 -On oxygen 
   
Cardiomyopathy 
- EF 25-30% - ICD (implantable cardioverter-defibrillator) discharge CAD (coronary artery disease) 
 -Continue routine meds 
 -Telemetry monitoring 
 -Cardiology saw patient on 10/6 and determined that patient's cardiac status is stable and advised to continue his home meds DVT Prophylaxis - SCD's BLE Disposition 
- no recommendations from PT/OT, plan is home 
- need to determine if pt will continue with HD at discharge pending renal biopsy, if so would need outpatient chair secured- defer to nephrology - ? Finish plasmaphereses as outpatient [facility] Pager:  [phone] Office:  [phone] Subjective: No complaints of pain. Success with enema Objective:  
  
Visit Vitals /68 (BP 1 Location: Right arm, BP Patient Position: Sitting) Pulse 74 Temp 98.4 °F (36.9 °C) Resp 18 Ht 5' 10\" (1.778 m) Wt 118.4 kg (261 lb) SpO2 99% BMI 37.45 kg/m² Physical Exam: 
General appearance: alert, cooperative, no distress, appears stated age, [facility] cath right Lungs: clear to auscultation throughout Heart: regular rate and rhythm, S1, S2 normal, no murmur, click, rub or gallop Abdomen: soft, non tender, non distended. Normoactive bowel sounds. Extremities: extremities normal, atraumatic, no cyanosis or edema Skin: flaky, dry Neurologic: Grossly normal 
PSY: Mood and affect normal, appropriately behaved Intake and Output: 
Current Shift:  No intake/output data recorded. Last three shifts:  10/19 1901 - 10/21 0700 In: 960 [P.O.:960] Out: 4000 Recent Results (from the past 24 hour(s)) FIBRINOGEN Collection Time: 10/20/18  5:45 PM  
Result Value Ref Range Fibrinogen 184 (L) 210 - 451 mg/dL RENAL FUNCTION PANEL Collection Time: 10/21/18  5:04 AM  
Result Value Ref Range Sodium 137 136 - 145 mmol/L Potassium 4.0 3.5 - 5.5 mmol/L Chloride 100 100 - 108 mmol/L  
 CO2 27 21 - 32 mmol/L Anion gap 10 3.0 - 18 mmol/L Glucose 138 (H) 74 - 99 mg/dL BUN 44 (H) 7.0 - 18 MG/DL Creatinine 5.40 (H) 0.6 - 1.3 MG/DL  
 BUN/Creatinine ratio 8 (L) 12 - 20 GFR est AA 14 (L) >60 ml/min/1.73m2 GFR est non-AA 11 (L) >60 ml/min/1.73m2 Calcium 7.3 (L) 8.5 - 10.1 MG/DL Phosphorus 4.3 2.5 - 4.9 MG/DL Albumin 3.1 (L) 3.4 - 5.0 g/dL FIBRINOGEN Collection Time: 10/21/18  5:04 AM  
Result Value Ref Range Fibrinogen 220 210 - 451 mg/dL Lab Results Component Value Date/Time  Glucose 138 (H) 10/21/2018 05:04 AM  
 Glucose 152 (H) 10/20/2018 04:28 AM  
 Glucose 162 (H) 10/19/2018 06:40 AM  
 Glucose 158 (H) 10/18/2018 08:10 AM  
 Glucose 85 10/17/2018 03:50 AM  
  
 
Imaging: No results found. Medication List Reviewed: 
Current Facility-Administered Medications Medication Dose Route Frequency  docusate sodium (COLACE) capsule 100 mg  100 mg Oral BID  boby enema  500 mL Rectal ONCE  polyethylene glycol (MIRALAX) packet 17 g  17 g Oral BID  diphenhydrAMINE (BENADRYL) capsule 25 mg  25 mg Oral Q6H PRN  
 [START ON 10/23/2018] riTUXimab (RITUXAN) 908 mg in 0.9% sodium chloride 908 mL infusion  375 mg/m2 IntraVENous ONCE TITR  predniSONE (DELTASONE) tablet 60 mg  60 mg Oral DAILY WITH BREAKFAST  epoetin natanael (EPOGEN;PROCRIT) injection 10,000 Units  10,000 Units IntraVENous Q TUE, THU & SAT  methylPREDNISolone (PF) (SOLU-MEDROL) 1,000 mg in 0.9% sodium chloride 100 mL IVPB  1,000 mg IntraVENous DAILY  buprenorphine-naloxone (SUBOXONE) 4mg-1mg SL film  1 Film SubLINGual BID  
 oxyCODONE IR (ROXICODONE) tablet 10 mg  10 mg Oral Q4H PRN  
 HYDROcodone-acetaminophen (NORCO) 7.5-325 mg per tablet 1 Tab  1 Tab Oral Q4H PRN  
 lidocaine 4 % patch 1 Patch  1 Patch TransDERmal Q24H  
 sodium chloride (NS) flush 5-10 mL  5-10 mL IntraVENous Q8H  
 sodium chloride (NS) flush 5-10 mL  5-10 mL IntraVENous PRN  
 LORazepam (ATIVAN) tablet 1 mg  1 mg Oral Q1H PRN Or  
 LORazepam (ATIVAN) injection 1 mg  1 mg IntraVENous Q1H PRN  
 LORazepam (ATIVAN) tablet 2 mg  2 mg Oral Q1H PRN Or  
 LORazepam (ATIVAN) injection 2 mg  2 mg IntraVENous Q1H PRN  
 LORazepam (ATIVAN) injection 3 mg  3 mg IntraVENous Q15MIN PRN  
 0.9% sodium chloride infusion  50 mL/hr IntraVENous DIALYSIS PRN  
 heparin (porcine) 1,000 unit/mL injection 1,000 Units  1,000 Units InterCATHeter DIALYSIS PRN  pantoprazole (PROTONIX) tablet 40 mg  40 mg Oral ACB  hydrALAZINE (APRESOLINE) tablet 50 mg  50 mg Oral TID  calcitRIOL (ROCALTROL) capsule 0.5 mcg  0.5 mcg Oral DAILY  promethazine (PHENERGAN) tablet 25 mg  25 mg Oral Q8H PRN  
 amLODIPine (NORVASC) tablet 10 mg  10 mg Oral DAILY  calcium acetate (PHOSLO) capsule 1,334 mg  2 Cap Oral TID WITH MEALS  influenza vaccine 2018-19 (6 mos+)(PF) (FLUARIX QUAD/FLULAVAL QUAD) injection 0.5 mL  0.5 mL IntraMUSCular PRIOR TO DISCHARGE  isosorbide mononitrate ER (IMDUR) tablet 90 mg  90 mg Oral DAILY  amiodarone (CORDARONE) tablet 400 mg  400 mg Oral DAILY  atorvastatin (LIPITOR) tablet 40 mg  40 mg Oral QHS  fluticasone (FLONASE) 50 mcg/actuation nasal spray 2 Spray  2 Spray Both Nostrils DAILY  tiotropium (SPIRIVA) inhalation capsule 18 mcg  1 Cap Inhalation DAILY  ondansetron (ZOFRAN) injection 4 mg  4 mg IntraVENous Q4H PRN  
 albuterol-ipratropium (DUO-NEB) 2.5 MG-0.5 MG/3 ML  3 mL Nebulization Q4H PRN

## 2018-10-21 NOTE — PROGRESS NOTES
Assumed patient care. Received patient awake, alert, oriented x4. Patient denies pain at this time. Bed is locked and in lowest position and call bell is within reach. Not in acute distress.

## 2018-10-21 NOTE — PROGRESS NOTES
Problem: Falls - Risk of 
Goal: *Absence of Falls Document [facility] Fall Risk and appropriate interventions in the flowsheet. Outcome: Progressing Towards Goal 
Fall Risk Interventions: 
Mobility Interventions: Bed/chair exit alarm, Patient to call before getting OOB Medication Interventions: Bed/chair exit alarm, Patient to call before getting OOB, Teach patient to arise slowly Elimination Interventions: Call light in reach History of Falls Interventions: Evaluate medications/consider consulting pharmacy Problem: Pressure Injury - Risk of 
Goal: *Prevention of pressure injury Document Pranav Scale and appropriate interventions in the flowsheet. Outcome: Progressing Towards Goal 
Pressure Injury Interventions: Activity Interventions: Increase time out of bed, Pressure redistribution bed/mattress(bed type) Mobility Interventions: Pressure redistribution bed/mattress (bed type), HOB 30 degrees or less Nutrition Interventions: Document food/fluid/supplement intake, Offer support with meals,snacks and hydration Friction and Shear Interventions: HOB 30 degrees or less

## 2018-10-21 NOTE — PROGRESS NOTES
Assumed patient care. Received patient awake, alert, oriented X4. Patient denies pain at this time. Bed is lowest position and call bell within reach. Not in acute distress.

## 2018-10-22 PROBLEM — M31.7 MICROSCOPIC POLYANGIITIS (HCC): Status: ACTIVE | Noted: 2018-10-22

## 2018-10-22 LAB
ALBUMIN SERPL-MCNC: 2.5 G/DL (ref 3.4–5)
ANION GAP SERPL CALC-SCNC: 10 MMOL/L (ref 3–18)
ANION GAP SERPL CALC-SCNC: 11 MMOL/L (ref 3–18)
BUN SERPL-MCNC: 60 MG/DL (ref 7–18)
BUN SERPL-MCNC: 60 MG/DL (ref 7–18)
BUN/CREAT SERPL: 9 (ref 12–20)
BUN/CREAT SERPL: 9 (ref 12–20)
CALCIUM SERPL-MCNC: 7.2 MG/DL (ref 8.5–10.1)
CALCIUM SERPL-MCNC: 7.4 MG/DL (ref 8.5–10.1)
CHLORIDE SERPL-SCNC: 99 MMOL/L (ref 100–108)
CHLORIDE SERPL-SCNC: 99 MMOL/L (ref 100–108)
CO2 SERPL-SCNC: 25 MMOL/L (ref 21–32)
CO2 SERPL-SCNC: 26 MMOL/L (ref 21–32)
CREAT SERPL-MCNC: 6.83 MG/DL (ref 0.6–1.3)
CREAT SERPL-MCNC: 6.97 MG/DL (ref 0.6–1.3)
ERYTHROCYTE [DISTWIDTH] IN BLOOD BY AUTOMATED COUNT: 16.8 % (ref 11.6–14.5)
FIBRINOGEN PPP-MCNC: 244 MG/DL (ref 210–451)
GLUCOSE SERPL-MCNC: 163 MG/DL (ref 74–99)
GLUCOSE SERPL-MCNC: 165 MG/DL (ref 74–99)
HCT VFR BLD AUTO: 24.1 % (ref 36–48)
HGB BLD-MCNC: 7.5 G/DL (ref 13–16)
MCH RBC QN AUTO: 26.8 PG (ref 24–34)
MCHC RBC AUTO-ENTMCNC: 31.1 G/DL (ref 31–37)
MCV RBC AUTO: 86.1 FL (ref 74–97)
PHOSPHATE SERPL-MCNC: 5.5 MG/DL (ref 2.5–4.9)
PLATELET # BLD AUTO: 214 K/UL (ref 135–420)
PMV BLD AUTO: 8.6 FL (ref 9.2–11.8)
POTASSIUM SERPL-SCNC: 4.3 MMOL/L (ref 3.5–5.5)
POTASSIUM SERPL-SCNC: 4.3 MMOL/L (ref 3.5–5.5)
RBC # BLD AUTO: 2.8 M/UL (ref 4.7–5.5)
SODIUM SERPL-SCNC: 135 MMOL/L (ref 136–145)
SODIUM SERPL-SCNC: 135 MMOL/L (ref 136–145)
WBC # BLD AUTO: 8 K/UL (ref 4.6–13.2)

## 2018-10-22 PROCEDURE — 74011000250 HC RX REV CODE- 250: Performed by: HOSPITALIST

## 2018-10-22 PROCEDURE — 74011000250 HC RX REV CODE- 250: Performed by: FAMILY MEDICINE

## 2018-10-22 PROCEDURE — 74011250637 HC RX REV CODE- 250/637: Performed by: INTERNAL MEDICINE

## 2018-10-22 PROCEDURE — 80069 RENAL FUNCTION PANEL: CPT | Performed by: INTERNAL MEDICINE

## 2018-10-22 PROCEDURE — 74011250636 HC RX REV CODE- 250/636: Performed by: INTERNAL MEDICINE

## 2018-10-22 PROCEDURE — 36415 COLL VENOUS BLD VENIPUNCTURE: CPT | Performed by: FAMILY MEDICINE

## 2018-10-22 PROCEDURE — 90935 HEMODIALYSIS ONE EVALUATION: CPT

## 2018-10-22 PROCEDURE — 86706 HEP B SURFACE ANTIBODY: CPT | Performed by: INTERNAL MEDICINE

## 2018-10-22 PROCEDURE — 80048 BASIC METABOLIC PNL TOTAL CA: CPT | Performed by: FAMILY MEDICINE

## 2018-10-22 PROCEDURE — 74011250636 HC RX REV CODE- 250/636: Performed by: HOSPITALIST

## 2018-10-22 PROCEDURE — 85384 FIBRINOGEN ACTIVITY: CPT | Performed by: INTERNAL MEDICINE

## 2018-10-22 PROCEDURE — 74011636637 HC RX REV CODE- 636/637: Performed by: INTERNAL MEDICINE

## 2018-10-22 PROCEDURE — 74011250637 HC RX REV CODE- 250/637: Performed by: HOSPITALIST

## 2018-10-22 PROCEDURE — 65660000000 HC RM CCU STEPDOWN

## 2018-10-22 PROCEDURE — 85027 COMPLETE CBC AUTOMATED: CPT | Performed by: FAMILY MEDICINE

## 2018-10-22 RX ORDER — ACETAMINOPHEN 325 MG/1
650 TABLET ORAL ONCE
Status: DISCONTINUED | OUTPATIENT
Start: 2018-10-23 | End: 2018-10-23 | Stop reason: SDUPTHER

## 2018-10-22 RX ORDER — DIPHENHYDRAMINE HYDROCHLORIDE 50 MG/ML
50 INJECTION, SOLUTION INTRAMUSCULAR; INTRAVENOUS
Status: DISCONTINUED | OUTPATIENT
Start: 2018-10-23 | End: 2018-10-23

## 2018-10-22 RX ORDER — ACETAMINOPHEN 325 MG/1
650 TABLET ORAL
Status: DISCONTINUED | OUTPATIENT
Start: 2018-10-23 | End: 2018-10-23

## 2018-10-22 RX ORDER — HYDRALAZINE HYDROCHLORIDE 20 MG/ML
20 INJECTION INTRAMUSCULAR; INTRAVENOUS ONCE
Status: COMPLETED | OUTPATIENT
Start: 2018-10-22 | End: 2018-10-22

## 2018-10-22 RX ORDER — ALBUMIN HUMAN 50 G/1000ML
187.5 SOLUTION INTRAVENOUS ONCE
Status: COMPLETED | OUTPATIENT
Start: 2018-10-23 | End: 2018-10-23

## 2018-10-22 RX ORDER — POLYETHYLENE GLYCOL 3350 17 G/17G
17 POWDER, FOR SOLUTION ORAL DAILY
Status: DISCONTINUED | OUTPATIENT
Start: 2018-10-22 | End: 2018-10-24 | Stop reason: HOSPADM

## 2018-10-22 RX ORDER — DIPHENHYDRAMINE HCL 25 MG
50 CAPSULE ORAL ONCE
Status: DISCONTINUED | OUTPATIENT
Start: 2018-10-23 | End: 2018-10-23

## 2018-10-22 RX ADMIN — DOCUSATE SODIUM 100 MG: 100 CAPSULE, LIQUID FILLED ORAL at 09:37

## 2018-10-22 RX ADMIN — TIOTROPIUM BROMIDE 18 MCG: 18 CAPSULE ORAL; RESPIRATORY (INHALATION) at 09:37

## 2018-10-22 RX ADMIN — CALCIUM ACETATE 1334 MG: 667 CAPSULE ORAL at 17:09

## 2018-10-22 RX ADMIN — PANTOPRAZOLE SODIUM 40 MG: 40 TABLET, DELAYED RELEASE ORAL at 09:37

## 2018-10-22 RX ADMIN — AMLODIPINE BESYLATE 10 MG: 5 TABLET ORAL at 09:00

## 2018-10-22 RX ADMIN — HYDRALAZINE HYDROCHLORIDE 50 MG: 50 TABLET, FILM COATED ORAL at 09:36

## 2018-10-22 RX ADMIN — Medication 10 ML: at 22:00

## 2018-10-22 RX ADMIN — CALCIUM ACETATE 1334 MG: 667 CAPSULE ORAL at 09:37

## 2018-10-22 RX ADMIN — LORAZEPAM 1 MG: 2 INJECTION, SOLUTION INTRAMUSCULAR; INTRAVENOUS at 20:16

## 2018-10-22 RX ADMIN — DOCUSATE SODIUM 100 MG: 100 CAPSULE, LIQUID FILLED ORAL at 17:08

## 2018-10-22 RX ADMIN — LORAZEPAM 2 MG: 2 INJECTION, SOLUTION INTRAMUSCULAR; INTRAVENOUS at 17:06

## 2018-10-22 RX ADMIN — FLUTICASONE PROPIONATE 2 SPRAY: 50 SPRAY, METERED NASAL at 09:41

## 2018-10-22 RX ADMIN — POLYETHYLENE GLYCOL 3350 17 G: 17 POWDER, FOR SOLUTION ORAL at 11:09

## 2018-10-22 RX ADMIN — HYDRALAZINE HYDROCHLORIDE 50 MG: 50 TABLET, FILM COATED ORAL at 21:47

## 2018-10-22 RX ADMIN — CALCIUM ACETATE 1334 MG: 667 CAPSULE ORAL at 11:09

## 2018-10-22 RX ADMIN — ATORVASTATIN CALCIUM 40 MG: 40 TABLET, FILM COATED ORAL at 21:47

## 2018-10-22 RX ADMIN — AMIODARONE HYDROCHLORIDE 400 MG: 200 TABLET ORAL at 09:36

## 2018-10-22 RX ADMIN — HYDRALAZINE HYDROCHLORIDE 50 MG: 50 TABLET, FILM COATED ORAL at 17:09

## 2018-10-22 RX ADMIN — PREDNISONE 60 MG: 20 TABLET ORAL at 09:37

## 2018-10-22 RX ADMIN — Medication 10 ML: at 06:30

## 2018-10-22 RX ADMIN — HYDRALAZINE HYDROCHLORIDE 20 MG: 20 INJECTION INTRAMUSCULAR; INTRAVENOUS at 06:29

## 2018-10-22 RX ADMIN — CALCITRIOL 0.5 MCG: 0.25 CAPSULE ORAL at 09:00

## 2018-10-22 RX ADMIN — LORAZEPAM 1 MG: 1 TABLET ORAL at 23:26

## 2018-10-22 RX ADMIN — Medication 10 ML: at 17:09

## 2018-10-22 RX ADMIN — BUPRENORPHINE HYDROCHLORIDE, NALOXONE HYDROCHLORIDE 1 FILM: 4; 1 FILM, SOLUBLE BUCCAL; SUBLINGUAL at 21:47

## 2018-10-22 RX ADMIN — BUPRENORPHINE HYDROCHLORIDE, NALOXONE HYDROCHLORIDE 1 FILM: 4; 1 FILM, SOLUBLE BUCCAL; SUBLINGUAL at 09:37

## 2018-10-22 RX ADMIN — AMLODIPINE BESYLATE 10 MG: 5 TABLET ORAL at 06:29

## 2018-10-22 RX ADMIN — ISOSORBIDE MONONITRATE 90 MG: 30 TABLET, EXTENDED RELEASE ORAL at 09:36

## 2018-10-22 NOTE — ROUTINE PROCESS
Bedside and Verbal shift change report given to JOHN Lira (oshncoming nurse) by Shena Ni RN (offgoing nurse). Report included the folowing information SBAR, Kardex, Intake/Output, MAR and Recent Results.

## 2018-10-22 NOTE — PROGRESS NOTES
attempted to  Complete a follow up visit with patient in room 2104 today  and a Spiritual assessment of patient but found the patient and family member resting peacefully at this time in a very dark room  Chaplains will continue to follow and will provide pastoral care on an as needed/requested basis Gen 3 Board Certified [employer] Spiritual Care  
[phone]

## 2018-10-22 NOTE — ROUTINE PROCESS
Bedside and Verbal shift change report given to Adelso Oh RN (oncoming nurse) by Alvaro Deleon RN 
 (offgoing nurse). Report included the following information SBAR, Kardex, MAR and Recent Results.

## 2018-10-22 NOTE — PROGRESS NOTES
2320 - Assumed pt care from [facility]. Pt in bed, alert and oriented x 4. Not in any form of distress. Denies pain. Frequent use items and call bell within reach. Verbalized understanding to call for assistance. Bed locked in lowest position. 0000 - Pt transferred to [facility] via wheel chair. Valuables with pt. 
 
0558 - Pt BP - 190/85, P - 85. Pt asymptomatic. Paged Dr. Fuller. 7114 - Dr. Fuller returned paged. Made aware of elevated BP than baseline. Order received to administer Hydralazine 20 mg IV once and to administer Norvasc 10 mg PO early. 4251 - Administered Hydralazine and Norvasc. 
 
0650 - BP - 133/70, P - 73.  
 
0735 - Bedside and Verbal shift change report given to JOHN Gauthier (oncoming nurse) by Booker Grimm RN (offgoing nurse). Report included the following information SBAR, Kardex, Intake/Output, MAR and Recent Results.

## 2018-10-22 NOTE — PROGRESS NOTES
[address] [place] [address] Hospitalist Division Inpatient Daily Progress Note Daily progress Note Patient: Wale Urbina MRN: [id number]  CSN: [id number] YOB: 1970  Age: 46 y.o. Sex: male DOA: 10/3/2018 LOS:  LOS: 19 days Chief Complaint:  Acute on chronic renal failure Interval History: 46 y.o.  male with h/o AICD,cardiac arrest,VT,ischemic cardiomyopathy EF 30%,obesity,cad,presented to ED because of weakness,nausea,vomting,abdominal pain,hematuria. Patient says that he started feeling sick 2 weeks ago when he started feeling weak. Then he started vomiting. Could not tolerate even water. In recent days ,he has had abdominal pain which he attributed to the ongoing vomiting. Patient also reported that he has seen his urine turning red for a while. Had to see his doctor in two weeks but felt very weak and decided to come to the ED. In ED,lab showed creatinine 11.0 compared to 2.12 on 1/11/2018 and 8.01 on 9/28/18. UA showed large blood,bacteria+. CT abd/pelvis w/o hydronephrosis or urolithiasis. Patient admitted for OLIVIA,Hematuria,UTI. Started on iv fluid. Nephrolohy consulted from ER. Since patient has GI complaint and renal involvement,nephrology has indicated that Henoch-Schonlein syndrome needs to be considered. Pt is on iv fluid. IgA elevated 648. Other serology for vasculitis including nathaniel,GBM ab,ANCA,C3,C4,heptitis profile not abnormal. Nephrology has indicated that if creatinine continue to rise over the weekend,patient will need dialysis. Today creat up to 12.00. Patient may also need kidney biopsy. Cardiology consulted significant cardiac history anticipating that he will have dialysis. Cardiology has determined that his cardiac status is stable. GI saw patient on 10/6 for ongoing symptoms. TDC catheter placed on 10/8. EGD on 10/9 showed chronic gastritis. HD on 10/9 and 10/10, next HD session on 10/12.  S/p kidney biopsy on 10/15- per Nephrology results- small specimen, only two glomeruli for light microscopy, none for immunofluorescence. 2 glomeruli with severe crescentic GN. 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 Started Cytoxan per Nephrology. Continues with plasmaphereses. Assessment/Plan:  
 
Patient Active Problem List  
Diagnosis Code  ST elevation (STEMI) myocardial infarction involving left anterior descending coronary artery ([facility]) I21.02  
 Cardiac arrest - ventricular fibrillation  HTN (hypertension) I10  
 Hypertensive emergency I16.1  Hyperglycemia R73.9  Acute on chronic renal failure (HCC) N17.9, N18.9  Leukocytosis D72.829  
 Acute pulmonary edema ([facility]) J81.0  Contrast dye induced nephropathy N14.1, T50.8X5A  
 CKD (chronic kidney disease) N18.9  Arachnoiditis G03.9  Postlaminectomy syndrome, lumbar region M96.1  Psoriasis L40.9  Degeneration of lumbar or lumbosacral intervertebral disc M51.37  
 Encounter for long-term (current) use of high-risk medication Z79.899  Obesity E66.9  
 Pain in joint, lower leg M25.569  Psoriatic arthropathy ([facility] [address] L40.50  Chronic low back pain M54.5, G89.29  Chronic pain syndrome G89.4  Lumbosacral spondylosis without myelopathy M47.817  Enthesopathy of hip M76.899  
 Sacroiliitis ([facility]) M46.1  Coronary artery disease I25.10  
 JESSICA (obstructive sleep apnea) G47.33  
 UTI (lower urinary tract infection) N39.0  Cardiomyopathy ([address] I42.9  AICD (automatic cardioverter/defibrillator) present Z95.810  
 OLIVIA (acute kidney injury) ([facility] [address] N17.9  Renal failure N19  
 Chest pain R07.9  
 SOB (shortness of breath) R06.02  Bronchitis J40  Acute renal failure (ARF) ([facility]) N17.9  ARF (acute renal failure) ([facility]) N17.9  Severe sepsis ([facility]) A41.9, R65.20  Epigastric abdominal pain R10.13  
 Anxiety F41.9  Ventricular tachycardia ([facility]) I47.2  Ventricular fibrillation (HCC) I49.01  
 ICD (implantable cardioverter-defibrillator) discharge Z45.02  
 CAD (coronary artery disease) I25.10  Obesity, morbid ([address] E66.01  
 Bacteria in urine A02.30  
 Metabolic acidosis L30.6 A/P: 
Acute on chronic renal failure/Metabolic acidosis -  
- nephrology following working diagnosis is microscopic polyangiitis  appreciate assistance 
- creatinine 11.00 POA 
 -Etiology:volume depletion -?acute GN as pt also hematuria. -Serology:IgA elevated. Other normal such as MANUELITO,ANCA,C3,C4,Hep profile. -s/p [facility] 10/8   
- hemodialysis TTS per Nephrology - s/p core biopsy lower pole right kidney on 10/15 - Per Nephrology small specimen, only two glomeruli for light microscopy, none for immunofluorescence. 2 glomeruli with severe crescentic GN. 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 
- Continue plasmapheresis tomorrow for a total of 7 courses per Nephrology  
- continue Rituximab - per Nephrology can be discharged tomorrow to continue dialysis [facility]wf at [facility] on [address] Dr. Dandre Goodrich in urine/UTI/Hematuria 
 -Ceftriaxone iv - completed 
 -Blood cx ng/Ucx negative 
 -Vasculitis serology non-revealing 
  
Secondary hyperparathyroidism  
 -On calcitriol 
   
Nausea/vomiting/abdominal pain 
 -CT reviewed,no acute process. -GI, Dr Tracie López saw patient on 10/6 - EGD 10/9, chronic gastritis 
   
HTN 
- BP uncontrolled  
 -Hydralazine - Norvasc  
   
JESSICA (obstructive sleep apnea) 
 -On oxygen 
   
Cardiomyopathy 
- EF 25-30% - ICD (implantable cardioverter-defibrillator) discharge CAD (coronary artery disease) 
 -Continue routine meds 
 -Telemetry monitoring 
 -Cardiology saw patient on 10/6 and determined that patient's cardiac status is stable and advised to continue his home meds DVT Prophylaxis - SCD's BLE Hx Psoriasis  
- on prednisone Constipation - Miralax every day Disposition - no recommendations from PT/OT, plan is home 
- need to determine if pt will continue with HD at discharge pending renal biopsy, if so would need outpatient chair secured- defer to nephrology Subjective: No complaints today Objective:  
  
Visit Vitals /85 (BP 1 Location: Left arm, BP Patient Position: At rest;Sitting) Pulse 85 Temp 97.9 °F (36.6 °C) Resp 17 Ht 5' 10\" (1.778 m) Wt 118.4 kg (261 lb) SpO2 98% BMI 37.45 kg/m² Physical Exam: 
General appearance: alert, cooperative, no distress, appears stated age, [facility] cath right Lungs: clear to auscultation throughout Heart: regular rate and rhythm, S1, S2 normal, no murmur, click, rub or gallop Abdomen: soft, non tender, non distended. Normoactive bowel sounds. Extremities: extremities normal, atraumatic, no cyanosis or edema Skin: flaky, dry Neurologic: Grossly normal 
PSY: Mood and affect normal, appropriately behaved Intake and Output: 
Current Shift:  No intake/output data recorded. Last three shifts:  10/20 1901 - 10/22 0700 In: 026 [P.O.:870] Out: - No results found for this or any previous visit (from the past 24 hour(s)). Lab Results Component Value Date/Time Glucose 138 (H) 10/21/2018 05:04 AM  
 Glucose 152 (H) 10/20/2018 04:28 AM  
 Glucose 162 (H) 10/19/2018 06:40 AM  
 Glucose 158 (H) 10/18/2018 08:10 AM  
 Glucose 85 10/17/2018 03:50 AM  
  
 
Imaging: No results found. Medication List Reviewed: 
Current Facility-Administered Medications Medication Dose Route Frequency  polyethylene glycol (MIRALAX) packet 17 g  17 g Oral DAILY PRN  
 docusate sodium (COLACE) capsule 100 mg  100 mg Oral BID  diphenhydrAMINE (BENADRYL) capsule 25 mg  25 mg Oral Q6H PRN  
 [START ON 10/23/2018] riTUXimab (RITUXAN) 908 mg in 0.9% sodium chloride 908 mL infusion  375 mg/m2 IntraVENous ONCE TITR  predniSONE (DELTASONE) tablet 60 mg  60 mg Oral DAILY WITH BREAKFAST  epoetin natanael (EPOGEN;PROCRIT) injection 10,000 Units  10,000 Units IntraVENous Q TUE, THU & SAT  buprenorphine-naloxone (SUBOXONE) 4mg-1mg SL film  1 Film SubLINGual BID  
 oxyCODONE IR (ROXICODONE) tablet 10 mg  10 mg Oral Q4H PRN  
 HYDROcodone-acetaminophen (NORCO) 7.5-325 mg per tablet 1 Tab  1 Tab Oral Q4H PRN  
 lidocaine 4 % patch 1 Patch  1 Patch TransDERmal Q24H  
 sodium chloride (NS) flush 5-10 mL  5-10 mL IntraVENous Q8H  
 sodium chloride (NS) flush 5-10 mL  5-10 mL IntraVENous PRN  
 LORazepam (ATIVAN) tablet 1 mg  1 mg Oral Q1H PRN Or  
 LORazepam (ATIVAN) injection 1 mg  1 mg IntraVENous Q1H PRN  
 LORazepam (ATIVAN) tablet 2 mg  2 mg Oral Q1H PRN Or  
 LORazepam (ATIVAN) injection 2 mg  2 mg IntraVENous Q1H PRN  
 LORazepam (ATIVAN) injection 3 mg  3 mg IntraVENous Q15MIN PRN  
 0.9% sodium chloride infusion  50 mL/hr IntraVENous DIALYSIS PRN  
 heparin (porcine) 1,000 unit/mL injection 1,000 Units  1,000 Units InterCATHeter DIALYSIS PRN  pantoprazole (PROTONIX) tablet 40 mg  40 mg Oral ACB  hydrALAZINE (APRESOLINE) tablet 50 mg  50 mg Oral TID  calcitRIOL (ROCALTROL) capsule 0.5 mcg  0.5 mcg Oral DAILY  promethazine (PHENERGAN) tablet 25 mg  25 mg Oral Q8H PRN  
 amLODIPine (NORVASC) tablet 10 mg  10 mg Oral DAILY  calcium acetate (PHOSLO) capsule 1,334 mg  2 Cap Oral TID WITH MEALS  influenza vaccine 2018-19 (6 mos+)(PF) (FLUARIX QUAD/FLULAVAL QUAD) injection 0.5 mL  0.5 mL IntraMUSCular PRIOR TO DISCHARGE  isosorbide mononitrate ER (IMDUR) tablet 90 mg  90 mg Oral DAILY  amiodarone (CORDARONE) tablet 400 mg  400 mg Oral DAILY  atorvastatin (LIPITOR) tablet 40 mg  40 mg Oral QHS  fluticasone (FLONASE) 50 mcg/actuation nasal spray 2 Spray  2 Spray Both Nostrils DAILY  tiotropium (SPIRIVA) inhalation capsule 18 mcg  1 Cap Inhalation DAILY  ondansetron (ZOFRAN) injection 4 mg  4 mg IntraVENous Q4H PRN  
  albuterol-ipratropium (DUO-NEB) 2.5 MG-0.5 MG/3 ML  3 mL Nebulization Q4H PRN

## 2018-10-22 NOTE — PROGRESS NOTES
. 
Acute HEmodialysis flow sheet Patient information Sydnie Ch MRN: [id number]      CFX:[id number]  [place]# BXME#[id number] Hospital: [place] [address] DX: unknown  
       []1st Time Acute  []Stat[x] Routine []Urgent []Chronic Unit  
       []Acute Room []Bedside  []ICU/CCU []ER Isolation Precautions: [x]Dialysis[] Airborne []Contact []Droplet []Reverse Special Considerations:    [] Blood Consent Verified  [x]N/A Allergies: 
[] NKA  [x] Reviewed Allergies Allergen Reactions  Latex Other (comments)  
  blisters  Other Food Hives Allergic to tomatoes and tomato sauce  Codeine Nausea and Vomiting Code Status [x]Full Code [] DNR  []Other Diet: renal Diabetic: [x]Yes []No 
  
 Hemodialysis Orders Physician: Diaz Garcia Dialyzer Revaclear 300 Duration 4 BFR: 350 DFR:800 Dialysate: K 3 CA 2.5 Na 140 Bicarb 35 Dry Weight: UF Goal: 4500 ml Heparin:  []Bolus   Units    []Hourly  Units      [x]None BP Tx:  []NS  200ml/Bolus    []Other     []N/A  
    Labs: Pre:  Post: []N/A Additional Orders: []N/A [x]Signed Treatment Consent   [x] Verified   [] Obtained Primary Nurse Report: First initial/ Last Name/Title Pre Dialysis: Vincenzo Norton RN    Time: 0367 Access CATHETER ACCESS:  [] N/A  [x] RIGHT  [] LEFT  [x] IJ  [] SUBCL [] FEM [] First use X-ray  [x] Tunnel     [] Non-Tunneled [x] No S/S infection  [] Redness [] Drainage  [] Cultured [] Swelling 
                       [] Pain  
                       [x] Medical Aseptic [] Prep Dressing Changed  
                       [] Clotted [] Patent []      Flows: [x] Good [] Poor [] Reversed          If Access Problem Dr. Adelaide Gillespie: [] Yes [] No    Date:    [x] N/A  
     GRAFT/FISTULA ACCESS:  [x] N/A  [] RIGHT  [] LEFT  [] UE  [] LE   
 [] AVG  [] AVF [] BUTTONHOLE  [] +BRUIT/THRILL 
     [] MEDICAL ASEPTIC PREP [] No S/S infection  [] Redness [] Drainage  [] Cultured [] Swelling 
                       [] Pain Needle Gauge                              Length: If Access Problem Dr. Ag Ball: [] Yes [] No    Date:     [] N/A General Assessment LUNGS:  SaO2%  [x] Clear [] Coarse [] Crackles [] Wheezing  
            [] Diminished Location: [] RLL [] LLL [] RUL [] NIKOS   
     COUGH:  [] Productive [] Dry [x] N/A  RESPIRATIONS: [x] Easy [] Labored THERAPY: [x] RA   [] NC     L/min    Mask: [] NRB [] Venti  O2%    
             [] Ventilator [] Intubated [] Trach [] BiPap [] CPap CARDIAC: [x] Regular [] Irregular [] Pericardial Rub [] JVD [x] Monitored Rhythm: EDEMA: [] None [x] Generalized [] Facial [] Pedal [] UE [] LE 
           [] Pitting [] 1 [] 2 [] 3 [] 4    [] Right [] Left [] Bilateral  
    SKIN:    [x] Warm [] Hot [] Cold  [] Dry [] Pale [] Diaphoretic  
           [] Flushed [] Jaundiced [] Cyanotic [] Rash [] Weeping LOC:    [x] Alert  Oriented to: [x] Person [x] Place [x] Time  
          [] Confused [] Lethargic [] Medically sedated [] Non-responsive GI/ABDOMEN: [] Flat [x] Distended [x] Soft [] Firm [] Obese [] Diarrhea 
 [] Bowel Sounds     []Nausea [] Vomiting PAIN: [x] 0 [] 1 [] 2 [] 3 [] 4 [] 5 [] 6 [] 7 [] 8 [] 9 [] 10 Scale 1-10 Action/Follow Up MOBILITY: [] Amb [] Amb/Assist [x] Bed  [] Wheelchair CUrrent LABS HBsAg ONLY: Date Drawn 10/6/18 [x] Negative [] Positive  [] Unknown. HBsAb: Date Drawn 10/6/18 [x] Susceptible <10 [] Immune ?10 [] Unknown Date of Current Labs:  
    
Lab Results Component Value Date/Time  Sodium 135 (L) 10/22/2018 08:59 AM  
 Sodium 135 (L) 10/22/2018 08:59 AM  
 Potassium 4.3 10/22/2018 08:59 AM  
 Potassium 4.3 10/22/2018 08:59 AM  
 Chloride 99 (L) 10/22/2018 08:59 AM  
 Chloride 99 (L) 10/22/2018 08:59 AM  
 CO2 26 10/22/2018 08:59 AM  
 CO2 25 10/22/2018 08:59 AM  
 BUN 60 (H) 10/22/2018 08:59 AM  
 BUN 60 (H) 10/22/2018 08:59 AM  
 Creatinine 6.83 (H) 10/22/2018 08:59 AM  
 Creatinine 6.97 (H) 10/22/2018 08:59 AM  
 Calcium 7.2 (L) 10/22/2018 08:59 AM  
 Calcium 7.4 (L) 10/22/2018 08:59 AM  
 Magnesium 2.0 12/27/2015 04:35 AM  
 Phosphorus 5.5 (H) 10/22/2018 08:59 AM  
 HCT 25.9 (L) 10/20/2018 04:28 AM  
 HGB 7.9 (L) 10/20/2018 04:28 AM  
 PLATELET 874 61/46/4721 04:28 AM  
 INR 1.2 10/08/2018 05:21 AM  
  
Education Person Educated: [x] Patient [] Family [] Other Knowledge base: [x] None [] Minimal [] Substantial   
     Barriers to learning  [x] None Preferred method of learning: [] Written [x] Oral [] Visual [] Hands on Topic: [] Access Care [] S&S of infection [] Fluid Management [] K+ 
               [x] Procedural    [] Albumin [] Medications [] Tx Options [] Transplant 
                [] Diet [] Other Teaching Tools: [x] Explain [] Demonstration [] Handout [] Teachback Ro/hemodiaylsis machine safety checks- before each treatment Machine Serial # 13 
 [] # 1 [id number] [] # 2 [id number] [] # 3 [id number] [] # 4 [id number] [] # 5 [id number] [] # [phone] [] # [phone] Alarm Test: [x] Pass Time 2034       RO Serial # 13 
[] [id number] (Large dual station RO) [] # 1  [id number]  (Portable # 1) [] # 2  [id number]  (Portable # 2) [] # 3  [id number]  (Portable # 3) [] # 4  [id number]  (Portable # 4) [] # 5  [id number]  (Portable # 5) Lot #s: Dialyzer [id number] exp. Tubing [id number] exp. Saline -jt exp. [x] RO/Machine Log Complete    [x] Extracorporeal circuit Tested for integrity Dialysate: pH 7.4 Temp. 36  Conductivity: Meter 14 HD Machine 14  
chlorine testing- before each treatment and every 4 hours Total Chlorine: [x] Less than 0.1 ppm Time: 1200 2nd Check Time: 1400 (If greater than 0.1 ppm from Primary then every 30 minutes from Secondary) treatment Iniation-with dialysis precautions [x] All Connections Secured   [x] Saline Line Double Clamped    
[x] Venous Parameters Set    [x] Arterial Parameters Set    [x] Prime Given 250 ml            [x] Air Foam Detector Engaged Lizeth Nurse Signature/Title_Shiraz Ernst__ Pre-Treatment Time 1200 B/P 132/92 HR 86 Temp. 98.5  Resp Rate 18 Pre Wt  
UF Calculations: Wt to lose:4000 ml(+) Oral:  ml(+) IV Meds/Fluids/Blood prods  ml(+) Prime/Rinse 50 ml 
(=)Total UF Goal 4500 mL Scale Type:[] Bed scale [] Sling Scale [] Wheel Chair Scale [x]  Not Ordered [] Unable to obtain pt on stretcher Tx Initiation Note: right IJ catheter accessed and treatment started without complicaton  
[x] Time Out/Safety Check  Time: 1436 Lizeth Signature/Title_Shiraz Ernst INTRADIALYTIC MONITORING 
(SEE ATTACHED FLOWSHEET) POST TREATMENT Time 1630 B/P 170/76 HR 62 Temp 98.4 Resp. Rate 18 Post wt Time Medication Dose Volume Route Initials [facility] Signatures Title Initials Time 52 [facility] RN PL 1630 Lizeth Signature/Title:Luis Ersnt_ Dialyzer cleared: [] Good [] Fair [] Poor    Blood Processed 67 Liters Net UF Removed 4000 mL Post Tx Access: AVF/AVG: Bleeding Stop                   Art. min Asohk min []+bruit/thrill Catheter: Locking Solution heparin     Art. 2.0 ml Ashok 2.1 ml 
 
Post Assessment:  Skin: [x]Warm []Dry []Diaphoretic []Flushed [] Pale []Cyanotic Lungs: [x]Clear []Coarse []Crackles []Wheezing Cardiac: [x]Regular []Irregular  []Monitored rhythm Edema: []None [x]General []Facial []Pedal  []UE []LE []RIGHT                     []LEFT    []Bilateral  
 Pain: [x]0 []1[]2 []3 []4 []5 []6 []7 []8 []9 []10  
POST Tx Note: removed 4 liters patient tolerated treatment Primary Nurse Report: First initial/Last name/Title Post Dialysis:DARELL Briones RN_         Time:_5327_ Abbreviations: AVG-arterial venous graft, AVF-arterial venous fistula, IJ-Internal Jugular, Subcl-Subclavian, Fem-Femoral, Tx-treatment, AP/HR-apical heart rate, DFR-dialysate flow rate, BFR-blood flow rate, AP-arterial pressure, -venous pressure, UF-ultrafiltrate, TMP-transmembrane pressure, Ashok-Venous, Art-Arterial, RO-Reverse Osmosis

## 2018-10-22 NOTE — PROGRESS NOTES
Problem: Falls - Risk of 
Goal: *Absence of Falls Document Jill Ash Fall Risk and appropriate interventions in the flowsheet. Outcome: Progressing Towards Goal 
Fall Risk Interventions: 
Mobility Interventions: Utilize walker, cane, or other assistive device, Patient to call before getting OOB Medication Interventions: Patient to call before getting OOB Elimination Interventions: Call light in reach, Patient to call for help with toileting needs History of Falls Interventions: Door open when patient unattended Problem: Pressure Injury - Risk of 
Goal: *Prevention of pressure injury Document Pranav Scale and appropriate interventions in the flowsheet. Outcome: Progressing Towards Goal 
Pressure Injury Interventions: Activity Interventions: Pressure redistribution bed/mattress(bed type) Mobility Interventions: Pressure redistribution bed/mattress (bed type), HOB 30 degrees or less Nutrition Interventions: Document food/fluid/supplement intake Friction and Shear Interventions: HOB 30 degrees or less

## 2018-10-22 NOTE — PROGRESS NOTES
Assumed care of pt from [facility] pt awake in bed A&O x 4 , no distress noted and denies pain. Frequently used items and call bell within reach. Patient verbalized understanding to use call bell for any needs or assistance. Bed locked in lowest position. 0935 Orders received to change Mirilax to daily. RBV. 1013 Orders received from Cesar Reilly from [facility] for albumin to be given 3750 Ml tomorrow am. RBV 
 
1658 Pt got back from dialysis and was crying and having some anxiety about his kidneys failing. Ativan to be given. 1733 Pt more relaxed and calm. 2005 Bedside and Verbal shift change report given to Jero eKrr RN (oncoming nurse) by Sven Irving RN (offgoing nurse). Report included the following information SBAR, Kardex, Intake/Output, MAR and Recent Results.

## 2018-10-22 NOTE — PROGRESS NOTES
RENAL PROGRESS NOTE Maricarmen Ortega Assessment/Plan: · OLIVIA with nephrotic proteinuria and hematuria. S/p  Kidney biopsy on 10/15. At this time the working diagnosis is ANCA neg microscopic polyangiitis. He has recived solumedrol 1 g daily x3, continue oral prednisone. Will arrange 2 nd plasmapheresis tomorrow with total course of 7 treatments. Will also start rituximab 375 mg/m2 with first infusion tomorrow after plasmapheresis. Meanwhile continue dialysis,  as an op he is set up to continue dialysis mwf at [place] Dr. Sybil Kaufman to be able to go home tomorrow after plasmapheresis and rituxan infusion. 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 Will also order screening for Fabry's disease. 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 Cases have been described of similar inclusions due to amiodarone. Will stop it, d/w Dr. Merlinda Howard. Patient does have aicd. · HTN, stable. Continue current regimen. Monitor bp with dialysis, volume removal.  
· N/V. Etio? CT finding noted. EGD results noted- diffuse gastritis. Resolved. · Secondary hyperparathyroidism/hyperphsphotemia. Continue calcitriol and phoslo. · Anemia of renal failure. Continue epogen. Subjective: 
Late entry, patient was seen at 12:30 pm on dialysis. Patient complaints off: Feel better, states has more energy with steroids. Withdrawal symptoms have resolved with getting restarted on suboxan. No SOB/CP. No n/v. Tolerates diet. Ambulates in the room. Patient Active Problem List  
Diagnosis Code  ST elevation (STEMI) myocardial infarction involving left anterior descending coronary artery (HCC) I21.02  
 Cardiac arrest - ventricular fibrillation  HTN (hypertension) I10  
 Hypertensive emergency I16.1  Hyperglycemia R73.9  Acute on chronic renal failure (HCC) N17.9, N18.9  Leukocytosis D72.829  
 Acute pulmonary edema (HCC) J81.0  Contrast dye induced nephropathy N14.1, T50.8X5A  
 CKD (chronic kidney disease) N18.9  Arachnoiditis G03.9  Postlaminectomy syndrome, lumbar region M96.1  Psoriasis L40.9  Degeneration of lumbar or lumbosacral intervertebral disc M51.37  
 Encounter for long-term (current) use of high-risk medication Z79.899  Obesity E66.9  
 Pain in joint, lower leg M25.569  Psoriatic arthropathy ([address] L40.50  Chronic low back pain M54.5, G89.29  Chronic pain syndrome G89.4  Lumbosacral spondylosis without myelopathy M47.817  Enthesopathy of hip M76.899  
 Sacroiliitis ([facility]) M46.1  Coronary artery disease I25.10  
 JESSICA (obstructive sleep apnea) G47.33  
 UTI (lower urinary tract infection) N39.0  Cardiomyopathy ([address] I42.9  AICD (automatic cardioverter/defibrillator) present Z95.810  
 OLIVIA (acute kidney injury) ([address] N17.9  Renal failure N19  
 Chest pain R07.9  
 SOB (shortness of breath) R06.02  Bronchitis J40  Acute renal failure (ARF) ([facility]) N17.9  ARF (acute renal failure) ([facility]) N17.9  Severe sepsis ([facility]) A41.9, R65.20  Epigastric abdominal pain R10.13  
 Anxiety F41.9  Ventricular tachycardia ([facility]) I47.2  Ventricular fibrillation ([facility]) I49.01  
 ICD (implantable cardioverter-defibrillator) discharge Z45.02  
 CAD (coronary artery disease) I25.10  Obesity, morbid ([address] E66.01  
 Bacteria in urine R61.17  
 Metabolic acidosis B77.0  Microscopic polyangiitis ([address] M31.7 Current Facility-Administered Medications Medication Dose Route Frequency Provider Last Rate Last Dose  polyethylene glycol (MIRALAX) packet 17 g  17 g Oral DAILY Eliane Sauceda MD   17 g at 10/22/18 1109  [START ON 10/23/2018] albumin human 5% (BUMINATE) solution 187.5 g  187.5 g IntraVENous ONCE Zenaida Sauceda MD      
  [START ON 10/23/2018] riTUXimab (RITUXAN) 900 mg in 0.9% sodium chloride 300 mL infusion  900 mg IntraVENous ONCE TITR Mónica Montes MD      
 [START ON 10/23/2018] acetaminophen (TYLENOL) tablet 650 mg  650 mg Oral ONCE Bulmaro BATEMAN MD      
 [START ON 10/23/2018] diphenhydrAMINE (BENADRYL) capsule 50 mg  50 mg Oral ONCE Bulmaro BATEMAN MD      
 [START ON 10/23/2018] meperidine (DEMEROL) injection 25 mg  25 mg IntraVENous Q20MIN PRN Mónica Montes MD      
 [START ON 10/23/2018] acetaminophen (TYLENOL) tablet 650 mg  650 mg Oral Q4H PRN Mónica Montes MD      
 [START ON 10/23/2018] diphenhydrAMINE (BENADRYL) injection 50 mg  50 mg IntraVENous Q4H PRN Michele Douglass MD      
 docusate sodium (COLACE) capsule 100 mg  100 mg Oral BID Angelita Campbell MD   100 mg at 10/22/18 1708  diphenhydrAMINE (BENADRYL) capsule 25 mg  25 mg Oral Q6H PRN Neita Lout H, DO   25 mg at 10/19/18 1232  predniSONE (DELTASONE) tablet 60 mg  60 mg Oral DAILY WITH BREAKFAST Mónica Montes MD   60 mg at 10/22/18 8439  epoetin natanael (EPOGEN;PROCRIT) injection 10,000 Units  10,000 Units IntraVENous Q TUE, THU & SAT Mónica Montes MD   10,000 Units at 10/18/18 1750  buprenorphine-naloxone (SUBOXONE) 4mg-1mg SL film  1 Film SubLINGual BID Mihaela Savin, DO   1 Film at 10/22/18 4065  
 oxyCODONE IR (ROXICODONE) tablet 10 mg  10 mg Oral Q4H PRN Mihaela Savin, DO   10 mg at 10/16/18 1529  
 HYDROcodone-acetaminophen (NORCO) 7.5-325 mg per tablet 1 Tab  1 Tab Oral Q4H PRN Argenis Schuster MD      
 lidocaine 4 % patch 1 Patch  1 Patch TransDERmal Q24H Chang Hughes MD   1 Patch at 10/21/18 2151  sodium chloride (NS) flush 5-10 mL  5-10 mL IntraVENous Q8H Bulmaro PIKE MD   10 mL at 10/22/18 1709  
 sodium chloride (NS) flush 5-10 mL  5-10 mL IntraVENous PRN Angelita Campbell MD      
  LORazepam (ATIVAN) tablet 1 mg  1 mg Oral Q1H PRN Svetlana Ray MD   1 mg at 10/10/18 1826 Or  LORazepam (ATIVAN) injection 1 mg  1 mg IntraVENous Q1H PRN Svetlana Ray MD   1 mg at 10/10/18 7151  LORazepam (ATIVAN) tablet 2 mg  2 mg Oral Q1H PRN Svetlana Ray MD   2 mg at 10/11/18 7340 Or  LORazepam (ATIVAN) injection 2 mg  2 mg IntraVENous Q1H PRN Svetlana Ray MD   2 mg at 10/22/18 1706  LORazepam (ATIVAN) injection 3 mg  3 mg IntraVENous Q15MIN PRN Svetlana Ray MD      
 0.9% sodium chloride infusion  50 mL/hr IntraVENous DIALYSIS PRN Joann Douglass MD      
 heparin (porcine) 1,000 unit/mL injection 1,000 Units  1,000 Units InterCATHeter DIALYSIS PRN Joann Douglass MD      
 pantoprazole (PROTONIX) tablet 40 mg  40 mg Oral ACB Riana Biggs MD   40 mg at 10/22/18 3510  hydrALAZINE (APRESOLINE) tablet 50 mg  50 mg Oral TID Susu Cerna MD   50 mg at 10/22/18 1709  calcitRIOL (ROCALTROL) capsule 0.5 mcg  0.5 mcg Oral DAILY Cristopher Kovacs MD   0.5 mcg at 10/22/18 0900  promethazine (PHENERGAN) tablet 25 mg  25 mg Oral Q8H PRN Riana Biggs MD   25 mg at 10/10/18 1441  
 amLODIPine (NORVASC) tablet 10 mg  10 mg Oral DAILY Petrona Ortiz MD   10 mg at 10/22/18 0900  calcium acetate (PHOSLO) capsule 1,334 mg  2 Cap Oral TID WITH MEALS Joann Douglass MD   1,334 mg at 10/22/18 1709  
 influenza vaccine 2018-19 (6 mos+)(PF) (FLUARIX QUAD/FLULAVAL QUAD) injection 0.5 mL  0.5 mL IntraMUSCular PRIOR TO DISCHARGE Riana Biggs MD      
 isosorbide mononitrate ER (IMDUR) tablet 90 mg  90 mg Oral DAILY Riana Biggs MD   90 mg at 10/22/18 0936  
 amiodarone (CORDARONE) tablet 400 mg  400 mg Oral DAILY Riana Biggs MD   400 mg at 10/22/18 0936  
 atorvastatin (LIPITOR) tablet 40 mg  40 mg Oral QHS Riana Biggs MD   40 mg at 10/21/18 2155  fluticasone (FLONASE) 50 mcg/actuation nasal spray 2 Spray  2 Spray Both Nostrils DAILY Melany Morgan MD   2 [place] at 10/22/18 8467  tiotropium (SPIRIVA) inhalation capsule 18 mcg  1 Cap Inhalation DAILY Melany Morgan MD   18 mcg at 10/22/18 0937  
 ondansetron (ZOFRAN) injection 4 mg  4 mg IntraVENous Q4H PRN Melany Morgan MD   4 mg at 10/13/18 2225  albuterol-ipratropium (DUO-NEB) 2.5 MG-0.5 MG/3 ML  3 mL Nebulization Q4H PRN Melany Morgan MD   3 mL at 10/18/18 1049 Objective Vitals:  
 10/22/18 1545 10/22/18 1600 10/22/18 1617 10/22/18 1618 BP: 162/76 159/79 166/71 170/76 Pulse: (!) 58 (!) 59 (!) 58 62 Resp: 18 18 18 18 Temp:    98.2 °F (36.8 °C) TempSrc:    Oral  
SpO2:      
Weight:      
Height:      
 
 
 
Intake/Output Summary (Last 24 hours) at 10/22/2018 1724 Last data filed at 10/22/2018 1617 Gross per 24 hour Intake  Output 4000 ml Net -4000 ml Admission weight: Weight: 118.7 kg (261 lb 11 oz) (10/04/18 0633) Last Weight Metrics: 
Weight Loss Metrics 10/5/2018 2/14/2018 2/8/2018 1/11/2018 8/22/2017 8/20/2017 3/2/2017 Today's Wt 261 lb 250 lb 250 lb 286 lb 280 lb 258 lb 258 lb BMI 37.45 kg/m2 35.87 kg/m2 35.87 kg/m2 41.04 kg/m2 40.18 kg/m2 37.02 kg/m2 37.02 kg/m2 Physical Assessment:  
 
General: NAD, alert and oriented. Neck: No jvd. Rt ij tdc. LUNGS: Clear to Auscultation, No rales, rhonchi or wheezes. CVS EXM: S1, S2  RRR, no murmurs/gallops/rubs. Abdomen: nt/nd. Lower Extremities: 1+edema. Skin: extensive scaly rash ue/le. Lab CBC w/Diff Recent Labs 10/22/18 
5943 10/20/18 
8266 WBC 8.0 11.6 RBC 2.80* 3.00* HGB 7.5* 7.9*  
HCT 24.1* 25.9*  
 295 Chemistry Recent Labs 10/22/18 
5873 10/21/18 
3475 10/20/18 
0776 *  163* 138* 152* *  135* 137 134* K 4.3  4.3 4.0 4.6 CL 99*  99* 100 98* CO2 25  26 27 25 BUN 60*  60* 44* 49* CREA 6.97*  6.83* 5.40* 6.84* CA 7.4*  7.2* 7.3* 7.7* AGAP 11  10 10 11 BUCR 9*  9* 8* 7* ALB 2.5* 3.1* 2.4* PHOS 5.5* 4.3 4.8 Lab Results Component Value Date/Time Iron 60 10/18/2018 08:10 AM  
 TIBC 184 (L) 10/18/2018 08:10 AM  
 Iron % saturation 33 10/18/2018 08:10 AM  
 Ferritin 325 10/18/2018 08:10 AM  
  
Lab Results Component Value Date/Time Calcium 7.2 (L) 10/22/2018 08:59 AM  
 Calcium 7.4 (L) 10/22/2018 08:59 AM  
 Phosphorus 5.5 (H) 10/22/2018 08:59 AM  
  
 
Yuly Mayer M.D. [facility] Phone [phone] Pager [phone]

## 2018-10-22 NOTE — PROGRESS NOTES
Care Management Interventions PCP Verified by CM: Yes 
Palliative Care Criteria Met (RRAT>21 & CHF Dx)?: No 
Mode of Transport at Discharge: Other (see comment) Transition of Care Consult (CM Consult): Discharge Planning Discharge Durable Medical Equipment: No 
Physical Therapy Consult: No 
Occupational Therapy Consult: No 
Speech Therapy Consult: No 
Current Support Network: Lives with Spouse Confirm Follow Up Transport: Family Plan discussed with Pt/Family/Caregiver: Yes Discharge Location Discharge Placement: Home Chart reviewed, pt currently at dialysis. Will be having outpatient dialysis at [facility] on [address]  ([phone])  on M-W-F.  11:30-3:30 chair time. Pt needs to arrive at 11am for first visit. Pt will need to follow up with outpatient provider for his suboxone.

## 2018-10-22 NOTE — PROGRESS NOTES
Problem: Falls - Risk of 
Goal: *Absence of Falls Document Juan Manuel Bender Fall Risk and appropriate interventions in the flowsheet. Outcome: Progressing Towards Goal 
Fall Risk Interventions: 
Mobility Interventions: Utilize walker, cane, or other assistive device, Patient to call before getting OOB Medication Interventions: Patient to call before getting OOB Elimination Interventions: Call light in reach, Patient to call for help with toileting needs History of Falls Interventions: Door open when patient unattended Problem: Pressure Injury - Risk of 
Goal: *Prevention of pressure injury Document Pranav Scale and appropriate interventions in the flowsheet. Outcome: Progressing Towards Goal 
Pressure Injury Interventions: Activity Interventions: Pressure redistribution bed/mattress(bed type) Mobility Interventions: Pressure redistribution bed/mattress (bed type), HOB 30 degrees or less Nutrition Interventions: Document food/fluid/supplement intake Friction and Shear Interventions: HOB 30 degrees or less

## 2018-10-22 NOTE — PROGRESS NOTES
Shift Progress Note: Assumed care of patient in bed awake and quiet, no complaints offered. Uneventful evening. No s/s of distress or acute discomfort. Patient Vitals for the past 8 hrs: 
 Temp Pulse Resp BP SpO2  
10/21/18 1911 98.2 °F (36.8 °C) 63 18 125/65 97 %

## 2018-10-23 PROBLEM — R45.851 SUICIDE IDEATION: Status: ACTIVE | Noted: 2018-10-23

## 2018-10-23 PROBLEM — R45.851 SUICIDAL IDEATION: Status: ACTIVE | Noted: 2018-10-23

## 2018-10-23 LAB
ALBUMIN SERPL-MCNC: 2.5 G/DL (ref 3.4–5)
ANION GAP SERPL CALC-SCNC: 8 MMOL/L (ref 3–18)
BUN SERPL-MCNC: 44 MG/DL (ref 7–18)
BUN/CREAT SERPL: 8 (ref 12–20)
CALCIUM SERPL-MCNC: 7.2 MG/DL (ref 8.5–10.1)
CHLORIDE SERPL-SCNC: 101 MMOL/L (ref 100–108)
CO2 SERPL-SCNC: 28 MMOL/L (ref 21–32)
CREAT SERPL-MCNC: 5.28 MG/DL (ref 0.6–1.3)
ERYTHROCYTE [DISTWIDTH] IN BLOOD BY AUTOMATED COUNT: 17 % (ref 11.6–14.5)
FIBRINOGEN PPP-MCNC: 225 MG/DL (ref 210–451)
GLUCOSE SERPL-MCNC: 125 MG/DL (ref 74–99)
HBV SURFACE AB SER QL IA: POSITIVE
HBV SURFACE AB SERPL IA-ACNC: 178.71 MIU/ML
HCT VFR BLD AUTO: 24.9 % (ref 36–48)
HEP BS AB COMMENT,HBSAC: NORMAL
HGB BLD-MCNC: 7.6 G/DL (ref 13–16)
MCH RBC QN AUTO: 26.3 PG (ref 24–34)
MCHC RBC AUTO-ENTMCNC: 30.5 G/DL (ref 31–37)
MCV RBC AUTO: 86.2 FL (ref 74–97)
PHOSPHATE SERPL-MCNC: 5.1 MG/DL (ref 2.5–4.9)
PLATELET # BLD AUTO: 229 K/UL (ref 135–420)
PMV BLD AUTO: 9.3 FL (ref 9.2–11.8)
POTASSIUM SERPL-SCNC: 4 MMOL/L (ref 3.5–5.5)
RBC # BLD AUTO: 2.89 M/UL (ref 4.7–5.5)
SODIUM SERPL-SCNC: 137 MMOL/L (ref 136–145)
WBC # BLD AUTO: 9.3 K/UL (ref 4.6–13.2)

## 2018-10-23 PROCEDURE — P9045 ALBUMIN (HUMAN), 5%, 250 ML: HCPCS | Performed by: FAMILY MEDICINE

## 2018-10-23 PROCEDURE — 74011250637 HC RX REV CODE- 250/637: Performed by: INTERNAL MEDICINE

## 2018-10-23 PROCEDURE — 85384 FIBRINOGEN ACTIVITY: CPT | Performed by: INTERNAL MEDICINE

## 2018-10-23 PROCEDURE — 65660000000 HC RM CCU STEPDOWN

## 2018-10-23 PROCEDURE — 74011250636 HC RX REV CODE- 250/636: Performed by: FAMILY MEDICINE

## 2018-10-23 PROCEDURE — 85027 COMPLETE CBC AUTOMATED: CPT | Performed by: INTERNAL MEDICINE

## 2018-10-23 PROCEDURE — 74011636637 HC RX REV CODE- 636/637: Performed by: INTERNAL MEDICINE

## 2018-10-23 PROCEDURE — 36430 TRANSFUSION BLD/BLD COMPNT: CPT

## 2018-10-23 PROCEDURE — 80069 RENAL FUNCTION PANEL: CPT | Performed by: INTERNAL MEDICINE

## 2018-10-23 PROCEDURE — 74011000250 HC RX REV CODE- 250: Performed by: FAMILY MEDICINE

## 2018-10-23 PROCEDURE — P9059 PLASMA, FRZ BETWEEN 8-24HOUR: HCPCS | Performed by: INTERNAL MEDICINE

## 2018-10-23 PROCEDURE — 74011250637 HC RX REV CODE- 250/637: Performed by: HOSPITALIST

## 2018-10-23 PROCEDURE — 36415 COLL VENOUS BLD VENIPUNCTURE: CPT | Performed by: INTERNAL MEDICINE

## 2018-10-23 PROCEDURE — 74011250636 HC RX REV CODE- 250/636: Performed by: INTERNAL MEDICINE

## 2018-10-23 PROCEDURE — 74011000250 HC RX REV CODE- 250: Performed by: HOSPITALIST

## 2018-10-23 PROCEDURE — 74011250636 HC RX REV CODE- 250/636: Performed by: HOSPITALIST

## 2018-10-23 PROCEDURE — 74011000258 HC RX REV CODE- 258: Performed by: INTERNAL MEDICINE

## 2018-10-23 RX ORDER — DIPHENHYDRAMINE HCL 25 MG
50 CAPSULE ORAL ONCE
Status: COMPLETED | OUTPATIENT
Start: 2018-10-23 | End: 2018-10-23

## 2018-10-23 RX ORDER — ACETAMINOPHEN 325 MG/1
650 TABLET ORAL ONCE
Status: COMPLETED | OUTPATIENT
Start: 2018-10-23 | End: 2018-10-23

## 2018-10-23 RX ORDER — SODIUM CHLORIDE 9 MG/ML
250 INJECTION, SOLUTION INTRAVENOUS AS NEEDED
Status: DISCONTINUED | OUTPATIENT
Start: 2018-10-23 | End: 2018-10-24 | Stop reason: HOSPADM

## 2018-10-23 RX ORDER — DIPHENHYDRAMINE HYDROCHLORIDE 50 MG/ML
50 INJECTION, SOLUTION INTRAMUSCULAR; INTRAVENOUS
Status: ACTIVE | OUTPATIENT
Start: 2018-10-23 | End: 2018-10-24

## 2018-10-23 RX ORDER — ACETAMINOPHEN 325 MG/1
650 TABLET ORAL
Status: ACTIVE | OUTPATIENT
Start: 2018-10-23 | End: 2018-10-24

## 2018-10-23 RX ORDER — LORAZEPAM 2 MG/ML
1 INJECTION INTRAMUSCULAR
Status: DISCONTINUED | OUTPATIENT
Start: 2018-10-23 | End: 2018-10-24 | Stop reason: HOSPADM

## 2018-10-23 RX ADMIN — DOCUSATE SODIUM 100 MG: 100 CAPSULE, LIQUID FILLED ORAL at 17:39

## 2018-10-23 RX ADMIN — CALCIUM GLUCONATE 2 G: 94 INJECTION, SOLUTION INTRAVENOUS at 12:03

## 2018-10-23 RX ADMIN — CALCIUM ACETATE 1334 MG: 667 CAPSULE ORAL at 09:08

## 2018-10-23 RX ADMIN — AMLODIPINE BESYLATE 10 MG: 5 TABLET ORAL at 09:09

## 2018-10-23 RX ADMIN — HYDRALAZINE HYDROCHLORIDE 50 MG: 50 TABLET, FILM COATED ORAL at 22:40

## 2018-10-23 RX ADMIN — DOCUSATE SODIUM 100 MG: 100 CAPSULE, LIQUID FILLED ORAL at 09:09

## 2018-10-23 RX ADMIN — ACETAMINOPHEN 650 MG: 325 TABLET, FILM COATED ORAL at 19:43

## 2018-10-23 RX ADMIN — PANTOPRAZOLE SODIUM 40 MG: 40 TABLET, DELAYED RELEASE ORAL at 09:09

## 2018-10-23 RX ADMIN — CALCIUM ACETATE 1334 MG: 667 CAPSULE ORAL at 17:39

## 2018-10-23 RX ADMIN — Medication 10 ML: at 06:00

## 2018-10-23 RX ADMIN — BUPRENORPHINE HYDROCHLORIDE, NALOXONE HYDROCHLORIDE 1 FILM: 4; 1 FILM, SOLUBLE BUCCAL; SUBLINGUAL at 09:09

## 2018-10-23 RX ADMIN — ALBUMIN (HUMAN) 187.5 G: 12.5 INJECTION, SOLUTION INTRAVENOUS at 13:16

## 2018-10-23 RX ADMIN — CALCIUM ACETATE 1334 MG: 667 CAPSULE ORAL at 12:02

## 2018-10-23 RX ADMIN — PREDNISONE 60 MG: 20 TABLET ORAL at 09:08

## 2018-10-23 RX ADMIN — POLYETHYLENE GLYCOL 3350 17 G: 17 POWDER, FOR SOLUTION ORAL at 09:09

## 2018-10-23 RX ADMIN — DIPHENHYDRAMINE HYDROCHLORIDE 50 MG: 25 CAPSULE ORAL at 19:43

## 2018-10-23 RX ADMIN — TIOTROPIUM BROMIDE 18 MCG: 18 CAPSULE ORAL; RESPIRATORY (INHALATION) at 09:14

## 2018-10-23 RX ADMIN — SODIUM CHLORIDE 250 ML: 900 INJECTION, SOLUTION INTRAVENOUS at 21:00

## 2018-10-23 RX ADMIN — ISOSORBIDE MONONITRATE 90 MG: 30 TABLET, EXTENDED RELEASE ORAL at 09:08

## 2018-10-23 RX ADMIN — ACETAMINOPHEN 650 MG: 325 TABLET, FILM COATED ORAL at 13:16

## 2018-10-23 RX ADMIN — BUPRENORPHINE HYDROCHLORIDE, NALOXONE HYDROCHLORIDE 1 FILM: 4; 1 FILM, SOLUBLE BUCCAL; SUBLINGUAL at 22:59

## 2018-10-23 RX ADMIN — FLUTICASONE PROPIONATE 2 SPRAY: 50 SPRAY, METERED NASAL at 09:11

## 2018-10-23 RX ADMIN — DIPHENHYDRAMINE HYDROCHLORIDE 25 MG: 25 CAPSULE ORAL at 13:16

## 2018-10-23 RX ADMIN — RITUXIMAB 900 MG: 10 INJECTION, SOLUTION INTRAVENOUS at 21:20

## 2018-10-23 RX ADMIN — HYDRALAZINE HYDROCHLORIDE 50 MG: 50 TABLET, FILM COATED ORAL at 17:39

## 2018-10-23 RX ADMIN — Medication 10 ML: at 13:18

## 2018-10-23 RX ADMIN — LORAZEPAM 1 MG: 2 INJECTION, SOLUTION INTRAMUSCULAR; INTRAVENOUS at 12:02

## 2018-10-23 RX ADMIN — ATORVASTATIN CALCIUM 40 MG: 40 TABLET, FILM COATED ORAL at 22:40

## 2018-10-23 RX ADMIN — HYDRALAZINE HYDROCHLORIDE 50 MG: 50 TABLET, FILM COATED ORAL at 09:08

## 2018-10-23 RX ADMIN — CALCITRIOL 0.5 MCG: 0.25 CAPSULE ORAL at 09:00

## 2018-10-23 NOTE — PROGRESS NOTES
Bedside and Verbal shift change report given to Zaid Hernandez RN (oncoming nurse) by Caleb Rose RN (offgoing nurse). Report included the following information SBAR, Kardex, Intake/Output, MAR and Recent Results.

## 2018-10-23 NOTE — PROGRESS NOTES
Chart reviewed. Met with patient and spouse at bedside. Awaiting psych recommendation as had suicidal ideation last evening. OP dialysis is set up at [facility]S. Dr. RIBERA 11:30 chair time. Run 4 hours. To have first infusion of Rituximub today. Plasmapheresis # 2 today. Pt states he believes he will be getting plasmapharesis at the dialysis center but is unsure where is is getting the Rituximab infusions. Paged Dr Nadia Esquivel. He states that pt is set up to get his plasmapharesis at the [facility] [employer] [facility]. He will get his 2nd treatment here today and they will inform him of where he needs to go once discharged from the hospital. Dr Nadia Esquivel has indicated that the pt is set up to have his Rituximab at the [facility] and Dong Garcia has arranged this. Placed call to [facility] at [address]. Pt is scheduled for Oct 30th and they will notify patient with further details. Made patient aware. Core Measure diagnosis. Placed verbal order for [facility] visit. Psych consult not indicating IP and sitter discontinued. Recommends giving patient OP providers for follow-up. List of Psychiatrists given/CSB info/Suicide Hotline number given to patient. 6620--Discussed follow-up with OP provider for suboxone resumption. Patient states that he is followed by [facility] and is concerned that he can not be seen right away for this med. Spouse was calling [facility] now to see when they can see him. 9930--Spoke with patient/spouse who states that [facility] needs DC summary to review before can work patient in in the next 1-2 weeks. Asked permission to call [facility] to assist in coordinating getting patient his OP suboxone. [employer] psych and asked how we can assist in expediting patient getting his OP suboxone to make it a smooth transition so pt doesn't experience withdrawals and return to ED. They stated that pt has a lot of no shows.  Informed them that pt has been in [facility] since 10/3/18. Will fax medical information for review by [address] [place] [address] who will then make decision if they can accept patient again for suboxone program. Pt signed consent to release information and medical info faxed to [place]. Copy of consent given to patient. Copy placed on chart. Karen Ann RN Outcomes Manager 
[phone]

## 2018-10-23 NOTE — PROGRESS NOTES
Problem: Falls - Risk of 
Goal: *Absence of Falls Document Wilma Cummings Fall Risk and appropriate interventions in the flowsheet. Outcome: Progressing Towards Goal 
Fall Risk Interventions: 
Mobility Interventions: Patient to call before getting OOB Medication Interventions: Patient to call before getting OOB Elimination Interventions: Call light in reach History of Falls Interventions: Door open when patient unattended

## 2018-10-23 NOTE — PROGRESS NOTES
0715: Bedside and Verbal shift change report given to JOHN Gauthier (oncoming nurse) by JOHN Chavez (offgoing nurse). Report included the following information SBAR, Kardex and MAR.

## 2018-10-23 NOTE — CONSULTS
Location of patient: [facility] Location of provider: [place] This evaluation was conducted via telepsychiatry with the assistance of onsite staff. Reason for consult: suicidal statement History of Present Illness: 46 y.o. male with history of AICD, cardiac arrest, VT, ischemic cardiomyopathy, HTN, CAD, admitted with acute on chronic renal failure and UTI with hematuria. 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 Records indicate that since receiving this diagnosis, pt has been depressed and anxious, and last night he made comment that he would like a gun and family would be better off without him. Pt also had mentioned owning a gun the day before. Shortly after making suicidal comment, pt stated that the thought just crossed his mind, was not seriously contemplating suicide. Per RN, pt's wife is aware of these comments at this point. Pt has not shown signs of delirium. Per records, pt is on CIWA protocol but has not required any ativan until yesterday (which per notes was for anxiety only). On exam, pt reports that his statement got blown out of proportion. He states that the nurse is very nice, he understands she was just doing her job but denies at any time intending to harm himself. He recalls asking for gun but states that he was not being serious, was more sarcastic. She then asked him more details, and he answered honestly that since receiving the bad news of his illness, \"the thought crossed my mind\" of not wanting to live, due to concern that he would become a burden on his family. \"I see where it scared her\" but states that he was just upset about the news and those thoughts were fleeting and did not include any type of intent or plan. Pt reports that he had some anxiety yesterday but mostly is just wanting to get out of the hospital and get back home.  States that he feels better about things today, will continue with treatments and do the best he can. \"I think I'm handling it rather well for the news I got\". Looking forward to being back with his family. Pt adamantly denies any suicidal or homicidal ideations at this time. He also states that if he develops depression or SI, he will seek help, emergently if needed. However, states that he does not think that will happen. Past SI/Self harm: Pt denies Past HI/Violence: Pt denies Access to firearms: Wife owns a gun, \"I'm a big strong man, I don't need a gun\". Legal: 30 years ago, was arrested for possession of marijuana. Collateral: EMR, JOHN Gauthier, pt's wife Debra Vázquez at bedside. Wife states that pt's comment must have been misconstrued, and denies any concern that pt would try to harm himself or anyone else at any time. She recalls the conversations about owning a gun, which she was present for. States that if he made other comments, it was likely out of frustration or meant as a joke. She reports feeling safe with pt going home when he is medically ready. Upon request, she agrees to have her gun locked up in neighbor's safe to remove pt's access to the weapon. She also agrees to contact emergency services if there are any future concerns for suicidality or other safety issues. Psychiatric History/Treatment History: Pt denies inpatient or outpatient treatment. Drug/Alcohol History: No alcohol use in over 25 years, denies excess use at any time. Has history of opioid dependence after being prescribed oxycodone. Reports taking it properly but went into withdrawal when he stopped. Went to substance treatment for this. Now on suboxone. Used marijuana in remote past, but none in over 25 years. Denies other drug use. Medical History:  
Past Medical History:  
Diagnosis Date  AICD MEDTRONIC (single chamber)  Apical mural thrombus ECHO 3/14  Cardiac arrest ([facility] [address] 10/15 VT / VF ( ~20 ICD shocks ). No obstructive CAD on cath  Chronic back pain  Chronic kidney disease, stage III (moderate) ([facility])  Coronary artery disease LAD - 3.0 x 18mm VISION 7/13  Degenerative spine disease  Dyslipidemia  Hypertension  Ischemic cardiomyopathy EF 30%(10/15) , 40-45% (03/15),  EF 30-35% (3/14)  Ischemic VF   
 07/13 in setting of MI, DC cardioversion x4  Narcotic dependence ([address]  Noncompliance  Obesity  Psoriasis  S/P cardiac cath 10/15  Secondary hyperparathyroidism (of renal origin)  Tobacco abuse Medications & Freq:  
 
Current Facility-Administered Medications:  
  LORazepam (ATIVAN) injection 1 mg, 1 mg, IntraVENous, Q12H PRN, Eliane Sauceda MD 
  calcium gluconate 2 g in 0.9% sodium chloride 50 mL IVPB, 2 g, IntraVENous, DAILY PRN, Erika Douglass MD 
  0.9% sodium chloride infusion 250 mL, 250 mL, IntraVENous, PRN, Erika Douglass MD 
  0.9% sodium chloride infusion 250 mL, 250 mL, IntraVENous, PRN, Erika Douglass MD 
  riTUXimab (RITUXAN) 900 mg in 0.9% sodium chloride 300 mL infusion, 900 mg, IntraVENous, ONCE TITR, Erika Douglass MD 
  polyethylene glycol (MIRALAX) packet 17 g, 17 g, Oral, DAILY, Eliane Sauceda MD, 17 g at 10/23/18 0909 
  albumin human 5% (BUMINATE) solution 187.5 g, 187.5 g, IntraVENous, ONCE, Eliane Sauceda MD 
  acetaminophen (TYLENOL) tablet 650 mg, 650 mg, Oral, ONCE, Erika Douglass MD 
  diphenhydrAMINE (BENADRYL) capsule 50 mg, 50 mg, Oral, ONCE, Erika Douglass MD 
  meperidine (DEMEROL) injection 25 mg, 25 mg, IntraVENous, Q20MIN PRN, Erika Douglass MD 
  acetaminophen (TYLENOL) tablet 650 mg, 650 mg, Oral, Q4H PRN, Erika Douglass MD 
  diphenhydrAMINE (BENADRYL) injection 50 mg, 50 mg, IntraVENous, Q4H PRN, Erika Douglass MD 
  docusate sodium (COLACE) capsule 100 mg, 100 mg, Oral, BID, Isabel Marcy Ledesma MD, 100 mg at 10/23/18 7683   diphenhydrAMINE (BENADRYL) capsule 25 mg, 25 mg, Oral, Q6H PRN, [place] , DO, 25 mg at 10/19/18 1232   predniSONE (DELTASONE) tablet 60 mg, 60 mg, Oral, DAILY WITH BREAKFAST, Kayy Douglass MD, 60 mg at 10/23/18 5276   epoetin natanael (EPOGEN;PROCRIT) injection 10,000 Units, 10,000 Units, IntraVENous, Q TUE, THU & SAT, Kayy Douglass MD, 10,000 Units at 10/18/18 1750   buprenorphine-naloxone (SUBOXONE) 4mg-1mg SL film, 1 Film, SubLINGual, BID, [place] , DO, 1 Film at 10/23/18 0810 
  oxyCODONE IR (ROXICODONE) tablet 10 mg, 10 mg, Oral, Q4H PRN, [place] , DO, 10 mg at 10/16/18 1529 
  HYDROcodone-acetaminophen (NORCO) 7.5-325 mg per tablet 1 Tab, 1 Tab, Oral, Q4H PRN, Argenis Byrd MD 
  lidocaine 4 % patch 1 Patch, 1 Patch, TransDERmal, Q24H, Aiden Lai MD, 1 Patch at 10/22/18 2147 
  sodium chloride (NS) flush 5-10 mL, 5-10 mL, IntraVENous, Q8H, Flip Hall MD, 10 mL at 10/23/18 0600 
  sodium chloride (NS) flush 5-10 mL, 5-10 mL, IntraVENous, PRN, Esthela Babcock MD 
  LORazepam (ATIVAN) tablet 1 mg, 1 mg, Oral, Q1H PRN, 1 mg at 10/22/18 2326 **OR** LORazepam (ATIVAN) injection 1 mg, 1 mg, IntraVENous, Q1H PRN, Esthela Babcock MD, 1 mg at 10/22/18 2016   LORazepam (ATIVAN) tablet 2 mg, 2 mg, Oral, Q1H PRN, 2 mg at 10/11/18 0436 **OR** LORazepam (ATIVAN) injection 2 mg, 2 mg, IntraVENous, Q1H PRN, Esthela Babcock MD, 2 mg at 10/22/18 1706   LORazepam (ATIVAN) injection 3 mg, 3 mg, IntraVENous, Q15MIN PRN, Esthela Babcock MD 
  0.9% sodium chloride infusion, 50 mL/hr, IntraVENous, DIALYSIS PRN, Kayy Douglass MD 
  heparin (porcine) 1,000 unit/mL injection 1,000 Units, 1,000 Units, InterCATHeter, DIALYSIS PRN, Kayy Douglass MD 
  pantoprazole (PROTONIX) tablet 40 mg, 40 mg, Oral, ACBDevon Nsekenene, MD, 40 mg at 10/23/18 5430   hydrALAZINE (APRESOLINE) tablet 50 mg, 50 mg, Oral, TID, Swetha Kovacs MD, 50 mg at 10/23/18 9554   calcitRIOL (ROCALTROL) capsule 0.5 mcg, 0.5 mcg, Oral, DAILY, Swetha Kovacs MD, 0.5 mcg at 10/23/18 0900   promethazine (PHENERGAN) tablet 25 mg, 25 mg, Oral, Q8H PRN, Kenyetta Osorio MD, 25 mg at 10/10/18 1441 
  amLODIPine (NORVASC) tablet 10 mg, 10 mg, Oral, DAILY, Michele Douglass MD, 10 mg at 10/23/18 2363   calcium acetate (PHOSLO) capsule 1,334 mg, 2 Cap, Oral, TID WITH MEALS, Michele Douglass MD, 1,334 mg at 10/23/18 8523   influenza vaccine 2018-19 (6 mos+)(PF) (FLUARIX QUAD/FLULAVAL QUAD) injection 0.5 mL, 0.5 mL, IntraMUSCular, PRIOR TO DISCHARGE, Kenyetta Osorio MD 
  isosorbide mononitrate ER (IMDUR) tablet 90 mg, 90 mg, Oral, DAILY, Kenyetta Osorio MD, 90 mg at 10/23/18 3325   atorvastatin (LIPITOR) tablet 40 mg, 40 mg, Oral, QHS, Kenyetta Osorio MD, 40 mg at 10/22/18 2147   fluticasone (FLONASE) 50 mcg/actuation nasal spray 2 Spray, 2 Spray, Both Nostrils, DAILY, Kenyetta Osorio MD, 2 [place] at 10/23/18 6179   tiotropium (SPIRIVA) inhalation capsule 18 mcg, 1 Cap, Inhalation, DAILY, Kenyetta Osorio MD, 18 mcg at 10/23/18 6035   ondansetron (ZOFRAN) injection 4 mg, 4 mg, IntraVENous, Q4H PRN, Kenyetta Hogue MD, 4 mg at 10/13/18 2225   albuterol-ipratropium (DUO-NEB) 2.5 MG-0.5 MG/3 ML, 3 mL, Nebulization, Q4H PRN, Janyce Hodgkin, MD, 3 mL at 10/18/18 8934 Allergies: Allergies Allergen Reactions  Latex Other (comments)  
  blisters  Other Food Hives Allergic to tomatoes and tomato sauce  Codeine Nausea and Vomiting Family Psych History/History of suicide: Pt denies Social History: , lives with wife and daughter age 16, and pt's brother. Has 2 other children ages 22 and 21, who live in [facility] [place] with their mother (pt's first wife). Education: 10th grade Employment: Worked as  for 30 years until back injury, now on disability. Stressors: Medical issues Strengths/supports: Good family support Mental Status Exam:  
Appearance and attire: well-groomed, casually dressed, reclined in bed Attitude and behavior: Pleasant, calm, cooperative; good eye contact Speech: Normal rate/volume/tone Mood: Near euthymic currently Affect: Appropriate Association and thought processes: Linear, logical, goal-directed Thought content: Denies SI or HI Perception: No evidence of delusions or hallucinations Sensorium and orientation: Alert, oriented x 4 Memory and intellectual functioning: Grossly intact Insight and judgment: Fair Impression/Risk Assessment: 46year old male with history of AICD, cardiac arrest, VT, ischemic cardiomyopathy, HTN, CAD, admitted with acute on chronic renal failure and UTI with hematuria. 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 Pt made comment about suicide but states that this was not serious. He does admit to fleeting thoughts of not wanting to live with this illness, but it passed quickly and did not involve intent or plan. Pt has no prior psychiatric history, no history of suicide attempts or violence. He is future-oriented, has excellent family support, not living alone, willing to pursue treatment if depression develops. Collateral from wife indicates no safety concerns at this time, and she reports feeling comfortable taking him home when medically clear. Wife agrees to lock up firearm at different location. Pt is [place] for safety at this time. Based on current exam and collateral obtained, imminent risk of harm to self or others is low. Diagnosis: F43.20  Unspecified adjustment disorder Treatment Recommendations: 1. Disposition: medically admitted, does not meet criteria for inpatient psychiatric treatment currently. Can discontinue sitter at this time. 2. Psychiatric medications: no recommendations at this time. 3. Staff to please provide pt with resources for outpatient psychotherapy and psychiatry services, as well as any local crisis resources, for future reference. 4. Pt/wife to call 911 or go to nearest ED if at any time pt is feeling suicidal or otherwise unsafe. The above recommendations were discussed with pt and wife who expressed understanding/agreement.

## 2018-10-23 NOTE — PROGRESS NOTES
Assumed care of pt from JOHN Chavez pt awake in bed A&O x 4 , no distress noted and denies pain. Frequently used items and call bell within reach. Patient verbalized understanding to use call bell for any needs or assistance. Bed locked in lowest position. Pt has sitter at bedside. Manuel ULLOA paged in regards to where pt is going to receive the chemo infusion. He stated that he wants it to be done in the hospital and that pharmacy is to decide. 1004 This nurse was informed of infusion later tonight around 8.  
 
[facility] staff stated that pt has 5 more transfusion to be done on Tuesday and Thursdays. [phone] Pt concerned about Suboxon prescription. He states that he wont be able to see his pain Doctor for another week or two and he is concerned he will have to suffer withdrawals when he leaves. Pharmacy stated that only certain Doctors can prescribe the medication. Md paged.

## 2018-10-23 NOTE — PROGRESS NOTES
RENAL PROGRESS NOTE Kailey Avila Assessment/Plan: · OLIVIA with nephrotic proteinuria and hematuria. S/p  Kidney biopsy on 10/15. At this time the working diagnosis is ANCA neg microscopic polyangiitis. S/p  solumedrol 1 g daily x3, continue oral prednisone. Will arrange 2 nd plasmapheresis today with total course of 7 treatments. Will also start rituximab 375 mg/m2 with first infusion today after plasmapheresis. Meanwhile continue dialysis,  as an op he is set up to continue dialysis [facility] at [address]- if d/darwin first treatment there tomorrow at 10:45 am. Unit's address and phone # given to the patient. 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 Screening for Fabry's disease is pending. 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 Cases have been described of similar inclusions due to amiodarone (stopped). · HTN, stable. Continue current regimen. Monitor bp with dialysis, volume removal.  
· N/V. Etio? CT finding noted. EGD results noted- diffuse gastritis. Resolved. · Secondary hyperparathyroidism/hyperphsphotemia. Continue phoslo. D/c calcitriol, hectorol will be given as op unit. · Anemia of renal failure. DINESH will be continued at op unit. · Suicidal ideation. Psychiatry consult is pending. Subjective: 
Patient complaints off: Feel fair. Withdrawal symptoms have resolved with getting restarted on suboxan. No SOB/CP. No n/v. Tolerates diet. Ambulates in the room. C/o feeling depressed due to his medical condition. Was suicidal yesterday evening but at this time states it was a misunderstanding. Patient Active Problem List  
Diagnosis Code  ST elevation (STEMI) myocardial infarction involving left anterior descending coronary artery (HCC) I21.02  
 Cardiac arrest - ventricular fibrillation  HTN (hypertension) I10  
 Hypertensive emergency I16.1  Hyperglycemia R73.9  Acute on chronic renal failure (HCC) N17.9, N18.9  Leukocytosis D72.829  
 Acute pulmonary edema (HCC) J81.0  Contrast dye induced nephropathy N14.1, T50.8X5A  
 CKD (chronic kidney disease) N18.9  Arachnoiditis G03.9  Postlaminectomy syndrome, lumbar region M96.1  Psoriasis L40.9  Degeneration of lumbar or lumbosacral intervertebral disc M51.37  
 Encounter for long-term (current) use of high-risk medication Z79.899  Obesity E66.9  
 Pain in joint, lower leg M25.569  Psoriatic arthropathy ([facility] [address] L40.50  Chronic low back pain M54.5, G89.29  Chronic pain syndrome G89.4  Lumbosacral spondylosis without myelopathy M47.817  Enthesopathy of hip M76.899  
 Sacroiliitis ([facility]) M46.1  Coronary artery disease I25.10  
 JESSICA (obstructive sleep apnea) G47.33  
 UTI (lower urinary tract infection) N39.0  Cardiomyopathy ([facility] [address] I42.9  AICD (automatic cardioverter/defibrillator) present Z95.810  
 OLIVIA (acute kidney injury) ([facility] [address] N17.9  Renal failure N19  
 Chest pain R07.9  
 SOB (shortness of breath) R06.02  Bronchitis J40  Acute renal failure (ARF) (HCC) N17.9  ARF (acute renal failure) ([facility]) N17.9  Severe sepsis ([facility]) A41.9, R65.20  Epigastric abdominal pain R10.13  
 Anxiety F41.9  Ventricular tachycardia ([facility]) I47.2  Ventricular fibrillation ([facility]) I49.01  
 ICD (implantable cardioverter-defibrillator) discharge Z45.02  
 CAD (coronary artery disease) I25.10  Obesity, morbid ([facility] [address] E66.01  
 Bacteria in urine H36.00  
 Metabolic acidosis G66.8  Microscopic polyangiitis ([facility] [address] M31.7  Suicide ideation R45.851  Suicidal ideation R45.851 Current Facility-Administered Medications Medication Dose Route Frequency Provider Last Rate Last Dose  LORazepam (ATIVAN) injection 1 mg  1 mg IntraVENous Q12H PRN Geeta Booze, MD      
  calcium gluconate 2 g in 0.9% sodium chloride 50 mL IVPB  2 g IntraVENous DAILY PRN Odell Douglass MD      
 0.9% sodium chloride infusion 250 mL  250 mL IntraVENous PRN Odell Douglass MD      
 0.9% sodium chloride infusion 250 mL  250 mL IntraVENous PRN Augustine BATEMAN MD      
 polyethylene glycol (MIRALAX) packet 17 g  17 g Oral DAILY Eliane Sauceda MD   17 g at 10/23/18 0909  
 albumin human 5% (BUMINATE) solution 187.5 g  187.5 g IntraVENous ONCE Eliane Sauceda MD      
 riTUXimab (RITUXAN) 900 mg in 0.9% sodium chloride 300 mL infusion  900 mg IntraVENous ONCE TITR Odell Douglass MD      
 acetaminophen (TYLENOL) tablet 650 mg  650 mg Oral ONCE Odlel Douglass MD      
 diphenhydrAMINE (BENADRYL) capsule 50 mg  50 mg Oral ONCE Odell Douglass MD      
 meperidine (DEMEROL) injection 25 mg  25 mg IntraVENous Q20MIN PRN Leoncio Hillman MD      
 acetaminophen (TYLENOL) tablet 650 mg  650 mg Oral Q4H PRN Odell Douglass MD      
 diphenhydrAMINE (BENADRYL) injection 50 mg  50 mg IntraVENous Q4H PRN Odell Douglass MD      
 docusate sodium (COLACE) capsule 100 mg  100 mg Oral BID Tyler Becerra MD   100 mg at 10/23/18 7707  diphenhydrAMINE (BENADRYL) capsule 25 mg  25 mg Oral Q6H PRN Siri Ojeda H, DO   25 mg at 10/19/18 1232  predniSONE (DELTASONE) tablet 60 mg  60 mg Oral DAILY WITH BREAKFAST Leoncio Hillman MD   60 mg at 10/23/18 8544  epoetin natanael (EPOGEN;PROCRIT) injection 10,000 Units  10,000 Units IntraVENous Q TUE, THU & SAT Leoncio Hillman MD   10,000 Units at 10/18/18 1750  buprenorphine-naloxone (SUBOXONE) 4mg-1mg SL film  1 Film SubLINGual BID Kathalene Mix, DO   1 Film at 10/23/18 6865  
 oxyCODONE IR (ROXICODONE) tablet 10 mg  10 mg Oral Q4H PRN Kathalene Mix, DO   10 mg at 10/16/18 1529  
 HYDROcodone-acetaminophen (NORCO) 7.5-325 mg per tablet 1 Tab  1 Tab Oral Q4H PRN Susanne WAITE MD      
 lidocaine 4 % patch 1 Patch  1 Patch TransDERmal Q24H Aide Disla MD   1 Patch at 10/22/18 2147  
 sodium chloride (NS) flush 5-10 mL  5-10 mL IntraVENous Q8H Salma Urrutia MD   10 mL at 10/23/18 0600  
 sodium chloride (NS) flush 5-10 mL  5-10 mL IntraVENous PRN Salma Urrutia MD      
 LORazepam (ATIVAN) tablet 1 mg  1 mg Oral Q1H PRN Salma Urrutia MD   1 mg at 10/22/18 2326 Or  LORazepam (ATIVAN) injection 1 mg  1 mg IntraVENous Q1H JADEN Salma Urrutia MD   1 mg at 10/22/18 2016  LORazepam (ATIVAN) tablet 2 mg  2 mg Oral Q1H JADEN Salma Urrutia MD   2 mg at 10/11/18 7564 Or  LORazepam (ATIVAN) injection 2 mg  2 mg IntraVENous Q1H TERESITA Urrutia MD   2 mg at 10/22/18 1706  LORazepam (ATIVAN) injection 3 mg  3 mg IntraVENous Q15MIN PRN Salma Urrutia MD      
 0.9% sodium chloride infusion  50 mL/hr IntraVENous DIALYSIS PRN Georgina Douglass MD      
 heparin (porcine) 1,000 unit/mL injection 1,000 Units  1,000 Units InterCATHeter DIALYSIS PRN Georgina Douglass MD      
 pantoprazole (PROTONIX) tablet 40 mg  40 mg Oral ACB Dorian Garcia MD   40 mg at 10/23/18 4100  hydrALAZINE (APRESOLINE) tablet 50 mg  50 mg Oral TID Roshni Baeza MD   50 mg at 10/23/18 8627  calcitRIOL (ROCALTROL) capsule 0.5 mcg  0.5 mcg Oral DAILY Charlene Kovacs MD   0.5 mcg at 10/23/18 0900  promethazine (PHENERGAN) tablet 25 mg  25 mg Oral Q8H PRN Dorian Garcia MD   25 mg at 10/10/18 1441  
 amLODIPine (NORVASC) tablet 10 mg  10 mg Oral DAILY Girish Scott MD   10 mg at 10/23/18 1724  calcium acetate (PHOSLO) capsule 1,334 mg  2 Cap Oral TID WITH MEALS Georgina Douglass MD   1,334 mg at 10/23/18 8417  influenza vaccine 2018-19 (6 mos+)(PF) (FLUARIX QUAD/FLULAVAL QUAD) injection 0.5 mL  0.5 mL IntraMUSCular PRIOR TO DISCHARGE Dorian Garcia MD      
  isosorbide mononitrate ER (IMDUR) tablet 90 mg  90 mg Oral DAILY Liz Gallegos MD   90 mg at 10/23/18 1737  atorvastatin (LIPITOR) tablet 40 mg  40 mg Oral QHS Liz Gallegos MD   40 mg at 10/22/18 2147  fluticasone (FLONASE) 50 mcg/actuation nasal spray 2 Spray  2 Spray Both Nostrils DAILY Liz Gallegos MD   2 [place] at 10/23/18 3874  tiotropium (SPIRIVA) inhalation capsule 18 mcg  1 Cap Inhalation DAILY Liz Gallegos MD   18 mcg at 10/23/18 3418  ondansetron (ZOFRAN) injection 4 mg  4 mg IntraVENous Q4H PRN Liz Gallegos MD   4 mg at 10/13/18 2225  albuterol-ipratropium (DUO-NEB) 2.5 MG-0.5 MG/3 ML  3 mL Nebulization Q4H PRN Liz Gallegos MD   3 mL at 10/18/18 2550 Objective Vitals:  
 10/22/18 6998 10/22/18 2145 10/23/18 2122 10/23/18 3502 BP: 170/76 128/72 143/71 148/73 Pulse: 62 64 73 63 Resp: 18 16 18 18 Temp: 98.2 °F (36.8 °C) 98 °F (36.7 °C) 98.2 °F (36.8 °C) 98.2 °F (36.8 °C) TempSrc: Oral     
SpO2:  98% 94% 95% Weight:      
Height:      
 
 
 
Intake/Output Summary (Last 24 hours) at 10/23/2018 7670 Last data filed at 10/22/2018 1803 Gross per 24 hour Intake 480 ml Output 4000 ml Net -3520 ml Admission weight: Weight: 118.7 kg (261 lb 11 oz) (10/04/18 0202) Last Weight Metrics: 
Weight Loss Metrics 10/5/2018 2/14/2018 2/8/2018 1/11/2018 8/22/2017 8/20/2017 3/2/2017 Today's Wt 261 lb 250 lb 250 lb 286 lb 280 lb 258 lb 258 lb BMI 37.45 kg/m2 35.87 kg/m2 35.87 kg/m2 41.04 kg/m2 40.18 kg/m2 37.02 kg/m2 37.02 kg/m2 Physical Assessment:  
 
General: NAD, alert and oriented. Neck: No jvd. Rt ij tdc. LUNGS: Clear to Auscultation, No rales, rhonchi or wheezes. CVS EXM: S1, S2  RRR, no murmurs/gallops/rubs. Abdomen: nt/nd. Lower Extremities: 1+edema. Skin: extensive scaly rash ue/le. Lab CBC w/Diff Recent Labs 10/23/18 
5368 10/22/18 
7012 WBC 9.3 8.0  
RBC 2.89* 2.80* HGB 7.6* 7.5*  
 HCT 24.9* 24.1*  
 214 Chemistry Recent Labs 10/23/18 
3672 10/22/18 
0164 10/21/18 
8967 * 165*  163* 138*  135*  135* 137  
K 4.0 4.3  4.3 4.0  
 99*  99* 100 CO2 28 25  26 27 BUN 44* 60*  60* 44* CREA 5.28* 6.97*  6.83* 5.40* CA 7.2* 7.4*  7.2* 7.3* AGAP 8 11  10 10 BUCR 8* 9*  9* 8* ALB 2.5* 2.5* 3.1*  
PHOS 5.1* 5.5* 4.3 Lab Results Component Value Date/Time Iron 60 10/18/2018 08:10 AM  
 TIBC 184 (L) 10/18/2018 08:10 AM  
 Iron % saturation 33 10/18/2018 08:10 AM  
 Ferritin 325 10/18/2018 08:10 AM  
  
Lab Results Component Value Date/Time Calcium 7.2 (L) 10/23/2018 04:20 AM  
 Phosphorus 5.1 (H) 10/23/2018 04:20 AM  
  
 
Demarco Yuen M.D. [facility] Phone [phone] Pager [phone]

## 2018-10-23 NOTE — PROGRESS NOTES
[facility] referral called to [facility]  Intake rep, Missael Bermudez , and sent to them via [address] as a pending dc. Care Management Interventions PCP Verified by CM: Yes 
Palliative Care Criteria Met (RRAT>21 & CHF Dx)?: No 
Mode of Transport at Discharge: Other (see comment) Transition of Care Consult (CM Consult): Home Health, Other([facility]) [facility] - INPATIENT: Yes Discharge Durable Medical Equipment: No 
Physical Therapy Consult: No 
Occupational Therapy Consult: No 
Speech Therapy Consult: No 
Current Support Network: Lives with Spouse Confirm Follow Up Transport: Family Plan discussed with Pt/Family/Caregiver: Yes Discharge Location Discharge Placement: Home with home health

## 2018-10-23 NOTE — PROGRESS NOTES
Informed by nurse of patient's new expression of suicidal ideation. Suicide precautions and psych consult ordered.

## 2018-10-23 NOTE — PROGRESS NOTES
Problem: Falls - Risk of 
Goal: *Absence of Falls Document Katevenessa Powerer Fall Risk and appropriate interventions in the flowsheet. Outcome: Progressing Towards Goal 
Fall Risk Interventions: 
Mobility Interventions: Patient to call before getting OOB Medication Interventions: Patient to call before getting OOB Elimination Interventions: Call light in reach History of Falls Interventions: Door open when patient unattended Problem: Pressure Injury - Risk of 
Goal: *Prevention of pressure injury Document Pranav Scale and appropriate interventions in the flowsheet. Outcome: Progressing Towards Goal 
Pressure Injury Interventions: Activity Interventions: Pressure redistribution bed/mattress(bed type) Mobility Interventions: Pressure redistribution bed/mattress (bed type) Nutrition Interventions: Document food/fluid/supplement intake Friction and Shear Interventions: HOB 30 degrees or less

## 2018-10-23 NOTE — PROGRESS NOTES
2005: Assumed patient care. Received report from [facility] 89, [facility] (offgoing nurse). Report included SBAR, Kardex, and MAR. Patient in bed, wife at bedside. Upon entering room, patient appears to be in distress. Hands visibly shaking, rigid posture, restless and agitated - drumming fingers nonstop on bedside table, facial expressions appear anxious. Wife and patient asking for medication to aid patient in relaxing 2010: Dr. Humaira Reyes at nurses [facility]. Informed doctor of patient's distress, that ativan is ordered for CIWA, asked if patient may received ativan for anxiety as well. Doctor Humaira Reyes stated this is acceptable. To give ativan as though CIWA. 2015: Patient's anxiety resulting in 14 on CIWA, 1 mg ativan given. Will reassess. 2145: Patient appears less anxious - relaxed posture, slowed breathing, slight tremor still noted. Scheduled suboxone administered. Will reassess anxiety 2327: Patient continues to appear extremely anxious exhibited by visibly shaking hands and rapid, ragged breathing. When asked if anxious patient stated yes. Patient expressing concern over what will become of family members if he is to pass and expresses \"I lived a full life, i'm worried about my wife and children, but i've lived a full life. I would have loved to meet my grandchildren though\" CIWA score 10. 1 mg ativan PO administered. Will reassess. 0015: Patient appears significantly more relaxed, slight tremor still noted. Patient states he is more relaxed, denied the need for further medication. Will continue to monitor patient 7123: When asked if this nurse could get anything for patient, patient stated \"a gun\". When asked if patient has had serious contemplations of suicide, patient stated \"I don't know at this point I feel as though my family would be better off that way. What point is there to be around like this\". Patient stated to this nurse earlier in shift that he owns a gun. This nurse asked wife to remove patient's personal swiss army knife from room. 18:  Paged Nursing supervisor and Dr. Giovanny Lima regarding above 0: Spoke with patient. Patient states he does not have any future intent \"the thought just crossed my mind. You asked and I was honest\". Explained to patient the severity of making comments suggesting suicide ideations. Additionally explained to patient the precautions taken once suicide ideations have been stated including one on one precautions and a psychiatry consultation. Patient verbalized understanding.  
 
0501: Informed Zenaida and Dr. Giovanny Lima of patient's statements. 7824: Telepsych fax and confirmation in patient's chart

## 2018-10-23 NOTE — PROGRESS NOTES
[address] [place], [place] [address]ty [facility] Hospitalist Division Inpatient Daily Progress Note Daily progress Note Patient: Mouna Damon MRN: [id number]  [facility]: [id number] YOB: 1970  Age: 46 y.o. Sex: male DOA: 10/3/2018 LOS:  LOS: 20 days Chief Complaint:  Acute on chronic renal failure Interval History: 46 y.o.  male with h/o AICD,cardiac arrest,VT,ischemic cardiomyopathy EF 30%,obesity,cad,presented to ED because of weakness,nausea,vomting,abdominal pain,hematuria. Patient says that he started feeling sick 2 weeks ago when he started feeling weak. Then he started vomiting. Could not tolerate even water. In recent days ,he has had abdominal pain which he attributed to the ongoing vomiting. Patient also reported that he has seen his urine turning red for a while. Had to see his doctor in two weeks but felt very weak and decided to come to the ED. In ED,lab showed creatinine 11.0 compared to 2.12 on 1/11/2018 and 8.01 on 9/28/18. UA showed large blood,bacteria+. CT abd/pelvis w/o hydronephrosis or urolithiasis. Patient admitted for OLIVIA,Hematuria,UTI. Started on iv fluid. Nephrolohy consulted from ER. Since patient has GI complaint and renal involvement,nephrology has indicated that Henoch-Schonlein syndrome needs to be considered. Pt is on iv fluid. IgA elevated 648. Other serology for vasculitis including nathaniel,GBM ab,ANCA,C3,C4,heptitis profile not abnormal. Nephrology has indicated that if creatinine continue to rise over the weekend,patient will need dialysis. Today creat up to 12.00. Patient may also need kidney biopsy. Cardiology consulted significant cardiac history anticipating that he will have dialysis. Cardiology has determined that his cardiac status is stable. GI saw patient on 10/6 for ongoing symptoms. TDC catheter placed on 10/8. EGD on 10/9 showed chronic gastritis. HD on 10/9 and 10/10, next HD session on 10/12.  S/p kidney biopsy on 10/15- per Nephrology results- small specimen, only two glomeruli for light microscopy, none for immunofluorescence. 2 glomeruli with severe crescentic GN. 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 Started Cytoxan per Nephrology. Continues with plasmaphereses. Patient last evening was anxious but denies depression. He expressed to the nurse that he wanted to harm himself with a gun . He denies intent to harm self to me or harm anyone else. He however is anxious due to his new diagnosis. Tele psychiatry has been consulted and patient has a 1:1 sitter. Assessment/Plan:  
 
Patient Active Problem List  
Diagnosis Code  ST elevation (STEMI) myocardial infarction involving left anterior descending coronary artery ([facility]) I21.02  
 Cardiac arrest - ventricular fibrillation  HTN (hypertension) I10  
 Hypertensive emergency I16.1  Hyperglycemia R73.9  Acute on chronic renal failure (HCC) N17.9, N18.9  Leukocytosis D72.829  
 Acute pulmonary edema ([facility]) J81.0  Contrast dye induced nephropathy N14.1, T50.8X5A  
 CKD (chronic kidney disease) N18.9  Arachnoiditis G03.9  Postlaminectomy syndrome, lumbar region M96.1  Psoriasis L40.9  Degeneration of lumbar or lumbosacral intervertebral disc M51.37  
 Encounter for long-term (current) use of high-risk medication Z79.899  Obesity E66.9  
 Pain in joint, lower leg M25.569  Psoriatic arthropathy ([address] L40.50  Chronic low back pain M54.5, G89.29  Chronic pain syndrome G89.4  Lumbosacral spondylosis without myelopathy M47.817  Enthesopathy of hip M76.899  
 Sacroiliitis ([facility]) M46.1  Coronary artery disease I25.10  
 JESSICA (obstructive sleep apnea) G47.33  
 UTI (lower urinary tract infection) N39.0  Cardiomyopathy ([address] I42.9  AICD (automatic cardioverter/defibrillator) present Z95.810  
 OLIVIA (acute kidney injury) ([address] N17.9  Renal failure N19  
 Chest pain R07.9  SOB (shortness of breath) R06.02  Bronchitis J40  Acute renal failure (ARF) ([facility]) N17.9  ARF (acute renal failure) ([facility]) N17.9  Severe sepsis ([facility]) A41.9, R65.20  Epigastric abdominal pain R10.13  
 Anxiety F41.9  Ventricular tachycardia ([facility]) I47.2  Ventricular fibrillation ([facility]) I49.01  
 ICD (implantable cardioverter-defibrillator) discharge Z45.02  
 CAD (coronary artery disease) I25.10  Obesity, morbid ([facility] E66.01  
 Bacteria in urine A67.65  
 Metabolic acidosis K70.7  Microscopic polyangiitis ([facility] M31.7  Suicide ideation R45.851  Suicidal ideation R45.851 A/P: 
Acute on chronic renal failure/Metabolic acidosis -  
- nephrology following working diagnosis is microscopic polyangiitis  appreciate assistance 
- creatinine 11.00 POA 
 -Etiology:volume depletion -?acute GN as pt also hematuria. -Serology:IgA elevated. Other normal such as MANUELITO,ANCA,C3,C4,Hep profile. -s/p [facility] 10/8   
- hemodialysis TTS per Nephrology - s/p core biopsy lower pole right kidney on 10/15 - Per Nephrology small specimen, only two glomeruli for light microscopy, none for immunofluorescence. 2 glomeruli with severe crescentic GN. 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 
- Continue plasmapheresis tomorrow for a total of 7 courses per Nephrology  
- continue Rituximab - per Nephrology can be discharged tomorrow to continue dialysis mwf at [facility] on [facility] [address]. Alfredo Ramirez in urine/UTI/Hematuria 
 -Ceftriaxone iv - completed 
 -Blood cx ng/Ucx negative 
 -Vasculitis serology non-revealing 
  
Secondary hyperparathyroidism  
 -On calcitriol 
   
Nausea/vomiting/abdominal pain 
 -CT reviewed,no acute process. -GI, Dr Carol Mendoza saw patient on 10/6 - EGD 10/9, chronic gastritis 
   
HTN 
- BP uncontrolled  
 -Hydralazine - Norvasc  
   
JESSICA (obstructive sleep apnea) 
 -On oxygen 
   
Cardiomyopathy 
- EF 25-30% - ICD (implantable cardioverter-defibrillator) discharge CAD (coronary artery disease) 
 -Continue routine meds 
 -Telemetry monitoring 
 -Cardiology saw patient on 10/6 and determined that patient's cardiac status is stable and advised to continue his home meds DVT Prophylaxis - SCD's BLE Hx Psoriasis  
- on prednisone Constipation - Miralax every day Suicidal ideation  
- sitter 1:1 
- tele psychiatry consulted Anxiety  
- ativan as needed Disposition 
- no recommendations from PT/OT, plan is home 
- home today if cleared by  psych Subjective: No complaints today Objective:  
  
Visit Vitals /71 Pulse 73 Temp 98.2 °F (36.8 °C) Resp 18 Ht 5' 10\" (1.778 m) Wt 118.4 kg (261 lb) SpO2 94% BMI 37.45 kg/m² Physical Exam: 
General appearance: alert, cooperative, no distress, appears stated age, [facility] cath right Lungs: clear to auscultation throughout Heart: regular rate and rhythm, S1, S2 normal, no murmur, click, rub or gallop Abdomen: soft, non tender, non distended. Normoactive bowel sounds. Extremities: extremities normal, atraumatic, no cyanosis or edema Skin: flaky, dry Neurologic: Grossly normal 
PSY: Mood and affect normal, appropriately behaved Intake and Output: 
Current Shift:  No intake/output data recorded. Last three shifts:  10/21 1901 - 10/23 0700 In: 480 [P.O.:480] Out: 4000 Recent Results (from the past 24 hour(s)) RENAL FUNCTION PANEL Collection Time: 10/22/18  8:59 AM  
Result Value Ref Range Sodium 135 (L) 136 - 145 mmol/L Potassium 4.3 3.5 - 5.5 mmol/L Chloride 99 (L) 100 - 108 mmol/L  
 CO2 26 21 - 32 mmol/L Anion gap 10 3.0 - 18 mmol/L Glucose 163 (H) 74 - 99 mg/dL BUN 60 (H) 7.0 - 18 MG/DL Creatinine 6.83 (H) 0.6 - 1.3 MG/DL  
 BUN/Creatinine ratio 9 (L) 12 - 20 GFR est AA 10 (L) >60 ml/min/1.73m2 GFR est non-AA 9 (L) >60 ml/min/1.73m2 Calcium 7.2 (L) 8.5 - 10.1 MG/DL Phosphorus 5.5 (H) 2.5 - 4.9 MG/DL Albumin 2.5 (L) 3.4 - 5.0 g/dL FIBRINOGEN Collection Time: 10/22/18  8:59 AM  
Result Value Ref Range Fibrinogen 244 210 - 451 mg/dL CBC W/O DIFF Collection Time: 10/22/18  8:59 AM  
Result Value Ref Range WBC 8.0 4.6 - 13.2 K/uL  
 RBC 2.80 (L) 4.70 - 5.50 M/uL HGB 7.5 (L) 13.0 - 16.0 g/dL HCT 24.1 (L) 36.0 - 48.0 % MCV 86.1 74.0 - 97.0 FL  
 MCH 26.8 24.0 - 34.0 PG  
 MCHC 31.1 31.0 - 37.0 g/dL  
 RDW 16.8 (H) 11.6 - 14.5 % PLATELET 078 010 - 264 K/uL MPV 8.6 (L) 9.2 - 24.2 FL  
METABOLIC PANEL, BASIC Collection Time: 10/22/18  8:59 AM  
Result Value Ref Range Sodium 135 (L) 136 - 145 mmol/L Potassium 4.3 3.5 - 5.5 mmol/L Chloride 99 (L) 100 - 108 mmol/L  
 CO2 25 21 - 32 mmol/L Anion gap 11 3.0 - 18 mmol/L Glucose 165 (H) 74 - 99 mg/dL BUN 60 (H) 7.0 - 18 MG/DL Creatinine 6.97 (H) 0.6 - 1.3 MG/DL  
 BUN/Creatinine ratio 9 (L) 12 - 20 GFR est AA 10 (L) >60 ml/min/1.73m2 GFR est non-AA 8 (L) >60 ml/min/1.73m2 Calcium 7.4 (L) 8.5 - 10.1 MG/DL RENAL FUNCTION PANEL Collection Time: 10/23/18  4:20 AM  
Result Value Ref Range Sodium 137 136 - 145 mmol/L Potassium 4.0 3.5 - 5.5 mmol/L Chloride 101 100 - 108 mmol/L  
 CO2 28 21 - 32 mmol/L Anion gap 8 3.0 - 18 mmol/L Glucose 125 (H) 74 - 99 mg/dL BUN 44 (H) 7.0 - 18 MG/DL Creatinine 5.28 (H) 0.6 - 1.3 MG/DL  
 BUN/Creatinine ratio 8 (L) 12 - 20 GFR est AA 14 (L) >60 ml/min/1.73m2 GFR est non-AA 12 (L) >60 ml/min/1.73m2 Calcium 7.2 (L) 8.5 - 10.1 MG/DL Phosphorus 5.1 (H) 2.5 - 4.9 MG/DL Albumin 2.5 (L) 3.4 - 5.0 g/dL FIBRINOGEN Collection Time: 10/23/18  4:20 AM  
Result Value Ref Range Fibrinogen 225 210 - 451 mg/dL CBC W/O DIFF Collection Time: 10/23/18  4:20 AM  
Result Value Ref Range WBC 9.3 4.6 - 13.2 K/uL  
 RBC 2.89 (L) 4.70 - 5.50 M/uL HGB 7.6 (L) 13.0 - 16.0 g/dL HCT 24.9 (L) 36.0 - 48.0 % MCV 86.2 74.0 - 97.0 FL  
 MCH 26.3 24.0 - 34.0 PG  
 MCHC 30.5 (L) 31.0 - 37.0 g/dL  
 RDW 17.0 (H) 11.6 - 14.5 % PLATELET 634 381 - 164 K/uL MPV 9.3 9.2 - 11.8 FL Lab Results Component Value Date/Time Glucose 125 (H) 10/23/2018 04:20 AM  
 Glucose 163 (H) 10/22/2018 08:59 AM  
 Glucose 165 (H) 10/22/2018 08:59 AM  
 Glucose 138 (H) 10/21/2018 05:04 AM  
 Glucose 152 (H) 10/20/2018 04:28 AM  
  
 
Imaging: No results found. Medication List Reviewed: 
Current Facility-Administered Medications Medication Dose Route Frequency  polyethylene glycol (MIRALAX) packet 17 g  17 g Oral DAILY  albumin human 5% (BUMINATE) solution 187.5 g  187.5 g IntraVENous ONCE  riTUXimab (RITUXAN) 900 mg in 0.9% sodium chloride 300 mL infusion  900 mg IntraVENous ONCE TITR  
 acetaminophen (TYLENOL) tablet 650 mg  650 mg Oral ONCE  
 diphenhydrAMINE (BENADRYL) capsule 50 mg  50 mg Oral ONCE  
 meperidine (DEMEROL) injection 25 mg  25 mg IntraVENous Q20MIN PRN  
 acetaminophen (TYLENOL) tablet 650 mg  650 mg Oral Q4H PRN  
 diphenhydrAMINE (BENADRYL) injection 50 mg  50 mg IntraVENous Q4H PRN  
 docusate sodium (COLACE) capsule 100 mg  100 mg Oral BID  diphenhydrAMINE (BENADRYL) capsule 25 mg  25 mg Oral Q6H PRN  predniSONE (DELTASONE) tablet 60 mg  60 mg Oral DAILY WITH BREAKFAST  epoetin natanael (EPOGEN;PROCRIT) injection 10,000 Units  10,000 Units IntraVENous Q TUE, THU & SAT  buprenorphine-naloxone (SUBOXONE) 4mg-1mg SL film  1 Film SubLINGual BID  
 oxyCODONE IR (ROXICODONE) tablet 10 mg  10 mg Oral Q4H PRN  
 HYDROcodone-acetaminophen (NORCO) 7.5-325 mg per tablet 1 Tab  1 Tab Oral Q4H PRN  
 lidocaine 4 % patch 1 Patch  1 Patch TransDERmal Q24H  
 sodium chloride (NS) flush 5-10 mL  5-10 mL IntraVENous Q8H  
 sodium chloride (NS) flush 5-10 mL  5-10 mL IntraVENous PRN  
  LORazepam (ATIVAN) tablet 1 mg  1 mg Oral Q1H PRN Or  
 LORazepam (ATIVAN) injection 1 mg  1 mg IntraVENous Q1H PRN  
 LORazepam (ATIVAN) tablet 2 mg  2 mg Oral Q1H PRN Or  
 LORazepam (ATIVAN) injection 2 mg  2 mg IntraVENous Q1H PRN  
 LORazepam (ATIVAN) injection 3 mg  3 mg IntraVENous Q15MIN PRN  
 0.9% sodium chloride infusion  50 mL/hr IntraVENous DIALYSIS PRN  
 heparin (porcine) 1,000 unit/mL injection 1,000 Units  1,000 Units InterCATHeter DIALYSIS PRN  pantoprazole (PROTONIX) tablet 40 mg  40 mg Oral ACB  hydrALAZINE (APRESOLINE) tablet 50 mg  50 mg Oral TID  calcitRIOL (ROCALTROL) capsule 0.5 mcg  0.5 mcg Oral DAILY  promethazine (PHENERGAN) tablet 25 mg  25 mg Oral Q8H PRN  
 amLODIPine (NORVASC) tablet 10 mg  10 mg Oral DAILY  calcium acetate (PHOSLO) capsule 1,334 mg  2 Cap Oral TID WITH MEALS  influenza vaccine 2018-19 (6 mos+)(PF) (FLUARIX QUAD/FLULAVAL QUAD) injection 0.5 mL  0.5 mL IntraMUSCular PRIOR TO DISCHARGE  isosorbide mononitrate ER (IMDUR) tablet 90 mg  90 mg Oral DAILY  atorvastatin (LIPITOR) tablet 40 mg  40 mg Oral QHS  fluticasone (FLONASE) 50 mcg/actuation nasal spray 2 Spray  2 Spray Both Nostrils DAILY  tiotropium (SPIRIVA) inhalation capsule 18 mcg  1 Cap Inhalation DAILY  ondansetron (ZOFRAN) injection 4 mg  4 mg IntraVENous Q4H PRN  
 albuterol-ipratropium (DUO-NEB) 2.5 MG-0.5 MG/3 ML  3 mL Nebulization Q4H PRN

## 2018-10-24 ENCOUNTER — HOME HEALTH ADMISSION (OUTPATIENT)
Dept: HOME HEALTH SERVICES | Facility: HOME HEALTH | Age: 48
End: 2018-10-24

## 2018-10-24 VITALS
HEIGHT: 70 IN | DIASTOLIC BLOOD PRESSURE: 80 MMHG | RESPIRATION RATE: 18 BRPM | BODY MASS INDEX: 37.97 KG/M2 | WEIGHT: 265.21 LBS | SYSTOLIC BLOOD PRESSURE: 152 MMHG | OXYGEN SATURATION: 95 % | HEART RATE: 78 BPM | TEMPERATURE: 97.7 F

## 2018-10-24 PROBLEM — K29.70 GASTRITIS: Status: ACTIVE | Noted: 2018-10-24

## 2018-10-24 LAB
ALBUMIN SERPL-MCNC: 3.1 G/DL (ref 3.4–5)
ANION GAP SERPL CALC-SCNC: 11 MMOL/L (ref 3–18)
BUN SERPL-MCNC: 51 MG/DL (ref 7–18)
BUN/CREAT SERPL: 8 (ref 12–20)
CALCIUM SERPL-MCNC: 6.9 MG/DL (ref 8.5–10.1)
CHLORIDE SERPL-SCNC: 103 MMOL/L (ref 100–108)
CO2 SERPL-SCNC: 24 MMOL/L (ref 21–32)
CREAT SERPL-MCNC: 6.1 MG/DL (ref 0.6–1.3)
ERYTHROCYTE [DISTWIDTH] IN BLOOD BY AUTOMATED COUNT: 17.3 % (ref 11.6–14.5)
EST. AVERAGE GLUCOSE BLD GHB EST-MCNC: 105 MG/DL
FIBRINOGEN PPP-MCNC: 160 MG/DL (ref 210–451)
GLUCOSE SERPL-MCNC: 208 MG/DL (ref 74–99)
HBA1C MFR BLD: 5.3 % (ref 4.2–5.6)
HCT VFR BLD AUTO: 23.7 % (ref 36–48)
HGB BLD-MCNC: 7.3 G/DL (ref 13–16)
MCH RBC QN AUTO: 26.5 PG (ref 24–34)
MCHC RBC AUTO-ENTMCNC: 30.8 G/DL (ref 31–37)
MCV RBC AUTO: 86.2 FL (ref 74–97)
PHOSPHATE SERPL-MCNC: 5.5 MG/DL (ref 2.5–4.9)
PLATELET # BLD AUTO: 211 K/UL (ref 135–420)
PMV BLD AUTO: 9.5 FL (ref 9.2–11.8)
POTASSIUM SERPL-SCNC: 3.9 MMOL/L (ref 3.5–5.5)
RBC # BLD AUTO: 2.75 M/UL (ref 4.7–5.5)
SODIUM SERPL-SCNC: 138 MMOL/L (ref 136–145)
WBC # BLD AUTO: 10.5 K/UL (ref 4.6–13.2)

## 2018-10-24 PROCEDURE — 80069 RENAL FUNCTION PANEL: CPT | Performed by: INTERNAL MEDICINE

## 2018-10-24 PROCEDURE — 74011000250 HC RX REV CODE- 250: Performed by: INTERNAL MEDICINE

## 2018-10-24 PROCEDURE — 85384 FIBRINOGEN ACTIVITY: CPT | Performed by: INTERNAL MEDICINE

## 2018-10-24 PROCEDURE — 74011250637 HC RX REV CODE- 250/637: Performed by: INTERNAL MEDICINE

## 2018-10-24 PROCEDURE — 74011250636 HC RX REV CODE- 250/636: Performed by: FAMILY MEDICINE

## 2018-10-24 PROCEDURE — 74011250636 HC RX REV CODE- 250/636: Performed by: HOSPITALIST

## 2018-10-24 PROCEDURE — 90935 HEMODIALYSIS ONE EVALUATION: CPT

## 2018-10-24 PROCEDURE — 74011250636 HC RX REV CODE- 250/636: Performed by: INTERNAL MEDICINE

## 2018-10-24 PROCEDURE — 83036 HEMOGLOBIN GLYCOSYLATED A1C: CPT | Performed by: FAMILY MEDICINE

## 2018-10-24 PROCEDURE — 90686 IIV4 VACC NO PRSV 0.5 ML IM: CPT | Performed by: INTERNAL MEDICINE

## 2018-10-24 PROCEDURE — 74011636637 HC RX REV CODE- 636/637: Performed by: INTERNAL MEDICINE

## 2018-10-24 PROCEDURE — 36415 COLL VENOUS BLD VENIPUNCTURE: CPT | Performed by: INTERNAL MEDICINE

## 2018-10-24 PROCEDURE — 85027 COMPLETE CBC AUTOMATED: CPT | Performed by: INTERNAL MEDICINE

## 2018-10-24 PROCEDURE — 74011000250 HC RX REV CODE- 250: Performed by: FAMILY MEDICINE

## 2018-10-24 PROCEDURE — 77030029684 HC NEB SM VOL KT MONA -A

## 2018-10-24 PROCEDURE — 74011250637 HC RX REV CODE- 250/637: Performed by: HOSPITALIST

## 2018-10-24 PROCEDURE — 90471 IMMUNIZATION ADMIN: CPT

## 2018-10-24 RX ORDER — CALCIUM ACETATE 667 MG/1
2 CAPSULE ORAL
Qty: 90 CAP | Refills: 0 | Status: SHIPPED | OUTPATIENT
Start: 2018-10-24

## 2018-10-24 RX ORDER — AMLODIPINE BESYLATE 10 MG/1
10 TABLET ORAL DAILY
Qty: 30 TAB | Refills: 0 | Status: SHIPPED | OUTPATIENT
Start: 2018-10-24

## 2018-10-24 RX ORDER — POLYETHYLENE GLYCOL 3350 17 G/17G
17 POWDER, FOR SOLUTION ORAL DAILY
Qty: 30 PACKET | Refills: 0 | Status: SHIPPED | OUTPATIENT
Start: 2018-10-24

## 2018-10-24 RX ORDER — MAGNESIUM SULFATE 100 %
4 CRYSTALS MISCELLANEOUS AS NEEDED
Status: DISCONTINUED | OUTPATIENT
Start: 2018-10-24 | End: 2018-10-24 | Stop reason: HOSPADM

## 2018-10-24 RX ORDER — DEXTROSE MONOHYDRATE 25 G/50ML
25-50 INJECTION, SOLUTION INTRAVENOUS AS NEEDED
Status: DISCONTINUED | OUTPATIENT
Start: 2018-10-24 | End: 2018-10-24 | Stop reason: HOSPADM

## 2018-10-24 RX ORDER — HYDRALAZINE HYDROCHLORIDE 50 MG/1
50 TABLET, FILM COATED ORAL 3 TIMES DAILY
Qty: 90 TAB | Refills: 0 | Status: SHIPPED | OUTPATIENT
Start: 2018-10-24

## 2018-10-24 RX ORDER — INSULIN LISPRO 100 [IU]/ML
INJECTION, SOLUTION INTRAVENOUS; SUBCUTANEOUS
Status: DISCONTINUED | OUTPATIENT
Start: 2018-10-24 | End: 2018-10-24 | Stop reason: HOSPADM

## 2018-10-24 RX ORDER — DOCUSATE SODIUM 100 MG/1
100 CAPSULE, LIQUID FILLED ORAL 2 TIMES DAILY
Qty: 60 CAP | Refills: 2 | Status: SHIPPED | OUTPATIENT
Start: 2018-10-24 | End: 2019-01-22

## 2018-10-24 RX ORDER — PANTOPRAZOLE SODIUM 40 MG/1
40 TABLET, DELAYED RELEASE ORAL
Qty: 30 TAB | Refills: 0 | Status: SHIPPED | OUTPATIENT
Start: 2018-10-24

## 2018-10-24 RX ORDER — PREDNISONE 20 MG/1
60 TABLET ORAL
Qty: 30 TAB | Refills: 0 | Status: ON HOLD | OUTPATIENT
Start: 2018-10-24 | End: 2020-01-01

## 2018-10-24 RX ORDER — SULFAMETHOXAZOLE AND TRIMETHOPRIM 400; 80 MG/1; MG/1
0.5 TABLET ORAL
Status: DISCONTINUED | OUTPATIENT
Start: 2018-10-24 | End: 2018-10-24 | Stop reason: HOSPADM

## 2018-10-24 RX ADMIN — PANTOPRAZOLE SODIUM 40 MG: 40 TABLET, DELAYED RELEASE ORAL at 09:24

## 2018-10-24 RX ADMIN — TIOTROPIUM BROMIDE 18 MCG: 18 CAPSULE ORAL; RESPIRATORY (INHALATION) at 09:22

## 2018-10-24 RX ADMIN — POLYETHYLENE GLYCOL 3350 17 G: 17 POWDER, FOR SOLUTION ORAL at 09:23

## 2018-10-24 RX ADMIN — BUPRENORPHINE HYDROCHLORIDE, NALOXONE HYDROCHLORIDE 1 FILM: 4; 1 FILM, SOLUBLE BUCCAL; SUBLINGUAL at 09:23

## 2018-10-24 RX ADMIN — HYDRALAZINE HYDROCHLORIDE 50 MG: 50 TABLET, FILM COATED ORAL at 18:04

## 2018-10-24 RX ADMIN — INFLUENZA VIRUS VACCINE 0.5 ML: 15; 15; 15; 15 SUSPENSION INTRAMUSCULAR at 09:22

## 2018-10-24 RX ADMIN — AMLODIPINE BESYLATE 10 MG: 5 TABLET ORAL at 09:22

## 2018-10-24 RX ADMIN — Medication 10 ML: at 01:07

## 2018-10-24 RX ADMIN — IPRATROPIUM BROMIDE AND ALBUTEROL SULFATE 3 ML: .5; 3 SOLUTION RESPIRATORY (INHALATION) at 00:06

## 2018-10-24 RX ADMIN — Medication 10 ML: at 09:24

## 2018-10-24 RX ADMIN — PREDNISONE 60 MG: 20 TABLET ORAL at 09:22

## 2018-10-24 RX ADMIN — CALCIUM ACETATE 1334 MG: 667 CAPSULE ORAL at 09:22

## 2018-10-24 RX ADMIN — FLUTICASONE PROPIONATE 2 SPRAY: 50 SPRAY, METERED NASAL at 09:23

## 2018-10-24 RX ADMIN — CALCIUM ACETATE 1334 MG: 667 CAPSULE ORAL at 18:04

## 2018-10-24 RX ADMIN — ISOSORBIDE MONONITRATE 90 MG: 30 TABLET, EXTENDED RELEASE ORAL at 09:22

## 2018-10-24 RX ADMIN — LORAZEPAM 1 MG: 2 INJECTION, SOLUTION INTRAMUSCULAR; INTRAVENOUS at 01:35

## 2018-10-24 RX ADMIN — BUPRENORPHINE HYDROCHLORIDE, NALOXONE HYDROCHLORIDE 1 FILM: 4; 1 FILM, SOLUBLE BUCCAL; SUBLINGUAL at 18:03

## 2018-10-24 RX ADMIN — DOCUSATE SODIUM 100 MG: 100 CAPSULE, LIQUID FILLED ORAL at 18:04

## 2018-10-24 RX ADMIN — DOCUSATE SODIUM 100 MG: 100 CAPSULE, LIQUID FILLED ORAL at 09:22

## 2018-10-24 RX ADMIN — HYDRALAZINE HYDROCHLORIDE 50 MG: 50 TABLET, FILM COATED ORAL at 09:22

## 2018-10-24 NOTE — PROGRESS NOTES
Progress Note Date: 2018 Name: Jose Luis Peck MRN: [id number] : 1970 Chemotherapy Cycle: Rituximab 1 of 1 Mr. Guille Strauss  is currently inpatient at [facility]. He l is receiving his first dose of Rituxmab. Patient was educated on rituximab, as to the s/e and use of this drug due to diagnosis of microscopic polyangiitis. Mr Guille Strauss verbal agreed to treatment. Chemo consent form in patient hard chart. Mr. Schmitt's vitals were reviewed. 22 G inserted  In right wrist by writer. 2 attempts made by two other nurse. Iv  flushed easily and had brisk blood return. . 
 
Premedication given 30 mins prior to infusion to include tylenol 650 mg po and benadryl 50 mg po. Patient has received prednisone 60 mg today. Hydration of Normal Saline as ordered. Lab results were obtained and reviewed. Chemo dosages verified with today's BSA and found to be within 10% of ordered dosages. Patient was given a prn albutrol treatment , ryder stated he gets SOb during the night at home in which he uses his pro air recuse inhaler . Patient dose  not have pro air ordered at this time, as his nurse Scott was informed and stated She would pass the request to the next shift to have pro-air ordered for the patient. Rituxan was infused at  32 ml/hr fo 30mins, 48 ml for for 30 mins , 64 ml/hr for 30 min's, 80 ml/hr for 30 min's, 96 ml/hr for 30 min's, 112 ml/ hr for 30 min's, 128 ml/hr for 30 min's, max f infusion rate of 133 ml/hr. VS stable at end of infusion and pt denied complaints. Line flushed with NS and blood return from PIV. Infusion completed at 1300. Mr. Guille Strauss tolerated infusion, and had no complaints at this time. Cedrick Bates RN 2018

## 2018-10-24 NOTE — PROGRESS NOTES
Notified [facility] intake nurse,  Huan Montes, the pt is dc'd home today.

## 2018-10-24 NOTE — PROGRESS NOTES
Called [facility] in follow-up of information faxed yesterday afternoon. Patient needs appt for resumption of his suboxone as patient is discharged today. Provider relations indicated that she would call me back shortly.

## 2018-10-24 NOTE — DISCHARGE SUMMARY
DISCHARGE SUMMARY PATIENT ID: Elena Osorio MRN: [id number] YOB: 1970   AGE: 46 y.o. DATE OF ADMISSION: 10/3/2018 10:13 AM   
DATE OF DISCHARGE: 10/26/2018 PRIMARY CARE PROVIDER: Monae Avery MD  
 
Procedure Performed: 
Procedure(s) with comments: 
ESOPHAGOGASTRODUODENOSCOPY (EGD) - EGD with bx   Surgical 
 
 
 
DISCHARGING PHYSICIAN: Jese Suggs MD   
Call hospitalist office [phone]. Discharge Diagnosis:  
Patient Active Problem List  
 Diagnosis Date Noted  Gastritis 10/24/2018  Suicide ideation 10/23/2018  Suicidal ideation 10/23/2018  Microscopic polyangiitis ([address] 10/22/2018  Metabolic acidosis 49/64/7144  Bacteria in urine 10/03/2018  Obesity, morbid ([address] 01/11/2018  Anxiety 10/29/2015  Ventricular tachycardia ([address] 10/29/2015  Ventricular fibrillation ([address] 10/29/2015  ICD (implantable cardioverter-defibrillator) discharge 10/29/2015  CAD (coronary artery disease) 10/29/2015  Epigastric abdominal pain 05/22/2015  Severe sepsis ([address] 05/17/2015  Acute renal failure (ARF) ([address] 03/21/2015  ARF (acute renal failure) ([address] 03/21/2015  Chest pain 11/03/2014  
 SOB (shortness of breath) 11/03/2014  Bronchitis 11/03/2014  OLIVIA (acute kidney injury) ([address] 09/03/2014  Renal failure 09/03/2014  AICD (automatic cardioverter/defibrillator) present 05/21/2014  Cardiomyopathy ([address] 04/23/2014  UTI (lower urinary tract infection) 03/17/2014  JESSICA (obstructive sleep apnea) 12/17/2013  Coronary artery disease  Contrast dye induced nephropathy 07/26/2013  CKD (chronic kidney disease) 07/26/2013  ST elevation (STEMI) myocardial infarction involving left anterior descending coronary artery ([address] 07/24/2013  Cardiac arrest - ventricular fibrillation 07/24/2013  
 HTN (hypertension) 07/24/2013  Hypertensive emergency 07/24/2013  Hyperglycemia 07/24/2013  Acute on chronic renal failure ([facility] 07/24/2013  Leukocytosis 07/24/2013  Acute pulmonary edema ([facility] 07/24/2013  Enthesopathy of hip 07/01/2013  Sacroiliitis ([facility] [address] 07/01/2013  Chronic pain syndrome 07/30/2012  Lumbosacral spondylosis without myelopathy 07/30/2012  Psoriatic arthropathy ([facility] [address] 07/23/2012  Chronic low back pain 07/23/2012  Obesity 01/29/2012  Pain in joint, lower leg 01/29/2012  Psoriasis 01/22/2012  Degeneration of lumbar or lumbosacral intervertebral disc 01/22/2012  Encounter for long-term (current) use of high-risk medication 01/22/2012  Arachnoiditis 01/17/2012  Postlaminectomy syndrome, lumbar region 01/17/2012 CONSULTATIONS: IP CONSULT TO GASTROENTEROLOGY 
IP CONSULT TO NEPHROLOGY 
IP CONSULT TO PSYCHIATRY History of Present Ilness: 
 
 
 
Hospital Course:  
46 y.o.  male with h/o AICD,cardiac arrest,VT,ischemic cardiomyopathy EF 30%,obesity,cad,presented to ED because of weakness,nausea,vomting,abdominal pain,hematuria. Patient says that he started feeling sick 2 weeks ago when he started feeling weak. Then he started vomiting. Could not tolerate even water. In recent days ,he has had abdominal pain which he attributed to the ongoing vomiting. Patient also reported that he has seen his urine turning red for a while. Had to see his doctor in two weeks but felt very weak and decided to come to the ED. In ED,lab showed creatinine 11.0 compared to 2.12 on 1/11/2018 and 8.01 on 9/28/18. UA showed large blood,bacteria+. CT abd/pelvis w/o hydronephrosis or urolithiasis. Patient admitted for OLIVIA,Hematuria,UTI. Started on iv fluid. Nephrolohy consulted from ER. Since patient has GI complaint and renal involvement,nephrology has indicated that Henoch-Schonlein syndrome needs to be considered. Pt is on iv fluid. IgA elevated 648. Other serology for vasculitis including nathaniel,GBM ab,ANCA,C3,C4,heptitis profile not abnormal. Nephrology has indicated that if creatinine continue to rise over the weekend,patient will need dialysis. Today creat up to 12.00. Patient may also need kidney biopsy. Cardiology consulted significant cardiac history anticipating that he will have dialysis. Cardiology has determined that his cardiac status is stable. GI saw patient on 10/6 for ongoing symptoms. TDC catheter placed on 10/8. EGD on 10/9 showed chronic gastritis. HD on 10/9 and 10/10, next HD session on 10/12. S/p kidney biopsy on 10/15- per Nephrology results- small specimen, only two glomeruli for light microscopy, none for immunofluorescence. 2 glomeruli with severe crescentic GN. 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 Started Cytoxan per Nephrology. Continues with plasmaphereses. Patient last evening was anxious but denies depression. He expressed to the nurse that he wanted to harm himself with a gun . He denies intent to harm self to me or harm anyone else. He however is anxious due to his new diagnosis. Tele psychiatry has been consulted and patient has a 1:1 sitter. [facility] after evaluation determined patient was not a suicide risk and did not meet criteria for in patient psych. Suicide precaution was discontinued. Patient was scheduled for plasmapheresis and Rituximab treatment out patient per nephrology after initial treatment . He is to have a total of 7 courses of plasmapheresis and weekly infusion of ritximab for 4 infusions. DINESH will be continued out patient. Patient at time of discharge was stable. Physical Exam on Discharge: 
Visit Vitals /80 Pulse 78 Temp 97.7 °F (36.5 °C) (Oral) Resp 18 Ht 5' 10\" (1.778 m) Wt 120.3 kg (265 lb 3.4 oz) SpO2 95% BMI 38.05 kg/m² General:  Alert, cooperative, no distress, appears stated age. Lungs:   Clear to auscultation bilaterally. Chest wall:  No tenderness or deformity. Heart:  Regular rate and rhythm, S1, S2 normal, no murmur, click, rub or gallop. Abdomen:   Soft, non-tender. Bowel sounds normal. No masses,  No organomegaly. Extremities: Extremities normal, atraumatic, no cyanosis or edema. Pulses: 2+ and symmetric all extremities. Skin: Skin color, texture, turgor normal. No rashes or lesions Neurologic: CNII-XII intact. Pertinent Results:   
Recent Labs 10/24/18 
2835 BUN 51*  CO2 24 Recent Labs 10/24/18 
5733 WBC 10.5 RBC 2.75* HCT 23.7* MCV 86.2 MCH 26.5 MCHC 30.8*  
RDW 17.3* All Micro Results Procedure Component Value Units Date/Time CULTURE, BLOOD [[id number]] Collected:  10/03/18 1829 Order Status:  Completed Specimen:  Blood Updated:  10/09/18 2453 Special Requests: NO SPECIAL REQUESTS Culture result: NO GROWTH 6 DAYS     
 CULTURE, BLOOD [[id number]] Collected:  10/03/18 1829 Order Status:  Completed Specimen:  Blood Updated:  10/09/18 4622 Special Requests: NO SPECIAL REQUESTS Culture result: NO GROWTH 6 DAYS     
 CULTURE, URINE [[id number]] Collected:  10/03/18 1532 Order Status:  Completed Specimen:  Urine Updated:  10/06/18 8095 Special Requests: NO SPECIAL REQUESTS Culture result: NO GROWTH 2 DAYS     
 CULTURE, URINE [[id number]] Collected:  10/03/18 1800 Order Status:  Canceled Specimen:  Urine CT Results  (Last 48 hours) None EXAM: Limited right upper quadrant abdominal ultrasound 
  INDICATION: Nausea, vomiting, epigastric abdominal pain. 
  
COMPARISON: CT 10/3/2018. 
  
TECHNIQUE: Real-time abdomen/right upper quadrant sonography in multiple planes 
was performed with image documentation. Grayscale, color flow Doppler imaging, 
and velocity spectral waveform analysis of the portal vein was performed (duplex 
imaging). 
  
_______________ 
  
FINDINGS: 
  
LIVER: Normal in echotexture. No focal mass Color flow Doppler and velocity spectral waveform analysis of the portal vein shows normal (hepatopedal) 
direction of flow. . 
  
BILIARY SYSTEM: No intrahepatic biliary dilatation. Common bile duct is normal 
in caliber measuring 0.4 cm. 
  
GALLBLADDER: No gallstones or gallbladder wall thickening. No pericholecystic 
fluid. Sonographic Castro sign assessed as negative by the performing 
sonographer. 
  
RIGHT KIDNEY: 10.4 cm in length. No hydronephrosis or renal mass. No visible 
calculi. Renal parenchymal echogenicity is mildly increased. 
  
PANCREAS: Head and body are unremarkable in appearance though the tail is 
obscured by overlying bowel gas. 
  
IVC: Visualized portions are unremarkable in appearance. 
  
OTHER: No free intraperitoneal fluid. 
  
_______________ 
  
IMPRESSION IMPRESSION: 
  
No acute sonographic abnormality demonstrated. 
  
Slightly increased renal parenchymal echogenicity; a finding which can be seen 
in the context of medical renal disease. 
  
Note: Preliminary report sent to the patient care division by the radiology 
resident at the time of the study. DISCHARGE MEDICATIONS:  
Discharge Medication List as of 10/24/2018  4:39 PM  
  
START taking these medications Details  
amLODIPine (NORVASC) 10 mg tablet Take 1 Tab by mouth daily. , Print, Disp-30 Tab, R-0  
  
calcium acetate (PHOSLO) 667 mg cap Take 2 Caps by mouth three (3) times daily (with meals). , Print, Disp-90 Cap, R-0  
  
epoetin natanael (EPOGEN;PROCRIT) 10,000 unit/mL injection 1 mL by SubCUTAneous route Every Tues, Thur & Sat., Print, Disp-12 Vial, R-0  
  
hydrALAZINE (APRESOLINE) 50 mg tablet Take 1 Tab by mouth three (3) times daily. , Print, Disp-90 Tab, R-0  
  
pantoprazole (PROTONIX) 40 mg tablet Take 1 Tab by mouth Daily (before breakfast). , Print, Disp-30 Tab, R-0  
  
polyethylene glycol (MIRALAX) 17 gram packet Take 1 Packet by mouth daily. , Print, Disp-30 Packet, R-0  
  
  
CONTINUE these medications which have CHANGED Details predniSONE (DELTASONE) 20 mg tablet Take 3 Tabs by mouth daily (with breakfast). , Print, Disp-30 Tab, R-0  
  
docusate sodium (COLACE) 100 mg capsule Take 1 Cap by mouth two (2) times a day for 90 days. , Print, Disp-60 Cap, R-2  
  
  
CONTINUE these medications which have NOT CHANGED Details  
buprenorphine-naloxone (SUBOXONE) 8-2 mg film sublingaul film 0.5 Film by SubLINGual route two (2) times a day. Indications: Opioid Withdrawal Symptoms, Historical Med  
  
isosorbide mononitrate ER (IMDUR) 60 mg CR tablet Take 1.5 Tabs by mouth every morning., Normal, Disp-45 Tab, R-0  
  
atorvastatin (LIPITOR) 40 mg tablet Take 1 Tab by mouth daily. Indications: hypercholesterolemia, Normal, Disp-90 Tab, R-3  
  
clopidogrel (PLAVIX) 75 mg tab Take 1 Tab by mouth daily. Indications: Myocardial Reinfarction Prevention, Normal, Disp-90 Tab, R-3  
  
amiodarone (PACERONE) 400 mg tablet Take 1 Tab by mouth daily. Indications: LIFE-THREATENING VENTRICULAR TACHYCARDIA, Normal, Disp-90 Tab, R-3  
  
albuterol (PROVENTIL HFA, VENTOLIN HFA, PROAIR HFA) 90 mcg/actuation inhaler Take 2 Puffs by inhalation every six (6) hours as needed for Wheezing. Indications: BRONCHOSPASM PREVENTION, Normal, Disp-1 Inhaler, R-3  
  
tiotropium (SPIRIVA) 18 mcg inhalation capsule Take 1 Cap by inhalation daily. , Normal, Disp-30 Cap, R-5  
  
lidocaine (LIDODERM) 5 % Apply patch to the affected area for 12 hours a day and remove for 12 hours a day., Print, Disp-7 Each, R-1  
  
aspirin 81 mg chewable tablet Take 162 mg by mouth two (2) times a day., Historical Med  
  
fluticasone (FLONASE) 50 mcg/actuation nasal spray 2 Sprays by Both Nostrils route daily as needed for Rhinitis. , Print, Disp-1 Bottle, R-1  
  
  
STOP taking these medications  
  
 carvedilol (COREG) 6.25 mg tablet Comments:  
Reason for Stopping:   
   
 bumetanide (BUMEX) 1 mg tablet Comments:  
Reason for Stopping:   
   
 ixekizumab (TALTZ) 80 mg/mL syringe Comments: Reason for Stopping:   
   
 calcium carbonate (TUMS) 200 mg calcium (500 mg) chew Comments:  
Reason for Stopping:   
   
  
 
 
Condition at discharge:  Afebrile Ambulating Eating, Drinking, Voiding Improved Stable FOLLOW UP APPOINTMENTS:   
Follow-up Information Follow up With Specialties Details Why Contact Info Samy Martinez MD Internal Medicine On 10/29/2018 8:30am [address] [facility] [phone] 
[phone] Disposition:  Home with home health Total discharge preparation time was > 30  minutes.

## 2018-10-24 NOTE — PROGRESS NOTES
Assumed care from [facility] , [address] [place] [address]. Pt resting in bed with no signs of pain or distress. Bed locked and in lowest position with call bell in reach. 0902 Pt made aware of DC and stated he would like to speak to the Doctor before he goes in regards to his medications. MD paged. He also was schedule to have dialysis outpatient but stated he will miss. Inpatient dialysis was called and pt is to go to dialysis. [phone] This nurse called and got pt a appointment for pt to see Dr. Kam Tinoco for dosage. 46 MD paged and asked about later dosage of Suboxone for tonight. MD gave okay for pt to have medication prior to DC around 6 pm. RBV  
 
1831 Pt discharged home with no distress noted, accompanied by this nurse  via  w/c to front entrance to vehicle. Pt has recieved discharge instructions, along with  prescription and belongings. Voiced understanding of discharge instructions.

## 2018-10-24 NOTE — PROGRESS NOTES
RENAL PROGRESS NOTE [place] Assessment/Plan: · OLIVIA with nephrotic proteinuria and hematuria. S/p  Kidney biopsy on 10/15. At this time the working diagnosis is ANCA neg microscopic polyangiitis. S/p  solumedrol 1 g daily x3, continue oral prednisone. S/c  2 nd plasmapheresis 10/23, plan  total course of 7 treatments. S/p rituximab 375 mg/m2 10/23 with the plans of weekly infusion with the total course of 4 infusions. Started on bactrim for pcp prophylaxis. Meanwhile continue dialysis,  as an op he is set up to continue dialysis [facility] at [address]-  first treatment there  at 10:45 am on Friday. Unit's address and phone # given to the patient. 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 Screening for Fabry's disease is pending. 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 Cases have been described of similar inclusions due to amiodarone (stopped). · HTN, stable. Continue current regimen. Monitor bp with dialysis, volume removal.  
· N/V. Etio? CT finding noted. EGD results noted- diffuse gastritis. Resolved. · Secondary hyperparathyroidism/hyperphsphotemia. Continue phoslo. D/c calcitriol, hectorol will be given as op unit. · Anemia of renal failure. DINESH will be continued at op unit. · Suicidal ideation. Resolved, going home. Subjective: 
Patient complaints off: Feel better. No SOB/CP. No n/v. Tolerates diet. Ambulates in the room. Tolerated dialysis this am.  
 
 
Patient Active Problem List  
Diagnosis Code  ST elevation (STEMI) myocardial infarction involving left anterior descending coronary artery ([facility]) I21.02  
 Cardiac arrest - ventricular fibrillation  HTN (hypertension) I10  
 Hypertensive emergency I16.1  Hyperglycemia R73.9  Acute on chronic renal failure (HCC) N17.9, N18.9  Leukocytosis D72.829  
 Acute pulmonary edema (HCC) J81.0  Contrast dye induced nephropathy N14.1, T50.8X5A  
 CKD (chronic kidney disease) N18.9  Arachnoiditis G03.9  Postlaminectomy syndrome, lumbar region M96.1  Psoriasis L40.9  Degeneration of lumbar or lumbosacral intervertebral disc M51.37  
 Encounter for long-term (current) use of high-risk medication Z79.899  Obesity E66.9  
 Pain in joint, lower leg M25.569  Psoriatic arthropathy ([facility] L40.50  Chronic low back pain M54.5, G89.29  Chronic pain syndrome G89.4  Lumbosacral spondylosis without myelopathy M47.817  Enthesopathy of hip M76.899  
 Sacroiliitis (HCC) M46.1  Coronary artery disease I25.10  
 JESSICA (obstructive sleep apnea) G47.33  
 UTI (lower urinary tract infection) N39.0  Cardiomyopathy ([facility] [address] [address] I42.9  AICD (automatic cardioverter/defibrillator) present Z95.810  
 OLIVIA (acute kidney injury) ([facility] [address] N17.9  Renal failure N19  
 Chest pain R07.9  
 SOB (shortness of breath) R06.02  Bronchitis J40  Acute renal failure (ARF) ([facility] N17.9  ARF (acute renal failure) ([facility]) N17.9  Severe sepsis ([facility]) A41.9, R65.20  Epigastric abdominal pain R10.13  
 Anxiety F41.9  Ventricular tachycardia ([facility]) I47.2  Ventricular fibrillation ([facility]) I49.01  
 ICD (implantable cardioverter-defibrillator) discharge Z45.02  
 CAD (coronary artery disease) I25.10  Obesity, morbid ([facility] E66.01  
 Bacteria in urine A91.70  
 Metabolic acidosis N01.8  Microscopic polyangiitis ([facility] [address] M31.7  Suicide ideation R45.851  Suicidal ideation R45.851  Gastritis K29.70 Current Facility-Administered Medications Medication Dose Route Frequency Provider Last Rate Last Dose  insulin lispro (HUMALOG) injection   SubCUTAneous AC&HS Eliane Sauceda MD      
 glucose chewable tablet 16 g  4 Tab Oral PRN Geeta Fontana MD      
  glucagon (GLUCAGEN) injection 1 mg  1 mg IntraMUSCular PRN Eliane Sauceda MD      
 dextrose (D50) infusion 12.5-25 g  25-50 mL IntraVENous PRN Eliane Sauceda MD      
 trimethoprim-sulfamethoxazole (BACTRIM, SEPTRA)  mg per tablet 0.5 Tab  0.5 Tab Oral Q MON, WED & FRI Antonietta Douglass MD      
 LORazepam (ATIVAN) injection 1 mg  1 mg IntraVENous Q12H PRN Eliane Sauceda MD   1 mg at 10/24/18 0135  calcium gluconate 2 g in 0.9% sodium chloride 50 mL IVPB  2 g IntraVENous DAILY PRN Kaity López MD   2 g at 10/23/18 1203  
 0.9% sodium chloride infusion 250 mL  250 mL IntraVENous PRN Antonietta Douglass MD      
 0.9% sodium chloride infusion 250 mL  250 mL IntraVENous PRN Kaity López MD 15 mL/hr at 10/23/18 2100 250 mL at 10/23/18 2100  polyethylene glycol (MIRALAX) packet 17 g  17 g Oral DAILY Eliane Sauceda MD   17 g at 10/24/18 2487  docusate sodium (COLACE) capsule 100 mg  100 mg Oral BID Maureen Correa MD   100 mg at 10/24/18 9070  diphenhydrAMINE (BENADRYL) capsule 25 mg  25 mg Oral Q6H PRN Ailyn Chi H, DO   25 mg at 10/23/18 1316  predniSONE (DELTASONE) tablet 60 mg  60 mg Oral DAILY WITH BREAKFAST Kaity López MD   60 mg at 10/24/18 5318  epoetin natanael (EPOGEN;PROCRIT) injection 10,000 Units  10,000 Units IntraVENous Q TUE, THU & SAT Kaity López MD   10,000 Units at 10/18/18 1750  buprenorphine-naloxone (SUBOXONE) 4mg-1mg SL film  1 Film SubLINGual BID Tan [place], DO   1 Film at 10/24/18 9889  oxyCODONE IR (ROXICODONE) tablet 10 mg  10 mg Oral Q4H PRN Ailyn Chi H, DO   10 mg at 10/16/18 1529  
 HYDROcodone-acetaminophen (NORCO) 7.5-325 mg per tablet 1 Tab  1 Tab Oral Q4H PRN Argenis Mario MD      
 lidocaine 4 % patch 1 Patch  1 Patch TransDERmal Q24H Leonard Church MD   1 Patch at 10/23/18 5670  sodium chloride (NS) flush 5-10 mL  5-10 mL IntraVENous Q8H Keren Piña MD   10 mL at 10/24/18 1368  sodium chloride (NS) flush 5-10 mL  5-10 mL IntraVENous PRN Keren Piña MD   10 mL at 10/24/18 0107  LORazepam (ATIVAN) tablet 1 mg  1 mg Oral Q1H PRN Keren Piña MD   1 mg at 10/22/18 2326 Or  LORazepam (ATIVAN) injection 1 mg  1 mg IntraVENous Q1H PRN Keren Piña MD   1 mg at 10/22/18 2016  LORazepam (ATIVAN) tablet 2 mg  2 mg Oral Q1H PRN Keren Piña MD   2 mg at 10/11/18 9864 Or  LORazepam (ATIVAN) injection 2 mg  2 mg IntraVENous Q1H PRN Keren Piña MD   2 mg at 10/22/18 1706  LORazepam (ATIVAN) injection 3 mg  3 mg IntraVENous Q15MIN PRN Keren Piña MD      
 0.9% sodium chloride infusion  50 mL/hr IntraVENous DIALYSIS PRN Marie Douglass MD      
 heparin (porcine) 1,000 unit/mL injection 1,000 Units  1,000 Units InterCATHeter DIALYSIS PRN Marie Douglass MD      
 pantoprazole (PROTONIX) tablet 40 mg  40 mg Oral ACB Andrea Robin MD   40 mg at 10/24/18 3803  hydrALAZINE (APRESOLINE) tablet 50 mg  50 mg Oral TID Bharath Mosley MD   50 mg at 10/24/18 6517  promethazine (PHENERGAN) tablet 25 mg  25 mg Oral Q8H PRN Andrea Robin MD   25 mg at 10/10/18 1441  
 amLODIPine (NORVASC) tablet 10 mg  10 mg Oral DAILY Angeles Price MD   10 mg at 10/24/18 2449  calcium acetate (PHOSLO) capsule 1,334 mg  2 Cap Oral TID WITH MEALS Marie Douglass MD   1,334 mg at 10/24/18 1449  isosorbide mononitrate ER (IMDUR) tablet 90 mg  90 mg Oral DAILY Andrea Robin MD   90 mg at 10/24/18 5135  
 atorvastatin (LIPITOR) tablet 40 mg  40 mg Oral QHS Andrea Robin MD   40 mg at 10/23/18 2240  fluticasone (FLONASE) 50 mcg/actuation nasal spray 2 Spray  2 Spray Both Nostrils DAILY Andrea Robin MD   2 [place] at 10/24/18 0224  tiotropium (SPIRIVA) inhalation capsule 18 mcg  1 Cap Inhalation DAILY Andrea Robin MD   18 mcg at 10/24/18 0845  ondansetron (ZOFRAN) injection 4 mg  4 mg IntraVENous Q4H PRN Andrea Robin MD   4 mg at 10/13/18 2225  albuterol-ipratropium (DUO-NEB) 2.5 MG-0.5 MG/3 ML  3 mL Nebulization Q4H PRN Andrea Robin MD   3 mL at 10/24/18 0006 Objective Vitals:  
 10/24/18 1445 10/24/18 1500 10/24/18 1507 10/24/18 1518 BP: 156/80 161/78 143/58 148/75 Pulse: 67 67 69 70 Resp: 18 18 18 18 Temp:    97.7 °F (36.5 °C) TempSrc:    Oral  
SpO2:      
Weight:      
Height:      
 
 
 
Intake/Output Summary (Last 24 hours) at 10/24/2018 1736 Last data filed at 10/24/2018 7728 Gross per 24 hour Intake 960 ml Output 4000 ml Net -3040 ml Admission weight: Weight: 118.7 kg (261 lb 11 oz) (10/04/18 8580) Last Weight Metrics: 
Weight Loss Metrics 10/24/2018 2/14/2018 2/8/2018 1/11/2018 8/22/2017 8/20/2017 3/2/2017 Today's Wt 265 lb 3.4 oz 250 lb 250 lb 286 lb 280 lb 258 lb 258 lb BMI 38.05 kg/m2 35.87 kg/m2 35.87 kg/m2 41.04 kg/m2 40.18 kg/m2 37.02 kg/m2 37.02 kg/m2 Physical Assessment:  
 
General: NAD, alert and oriented. Neck: No jvd. Rt ij tdc. LUNGS: Clear to Auscultation, No rales, rhonchi or wheezes. CVS EXM: S1, S2  RRR, no murmurs/gallops/rubs. Abdomen: nt/nd. Lower Extremities: 1+edema. Skin: extensive scaly rash ue/le. Lab CBC w/Diff Recent Labs 10/24/18 
4836 10/23/18 
8564 10/22/18 
7912 WBC 10.5 9.3 8.0  
RBC 2.75* 2.89* 2.80* HGB 7.3* 7.6* 7.5* HCT 23.7* 24.9* 24.1*  
 229 214 Chemistry Recent Labs 10/24/18 
4006 10/23/18 
1730 10/22/18 
6205 * 125* 165*  163*  137 135*  135* K 3.9 4.0 4.3  4.3  101 99*  99* CO2 24 28 25  26 BUN 51* 44* 60*  60* CREA 6.10* 5.28* 6.97*  6.83* CA 6.9* 7.2* 7.4*  7.2* AGAP 11 8 11  10 BUCR 8* 8* 9*  9* ALB 3.1* 2.5* 2.5* PHOS 5.5* 5.1* 5.5* Lab Results Component Value Date/Time Iron 60 10/18/2018 08:10 AM  
 TIBC 184 (L) 10/18/2018 08:10 AM  
 Iron % saturation 33 10/18/2018 08:10 AM  
 Ferritin 325 10/18/2018 08:10 AM  
  
Lab Results Component Value Date/Time Calcium 6.9 (L) 10/24/2018 06:26 AM  
 Phosphorus 5.5 (H) 10/24/2018 06:26 AM  
  
 
Matilde Flores M.D. [facility] Phone [phone] Pager [phone]

## 2018-10-24 NOTE — ANCILLARY DISCHARGE INSTRUCTIONS
Patient and/or next of kin has been given the [facility] Important Message From Medicare About Your Rights\" letter and all questions were answered. Patient at dialysis. Copy left at bedside.

## 2018-10-24 NOTE — DIALYSIS
ACUTE HEMODIALYSIS FLOW SHEET 
 
HEMODIALYSIS ORDERS: Physician: Elvira Schneider Dialyzer: revaclear   Duration: 4 hr  BFR: 300   DFR: 800 Dialysate:  Temp 97.7 K+   3    Ca+  2.5 Na 140 Bicarb 35 Weight:   kg    Patient Chart []     Unable to Obtain []   Dry weight/UF Goal: CVC Access cvc Needle Gauge Heparin []  Bolus      Units    [] Hourly       Units    [x]None Catheter locking solution heparin Pre BP:   124/60    Pulse:     87     Temperature:   97.7  Respirations: 18  Tx: NS       ml/Bolus  Other        [x] N/A Labs: Pre        Post:        [x] N/A Additional Orders(medications, blood products, hypotension management):       [x] N/A [x] [facility] Consent Verified CATHETER ACCESS: []N/A   [x]Right   []Left   [x]IJ     []Fem [] First use X-ray verified     []Tunnel                [] Non Tunneled [x]No S/S infection  []Redness  []Drainage []Cultured []Swelling []Pain  
[x]Medical Aseptic Prep Utilized   []Dressing Changed  [] Biopatch  Date:      
[]Clotted   [x]Patent   Flows: [x]Good  []Poor  []Reversed If access problem,  notified: []Yes    [x]N/A  Date:        
 
GRAFT/FISTULA ACCESS:  [x]N/A     []Right     []Left     []UE     []LE []AVG   []AVF        []Buttonhole    []Medical Aseptic Prep Utilized []No S/S infection  []Redness  []Drainage []Cultured []Swelling []Pain Bruit:   [] Strong    [] Weak       Thrill :   [] Strong    [] Weak Needle Gauge:    Length: If access problem,  notified: []Yes     []N/A  Date:       
Please describe access if present and not used:  
 
 
GENERAL ASSESSMENT: 25 LUNGS:  Rate 18 SaO2%        [] N/A    [x] Clear  [] Coarse  [] Crackles  [] Wheezing 
      [] Diminished     Location : []RLL   []LLL    []RUL  []NIKOS Cough: []Productive  []Dry  [x]N/A   Respirations:  [x]Easy  []Labored Therapy:  [x]RA  []NC  l/min    Mask: []NRB []Venti       O2% []Ventilator  []Intubated  [] Trach  [] BiPaP CARDIAC: [x]Regular      [] Irregular   [] Pericardial Rub  [] JVD [x]  Monitored  [] Bedside  [] Remotely monitored [] N/A  Rhythm: EDEMA: [] None  [x]Generalized  [] Pitting [] 1    [] 2    [] 3    [] 4 [] Facial  [] Pedal  []  UE  [] LE  
SKIN:   [] Warm  [] Hot     [] Cold   [] Dry     [] Pale   [] Diaphoretic    
             [] Flushed  [] Jaundiced  [] Cyanotic  [] Rash  [] Weeping LOC:    [] Alert      []Oriented:    [] Person     [] Place  []Time 
             [] Confused  [] Lethargic  [] Medicated  [] Non-responsive GI / ABDOMEN:  [] Flat    [x] Distended    [x] Soft    [] Firm   []  Obese 
                           [] Diarrhea  [] Bowel Sounds  [] Nausea  [] Vomiting  / URINE ASSESSMENT:[] Voiding   [x] Oliguria  [] Anuria   []  Alonzo [] Incontinent    []  Incontinent Brief      []  Bathroom Privileges PAIN: [x] 0 []1  []2   []3   []4   []5   []6   []7   []8   []9   []10 Scale 0-10  Action/Follow Up: MOBILITY:  [] Amb    [] Amb/Assist    [x] Bed    [] Wheelchair  [] Stretcher All Vitals and Treatment Details on Attached Flowsheet Hospital: [facility] Room # 2104 [] 1st Time Acute  [] Stat  [x] Routine  [] Urgent [x] Acute Room  []  Bedside  [] ICU/CCU  [] ER Isolation Precautions:  [x] Dialysis   [] Airborne   [] Contact    [] Reverse Special Considerations:         [] Blood Consent Verified [x]N/A ALLERGIES:   [x] NKA Code Status:  [x] Full Code  [] DNR  [] Other HBsAg ONLY: Date Drawn 10/22/18         [x]Negative []Positive []Unknown HBsAb: Date 10/22/18    [] Susceptible   [x] [id number] []Not Drawn  [] Drawn Current Labs:    Date of Labs: Today [x] Cut and paste current labs here. Results for Nicholas Llanes (MRN [id number]) as of 10/24/2018 11:15 Ref. Range 10/24/2018 06:26 Sodium Latest Ref Range: 136 - 145 mmol/L 138 Potassium Latest Ref Range: 3.5 - 5.5 mmol/L 3.9 Chloride Latest Ref Range: 100 - 108 mmol/L 103 CO2 Latest Ref Range: 21 - 32 mmol/L 24 Anion gap Latest Ref Range: 3.0 - 18 mmol/L 11 Glucose Latest Ref Range: 74 - 99 mg/dL 208 (H) BUN Latest Ref Range: 7.0 - 18 MG/DL 51 (H) Creatinine Latest Ref Range: 0.6 - 1.3 MG/DL 6.10 (H) BUN/Creatinine ratio Latest Ref Range: 12 - 20   8 (L) Calcium Latest Ref Range: 8.5 - 10.1 MG/DL 6.9 (L) Phosphorus Latest Ref Range: 2.5 - 4.9 MG/DL 5.5 (H) GFR est non-AA Latest Ref Range: >60 ml/min/1.73m2 10 (L) GFR est AA Latest Ref Range: >60 ml/min/1.73m2 12 (L) Albumin Latest Ref Range: 3.4 - 5.0 g/dL 3.1 (L) DIET:  [x] Renal    [] Other     [] NPO     []  Diabetic PRIMARY NURSE REPORT: First initial/Last name/Title Pre Dialysis: Gagandeep Montiel RN    Time: 2474 EDUCATION:   
[x] Patient [] Other         Knowledge Basis: []None []Minimal [] Substantial  
Barriers to learning  [x]N/A [x] Access Care     [] S&S of infection     [] Fluid Management     []K+     []Procedural   
[]Albumin     [] Medications     [] Tx Options     [] Transplant     [] Diet     [] Other Teaching Tools:  [x] Explain  [] Demo  [] Handouts [] Video Patient response:   [x] Verbalized understanding  [] Teach back  [] Return demonstration [] Requires follow up Inappropriate due to         
 
[x]1045 Time Out/Safety Check  [x]Extracorporeal Circuit Tested for integrity RO/HEMODIALYSIS MACHINE SAFETY CHECKS  Before each treatment:    
Machine Number:                   [facility]  
                                [] Unit Machine #  with centralized RO 
                                [] Portable Machine #1/RO serial # [id number] [] Portable Machine #2/RO serial # [id number] [] Portable Machine #4/RO serial # [id number] [facility] [x] Portable Machine #11/RO serial # [id number] [] Portable Machine #12/RO serial # [id number] [] Portable Machine #13/RO serial #  [id number] Alarm Test:  Pass time 9499         Other:        
[x] RO/Machine Log Complete Temp    36 Dialysate: pH  7.4 Conductivity: Meter   14     HD Machine   14                  TCD: 13.9 Dialyzer Lot # [id number]            Blood Tubing Lot # [id number]          Saline Lot #  [id number] CHLORINE TESTING-Before each treatment and every 4 hours Total Chlorine: [] less than 0.1 ppm  Time: 1045 4 Hr/2nd Check Time: 7838  
(if greater than 0.1 ppm from Primary then every 30 minutes from Secondary) TREATMENT INITIATION  with Dialysis Precautions:  
[x] All Connections Secured                 [x] Saline Line Double Clamped  
[x] Venous Parameters Set                  [x] Arterial Parameters Set [x] Prime Given 250                          [x]Air Foam Detector Engaged Treatment Initiation Note:right IJ catheter accessed and treatment started without complication During Treatment Notes:  
 
Medication Dose Volume Route Initials Dialyzer Cleared: [] Good [x] Fair  [] Poor Blood processed:  69 L 
UF Removed  4000 Ml Post Wt:     kg 
POst BP:   148/75       Pulse: 70      Respirations: 18  Temperature: 97.7 Post Tx Vascular Access: AVF/AVG: Bleeding stopped Art  min. Ashok. Min   N/A Catheter: Locking solution: Heparin 1:1000 Art. 2.0  Ashok. 2.1  N/A Post Assessment:  
  
                              Skin:  [x] Warm  [] Dry [] Diaphoretic    [] Flushed  [] Pale [] Cyanotic Lizeth Signatures Title Initials  Time Lungs: [] Clear    [] Course  [] Crackles  [] Wheezing [] Diminished Stacy Galvez RN [address] Cardiac: [x] Regular   [] Irregular   [x] Monitor  [] N/A  Rhythm:      
    Edema:  [] None    [x] General     [] Facial   [] Pedal    [] UE    [] LE  
    Pain []0  []1  []2   []3  []4   []5   []6   []7   []8   []9   []10 Post Treatment Note: 
 
 removed 4 liters patient tolerated treatment well POST TREATMENT PRIMARY NURSE HANDOFF REPORT:  
 
First initial/Last name/Title Post Dialysis: Dana Hylton RN Time:  4242 Abbreviations: AVG-arterial venous graft, AVF-arterial venous fistula, IJ-Internal Jugular, Subcl-Subclavian, Fem-Femoral, Tx-treatment, AP/HR-apical heart rate, DFR-dialysate flow rate, BFR-blood flow rate, AP-arterial pressure, -venous pressure, UF-ultrafiltrate, TMP-transmembrane pressure, Ashok-Venous, Art-Arterial, RO-Reverse Osmosis

## 2018-10-24 NOTE — PROGRESS NOTES
Problem: Falls - Risk of 
Goal: *Absence of Falls Document Genevive Camera Fall Risk and appropriate interventions in the flowsheet. Outcome: Progressing Towards Goal 
Fall Risk Interventions: 
Mobility Interventions: Patient to call before getting OOB Medication Interventions: Teach patient to arise slowly Elimination Interventions: Call light in reach History of Falls Interventions: Door open when patient unattended

## 2018-10-24 NOTE — PROGRESS NOTES
1910: Assumed patient care. Received report from [facility], [address] (offgoing nurse). Report included SBAR, Kardex, and MAR. Patient resting in bed, in no signs of pain or distress. Asleep at this claudia.e  
 
2100: Patient receiving chemotherapy at this time. 6131: Ativan administered for subjective feelings of restlessness and anxiety as well as visible tremors noted in hands. 0207: Reassessed patient. Patient states he feels significantly more relaxed. Tremors reduced in hands.

## 2018-10-24 NOTE — PROGRESS NOTES
Problem: Falls - Risk of 
Goal: *Absence of Falls Document [place] Fall Risk and appropriate interventions in the flowsheet. Outcome: Progressing Towards Goal 
Fall Risk Interventions: 
Mobility Interventions: Patient to call before getting OOB Medication Interventions: Teach patient to arise slowly Elimination Interventions: Call light in reach History of Falls Interventions: Door open when patient unattended Problem: Pressure Injury - Risk of 
Goal: *Prevention of pressure injury Document Pranav Scale and appropriate interventions in the flowsheet. Outcome: Progressing Towards Goal 
Pressure Injury Interventions: Activity Interventions: Pressure redistribution bed/mattress(bed type) Mobility Interventions: Pressure redistribution bed/mattress (bed type) Nutrition Interventions: Document food/fluid/supplement intake Friction and Shear Interventions: Foam dressings/transparent film/skin sealants, HOB 30 degrees or less

## 2018-10-24 NOTE — PROGRESS NOTES
0725: Bedside and Verbal shift change report given to JOHN Gauthier (oncoming nurse) by Rodney Spicer RN (offgoing nurse). Report included the following information SBAR, Kardex and MAR.

## 2018-10-25 ENCOUNTER — HOME CARE VISIT (OUTPATIENT)
Dept: HOME HEALTH SERVICES | Facility: HOME HEALTH | Age: 48
End: 2018-10-25

## 2018-10-25 LAB
BLD PROD TYP BPU: NORMAL
BLD PROD TYP BPU: NORMAL
BPU ID: NORMAL
BPU ID: NORMAL
CALLED TO:,BCALL1: NORMAL
STATUS OF UNIT,%ST: NORMAL
STATUS OF UNIT,%ST: NORMAL
UNIT DIVISION, %UDIV: 0
UNIT DIVISION, %UDIV: 0

## 2018-10-28 ENCOUNTER — HOSPITAL ENCOUNTER (EMERGENCY)
Age: 48
Discharge: HOME | End: 2018-10-29
Attending: EMERGENCY MEDICINE | Admitting: EMERGENCY MEDICINE
Payer: MEDICARE

## 2018-10-28 ENCOUNTER — APPOINTMENT (OUTPATIENT)
Dept: NUCLEAR MEDICINE | Age: 48
End: 2018-10-28
Attending: EMERGENCY MEDICINE
Payer: MEDICARE

## 2018-10-28 ENCOUNTER — APPOINTMENT (OUTPATIENT)
Dept: GENERAL RADIOLOGY | Age: 48
End: 2018-10-28
Attending: EMERGENCY MEDICINE
Payer: MEDICARE

## 2018-10-28 DIAGNOSIS — Z87.09 HISTORY OF ACUTE BRONCHITIS WITH BRONCHOSPASM: ICD-10-CM

## 2018-10-28 DIAGNOSIS — R06.02 SOB (SHORTNESS OF BREATH): ICD-10-CM

## 2018-10-28 DIAGNOSIS — J81.0 FLASH PULMONARY EDEMA (HCC): Primary | ICD-10-CM

## 2018-10-28 DIAGNOSIS — J44.1 COPD WITH ACUTE EXACERBATION (HCC): ICD-10-CM

## 2018-10-28 DIAGNOSIS — D72.829 LEUKOCYTOSIS, UNSPECIFIED TYPE: ICD-10-CM

## 2018-10-28 LAB
ALBUMIN SERPL-MCNC: 3.3 G/DL (ref 3.4–5)
ALBUMIN/GLOB SERPL: 1.4 {RATIO} (ref 0.8–1.7)
ALP SERPL-CCNC: 48 U/L (ref 45–117)
ALT SERPL-CCNC: 54 U/L (ref 16–61)
ANION GAP SERPL CALC-SCNC: 8 MMOL/L (ref 3–18)
AST SERPL-CCNC: 30 U/L (ref 15–37)
BASOPHILS # BLD: 0 K/UL (ref 0–0.1)
BASOPHILS NFR BLD: 0 % (ref 0–2)
BILIRUB SERPL-MCNC: 0.6 MG/DL (ref 0.2–1)
BNP SERPL-MCNC: ABNORMAL PG/ML (ref 0–900)
BUN SERPL-MCNC: 50 MG/DL (ref 7–18)
BUN/CREAT SERPL: 8 (ref 12–20)
CALCIUM SERPL-MCNC: 7.6 MG/DL (ref 8.5–10.1)
CHLORIDE SERPL-SCNC: 101 MMOL/L (ref 100–108)
CO2 SERPL-SCNC: 28 MMOL/L (ref 21–32)
CREAT SERPL-MCNC: 6.07 MG/DL (ref 0.6–1.3)
DIFFERENTIAL METHOD BLD: ABNORMAL
EOSINOPHIL # BLD: 0.3 K/UL (ref 0–0.4)
EOSINOPHIL NFR BLD: 2 % (ref 0–5)
ERYTHROCYTE [DISTWIDTH] IN BLOOD BY AUTOMATED COUNT: 17.1 % (ref 11.6–14.5)
GLOBULIN SER CALC-MCNC: 2.4 G/DL (ref 2–4)
GLUCOSE SERPL-MCNC: 106 MG/DL (ref 74–99)
HCT VFR BLD AUTO: 25.4 % (ref 36–48)
HGB BLD-MCNC: 8.1 G/DL (ref 13–16)
LACTATE BLD-SCNC: 1.4 MMOL/L (ref 0.4–2)
LYMPHOCYTES # BLD: 1.7 K/UL (ref 0.9–3.6)
LYMPHOCYTES NFR BLD: 10 % (ref 21–52)
MCH RBC QN AUTO: 27.5 PG (ref 24–34)
MCHC RBC AUTO-ENTMCNC: 31.9 G/DL (ref 31–37)
MCV RBC AUTO: 86.1 FL (ref 74–97)
MONOCYTES # BLD: 0.7 K/UL (ref 0.05–1.2)
MONOCYTES NFR BLD: 4 % (ref 3–10)
NEUTS SEG # BLD: 13.6 K/UL (ref 1.8–8)
NEUTS SEG NFR BLD: 84 % (ref 40–73)
PLATELET # BLD AUTO: 207 K/UL (ref 135–420)
PMV BLD AUTO: 8.9 FL (ref 9.2–11.8)
POTASSIUM SERPL-SCNC: 4 MMOL/L (ref 3.5–5.5)
PROT SERPL-MCNC: 5.7 G/DL (ref 6.4–8.2)
RBC # BLD AUTO: 2.95 M/UL (ref 4.7–5.5)
SODIUM SERPL-SCNC: 137 MMOL/L (ref 136–145)
TROPONIN I SERPL-MCNC: 0.05 NG/ML (ref 0–0.04)
WBC # BLD AUTO: 16.1 K/UL (ref 4.6–13.2)

## 2018-10-28 PROCEDURE — 83605 ASSAY OF LACTIC ACID: CPT

## 2018-10-28 PROCEDURE — 77030029684 HC NEB SM VOL KT MONA -A

## 2018-10-28 PROCEDURE — 84484 ASSAY OF TROPONIN QUANT: CPT

## 2018-10-28 PROCEDURE — 85025 COMPLETE CBC W/AUTO DIFF WBC: CPT

## 2018-10-28 PROCEDURE — 99285 EMERGENCY DEPT VISIT HI MDM: CPT

## 2018-10-28 PROCEDURE — 80053 COMPREHEN METABOLIC PANEL: CPT

## 2018-10-28 PROCEDURE — 83880 ASSAY OF NATRIURETIC PEPTIDE: CPT

## 2018-10-28 PROCEDURE — 71045 X-RAY EXAM CHEST 1 VIEW: CPT

## 2018-10-28 PROCEDURE — 90935 HEMODIALYSIS ONE EVALUATION: CPT

## 2018-10-28 PROCEDURE — A9540 TC99M MAA: HCPCS

## 2018-10-28 PROCEDURE — 93005 ELECTROCARDIOGRAM TRACING: CPT

## 2018-10-28 PROCEDURE — 94640 AIRWAY INHALATION TREATMENT: CPT

## 2018-10-28 NOTE — ED NOTES
Bedside shift change report given to Nel (oncoming nurse) by [facility] (offgoing nurse). Report included the following information SBAR.
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
## 2018-10-28 NOTE — PROGRESS NOTES
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 Ordered HD with UF for volume overload, informed on call HD RN Ms. Lange. Full consult in morning.

## 2018-10-28 NOTE — ED NOTES
Vitals: 
Patient Vitals for the past 12 hrs: 
 Temp Pulse Resp BP SpO2  
10/28/18 1700  74 14 134/62 98 % 10/28/18 1634  77 18 144/66 98 % 10/28/18 1515  71 13 (!) 178/154 98 % 10/28/18 1500  75 15 190/77 97 % 10/28/18 1445  78 17 189/70 97 % 10/28/18 1430  76 18 167/87 96 % 10/28/18 1405 98.1 °F (36.7 °C) 87 15 177/64 94 % Medications ordered:  
Medications  
technetium pentetate (DTPA) aerosol 1 millicurie (1 millicurie Inhalation Given 10/28/18 1539) technetium albumin aggregated (MAA) solution 5.8 millicurie (5.8 millicuries IntraVENous Given 10/28/18 1617) Lab findings: 
Recent Results (from the past 12 hour(s)) CBC WITH AUTOMATED DIFF Collection Time: 10/28/18  2:10 PM  
Result Value Ref Range WBC 16.1 (H) 4.6 - 13.2 K/uL  
 RBC 2.95 (L) 4.70 - 5.50 M/uL HGB 8.1 (L) 13.0 - 16.0 g/dL HCT 25.4 (L) 36.0 - 48.0 % MCV 86.1 74.0 - 97.0 FL  
 MCH 27.5 24.0 - 34.0 PG  
 MCHC 31.9 31.0 - 37.0 g/dL  
 RDW 17.1 (H) 11.6 - 14.5 % PLATELET 871 399 - 193 K/uL MPV 8.9 (L) 9.2 - 11.8 FL  
 NEUTROPHILS 84 (H) 40 - 73 % LYMPHOCYTES 10 (L) 21 - 52 % MONOCYTES 4 3 - 10 % EOSINOPHILS 2 0 - 5 % BASOPHILS 0 0 - 2 %  
 ABS. NEUTROPHILS 13.6 (H) 1.8 - 8.0 K/UL  
 ABS. LYMPHOCYTES 1.7 0.9 - 3.6 K/UL  
 ABS. MONOCYTES 0.7 0.05 - 1.2 K/UL  
 ABS. EOSINOPHILS 0.3 0.0 - 0.4 K/UL  
 ABS. BASOPHILS 0.0 0.0 - 0.1 K/UL  
 DF AUTOMATED METABOLIC PANEL, COMPREHENSIVE Collection Time: 10/28/18  2:10 PM  
Result Value Ref Range Sodium 137 136 - 145 mmol/L Potassium 4.0 3.5 - 5.5 mmol/L Chloride 101 100 - 108 mmol/L  
 CO2 28 21 - 32 mmol/L Anion gap 8 3.0 - 18 mmol/L Glucose 106 (H) 74 - 99 mg/dL BUN 50 (H) 7.0 - 18 MG/DL Creatinine 6.07 (H) 0.6 - 1.3 MG/DL  
 BUN/Creatinine ratio 8 (L) 12 - 20 GFR est AA 12 (L) >60 ml/min/1.73m2 GFR est non-AA 10 (L) >60 ml/min/1.73m2 Calcium 7.6 (L) 8.5 - 10.1 MG/DL  Bilirubin, total 0.6 0.2 - 1.0 MG/DL  
 ALT (SGPT) 54 16 - 61 U/L  
 AST (SGOT) 30 15 - 37 U/L Alk. phosphatase 48 45 - 117 U/L Protein, total 5.7 (L) 6.4 - 8.2 g/dL Albumin 3.3 (L) 3.4 - 5.0 g/dL Globulin 2.4 2.0 - 4.0 g/dL A-G Ratio 1.4 0.8 - 1.7 NT-PRO BNP Collection Time: 10/28/18  2:10 PM  
Result Value Ref Range NT pro-BNP 21,333 (H) 0 - 900 PG/ML  
TROPONIN I Collection Time: 10/28/18  2:10 PM  
Result Value Ref Range Troponin-I, Qt. 0.05 (H) 0.0 - 0.045 NG/ML  
EKG, 12 LEAD, INITIAL Collection Time: 10/28/18  2:30 PM  
Result Value Ref Range Ventricular Rate 77 BPM  
 Atrial Rate 77 BPM  
 P-R Interval 164 ms QRS Duration 92 ms Q-T Interval 448 ms QTC Calculation (Bezet) 506 ms Calculated P Axis 23 degrees Calculated R Axis -22 degrees Calculated T Axis 72 degrees Diagnosis Normal sinus rhythm Septal infarct (cited on or before 01-DEC-2015) Prolonged QT Abnormal ECG When compared with ECG of 20-AUG-2017 05:44, 
Vent. rate has increased BY  29 BPM 
T wave inversion no longer evident in Lateral leads QT has lengthened POC LACTIC ACID Collection Time: 10/28/18  2:32 PM  
Result Value Ref Range Lactic Acid (POC) 1.4 0.4 - 2.0 mmol/L  
 
 
EKG interpretation by ED Physician: X-Ray, CT or other radiology findings or impressions: 
XR CHEST PORT Final Result NM LUNG PERFUSION W VENT    (Results Pending) Progress notes, Consult notes or additional Procedure notes:  
T/o from dr coello to recheck after dialysis Pt wheezing after dialysis. Given treatment with improvement No hypoxia. Already on prednisone. Will add doxy, refill inhaler I have discussed with patient and/or family/sig other the results, interpretation of any imaging if performed, suspected diagnosis and treatment plan to include instructions regarding the diagnoses listed to which understanding was expressed with all questions answered Reevaluation of patient:  
stable Disposition: Diagnosis: 1. Flash pulmonary edema (HCC) 2. Leukocytosis, unspecified type 3. SOB (shortness of breath) Disposition: home Follow-up Information None Medication List  
  
ASK your doctor about these medications   
albuterol 90 mcg/actuation inhaler Commonly known as:  PROVENTIL HFA, VENTOLIN HFA, PROAIR HFA Take 2 Puffs by inhalation every six (6) hours as needed for Wheezing. Indications: BRONCHOSPASM PREVENTION 
  
amiodarone 400 mg tablet Commonly known as:  Anabel Santos Take 1 Tab by mouth daily. Indications: LIFE-THREATENING VENTRICULAR TACHYCARDIA 
  
amLODIPine 10 mg tablet Commonly known as:  Lue Sunday Take 1 Tab by mouth daily. aspirin 81 mg chewable tablet 
  
atorvastatin 40 mg tablet Commonly known as:  LIPITOR Take 1 Tab by mouth daily. Indications: hypercholesterolemia 
  
calcium acetate 667 mg Cap Commonly known as:  PHOSLO Take 2 Caps by mouth three (3) times daily (with meals). clopidogrel 75 mg Tab Commonly known as:  PLAVIX Take 1 Tab by mouth daily. Indications: Myocardial Reinfarction Prevention 
  
docusate sodium 100 mg capsule Commonly known as:  Aryan Hamburg Take 1 Cap by mouth two (2) times a day for 90 days. epoetin natanael 10,000 unit/mL injection Commonly known as:  EPOGEN;PROCRIT 
1 mL by SubCUTAneous route Every Tues, Thur & Sat. 
  
fluticasone 50 mcg/actuation nasal spray Commonly known as:  Deja Parlor 2 Sprays by Both Nostrils route daily as needed for Rhinitis. hydrALAZINE 50 mg tablet Commonly known as:  APRESOLINE Take 1 Tab by mouth three (3) times daily. isosorbide mononitrate ER 60 mg CR tablet Commonly known as:  IMDUR Take 1.5 Tabs by mouth every morning. lidocaine 5 % Commonly known as:  Francisco Luis Apply patch to the affected area for 12 hours a day and remove for 12 hours a day. pantoprazole 40 mg tablet Commonly known as:  PROTONIX Take 1 Tab by mouth Daily (before breakfast). polyethylene glycol 17 gram packet Commonly known as:  Chaparrita Bulmaro Take 1 Packet by mouth daily. predniSONE 20 mg tablet Commonly known as:  Max Climes Take 3 Tabs by mouth daily (with breakfast). SUBOXONE 8-2 mg Film sublingaul film Generic drug:  buprenorphine-naloxone 
  
tiotropium 18 mcg inhalation capsule Commonly known as:  Taylor Travis Take 1 Cap by inhalation daily.

## 2018-10-29 ENCOUNTER — TELEPHONE (OUTPATIENT)
Dept: CARDIOLOGY CLINIC | Age: 48
End: 2018-10-29

## 2018-10-29 VITALS
WEIGHT: 250 LBS | SYSTOLIC BLOOD PRESSURE: 151 MMHG | DIASTOLIC BLOOD PRESSURE: 77 MMHG | HEART RATE: 68 BPM | BODY MASS INDEX: 35.79 KG/M2 | HEIGHT: 70 IN | OXYGEN SATURATION: 100 % | RESPIRATION RATE: 20 BRPM | TEMPERATURE: 99.4 F

## 2018-10-29 PROCEDURE — 74011000250 HC RX REV CODE- 250: Performed by: EMERGENCY MEDICINE

## 2018-10-29 PROCEDURE — 74011250637 HC RX REV CODE- 250/637: Performed by: EMERGENCY MEDICINE

## 2018-10-29 RX ORDER — ALBUTEROL SULFATE 90 UG/1
2 AEROSOL, METERED RESPIRATORY (INHALATION)
Qty: 1 INHALER | Refills: 3 | OUTPATIENT
Start: 2018-10-29 | End: 2020-01-09

## 2018-10-29 RX ORDER — DOXYCYCLINE HYCLATE 100 MG
100 TABLET ORAL 2 TIMES DAILY
Qty: 20 TAB | Refills: 0 | Status: SHIPPED | OUTPATIENT
Start: 2018-10-29 | End: 2018-11-08

## 2018-10-29 RX ORDER — IPRATROPIUM BROMIDE AND ALBUTEROL SULFATE 2.5; .5 MG/3ML; MG/3ML
3 SOLUTION RESPIRATORY (INHALATION)
Status: COMPLETED | OUTPATIENT
Start: 2018-10-29 | End: 2018-10-29

## 2018-10-29 RX ORDER — DOXYCYCLINE 100 MG/1
100 CAPSULE ORAL
Status: COMPLETED | OUTPATIENT
Start: 2018-10-29 | End: 2018-10-29

## 2018-10-29 RX ADMIN — DOXYCYCLINE 100 MG: 100 CAPSULE ORAL at 02:33

## 2018-10-29 RX ADMIN — IPRATROPIUM BROMIDE AND ALBUTEROL SULFATE 3 ML: .5; 3 SOLUTION RESPIRATORY (INHALATION) at 01:38

## 2018-10-29 NOTE — DIALYSIS
ACUTE HEMODIALYSIS FLOW SHEET 
 
HEMODIALYSIS ORDERS: Physician: Christian Perry Dialyzer:  Revaclear        Duration: 3.5 hr  BFR:  350  DFR: 800 Dialysate:  Temp37  K+   3    Ca+2.5   Na140  Bicarb 35 Weight:   kg    Bed Scale []     Unable to Obtain [x]      Dry weight/UF Goal: [address] [facility] Needle Gauge Heparin []  Bolus      Units    [] Hourly       Units    [x]None Catheter locking solution Pre BP:151/77       Pulse:  75      Temperature:99.3     Respirations: 26  Tx: NS       ml/Bolus  Other        [x] N/A Labs: Pre        Post:        [x] N/A Additional Orders(medications, blood products, hypotension management):       [x] N/A [x] Time Out/Safety Check  [x] [facility] Consent Verified CATHETER ACCESS: []N/A   [x]Right   []Left   []IJ     []Fem [] First use X-ray verified     [x]Tunnel                [] Non Tunneled [x]No S/S infection  []Redness  []Drainage []Cultured []Swelling []Pain  
[x]Medical Aseptic Prep Utilized   [x]Dressing Changed  [] Biopatch  Date:      
[]Clotted   [x]Patent   Flows: [x]Good  []Poor  []Reversed If access problem,  notified: []Yes    [x]N/A  Date:        
 
GRAFT/FISTULA ACCESS:  [x]N/A     []Right     []Left     []UE     []LE []AVG   []AVF        []Buttonhole    []Medical Aseptic Prep Utilized []No S/S infection  []Redness  []Drainage []Cultured []Swelling []Pain Bruit:   [] Strong    [] Weak       Thrill :   [] Strong    [] Weak Needle Gauge:    Length: If access problem,  notified: []Yes     [x]N/A  Date:       
Please describe access if present and not used:  
 
 
GENERAL ASSESSMENT:   
LUNGS:  Rate  SaO2%   94     [] N/A    [] Clear  [] Coarse  [] Crackles  [x] Wheezing 
      [x] Diminished     Location : []RLL   []LLL    []RUL  []NIKOS Cough: []Productive  []Dry  [x]N/A   Respirations:  []Easy  [x]Labored Therapy:  []RA  [x]NC 2  l/min    Mask: []NRB []Venti       O2% []Ventilator  []Intubated  [] Trach  [] BiPaP CARDIAC: [x]Regular      [] Irregular   [] Pericardial Rub  [] JVD []  Monitored  [] Bedside  [] Remotely monitored [x] N/A  Rhythm: EDEMA: [] None  [x]Generalized  [] Pitting [] 1    [] 2    [] 3    [] 4 [] Facial  [] Pedal  []  UE  [] LE  
SKIN:   [x] Warm  [] Hot     [] Cold   [x] Dry     [] Pale   [] Diaphoretic    
             [] Flushed  [] Jaundiced  [] Cyanotic  [] Rash  [] Weeping LOC:    [x] Alert      [x]Oriented:    [x] Person     [x] Place  [x]Time 
             [] Confused  [] Lethargic  [] Medicated  [] Non-responsive GI / ABDOMEN:  [] Flat    [x] Distended    [x] Soft    [] Firm   []  Obese 
                           [] Diarrhea  [x] Bowel Sounds  [] Nausea  [] Vomiting  / URINE ASSESSMENT:[x] Voiding   [] Oliguria  [] Anuria   []  Alonzo [] Incontinent    []  Incontinent Brief      []  Bathroom Privileges PAIN: [] 0 []1  []2   [x]3   []4   []5   []6   []7   []8   []9   []10 Scale 0-10  Action/Follow Up: MOBILITY:  [] Amb    [] Amb/Assist    [] Bed    [] Wheelchair  [x] [employer] All Vitals and Treatment Details on Attached Flowsheet Hospital:  [facility] Room # ER 
  
[] 1st Time Acute  [x] Stat  [] Routine  [] Urgent [x] Acute Room  []  Bedside  [] ICU/CCU  [] ER Isolation Precautions:  [x] Dialysis   [] Airborne   [] Contact    [] Reverse Special Considerations:         [] Blood Consent Verified [x]N/A ALLERGIES:   [] NKA    Latex codeine Code Status:  [x] Full Code  [] DNR  [] Other HBsAg ONLY: Date Drawn   10/6/18       [x]Negative []Positive []Unknown HBsAb: Date  10/22/18   [] Susceptible   [x] [id number] []Not Drawn  [] Drawn Current Labs:    Date of Labs: Today [x] Results for Isabelle Khanna (MRN [id number]) as of 10/28/2018 22:58 Ref.  Range 10/28/2018 14:10  
 WBC Latest Ref Range: 4.6 - 13.2 K/uL 16.1 (H) RBC Latest Ref Range: 4.70 - 5.50 M/uL 2.95 (L) HGB Latest Ref Range: 13.0 - 16.0 g/dL 8.1 (L) HCT Latest Ref Range: 36.0 - 48.0 % 25.4 (L) MCV Latest Ref Range: 74.0 - 97.0 FL 86.1 MCH Latest Ref Range: 24.0 - 34.0 PG 27.5 MCHC Latest Ref Range: 31.0 - 37.0 g/dL 31.9 RDW Latest Ref Range: 11.6 - 14.5 % 17.1 (H) PLATELET Latest Ref Range: 135 - 420 K/uL 207 MPV Latest Ref Range: 9.2 - 11.8 FL 8.9 (L) NEUTROPHILS Latest Ref Range: 40 - 73 % 84 (H) LYMPHOCYTES Latest Ref Range: 21 - 52 % 10 (L) MONOCYTES Latest Ref Range: 3 - 10 % 4 EOSINOPHILS Latest Ref Range: 0 - 5 % 2  
BASOPHILS Latest Ref Range: 0 - 2 % 0  
DF Latest Units:   AUTOMATED  
ABS. NEUTROPHILS Latest Ref Range: 1.8 - 8.0 K/UL 13.6 (H)  
ABS. LYMPHOCYTES Latest Ref Range: 0.9 - 3.6 K/UL 1.7  
ABS. MONOCYTES Latest Ref Range: 0.05 - 1.2 K/UL 0.7 ABS. EOSINOPHILS Latest Ref Range: 0.0 - 0.4 K/UL 0.3  
ABS. BASOPHILS Latest Ref Range: 0.0 - 0.1 K/UL 0.0 Sodium Latest Ref Range: 136 - 145 mmol/L 137 Potassium Latest Ref Range: 3.5 - 5.5 mmol/L 4.0 Chloride Latest Ref Range: 100 - 108 mmol/L 101 CO2 Latest Ref Range: 21 - 32 mmol/L 28 Anion gap Latest Ref Range: 3.0 - 18 mmol/L 8 Glucose Latest Ref Range: 74 - 99 mg/dL 106 (H) BUN Latest Ref Range: 7.0 - 18 MG/DL 50 (H) Creatinine Latest Ref Range: 0.6 - 1.3 MG/DL 6.07 (H) BUN/Creatinine ratio Latest Ref Range: 12 - 20   8 (L) Calcium Latest Ref Range: 8.5 - 10.1 MG/DL 7.6 (L)  
GFR est non-AA Latest Ref Range: >60 ml/min/1.73m2 10 (L) GFR est AA Latest Ref Range: >60 ml/min/1.73m2 12 (L) Bilirubin, total Latest Ref Range: 0.2 - 1.0 MG/DL 0.6 Protein, total Latest Ref Range: 6.4 - 8.2 g/dL 5.7 (L) Albumin Latest Ref Range: 3.4 - 5.0 g/dL 3.3 (L) Globulin Latest Ref Range: 2.0 - 4.0 g/dL 2.4 A-G Ratio Latest Ref Range: 0.8 - 1.7   1.4 ALT (SGPT) Latest Ref Range: 16 - 61 U/L 54 AST Latest Ref Range: 15 - 37 U/L 30 Alk. phosphatase Latest Ref Range: 45 - 117 U/L 48 Troponin-I, Qt. Latest Ref Range: 0.0 - 0.045 NG/ML 0.05 (H)  
NT pro-BNP Latest Ref Range: 0 - 900 PG/ML 21,333 (H) Results for Bright Werner (MRN [id number]) as of 10/28/2018 22:58 Ref. Range 10/6/2018 20:00 10/22/2018 08:59 Hepatitis B surface Ag Latest Ref Range: <1.00 Index <0.10 Hep B surface Ag Interp. Latest Ref Range: NEG   NEGATIVE Hepatitis B surface Ab Latest Ref Range: >10.0 mIU/mL  178.71 Hep B surface Ab Interp. Latest Ref Range: POS    POSITIVE Hep B surface Ab comment Latest Units:    Samples with a  v... Cut and paste current labs here. DIET:  [x] Renal    [] Other     [] NPO     [x]  Diabetic PRIMARY NURSE REPORT: First initial/Last name/Title Pre Dialysis: Gregorio Hodge RN    Time:  5108 EDUCATION:   
[x] Patient [] Other         Knowledge Basis: []None [x]Minimal [] Substantial  
Barriers to learning  []N/A [x] Access Care     [] S&S of infection     [x] Fluid Management     []K+     [x]Procedural   
[]Albumin     [x] Medications     [] Tx Options     [] Transplant     [] Diet     [] Other Teaching Tools:  [x] Explain  [] Demo  [] Handouts [] Video Patient response:   [x] Verbalized understanding  [] Teach back  [] Return demonstration [] Requires follow up Inappropriate due to         
 
[address] Before each treatment:    
Machine Number:                   [address] 
                                [] Unit Machine #  with centralized RO 
                                [] Portable Machine #1/RO serial # [id number] [] Portable Machine #2/RO serial # [id number] [] Portable Machine #3/RO serial # [id number] [address] [facility] 
                                [] Portable Machine #11/RO serial # [id number] [] Portable Machine #12/RO serial # [id number] [x] Portable Machine #13/RO serial #  [id number] Alarm Test:  Pass time  2015        Other:        
[x] RO/Machine Log Complete Temp     37           [x]Extracorporeal Circuit Tested for integrity Dialysate: pH 7.2  Conductivity: Meter  14      HD Machine 14                  TCD:14 Dialyzer Lot #  [id number]           Blood Tubing Lot # [id number]          Saline Lot #  [id number]  
 
CHLORINE TESTING-Before each treatment and every 4 hours Total Chlorine: [x] less than 0.1 ppm  Time: 17143 Hr/2nd Check Time:   
(if greater than 0.1 ppm from Primary then every 30 minutes from Secondary) TREATMENT INITIATION  with Dialysis Precautions:  
[x] All Connections Secured                 [x] Saline Line Double Clamped  
[x] Venous Parameters Set                  [x] Arterial Parameters Set [x] Prime Given   ml 250              [x]Air Foam Detector Engaged Treatment Initiation Note: Started treatment using rt ij TDC. Pt. Very SOB I placed pt on 4l nc. sats 97%. Temp 99.3. His breathing improved as treatment went on. Medication Dose Volume Route Initials Dialyzer Cleared: [] Good [x] Fair  [] Poor Blood processed: 70.9  L 
UF Removed 3000Ml Post Wt:  ISIDRO   kg POst BP:  151/77        Pulse:   63    Respirations: 22  Temperature: 99.4 Post Tx Vascular Access: AVF/AVG: Bleeding stopped Art  min. Ashok. Min   N/A Catheter: Locking solution: Heparin 1:1000 Art.2.0   Ashok. 2.1  N/A                               Post Assessment:  
  
                              Skin:  [x] Warm  [x] Dry [] Diaphoretic [] Flushed  [] Pale [] Cyanotic [facility] Signatures Title Initials  Time Lungs: [x] Clear    [] Course  [] Crackles  [] Wheezing [] Diminished Cardiac: [x] Regular   [] Irregular   [] Monitor  [] N/A  Rhythm:      
    Edema:  [] None    [x] General     [] Facial   [] Pedal    [] UE    [] LE  
    Pain: [x]0  []1  []2   []3  []4   []5   []6   []7   []8   []9   []10 Post Treatment Note:  Pt. Tolerated treatment well. He feels better. POST TREATMENT PRIMARY NURSE HANDOFF REPORT:  
 
First initial/Last name/Title Post Dialysis: Charity Ramirez RN Time:  1211 Abbreviations: AVG-arterial venous graft, AVF-arterial venous fistula, IJ-Internal Jugular, Subcl-Subclavian, Fem-Femoral, Tx-treatment, AP/HR-apical heart rate, DFR-dialysate flow rate, BFR-blood flow rate, AP-arterial pressure, -venous pressure, UF-ultrafiltrate, TMP-transmembrane pressure, Ashok-Venous, Art-Arterial, RO-Reverse Osmosis

## 2018-10-29 NOTE — TELEPHONE ENCOUNTER
Patient is now end stage renal disease, patient wife called and says that his nephrologist wants him to stop Amiodarone. She says that EARLENE Estrada has told them in the past he should not stop taking that. She wants recommendations on what she should do. Advised that Dr. Israel Estrada is not in office and will send message to him for recommendations.  She knows that a call back will most likely be tomorrow

## 2018-10-30 LAB
ATRIAL RATE: 77 BPM
CALCULATED P AXIS, ECG09: 23 DEGREES
CALCULATED R AXIS, ECG10: -22 DEGREES
CALCULATED T AXIS, ECG11: 72 DEGREES
DIAGNOSIS, 93000: NORMAL
P-R INTERVAL, ECG05: 164 MS
Q-T INTERVAL, ECG07: 448 MS
QRS DURATION, ECG06: 92 MS
QTC CALCULATION (BEZET), ECG08: 506 MS
VENTRICULAR RATE, ECG03: 77 BPM

## 2018-11-01 NOTE — TELEPHONE ENCOUNTER
Verbal order and read back per Kennith Snellen, MD  [facility] to be off amiodarone will f/u and discuss restarting at next OV.

## 2018-11-01 NOTE — TELEPHONE ENCOUNTER
Attempted to contact pt at  number, no answer different name on vm. Cell number -wrong number. Pts home -not in service. Will send [employer] message adviseing.

## 2018-12-11 ENCOUNTER — PATIENT MESSAGE (OUTPATIENT)
Dept: FAMILY MEDICINE CLINIC | Facility: CLINIC | Age: 48
End: 2018-12-11

## 2019-02-18 ENCOUNTER — OFFICE VISIT (OUTPATIENT)
Dept: FAMILY MEDICINE CLINIC | Facility: CLINIC | Age: 49
End: 2019-02-18

## 2019-02-18 VITALS
SYSTOLIC BLOOD PRESSURE: 136 MMHG | RESPIRATION RATE: 17 BRPM | HEART RATE: 66 BPM | DIASTOLIC BLOOD PRESSURE: 79 MMHG | TEMPERATURE: 98.7 F | OXYGEN SATURATION: 98 % | WEIGHT: 232 LBS | HEIGHT: 70 IN | BODY MASS INDEX: 33.21 KG/M2

## 2019-02-18 DIAGNOSIS — G47.33 OSA (OBSTRUCTIVE SLEEP APNEA): ICD-10-CM

## 2019-02-18 DIAGNOSIS — M54.42 CHRONIC MIDLINE LOW BACK PAIN WITH LEFT-SIDED SCIATICA: ICD-10-CM

## 2019-02-18 DIAGNOSIS — R73.9 HYPERGLYCEMIA: ICD-10-CM

## 2019-02-18 DIAGNOSIS — J42 CHRONIC BRONCHITIS, UNSPECIFIED CHRONIC BRONCHITIS TYPE (HCC): ICD-10-CM

## 2019-02-18 DIAGNOSIS — I10 ESSENTIAL HYPERTENSION: ICD-10-CM

## 2019-02-18 DIAGNOSIS — H54.7 DECREASED VISUAL ACUITY: ICD-10-CM

## 2019-02-18 DIAGNOSIS — I25.5 ISCHEMIC CARDIOMYOPATHY: ICD-10-CM

## 2019-02-18 DIAGNOSIS — G89.29 CHRONIC MIDLINE LOW BACK PAIN WITH LEFT-SIDED SCIATICA: ICD-10-CM

## 2019-02-18 DIAGNOSIS — N18.6 ESRD (END STAGE RENAL DISEASE) (HCC): Primary | ICD-10-CM

## 2019-02-18 RX ORDER — INSULIN ASPART 100 [IU]/ML
5 INJECTION, SOLUTION INTRAVENOUS; SUBCUTANEOUS AS NEEDED
COMMUNITY
Start: 2019-02-15

## 2019-02-18 RX ORDER — OXYCODONE HYDROCHLORIDE 10 MG/1
10 TABLET ORAL
Qty: 12 TAB | Refills: 0 | Status: ON HOLD | OUTPATIENT
Start: 2019-02-18 | End: 2020-01-01 | Stop reason: SDUPTHER

## 2019-02-18 RX ORDER — OXYCODONE HYDROCHLORIDE 10 MG/1
TABLET ORAL
COMMUNITY
End: 2019-02-18 | Stop reason: SDUPTHER

## 2019-02-18 NOTE — PROGRESS NOTES
Rand Kovacs is a 46 y.o.@ presents today for office visit for follow up. Pt is in Room # 6.     1. Have you been to the ER, urgent care clinic since your last visit? Hospitalized since your last visit? Yes pt in [address] for the past six months. 2. Have you seen or consulted any other health care providers outside of the [address] [place] [address] since your last visit? Include any pap smears or colon screening. No         Health Maintenance reviewed - pt ill.     Requested Prescriptions     Pending Prescriptions Disp Refills    glucose blood VI test strips (ASCENSIA AUTODISC VI, ONE TOUCH ULTRA TEST VI) strip 50 Strip 3       Visit Vitals  /79 (BP 1 Location: Right arm, BP Patient Position: Sitting)   Pulse 66   Temp 98.7 °F (37.1 °C) (Oral)   Resp 17   Ht 5' 10\" (1.778 m)   Wt 232 lb (105.2 kg)   SpO2 98%   BMI 33.29 kg/m²          Upcoming Appts  No.     VORB:    MD Cary Sr LPN

## 2019-02-18 NOTE — PROGRESS NOTES
Subjective:   Jackson Urbina is a 46y.o. year old male who presents for follow-up of chronic conditions. Pt was hospitalized at [facility] from 11/27/18 to 12/15/18. 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 On 12/4/18 he was noted to desaturate to SpO2 of 87%. He was also noted to have borderline hypotension at that time with SBPs in the 80s as well as altered mental status. Platelet count was also noted drom from 114-->87. He was discharged from PM&R and readmitted to . Upon admission he was saturating well on 3L NC. He was obersved to have multiple apneic episodes during sleep while with PM&R. CO2 retention from JESSICA was suspected as a contributing factor to his condition. CXR at that time showed persistent asymmetric  Ill-defined opacity suggestive of a clearing pneumonia. BNP was elevated at 9,600. Pt exhibited asterixis on exam, but ammonia level was wnl and LFTs were only mildly elevated. DIC and PE labs and imaging were negative. ABG was wnl. 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 Alpha-glactosidase activity assay was ordered. Pt's gabapentin was reduced and ultimately discontinued with improvement in mental status. AMS believed to be due to polypharmacy. RUQ imaging showed fatty infiltration of liver. MEDARDO panel was negative. Pt was continued on Eliquis for RUE DVT. He was discharged in stable condition to SNF. Pt continue CPAP, HD MWF. Today he reports feeling well. HTN: BP is controlled. Taking imdur and hydralazine. ESRD:  Pt with history of cresentic IGA nephropathy. He is receiving hemodialysis MWF. Pt has anemia of chronic disease. Taking EPO and steroids. Experiencing hyperglycemia. Taking insulin per sliding scale for hyperglycemia from steroids.   He is following up with nephrology. JESSICA: Pt was using CPAP in hospital, but has not been able to obtain device due to insurance reasons. CAD: Pt reports no anginal symptoms. Decreased visual acuity of left eye: This is chronic. Pt denies recent trauma, eye redness, or pain. Chronic low back pain:  Pt was previously seeing pain management. He is taking roxicodone to manage his pain. He has a history of 3 lumbar surgeries. He will need to reestablish care with pain management. Review of Systems   Constitutional: Negative for chills, diaphoresis, fever, malaise/fatigue and weight loss. HENT: Negative for congestion and sore throat. Eyes: Positive for blurred vision. Respiratory: Negative for cough and shortness of breath. Cardiovascular: Negative for chest pain, palpitations, orthopnea, claudication, leg swelling and PND. Gastrointestinal: Negative for abdominal pain, blood in stool, constipation, diarrhea, melena, nausea and vomiting. Genitourinary: Negative for dysuria, flank pain, frequency, hematuria and urgency. Musculoskeletal: Positive for back pain. Neurological: Negative for dizziness, weakness and headaches. Psychiatric/Behavioral: Negative for depression. Current Outpatient Medications on File Prior to Visit   Medication Sig Dispense Refill    oxyCODONE IR (ROXICODONE) 10 mg tab immediate release tablet Take  by mouth.  NOVOLOG FLEXPEN U-100 INSULIN 100 unit/mL inpn 5 m by IntraMUSCular route as needed. Pt on scale      albuterol (PROVENTIL HFA, VENTOLIN HFA, PROAIR HFA) 90 mcg/actuation inhaler Take 2 Puffs by inhalation every six (6) hours as needed for Wheezing. 1 Inhaler 3    predniSONE (DELTASONE) 20 mg tablet Take 3 Tabs by mouth daily (with breakfast). 30 Tab 0    amLODIPine (NORVASC) 10 mg tablet Take 1 Tab by mouth daily. 30 Tab 0    calcium acetate (PHOSLO) 667 mg cap Take 2 Caps by mouth three (3) times daily (with meals).  90 Cap 0    epoetin natanael (EPOGEN;PROCRIT) 10,000 unit/mL injection 1 mL by SubCUTAneous route Every Tues, Thur & Sat. 12 Vial 0    hydrALAZINE (APRESOLINE) 50 mg tablet Take 1 Tab by mouth three (3) times daily. 90 Tab 0    pantoprazole (PROTONIX) 40 mg tablet Take 1 Tab by mouth Daily (before breakfast). 30 Tab 0    polyethylene glycol (MIRALAX) 17 gram packet Take 1 Packet by mouth daily. 30 Packet 0    isosorbide mononitrate ER (IMDUR) 60 mg CR tablet Take 1.5 Tabs by mouth every morning. (Patient taking differently: Take 30 mg by mouth every morning.) 45 Tab 0    atorvastatin (LIPITOR) 40 mg tablet Take 1 Tab by mouth daily. Indications: hypercholesterolemia 90 Tab 3    aspirin 81 mg chewable tablet Take 162 mg by mouth two (2) times a day.  fluticasone (FLONASE) 50 mcg/actuation nasal spray 2 Sprays by Both Nostrils route daily as needed for Rhinitis. 1 Bottle 1    buprenorphine-naloxone (SUBOXONE) 8-2 mg film sublingaul film 0.5 Film by SubLINGual route two (2) times a day. Indications: Opioid Withdrawal Symptoms      clopidogrel (PLAVIX) 75 mg tab Take 1 Tab by mouth daily. Indications: Myocardial Reinfarction Prevention 90 Tab 3    amiodarone (PACERONE) 400 mg tablet Take 1 Tab by mouth daily. Indications: LIFE-THREATENING VENTRICULAR TACHYCARDIA 90 Tab 3    tiotropium (SPIRIVA) 18 mcg inhalation capsule Take 1 Cap by inhalation daily. 30 Cap 5    lidocaine (LIDODERM) 5 % Apply patch to the affected area for 12 hours a day and remove for 12 hours a day. 7 Each 1     No current facility-administered medications on file prior to visit. Reviewed PmHx, RxHx, FmHx, SocHx, AllgHx and updated and dated in the chart.     Nurse notes were reviewed and are correct    Objective:     Vitals:    02/18/19 1549   BP: 136/79   Pulse: 66   Resp: 17   Temp: 98.7 °F (37.1 °C)   TempSrc: Oral   SpO2: 98%   Weight: 232 lb (105.2 kg)   Height: 5' 10\" (1.778 m)     Physical Exam   Constitutional: He is oriented to person, place, and time. He appears well-developed and well-nourished. HENT:   Head: Normocephalic and atraumatic. Mouth/Throat: Oropharynx is clear and moist.   Eyes: Conjunctivae and EOM are normal. Pupils are equal, round, and reactive to light. Neck: Normal range of motion. Neck supple. Cardiovascular: Normal rate, regular rhythm, normal heart sounds and intact distal pulses. Pulmonary/Chest: Effort normal and breath sounds normal.   Abdominal: Soft. Bowel sounds are normal.   Musculoskeletal: Normal range of motion. He exhibits no edema or deformity. Lymphadenopathy:     He has no cervical adenopathy. Neurological: He is alert and oriented to person, place, and time. Skin: Skin is warm and dry. Capillary refill takes less than 2 seconds. Psychiatric: He has a normal mood and affect. His behavior is normal.   Nursing note and vitals reviewed. Assessment/ Plan:     Diagnoses and all orders for this visit:    1. ESRD (end stage renal disease) ([facility] [address]      -    Stable. Follow up with nephrology for HD MWF    2. Chronic bronchitis, unspecified chronic bronchitis type ([facility] [address]      -    Stable. 3. Ischemic cardiomyopathy      -    Stable. No anginal symptoms. Follow up with cardiology as directed. 4. Essential hypertension      -    BP is controlled. Continue current regimen. 5. Hyperglycemia  -     glucose blood VI test strips (ASCENSIA AUTODISC VI, ONE TOUCH ULTRA TEST VI) strip; Use to monitor blood glucose 3 times daily.  -     METABOLIC PANEL, COMPREHENSIVE; Future    6. Chronic midline low back pain with left-sided sciatica  -     REFERRAL TO PAIN MANAGEMENT  -     oxyCODONE IR (ROXICODONE) 10 mg tab immediate release tablet; Take 1 Tab by mouth every eight (8) hours as needed. Max Daily Amount: 30 mg.    7. Decreased visual acuity  -     REFERRAL TO OPHTHALMOLOGY    8. BMI 33.0-33.9,adult        -     Counseled on lifestyle modification. 9. JESSICA        -     Continue CPAP nightly.     Pt encouraged to go to ED if experiencing any SOB, chest pain, or weakness. I have discussed the diagnosis with the patient and the intended plan as seen in the above orders. The patient verbalized understanding and agrees with the plan.     Follow-up Disposition: Not on [facility], MD

## 2019-02-26 LAB
Lab: NORMAL
REFERENCE LAB,REFLB: NORMAL
TEST DESCRIPTION:,ATST: NORMAL

## 2019-03-04 ENCOUNTER — APPOINTMENT (OUTPATIENT)
Dept: GENERAL RADIOLOGY | Age: 49
End: 2019-03-04
Attending: EMERGENCY MEDICINE
Payer: MEDICARE

## 2019-03-04 ENCOUNTER — HOSPITAL ENCOUNTER (EMERGENCY)
Age: 49
Discharge: HOME | End: 2019-03-04
Attending: EMERGENCY MEDICINE | Admitting: EMERGENCY MEDICINE
Payer: MEDICARE

## 2019-03-04 VITALS
RESPIRATION RATE: 10 BRPM | OXYGEN SATURATION: 96 % | BODY MASS INDEX: 35.61 KG/M2 | HEIGHT: 68 IN | TEMPERATURE: 97.5 F | DIASTOLIC BLOOD PRESSURE: 107 MMHG | SYSTOLIC BLOOD PRESSURE: 167 MMHG | WEIGHT: 235 LBS | HEART RATE: 66 BPM

## 2019-03-04 DIAGNOSIS — E87.70 HYPERVOLEMIA, UNSPECIFIED HYPERVOLEMIA TYPE: Primary | ICD-10-CM

## 2019-03-04 DIAGNOSIS — Z99.2 ESRD (END STAGE RENAL DISEASE) ON DIALYSIS (HCC): ICD-10-CM

## 2019-03-04 DIAGNOSIS — N18.6 ESRD (END STAGE RENAL DISEASE) ON DIALYSIS (HCC): ICD-10-CM

## 2019-03-04 LAB
ALBUMIN SERPL-MCNC: 2.9 G/DL (ref 3.4–5)
ALBUMIN/GLOB SERPL: 1.1 {RATIO} (ref 0.8–1.7)
ALP SERPL-CCNC: 93 U/L (ref 45–117)
ALT SERPL-CCNC: 74 U/L (ref 16–61)
ANION GAP SERPL CALC-SCNC: 11 MMOL/L (ref 3–18)
AST SERPL-CCNC: 38 U/L (ref 15–37)
ATRIAL RATE: 65 BPM
BASOPHILS # BLD: 0 K/UL (ref 0–0.1)
BASOPHILS NFR BLD: 0 % (ref 0–2)
BILIRUB SERPL-MCNC: 0.4 MG/DL (ref 0.2–1)
BNP SERPL-MCNC: ABNORMAL PG/ML (ref 0–900)
BUN SERPL-MCNC: 52 MG/DL (ref 7–18)
BUN/CREAT SERPL: 9 (ref 12–20)
CALCIUM SERPL-MCNC: 8.2 MG/DL (ref 8.5–10.1)
CALCULATED P AXIS, ECG09: 23 DEGREES
CALCULATED R AXIS, ECG10: -24 DEGREES
CALCULATED T AXIS, ECG11: 69 DEGREES
CHLORIDE SERPL-SCNC: 103 MMOL/L (ref 100–108)
CK MB CFR SERPL CALC: 19.3 % (ref 0–4)
CK MB SERPL-MCNC: 8.3 NG/ML (ref 5–25)
CK SERPL-CCNC: 43 U/L (ref 39–308)
CO2 SERPL-SCNC: 25 MMOL/L (ref 21–32)
CREAT SERPL-MCNC: 5.75 MG/DL (ref 0.6–1.3)
DIAGNOSIS, 93000: NORMAL
DIFFERENTIAL METHOD BLD: ABNORMAL
EOSINOPHIL # BLD: 0 K/UL (ref 0–0.4)
EOSINOPHIL NFR BLD: 0 % (ref 0–5)
ERYTHROCYTE [DISTWIDTH] IN BLOOD BY AUTOMATED COUNT: 17.3 % (ref 11.6–14.5)
FLUAV AG NPH QL IA: NEGATIVE
FLUBV AG NOSE QL IA: NEGATIVE
GLOBULIN SER CALC-MCNC: 2.7 G/DL (ref 2–4)
GLUCOSE SERPL-MCNC: 75 MG/DL (ref 74–99)
HCT VFR BLD AUTO: 38 % (ref 36–48)
HGB BLD-MCNC: 11.2 G/DL (ref 13–16)
INR PPP: 1 (ref 0.8–1.2)
LACTATE BLD-SCNC: 0.86 MMOL/L (ref 0.4–2)
LIPASE SERPL-CCNC: 141 U/L (ref 73–393)
LYMPHOCYTES # BLD: 1.9 K/UL (ref 0.9–3.6)
LYMPHOCYTES NFR BLD: 17 % (ref 21–52)
MAGNESIUM SERPL-MCNC: 2.2 MG/DL (ref 1.6–2.6)
MCH RBC QN AUTO: 27.9 PG (ref 24–34)
MCHC RBC AUTO-ENTMCNC: 29.5 G/DL (ref 31–37)
MCV RBC AUTO: 94.5 FL (ref 74–97)
MONOCYTES # BLD: 0.8 K/UL (ref 0.05–1.2)
MONOCYTES NFR BLD: 7 % (ref 3–10)
NEUTS SEG # BLD: 8.5 K/UL (ref 1.8–8)
NEUTS SEG NFR BLD: 76 % (ref 40–73)
P-R INTERVAL, ECG05: 196 MS
PLATELET # BLD AUTO: 134 K/UL (ref 135–420)
PMV BLD AUTO: 8.8 FL (ref 9.2–11.8)
POTASSIUM SERPL-SCNC: 4.9 MMOL/L (ref 3.5–5.5)
PROT SERPL-MCNC: 5.6 G/DL (ref 6.4–8.2)
PROTHROMBIN TIME: 13 SEC (ref 11.5–15.2)
Q-T INTERVAL, ECG07: 432 MS
QRS DURATION, ECG06: 96 MS
QTC CALCULATION (BEZET), ECG08: 449 MS
RBC # BLD AUTO: 4.02 M/UL (ref 4.7–5.5)
SODIUM SERPL-SCNC: 139 MMOL/L (ref 136–145)
TROPONIN I SERPL-MCNC: 0.03 NG/ML (ref 0–0.04)
VENTRICULAR RATE, ECG03: 65 BPM
WBC # BLD AUTO: 11.3 K/UL (ref 4.6–13.2)

## 2019-03-04 PROCEDURE — 83690 ASSAY OF LIPASE: CPT

## 2019-03-04 PROCEDURE — 82550 ASSAY OF CK (CPK): CPT

## 2019-03-04 PROCEDURE — 99285 EMERGENCY DEPT VISIT HI MDM: CPT

## 2019-03-04 PROCEDURE — 83735 ASSAY OF MAGNESIUM: CPT

## 2019-03-04 PROCEDURE — 93005 ELECTROCARDIOGRAM TRACING: CPT

## 2019-03-04 PROCEDURE — 80053 COMPREHEN METABOLIC PANEL: CPT

## 2019-03-04 PROCEDURE — 87804 INFLUENZA ASSAY W/OPTIC: CPT

## 2019-03-04 PROCEDURE — 85025 COMPLETE CBC W/AUTO DIFF WBC: CPT

## 2019-03-04 PROCEDURE — 85610 PROTHROMBIN TIME: CPT

## 2019-03-04 PROCEDURE — 83605 ASSAY OF LACTIC ACID: CPT

## 2019-03-04 PROCEDURE — 71046 X-RAY EXAM CHEST 2 VIEWS: CPT

## 2019-03-04 PROCEDURE — 83880 ASSAY OF NATRIURETIC PEPTIDE: CPT

## 2019-03-04 NOTE — ED TRIAGE NOTES
Per EMS, \"he was due for dialysis today, went to go but couldn't make it. He is complaining of weakness. Chanelle Lama also that he has been throwing up and his belly has been bothering him. \"

## 2019-03-04 NOTE — DISCHARGE INSTRUCTIONS
Patient Education        Kidney Dialysis: Care Instructions  Your Care Instructions    Dialysis is a process that filters wastes from the blood when your kidneys can no longer do the job. It is not a cure, but it can help you live longer and feel better. It is a lifesaving treatment when you have kidney failure. Normal kidneys work 24 hours a day to clean wastes from your blood. Your kidneys are not able to do this job, so a process called dialysis will do some of the work for your kidneys. You and your doctor will decide which type of dialysis you should have. Peritoneal dialysis uses the lining of your belly (peritoneum) to filter your blood. You can do it at home, on a daily basis. Hemodialysis uses a man-made filter called a dialyzer to clean your blood. Most people need to go to a hospital or clinic 3 days a week for several hours each time. Sometimes hemodialysis can be done at home. It is normal to have questions about your treatment, and you have a right to know what is happening to you. Learning about dialysis can help you take an active role in your treatment. Dialysis does not cure kidney disease, but it can help you live longer and feel better. You will need to follow your diet and treatment schedule carefully. Follow-up care is a key part of your treatment and safety. Be sure to make and go to all appointments, and call your doctor if you are having problems. It's also a good idea to know your test results and keep a list of the medicines you take. What do you need to know about peritoneal dialysis? Peritoneal dialysis uses the lining of your belly (or peritoneal membrane) to filter your blood. Before you can begin peritoneal dialysis, your doctor will need to place a thin tube called a catheter in your belly. This is the dialysis access. · Peritoneal dialysis can be done at home or in any clean place. You may be able to do it while you sleep. · You can do it by yourself.  You do not have to rely on help from others. · You can do it at the times you choose as long as you do the right number of treatments. · It has to be done every day of the week. · Some people find it hard to do all the required steps. · It increases your chance for a serious infection of the lining of the belly (peritoneum). What do you need to know about hemodialysis? Hemodialysis uses a man-made membrane called a dialyzer to clean your blood. You are connected to the dialyzer by tubes attached to your blood vessels. Before you start hemodialysis, your doctor will create a site where the blood can flow in and out of your body during your dialysis sessions. This site is called the vascular access. It may be a fistula, made by connecting an artery and a vein. Or it may be a graft, which is a tube implanted under your skin. · Hemodialysis is done mainly by trained health workers who can watch for any problems. · It allows you to be in contact with other people having dialysis. This can help provide emotional support. · You can schedule your treatments in the evenings so you can keep working. · You may be able to do home hemodialysis, which gives you more control over your schedule. · It usually needs to be done on a set schedule 3 times a week. · It can cause side effects. The most common side effects are low blood pressure and muscle cramps. These can often be treated easily. · It requires needle sticks during every treatment, which bothers some people. Others get used to it and even do the needle sticks themselves. How can you care for yourself at home? · Be sure to have all of your dialysis sessions. Do not try to shorten or skip your sessions. You have a better chance of a longer and healthier life by getting your full treatment. · Your doctor or health care team will show you the steps you need to go through each day before, during, and after dialysis. Be sure to follow these steps.  If you do not understand a step, talk to your team.  · Your doctor and dietitian will help you design menus that follow your diet. Be sure to follow your diet guidelines. ? You will need to limit fluids and certain foods that contain salt (sodium), potassium, and phosphorus. ? You may need to follow a heart-healthy diet to keep the fat (cholesterol) in your blood under control. ? You may need higher levels of protein in your diet. · Your doctor may recommend certain vitamins. But do not take any other medicine, including over-the-counter medicines, vitamins, and herbal products, without talking to your doctor first.  · Do not smoke. Smoking raises your risk of many health problems, including more kidney damage. If you need help quitting, talk to your doctor about stop-smoking programs and medicines. These can increase your chances of quitting for good. · Do not take ibuprofen (Advil, Motrin), naproxen (Aleve), or similar medicines, unless your doctor tells you to. These medicines may make kidney problems worse. When should you call for help? Call your doctor now or seek immediate medical care if:    · You have a fever.     · You are dizzy or lightheaded, or you feel like you may faint.     · You are confused or cannot think clearly.     · You have new or worse nausea or vomiting.     · You have new or more blood in your urine.     · You have new swelling.    Watch closely for changes in your health, and be sure to contact your doctor if:    · You do not get better as expected. Where can you learn more? Go to http://berta-keyla.info/. Enter M617 in the search box to learn more about \"Kidney Dialysis: Care Instructions. \"  Current as of: March 14, 2018  Content Version: 11.9  © 3010-1569 [employer]. Care instructions adapted under license by [employer] (which disclaims liability or warranty for this information).  If you have questions about a medical condition or this instruction, always ask your healthcare professional. [place] [address] any warranty or liability for your use of this information. Patient Education        Learning About Fluid Overload  What is fluid overload? Fluid overload means that your body has too much water. The extra fluid in your body can raise your blood pressure and force your heart to work harder. It can also make it hard for you to breathe. Most of your body is made up of water. The body uses minerals like sodium and potassium to help organs such as your heart, kidneys, and liver balance how much water you need. For example, the heart pumps blood to move water around the body. And the kidneys work to get rid of the water that the body doesn't need. Health conditions like kidney disease, heart failure, and cirrhosis can cause fluid overload. Other things can cause extra fluid to build up. IV fluids, some medicines, too much salt (sodium) from food, and certain medical treatments can sometimes cause this fluid increase. What are the symptoms? Some of the most common symptoms are:  · Gaining weight over a short period of time. · Swelling in the ankles or legs. · Shortness of breath. How is it treated? The goal of treatment is to remove the extra fluid in your body. Your treatment will depend on the cause. Your doctor may:  · Give you medicines, such as diuretics (also called \"water pills\"). They help your body get rid of the extra fluid. · Restrict your fluid or salt intake. Follow-up care is a key part of your treatment and safety. Be sure to make and go to all appointments, and call your doctor if you are having problems. It's also a good idea to know your test results and keep a list of the medicines you take. Where can you learn more? Go to http://berta-keyla.info/. Enter O110 in the search box to learn more about \"Learning About Fluid Overload. \"  Current as of: July 22, 2018  Content Version: 11.9  © 7287-0767 [employer]. Care instructions adapted under license by [employer] (which disclaims liability or warranty for this information). If you have questions about a medical condition or this instruction, always ask your healthcare professional. [address] any warranty or liability for your use of this information.

## 2019-03-04 NOTE — ED NOTES
I have reviewed discharge instructions with the patient. The patient verbalized understanding. Patient armband removed and given to patient to take home. Patient was informed of the privacy risks if armband lost or stolen. Pt alert, oriented x4 and ambulatory out of ER in [facility] at this time. VSS.
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
## 2019-03-04 NOTE — ED NOTES
Resting in stretcher in nad at this time, playing on phone. Denies further needs.  Provided with cup of water per request.

## 2019-04-11 ENCOUNTER — HOSPITAL ENCOUNTER (EMERGENCY)
Age: 49
Discharge: HOME | End: 2019-04-11
Attending: EMERGENCY MEDICINE
Payer: MEDICARE

## 2019-04-11 ENCOUNTER — APPOINTMENT (OUTPATIENT)
Dept: GENERAL RADIOLOGY | Age: 49
End: 2019-04-11
Attending: EMERGENCY MEDICINE
Payer: MEDICARE

## 2019-04-11 VITALS
RESPIRATION RATE: 16 BRPM | TEMPERATURE: 98.1 F | SYSTOLIC BLOOD PRESSURE: 155 MMHG | HEART RATE: 61 BPM | DIASTOLIC BLOOD PRESSURE: 88 MMHG | OXYGEN SATURATION: 98 %

## 2019-04-11 DIAGNOSIS — N18.6 ESRD (END STAGE RENAL DISEASE) ON DIALYSIS (HCC): ICD-10-CM

## 2019-04-11 DIAGNOSIS — S92.345A CLOSED NONDISPLACED FRACTURE OF FOURTH METATARSAL BONE OF LEFT FOOT, INITIAL ENCOUNTER: Primary | ICD-10-CM

## 2019-04-11 DIAGNOSIS — Z99.2 ESRD (END STAGE RENAL DISEASE) ON DIALYSIS (HCC): ICD-10-CM

## 2019-04-11 LAB
ANION GAP SERPL CALC-SCNC: 10 MMOL/L (ref 3–18)
BUN SERPL-MCNC: 59 MG/DL (ref 7–18)
BUN/CREAT SERPL: 6 (ref 12–20)
CALCIUM SERPL-MCNC: 8.1 MG/DL (ref 8.5–10.1)
CHLORIDE SERPL-SCNC: 99 MMOL/L (ref 100–108)
CO2 SERPL-SCNC: 26 MMOL/L (ref 21–32)
CREAT SERPL-MCNC: 9.64 MG/DL (ref 0.6–1.3)
GLUCOSE SERPL-MCNC: 104 MG/DL (ref 74–99)
POTASSIUM SERPL-SCNC: 5.3 MMOL/L (ref 3.5–5.5)
SODIUM SERPL-SCNC: 135 MMOL/L (ref 136–145)

## 2019-04-11 PROCEDURE — 73630 X-RAY EXAM OF FOOT: CPT

## 2019-04-11 PROCEDURE — 99284 EMERGENCY DEPT VISIT MOD MDM: CPT

## 2019-04-11 PROCEDURE — 72110 X-RAY EXAM L-2 SPINE 4/>VWS: CPT

## 2019-04-11 PROCEDURE — 80048 BASIC METABOLIC PNL TOTAL CA: CPT

## 2019-04-11 PROCEDURE — 74011250637 HC RX REV CODE- 250/637: Performed by: EMERGENCY MEDICINE

## 2019-04-11 PROCEDURE — 75810000053 HC SPLINT APPLICATION

## 2019-04-11 RX ORDER — HYDROCODONE BITARTRATE AND ACETAMINOPHEN 5; 325 MG/1; MG/1
1 TABLET ORAL
Status: COMPLETED | OUTPATIENT
Start: 2019-04-11 | End: 2019-04-11

## 2019-04-11 RX ADMIN — HYDROCODONE BITARTRATE AND ACETAMINOPHEN 1 TABLET: 5; 325 TABLET ORAL at 14:51

## 2019-04-11 NOTE — ED NOTES
This nurse served as thru-put. Primary nurse JOHN Lucas given report. Patient has left AC 20G IV. Here for left foot pain and dialysis treatment.
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
## 2019-04-11 NOTE — DISCHARGE INSTRUCTIONS
Please use crutches. You should not put weight on the affected foot. Please be compliant with her dialysis appointments.

## 2019-04-18 ENCOUNTER — HOSPITAL ENCOUNTER (EMERGENCY)
Age: 49
Discharge: HOME | End: 2019-04-18
Attending: EMERGENCY MEDICINE | Admitting: EMERGENCY MEDICINE
Payer: MEDICARE

## 2019-04-18 ENCOUNTER — APPOINTMENT (OUTPATIENT)
Dept: GENERAL RADIOLOGY | Age: 49
End: 2019-04-18
Attending: EMERGENCY MEDICINE
Payer: MEDICARE

## 2019-04-18 VITALS
RESPIRATION RATE: 18 BRPM | OXYGEN SATURATION: 97 % | SYSTOLIC BLOOD PRESSURE: 180 MMHG | HEART RATE: 69 BPM | DIASTOLIC BLOOD PRESSURE: 99 MMHG | TEMPERATURE: 98.4 F

## 2019-04-18 DIAGNOSIS — N18.6 ESRD (END STAGE RENAL DISEASE) ON DIALYSIS (HCC): Primary | ICD-10-CM

## 2019-04-18 DIAGNOSIS — Z99.2 ESRD (END STAGE RENAL DISEASE) ON DIALYSIS (HCC): Primary | ICD-10-CM

## 2019-04-18 LAB
ANION GAP SERPL CALC-SCNC: 6 MMOL/L (ref 3–18)
BASOPHILS # BLD: 0 K/UL (ref 0–0.1)
BASOPHILS NFR BLD: 0 % (ref 0–2)
BUN SERPL-MCNC: 61 MG/DL (ref 7–18)
BUN/CREAT SERPL: 5 (ref 12–20)
CALCIUM SERPL-MCNC: 7.8 MG/DL (ref 8.5–10.1)
CHLORIDE SERPL-SCNC: 104 MMOL/L (ref 100–108)
CO2 SERPL-SCNC: 25 MMOL/L (ref 21–32)
CREAT SERPL-MCNC: 11.1 MG/DL (ref 0.6–1.3)
DIFFERENTIAL METHOD BLD: ABNORMAL
EOSINOPHIL # BLD: 0.1 K/UL (ref 0–0.4)
EOSINOPHIL NFR BLD: 1 % (ref 0–5)
ERYTHROCYTE [DISTWIDTH] IN BLOOD BY AUTOMATED COUNT: 18.5 % (ref 11.6–14.5)
GLUCOSE SERPL-MCNC: 96 MG/DL (ref 74–99)
HBV SURFACE AG SER QL: <0.1 INDEX
HBV SURFACE AG SER QL: NEGATIVE
HCT VFR BLD AUTO: 35.7 % (ref 36–48)
HGB BLD-MCNC: 10.8 G/DL (ref 13–16)
LYMPHOCYTES # BLD: 1.6 K/UL (ref 0.9–3.6)
LYMPHOCYTES NFR BLD: 21 % (ref 21–52)
MAGNESIUM SERPL-MCNC: 2.7 MG/DL (ref 1.6–2.6)
MCH RBC QN AUTO: 27.7 PG (ref 24–34)
MCHC RBC AUTO-ENTMCNC: 30.3 G/DL (ref 31–37)
MCV RBC AUTO: 91.5 FL (ref 74–97)
MONOCYTES # BLD: 0.5 K/UL (ref 0.05–1.2)
MONOCYTES NFR BLD: 6 % (ref 3–10)
NEUTS SEG # BLD: 5.8 K/UL (ref 1.8–8)
NEUTS SEG NFR BLD: 72 % (ref 40–73)
PLATELET # BLD AUTO: 118 K/UL (ref 135–420)
PMV BLD AUTO: 8.3 FL (ref 9.2–11.8)
POTASSIUM SERPL-SCNC: 5 MMOL/L (ref 3.5–5.5)
RBC # BLD AUTO: 3.9 M/UL (ref 4.7–5.5)
SODIUM SERPL-SCNC: 135 MMOL/L (ref 136–145)
WBC # BLD AUTO: 8 K/UL (ref 4.6–13.2)

## 2019-04-18 PROCEDURE — 80048 BASIC METABOLIC PNL TOTAL CA: CPT

## 2019-04-18 PROCEDURE — 71045 X-RAY EXAM CHEST 1 VIEW: CPT

## 2019-04-18 PROCEDURE — 99284 EMERGENCY DEPT VISIT MOD MDM: CPT

## 2019-04-18 PROCEDURE — 99285 EMERGENCY DEPT VISIT HI MDM: CPT

## 2019-04-18 PROCEDURE — 93005 ELECTROCARDIOGRAM TRACING: CPT

## 2019-04-18 PROCEDURE — 85025 COMPLETE CBC W/AUTO DIFF WBC: CPT

## 2019-04-18 PROCEDURE — 83735 ASSAY OF MAGNESIUM: CPT

## 2019-04-18 PROCEDURE — 87340 HEPATITIS B SURFACE AG IA: CPT

## 2019-04-18 PROCEDURE — 86706 HEP B SURFACE ANTIBODY: CPT

## 2019-04-18 PROCEDURE — 90935 HEMODIALYSIS ONE EVALUATION: CPT

## 2019-04-18 NOTE — PROGRESS NOTES
TPA catheter dwell orders placed. HD orders placed. Discussed with dialysis nurse Please call with questions, 
 
Glenda Goodwin MD FASN Cell [phone] Pager: [phone]

## 2019-04-18 NOTE — DIALYSIS
ACUTE HEMODIALYSIS FLOW SHEET 
 
HEMODIALYSIS ORDERS: Physician: WILLIAM Batista 
  
Dialyzer: revaclear   Duration: 3 hr  BFR: 400   DFR: 800 Dialysate:  Temp 36.5 K+   2    Ca+  2.5 Na 140 Bicarb 35 Weight:   kg    Patient Chart []     Unable to Obtain [x]   Dry weight/UF Goal: 2000 ml Access Right subclavian TDC Needle Gauge NA Heparin []  Bolus      Units    [] Hourly       Units    [x]None Catheter locking solution Heparin Pre BP:   136/97    Pulse:     59      Respirations: 18 Temperature:   97.5  Tx: NS   250    ml/Bolus  Other        [] N/A Labs: Pre        Post:        [] N/A Additional Orders(medications, blood products, hypotension management):       [x] N/A [x] [facility] Consent Verified CATHETER ACCESS: []N/A   [x]Right   []Left   []IJ     []Fem   [x]Chest wall  
[] First use X-ray verified     []Tunnel                [] Non Tunneled [x]No S/S infection  []Redness  []Drainage []Cultured []Swelling []Pain  
[x]Medical Aseptic Prep Utilized   []Dressing Changed  [] Biopatch  Date:      
[]Clotted   []Patent   Flows: [x]Good  []Poor  []Reversed If access problem,  notified: []Yes    [x]N/A  Date:        
 
GRAFT/FISTULA ACCESS:  [x]N/A     []Right     []Left     []UE     []LE []AVG   []AVF        []Buttonhole    []Medical Aseptic Prep Utilized []No S/S infection  []Redness  []Drainage []Cultured []Swelling []Pain Bruit:   [] Strong    [] Weak       Thrill :   [] Strong    [] Weak Needle Gauge: NA   Length: NA If access problem,  notified: []Yes     [x]N/A  Date:       
Please describe access if present and not used:  
 
 
GENERAL ASSESSMENT:   
LUNGS:  Rate 18 SaO2%        [] N/A    [x] Clear  [] Coarse  [] Crackles  [] Wheezing 
      [] Diminished     Location : []RLL   []LLL    []RUL  []NIKOS Cough: []Productive  []Dry  []N/A   Respirations:  [x]Easy  []Labored Therapy:  [x]RA  []NC  l/min    Mask: []NRB []Venti       O2% []Ventilator  []Intubated  [] Trach  [] BiPaP CARDIAC: [x]Regular      [] Irregular   [] Pericardial Rub  [] JVD []  Monitored  [] Bedside  [] Remotely monitored [] N/A  Rhythm: EDEMA: [] None  [x]Generalized  [] Pitting [] 1    [] 2    [x] 3    [] 4 [] Facial  [] Pedal  []  UE  [x] LE  
SKIN:   [x] Warm  [] Hot     [] Cold   [x] Dry      [] Pale   [] Diaphoretic    
             [] Flushed  [] Jaundiced  [] Cyanotic  [] Rash  [] Weeping LOC:    [x] Alert      [x]Oriented:    [x] Person     [x] Place  [x]Time 
             [] Confused  [] Lethargic  [] Medicated  [] Non-responsive GI / ABDOMEN:  [] Flat    [] Distended    [] Soft    [] Firm   [x]  Obese 
                           [] Diarrhea  [] Bowel Sounds  [] Nausea  [] Vomiting  / URINE ASSESSMENT:[] Voiding   [] Oliguria  [] Anuria   []  Alonzo [] Incontinent    []  Incontinent Brief      []  Bathroom Privileges PAIN: [x] 0 []1  []2   []3   []4   []5   []6   []7   []8   []9   []10 Scale 0-10  Action/Follow Up: MOBILITY:  [] Amb    [] Amb/Assist    [] Bed    [] Wheelchair  [x] Stretcher (Patient with a dressing on his left foot) All Vitals and Treatment Details on Attached Flowsheet Hospital: [facility] Room # ER 
  
[] 1st Time Acute  [] Stat  [x] Routine  [] Urgent [x] Acute Room  []  Bedside  [] ICU/CCU  [] ER Isolation Precautions:  [x] Dialysis   [] Airborne   [] Contact    [] Reverse Special Considerations:         [] Blood Consent Verified [x]N/A ALLERGIES:  
Latex Other (comments) High  11/30/2010  Past Updates. ..  
blisters Other Food Hives Not Specified  5/18/2015  Past Updates. .. Allergic to tomatoes and tomato sauce Keflex [Cephalexin] Anaphylaxis High  2/18/2019  Past Updates. .. Codeine Nausea and Vomiting Not Specified    Past Updates. .. [] NKA Code Status:  [x] Full Code  [] DNR  [] Other HBsAg ONLY: Date Drawn 4/18/2019         []Negative []Positive [x]Unknown HBsAb: Date 4/18/2019    [] Susceptible   [] [id number] []Not Drawn  [x] Drawn Current Labs:    Date of Labs: 4/19/2019          Today [x] Cut and paste current labs here. Results for Monalisa Alicea (MRN [id number]) as of 4/18/2019 18:35 Ref. Range 4/18/2019 11:57 WBC Latest Ref Range: 4.6 - 13.2 K/uL 8.0  
RBC Latest Ref Range: 4.70 - 5.50 M/uL 3.90 (L) HGB Latest Ref Range: 13.0 - 16.0 g/dL 10.8 (L) HCT Latest Ref Range: 36.0 - 48.0 % 35.7 (L) MCV Latest Ref Range: 74.0 - 97.0 FL 91.5 MCH Latest Ref Range: 24.0 - 34.0 PG 27.7 MCHC Latest Ref Range: 31.0 - 37.0 g/dL 30.3 (L) RDW Latest Ref Range: 11.6 - 14.5 % 18.5 (H) PLATELET Latest Ref Range: 135 - 420 K/uL 118 (L) MPV Latest Ref Range: 9.2 - 11.8 FL 8.3 (L) NEUTROPHILS Latest Ref Range: 40 - 73 % 72 LYMPHOCYTES Latest Ref Range: 21 - 52 % 21 MONOCYTES Latest Ref Range: 3 - 10 % 6 EOSINOPHILS Latest Ref Range: 0 - 5 % 1  
BASOPHILS Latest Ref Range: 0 - 2 % 0  
DF Latest Units:   AUTOMATED  
ABS. NEUTROPHILS Latest Ref Range: 1.8 - 8.0 K/UL 5.8  
ABS. LYMPHOCYTES Latest Ref Range: 0.9 - 3.6 K/UL 1.6  
ABS. MONOCYTES Latest Ref Range: 0.05 - 1.2 K/UL 0.5  
ABS. EOSINOPHILS Latest Ref Range: 0.0 - 0.4 K/UL 0.1 ABS. BASOPHILS Latest Ref Range: 0.0 - 0.1 K/UL 0.0 Sodium Latest Ref Range: 136 - 145 mmol/L 135 (L) Potassium Latest Ref Range: 3.5 - 5.5 mmol/L 5.0 Chloride Latest Ref Range: 100 - 108 mmol/L 104 CO2 Latest Ref Range: 21 - 32 mmol/L 25 Anion gap Latest Ref Range: 3.0 - 18 mmol/L 6 Glucose Latest Ref Range: 74 - 99 mg/dL 96 BUN Latest Ref Range: 7.0 - 18 MG/DL 61 (H) Creatinine Latest Ref Range: 0.6 - 1.3 MG/DL 11.10 (H) BUN/Creatinine ratio Latest Ref Range: 12 - 20   5 (L) Calcium Latest Ref Range: 8.5 - 10.1 MG/DL 7.8 (L) Magnesium Latest Ref Range: 1.6 - 2.6 mg/dL 2.7 (H) GFR est non-AA Latest Ref Range: >60 ml/min/1.73m2 5 (L)  
GFR est AA Latest Ref Range: >60 ml/min/1.73m2 6 (L) DIET:  [x] Renal    [] Other     [] NPO     []  Diabetic PRIMARY NURSE REPORT: First initial/Last name/Title Pre Dialysis: Rubina Pichardo RN    Time: 8863 EDUCATION:   
[x] Patient [] Other         Knowledge Basis: []None [x]Minimal [] Substantial  
Barriers to learning  []N/A [x] Access Care     [] S&S of infection     [] Fluid Management     []K+     [x]Procedural   
[]Albumin     [] Medications     [x] Tx Options     [] Transplant     [] Diet     [] Other Teaching Tools:  [x] Explain  [] Demo  [] Handouts [] Video Patient response:   [x] Verbalized understanding  [] Teach back  [] Return demonstration [x] Requires follow up Inappropriate due to         
 
[x] Time Out/Safety Check  [x]Extracorporeal Circuit Tested for integrity RO/HEMODIALYSIS MACHINE SAFETY CHECKS  Before each treatment:    
Machine Number:                   [facility] 
                                [] Unit Machine #  with centralized RO 
                                [] Portable Machine #1/RO serial # [id number] [] Portable Machine #2/RO serial # [id number] [] Portable Machine #4/RO serial # [id number] [facility] 
                                [] Portable Machine #11/RO serial # [id number] [] Portable Machine #12/RO serial # [id number] [x] Portable Machine #13/RO serial #  [id number] Alarm Test:  Pass time 1687         Other:        
[] RO/Machine Log Complete Temp    36.5 Dialysate: pH  7.4 Conductivity: Meter   13.9     HD Machine   14.1                  TCD: 13.7 Dialyzer Lot # [id number]            Blood Tubing Lot # [id number]          Saline Lot #  [id number]  
 
CHLORINE TESTING-Before each treatment and every 4 hours Total Chlorine: [] less than 0.1 ppm  Time: 1522 4 Hr/2nd Check Time:   
(if greater than 0.1 ppm from Primary then every 30 minutes from Secondary) TREATMENT INITIATION  with Dialysis Precautions:  
[x] All Connections Secured                 [x] Saline Line Double Clamped  
[x] Venous Parameters Set                  [x] Arterial Parameters Set [x] Prime Given 250 ml                          [x]Air Foam Detector Engaged Treatment Initiation Note HD nurse went to ED to administer Alteplase to dialysis catheter, but both ports were already functional.  Patient was brought to dialysis suite on a stretcher, and his treatment was started. During Treatment Notes: With 38 minutes remaining in his treatment, patient requested to be taken off the machine. He wanted to be done by 8 pm.  He did tolerate treatment well. Medication Dose Volume Route Initials Dialyzer Cleared: [x] Good [] Fair  [] Poor Blood processed:  47.8 L 
UF Removed  1491 Ml Post Wt:     kg 
POst BP:   180/99       Pulse: 69      Respirations: 18  Temperature:  
  
                              Post Tx Vascular Access:    N/A Catheter: Locking solution: Heparin 1:1000 Art. 2.0 ml  Ashok. 2.1 ml    
                              Post Assessment:  
  
                              Skin:  [x] Warm  [x] Dry [] Diaphoretic    [] Flushed  [] Pale [] Cyanotic (Still has the dressing on his left foot) [facility] Signatures Title Initials  Time Lungs: [x] Clear    [] Course  [] Crackles  [] Wheezing [] Diminished Octaviano Cui RN SH  Cardiac: [x] Regular   [] Irregular   [] Monitor  [] N/A  Rhythm:      
    Edema:  [] None    [x] General     [] Facial   [] Pedal    [] UE    [x] LE  
 Pain: []0  []1  []2   []3  []4   []5   []6   []7   []8   []9   []10 Post Treatment Note:  Patient's blood was returned with 38 minutes remaining in his treatment, per patient's request.  The nephrologist was informed. The patient's [facility] was locked with Heparin, and he was returned to the ER by ER personnel on a stretcher. POST TREATMENT PRIMARY NURSE HANDOFF REPORT:  
 
First initial/Last name/Title Post Dialysis: Rafi Carreon RN Time:  2276 Abbreviations: AVG-arterial venous graft, AVF-arterial venous fistula, IJ-Internal Jugular, Subcl-Subclavian, Fem-Femoral, Tx-treatment, AP/HR-apical heart rate, DFR-dialysate flow rate, BFR-blood flow rate, AP-arterial pressure, -venous pressure, UF-ultrafiltrate, TMP-transmembrane pressure, Ashok-Venous, Art-Arterial, RO-Reverse Osmosis

## 2019-04-18 NOTE — PROGRESS NOTES
Case Management consult noted for \"broken leg, help transport to dialysis. \" Spoke with Dr. Ric Nuno and advised him that pt's transportation for dialysis requires a \"Physician Certification Statement\" that has to be completed by pt's nephrologist, PCP, or Orthopaedic physician. Met with pt at the bedside and confirmed that he goes to [facility] on [address] in [place]. Pt stated that his nephrologist has \"agreed to sign whatever paperwork I need. \" Patient stated to me that transportation is not an issue for him, but instead, his access has clotted off. Pt informed me that it clotted off 2 weeks ago, while at his dialysis center. Pt states that he was sent to see a vascular surgeon and \"they couldn't fix it. \" He states that he was advised to come to the ED for his hemodialysis treatment. Pt understands that he can not utilize the ED as his dialysis center and agreed to follow up with his dialysis center/ nephrologist in order to obtain new access. Yassine Reynolds RN, BSN, ACM Outcomes Manager 
[phone] (phone)

## 2019-04-18 NOTE — ED TRIAGE NOTES
Patient missed dialysis for the past two weeks and PCP told patient to come to ED for treatment; also, patient has been weak since not going to treatment.
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
## 2019-04-18 NOTE — ED NOTES
4:29 PM :Pt care assumed from Dr. Beny Borrero , ED provider. Pt complaint(s), current treatment plan, progression and available diagnostic results have been discussed thoroughly. Rounding occurred: yes Intended Disposition: Home Pending diagnostic reports and/or labs (please list): dialysis tx then transport home Shruthi Roper PA-C 
 
8:14 PM 
Patient back from dialysis with no complaints. Tolerated hemodialysis without any complications. Advised patient follow-up with primary care in his kidney doctor as needed.  
Shruthi Roper PA-C

## 2019-04-18 NOTE — ED NOTES
Patient left for dialysis. Cath flow provided to dialysis nurse. Patient denies pain. Remains alert with stable vitals.

## 2019-04-18 NOTE — ED NOTES
Spoke with the dialysis nurse at [facility]. Noted, 04/05/19 was the last time patient received dialysis. Next visit 04/12/2019 patient was unable to receive treatment and was referred to vascular; however, patient was sent to [facility] for vascular physician. Dialysis nurse stated that patient often misses multiple treatments.

## 2019-04-19 LAB
ATRIAL RATE: 55 BPM
CALCULATED P AXIS, ECG09: 13 DEGREES
CALCULATED R AXIS, ECG10: -33 DEGREES
CALCULATED T AXIS, ECG11: 81 DEGREES
DIAGNOSIS, 93000: NORMAL
HBV SURFACE AB SER QL IA: NEGATIVE
HBV SURFACE AB SERPL IA-ACNC: <3.1 MIU/ML
HEP BS AB COMMENT,HBSAC: ABNORMAL
P-R INTERVAL, ECG05: 198 MS
Q-T INTERVAL, ECG07: 482 MS
QRS DURATION, ECG06: 94 MS
QTC CALCULATION (BEZET), ECG08: 461 MS
VENTRICULAR RATE, ECG03: 55 BPM

## 2019-04-19 NOTE — DISCHARGE INSTRUCTIONS
Patient Education        Hemodialysis Vascular Access: Care Instructions  Your Care Instructions  Hemodialysis, or dialysis, is the use of a machine to remove wastes from your blood. You need it if your kidneys are not able to remove wastes on their own. A dialysis access is the place in your arm, or sometimes in your leg, where a doctor creates a blood vessel that carries a large flow of blood. When you have dialysis, two needles are placed in this blood vessel and are connected to the dialysis machine. Your blood flows out of one needle and into the machine to be cleaned. Then your cleaned blood flows back into your body through the other needle. Sometimes, a doctor makes a short-term access through a tube, called a catheter, placed in your neck, upper chest, or groin. Your doctor creates an access during a minor surgery. You need to take care of your access to keep it working and to prevent infection. Follow-up care is a key part of your treatment and safety. Be sure to make and go to all appointments, and call your doctor if you are having problems. It's also a good idea to know your test results and keep a list of the medicines you take. How can you care for yourself at home? · After your doctor creates an access, keep it dry for at least 2 days. · Squeeze a soft ball or other object as instructed after the access is placed. This will help blood flow through the access and help prevent blood clots. · After you have dialysis, check to see whether the access bleeds or swells. Let your doctor know if your arm bleeds or swells. · Do not lift anything heavy with the arm that has the access. · Do not bump your arm. · Do not wear tight clothing or jewelry over the access. · Do not sleep with your access arm under your body. · Have blood drawn or blood pressure taken from your other arm. · Keep the access clean and dry. · Do not put cream or lotion on or near the access.   When should you call for help?  Call your doctor now or seek immediate medical care if:    · You have signs of infection, such as:  ? Increased pain, swelling, warmth, or redness around the access. ? Red streaks leading from the access. ? Pus draining from the access. ? A fever.     · You do not feel a pulse in your access.    Watch closely for changes in your health, and be sure to contact your doctor if:    · You do not get better as expected. Where can you learn more? Go to http://berta-keyla.info/. Enter L169 in the search box to learn more about \"Hemodialysis Vascular Access: Care Instructions. \"  Current as of: March 14, 2018  Content Version: 11.9  © 2888-1092 [employer], [employer]. Care instructions adapted under license by [employer] (which disclaims liability or warranty for this information). If you have questions about a medical condition or this instruction, always ask your healthcare professional. [address] any warranty or liability for your use of this information.

## 2019-04-19 NOTE — ED NOTES
8:17 PM 
04/18/19 Discharge instructions given to patient (name) with verbalization of understanding. Patient accompanied by self. Patient discharged with the following prescriptions none. Patient discharged to home (destination).   
  
Desean Stout RN

## 2019-05-16 ENCOUNTER — HOSPITAL ENCOUNTER (EMERGENCY)
Age: 49
Discharge: HOME | End: 2019-05-17
Attending: EMERGENCY MEDICINE
Payer: MEDICARE

## 2019-05-16 DIAGNOSIS — I10 ESSENTIAL HYPERTENSION: ICD-10-CM

## 2019-05-16 DIAGNOSIS — F11.93 NARCOTIC WITHDRAWAL (HCC): Primary | ICD-10-CM

## 2019-05-16 DIAGNOSIS — N18.6 ESRD ON DIALYSIS (HCC): ICD-10-CM

## 2019-05-16 DIAGNOSIS — R11.2 NON-INTRACTABLE VOMITING WITH NAUSEA, UNSPECIFIED VOMITING TYPE: ICD-10-CM

## 2019-05-16 DIAGNOSIS — R10.13 ABDOMINAL PAIN, EPIGASTRIC: ICD-10-CM

## 2019-05-16 DIAGNOSIS — Z99.2 ESRD ON DIALYSIS (HCC): ICD-10-CM

## 2019-05-16 LAB
ALBUMIN SERPL-MCNC: 3.6 G/DL (ref 3.4–5)
ALBUMIN/GLOB SERPL: 1.1 {RATIO} (ref 0.8–1.7)
ALP SERPL-CCNC: 83 U/L (ref 45–117)
ALT SERPL-CCNC: 29 U/L (ref 16–61)
ANION GAP SERPL CALC-SCNC: 8 MMOL/L (ref 3–18)
AST SERPL-CCNC: 34 U/L (ref 15–37)
BASOPHILS # BLD: 0 K/UL (ref 0–0.1)
BASOPHILS NFR BLD: 0 % (ref 0–2)
BILIRUB SERPL-MCNC: 0.5 MG/DL (ref 0.2–1)
BUN SERPL-MCNC: 25 MG/DL (ref 7–18)
BUN/CREAT SERPL: 4 (ref 12–20)
CALCIUM SERPL-MCNC: 9.3 MG/DL (ref 8.5–10.1)
CHLORIDE SERPL-SCNC: 99 MMOL/L (ref 100–108)
CO2 SERPL-SCNC: 26 MMOL/L (ref 21–32)
CREAT SERPL-MCNC: 6.67 MG/DL (ref 0.6–1.3)
DIFFERENTIAL METHOD BLD: ABNORMAL
EOSINOPHIL # BLD: 0 K/UL (ref 0–0.4)
EOSINOPHIL NFR BLD: 0 % (ref 0–5)
ERYTHROCYTE [DISTWIDTH] IN BLOOD BY AUTOMATED COUNT: 16.9 % (ref 11.6–14.5)
GLOBULIN SER CALC-MCNC: 3.3 G/DL (ref 2–4)
GLUCOSE SERPL-MCNC: 93 MG/DL (ref 74–99)
HCT VFR BLD AUTO: 43.4 % (ref 36–48)
HGB BLD-MCNC: 13.8 G/DL (ref 13–16)
LIPASE SERPL-CCNC: 286 U/L (ref 73–393)
LYMPHOCYTES # BLD: 1.9 K/UL (ref 0.9–3.6)
LYMPHOCYTES NFR BLD: 20 % (ref 21–52)
MCH RBC QN AUTO: 27.9 PG (ref 24–34)
MCHC RBC AUTO-ENTMCNC: 31.8 G/DL (ref 31–37)
MCV RBC AUTO: 87.9 FL (ref 74–97)
MONOCYTES # BLD: 0.8 K/UL (ref 0.05–1.2)
MONOCYTES NFR BLD: 8 % (ref 3–10)
NEUTS SEG # BLD: 6.9 K/UL (ref 1.8–8)
NEUTS SEG NFR BLD: 72 % (ref 40–73)
PLATELET # BLD AUTO: 115 K/UL (ref 135–420)
PMV BLD AUTO: 9.6 FL (ref 9.2–11.8)
POTASSIUM SERPL-SCNC: 3.9 MMOL/L (ref 3.5–5.5)
PROT SERPL-MCNC: 6.9 G/DL (ref 6.4–8.2)
RBC # BLD AUTO: 4.94 M/UL (ref 4.7–5.5)
SODIUM SERPL-SCNC: 133 MMOL/L (ref 136–145)
TROPONIN I SERPL-MCNC: 0.08 NG/ML (ref 0–0.04)
TROPONIN I SERPL-MCNC: 0.11 NG/ML (ref 0–0.04)
WBC # BLD AUTO: 9.5 K/UL (ref 4.6–13.2)

## 2019-05-16 PROCEDURE — 96374 THER/PROPH/DIAG INJ IV PUSH: CPT

## 2019-05-16 PROCEDURE — 93005 ELECTROCARDIOGRAM TRACING: CPT

## 2019-05-16 PROCEDURE — 85025 COMPLETE CBC W/AUTO DIFF WBC: CPT

## 2019-05-16 PROCEDURE — 74011250636 HC RX REV CODE- 250/636

## 2019-05-16 PROCEDURE — 80053 COMPREHEN METABOLIC PANEL: CPT

## 2019-05-16 PROCEDURE — 84484 ASSAY OF TROPONIN QUANT: CPT

## 2019-05-16 PROCEDURE — 96375 TX/PRO/DX INJ NEW DRUG ADDON: CPT

## 2019-05-16 PROCEDURE — 74011000250 HC RX REV CODE- 250: Performed by: EMERGENCY MEDICINE

## 2019-05-16 PROCEDURE — 83690 ASSAY OF LIPASE: CPT

## 2019-05-16 PROCEDURE — C9113 INJ PANTOPRAZOLE SODIUM, VIA: HCPCS | Performed by: EMERGENCY MEDICINE

## 2019-05-16 PROCEDURE — 74011250637 HC RX REV CODE- 250/637: Performed by: EMERGENCY MEDICINE

## 2019-05-16 PROCEDURE — 74011250636 HC RX REV CODE- 250/636: Performed by: EMERGENCY MEDICINE

## 2019-05-16 PROCEDURE — 96376 TX/PRO/DX INJ SAME DRUG ADON: CPT

## 2019-05-16 PROCEDURE — 99285 EMERGENCY DEPT VISIT HI MDM: CPT

## 2019-05-16 RX ORDER — GUAIFENESIN 100 MG/5ML
324 LIQUID (ML) ORAL
Status: COMPLETED | OUTPATIENT
Start: 2019-05-16 | End: 2019-05-16

## 2019-05-16 RX ORDER — ONDANSETRON 4 MG/1
8 TABLET, FILM COATED ORAL
Qty: 12 TAB | Refills: 0 | Status: SHIPPED | OUTPATIENT
Start: 2019-05-16

## 2019-05-16 RX ORDER — MORPHINE SULFATE 4 MG/ML
4 INJECTION INTRAVENOUS
Status: COMPLETED | OUTPATIENT
Start: 2019-05-16 | End: 2019-05-16

## 2019-05-16 RX ORDER — LORAZEPAM 2 MG/ML
1 INJECTION INTRAMUSCULAR
Status: COMPLETED | OUTPATIENT
Start: 2019-05-16 | End: 2019-05-16

## 2019-05-16 RX ORDER — CLONIDINE HYDROCHLORIDE 0.1 MG/1
0.2 TABLET ORAL
Status: COMPLETED | OUTPATIENT
Start: 2019-05-16 | End: 2019-05-16

## 2019-05-16 RX ORDER — HYDRALAZINE HYDROCHLORIDE 20 MG/ML
20 INJECTION INTRAMUSCULAR; INTRAVENOUS ONCE
Status: COMPLETED | OUTPATIENT
Start: 2019-05-16 | End: 2019-05-16

## 2019-05-16 RX ORDER — MORPHINE SULFATE 4 MG/ML
INJECTION INTRAVENOUS
Status: COMPLETED
Start: 2019-05-16 | End: 2019-05-16

## 2019-05-16 RX ORDER — METOCLOPRAMIDE HYDROCHLORIDE 5 MG/ML
10 INJECTION INTRAMUSCULAR; INTRAVENOUS
Status: COMPLETED | OUTPATIENT
Start: 2019-05-16 | End: 2019-05-16

## 2019-05-16 RX ADMIN — HYDRALAZINE HYDROCHLORIDE 20 MG: 20 INJECTION INTRAMUSCULAR; INTRAVENOUS at 19:53

## 2019-05-16 RX ADMIN — METOCLOPRAMIDE 10 MG: 5 INJECTION, SOLUTION INTRAMUSCULAR; INTRAVENOUS at 19:39

## 2019-05-16 RX ADMIN — MORPHINE SULFATE 4 MG: 4 INJECTION INTRAVENOUS at 21:07

## 2019-05-16 RX ADMIN — LORAZEPAM 1 MG: 2 INJECTION, SOLUTION INTRAMUSCULAR; INTRAVENOUS at 19:37

## 2019-05-16 RX ADMIN — SODIUM CHLORIDE 40 MG: 9 INJECTION, SOLUTION INTRAMUSCULAR; INTRAVENOUS; SUBCUTANEOUS at 19:36

## 2019-05-16 RX ADMIN — MORPHINE SULFATE 4 MG: 4 INJECTION INTRAVENOUS at 23:25

## 2019-05-16 RX ADMIN — CLONIDINE HYDROCHLORIDE 0.2 MG: 0.1 TABLET ORAL at 20:33

## 2019-05-16 RX ADMIN — ASPIRIN 81 MG 324 MG: 81 TABLET ORAL at 21:06

## 2019-05-16 NOTE — ED TRIAGE NOTES
Pt reports N/V/D and chills. Last heroin use yesterday. States feels like he is going through withdrawals. Pt is a dialysis patient, due tomorrow.
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
## 2019-05-17 VITALS
OXYGEN SATURATION: 96 % | RESPIRATION RATE: 18 BRPM | DIASTOLIC BLOOD PRESSURE: 79 MMHG | HEART RATE: 114 BPM | SYSTOLIC BLOOD PRESSURE: 151 MMHG | TEMPERATURE: 98 F

## 2019-05-17 LAB
ATRIAL RATE: 107 BPM
ATRIAL RATE: 114 BPM
CALCULATED P AXIS, ECG09: 39 DEGREES
CALCULATED P AXIS, ECG09: 53 DEGREES
CALCULATED R AXIS, ECG10: -52 DEGREES
CALCULATED R AXIS, ECG10: -56 DEGREES
CALCULATED T AXIS, ECG11: 83 DEGREES
CALCULATED T AXIS, ECG11: 86 DEGREES
DIAGNOSIS, 93000: NORMAL
DIAGNOSIS, 93000: NORMAL
P-R INTERVAL, ECG05: 178 MS
P-R INTERVAL, ECG05: 180 MS
Q-T INTERVAL, ECG07: 336 MS
Q-T INTERVAL, ECG07: 346 MS
QRS DURATION, ECG06: 94 MS
QRS DURATION, ECG06: 94 MS
QTC CALCULATION (BEZET), ECG08: 461 MS
QTC CALCULATION (BEZET), ECG08: 463 MS
VENTRICULAR RATE, ECG03: 107 BPM
VENTRICULAR RATE, ECG03: 114 BPM

## 2019-05-17 NOTE — DISCHARGE INSTRUCTIONS
Patient Education        Learning About Opioid Use Disorder  What is opioid use disorder? Opioids are strong pain medicines. Examples include hydrocodone, oxycodone, fentanyl, and morphine. Heroin is an example of an illegal opioid. Opioid use disorder is using these drugs in a way that keeps you from living the life you want. Your use is out of control, and it harms you and your relationships. Even if you don't see bad effects in your life, it can be dangerous to use opioids in a way that your doctor didn't prescribe. Taking too much of an opioid can cause:  · Trouble breathing. · Low blood pressure. · A low heart rate. · A coma. · Death. Some people develop problems with opioids after they get a prescription from a doctor. Others buy these drugs illegally. Many people with this disorder, and sometimes their families, feel embarrassed or ashamed. Don't let these feelings  the way of getting treatment. Remember that the disorder can happen to anyone who uses opioids, no matter what the reason. What are the symptoms? You may have opioid use disorder if two or more of the following are true:  · You use larger amounts of the drug than you ever meant to. Or you've been using it for a longer period of time than you ever meant to. · You can't cut down or control your use. Or you constantly wish you could cut down. · You spend a lot of time getting or using the drug, or recovering from the effects. · You have strong cravings for the drug. · You can no longer do your main jobs at work, at school, or at home. · You keep using even though your drug use hurts your relationships. · You have stopped doing important activities because of your drug use. · You use drugs in situations where doing so is dangerous. · You keep using the drug even though you know it is causing health problems. · You need more and more of the drug to get the same effect, or you get less effect from the same amount over time. This is called tolerance. · You can't stop using the drug without having uncomfortable symptoms. This is called withdrawal.  How is opioid use disorder treated? Treatment usually includes medicines, group therapy, one or more types of counseling, and drug education. Sometimes medicines are used to help you quit. They may help to control cravings, ease withdrawal symptoms, and prevent relapse. This treatment is called medication-assisted treatment, or MAT. During MAT, you take a substitute drug (usually methadone or buprenorphine) in place of the opioid you were using. Most people take the medicine for months or years as a part of the treatment, along with therapy or counseling. Treatment focuses on more than drugs. It helps you cope with the anger, frustration, sadness, and disappointment that often happen when a person tries to stop using drugs. Treatment also looks at other parts of your life. For example, how are your relationships with friends and family? What's going on at school and work? Do you have health problems? What is your living situation? Treatment helps you find and manage problems. It helps you take control of your life so you don't have to depend on drugs. A drug problem affects your whole family. Family counseling often is part of treatment. Urgent treatment for an overdose  Naloxone is a medicine that reverses the effects of an overdose. If you take it or someone gives it to you soon enough after an overdose, it can save your life. Naloxone comes in a rescue kit you can carry with you. Ask your doctor or pharmacist about having a naloxone rescue kit on hand. Follow-up care is a key part of your treatment and safety. Be sure to make and go to all appointments, and call your doctor if you are having problems. It's also a good idea to know your test results and keep a list of the medicines you take. Where can you learn more? Go to http://berta-keyla.info/.   Enter W336 in the search box to learn more about \"Learning About Opioid Use Disorder. \"  Current as of: May 7, 2018  Content Version: 11.9  © 9808-4566 [employer]. Care instructions adapted under license by [employer] (which disclaims liability or warranty for this information). If you have questions about a medical condition or this instruction, always ask your healthcare professional. [place] [address] any warranty or liability for your use of this information.

## 2019-07-08 ENCOUNTER — HOSPITAL ENCOUNTER (OUTPATIENT)
Dept: GENERAL RADIOLOGY | Age: 49
Discharge: HOME | End: 2019-07-08
Payer: MEDICARE

## 2019-07-08 DIAGNOSIS — R52 PAIN: ICD-10-CM

## 2019-07-08 PROCEDURE — 72120 X-RAY BEND ONLY L-S SPINE: CPT

## 2019-11-28 ENCOUNTER — HOSPITAL ENCOUNTER (EMERGENCY)
Age: 49
Discharge: HOME | End: 2019-11-28
Attending: EMERGENCY MEDICINE
Payer: MEDICARE

## 2019-11-28 ENCOUNTER — APPOINTMENT (OUTPATIENT)
Dept: GENERAL RADIOLOGY | Age: 49
End: 2019-11-28
Attending: EMERGENCY MEDICINE
Payer: MEDICARE

## 2019-11-28 VITALS
DIASTOLIC BLOOD PRESSURE: 92 MMHG | OXYGEN SATURATION: 94 % | BODY MASS INDEX: 33.36 KG/M2 | HEIGHT: 70 IN | RESPIRATION RATE: 18 BRPM | WEIGHT: 233 LBS | HEART RATE: 83 BPM | TEMPERATURE: 99.2 F | SYSTOLIC BLOOD PRESSURE: 166 MMHG

## 2019-11-28 DIAGNOSIS — J81.0 ACUTE PULMONARY EDEMA (HCC): Primary | ICD-10-CM

## 2019-11-28 DIAGNOSIS — J18.9 PNEUMONIA OF RIGHT MIDDLE LOBE DUE TO INFECTIOUS ORGANISM: ICD-10-CM

## 2019-11-28 DIAGNOSIS — Z91.15 NONCOMPLIANCE OF PATIENT WITH RENAL DIALYSIS (HCC): ICD-10-CM

## 2019-11-28 LAB
ANION GAP SERPL CALC-SCNC: 11 MMOL/L (ref 3–18)
BASOPHILS # BLD: 0.1 K/UL (ref 0–0.1)
BASOPHILS NFR BLD: 0 % (ref 0–2)
BUN SERPL-MCNC: 63 MG/DL (ref 7–18)
BUN/CREAT SERPL: 4 (ref 12–20)
CALCIUM SERPL-MCNC: 8.5 MG/DL (ref 8.5–10.1)
CHLORIDE SERPL-SCNC: 101 MMOL/L (ref 100–111)
CK MB CFR SERPL CALC: 10.4 % (ref 0–4)
CK MB SERPL-MCNC: 8 NG/ML (ref 5–25)
CK SERPL-CCNC: 77 U/L (ref 39–308)
CO2 SERPL-SCNC: 23 MMOL/L (ref 21–32)
CREAT SERPL-MCNC: 15.9 MG/DL (ref 0.6–1.3)
DIFFERENTIAL METHOD BLD: ABNORMAL
EOSINOPHIL # BLD: 1 K/UL (ref 0–0.4)
EOSINOPHIL NFR BLD: 9 % (ref 0–5)
ERYTHROCYTE [DISTWIDTH] IN BLOOD BY AUTOMATED COUNT: 16.1 % (ref 11.6–14.5)
GLUCOSE SERPL-MCNC: 110 MG/DL (ref 74–99)
HCT VFR BLD AUTO: 33.7 % (ref 36–48)
HGB BLD-MCNC: 10.9 G/DL (ref 13–16)
LYMPHOCYTES # BLD: 2.8 K/UL (ref 0.9–3.6)
LYMPHOCYTES NFR BLD: 23 % (ref 21–52)
MCH RBC QN AUTO: 31.2 PG (ref 24–34)
MCHC RBC AUTO-ENTMCNC: 32.3 G/DL (ref 31–37)
MCV RBC AUTO: 96.6 FL (ref 74–97)
MONOCYTES # BLD: 1 K/UL (ref 0.05–1.2)
MONOCYTES NFR BLD: 8 % (ref 3–10)
NEUTS SEG # BLD: 7.1 K/UL (ref 1.8–8)
NEUTS SEG NFR BLD: 60 % (ref 40–73)
PLATELET # BLD AUTO: 145 K/UL (ref 135–420)
PMV BLD AUTO: 9.9 FL (ref 9.2–11.8)
POTASSIUM SERPL-SCNC: 4.8 MMOL/L (ref 3.5–5.5)
RBC # BLD AUTO: 3.49 M/UL (ref 4.7–5.5)
SODIUM SERPL-SCNC: 135 MMOL/L (ref 136–145)
TROPONIN I SERPL-MCNC: <0.02 NG/ML (ref 0–0.04)
WBC # BLD AUTO: 11.9 K/UL (ref 4.6–13.2)

## 2019-11-28 PROCEDURE — 71046 X-RAY EXAM CHEST 2 VIEWS: CPT

## 2019-11-28 PROCEDURE — 74011250636 HC RX REV CODE- 250/636: Performed by: EMERGENCY MEDICINE

## 2019-11-28 PROCEDURE — 82550 ASSAY OF CK (CPK): CPT

## 2019-11-28 PROCEDURE — 85025 COMPLETE CBC W/AUTO DIFF WBC: CPT

## 2019-11-28 PROCEDURE — 86706 HEP B SURFACE ANTIBODY: CPT

## 2019-11-28 PROCEDURE — 96365 THER/PROPH/DIAG IV INF INIT: CPT

## 2019-11-28 PROCEDURE — 80048 BASIC METABOLIC PNL TOTAL CA: CPT

## 2019-11-28 PROCEDURE — 87340 HEPATITIS B SURFACE AG IA: CPT

## 2019-11-28 PROCEDURE — 90935 HEMODIALYSIS ONE EVALUATION: CPT

## 2019-11-28 PROCEDURE — 99285 EMERGENCY DEPT VISIT HI MDM: CPT

## 2019-11-28 RX ORDER — LEVOFLOXACIN 5 MG/ML
500 INJECTION, SOLUTION INTRAVENOUS
Status: COMPLETED | OUTPATIENT
Start: 2019-11-28 | End: 2019-11-28

## 2019-11-28 RX ORDER — AZITHROMYCIN 250 MG/1
TABLET, FILM COATED ORAL
Qty: 6 TAB | Refills: 0 | Status: SHIPPED | OUTPATIENT
Start: 2019-11-28 | End: 2019-12-28

## 2019-11-28 RX ADMIN — LEVOFLOXACIN 500 MG: 5 INJECTION, SOLUTION INTRAVENOUS at 10:38

## 2019-11-28 NOTE — DISCHARGE INSTRUCTIONS
Specific instructions from the physician treating the emergency department today:  1. Return to the ER if worse. 2.  It is important that you do not miss any hemodialysis treatments. It can be life-threatening if you miss them. 3.  Zithromax as prescribed until finished. Patient Education        Pneumonia: Care Instructions  Your Care Instructions    Pneumonia is an infection of the lungs. Most cases are caused by infections from bacteria or viruses. Pneumonia may be mild or very severe. If it is caused by bacteria, you will be treated with antibiotics. It may take a few weeks to a few months to recover fully from pneumonia, depending on how sick you were and whether your overall health is good. Follow-up care is a key part of your treatment and safety. Be sure to make and go to all appointments, and call your doctor if you are having problems. It's also a good idea to know your test results and keep a list of the medicines you take. How can you care for yourself at home? · Take your antibiotics exactly as directed. Do not stop taking the medicine just because you are feeling better. You need to take the full course of antibiotics. · Take your medicines exactly as prescribed. Call your doctor if you think you are having a problem with your medicine. · Get plenty of rest and sleep. You may feel weak and tired for a while, but your energy level will improve with time. · To prevent dehydration, drink plenty of fluids, enough so that your urine is light yellow or clear like water. Choose water and other caffeine-free clear liquids until you feel better. If you have kidney, heart, or liver disease and have to limit fluids, talk with your doctor before you increase the amount of fluids you drink. · Take care of your cough so you can rest. A cough that brings up mucus from your lungs is common with pneumonia. It is one way your body gets rid of the infection.  But if coughing keeps you from resting or causes severe fatigue and chest-wall pain, talk to your doctor. He or she may suggest that you take a medicine to reduce the cough. · Use a vaporizer or humidifier to add moisture to your bedroom. Follow the directions for cleaning the machine. · Do not smoke or allow others to smoke around you. Smoke will make your cough last longer. If you need help quitting, talk to your doctor about stop-smoking programs and medicines. These can increase your chances of quitting for good. · Take an over-the-counter pain medicine, such as acetaminophen (Tylenol), ibuprofen (Advil, Motrin), or naproxen (Aleve). Read and follow all instructions on the label. · Do not take two or more pain medicines at the same time unless the doctor told you to. Many pain medicines have acetaminophen, which is Tylenol. Too much acetaminophen (Tylenol) can be harmful. · If you were given a spirometer to measure how well your lungs are working, use it as instructed. This can help your doctor tell how your recovery is going. · To prevent pneumonia in the future, talk to your doctor about getting a flu vaccine (once a year) and a pneumococcal vaccine (one time only for most people). When should you call for help? Call 911 anytime you think you may need emergency care. For example, call if:    · You have severe trouble breathing.    Call your doctor now or seek immediate medical care if:    · You cough up dark brown or bloody mucus (sputum).     · You have new or worse trouble breathing.     · You are dizzy or lightheaded, or you feel like you may faint.    Watch closely for changes in your health, and be sure to contact your doctor if:    · You have a new or higher fever.     · You are coughing more deeply or more often.     · You are not getting better after 2 days (48 hours).     · You do not get better as expected. Where can you learn more? Go to http://berta-keyla.info/.   Enter [id number] in the search box to learn more about \"Pneumonia: Care Instructions. \"  Current as of: June 9, 2019  Content Version: 12.2  © 3655-5022 [employer]. Care instructions adapted under license by [employer] (which disclaims liability or warranty for this information). If you have questions about a medical condition or this instruction, always ask your healthcare professional. [address] any warranty or liability for your use of this information. Patient Education        Pulmonary Edema: Care Instructions  Your Care Instructions    Pulmonary edema is the buildup of fluid in the lungs. It usually occurs when the heart does not pump blood through the body properly. Pulmonary edema can also be caused by another disease, such as liver or kidney failure. It can also happen at high altitudes, from a poisoning, or as a result of a near-drowning. If you have fluid in your lungs, you may have trouble breathing, be restless, have a fast heart rate, or cough up foamy pink fluid. Breathing problems may be worse when you lie down. Follow-up care is a key part of your treatment and safety. Be sure to make and go to all appointments, and call your doctor if you are having problems. It's also a good idea to know your test results and keep a list of the medicines you take. How can you care for yourself at home? Medicines    · Take your medicines exactly as prescribed. Call your doctor if you think you are having a problem with your medicine.     · Review all of your regular medicines with your doctor. Do not take any vitamins, over-the-counter medicines, or herbal products without talking to your doctor first.   Diet    · Eat a balanced diet. Make an appointment with a dietitian if you have questions about what type of diet might be best for you.     · Do not eat more than 2,000 milligrams (mg) of sodium each day.  That is less than 1 teaspoon of salt a day, including all the salt you eat in prepared or packaged foods.  ? Do not add salt while you are cooking or at the table. Flavor with garlic, lemon juice, onion, vinegar, herbs, and spices instead of salt. ? Eat fewer processed foods and foods from restaurants, including fast food. ? Use fresh or frozen foods instead of canned. ? Count and record how much sodium you eat each day. Check food labels for sodium. ? Ask your doctor before using salt substitutes that have potassium, such as Lite Salt.    Lifestyle    · Stay out of air pollution; smog; cold, dry air; hot, humid air; and high altitudes.     · Learn breathing methods that help the airflow in and out of your lungs.     · Take rest breaks often. Schedule short rest breaks when doing housework and other activities. An occupational or physical therapist can help you find ways to do everyday activities with less effort.     · Start light exercise if your doctor says it is okay. Try to stay as active as possible. If you have not exercised in the past, start out slowly. Walking is a good way to start.     · Get enough rest at night. Sleeping with 1 or 2 pillows under your upper body and head may help you breathe easier at night.     · Discuss rehabilitation with your doctor. Find out what programs are available in your area.     · Do not smoke or use other tobacco products. Smoking can make your condition worse. If you need help quitting, talk to your doctor about stop-smoking programs and medicines. These can increase your chances of quitting for good.     · Do not use alcohol or illegal drugs. When should you call for help? Call 911 anytime you think you may need emergency care. For example, call if:    · You have severe trouble breathing.     · You passed out (lost consciousness).     · You have symptoms of a heart attack. These may include:  ? Chest pain or pressure, or a strange feeling in the chest.  ? Sweating. ? Shortness of breath. ? Nausea or vomiting.   ? Pain, pressure, or a strange feeling in the back, neck, jaw, or upper belly or in one or both shoulders or arms. ? Lightheadedness or sudden weakness. ? A fast or irregular heartbeat. Pain that spreads from the chest to the neck, jaw, or one or both shoulders or arms.    After you call 911, the  may tell you to chew 1 adult-strength or 2 to 4 low-dose aspirin. Wait for an ambulance. Do not try to drive yourself.   Call your doctor now or seek immediate medical care if:    · You have trouble breathing or have wheezing that is getting worse.     · You are coughing more deeply or more often.     · You cough up blood.     · You get a fever.     · You have more swelling in your legs or belly.     · Your symptoms are getting worse.    Watch closely for changes in your health, and be sure to contact your doctor if you have any problems. Where can you learn more? Go to http://berta-keyla.info/. Enter B290 in the search box to learn more about \"Pulmonary Edema: Care Instructions. \"  Current as of: June 9, 2019  Content Version: 12.2  © 1957-6829 [employer]. Care instructions adapted under license by [facility] (which disclaims liability or warranty for this information). If you have questions about a medical condition or this instruction, always ask your healthcare professional. [address] any warranty or liability for your use of this information. [employer] Activation    Thank you for requesting access to [employer]. Please follow the instructions below to securely access and download your online medical record. [employer] allows you to send messages to your doctor, view your test results, renew your prescriptions, schedule appointments, and more. How Do I Sign Up? In your internet browser, go to [email or web address]. Click on the First Time User? Click Here link in the Sign In box. You will see the New Member Sign Up page.   Enter your [employer] Access Code exactly as it appears below. You will not need to use this code after you´ve completed the sign-up process. If you do not sign up before the expiration date, you must request a new code. [employer] Access Code: [email or web address]  Expires: 3/28/2019  2:27 PM (This is the date your [employer] access code will )    Enter the last four digits of your Social Security Number (xxxx) and Date of Birth (mm/dd/yyyy) as indicated and click Submit. You will be taken to the next sign-up page. Create a [employer] ID. This will be your [employer] login ID and cannot be changed, so think of one that is secure and easy to remember. Create a [employer] password. You can change your password at any time. Enter your Password Reset Question and Answer. This can be used at a later time if you forget your password. Enter your e-mail address. You will receive e-mail notification when new information is available in [address] Click Sign Up. You can now view and download portions of your medical record. Click the [employer] link to download a portable copy of your medical information. Additional Information    If you have questions, please visit the Frequently Asked Questions section of the [employer] website at [email or web address] Remember, [employer] is NOT to be used for urgent needs. For medical emergencies, dial 911.

## 2019-11-28 NOTE — ED TRIAGE NOTES
Pt arrived from [facility] because he has not had dialysis since last Thursday and they wanted him evaluated. Pt states he was busy. Pt with hx of htn and not taking meds due to forgetting. Cough and chest congestion x greater than a week. Pt denies fever, dysuria. States a little short of breath with exertion.

## 2019-11-28 NOTE — DIALYSIS
[facility] ACUTE HEMODIALYSIS FLOW SHEET 
 
 
HEMODIALYSIS ORDERS: Physician:   Ben Lopez Dialyzer: revaclear   Duration: 2    hr  BFR:  350     DFR:  800 Dialysate:  Temp 36-37 C  K+   2       Ca+  2.5   Na 138   Bicarb 35 Weight:      kg    Patient Chart []     Unable to Obtain [x]   Dry weight/UF Goal:2000    Access [facility] Needle Gauge Heparin []  Bolus  Units    [] Hourly    Units    [x]  None Catheter locking solution Pre BP: 162/120        Pulse:   69            Respirations: 18    Temperature:  98.6 Labs: Pre     Post:      [x] N/A Additional Orders(medications, blood products, hypotension management):  [x] N/A [x]    [facility] Consent Verified CATHETER ACCESS: []N/A   [x]Right   []Left   [x]IJ     []Fem   [x]chest wall  
[] First use X-ray verified     [x]Tunnel                [] Non Tunneled [x]No S/S infection  []Redness  []Drainage []Cultured []Swelling []Pain  
[x]Medical Aseptic Prep Utilized   [x]Dressing Changed  [x] Biopatch  Date: 11/28/19 []Clotted   [x]Patent   Flows: [x]Good  []Poor  []Reversed If access problem,  notified: []Yes    [x]N/A  Date:  
 
GRAFT/FISTULA ACCESS:  [x]N/A     []Right     []Left     []UE     []LE []AVG   []AVF        []Buttonhole    []Medical Aseptic Prep Utilized []No S/S infection  []Redness  []Drainage []Cultured []Swelling []Pain Bruit:   [] Strong    [] Weak       Thrill :   [] Strong    [] Weak Needle Gauge:    g    Length:    inch If access problem,  notified: []Yes     [x]N/A  Date:  
Please describe access if present and not used:  
            
            GENERAL ASSESSMENT:  
  
LUNGS:  Rate  SaO2%     [] N/A    [x] Clear  [] Coarse  [] Crackles  [] Wheezing 
      [x] Diminished     Location : []RLL   []LLL    []RUL  []NIKOS Cough: []Productive  [x]Dry  []N/A   Respirations:  [x]Easy  []Labored Therapy:   [x]RA  []NC    l/min    Mask: []NRB []Venti       O2% []Ventilator  []Intubated  [] Trach  [] BiPaP CARDIAC: [x]Regular      [] Irregular   [] Pericardial Rub  [] JVD []  Monitored  [] Bedside  [] Remotely monitored [] N/A  Rhythm: EDEMA: [x] None  []Generalized  [] Pitting [] 1    [] 2    [] 3    [] 4 [] Facial  [] Pedal  []  UE  [] LE  
 
SKIN:   [x] Warm  [] Hot     [] Cold   [x] Dry     [] Pale   [] Diaphoretic    
             [] Flushed  [] Jaundiced  [] Cyanotic  [] Rash  [] Weeping LOC:    [x] Alert      [x]Oriented:    [x] Person     [x] Place  [x]Time 
             [] Confused  [] Lethargic  [] Medicated  [] Non-responsive GI / ABDOMEN:     [] Flat    [] Distended    [x] Soft    [] Firm   []  Obese 
                           [] Diarrhea  [x] Bowel Sounds  [] Nausea  [] Vomiting  / URINE ASSESSMENT:   [] Voiding   [x] Oliguria  [] Anuria   []  Alonzo [] Incontinent    []  Incontinent Brief      []  Bathroom Privileges PAIN:   [x] 0 []1  []2   []3   []4   []5   []6   []7   []8   []9   []10 Scale 0-10  Action/Follow Up: MOBILITY:     [x] Amb    [] Amb/Assist    [] Bed    [] Wheelchair  [] Stretcher All Vitals and Treatment Details on Attached Flowsheet Hospital:  [facility] Room # YJ49/76 [] 1st Time Acute  [x] Stat  [] Routine  [] Urgent [x] Acute Room  []  Bedside  [] ICU/CCU  [x] ER Isolation Precautions: There are currently no Active Isolations Special Considerations:         [] Blood Consent Verified [x]N/A ALLERGIES:  
Allergies Allergen Reactions  Latex Other (comments)  
  blisters  Other Food Hives Allergic to tomatoes and tomato sauce  Keflex [Cephalexin] Anaphylaxis  Codeine Nausea and Vomiting Code Status:Prior Hepatitis Status:    2nd RN check:                   
Lab Results Component Value Date/Time  Hepatitis A, IgM NEGATIVE  07/07/2017 08:39 AM  
 Hepatitis B surface Ag <0.10 04/18/2019 05:44 PM  
 Hepatitis B surface Ab <3.10 (L) 04/18/2019 05:44 PM  
 Hepatitis B core, IgM NEGATIVE  07/07/2017 08:39 AM  
 Hepatitis C virus Ab 0.07 10/04/2018 05:30 AM  
   
 
            Current Labs:  
Lab Results Component Value Date/Time Sodium 135 (L) 11/28/2019 09:15 AM  
 Potassium 4.8 11/28/2019 09:15 AM  
 Chloride 101 11/28/2019 09:15 AM  
 CO2 23 11/28/2019 09:15 AM  
 Anion gap 11 11/28/2019 09:15 AM  
 Glucose 110 (H) 11/28/2019 09:15 AM  
 BUN 63 (H) 11/28/2019 09:15 AM  
 Creatinine 15.90 (H) 11/28/2019 09:15 AM  
 BUN/Creatinine ratio 4 (L) 11/28/2019 09:15 AM  
 GFR est AA 4 (L) 11/28/2019 09:15 AM  
 GFR est non-AA 3 (L) 11/28/2019 09:15 AM  
 Calcium 8.5 11/28/2019 09:15 AM  
  
Lab Results Component Value Date/Time WBC 11.9 11/28/2019 09:15 AM  
 Hemoglobin, POC 13.9 08/20/2017 05:57 AM  
 HGB 10.9 (L) 11/28/2019 09:15 AM  
 Hematocrit, POC 41 08/20/2017 05:57 AM  
 HCT 33.7 (L) 11/28/2019 09:15 AM  
 PLATELET 300 43/78/1160 09:15 AM  
 MCV 96.6 11/28/2019 09:15 AM  
  
  
 
                                                                         
DIET: None PRIMARY NURSE REPORT: First initial/Last name/Title Pre Dialysis:        Time:  1245 EDUCATION:      
[x] Patient [] Other         Knowledge Basis: []None []Minimal [x] Substantial  
Barriers to learning     []N/A [x] Access Care     [] S&S of infection     [] Fluid Management     []K+     [x]Procedural   
[]Albumin     [] Medications     [] Tx Options     [] Transplant     [] Diet     [] Other Teaching Tools:  [x] Explain  [] Demo  [] Handouts [] Video Patient response:      [x] Verbalized understanding  [] Teach back  [] Return demonstration [] Requires follow up Inappropriate due to         
 
[x]    Time Out/Safety Check  [x]   Extracorporeal Circuit Tested for integrity RO/HEMODIALYSIS MACHINE SAFETY CHECKS  Before each treatment: Machine Number:                   [facility] 
                                [] Unit Machine #     with centralized RO 
                                [] Portable Machine #1/RO serial # [id number] [] Portable Machine #2/RO serial # [id number] [] Portable Machine #4/RO serial # [id number] [address] #1/RO serial # [id number] [] Portable Machine #2/RO serial # [id number] [] Portable Machine #3/RO serial #  [id number] Alarm Test:  Pass time               
[x]RO/Machine Log Complete Temp             37  
Dialysate: pH 7.2 Conductivity: Meter 14      HD Machine    14           TCD: 13.9 Dialyzer Lot #                Blood Tubing Lot #              Saline Lot # CHLORINE TESTING-Before each treatment and every 4 hours Total Chlorine:    [x] less than 0.1 ppm  Time: 1300     4 Hr/2nd Check Time:      
(if greater than 0.1 ppm from Primary then every 30 minutes from Secondary) TREATMENT INITIATION  with Dialysis Precautions:  
[x] All Connections Secured                 [x] Saline Line Double Clamped  
[x] Venous Parameters Set                  [x] Arterial Parameters Set [x] Prime Given 250ml                          [x]Air Foam Detector Engaged Treatment Initiation Note: Started treatment using rt chest wall TDC. Pt is pleasant alert in No distress. During Treatment Notes: 
         
 
Medication Dose Volume Route Time [facility] name Title RN  
      RN  
 
 
            Post Assessment:  
Dialyzer Cleared:    [x] Good [] Fair  [] Poor Blood processed:      L 
UF Removed  2000    Ml POst BP:             Pulse:           
Respirations:      Temperature:     Lungs: [x] Clear      [] Course         [] Crackles  
 [] Wheezing         [] Diminished Post Tx Vascular Access: AVF/AVG: Bleeding stopped Art     min. Ashok. Min   N/A    Cardiac:  
[x] Regular   [] Irregular   [] Monitor  [] N/A Rhythm:      
Catheter:  
Locking solution: Heparin 1:1000 Art.2.2      Ashok.  2.3   N/A Skin:  Pain:   
[x] Warm  [x] Dry [] Diaphoretic    [] Flushed   
[] Pale [] Cyanotic [x]0  []1  []2   []3  []4   []5   []6   []7   []8   []9   []10 Post Treatment Note: Pt. Tolerated treatment well. POST TREATMENT PRIMARY NURSE HANDOFF REPORT:  
 
First initial/Last name/Title Post Dialysis:     Time: 1600 Abbreviations: AVG-arterial venous graft, AVF-arterial venous fistula, IJ-Internal Jugular, Subcl-Subclavian, Fem-Femoral, Tx-treatment, AP/HR-apical heart rate, DFR-dialysate flow rate, BFR-blood flow rate, AP-arterial pressure, -venous pressure, UF-ultrafiltrate, TMP-transmembrane pressure, Ashok-Venous, Art-Arterial, RO-Reverse Osmosis
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
## 2019-11-28 NOTE — ED NOTES
I have reviewed discharge instructions with the patient. The patient verbalized understanding. Patient NAD, VSS and patient denies pain at discharge. Patient armband removed and shredded

## 2019-11-28 NOTE — ED NOTES
Assumed care of patient after dialysis. Patient is stable and ready to go home. Provider aware that patient is done with dialysis.

## 2019-11-29 LAB
HBV SURFACE AB SER QL IA: NEGATIVE
HBV SURFACE AB SERPL IA-ACNC: <3.1 MIU/ML
HBV SURFACE AG SER QL: <0.1 INDEX
HBV SURFACE AG SER QL: NEGATIVE
HEP BS AB COMMENT,HBSAC: ABNORMAL

## 2019-12-28 ENCOUNTER — HOSPITAL ENCOUNTER (EMERGENCY)
Age: 49
Discharge: HOME | End: 2019-12-28
Attending: EMERGENCY MEDICINE | Admitting: EMERGENCY MEDICINE
Payer: MEDICARE

## 2019-12-28 ENCOUNTER — APPOINTMENT (OUTPATIENT)
Dept: GENERAL RADIOLOGY | Age: 49
End: 2019-12-28
Attending: PHYSICIAN ASSISTANT
Payer: MEDICARE

## 2019-12-28 VITALS
SYSTOLIC BLOOD PRESSURE: 143 MMHG | TEMPERATURE: 98.6 F | OXYGEN SATURATION: 96 % | BODY MASS INDEX: 32.93 KG/M2 | RESPIRATION RATE: 20 BRPM | WEIGHT: 230 LBS | DIASTOLIC BLOOD PRESSURE: 100 MMHG | HEART RATE: 78 BPM | HEIGHT: 70 IN

## 2019-12-28 DIAGNOSIS — R06.2 WHEEZING: ICD-10-CM

## 2019-12-28 DIAGNOSIS — R06.02 SOB (SHORTNESS OF BREATH): ICD-10-CM

## 2019-12-28 DIAGNOSIS — J18.9 PNEUMONIA OF RIGHT LOWER LOBE DUE TO INFECTIOUS ORGANISM: Primary | ICD-10-CM

## 2019-12-28 DIAGNOSIS — Z76.0 MEDICATION REFILL: ICD-10-CM

## 2019-12-28 LAB
ALBUMIN SERPL-MCNC: 3.1 G/DL (ref 3.4–5)
ALBUMIN/GLOB SERPL: 0.9 {RATIO} (ref 0.8–1.7)
ALP SERPL-CCNC: 64 U/L (ref 45–117)
ALT SERPL-CCNC: 16 U/L (ref 16–61)
ANION GAP SERPL CALC-SCNC: 10 MMOL/L (ref 3–18)
AST SERPL-CCNC: 18 U/L (ref 10–38)
ATRIAL RATE: 84 BPM
BASOPHILS # BLD: 0 K/UL (ref 0–0.1)
BASOPHILS NFR BLD: 0 % (ref 0–2)
BILIRUB SERPL-MCNC: 0.3 MG/DL (ref 0.2–1)
BNP SERPL-MCNC: 8138 PG/ML (ref 0–900)
BUN SERPL-MCNC: 40 MG/DL (ref 7–18)
BUN/CREAT SERPL: 4 (ref 12–20)
CALCIUM SERPL-MCNC: 7.5 MG/DL (ref 8.5–10.1)
CALCULATED P AXIS, ECG09: 38 DEGREES
CALCULATED R AXIS, ECG10: 16 DEGREES
CALCULATED T AXIS, ECG11: 104 DEGREES
CHLORIDE SERPL-SCNC: 99 MMOL/L (ref 100–111)
CK MB CFR SERPL CALC: 6.6 % (ref 0–4)
CK MB SERPL-MCNC: 6.7 NG/ML (ref 5–25)
CK SERPL-CCNC: 101 U/L (ref 39–308)
CO2 SERPL-SCNC: 28 MMOL/L (ref 21–32)
CREAT SERPL-MCNC: 10.6 MG/DL (ref 0.6–1.3)
DIAGNOSIS, 93000: NORMAL
DIFFERENTIAL METHOD BLD: ABNORMAL
EOSINOPHIL # BLD: 0.9 K/UL (ref 0–0.4)
EOSINOPHIL NFR BLD: 11 % (ref 0–5)
ERYTHROCYTE [DISTWIDTH] IN BLOOD BY AUTOMATED COUNT: 16.4 % (ref 11.6–14.5)
GLOBULIN SER CALC-MCNC: 3.6 G/DL (ref 2–4)
GLUCOSE SERPL-MCNC: 130 MG/DL (ref 74–99)
HCT VFR BLD AUTO: 28 % (ref 36–48)
HGB BLD-MCNC: 9 G/DL (ref 13–16)
LYMPHOCYTES # BLD: 4.2 K/UL (ref 0.9–3.6)
LYMPHOCYTES NFR BLD: 47 % (ref 21–52)
MCH RBC QN AUTO: 30.8 PG (ref 24–34)
MCHC RBC AUTO-ENTMCNC: 32.1 G/DL (ref 31–37)
MCV RBC AUTO: 95.9 FL (ref 74–97)
MONOCYTES # BLD: 0.4 K/UL (ref 0.05–1.2)
MONOCYTES NFR BLD: 5 % (ref 3–10)
NEUTS SEG # BLD: 3.3 K/UL (ref 1.8–8)
NEUTS SEG NFR BLD: 37 % (ref 40–73)
P-R INTERVAL, ECG05: 160 MS
PLATELET # BLD AUTO: 105 K/UL (ref 135–420)
PMV BLD AUTO: 9.9 FL (ref 9.2–11.8)
POTASSIUM SERPL-SCNC: 4.2 MMOL/L (ref 3.5–5.5)
PROT SERPL-MCNC: 6.7 G/DL (ref 6.4–8.2)
Q-T INTERVAL, ECG07: 396 MS
QRS DURATION, ECG06: 86 MS
QTC CALCULATION (BEZET), ECG08: 467 MS
RBC # BLD AUTO: 2.92 M/UL (ref 4.7–5.5)
SODIUM SERPL-SCNC: 137 MMOL/L (ref 136–145)
TROPONIN I SERPL-MCNC: <0.02 NG/ML (ref 0–0.04)
TROPONIN I SERPL-MCNC: <0.02 NG/ML (ref 0–0.04)
VENTRICULAR RATE, ECG03: 84 BPM
WBC # BLD AUTO: 8.9 K/UL (ref 4.6–13.2)

## 2019-12-28 PROCEDURE — 74011000250 HC RX REV CODE- 250: Performed by: PHYSICIAN ASSISTANT

## 2019-12-28 PROCEDURE — 74011636637 HC RX REV CODE- 636/637: Performed by: PHYSICIAN ASSISTANT

## 2019-12-28 PROCEDURE — 80053 COMPREHEN METABOLIC PANEL: CPT

## 2019-12-28 PROCEDURE — 85025 COMPLETE CBC W/AUTO DIFF WBC: CPT

## 2019-12-28 PROCEDURE — A9270 NON-COVERED ITEM OR SERVICE: HCPCS | Performed by: PHYSICIAN ASSISTANT

## 2019-12-28 PROCEDURE — 93005 ELECTROCARDIOGRAM TRACING: CPT

## 2019-12-28 PROCEDURE — 71046 X-RAY EXAM CHEST 2 VIEWS: CPT

## 2019-12-28 PROCEDURE — 82550 ASSAY OF CK (CPK): CPT

## 2019-12-28 PROCEDURE — 83880 ASSAY OF NATRIURETIC PEPTIDE: CPT

## 2019-12-28 PROCEDURE — 99285 EMERGENCY DEPT VISIT HI MDM: CPT

## 2019-12-28 RX ORDER — PREDNISONE 20 MG/1
60 TABLET ORAL
Status: COMPLETED | OUTPATIENT
Start: 2019-12-28 | End: 2019-12-28

## 2019-12-28 RX ORDER — ALBUTEROL SULFATE 0.83 MG/ML
2.5 SOLUTION RESPIRATORY (INHALATION)
Status: COMPLETED | OUTPATIENT
Start: 2019-12-28 | End: 2019-12-28

## 2019-12-28 RX ORDER — IPRATROPIUM BROMIDE AND ALBUTEROL SULFATE 2.5; .5 MG/3ML; MG/3ML
3 SOLUTION RESPIRATORY (INHALATION) ONCE
Status: COMPLETED | OUTPATIENT
Start: 2019-12-28 | End: 2019-12-28

## 2019-12-28 RX ORDER — ALBUTEROL SULFATE 90 UG/1
2 AEROSOL, METERED RESPIRATORY (INHALATION)
Qty: 1 INHALER | Refills: 0 | OUTPATIENT
Start: 2019-12-28 | End: 2020-01-09

## 2019-12-28 RX ORDER — DOXYCYCLINE HYCLATE 100 MG
100 TABLET ORAL 2 TIMES DAILY
Qty: 20 TAB | Refills: 0 | Status: SHIPPED | OUTPATIENT
Start: 2019-12-28 | End: 2020-01-07

## 2019-12-28 RX ORDER — PREDNISONE 50 MG/1
50 TABLET ORAL DAILY
Qty: 3 TAB | Refills: 0 | Status: SHIPPED | OUTPATIENT
Start: 2019-12-28 | End: 2019-12-31

## 2019-12-28 RX ADMIN — ALBUTEROL SULFATE 2.5 MG: 2.5 SOLUTION RESPIRATORY (INHALATION) at 17:29

## 2019-12-28 RX ADMIN — PREDNISONE 60 MG: 20 TABLET ORAL at 17:28

## 2019-12-28 RX ADMIN — IPRATROPIUM BROMIDE AND ALBUTEROL SULFATE 3 ML: .5; 3 SOLUTION RESPIRATORY (INHALATION) at 15:30

## 2019-12-28 NOTE — ED NOTES
I performed a brief history of the patient here in triage and I have determined that pt will need further treatment and evaluation from the main side ER physician. I have placed initial orders based on the history to help in expediting patients care.        OCTAVIO Hinds 2:55 PM
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
## 2019-12-28 NOTE — ED TRIAGE NOTES
Dialysis pt who had to shorten dialysis today due to SOB. Has URI w/ congestion and cough. Has chest port. Recent URI w/ zpak tx. Feels same.

## 2019-12-28 NOTE — ED NOTES
Vitals as charted. Denies pain at this time. Per patient feels SOB sat 93% on RA. Placed on 2L via NC. Will continue to monitor.

## 2019-12-29 NOTE — DISCHARGE INSTRUCTIONS
Patient Education        Pneumonia: Care Instructions  Your Care Instructions    Pneumonia is an infection of the lungs. Most cases are caused by infections from bacteria or viruses. Pneumonia may be mild or very severe. If it is caused by bacteria, you will be treated with antibiotics. It may take a few weeks to a few months to recover fully from pneumonia, depending on how sick you were and whether your overall health is good. Follow-up care is a key part of your treatment and safety. Be sure to make and go to all appointments, and call your doctor if you are having problems. It's also a good idea to know your test results and keep a list of the medicines you take. How can you care for yourself at home? · Take your antibiotics exactly as directed. Do not stop taking the medicine just because you are feeling better. You need to take the full course of antibiotics. · Take your medicines exactly as prescribed. Call your doctor if you think you are having a problem with your medicine. · Get plenty of rest and sleep. You may feel weak and tired for a while, but your energy level will improve with time. · To prevent dehydration, drink plenty of fluids, enough so that your urine is light yellow or clear like water. Choose water and other caffeine-free clear liquids until you feel better. If you have kidney, heart, or liver disease and have to limit fluids, talk with your doctor before you increase the amount of fluids you drink. · Take care of your cough so you can rest. A cough that brings up mucus from your lungs is common with pneumonia. It is one way your body gets rid of the infection. But if coughing keeps you from resting or causes severe fatigue and chest-wall pain, talk to your doctor. He or she may suggest that you take a medicine to reduce the cough. · Use a vaporizer or humidifier to add moisture to your bedroom. Follow the directions for cleaning the machine.   · Do not smoke or allow others to smoke around you. Smoke will make your cough last longer. If you need help quitting, talk to your doctor about stop-smoking programs and medicines. These can increase your chances of quitting for good. · Take an over-the-counter pain medicine, such as acetaminophen (Tylenol), ibuprofen (Advil, Motrin), or naproxen (Aleve). Read and follow all instructions on the label. · Do not take two or more pain medicines at the same time unless the doctor told you to. Many pain medicines have acetaminophen, which is Tylenol. Too much acetaminophen (Tylenol) can be harmful. · If you were given a spirometer to measure how well your lungs are working, use it as instructed. This can help your doctor tell how your recovery is going. · To prevent pneumonia in the future, talk to your doctor about getting a flu vaccine (once a year) and a pneumococcal vaccine (one time only for most people). When should you call for help? Call 911 anytime you think you may need emergency care. For example, call if:    · You have severe trouble breathing.    Call your doctor now or seek immediate medical care if:    · You cough up dark brown or bloody mucus (sputum).     · You have new or worse trouble breathing.     · You are dizzy or lightheaded, or you feel like you may faint.    Watch closely for changes in your health, and be sure to contact your doctor if:    · You have a new or higher fever.     · You are coughing more deeply or more often.     · You are not getting better after 2 days (48 hours).     · You do not get better as expected. Where can you learn more? Go to http://berta-keyla.info/. Enter [id number] in the search box to learn more about \"Pneumonia: Care Instructions. \"  Current as of: June 9, 2019  Content Version: 12.2  © 2237-1714 [employer]. Care instructions adapted under license by [employer] (which disclaims liability or warranty for this information).  If you have questions about a medical condition or this instruction, always ask your healthcare professional. [place] [address] any warranty or liability for your use of this information.

## 2020-01-01 ENCOUNTER — OFFICE VISIT (OUTPATIENT)
Dept: CARDIOLOGY CLINIC | Age: 50
End: 2020-01-01

## 2020-01-01 ENCOUNTER — PATIENT OUTREACH (OUTPATIENT)
Dept: CASE MANAGEMENT | Age: 50
End: 2020-01-01

## 2020-01-01 ENCOUNTER — APPOINTMENT (OUTPATIENT)
Dept: GENERAL RADIOLOGY | Age: 50
End: 2020-01-01
Attending: EMERGENCY MEDICINE
Payer: MEDICARE

## 2020-01-01 ENCOUNTER — PATIENT OUTREACH (OUTPATIENT)
Dept: FAMILY MEDICINE CLINIC | Facility: CLINIC | Age: 50
End: 2020-01-01

## 2020-01-01 ENCOUNTER — HOSPITAL ENCOUNTER (EMERGENCY)
Age: 50
Discharge: HOME | End: 2020-06-21
Attending: EMERGENCY MEDICINE
Payer: MEDICARE

## 2020-01-01 ENCOUNTER — HOSPITAL ENCOUNTER (EMERGENCY)
Age: 50
Discharge: HOME | End: 2020-10-22
Attending: EMERGENCY MEDICINE
Payer: MEDICARE

## 2020-01-01 ENCOUNTER — APPOINTMENT (OUTPATIENT)
Dept: ULTRASOUND IMAGING | Age: 50
DRG: 391 | End: 2020-01-01
Attending: EMERGENCY MEDICINE
Payer: MEDICARE

## 2020-01-01 ENCOUNTER — APPOINTMENT (OUTPATIENT)
Dept: NON INVASIVE DIAGNOSTICS | Age: 50
DRG: 208 | End: 2020-01-01
Attending: INTERNAL MEDICINE
Payer: MEDICARE

## 2020-01-01 ENCOUNTER — APPOINTMENT (OUTPATIENT)
Dept: GENERAL RADIOLOGY | Age: 50
DRG: 208 | End: 2020-01-01
Attending: INTERNAL MEDICINE
Payer: MEDICARE

## 2020-01-01 ENCOUNTER — APPOINTMENT (OUTPATIENT)
Dept: GENERAL RADIOLOGY | Age: 50
DRG: 208 | End: 2020-01-01
Attending: EMERGENCY MEDICINE
Payer: MEDICARE

## 2020-01-01 ENCOUNTER — APPOINTMENT (OUTPATIENT)
Dept: GENERAL RADIOLOGY | Age: 50
DRG: 391 | End: 2020-01-01
Attending: EMERGENCY MEDICINE
Payer: MEDICARE

## 2020-01-01 ENCOUNTER — APPOINTMENT (OUTPATIENT)
Dept: GENERAL RADIOLOGY | Age: 50
DRG: 391 | End: 2020-01-01
Attending: INTERNAL MEDICINE
Payer: MEDICARE

## 2020-01-01 ENCOUNTER — HOSPITAL ENCOUNTER (EMERGENCY)
Age: 50
Discharge: HOME | End: 2020-07-19
Attending: EMERGENCY MEDICINE
Payer: MEDICARE

## 2020-01-01 ENCOUNTER — HOSPITAL ENCOUNTER (INPATIENT)
Age: 50
LOS: 3 days | Discharge: HOME | DRG: 391 | End: 2020-09-13
Attending: EMERGENCY MEDICINE | Admitting: INTERNAL MEDICINE
Payer: MEDICARE

## 2020-01-01 ENCOUNTER — HOSPITAL ENCOUNTER (INPATIENT)
Age: 50
LOS: 3 days | Discharge: HOME | DRG: 208 | End: 2020-03-28
Attending: EMERGENCY MEDICINE | Admitting: HOSPITALIST
Payer: MEDICARE

## 2020-01-01 ENCOUNTER — HOSPITAL ENCOUNTER (OUTPATIENT)
Dept: NUCLEAR MEDICINE | Age: 50
Discharge: HOME | DRG: 391 | End: 2020-09-11
Attending: INTERNAL MEDICINE
Payer: MEDICARE

## 2020-01-01 ENCOUNTER — HOSPITAL ENCOUNTER (EMERGENCY)
Age: 50
Discharge: HOME | End: 2020-04-02
Attending: EMERGENCY MEDICINE | Admitting: EMERGENCY MEDICINE
Payer: MEDICARE

## 2020-01-01 ENCOUNTER — APPOINTMENT (OUTPATIENT)
Dept: CT IMAGING | Age: 50
DRG: 391 | End: 2020-01-01
Attending: EMERGENCY MEDICINE
Payer: MEDICARE

## 2020-01-01 ENCOUNTER — HOSPITAL ENCOUNTER (EMERGENCY)
Age: 50
Discharge: HOME | End: 2020-06-19
Attending: EMERGENCY MEDICINE
Payer: MEDICARE

## 2020-01-01 VITALS
HEIGHT: 70 IN | BODY MASS INDEX: 34.59 KG/M2 | WEIGHT: 241.6 LBS | DIASTOLIC BLOOD PRESSURE: 82 MMHG | SYSTOLIC BLOOD PRESSURE: 157 MMHG | TEMPERATURE: 98.5 F | HEART RATE: 58 BPM | RESPIRATION RATE: 18 BRPM | OXYGEN SATURATION: 98 %

## 2020-01-01 VITALS
RESPIRATION RATE: 15 BRPM | TEMPERATURE: 97.7 F | BODY MASS INDEX: 34.44 KG/M2 | OXYGEN SATURATION: 95 % | WEIGHT: 240 LBS | HEART RATE: 68 BPM | DIASTOLIC BLOOD PRESSURE: 68 MMHG | SYSTOLIC BLOOD PRESSURE: 114 MMHG

## 2020-01-01 VITALS
TEMPERATURE: 97.5 F | HEART RATE: 61 BPM | OXYGEN SATURATION: 98 % | DIASTOLIC BLOOD PRESSURE: 100 MMHG | RESPIRATION RATE: 16 BRPM | SYSTOLIC BLOOD PRESSURE: 163 MMHG

## 2020-01-01 VITALS
WEIGHT: 240 LBS | TEMPERATURE: 100.3 F | DIASTOLIC BLOOD PRESSURE: 59 MMHG | SYSTOLIC BLOOD PRESSURE: 109 MMHG | RESPIRATION RATE: 18 BRPM | HEIGHT: 70 IN | HEART RATE: 56 BPM | OXYGEN SATURATION: 92 % | BODY MASS INDEX: 34.36 KG/M2

## 2020-01-01 VITALS
HEART RATE: 66 BPM | HEIGHT: 70 IN | RESPIRATION RATE: 16 BRPM | WEIGHT: 230 LBS | OXYGEN SATURATION: 97 % | SYSTOLIC BLOOD PRESSURE: 165 MMHG | DIASTOLIC BLOOD PRESSURE: 97 MMHG | TEMPERATURE: 98.1 F | BODY MASS INDEX: 32.93 KG/M2

## 2020-01-01 VITALS
RESPIRATION RATE: 18 BRPM | HEIGHT: 71 IN | OXYGEN SATURATION: 98 % | WEIGHT: 239 LBS | SYSTOLIC BLOOD PRESSURE: 181 MMHG | BODY MASS INDEX: 33.46 KG/M2 | HEART RATE: 88 BPM | TEMPERATURE: 98.5 F | DIASTOLIC BLOOD PRESSURE: 89 MMHG

## 2020-01-01 VITALS
RESPIRATION RATE: 16 BRPM | DIASTOLIC BLOOD PRESSURE: 90 MMHG | BODY MASS INDEX: 34.36 KG/M2 | HEIGHT: 70 IN | TEMPERATURE: 97.8 F | OXYGEN SATURATION: 96 % | HEART RATE: 58 BPM | SYSTOLIC BLOOD PRESSURE: 158 MMHG | WEIGHT: 240 LBS

## 2020-01-01 DIAGNOSIS — Z99.2 ESRD ON DIALYSIS (HCC): Primary | ICD-10-CM

## 2020-01-01 DIAGNOSIS — I10 ESSENTIAL HYPERTENSION WITH GOAL BLOOD PRESSURE LESS THAN 140/90: ICD-10-CM

## 2020-01-01 DIAGNOSIS — N18.6 ESRD ON DIALYSIS (HCC): Primary | ICD-10-CM

## 2020-01-01 DIAGNOSIS — R10.11 ABDOMINAL PAIN, RIGHT UPPER QUADRANT: ICD-10-CM

## 2020-01-01 DIAGNOSIS — J81.0 ACUTE PULMONARY EDEMA (HCC): Primary | ICD-10-CM

## 2020-01-01 DIAGNOSIS — I10 POORLY-CONTROLLED HYPERTENSION: ICD-10-CM

## 2020-01-01 DIAGNOSIS — M54.42 CHRONIC MIDLINE LOW BACK PAIN WITH LEFT-SIDED SCIATICA: ICD-10-CM

## 2020-01-01 DIAGNOSIS — I25.83 CORONARY ARTERY DISEASE DUE TO LIPID RICH PLAQUE: ICD-10-CM

## 2020-01-01 DIAGNOSIS — S09.91XA INJURY OF EAR, INITIAL ENCOUNTER: Primary | ICD-10-CM

## 2020-01-01 DIAGNOSIS — E87.5 ACUTE HYPERKALEMIA: ICD-10-CM

## 2020-01-01 DIAGNOSIS — Z99.2 HEMODIALYSIS PATIENT (HCC): ICD-10-CM

## 2020-01-01 DIAGNOSIS — Z99.2 ESRD ON HEMODIALYSIS (HCC): ICD-10-CM

## 2020-01-01 DIAGNOSIS — N18.6 END STAGE RENAL DISEASE (HCC): ICD-10-CM

## 2020-01-01 DIAGNOSIS — N18.6 ESRD (END STAGE RENAL DISEASE) ON DIALYSIS (HCC): ICD-10-CM

## 2020-01-01 DIAGNOSIS — N18.6 ESRD ON HEMODIALYSIS (HCC): ICD-10-CM

## 2020-01-01 DIAGNOSIS — D72.829 LEUKOCYTOSIS, UNSPECIFIED TYPE: Primary | ICD-10-CM

## 2020-01-01 DIAGNOSIS — R11.2 NON-INTRACTABLE VOMITING WITH NAUSEA, UNSPECIFIED VOMITING TYPE: ICD-10-CM

## 2020-01-01 DIAGNOSIS — Z99.2 ESRD (END STAGE RENAL DISEASE) ON DIALYSIS (HCC): ICD-10-CM

## 2020-01-01 DIAGNOSIS — Z91.199 HISTORY OF NONCOMPLIANCE WITH MEDICAL TREATMENT: ICD-10-CM

## 2020-01-01 DIAGNOSIS — I50.22 CHRONIC SYSTOLIC CONGESTIVE HEART FAILURE (HCC): Primary | ICD-10-CM

## 2020-01-01 DIAGNOSIS — G89.29 CHRONIC MIDLINE LOW BACK PAIN WITH LEFT-SIDED SCIATICA: ICD-10-CM

## 2020-01-01 DIAGNOSIS — E87.5 ACUTE HYPERKALEMIA: Primary | ICD-10-CM

## 2020-01-01 DIAGNOSIS — R19.7 DIARRHEA, UNSPECIFIED TYPE: ICD-10-CM

## 2020-01-01 DIAGNOSIS — J18.0 BRONCHOPNEUMONIA: ICD-10-CM

## 2020-01-01 DIAGNOSIS — J18.9 COMMUNITY ACQUIRED PNEUMONIA OF RIGHT LOWER LOBE OF LUNG: Primary | ICD-10-CM

## 2020-01-01 DIAGNOSIS — E78.00 PURE HYPERCHOLESTEROLEMIA: ICD-10-CM

## 2020-01-01 DIAGNOSIS — R10.84 ABDOMINAL PAIN, GENERALIZED: ICD-10-CM

## 2020-01-01 DIAGNOSIS — J45.31 MILD PERSISTENT ASTHMA WITH ACUTE EXACERBATION IN ADULT: ICD-10-CM

## 2020-01-01 DIAGNOSIS — J18.9 PNEUMONIA OF RIGHT LOWER LOBE DUE TO INFECTIOUS ORGANISM: ICD-10-CM

## 2020-01-01 DIAGNOSIS — I25.10 CORONARY ARTERY DISEASE DUE TO LIPID RICH PLAQUE: ICD-10-CM

## 2020-01-01 LAB
ALBUMIN SERPL-MCNC: 2.4 G/DL (ref 3.4–5)
ALBUMIN SERPL-MCNC: 2.5 G/DL (ref 3.4–5)
ALBUMIN SERPL-MCNC: 2.6 G/DL (ref 3.4–5)
ALBUMIN SERPL-MCNC: 2.6 G/DL (ref 3.4–5)
ALBUMIN SERPL-MCNC: 2.7 G/DL (ref 3.4–5)
ALBUMIN SERPL-MCNC: 2.8 G/DL (ref 3.4–5)
ALBUMIN SERPL-MCNC: 2.9 G/DL (ref 3.4–5)
ALBUMIN SERPL-MCNC: 3 G/DL (ref 3.4–5)
ALBUMIN SERPL-MCNC: 3.2 G/DL (ref 3.4–5)
ALBUMIN SERPL-MCNC: 3.3 G/DL (ref 3.4–5)
ALBUMIN/GLOB SERPL: 0.7 {RATIO} (ref 0.8–1.7)
ALBUMIN/GLOB SERPL: 0.8 {RATIO} (ref 0.8–1.7)
ALBUMIN/GLOB SERPL: 0.8 {RATIO} (ref 0.8–1.7)
ALBUMIN/GLOB SERPL: 0.9 {RATIO} (ref 0.8–1.7)
ALP SERPL-CCNC: 50 U/L (ref 45–117)
ALP SERPL-CCNC: 56 U/L (ref 45–117)
ALP SERPL-CCNC: 57 U/L (ref 45–117)
ALP SERPL-CCNC: 58 U/L (ref 45–117)
ALP SERPL-CCNC: 64 U/L (ref 45–117)
ALP SERPL-CCNC: 67 U/L (ref 45–117)
ALP SERPL-CCNC: 69 U/L (ref 45–117)
ALP SERPL-CCNC: 77 U/L (ref 45–117)
ALT SERPL-CCNC: 10 U/L (ref 16–61)
ALT SERPL-CCNC: 12 U/L (ref 16–61)
ALT SERPL-CCNC: 14 U/L (ref 16–61)
ALT SERPL-CCNC: 16 U/L (ref 16–61)
ALT SERPL-CCNC: 34 U/L (ref 16–61)
ALT SERPL-CCNC: 44 U/L (ref 16–61)
ANION GAP SERPL CALC-SCNC: 10 MMOL/L (ref 3–18)
ANION GAP SERPL CALC-SCNC: 11 MMOL/L (ref 3–18)
ANION GAP SERPL CALC-SCNC: 11 MMOL/L (ref 3–18)
ANION GAP SERPL CALC-SCNC: 13 MMOL/L (ref 3–18)
ANION GAP SERPL CALC-SCNC: 15 MMOL/L (ref 3–18)
ANION GAP SERPL CALC-SCNC: 8 MMOL/L (ref 3–18)
ANION GAP SERPL CALC-SCNC: 9 MMOL/L (ref 3–18)
ANION GAP SERPL CALC-SCNC: 9 MMOL/L (ref 3–18)
APPEARANCE UR: CLEAR
ARTERIAL PATENCY WRIST A: YES
ARTERIAL PATENCY WRIST A: YES
AST SERPL-CCNC: 10 U/L (ref 10–38)
AST SERPL-CCNC: 13 U/L (ref 10–38)
AST SERPL-CCNC: 16 U/L (ref 10–38)
AST SERPL-CCNC: 20 U/L (ref 10–38)
AST SERPL-CCNC: 33 U/L (ref 10–38)
AST SERPL-CCNC: 37 U/L (ref 10–38)
AST SERPL-CCNC: 8 U/L (ref 10–38)
AST SERPL-CCNC: 9 U/L (ref 10–38)
ATRIAL RATE: 59 BPM
ATRIAL RATE: 62 BPM
ATRIAL RATE: 65 BPM
ATRIAL RATE: 82 BPM
ATRIAL RATE: 84 BPM
BACTERIA SPEC CULT: NORMAL
BACTERIA URNS QL MICRO: ABNORMAL /HPF
BACTERIA URNS QL MICRO: ABNORMAL /HPF
BACTERIA URNS QL MICRO: NEGATIVE /HPF
BASE DEFICIT BLD-SCNC: 7 MMOL/L
BASE DEFICIT BLD-SCNC: 8 MMOL/L
BASOPHILS # BLD: 0 K/UL (ref 0–0.1)
BASOPHILS # BLD: 0.1 K/UL (ref 0–0.1)
BASOPHILS NFR BLD: 0 % (ref 0–2)
BDY SITE: ABNORMAL
BDY SITE: ABNORMAL
BILIRUB DIRECT SERPL-MCNC: 0.1 MG/DL (ref 0–0.2)
BILIRUB DIRECT SERPL-MCNC: <0.1 MG/DL (ref 0–0.2)
BILIRUB SERPL-MCNC: 0.3 MG/DL (ref 0.2–1)
BILIRUB SERPL-MCNC: 0.4 MG/DL (ref 0.2–1)
BILIRUB SERPL-MCNC: 0.5 MG/DL (ref 0.2–1)
BILIRUB SERPL-MCNC: 0.5 MG/DL (ref 0.2–1)
BILIRUB UR QL: NEGATIVE
BODY TEMPERATURE: 100.2
BODY TEMPERATURE: 99.1
BUN SERPL-MCNC: 22 MG/DL (ref 7–18)
BUN SERPL-MCNC: 29 MG/DL (ref 7–18)
BUN SERPL-MCNC: 31 MG/DL (ref 7–18)
BUN SERPL-MCNC: 38 MG/DL (ref 7–18)
BUN SERPL-MCNC: 43 MG/DL (ref 7–18)
BUN SERPL-MCNC: 50 MG/DL (ref 7–18)
BUN SERPL-MCNC: 51 MG/DL (ref 7–18)
BUN SERPL-MCNC: 53 MG/DL (ref 7–18)
BUN SERPL-MCNC: 54 MG/DL (ref 7–18)
BUN SERPL-MCNC: 57 MG/DL (ref 7–18)
BUN SERPL-MCNC: 65 MG/DL (ref 7–18)
BUN SERPL-MCNC: 71 MG/DL (ref 7–18)
BUN/CREAT SERPL: 3 (ref 12–20)
BUN/CREAT SERPL: 4 (ref 12–20)
BUN/CREAT SERPL: 5 (ref 12–20)
CALCIUM SERPL-MCNC: 7.2 MG/DL (ref 8.5–10.1)
CALCIUM SERPL-MCNC: 7.5 MG/DL (ref 8.5–10.1)
CALCIUM SERPL-MCNC: 7.6 MG/DL (ref 8.5–10.1)
CALCIUM SERPL-MCNC: 7.8 MG/DL (ref 8.5–10.1)
CALCIUM SERPL-MCNC: 8 MG/DL (ref 8.5–10.1)
CALCIUM SERPL-MCNC: 8.4 MG/DL (ref 8.5–10.1)
CALCIUM SERPL-MCNC: 8.5 MG/DL (ref 8.5–10.1)
CALCIUM SERPL-MCNC: 8.7 MG/DL (ref 8.5–10.1)
CALCIUM SERPL-MCNC: 9 MG/DL (ref 8.5–10.1)
CALCULATED P AXIS, ECG09: 1 DEGREES
CALCULATED P AXIS, ECG09: 14 DEGREES
CALCULATED P AXIS, ECG09: 33 DEGREES
CALCULATED P AXIS, ECG09: 45 DEGREES
CALCULATED P AXIS, ECG09: 45 DEGREES
CALCULATED R AXIS, ECG10: -30 DEGREES
CALCULATED R AXIS, ECG10: -33 DEGREES
CALCULATED R AXIS, ECG10: -34 DEGREES
CALCULATED R AXIS, ECG10: -42 DEGREES
CALCULATED R AXIS, ECG10: -46 DEGREES
CALCULATED T AXIS, ECG11: 101 DEGREES
CALCULATED T AXIS, ECG11: 74 DEGREES
CALCULATED T AXIS, ECG11: 86 DEGREES
CALCULATED T AXIS, ECG11: 90 DEGREES
CALCULATED T AXIS, ECG11: 99 DEGREES
CHLORIDE SERPL-SCNC: 100 MMOL/L (ref 100–111)
CHLORIDE SERPL-SCNC: 101 MMOL/L (ref 100–111)
CHLORIDE SERPL-SCNC: 101 MMOL/L (ref 100–111)
CHLORIDE SERPL-SCNC: 104 MMOL/L (ref 100–111)
CHLORIDE SERPL-SCNC: 105 MMOL/L (ref 100–111)
CHLORIDE SERPL-SCNC: 99 MMOL/L (ref 100–111)
CK MB CFR SERPL CALC: 4.2 % (ref 0–4)
CK MB CFR SERPL CALC: 8.6 % (ref 0–4)
CK MB CFR SERPL CALC: 9.7 % (ref 0–4)
CK MB SERPL-MCNC: 2.8 NG/ML (ref 5–25)
CK MB SERPL-MCNC: 2.8 NG/ML (ref 5–25)
CK MB SERPL-MCNC: 6.8 NG/ML (ref 5–25)
CK SERPL-CCNC: 29 U/L (ref 39–308)
CK SERPL-CCNC: 66 U/L (ref 39–308)
CK SERPL-CCNC: 79 U/L (ref 39–308)
CO2 SERPL-SCNC: 16 MMOL/L (ref 21–32)
CO2 SERPL-SCNC: 20 MMOL/L (ref 21–32)
CO2 SERPL-SCNC: 22 MMOL/L (ref 21–32)
CO2 SERPL-SCNC: 23 MMOL/L (ref 21–32)
CO2 SERPL-SCNC: 24 MMOL/L (ref 21–32)
CO2 SERPL-SCNC: 24 MMOL/L (ref 21–32)
CO2 SERPL-SCNC: 25 MMOL/L (ref 21–32)
CO2 SERPL-SCNC: 26 MMOL/L (ref 21–32)
CO2 SERPL-SCNC: 28 MMOL/L (ref 21–32)
CO2 SERPL-SCNC: 29 MMOL/L (ref 21–32)
COLOR UR: YELLOW
COVID-19 RAPID TEST, COVR: NOT DETECTED
CREAT SERPL-MCNC: 10.7 MG/DL (ref 0.6–1.3)
CREAT SERPL-MCNC: 12.1 MG/DL (ref 0.6–1.3)
CREAT SERPL-MCNC: 12.7 MG/DL (ref 0.6–1.3)
CREAT SERPL-MCNC: 13 MG/DL (ref 0.6–1.3)
CREAT SERPL-MCNC: 13.7 MG/DL (ref 0.6–1.3)
CREAT SERPL-MCNC: 14 MG/DL (ref 0.6–1.3)
CREAT SERPL-MCNC: 14.8 MG/DL (ref 0.6–1.3)
CREAT SERPL-MCNC: 5.95 MG/DL (ref 0.6–1.3)
CREAT SERPL-MCNC: 8.07 MG/DL (ref 0.6–1.3)
CREAT SERPL-MCNC: 9.8 MG/DL (ref 0.6–1.3)
CREAT SERPL-MCNC: 9.82 MG/DL (ref 0.6–1.3)
CREAT SERPL-MCNC: 9.85 MG/DL (ref 0.6–1.3)
DIAGNOSIS, 93000: NORMAL
DIFFERENTIAL METHOD BLD: ABNORMAL
ECHO AO ROOT DIAM: 3.14 CM
ECHO EST RA PRESSURE: 8 MMHG
ECHO LA MAJOR AXIS: 4.25 CM
ECHO LA TO AORTIC ROOT RATIO: 1.36
ECHO LV EDV A2C: 147.8 ML
ECHO LV EDV A4C: 172.2 ML
ECHO LV EDV BP: 161.6 ML (ref 67–155)
ECHO LV EDV INDEX A4C: 76.9 ML/M2
ECHO LV EDV INDEX BP: 72.2 ML/M2
ECHO LV EDV NDEX A2C: 66 ML/M2
ECHO LV EJECTION FRACTION A2C: 29 %
ECHO LV EJECTION FRACTION A4C: 41 %
ECHO LV EJECTION FRACTION BIPLANE: 36.3 % (ref 55–100)
ECHO LV ESV A2C: 105 ML
ECHO LV ESV A4C: 101.1 ML
ECHO LV ESV BP: 102.9 ML (ref 22–58)
ECHO LV ESV INDEX A2C: 46.9 ML/M2
ECHO LV ESV INDEX A4C: 45.2 ML/M2
ECHO LV ESV INDEX BP: 46 ML/M2
ECHO LV INTERNAL DIMENSION DIASTOLIC: 6.01 CM (ref 4.2–5.9)
ECHO LV INTERNAL DIMENSION SYSTOLIC: 4.76 CM
ECHO LV IVSD: 1.19 CM (ref 0.6–1)
ECHO LV MASS 2D: 384.6 G (ref 88–224)
ECHO LV MASS INDEX 2D: 171.9 G/M2 (ref 49–115)
ECHO LV POSTERIOR WALL DIASTOLIC: 1.24 CM (ref 0.6–1)
ECHO PULMONARY ARTERY SYSTOLIC PRESSURE (PASP): 33.4 MMHG
ECHO RIGHT VENTRICULAR SYSTOLIC PRESSURE (RVSP): 33.4 MMHG
ECHO TV REGURGITANT MAX VELOCITY: 251.93 CM/S
ECHO TV REGURGITANT PEAK GRADIENT: 25.4 MMHG
EOSINOPHIL # BLD: 0 K/UL (ref 0–0.4)
EOSINOPHIL # BLD: 0 K/UL (ref 0–0.4)
EOSINOPHIL # BLD: 0.1 K/UL (ref 0–0.4)
EOSINOPHIL # BLD: 0.2 K/UL (ref 0–0.4)
EOSINOPHIL # BLD: 0.2 K/UL (ref 0–0.4)
EOSINOPHIL # BLD: 0.5 K/UL (ref 0–0.4)
EOSINOPHIL NFR BLD: 0 % (ref 0–5)
EOSINOPHIL NFR BLD: 0 % (ref 0–5)
EOSINOPHIL NFR BLD: 1 % (ref 0–5)
EOSINOPHIL NFR BLD: 2 % (ref 0–5)
EOSINOPHIL NFR BLD: 2 % (ref 0–5)
EOSINOPHIL NFR BLD: 3 % (ref 0–5)
EOSINOPHIL NFR BLD: 5 % (ref 0–5)
EPITH CASTS URNS QL MICRO: ABNORMAL /LPF (ref 0–5)
EPITH CASTS URNS QL MICRO: NEGATIVE /LPF (ref 0–5)
EPITH CASTS URNS QL MICRO: NEGATIVE /LPF (ref 0–5)
ERYTHROCYTE [DISTWIDTH] IN BLOOD BY AUTOMATED COUNT: 16.2 % (ref 11.6–14.5)
ERYTHROCYTE [DISTWIDTH] IN BLOOD BY AUTOMATED COUNT: 16.3 % (ref 11.6–14.5)
ERYTHROCYTE [DISTWIDTH] IN BLOOD BY AUTOMATED COUNT: 16.4 % (ref 11.6–14.5)
ERYTHROCYTE [DISTWIDTH] IN BLOOD BY AUTOMATED COUNT: 16.6 % (ref 11.6–14.5)
ERYTHROCYTE [DISTWIDTH] IN BLOOD BY AUTOMATED COUNT: 16.9 % (ref 11.6–14.5)
ERYTHROCYTE [DISTWIDTH] IN BLOOD BY AUTOMATED COUNT: 17 % (ref 11.6–14.5)
ERYTHROCYTE [DISTWIDTH] IN BLOOD BY AUTOMATED COUNT: 17.1 % (ref 11.6–14.5)
ERYTHROCYTE [DISTWIDTH] IN BLOOD BY AUTOMATED COUNT: 20.8 % (ref 11.6–14.5)
ERYTHROCYTE [DISTWIDTH] IN BLOOD BY AUTOMATED COUNT: 20.9 % (ref 11.6–14.5)
GAS FLOW.O2 O2 DELIVERY SYS: ABNORMAL L/MIN
GAS FLOW.O2 O2 DELIVERY SYS: ABNORMAL L/MIN
GAS FLOW.O2 SETTING OXYMISER: 14 BPM
GAS FLOW.O2 SETTING OXYMISER: 14 BPM
GLOBULIN SER CALC-MCNC: 3.3 G/DL (ref 2–4)
GLOBULIN SER CALC-MCNC: 3.3 G/DL (ref 2–4)
GLOBULIN SER CALC-MCNC: 3.4 G/DL (ref 2–4)
GLOBULIN SER CALC-MCNC: 3.7 G/DL (ref 2–4)
GLOBULIN SER CALC-MCNC: 3.9 G/DL (ref 2–4)
GLOBULIN SER CALC-MCNC: 4.3 G/DL (ref 2–4)
GLOBULIN SER CALC-MCNC: 4.4 G/DL (ref 2–4)
GLOBULIN SER CALC-MCNC: 4.5 G/DL (ref 2–4)
GLUCOSE BLD STRIP.AUTO-MCNC: 107 MG/DL (ref 70–110)
GLUCOSE BLD STRIP.AUTO-MCNC: 108 MG/DL (ref 70–110)
GLUCOSE BLD STRIP.AUTO-MCNC: 110 MG/DL (ref 70–110)
GLUCOSE BLD STRIP.AUTO-MCNC: 114 MG/DL (ref 70–110)
GLUCOSE BLD STRIP.AUTO-MCNC: 132 MG/DL (ref 70–110)
GLUCOSE BLD STRIP.AUTO-MCNC: 133 MG/DL (ref 70–110)
GLUCOSE BLD STRIP.AUTO-MCNC: 163 MG/DL (ref 70–110)
GLUCOSE BLD STRIP.AUTO-MCNC: 230 MG/DL (ref 70–110)
GLUCOSE BLD STRIP.AUTO-MCNC: 45 MG/DL (ref 70–110)
GLUCOSE BLD STRIP.AUTO-MCNC: 46 MG/DL (ref 70–110)
GLUCOSE BLD STRIP.AUTO-MCNC: 46 MG/DL (ref 70–110)
GLUCOSE BLD STRIP.AUTO-MCNC: 48 MG/DL (ref 70–110)
GLUCOSE BLD STRIP.AUTO-MCNC: 49 MG/DL (ref 70–110)
GLUCOSE BLD STRIP.AUTO-MCNC: 56 MG/DL (ref 70–110)
GLUCOSE BLD STRIP.AUTO-MCNC: 58 MG/DL (ref 70–110)
GLUCOSE BLD STRIP.AUTO-MCNC: 64 MG/DL (ref 70–110)
GLUCOSE BLD STRIP.AUTO-MCNC: 81 MG/DL (ref 70–110)
GLUCOSE BLD STRIP.AUTO-MCNC: 87 MG/DL (ref 70–110)
GLUCOSE BLD STRIP.AUTO-MCNC: 87 MG/DL (ref 70–110)
GLUCOSE BLD STRIP.AUTO-MCNC: 89 MG/DL (ref 70–110)
GLUCOSE SERPL-MCNC: 109 MG/DL (ref 74–99)
GLUCOSE SERPL-MCNC: 118 MG/DL (ref 74–99)
GLUCOSE SERPL-MCNC: 136 MG/DL (ref 74–99)
GLUCOSE SERPL-MCNC: 57 MG/DL (ref 74–99)
GLUCOSE SERPL-MCNC: 68 MG/DL (ref 74–99)
GLUCOSE SERPL-MCNC: 70 MG/DL (ref 74–99)
GLUCOSE SERPL-MCNC: 81 MG/DL (ref 74–99)
GLUCOSE SERPL-MCNC: 88 MG/DL (ref 74–99)
GLUCOSE SERPL-MCNC: 90 MG/DL (ref 74–99)
GLUCOSE SERPL-MCNC: 91 MG/DL (ref 74–99)
GLUCOSE SERPL-MCNC: 91 MG/DL (ref 74–99)
GLUCOSE SERPL-MCNC: 94 MG/DL (ref 74–99)
GLUCOSE UR STRIP.AUTO-MCNC: 100 MG/DL
GLUCOSE UR STRIP.AUTO-MCNC: 250 MG/DL
GLUCOSE UR STRIP.AUTO-MCNC: 250 MG/DL
GRAM STN SPEC: NORMAL
GRAM STN SPEC: NORMAL
HBV SURFACE AB SER QL IA: POSITIVE
HBV SURFACE AB SERPL IA-ACNC: 20.06 MIU/ML
HBV SURFACE AG SER QL: 0.11 INDEX
HBV SURFACE AG SER QL: NEGATIVE
HCO3 BLD-SCNC: 18.5 MMOL/L (ref 22–26)
HCO3 BLD-SCNC: 19.1 MMOL/L (ref 22–26)
HCT VFR BLD AUTO: 23.1 % (ref 36–48)
HCT VFR BLD AUTO: 25.8 % (ref 36–48)
HCT VFR BLD AUTO: 26.3 % (ref 36–48)
HCT VFR BLD AUTO: 27.7 % (ref 36–48)
HCT VFR BLD AUTO: 30.7 % (ref 36–48)
HCT VFR BLD AUTO: 31.7 % (ref 36–48)
HCT VFR BLD AUTO: 33.6 % (ref 36–48)
HCT VFR BLD AUTO: 37.2 % (ref 36–48)
HCT VFR BLD AUTO: 38.8 % (ref 36–48)
HEALTH STATUS, XMCV2T: NORMAL
HEP BS AB COMMENT,HBSAC: NORMAL
HGB BLD-MCNC: 10.7 G/DL (ref 13–16)
HGB BLD-MCNC: 11.7 G/DL (ref 13–16)
HGB BLD-MCNC: 12.5 G/DL (ref 13–16)
HGB BLD-MCNC: 7.3 G/DL (ref 13–16)
HGB BLD-MCNC: 8.2 G/DL (ref 13–16)
HGB BLD-MCNC: 8.2 G/DL (ref 13–16)
HGB BLD-MCNC: 8.8 G/DL (ref 13–16)
HGB BLD-MCNC: 9.8 G/DL (ref 13–16)
HGB BLD-MCNC: 9.9 G/DL (ref 13–16)
HGB UR QL STRIP: ABNORMAL
INR PPP: 1.1 (ref 0.8–1.2)
INR PPP: 1.2 (ref 0.8–1.2)
INR PPP: 1.2 (ref 0.8–1.2)
INSPIRATION.DURATION SETTING TIME VENT: 1 SEC
INSPIRATION.DURATION SETTING TIME VENT: 1 SEC
KETONES UR QL STRIP.AUTO: NEGATIVE MG/DL
LACTATE BLD-SCNC: 1.04 MMOL/L (ref 0.4–2)
LACTATE BLD-SCNC: 1.56 MMOL/L (ref 0.4–2)
LACTATE SERPL-SCNC: 0.6 MMOL/L (ref 0.4–2)
LEUKOCYTE ESTERASE UR QL STRIP.AUTO: NEGATIVE
LIPASE SERPL-CCNC: 112 U/L (ref 73–393)
LIPASE SERPL-CCNC: 119 U/L (ref 73–393)
LIPASE SERPL-CCNC: 161 U/L (ref 73–393)
LIPASE SERPL-CCNC: 314 U/L (ref 73–393)
LVFS 2D: 20.82 %
LYMPHOCYTES # BLD: 1.9 K/UL (ref 0.9–3.6)
LYMPHOCYTES # BLD: 2.3 K/UL (ref 0.9–3.6)
LYMPHOCYTES # BLD: 2.8 K/UL (ref 0.9–3.6)
LYMPHOCYTES # BLD: 3 K/UL (ref 0.9–3.6)
LYMPHOCYTES # BLD: 3.1 K/UL (ref 0.9–3.6)
LYMPHOCYTES # BLD: 3.2 K/UL (ref 0.9–3.6)
LYMPHOCYTES # BLD: 3.2 K/UL (ref 0.9–3.6)
LYMPHOCYTES NFR BLD: 15 % (ref 21–52)
LYMPHOCYTES NFR BLD: 21 % (ref 21–52)
LYMPHOCYTES NFR BLD: 21 % (ref 21–52)
LYMPHOCYTES NFR BLD: 27 % (ref 21–52)
LYMPHOCYTES NFR BLD: 28 % (ref 21–52)
LYMPHOCYTES NFR BLD: 31 % (ref 21–52)
LYMPHOCYTES NFR BLD: 34 % (ref 21–52)
LYMPHOCYTES NFR BLD: 36 % (ref 21–52)
LYMPHOCYTES NFR BLD: 47 % (ref 21–52)
Lab: NORMAL
MAGNESIUM SERPL-MCNC: 2 MG/DL (ref 1.6–2.6)
MAGNESIUM SERPL-MCNC: 2.2 MG/DL (ref 1.6–2.6)
MAGNESIUM SERPL-MCNC: 2.4 MG/DL (ref 1.6–2.6)
MCH RBC QN AUTO: 29.3 PG (ref 24–34)
MCH RBC QN AUTO: 29.7 PG (ref 24–34)
MCH RBC QN AUTO: 29.8 PG (ref 24–34)
MCH RBC QN AUTO: 29.8 PG (ref 24–34)
MCH RBC QN AUTO: 30.1 PG (ref 24–34)
MCH RBC QN AUTO: 33 PG (ref 24–34)
MCH RBC QN AUTO: 33.6 PG (ref 24–34)
MCH RBC QN AUTO: 33.6 PG (ref 24–34)
MCH RBC QN AUTO: 34 PG (ref 24–34)
MCHC RBC AUTO-ENTMCNC: 31.2 G/DL (ref 31–37)
MCHC RBC AUTO-ENTMCNC: 31.2 G/DL (ref 31–37)
MCHC RBC AUTO-ENTMCNC: 31.5 G/DL (ref 31–37)
MCHC RBC AUTO-ENTMCNC: 31.6 G/DL (ref 31–37)
MCHC RBC AUTO-ENTMCNC: 31.8 G/DL (ref 31–37)
MCHC RBC AUTO-ENTMCNC: 31.9 G/DL (ref 31–37)
MCHC RBC AUTO-ENTMCNC: 32.2 G/DL (ref 31–37)
MCV RBC AUTO: 105.7 FL (ref 74–97)
MCV RBC AUTO: 106.6 FL (ref 74–97)
MCV RBC AUTO: 92.8 FL (ref 74–97)
MCV RBC AUTO: 93.5 FL (ref 74–97)
MCV RBC AUTO: 93.6 FL (ref 74–97)
MCV RBC AUTO: 94.7 FL (ref 74–97)
MCV RBC AUTO: 95.6 FL (ref 74–97)
MONOCYTES # BLD: 0.4 K/UL (ref 0.05–1.2)
MONOCYTES # BLD: 0.5 K/UL (ref 0.05–1.2)
MONOCYTES # BLD: 0.6 K/UL (ref 0.05–1.2)
MONOCYTES # BLD: 0.7 K/UL (ref 0.05–1.2)
MONOCYTES # BLD: 0.7 K/UL (ref 0.05–1.2)
MONOCYTES # BLD: 0.8 K/UL (ref 0.05–1.2)
MONOCYTES # BLD: 1.1 K/UL (ref 0.05–1.2)
MONOCYTES NFR BLD: 10 % (ref 3–10)
MONOCYTES NFR BLD: 10 % (ref 3–10)
MONOCYTES NFR BLD: 4 % (ref 3–10)
MONOCYTES NFR BLD: 4 % (ref 3–10)
MONOCYTES NFR BLD: 6 % (ref 3–10)
MONOCYTES NFR BLD: 8 % (ref 3–10)
MONOCYTES NFR BLD: 9 % (ref 3–10)
NEUTS SEG # BLD: 12.2 K/UL (ref 1.8–8)
NEUTS SEG # BLD: 3 K/UL (ref 1.8–8)
NEUTS SEG # BLD: 3.8 K/UL (ref 1.8–8)
NEUTS SEG # BLD: 4.7 K/UL (ref 1.8–8)
NEUTS SEG # BLD: 6 K/UL (ref 1.8–8)
NEUTS SEG # BLD: 6 K/UL (ref 1.8–8)
NEUTS SEG # BLD: 6.6 K/UL (ref 1.8–8)
NEUTS SEG # BLD: 7.6 K/UL (ref 1.8–8)
NEUTS SEG # BLD: 7.7 K/UL (ref 1.8–8)
NEUTS SEG NFR BLD: 44 % (ref 40–73)
NEUTS SEG NFR BLD: 54 % (ref 40–73)
NEUTS SEG NFR BLD: 55 % (ref 40–73)
NEUTS SEG NFR BLD: 58 % (ref 40–73)
NEUTS SEG NFR BLD: 66 % (ref 40–73)
NEUTS SEG NFR BLD: 68 % (ref 40–73)
NEUTS SEG NFR BLD: 81 % (ref 40–73)
NITRITE UR QL STRIP.AUTO: NEGATIVE
O2/TOTAL GAS SETTING VFR VENT: 1 %
O2/TOTAL GAS SETTING VFR VENT: 40 %
P-R INTERVAL, ECG05: 162 MS
P-R INTERVAL, ECG05: 170 MS
P-R INTERVAL, ECG05: 176 MS
P-R INTERVAL, ECG05: 178 MS
P-R INTERVAL, ECG05: 180 MS
PCO2 BLD: 39.6 MMHG (ref 35–45)
PCO2 BLD: 41.2 MMHG (ref 35–45)
PEEP RESPIRATORY: 5 CMH2O
PEEP RESPIRATORY: 5 CMH2O
PH BLD: 7.26 [PH] (ref 7.35–7.45)
PH BLD: 7.3 [PH] (ref 7.35–7.45)
PH UR STRIP: 8 [PH] (ref 5–8)
PH UR STRIP: 8.5 [PH] (ref 5–8)
PH UR STRIP: 8.5 [PH] (ref 5–8)
PHOSPHATE SERPL-MCNC: 5.1 MG/DL (ref 2.5–4.9)
PHOSPHATE SERPL-MCNC: 5.7 MG/DL (ref 2.5–4.9)
PHOSPHATE SERPL-MCNC: 5.9 MG/DL (ref 2.5–4.9)
PHOSPHATE SERPL-MCNC: 8.5 MG/DL (ref 2.5–4.9)
PIP ISTAT,IPIP: 27
PIP ISTAT,IPIP: 29
PLATELET # BLD AUTO: 107 K/UL (ref 135–420)
PLATELET # BLD AUTO: 125 K/UL (ref 135–420)
PLATELET # BLD AUTO: 138 K/UL (ref 135–420)
PLATELET # BLD AUTO: 150 K/UL (ref 135–420)
PLATELET # BLD AUTO: 157 K/UL (ref 135–420)
PLATELET # BLD AUTO: 168 K/UL (ref 135–420)
PLATELET # BLD AUTO: 176 K/UL (ref 135–420)
PLATELET # BLD AUTO: 225 K/UL (ref 135–420)
PLATELET # BLD AUTO: 260 K/UL (ref 135–420)
PLATELET COMMENTS,PCOM: ABNORMAL
PMV BLD AUTO: 10 FL (ref 9.2–11.8)
PMV BLD AUTO: 8.7 FL (ref 9.2–11.8)
PMV BLD AUTO: 8.7 FL (ref 9.2–11.8)
PMV BLD AUTO: 8.8 FL (ref 9.2–11.8)
PMV BLD AUTO: 8.9 FL (ref 9.2–11.8)
PMV BLD AUTO: 9.1 FL (ref 9.2–11.8)
PMV BLD AUTO: 9.3 FL (ref 9.2–11.8)
PMV BLD AUTO: 9.4 FL (ref 9.2–11.8)
PMV BLD AUTO: 9.9 FL (ref 9.2–11.8)
PO2 BLD: 230 MMHG (ref 80–100)
PO2 BLD: 70 MMHG (ref 80–100)
POTASSIUM SERPL-SCNC: 3.7 MMOL/L (ref 3.5–5.5)
POTASSIUM SERPL-SCNC: 4.2 MMOL/L (ref 3.5–5.5)
POTASSIUM SERPL-SCNC: 4.3 MMOL/L (ref 3.5–5.5)
POTASSIUM SERPL-SCNC: 4.9 MMOL/L (ref 3.5–5.5)
POTASSIUM SERPL-SCNC: 5.4 MMOL/L (ref 3.5–5.5)
POTASSIUM SERPL-SCNC: 5.7 MMOL/L (ref 3.5–5.5)
POTASSIUM SERPL-SCNC: 5.8 MMOL/L (ref 3.5–5.5)
POTASSIUM SERPL-SCNC: 5.8 MMOL/L (ref 3.5–5.5)
POTASSIUM SERPL-SCNC: 5.9 MMOL/L (ref 3.5–5.5)
POTASSIUM SERPL-SCNC: 6.1 MMOL/L (ref 3.5–5.5)
POTASSIUM SERPL-SCNC: 6.1 MMOL/L (ref 3.5–5.5)
POTASSIUM SERPL-SCNC: 6.2 MMOL/L (ref 3.5–5.5)
POTASSIUM SERPL-SCNC: 7.1 MMOL/L (ref 3.5–5.5)
PROT SERPL-MCNC: 5.8 G/DL (ref 6.4–8.2)
PROT SERPL-MCNC: 5.9 G/DL (ref 6.4–8.2)
PROT SERPL-MCNC: 6.4 G/DL (ref 6.4–8.2)
PROT SERPL-MCNC: 6.4 G/DL (ref 6.4–8.2)
PROT SERPL-MCNC: 6.7 G/DL (ref 6.4–8.2)
PROT SERPL-MCNC: 7.2 G/DL (ref 6.4–8.2)
PROT SERPL-MCNC: 7.6 G/DL (ref 6.4–8.2)
PROT SERPL-MCNC: 7.8 G/DL (ref 6.4–8.2)
PROT UR STRIP-MCNC: 300 MG/DL
PROT UR STRIP-MCNC: 300 MG/DL
PROT UR STRIP-MCNC: >1000 MG/DL
PROTHROMBIN TIME: 13.9 SEC (ref 11.5–15.2)
PROTHROMBIN TIME: 14.6 SEC (ref 11.5–15.2)
PROTHROMBIN TIME: 15.2 SEC (ref 11.5–15.2)
Q-T INTERVAL, ECG07: 394 MS
Q-T INTERVAL, ECG07: 408 MS
Q-T INTERVAL, ECG07: 416 MS
Q-T INTERVAL, ECG07: 438 MS
Q-T INTERVAL, ECG07: 452 MS
QRS DURATION, ECG06: 84 MS
QRS DURATION, ECG06: 92 MS
QRS DURATION, ECG06: 96 MS
QTC CALCULATION (BEZET), ECG08: 411 MS
QTC CALCULATION (BEZET), ECG08: 455 MS
QTC CALCULATION (BEZET), ECG08: 458 MS
QTC CALCULATION (BEZET), ECG08: 465 MS
QTC CALCULATION (BEZET), ECG08: 476 MS
RBC # BLD AUTO: 2.44 M/UL (ref 4.7–5.5)
RBC # BLD AUTO: 2.49 M/UL (ref 4.7–5.5)
RBC # BLD AUTO: 2.75 M/UL (ref 4.7–5.5)
RBC # BLD AUTO: 2.88 M/UL (ref 4.7–5.5)
RBC # BLD AUTO: 2.96 M/UL (ref 4.7–5.5)
RBC # BLD AUTO: 3 M/UL (ref 4.7–5.5)
RBC # BLD AUTO: 3.18 M/UL (ref 4.7–5.5)
RBC # BLD AUTO: 3.93 M/UL (ref 4.7–5.5)
RBC # BLD AUTO: 4.15 M/UL (ref 4.7–5.5)
RBC #/AREA URNS HPF: ABNORMAL /HPF (ref 0–5)
RBC #/AREA URNS HPF: ABNORMAL /HPF (ref 0–5)
RBC #/AREA URNS HPF: NEGATIVE /HPF (ref 0–5)
RBC MORPH BLD: ABNORMAL
RBC MORPH BLD: ABNORMAL
REFERENCE LAB,REFLB: NORMAL
SAO2 % BLD: 100 % (ref 92–97)
SAO2 % BLD: 91 % (ref 92–97)
SARS-COV-2, COV2NT: NOT DETECTED
SERVICE CMNT-IMP: ABNORMAL
SERVICE CMNT-IMP: ABNORMAL
SERVICE CMNT-IMP: NORMAL
SODIUM SERPL-SCNC: 132 MMOL/L (ref 136–145)
SODIUM SERPL-SCNC: 132 MMOL/L (ref 136–145)
SODIUM SERPL-SCNC: 133 MMOL/L (ref 136–145)
SODIUM SERPL-SCNC: 135 MMOL/L (ref 136–145)
SODIUM SERPL-SCNC: 136 MMOL/L (ref 136–145)
SODIUM SERPL-SCNC: 136 MMOL/L (ref 136–145)
SODIUM SERPL-SCNC: 137 MMOL/L (ref 136–145)
SODIUM SERPL-SCNC: 139 MMOL/L (ref 136–145)
SOURCE, COVRS: NORMAL
SP GR UR REFRACTOMETRY: 1.01 (ref 1–1.03)
SPECIMEN TYPE, XMCV1T: NORMAL
SPECIMEN TYPE: ABNORMAL
SPECIMEN TYPE: ABNORMAL
TEST DESCRIPTION:,ATST: NORMAL
TOTAL RESP. RATE, ITRR: 14
TOTAL RESP. RATE, ITRR: 26
TROPONIN I SERPL-MCNC: 0.02 NG/ML (ref 0–0.04)
TROPONIN I SERPL-MCNC: 0.02 NG/ML (ref 0–0.04)
TROPONIN I SERPL-MCNC: 0.05 NG/ML (ref 0–0.04)
TROPONIN I SERPL-MCNC: 0.05 NG/ML (ref 0–0.04)
TROPONIN I SERPL-MCNC: <0.02 NG/ML (ref 0–0.04)
UROBILINOGEN UR QL STRIP.AUTO: 0.2 EU/DL (ref 0.2–1)
VANCOMYCIN SERPL-MCNC: 45.2 UG/ML (ref 5–40)
VENTILATION MODE VENT: ABNORMAL
VENTILATION MODE VENT: ABNORMAL
VENTRICULAR RATE, ECG03: 59 BPM
VENTRICULAR RATE, ECG03: 62 BPM
VENTRICULAR RATE, ECG03: 65 BPM
VENTRICULAR RATE, ECG03: 82 BPM
VENTRICULAR RATE, ECG03: 84 BPM
VOLUME CONTROL PLUS IVLCP: YES
VOLUME CONTROL PLUS IVLCP: YES
VT SETTING VENT: 500 ML
VT SETTING VENT: 500 ML
WBC # BLD AUTO: 10.1 K/UL (ref 4.6–13.2)
WBC # BLD AUTO: 10.2 K/UL (ref 4.6–13.2)
WBC # BLD AUTO: 10.8 K/UL (ref 4.6–13.2)
WBC # BLD AUTO: 11.2 K/UL (ref 4.6–13.2)
WBC # BLD AUTO: 15.1 K/UL (ref 4.6–13.2)
WBC # BLD AUTO: 6.8 K/UL (ref 4.6–13.2)
WBC # BLD AUTO: 6.9 K/UL (ref 4.6–13.2)
WBC # BLD AUTO: 8.7 K/UL (ref 4.6–13.2)
WBC # BLD AUTO: 8.8 K/UL (ref 4.6–13.2)
WBC URNS QL MICRO: ABNORMAL /HPF (ref 0–4)
WBC URNS QL MICRO: ABNORMAL /HPF (ref 0–4)
WBC URNS QL MICRO: NEGATIVE /HPF (ref 0–4)

## 2020-01-01 PROCEDURE — 74011250636 HC RX REV CODE- 250/636: Performed by: EMERGENCY MEDICINE

## 2020-01-01 PROCEDURE — 85025 COMPLETE CBC W/AUTO DIFF WBC: CPT

## 2020-01-01 PROCEDURE — 80076 HEPATIC FUNCTION PANEL: CPT

## 2020-01-01 PROCEDURE — 94002 VENT MGMT INPAT INIT DAY: CPT

## 2020-01-01 PROCEDURE — 81001 URINALYSIS AUTO W/SCOPE: CPT

## 2020-01-01 PROCEDURE — 80048 BASIC METABOLIC PNL TOTAL CA: CPT

## 2020-01-01 PROCEDURE — 71046 X-RAY EXAM CHEST 2 VIEWS: CPT

## 2020-01-01 PROCEDURE — 96367 TX/PROPH/DG ADDL SEQ IV INF: CPT

## 2020-01-01 PROCEDURE — 71045 X-RAY EXAM CHEST 1 VIEW: CPT

## 2020-01-01 PROCEDURE — 36600 WITHDRAWAL OF ARTERIAL BLOOD: CPT

## 2020-01-01 PROCEDURE — 96374 THER/PROPH/DIAG INJ IV PUSH: CPT

## 2020-01-01 PROCEDURE — 74011250636 HC RX REV CODE- 250/636: Performed by: HOSPITALIST

## 2020-01-01 PROCEDURE — 78227 HEPATOBIL SYST IMAGE W/DRUG: CPT

## 2020-01-01 PROCEDURE — 0BH17EZ INSERTION OF ENDOTRACHEAL AIRWAY INTO TRACHEA, VIA NATURAL OR ARTIFICIAL OPENING: ICD-10-PCS | Performed by: EMERGENCY MEDICINE

## 2020-01-01 PROCEDURE — 74011250637 HC RX REV CODE- 250/637: Performed by: EMERGENCY MEDICINE

## 2020-01-01 PROCEDURE — 87340 HEPATITIS B SURFACE AG IA: CPT

## 2020-01-01 PROCEDURE — 96375 TX/PRO/DX INJ NEW DRUG ADDON: CPT

## 2020-01-01 PROCEDURE — 74022 RADEX COMPL AQT ABD SERIES: CPT

## 2020-01-01 PROCEDURE — 80053 COMPREHEN METABOLIC PANEL: CPT

## 2020-01-01 PROCEDURE — 73060999999 HC MISC LAB CHARGE

## 2020-01-01 PROCEDURE — 74011250637 HC RX REV CODE- 250/637: Performed by: INTERNAL MEDICINE

## 2020-01-01 PROCEDURE — 74011250637 HC RX REV CODE- 250/637: Performed by: HOSPITALIST

## 2020-01-01 PROCEDURE — 65660000000 HC RM CCU STEPDOWN

## 2020-01-01 PROCEDURE — 87635 SARS-COV-2 COVID-19 AMP PRB: CPT

## 2020-01-01 PROCEDURE — 80069 RENAL FUNCTION PANEL: CPT

## 2020-01-01 PROCEDURE — 74011000258 HC RX REV CODE- 258: Performed by: INTERNAL MEDICINE

## 2020-01-01 PROCEDURE — 74011250636 HC RX REV CODE- 250/636: Performed by: INTERNAL MEDICINE

## 2020-01-01 PROCEDURE — 74011000636 HC RX REV CODE- 636: Performed by: EMERGENCY MEDICINE

## 2020-01-01 PROCEDURE — 84100 ASSAY OF PHOSPHORUS: CPT

## 2020-01-01 PROCEDURE — 96376 TX/PRO/DX INJ SAME DRUG ADON: CPT

## 2020-01-01 PROCEDURE — 90935 HEMODIALYSIS ONE EVALUATION: CPT

## 2020-01-01 PROCEDURE — 87040 BLOOD CULTURE FOR BACTERIA: CPT

## 2020-01-01 PROCEDURE — 93005 ELECTROCARDIOGRAM TRACING: CPT

## 2020-01-01 PROCEDURE — 85610 PROTHROMBIN TIME: CPT

## 2020-01-01 PROCEDURE — 74011000250 HC RX REV CODE- 250

## 2020-01-01 PROCEDURE — 94640 AIRWAY INHALATION TREATMENT: CPT

## 2020-01-01 PROCEDURE — 5A1945Z RESPIRATORY VENTILATION, 24-96 CONSECUTIVE HOURS: ICD-10-PCS | Performed by: EMERGENCY MEDICINE

## 2020-01-01 PROCEDURE — 82962 GLUCOSE BLOOD TEST: CPT

## 2020-01-01 PROCEDURE — 36415 COLL VENOUS BLD VENIPUNCTURE: CPT

## 2020-01-01 PROCEDURE — 99285 EMERGENCY DEPT VISIT HI MDM: CPT

## 2020-01-01 PROCEDURE — 74011000250 HC RX REV CODE- 250: Performed by: INTERNAL MEDICINE

## 2020-01-01 PROCEDURE — 83735 ASSAY OF MAGNESIUM: CPT

## 2020-01-01 PROCEDURE — 5A1D70Z PERFORMANCE OF URINARY FILTRATION, INTERMITTENT, LESS THAN 6 HOURS PER DAY: ICD-10-PCS | Performed by: INTERNAL MEDICINE

## 2020-01-01 PROCEDURE — 83605 ASSAY OF LACTIC ACID: CPT

## 2020-01-01 PROCEDURE — 84484 ASSAY OF TROPONIN QUANT: CPT

## 2020-01-01 PROCEDURE — 99284 EMERGENCY DEPT VISIT MOD MDM: CPT

## 2020-01-01 PROCEDURE — 74011000258 HC RX REV CODE- 258: Performed by: EMERGENCY MEDICINE

## 2020-01-01 PROCEDURE — 80202 ASSAY OF VANCOMYCIN: CPT

## 2020-01-01 PROCEDURE — 65610000006 HC RM INTENSIVE CARE

## 2020-01-01 PROCEDURE — 82550 ASSAY OF CK (CPK): CPT

## 2020-01-01 PROCEDURE — 31500 INSERT EMERGENCY AIRWAY: CPT

## 2020-01-01 PROCEDURE — 84132 ASSAY OF SERUM POTASSIUM: CPT

## 2020-01-01 PROCEDURE — 74177 CT ABD & PELVIS W/CONTRAST: CPT

## 2020-01-01 PROCEDURE — 94003 VENT MGMT INPAT SUBQ DAY: CPT

## 2020-01-01 PROCEDURE — C8929 TTE W OR WO FOL WCON,DOPPLER: HCPCS

## 2020-01-01 PROCEDURE — 83690 ASSAY OF LIPASE: CPT

## 2020-01-01 PROCEDURE — 74011636637 HC RX REV CODE- 636/637: Performed by: EMERGENCY MEDICINE

## 2020-01-01 PROCEDURE — 96365 THER/PROPH/DIAG IV INF INIT: CPT

## 2020-01-01 PROCEDURE — 99282 EMERGENCY DEPT VISIT SF MDM: CPT

## 2020-01-01 PROCEDURE — 94762 N-INVAS EAR/PLS OXIMTRY CONT: CPT

## 2020-01-01 PROCEDURE — 96366 THER/PROPH/DIAG IV INF ADDON: CPT

## 2020-01-01 PROCEDURE — 74011000250 HC RX REV CODE- 250: Performed by: EMERGENCY MEDICINE

## 2020-01-01 PROCEDURE — 87070 CULTURE OTHR SPECIMN AEROBIC: CPT

## 2020-01-01 PROCEDURE — 77010033678 HC OXYGEN DAILY

## 2020-01-01 PROCEDURE — 77030021352 HC CBL LD SYS DISP COVD -B

## 2020-01-01 PROCEDURE — 74011636637 HC RX REV CODE- 636/637: Performed by: INTERNAL MEDICINE

## 2020-01-01 PROCEDURE — 82657 ENZYME CELL ACTIVITY: CPT

## 2020-01-01 PROCEDURE — 82803 BLOOD GASES ANY COMBINATION: CPT

## 2020-01-01 PROCEDURE — 74011250636 HC RX REV CODE- 250/636

## 2020-01-01 PROCEDURE — 86706 HEP B SURFACE ANTIBODY: CPT

## 2020-01-01 PROCEDURE — 76705 ECHO EXAM OF ABDOMEN: CPT

## 2020-01-01 PROCEDURE — 77030005513 HC CATH URETH FOL11 MDII -B

## 2020-01-01 PROCEDURE — 51702 INSERT TEMP BLADDER CATH: CPT

## 2020-01-01 RX ORDER — HEPARIN SODIUM 5000 [USP'U]/ML
3000 INJECTION, SOLUTION INTRAVENOUS; SUBCUTANEOUS ONCE
Status: DISCONTINUED | OUTPATIENT
Start: 2020-01-01 | End: 2020-01-01 | Stop reason: HOSPADM

## 2020-01-01 RX ORDER — PROPOFOL 10 MG/ML
0-50 INJECTION, EMULSION INTRAVENOUS
Status: DISCONTINUED | OUTPATIENT
Start: 2020-01-01 | End: 2020-01-01

## 2020-01-01 RX ORDER — DEXTROSE 50 % IN WATER (D50W) INTRAVENOUS SYRINGE
25 ONCE
Status: ACTIVE | OUTPATIENT
Start: 2020-01-01 | End: 2020-01-01

## 2020-01-01 RX ORDER — ISOSORBIDE MONONITRATE 30 MG/1
30 TABLET, EXTENDED RELEASE ORAL 3 TIMES DAILY
Status: DISCONTINUED | OUTPATIENT
Start: 2020-01-01 | End: 2020-01-01 | Stop reason: HOSPADM

## 2020-01-01 RX ORDER — FUROSEMIDE 10 MG/ML
20 INJECTION INTRAMUSCULAR; INTRAVENOUS
Status: COMPLETED | OUTPATIENT
Start: 2020-01-01 | End: 2020-01-01

## 2020-01-01 RX ORDER — DIPHENHYDRAMINE HYDROCHLORIDE 50 MG/ML
25 INJECTION, SOLUTION INTRAMUSCULAR; INTRAVENOUS
Status: COMPLETED | OUTPATIENT
Start: 2020-01-01 | End: 2020-01-01

## 2020-01-01 RX ORDER — CARVEDILOL 6.25 MG/1
6.25 TABLET ORAL 2 TIMES DAILY WITH MEALS
Status: DISCONTINUED | OUTPATIENT
Start: 2020-01-01 | End: 2020-01-01

## 2020-01-01 RX ORDER — NALOXONE HYDROCHLORIDE 0.4 MG/ML
0.4 INJECTION, SOLUTION INTRAMUSCULAR; INTRAVENOUS; SUBCUTANEOUS AS NEEDED
Status: DISCONTINUED | OUTPATIENT
Start: 2020-01-01 | End: 2020-01-01 | Stop reason: HOSPADM

## 2020-01-01 RX ORDER — CLONIDINE HYDROCHLORIDE 0.1 MG/1
0.2 TABLET ORAL 2 TIMES DAILY
Status: DISCONTINUED | OUTPATIENT
Start: 2020-01-01 | End: 2020-01-01 | Stop reason: HOSPADM

## 2020-01-01 RX ORDER — ISOSORBIDE MONONITRATE 30 MG/1
30 TABLET, EXTENDED RELEASE ORAL 3 TIMES DAILY
Qty: 90 TAB | Refills: 0 | Status: SHIPPED | OUTPATIENT
Start: 2020-01-01 | End: 2020-01-01

## 2020-01-01 RX ORDER — CARVEDILOL 25 MG/1
25 TABLET ORAL 2 TIMES DAILY WITH MEALS
Qty: 180 TAB | Refills: 3 | Status: SHIPPED | OUTPATIENT
Start: 2020-01-01

## 2020-01-01 RX ORDER — CHLORHEXIDINE GLUCONATE 1.2 MG/ML
10 RINSE ORAL EVERY 12 HOURS
Status: DISCONTINUED | OUTPATIENT
Start: 2020-01-01 | End: 2020-01-01

## 2020-01-01 RX ORDER — PROPOFOL 10 MG/ML
0-50 INJECTION, EMULSION INTRAVENOUS
Status: COMPLETED | OUTPATIENT
Start: 2020-01-01 | End: 2020-01-01

## 2020-01-01 RX ORDER — ASPIRIN 81 MG/1
81 TABLET ORAL DAILY
Status: DISCONTINUED | OUTPATIENT
Start: 2020-01-01 | End: 2020-01-01 | Stop reason: HOSPADM

## 2020-01-01 RX ORDER — FENTANYL CITRATE 50 UG/ML
100 INJECTION, SOLUTION INTRAMUSCULAR; INTRAVENOUS ONCE
Status: DISCONTINUED | OUTPATIENT
Start: 2020-01-01 | End: 2020-01-01

## 2020-01-01 RX ORDER — SODIUM CHLORIDE 0.9 % (FLUSH) 0.9 %
5-40 SYRINGE (ML) INJECTION EVERY 8 HOURS
Status: DISCONTINUED | OUTPATIENT
Start: 2020-01-01 | End: 2020-01-01 | Stop reason: HOSPADM

## 2020-01-01 RX ORDER — ONDANSETRON 4 MG/1
TABLET, ORALLY DISINTEGRATING ORAL
Qty: 10 TAB | Refills: 0 | Status: SHIPPED | OUTPATIENT
Start: 2020-01-01

## 2020-01-01 RX ORDER — HEPARIN SODIUM 1000 [USP'U]/ML
1000 INJECTION, SOLUTION INTRAVENOUS; SUBCUTANEOUS
Status: DISCONTINUED | OUTPATIENT
Start: 2020-01-01 | End: 2020-01-01 | Stop reason: HOSPADM

## 2020-01-01 RX ORDER — ACETAMINOPHEN 500 MG
1000 TABLET ORAL
Status: COMPLETED | OUTPATIENT
Start: 2020-01-01 | End: 2020-01-01

## 2020-01-01 RX ORDER — FENTANYL CITRATE 50 UG/ML
INJECTION, SOLUTION INTRAMUSCULAR; INTRAVENOUS
Status: COMPLETED
Start: 2020-01-01 | End: 2020-01-01

## 2020-01-01 RX ORDER — AMIODARONE HYDROCHLORIDE 200 MG/1
400 TABLET ORAL DAILY
Status: DISCONTINUED | OUTPATIENT
Start: 2020-01-01 | End: 2020-01-01 | Stop reason: HOSPADM

## 2020-01-01 RX ORDER — ACETAMINOPHEN 325 MG/1
650 TABLET ORAL
Status: DISCONTINUED | OUTPATIENT
Start: 2020-01-01 | End: 2020-01-01 | Stop reason: HOSPADM

## 2020-01-01 RX ORDER — HYDRALAZINE HYDROCHLORIDE 50 MG/1
50 TABLET, FILM COATED ORAL 3 TIMES DAILY
Status: DISCONTINUED | OUTPATIENT
Start: 2020-01-01 | End: 2020-01-01 | Stop reason: HOSPADM

## 2020-01-01 RX ORDER — ISOSORBIDE MONONITRATE 60 MG/1
30 TABLET, EXTENDED RELEASE ORAL
Qty: 1 TAB | Refills: 1 | Status: SHIPPED | OUTPATIENT
Start: 2020-01-01 | End: 2020-01-01 | Stop reason: SDUPTHER

## 2020-01-01 RX ORDER — CLONIDINE HYDROCHLORIDE 0.1 MG/1
TABLET ORAL 2 TIMES DAILY
COMMUNITY
End: 2020-01-01

## 2020-01-01 RX ORDER — CARVEDILOL 12.5 MG/1
25 TABLET ORAL 2 TIMES DAILY WITH MEALS
Status: DISCONTINUED | OUTPATIENT
Start: 2020-01-01 | End: 2020-01-01 | Stop reason: HOSPADM

## 2020-01-01 RX ORDER — CARVEDILOL 12.5 MG/1
12.5 TABLET ORAL 2 TIMES DAILY WITH MEALS
Qty: 180 TAB | Refills: 1 | Status: SHIPPED | OUTPATIENT
Start: 2020-01-01 | End: 2020-01-01

## 2020-01-01 RX ORDER — DOXYCYCLINE HYCLATE 100 MG
100 TABLET ORAL 2 TIMES DAILY
Qty: 28 TAB | Refills: 0 | Status: SHIPPED | OUTPATIENT
Start: 2020-01-01 | End: 2020-01-01

## 2020-01-01 RX ORDER — PROMETHAZINE HYDROCHLORIDE 25 MG/1
12.5 TABLET ORAL
Status: DISCONTINUED | OUTPATIENT
Start: 2020-01-01 | End: 2020-01-01 | Stop reason: HOSPADM

## 2020-01-01 RX ORDER — LANOLIN ALCOHOL/MO/W.PET/CERES
5 CREAM (GRAM) TOPICAL
Status: DISCONTINUED | OUTPATIENT
Start: 2020-01-01 | End: 2020-01-01 | Stop reason: HOSPADM

## 2020-01-01 RX ORDER — ONDANSETRON 2 MG/ML
4 INJECTION INTRAMUSCULAR; INTRAVENOUS
Status: DISCONTINUED | OUTPATIENT
Start: 2020-01-01 | End: 2020-01-01 | Stop reason: HOSPADM

## 2020-01-01 RX ORDER — HYDRALAZINE HYDROCHLORIDE 20 MG/ML
20 INJECTION INTRAMUSCULAR; INTRAVENOUS ONCE
Status: COMPLETED | OUTPATIENT
Start: 2020-01-01 | End: 2020-01-01

## 2020-01-01 RX ORDER — ISOSORBIDE MONONITRATE 30 MG/1
TABLET, EXTENDED RELEASE ORAL
Qty: 270 TAB | Refills: 3 | Status: SHIPPED | OUTPATIENT
Start: 2020-01-01

## 2020-01-01 RX ORDER — ACETAMINOPHEN 650 MG/1
650 SUPPOSITORY RECTAL
Status: DISCONTINUED | OUTPATIENT
Start: 2020-01-01 | End: 2020-01-01 | Stop reason: HOSPADM

## 2020-01-01 RX ORDER — LIDOCAINE 50 MG/G
2 PATCH TOPICAL EVERY 24 HOURS
Status: DISCONTINUED | OUTPATIENT
Start: 2020-01-01 | End: 2020-01-01

## 2020-01-01 RX ORDER — DEXTROSE 50 % IN WATER (D50W) INTRAVENOUS SYRINGE
Status: COMPLETED
Start: 2020-01-01 | End: 2020-01-01

## 2020-01-01 RX ORDER — INSULIN GLARGINE 100 [IU]/ML
0.2 INJECTION, SOLUTION SUBCUTANEOUS
Status: DISCONTINUED | OUTPATIENT
Start: 2020-01-01 | End: 2020-01-01

## 2020-01-01 RX ORDER — CARVEDILOL 25 MG/1
25 TABLET ORAL 2 TIMES DAILY WITH MEALS
Qty: 60 TAB | Refills: 0 | Status: SHIPPED | OUTPATIENT
Start: 2020-01-01 | End: 2020-01-01

## 2020-01-01 RX ORDER — MORPHINE SULFATE 2 MG/ML
2 INJECTION, SOLUTION INTRAMUSCULAR; INTRAVENOUS ONCE
Status: COMPLETED | OUTPATIENT
Start: 2020-01-01 | End: 2020-01-01

## 2020-01-01 RX ORDER — PANTOPRAZOLE SODIUM 40 MG/1
40 TABLET, DELAYED RELEASE ORAL
Status: DISCONTINUED | OUTPATIENT
Start: 2020-01-01 | End: 2020-01-01 | Stop reason: HOSPADM

## 2020-01-01 RX ORDER — ONDANSETRON 4 MG/1
8 TABLET, FILM COATED ORAL
Status: DISCONTINUED | OUTPATIENT
Start: 2020-01-01 | End: 2020-01-01 | Stop reason: SDUPTHER

## 2020-01-01 RX ORDER — DOXYCYCLINE 100 MG/1
100 CAPSULE ORAL
Status: DISCONTINUED | OUTPATIENT
Start: 2020-01-01 | End: 2020-01-01

## 2020-01-01 RX ORDER — LEVOFLOXACIN 5 MG/ML
750 INJECTION, SOLUTION INTRAVENOUS ONCE
Status: COMPLETED | OUTPATIENT
Start: 2020-01-01 | End: 2020-01-01

## 2020-01-01 RX ORDER — HEPARIN SODIUM 1000 [USP'U]/ML
4500 INJECTION, SOLUTION INTRAVENOUS; SUBCUTANEOUS
Status: DISCONTINUED | OUTPATIENT
Start: 2020-01-01 | End: 2020-01-01 | Stop reason: HOSPADM

## 2020-01-01 RX ORDER — SODIUM CHLORIDE 9 MG/ML
100 INJECTION, SOLUTION INTRAVENOUS
Status: DISCONTINUED | OUTPATIENT
Start: 2020-01-01 | End: 2020-01-01 | Stop reason: HOSPADM

## 2020-01-01 RX ORDER — IPRATROPIUM BROMIDE AND ALBUTEROL SULFATE 2.5; .5 MG/3ML; MG/3ML
SOLUTION RESPIRATORY (INHALATION)
Status: COMPLETED
Start: 2020-01-01 | End: 2020-01-01

## 2020-01-01 RX ORDER — MIDAZOLAM HYDROCHLORIDE 1 MG/ML
2 INJECTION, SOLUTION INTRAMUSCULAR; INTRAVENOUS
Status: COMPLETED | OUTPATIENT
Start: 2020-01-01 | End: 2020-01-01

## 2020-01-01 RX ORDER — LOSARTAN POTASSIUM 50 MG/1
100 TABLET ORAL DAILY
Status: DISCONTINUED | OUTPATIENT
Start: 2020-01-01 | End: 2020-01-01 | Stop reason: HOSPADM

## 2020-01-01 RX ORDER — CARVEDILOL 25 MG/1
25 TABLET ORAL 2 TIMES DAILY WITH MEALS
COMMUNITY
End: 2020-01-01 | Stop reason: SDUPTHER

## 2020-01-01 RX ORDER — CALCIUM ACETATE 667 MG/1
2 CAPSULE ORAL
Status: DISCONTINUED | OUTPATIENT
Start: 2020-01-01 | End: 2020-01-01 | Stop reason: HOSPADM

## 2020-01-01 RX ORDER — HEPARIN SODIUM 5000 [USP'U]/ML
5000 INJECTION, SOLUTION INTRAVENOUS; SUBCUTANEOUS EVERY 8 HOURS
Status: DISCONTINUED | OUTPATIENT
Start: 2020-01-01 | End: 2020-01-01 | Stop reason: HOSPADM

## 2020-01-01 RX ORDER — LEVOFLOXACIN 5 MG/ML
500 INJECTION, SOLUTION INTRAVENOUS
Status: DISCONTINUED | OUTPATIENT
Start: 2020-01-01 | End: 2020-01-01

## 2020-01-01 RX ORDER — SODIUM CHLORIDE 0.9 % (FLUSH) 0.9 %
5-10 SYRINGE (ML) INJECTION AS NEEDED
Status: DISCONTINUED | OUTPATIENT
Start: 2020-01-01 | End: 2020-01-01 | Stop reason: HOSPADM

## 2020-01-01 RX ORDER — SODIUM CHLORIDE 900 MG/100ML
INJECTION INTRAVENOUS
Status: DISPENSED
Start: 2020-01-01 | End: 2020-01-01

## 2020-01-01 RX ORDER — HYDROMORPHONE HYDROCHLORIDE 1 MG/ML
0.5 INJECTION, SOLUTION INTRAMUSCULAR; INTRAVENOUS; SUBCUTANEOUS
Status: COMPLETED | OUTPATIENT
Start: 2020-01-01 | End: 2020-01-01

## 2020-01-01 RX ORDER — AMOXICILLIN AND CLAVULANATE POTASSIUM 875; 125 MG/1; MG/1
1 TABLET, FILM COATED ORAL 2 TIMES DAILY
Qty: 8 TAB | Refills: 4 | Status: SHIPPED | OUTPATIENT
Start: 2020-01-01 | End: 2020-01-01

## 2020-01-01 RX ORDER — VANCOMYCIN 2 GRAM/500 ML IN 0.9 % SODIUM CHLORIDE INTRAVENOUS
2000
Status: COMPLETED | OUTPATIENT
Start: 2020-01-01 | End: 2020-01-01

## 2020-01-01 RX ORDER — SODIUM POLYSTYRENE SULFONATE 15 G/60ML
30 SUSPENSION ORAL; RECTAL
Status: COMPLETED | OUTPATIENT
Start: 2020-01-01 | End: 2020-01-01

## 2020-01-01 RX ORDER — CLONIDINE HYDROCHLORIDE 0.1 MG/1
TABLET ORAL
Qty: 360 TAB | Refills: 3 | Status: SHIPPED | OUTPATIENT
Start: 2020-01-01

## 2020-01-01 RX ORDER — HYDRALAZINE HYDROCHLORIDE 50 MG/1
50 TABLET, FILM COATED ORAL 3 TIMES DAILY
Status: DISCONTINUED | OUTPATIENT
Start: 2020-01-01 | End: 2020-01-01

## 2020-01-01 RX ORDER — CALCIUM GLUCONATE 94 MG/ML
1 INJECTION, SOLUTION INTRAVENOUS ONCE
Status: COMPLETED | OUTPATIENT
Start: 2020-01-01 | End: 2020-01-01

## 2020-01-01 RX ORDER — AMOXICILLIN AND CLAVULANATE POTASSIUM 875; 125 MG/1; MG/1
1 TABLET, FILM COATED ORAL DAILY
Qty: 14 TAB | Refills: 0 | Status: SHIPPED | OUTPATIENT
Start: 2020-01-01

## 2020-01-01 RX ORDER — INSULIN LISPRO 100 [IU]/ML
INJECTION, SOLUTION INTRAVENOUS; SUBCUTANEOUS
Status: DISCONTINUED | OUTPATIENT
Start: 2020-01-01 | End: 2020-01-01 | Stop reason: HOSPADM

## 2020-01-01 RX ORDER — CLONIDINE HYDROCHLORIDE 0.2 MG/1
0.2 TABLET ORAL 2 TIMES DAILY
Qty: 60 TAB | Refills: 0 | Status: SHIPPED | OUTPATIENT
Start: 2020-01-01 | End: 2020-01-01

## 2020-01-01 RX ORDER — FENTANYL CITRATE 50 UG/ML
100 INJECTION, SOLUTION INTRAMUSCULAR; INTRAVENOUS
Status: COMPLETED | OUTPATIENT
Start: 2020-01-01 | End: 2020-01-01

## 2020-01-01 RX ORDER — IPRATROPIUM BROMIDE AND ALBUTEROL SULFATE 2.5; .5 MG/3ML; MG/3ML
3 SOLUTION RESPIRATORY (INHALATION)
Status: COMPLETED | OUTPATIENT
Start: 2020-01-01 | End: 2020-01-01

## 2020-01-01 RX ORDER — AMOXICILLIN AND CLAVULANATE POTASSIUM 875; 125 MG/1; MG/1
1 TABLET, FILM COATED ORAL
Status: COMPLETED | OUTPATIENT
Start: 2020-01-01 | End: 2020-01-01

## 2020-01-01 RX ORDER — AMOXICILLIN AND CLAVULANATE POTASSIUM 500; 125 MG/1; MG/1
1 TABLET, FILM COATED ORAL DAILY
Qty: 4 TAB | Refills: 0 | Status: SHIPPED | OUTPATIENT
Start: 2020-01-01 | End: 2020-01-01

## 2020-01-01 RX ORDER — LOSARTAN POTASSIUM 100 MG/1
100 TABLET ORAL DAILY
Qty: 90 TAB | Refills: 1 | Status: SHIPPED | OUTPATIENT
Start: 2020-01-01

## 2020-01-01 RX ORDER — CLONIDINE HYDROCHLORIDE 0.1 MG/1
0.2 TABLET ORAL
Status: COMPLETED | OUTPATIENT
Start: 2020-01-01 | End: 2020-01-01

## 2020-01-01 RX ORDER — FLUTICASONE PROPIONATE 50 MCG
2 SPRAY, SUSPENSION (ML) NASAL
Status: DISCONTINUED | OUTPATIENT
Start: 2020-01-01 | End: 2020-01-01 | Stop reason: HOSPADM

## 2020-01-01 RX ORDER — PROPOFOL 10 MG/ML
INJECTION, EMULSION INTRAVENOUS
Status: COMPLETED
Start: 2020-01-01 | End: 2020-01-01

## 2020-01-01 RX ORDER — KETOROLAC TROMETHAMINE 30 MG/ML
30 INJECTION, SOLUTION INTRAMUSCULAR; INTRAVENOUS
Status: COMPLETED | OUTPATIENT
Start: 2020-01-01 | End: 2020-01-01

## 2020-01-01 RX ORDER — ISOSORBIDE MONONITRATE 30 MG/1
30 TABLET, EXTENDED RELEASE ORAL DAILY
Status: DISCONTINUED | OUTPATIENT
Start: 2020-01-01 | End: 2020-01-01 | Stop reason: HOSPADM

## 2020-01-01 RX ORDER — HEPARIN SODIUM 1000 [USP'U]/ML
4500 INJECTION, SOLUTION INTRAVENOUS; SUBCUTANEOUS
Status: DISCONTINUED | OUTPATIENT
Start: 2020-01-01 | End: 2020-01-01 | Stop reason: CLARIF

## 2020-01-01 RX ORDER — ISOSORBIDE MONONITRATE 30 MG/1
TABLET, EXTENDED RELEASE ORAL
Qty: 270 TAB | Refills: 3 | Status: SHIPPED | OUTPATIENT
Start: 2020-01-01 | End: 2020-01-01 | Stop reason: SDUPTHER

## 2020-01-01 RX ORDER — SODIUM CHLORIDE 9 MG/ML
25 INJECTION, SOLUTION INTRAVENOUS
Status: DISCONTINUED | OUTPATIENT
Start: 2020-01-01 | End: 2020-01-01 | Stop reason: HOSPADM

## 2020-01-01 RX ORDER — GUAIFENESIN 600 MG/1
600 TABLET, EXTENDED RELEASE ORAL 2 TIMES DAILY
Qty: 30 TAB | Refills: 0 | Status: SHIPPED | OUTPATIENT
Start: 2020-01-01

## 2020-01-01 RX ORDER — DEXTROSE MONOHYDRATE 100 MG/ML
125-250 INJECTION, SOLUTION INTRAVENOUS AS NEEDED
Status: DISCONTINUED | OUTPATIENT
Start: 2020-01-01 | End: 2020-01-01 | Stop reason: HOSPADM

## 2020-01-01 RX ORDER — PROPOFOL 10 MG/ML
40 INJECTION, EMULSION INTRAVENOUS
Status: COMPLETED | OUTPATIENT
Start: 2020-01-01 | End: 2020-01-01

## 2020-01-01 RX ORDER — ETOMIDATE 2 MG/ML
0.1 INJECTION INTRAVENOUS
Status: COMPLETED | OUTPATIENT
Start: 2020-01-01 | End: 2020-01-01

## 2020-01-01 RX ORDER — CLONIDINE HYDROCHLORIDE 0.1 MG/1
0.2 TABLET ORAL 2 TIMES DAILY
Qty: 180 TAB | Refills: 0 | Status: SHIPPED | OUTPATIENT
Start: 2020-01-01 | End: 2020-01-01

## 2020-01-01 RX ORDER — METRONIDAZOLE 500 MG/100ML
500 INJECTION, SOLUTION INTRAVENOUS EVERY 8 HOURS
Status: DISCONTINUED | OUTPATIENT
Start: 2020-01-01 | End: 2020-01-01

## 2020-01-01 RX ORDER — FENTANYL CITRATE 50 UG/ML
INJECTION, SOLUTION INTRAMUSCULAR; INTRAVENOUS
Status: DISPENSED
Start: 2020-01-01 | End: 2020-01-01

## 2020-01-01 RX ORDER — LORAZEPAM 2 MG/ML
1 INJECTION INTRAMUSCULAR ONCE
Status: COMPLETED | OUTPATIENT
Start: 2020-01-01 | End: 2020-01-01

## 2020-01-01 RX ORDER — IPRATROPIUM BROMIDE AND ALBUTEROL SULFATE 2.5; .5 MG/3ML; MG/3ML
3 SOLUTION RESPIRATORY (INHALATION)
Status: DISCONTINUED | OUTPATIENT
Start: 2020-01-01 | End: 2020-01-01 | Stop reason: HOSPADM

## 2020-01-01 RX ORDER — SODIUM CHLORIDE 0.9 % (FLUSH) 0.9 %
5-40 SYRINGE (ML) INJECTION AS NEEDED
Status: DISCONTINUED | OUTPATIENT
Start: 2020-01-01 | End: 2020-01-01 | Stop reason: HOSPADM

## 2020-01-01 RX ORDER — ONDANSETRON 2 MG/ML
4 INJECTION INTRAMUSCULAR; INTRAVENOUS ONCE
Status: COMPLETED | OUTPATIENT
Start: 2020-01-01 | End: 2020-01-01

## 2020-01-01 RX ORDER — ONDANSETRON 2 MG/ML
4 INJECTION INTRAMUSCULAR; INTRAVENOUS
Status: COMPLETED | OUTPATIENT
Start: 2020-01-01 | End: 2020-01-01

## 2020-01-01 RX ORDER — OXYCODONE AND ACETAMINOPHEN 5; 325 MG/1; MG/1
1 TABLET ORAL
Status: DISCONTINUED | OUTPATIENT
Start: 2020-01-01 | End: 2020-01-01 | Stop reason: HOSPADM

## 2020-01-01 RX ORDER — HEPARIN SODIUM 5000 [USP'U]/ML
1000 INJECTION, SOLUTION INTRAVENOUS; SUBCUTANEOUS
Status: ACTIVE | OUTPATIENT
Start: 2020-01-01 | End: 2020-01-01

## 2020-01-01 RX ORDER — DOXYCYCLINE 100 MG/1
100 CAPSULE ORAL EVERY 12 HOURS
Status: DISCONTINUED | OUTPATIENT
Start: 2020-01-01 | End: 2020-01-01 | Stop reason: HOSPADM

## 2020-01-01 RX ORDER — GUAIFENESIN 100 MG/5ML
162 LIQUID (ML) ORAL 2 TIMES DAILY
Status: DISCONTINUED | OUTPATIENT
Start: 2020-01-01 | End: 2020-01-01 | Stop reason: HOSPADM

## 2020-01-01 RX ORDER — ETOMIDATE 2 MG/ML
INJECTION INTRAVENOUS
Status: COMPLETED
Start: 2020-01-01 | End: 2020-01-01

## 2020-01-01 RX ORDER — ROCURONIUM BROMIDE 10 MG/ML
INJECTION, SOLUTION INTRAVENOUS
Status: DISPENSED
Start: 2020-01-01 | End: 2020-01-01

## 2020-01-01 RX ORDER — DOXYCYCLINE 100 MG/1
100 CAPSULE ORAL
Status: COMPLETED | OUTPATIENT
Start: 2020-01-01 | End: 2020-01-01

## 2020-01-01 RX ORDER — ONDANSETRON 4 MG/1
4 TABLET, ORALLY DISINTEGRATING ORAL
Status: DISCONTINUED | OUTPATIENT
Start: 2020-01-01 | End: 2020-01-01 | Stop reason: HOSPADM

## 2020-01-01 RX ORDER — HYDRALAZINE HYDROCHLORIDE 20 MG/ML
10 INJECTION INTRAMUSCULAR; INTRAVENOUS
Status: DISCONTINUED | OUTPATIENT
Start: 2020-01-01 | End: 2020-01-01 | Stop reason: HOSPADM

## 2020-01-01 RX ORDER — METOCLOPRAMIDE HYDROCHLORIDE 5 MG/ML
5 INJECTION INTRAMUSCULAR; INTRAVENOUS EVERY 6 HOURS
Status: DISCONTINUED | OUTPATIENT
Start: 2020-01-01 | End: 2020-01-01 | Stop reason: HOSPADM

## 2020-01-01 RX ORDER — PREDNISONE 50 MG/1
50 TABLET ORAL
Status: DISCONTINUED | OUTPATIENT
Start: 2020-01-01 | End: 2020-01-01 | Stop reason: HOSPADM

## 2020-01-01 RX ORDER — SODIUM BICARBONATE 84 MG/ML
50 INJECTION, SOLUTION INTRAVENOUS
Status: COMPLETED | OUTPATIENT
Start: 2020-01-01 | End: 2020-01-01

## 2020-01-01 RX ORDER — AMOXICILLIN AND CLAVULANATE POTASSIUM 875; 125 MG/1; MG/1
1 TABLET, FILM COATED ORAL
Status: DISCONTINUED | OUTPATIENT
Start: 2020-01-01 | End: 2020-01-01

## 2020-01-01 RX ORDER — ASPIRIN 325 MG
325 TABLET ORAL
Status: COMPLETED | OUTPATIENT
Start: 2020-01-01 | End: 2020-01-01

## 2020-01-01 RX ORDER — PREDNISONE 50 MG/1
50 TABLET ORAL DAILY
COMMUNITY
End: 2020-01-01

## 2020-01-01 RX ORDER — OXYCODONE HYDROCHLORIDE 5 MG/1
10 TABLET ORAL
Status: DISCONTINUED | OUTPATIENT
Start: 2020-01-01 | End: 2020-01-01 | Stop reason: HOSPADM

## 2020-01-01 RX ORDER — PREDNISONE 10 MG/1
TABLET ORAL
Qty: 63 TAB | Refills: 0 | Status: SHIPPED | OUTPATIENT
Start: 2020-01-01

## 2020-01-01 RX ORDER — DEXTROSE MONOHYDRATE 100 MG/ML
125-250 INJECTION, SOLUTION INTRAVENOUS ONCE
Status: COMPLETED | OUTPATIENT
Start: 2020-01-01 | End: 2020-01-01

## 2020-01-01 RX ORDER — DEXTROSE MONOHYDRATE 25 G/50ML
25-50 INJECTION, SOLUTION INTRAVENOUS AS NEEDED
Status: DISCONTINUED | OUTPATIENT
Start: 2020-01-01 | End: 2020-01-01 | Stop reason: HOSPADM

## 2020-01-01 RX ORDER — CARVEDILOL 12.5 MG/1
12.5 TABLET ORAL 2 TIMES DAILY WITH MEALS
Status: DISCONTINUED | OUTPATIENT
Start: 2020-01-01 | End: 2020-01-01 | Stop reason: HOSPADM

## 2020-01-01 RX ORDER — AMLODIPINE BESYLATE 10 MG/1
10 TABLET ORAL DAILY
Status: DISCONTINUED | OUTPATIENT
Start: 2020-01-01 | End: 2020-01-01 | Stop reason: HOSPADM

## 2020-01-01 RX ORDER — HEPARIN SODIUM 1000 [USP'U]/ML
4300 INJECTION, SOLUTION INTRAVENOUS; SUBCUTANEOUS ONCE
Status: DISCONTINUED | OUTPATIENT
Start: 2020-01-01 | End: 2020-01-01 | Stop reason: HOSPADM

## 2020-01-01 RX ORDER — WATER FOR INJECTION,STERILE
5 VIAL (ML) INJECTION
Status: COMPLETED | OUTPATIENT
Start: 2020-01-01 | End: 2020-01-01

## 2020-01-01 RX ORDER — HEPARIN SODIUM 1000 [USP'U]/ML
4300 INJECTION, SOLUTION INTRAVENOUS; SUBCUTANEOUS ONCE
Status: COMPLETED | OUTPATIENT
Start: 2020-01-01 | End: 2020-01-01

## 2020-01-01 RX ORDER — MAGNESIUM SULFATE 100 %
4 CRYSTALS MISCELLANEOUS AS NEEDED
Status: DISCONTINUED | OUTPATIENT
Start: 2020-01-01 | End: 2020-01-01 | Stop reason: HOSPADM

## 2020-01-01 RX ORDER — MIDAZOLAM HYDROCHLORIDE 1 MG/ML
INJECTION, SOLUTION INTRAMUSCULAR; INTRAVENOUS
Status: DISPENSED
Start: 2020-01-01 | End: 2020-01-01

## 2020-01-01 RX ORDER — MORPHINE SULFATE 4 MG/ML
4 INJECTION, SOLUTION INTRAMUSCULAR; INTRAVENOUS
Status: COMPLETED | OUTPATIENT
Start: 2020-01-01 | End: 2020-01-01

## 2020-01-01 RX ORDER — MIDAZOLAM HYDROCHLORIDE 1 MG/ML
2 INJECTION, SOLUTION INTRAMUSCULAR; INTRAVENOUS ONCE
Status: DISCONTINUED | OUTPATIENT
Start: 2020-01-01 | End: 2020-01-01

## 2020-01-01 RX ORDER — ATORVASTATIN CALCIUM 20 MG/1
40 TABLET, FILM COATED ORAL DAILY
Status: DISCONTINUED | OUTPATIENT
Start: 2020-01-01 | End: 2020-01-01 | Stop reason: HOSPADM

## 2020-01-01 RX ORDER — DEXTROSE MONOHYDRATE 25 G/50ML
25 INJECTION, SOLUTION INTRAVENOUS DAILY PRN
Status: DISCONTINUED | OUTPATIENT
Start: 2020-01-01 | End: 2020-01-01 | Stop reason: SDUPTHER

## 2020-01-01 RX ORDER — MIDAZOLAM HYDROCHLORIDE 1 MG/ML
INJECTION, SOLUTION INTRAMUSCULAR; INTRAVENOUS
Status: COMPLETED
Start: 2020-01-01 | End: 2020-01-01

## 2020-01-01 RX ORDER — NITROGLYCERIN 40 MG/100ML
0-20 INJECTION INTRAVENOUS
Status: DISCONTINUED | OUTPATIENT
Start: 2020-01-01 | End: 2020-01-01

## 2020-01-01 RX ORDER — OXYCODONE HYDROCHLORIDE 10 MG/1
10 TABLET ORAL
Qty: 12 TAB | Refills: 0 | Status: SHIPPED | OUTPATIENT
Start: 2020-01-01 | End: 2020-01-01

## 2020-01-01 RX ADMIN — METRONIDAZOLE 500 MG: 500 INJECTION, SOLUTION INTRAVENOUS at 23:22

## 2020-01-01 RX ADMIN — HYDRALAZINE HYDROCHLORIDE 20 MG: 20 INJECTION INTRAMUSCULAR; INTRAVENOUS at 22:43

## 2020-01-01 RX ADMIN — OXYCODONE 10 MG: 5 TABLET ORAL at 21:56

## 2020-01-01 RX ADMIN — PROPOFOL 30 MCG/KG/MIN: 10 INJECTION, EMULSION INTRAVENOUS at 21:33

## 2020-01-01 RX ADMIN — MORPHINE SULFATE 4 MG: 4 INJECTION, SOLUTION INTRAMUSCULAR; INTRAVENOUS at 22:16

## 2020-01-01 RX ADMIN — CLONIDINE HYDROCHLORIDE 0.2 MG: 0.1 TABLET ORAL at 10:21

## 2020-01-01 RX ADMIN — CALCIUM ACETATE 1334 MG: 667 CAPSULE ORAL at 08:49

## 2020-01-01 RX ADMIN — FENTANYL CITRATE 100 MCG: 50 INJECTION, SOLUTION INTRAMUSCULAR; INTRAVENOUS at 12:37

## 2020-01-01 RX ADMIN — ISOSORBIDE MONONITRATE 30 MG: 30 TABLET, EXTENDED RELEASE ORAL at 16:55

## 2020-01-01 RX ADMIN — HEPARIN SODIUM 4.3 UNITS: 1000 INJECTION INTRAVENOUS; SUBCUTANEOUS at 12:13

## 2020-01-01 RX ADMIN — IPRATROPIUM BROMIDE AND ALBUTEROL SULFATE 3 ML: .5; 3 SOLUTION RESPIRATORY (INHALATION) at 12:01

## 2020-01-01 RX ADMIN — PROPOFOL 30 MCG/KG/MIN: 10 INJECTION, EMULSION INTRAVENOUS at 03:06

## 2020-01-01 RX ADMIN — AMIODARONE HYDROCHLORIDE 400 MG: 200 TABLET ORAL at 09:08

## 2020-01-01 RX ADMIN — MELATONIN 4.5 MG: at 01:01

## 2020-01-01 RX ADMIN — SODIUM CHLORIDE 1000 MG: 900 INJECTION, SOLUTION INTRAVENOUS at 03:06

## 2020-01-01 RX ADMIN — DOXYCYCLINE 100 MG: 100 CAPSULE ORAL at 04:40

## 2020-01-01 RX ADMIN — ISOSORBIDE MONONITRATE 30 MG: 30 TABLET, EXTENDED RELEASE ORAL at 13:01

## 2020-01-01 RX ADMIN — OXYCODONE HYDROCHLORIDE AND ACETAMINOPHEN 1 TABLET: 5; 325 TABLET ORAL at 00:15

## 2020-01-01 RX ADMIN — ASPIRIN 81 MG CHEWABLE TABLET 162 MG: 81 TABLET CHEWABLE at 09:46

## 2020-01-01 RX ADMIN — AMOXICILLIN AND CLAVULANATE POTASSIUM 1 TABLET: 875; 125 TABLET, FILM COATED ORAL at 04:40

## 2020-01-01 RX ADMIN — ONDANSETRON 4 MG: 2 INJECTION INTRAMUSCULAR; INTRAVENOUS at 13:13

## 2020-01-01 RX ADMIN — MORPHINE SULFATE 2 MG: 2 INJECTION, SOLUTION INTRAMUSCULAR; INTRAVENOUS at 01:25

## 2020-01-01 RX ADMIN — PIPERACILLIN AND TAZOBACTAM 4.5 G: 4; .5 INJECTION, POWDER, FOR SOLUTION INTRAVENOUS at 15:30

## 2020-01-01 RX ADMIN — OXYCODONE 10 MG: 5 TABLET ORAL at 17:47

## 2020-01-01 RX ADMIN — HYDRALAZINE HYDROCHLORIDE 50 MG: 50 TABLET, FILM COATED ORAL at 16:23

## 2020-01-01 RX ADMIN — CARVEDILOL 25 MG: 12.5 TABLET, FILM COATED ORAL at 09:47

## 2020-01-01 RX ADMIN — OXYCODONE HYDROCHLORIDE AND ACETAMINOPHEN 1 TABLET: 5; 325 TABLET ORAL at 15:30

## 2020-01-01 RX ADMIN — PANTOPRAZOLE SODIUM 40 MG: 40 TABLET, DELAYED RELEASE ORAL at 16:55

## 2020-01-01 RX ADMIN — CHLORHEXIDINE GLUCONATE 10 ML: 1.2 RINSE ORAL at 09:50

## 2020-01-01 RX ADMIN — HEPARIN SODIUM 4300 UNITS: 1000 INJECTION, SOLUTION INTRAVENOUS; SUBCUTANEOUS at 14:35

## 2020-01-01 RX ADMIN — OXYCODONE 10 MG: 5 TABLET ORAL at 08:46

## 2020-01-01 RX ADMIN — OXYCODONE HYDROCHLORIDE AND ACETAMINOPHEN 1 TABLET: 5; 325 TABLET ORAL at 10:48

## 2020-01-01 RX ADMIN — ISOSORBIDE MONONITRATE 30 MG: 30 TABLET, EXTENDED RELEASE ORAL at 09:46

## 2020-01-01 RX ADMIN — SODIUM BICARBONATE 50 MEQ: 84 INJECTION, SOLUTION INTRAVENOUS at 21:51

## 2020-01-01 RX ADMIN — METHYLPREDNISOLONE SODIUM SUCCINATE 125 MG: 125 INJECTION, POWDER, FOR SOLUTION INTRAMUSCULAR; INTRAVENOUS at 03:27

## 2020-01-01 RX ADMIN — PROPOFOL 10 MCG/KG/MIN: 10 INJECTION, EMULSION INTRAVENOUS at 12:33

## 2020-01-01 RX ADMIN — INSULIN LISPRO 4 UNITS: 100 INJECTION, SOLUTION INTRAVENOUS; SUBCUTANEOUS at 22:28

## 2020-01-01 RX ADMIN — OXYCODONE HYDROCHLORIDE AND ACETAMINOPHEN 1 TABLET: 5; 325 TABLET ORAL at 20:50

## 2020-01-01 RX ADMIN — ASPIRIN 81 MG: 81 TABLET, COATED ORAL at 08:25

## 2020-01-01 RX ADMIN — IOPAMIDOL 95 ML: 612 INJECTION, SOLUTION INTRAVENOUS at 14:12

## 2020-01-01 RX ADMIN — OXYCODONE 10 MG: 5 TABLET ORAL at 10:50

## 2020-01-01 RX ADMIN — VANCOMYCIN HYDROCHLORIDE 2000 MG: 10 INJECTION, POWDER, LYOPHILIZED, FOR SOLUTION INTRAVENOUS at 11:57

## 2020-01-01 RX ADMIN — MIDAZOLAM 2 MG: 1 INJECTION INTRAMUSCULAR; INTRAVENOUS at 12:37

## 2020-01-01 RX ADMIN — LOSARTAN POTASSIUM 100 MG: 50 TABLET ORAL at 16:55

## 2020-01-01 RX ADMIN — AMLODIPINE BESYLATE 10 MG: 10 TABLET ORAL at 16:55

## 2020-01-01 RX ADMIN — FENTANYL CITRATE 100 MCG: 50 INJECTION, SOLUTION INTRAMUSCULAR; INTRAVENOUS at 13:43

## 2020-01-01 RX ADMIN — ONDANSETRON 4 MG: 2 INJECTION INTRAMUSCULAR; INTRAVENOUS at 21:29

## 2020-01-01 RX ADMIN — HEPARIN SODIUM 5000 UNITS: 5000 INJECTION INTRAVENOUS; SUBCUTANEOUS at 15:30

## 2020-01-01 RX ADMIN — METOCLOPRAMIDE 5 MG: 5 INJECTION, SOLUTION INTRAMUSCULAR; INTRAVENOUS at 11:30

## 2020-01-01 RX ADMIN — METOCLOPRAMIDE 5 MG: 5 INJECTION, SOLUTION INTRAMUSCULAR; INTRAVENOUS at 04:31

## 2020-01-01 RX ADMIN — METRONIDAZOLE 500 MG: 500 INJECTION, SOLUTION INTRAVENOUS at 07:28

## 2020-01-01 RX ADMIN — METOCLOPRAMIDE 5 MG: 5 INJECTION, SOLUTION INTRAMUSCULAR; INTRAVENOUS at 17:36

## 2020-01-01 RX ADMIN — WATER 10 ML: 1 INJECTION INTRAMUSCULAR; INTRAVENOUS; SUBCUTANEOUS at 14:40

## 2020-01-01 RX ADMIN — HEPARIN SODIUM 5000 UNITS: 5000 INJECTION INTRAVENOUS; SUBCUTANEOUS at 00:00

## 2020-01-01 RX ADMIN — METRONIDAZOLE 500 MG: 500 INJECTION, SOLUTION INTRAVENOUS at 15:58

## 2020-01-01 RX ADMIN — ASPIRIN 81 MG CHEWABLE TABLET 162 MG: 81 TABLET CHEWABLE at 16:55

## 2020-01-01 RX ADMIN — HYDROMORPHONE HYDROCHLORIDE 0.5 MG: 1 INJECTION, SOLUTION INTRAMUSCULAR; INTRAVENOUS; SUBCUTANEOUS at 13:13

## 2020-01-01 RX ADMIN — CLONIDINE HYDROCHLORIDE 0.2 MG: 0.1 TABLET ORAL at 21:57

## 2020-01-01 RX ADMIN — HEPARIN SODIUM 5000 UNITS: 5000 INJECTION INTRAVENOUS; SUBCUTANEOUS at 23:04

## 2020-01-01 RX ADMIN — PANTOPRAZOLE SODIUM 40 MG: 40 TABLET, DELAYED RELEASE ORAL at 10:21

## 2020-01-01 RX ADMIN — SODIUM CHLORIDE: 234 INJECTION INTRAMUSCULAR; INTRAVENOUS; SUBCUTANEOUS at 16:25

## 2020-01-01 RX ADMIN — HYDRALAZINE HYDROCHLORIDE 50 MG: 50 TABLET, FILM COATED ORAL at 09:08

## 2020-01-01 RX ADMIN — METOCLOPRAMIDE 5 MG: 5 INJECTION, SOLUTION INTRAMUSCULAR; INTRAVENOUS at 13:02

## 2020-01-01 RX ADMIN — ISOSORBIDE MONONITRATE 30 MG: 30 TABLET, EXTENDED RELEASE ORAL at 13:14

## 2020-01-01 RX ADMIN — OXYCODONE HYDROCHLORIDE AND ACETAMINOPHEN 1 TABLET: 5; 325 TABLET ORAL at 00:01

## 2020-01-01 RX ADMIN — METRONIDAZOLE 500 MG: 500 INJECTION, SOLUTION INTRAVENOUS at 08:50

## 2020-01-01 RX ADMIN — CLONIDINE HYDROCHLORIDE 0.2 MG: 0.1 TABLET ORAL at 03:23

## 2020-01-01 RX ADMIN — CARBAMIDE PEROXIDE 6.5% 5 DROP: 6.5 LIQUID AURICULAR (OTIC) at 16:55

## 2020-01-01 RX ADMIN — AMIODARONE HYDROCHLORIDE 400 MG: 200 TABLET ORAL at 10:21

## 2020-01-01 RX ADMIN — OXYCODONE HYDROCHLORIDE AND ACETAMINOPHEN 1 TABLET: 5; 325 TABLET ORAL at 04:31

## 2020-01-01 RX ADMIN — HEPARIN SODIUM 5000 UNITS: 5000 INJECTION INTRAVENOUS; SUBCUTANEOUS at 09:50

## 2020-01-01 RX ADMIN — IPRATROPIUM BROMIDE AND ALBUTEROL SULFATE 3 ML: 2.5; .5 SOLUTION RESPIRATORY (INHALATION) at 12:01

## 2020-01-01 RX ADMIN — Medication 10 ML: at 22:30

## 2020-01-01 RX ADMIN — ETOMIDATE: 2 INJECTION INTRAVENOUS at 12:31

## 2020-01-01 RX ADMIN — LORAZEPAM 1 MG: 2 INJECTION, SOLUTION INTRAMUSCULAR; INTRAVENOUS at 18:37

## 2020-01-01 RX ADMIN — ACETAMINOPHEN 1000 MG: 500 TABLET, FILM COATED ORAL at 03:28

## 2020-01-01 RX ADMIN — CARBAMIDE PEROXIDE 6.5% 5 DROP: 6.5 LIQUID AURICULAR (OTIC) at 23:09

## 2020-01-01 RX ADMIN — CALCIUM GLUCONATE 1 G: 98 INJECTION, SOLUTION INTRAVENOUS at 21:51

## 2020-01-01 RX ADMIN — ASPIRIN 325 MG ORAL TABLET 325 MG: 325 PILL ORAL at 08:48

## 2020-01-01 RX ADMIN — HEPARIN SODIUM 5000 UNITS: 5000 INJECTION INTRAVENOUS; SUBCUTANEOUS at 22:28

## 2020-01-01 RX ADMIN — CALCIUM GLUCONATE 1000 MG: 94 INJECTION, SOLUTION INTRAVENOUS at 18:41

## 2020-01-01 RX ADMIN — OXYCODONE 10 MG: 5 TABLET ORAL at 22:17

## 2020-01-01 RX ADMIN — DEXTROSE MONOHYDRATE 25 G: 25 INJECTION, SOLUTION INTRAVENOUS at 14:38

## 2020-01-01 RX ADMIN — ASPIRIN 81 MG CHEWABLE TABLET 162 MG: 81 TABLET CHEWABLE at 10:21

## 2020-01-01 RX ADMIN — ONDANSETRON 4 MG: 2 INJECTION INTRAMUSCULAR; INTRAVENOUS at 03:27

## 2020-01-01 RX ADMIN — ASPIRIN 81 MG CHEWABLE TABLET 162 MG: 81 TABLET CHEWABLE at 22:29

## 2020-01-01 RX ADMIN — DEXTROSE MONOHYDRATE 25 G: 25 INJECTION, SOLUTION INTRAVENOUS at 09:55

## 2020-01-01 RX ADMIN — DIPHENHYDRAMINE HYDROCHLORIDE 25 MG: 50 INJECTION, SOLUTION INTRAMUSCULAR; INTRAVENOUS at 11:30

## 2020-01-01 RX ADMIN — METOCLOPRAMIDE 5 MG: 5 INJECTION, SOLUTION INTRAMUSCULAR; INTRAVENOUS at 00:45

## 2020-01-01 RX ADMIN — HYDRALAZINE HYDROCHLORIDE 50 MG: 50 TABLET, FILM COATED ORAL at 22:36

## 2020-01-01 RX ADMIN — CALCIUM ACETATE 1334 MG: 667 CAPSULE ORAL at 16:55

## 2020-01-01 RX ADMIN — METRONIDAZOLE 500 MG: 500 INJECTION, SOLUTION INTRAVENOUS at 16:54

## 2020-01-01 RX ADMIN — DEXTROSE MONOHYDRATE 250 ML: 100 INJECTION, SOLUTION INTRAVENOUS at 20:49

## 2020-01-01 RX ADMIN — NITROGLYCERIN 50 MCG/MIN: 40 INJECTION INTRAVENOUS at 12:59

## 2020-01-01 RX ADMIN — ASPIRIN 81 MG: 81 TABLET, COATED ORAL at 09:22

## 2020-01-01 RX ADMIN — PERFLUTREN 1 ML: 6.52 INJECTION, SUSPENSION INTRAVENOUS at 15:49

## 2020-01-01 RX ADMIN — HYDROMORPHONE HYDROCHLORIDE 0.5 MG: 1 INJECTION, SOLUTION INTRAMUSCULAR; INTRAVENOUS; SUBCUTANEOUS at 14:53

## 2020-01-01 RX ADMIN — PANTOPRAZOLE SODIUM 40 MG: 40 TABLET, DELAYED RELEASE ORAL at 08:46

## 2020-01-01 RX ADMIN — AMIODARONE HYDROCHLORIDE 400 MG: 200 TABLET ORAL at 16:55

## 2020-01-01 RX ADMIN — PIPERACILLIN AND TAZOBACTAM 4.5 G: 4; .5 INJECTION, POWDER, FOR SOLUTION INTRAVENOUS at 03:05

## 2020-01-01 RX ADMIN — Medication 10 ML: at 17:00

## 2020-01-01 RX ADMIN — PROPOFOL 25 MCG/KG/MIN: 10 INJECTION, EMULSION INTRAVENOUS at 14:33

## 2020-01-01 RX ADMIN — DOXYCYCLINE 100 MG: 100 CAPSULE ORAL at 14:40

## 2020-01-01 RX ADMIN — OXYCODONE 10 MG: 5 TABLET ORAL at 02:42

## 2020-01-01 RX ADMIN — METOCLOPRAMIDE 5 MG: 5 INJECTION, SOLUTION INTRAMUSCULAR; INTRAVENOUS at 13:58

## 2020-01-01 RX ADMIN — AMIODARONE HYDROCHLORIDE 400 MG: 200 TABLET ORAL at 13:14

## 2020-01-01 RX ADMIN — CLONIDINE HYDROCHLORIDE 0.2 MG: 0.1 TABLET ORAL at 09:46

## 2020-01-01 RX ADMIN — DOXYCYCLINE 100 MG: 100 CAPSULE ORAL at 10:21

## 2020-01-01 RX ADMIN — PIPERACILLIN AND TAZOBACTAM 4.5 G: 4; .5 INJECTION, POWDER, FOR SOLUTION INTRAVENOUS at 17:30

## 2020-01-01 RX ADMIN — METOCLOPRAMIDE 5 MG: 5 INJECTION, SOLUTION INTRAMUSCULAR; INTRAVENOUS at 18:38

## 2020-01-01 RX ADMIN — CLONIDINE HYDROCHLORIDE 0.2 MG: 0.1 TABLET ORAL at 16:54

## 2020-01-01 RX ADMIN — PANTOPRAZOLE SODIUM 40 MG: 40 TABLET, DELAYED RELEASE ORAL at 08:25

## 2020-01-01 RX ADMIN — SODIUM CHLORIDE 1000 MG: 900 INJECTION, SOLUTION INTRAVENOUS at 22:36

## 2020-01-01 RX ADMIN — HEPARIN SODIUM 5000 UNITS: 5000 INJECTION INTRAVENOUS; SUBCUTANEOUS at 08:26

## 2020-01-01 RX ADMIN — PROPOFOL 40 MG: 10 INJECTION, EMULSION INTRAVENOUS at 13:00

## 2020-01-01 RX ADMIN — ETOMIDATE: 20 INJECTION, SOLUTION INTRAVENOUS at 12:31

## 2020-01-01 RX ADMIN — PROPOFOL 40 MG: 10 INJECTION, EMULSION INTRAVENOUS at 13:54

## 2020-01-01 RX ADMIN — OXYCODONE HYDROCHLORIDE AND ACETAMINOPHEN 1 TABLET: 5; 325 TABLET ORAL at 08:25

## 2020-01-01 RX ADMIN — METOCLOPRAMIDE 5 MG: 5 INJECTION, SOLUTION INTRAMUSCULAR; INTRAVENOUS at 05:02

## 2020-01-01 RX ADMIN — CHLORHEXIDINE GLUCONATE 10 ML: 1.2 RINSE ORAL at 21:33

## 2020-01-01 RX ADMIN — SINCALIDE 5 MCG: 5 INJECTION, POWDER, LYOPHILIZED, FOR SOLUTION INTRAVENOUS at 14:40

## 2020-01-01 RX ADMIN — FUROSEMIDE 20 MG: 10 INJECTION, SOLUTION INTRAMUSCULAR; INTRAVENOUS at 08:57

## 2020-01-01 RX ADMIN — ISOSORBIDE MONONITRATE 30 MG: 30 TABLET, EXTENDED RELEASE ORAL at 21:55

## 2020-01-01 RX ADMIN — LEVOFLOXACIN 750 MG: 5 INJECTION, SOLUTION INTRAVENOUS at 15:58

## 2020-01-01 RX ADMIN — PIPERACILLIN AND TAZOBACTAM 4.5 G: 4; .5 INJECTION, POWDER, FOR SOLUTION INTRAVENOUS at 16:15

## 2020-01-01 RX ADMIN — ISOSORBIDE MONONITRATE 30 MG: 30 TABLET, EXTENDED RELEASE ORAL at 21:45

## 2020-01-01 RX ADMIN — ASPIRIN 81 MG CHEWABLE TABLET 162 MG: 81 TABLET CHEWABLE at 21:33

## 2020-01-01 RX ADMIN — PIPERACILLIN SODIUM,TAZOBACTAM SODIUM 2.25 G: 2; .25 INJECTION, POWDER, FOR SOLUTION INTRAVENOUS at 11:34

## 2020-01-01 RX ADMIN — PIPERACILLIN AND TAZOBACTAM 4.5 G: 4; .5 INJECTION, POWDER, FOR SOLUTION INTRAVENOUS at 03:09

## 2020-01-01 RX ADMIN — IPRATROPIUM BROMIDE AND ALBUTEROL SULFATE 3 ML: .5; 3 SOLUTION RESPIRATORY (INHALATION) at 03:27

## 2020-01-01 RX ADMIN — DEXTROSE MONOHYDRATE 250 ML: 10 INJECTION, SOLUTION INTRAVENOUS at 20:49

## 2020-01-01 RX ADMIN — METRONIDAZOLE 500 MG: 500 INJECTION, SOLUTION INTRAVENOUS at 23:37

## 2020-01-01 RX ADMIN — ATORVASTATIN CALCIUM 40 MG: 20 TABLET, FILM COATED ORAL at 16:55

## 2020-01-01 RX ADMIN — METOCLOPRAMIDE 5 MG: 5 INJECTION, SOLUTION INTRAMUSCULAR; INTRAVENOUS at 23:04

## 2020-01-01 RX ADMIN — PREDNISONE 50 MG: 50 TABLET ORAL at 16:55

## 2020-01-01 RX ADMIN — AMLODIPINE BESYLATE 10 MG: 10 TABLET ORAL at 09:46

## 2020-01-01 RX ADMIN — IPRATROPIUM BROMIDE AND ALBUTEROL SULFATE 3 ML: .5; 3 SOLUTION RESPIRATORY (INHALATION) at 08:48

## 2020-01-01 RX ADMIN — HEPARIN SODIUM 5000 UNITS: 5000 INJECTION INTRAVENOUS; SUBCUTANEOUS at 16:15

## 2020-01-01 RX ADMIN — ONDANSETRON 4 MG: 2 INJECTION INTRAMUSCULAR; INTRAVENOUS at 08:26

## 2020-01-01 RX ADMIN — CARVEDILOL 25 MG: 12.5 TABLET, FILM COATED ORAL at 16:54

## 2020-01-01 RX ADMIN — Medication 10 ML: at 21:47

## 2020-01-01 RX ADMIN — SODIUM POLYSTYRENE SULFONATE 30 G: 15 SUSPENSION ORAL; RECTAL at 07:17

## 2020-01-01 RX ADMIN — Medication 10 ML: at 21:58

## 2020-01-01 RX ADMIN — HEPARIN SODIUM 4300 UNITS: 1000 INJECTION INTRAVENOUS; SUBCUTANEOUS at 02:32

## 2020-01-01 RX ADMIN — HYDROMORPHONE HYDROCHLORIDE 0.5 MG: 1 INJECTION, SOLUTION INTRAMUSCULAR; INTRAVENOUS; SUBCUTANEOUS at 18:59

## 2020-01-01 RX ADMIN — LOSARTAN POTASSIUM 100 MG: 50 TABLET, FILM COATED ORAL at 13:14

## 2020-01-01 RX ADMIN — MIDAZOLAM HYDROCHLORIDE 2 MG: 1 INJECTION, SOLUTION INTRAMUSCULAR; INTRAVENOUS at 12:37

## 2020-01-01 RX ADMIN — AMIODARONE HYDROCHLORIDE 400 MG: 200 TABLET ORAL at 09:47

## 2020-01-01 RX ADMIN — HYDRALAZINE HYDROCHLORIDE 50 MG: 50 TABLET, FILM COATED ORAL at 21:55

## 2020-01-01 RX ADMIN — PANTOPRAZOLE SODIUM 40 MG: 40 TABLET, DELAYED RELEASE ORAL at 09:08

## 2020-01-01 RX ADMIN — METOCLOPRAMIDE 5 MG: 5 INJECTION, SOLUTION INTRAMUSCULAR; INTRAVENOUS at 05:49

## 2020-01-01 RX ADMIN — METOCLOPRAMIDE 5 MG: 5 INJECTION, SOLUTION INTRAMUSCULAR; INTRAVENOUS at 00:12

## 2020-01-01 RX ADMIN — Medication 10 ML: at 05:09

## 2020-01-01 RX ADMIN — ONDANSETRON 4 MG: 2 INJECTION INTRAMUSCULAR; INTRAVENOUS at 20:31

## 2020-01-01 RX ADMIN — HYDRALAZINE HYDROCHLORIDE 50 MG: 50 TABLET, FILM COATED ORAL at 16:55

## 2020-01-01 RX ADMIN — HUMAN INSULIN 10 UNITS: 100 INJECTION, SOLUTION SUBCUTANEOUS at 19:53

## 2020-01-01 RX ADMIN — OXYCODONE HYDROCHLORIDE AND ACETAMINOPHEN 1 TABLET: 5; 325 TABLET ORAL at 13:15

## 2020-01-01 RX ADMIN — MIDAZOLAM 2 MG: 1 INJECTION INTRAMUSCULAR; INTRAVENOUS at 13:42

## 2020-01-01 RX ADMIN — HEPARIN SODIUM 5000 UNITS: 5000 INJECTION INTRAVENOUS; SUBCUTANEOUS at 09:08

## 2020-01-01 RX ADMIN — PIPERACILLIN AND TAZOBACTAM 4.5 G: 4; .5 INJECTION, POWDER, FOR SOLUTION INTRAVENOUS at 04:00

## 2020-01-01 RX ADMIN — KETOROLAC TROMETHAMINE 30 MG: 30 INJECTION, SOLUTION INTRAMUSCULAR at 16:54

## 2020-01-01 RX ADMIN — LOSARTAN POTASSIUM 100 MG: 50 TABLET, FILM COATED ORAL at 13:02

## 2020-01-01 RX ADMIN — SODIUM BICARBONATE 50 MEQ: 84 INJECTION, SOLUTION INTRAVENOUS at 19:53

## 2020-01-09 ENCOUNTER — HOSPITAL ENCOUNTER (EMERGENCY)
Age: 50
Discharge: HOME | End: 2020-01-09
Attending: EMERGENCY MEDICINE | Admitting: EMERGENCY MEDICINE
Payer: MEDICARE

## 2020-01-09 ENCOUNTER — APPOINTMENT (OUTPATIENT)
Dept: CT IMAGING | Age: 50
End: 2020-01-09
Attending: EMERGENCY MEDICINE
Payer: MEDICARE

## 2020-01-09 ENCOUNTER — APPOINTMENT (OUTPATIENT)
Dept: GENERAL RADIOLOGY | Age: 50
End: 2020-01-09
Attending: PHYSICIAN ASSISTANT
Payer: MEDICARE

## 2020-01-09 ENCOUNTER — APPOINTMENT (OUTPATIENT)
Dept: VASCULAR SURGERY | Age: 50
End: 2020-01-09
Attending: EMERGENCY MEDICINE
Payer: MEDICARE

## 2020-01-09 VITALS
WEIGHT: 230 LBS | DIASTOLIC BLOOD PRESSURE: 71 MMHG | BODY MASS INDEX: 32.93 KG/M2 | RESPIRATION RATE: 16 BRPM | SYSTOLIC BLOOD PRESSURE: 146 MMHG | HEIGHT: 70 IN | TEMPERATURE: 98.3 F | OXYGEN SATURATION: 98 % | HEART RATE: 75 BPM

## 2020-01-09 DIAGNOSIS — Z99.2 ENCOUNTER FOR DIALYSIS (HCC): ICD-10-CM

## 2020-01-09 DIAGNOSIS — R06.02 SOB (SHORTNESS OF BREATH): ICD-10-CM

## 2020-01-09 DIAGNOSIS — R60.9 PERIPHERAL EDEMA: Primary | ICD-10-CM

## 2020-01-09 LAB
ALBUMIN SERPL-MCNC: 3.1 G/DL (ref 3.4–5)
ALBUMIN/GLOB SERPL: 0.9 {RATIO} (ref 0.8–1.7)
ALP SERPL-CCNC: 93 U/L (ref 45–117)
ALT SERPL-CCNC: 32 U/L (ref 16–61)
ANION GAP SERPL CALC-SCNC: 15 MMOL/L (ref 3–18)
AST SERPL-CCNC: 18 U/L (ref 10–38)
ATRIAL RATE: 76 BPM
BASOPHILS # BLD: 0 K/UL (ref 0–0.1)
BASOPHILS NFR BLD: 0 % (ref 0–2)
BILIRUB SERPL-MCNC: 0.3 MG/DL (ref 0.2–1)
BUN SERPL-MCNC: 72 MG/DL (ref 7–18)
BUN/CREAT SERPL: 5 (ref 12–20)
CALCIUM SERPL-MCNC: 7.9 MG/DL (ref 8.5–10.1)
CALCULATED P AXIS, ECG09: 34 DEGREES
CALCULATED R AXIS, ECG10: 32 DEGREES
CALCULATED T AXIS, ECG11: 61 DEGREES
CHLORIDE SERPL-SCNC: 98 MMOL/L (ref 100–111)
CK MB CFR SERPL CALC: 8.4 % (ref 0–4)
CK MB SERPL-MCNC: 11.8 NG/ML (ref 5–25)
CK SERPL-CCNC: 141 U/L (ref 39–308)
CO2 SERPL-SCNC: 21 MMOL/L (ref 21–32)
CREAT SERPL-MCNC: 15.1 MG/DL (ref 0.6–1.3)
DIAGNOSIS, 93000: NORMAL
DIFFERENTIAL METHOD BLD: ABNORMAL
EOSINOPHIL # BLD: 0.6 K/UL (ref 0–0.4)
EOSINOPHIL NFR BLD: 6 % (ref 0–5)
ERYTHROCYTE [DISTWIDTH] IN BLOOD BY AUTOMATED COUNT: 15.9 % (ref 11.6–14.5)
GLOBULIN SER CALC-MCNC: 3.4 G/DL (ref 2–4)
GLUCOSE SERPL-MCNC: 78 MG/DL (ref 74–99)
HCT VFR BLD AUTO: 25.4 % (ref 36–48)
HGB BLD-MCNC: 8.3 G/DL (ref 13–16)
LYMPHOCYTES # BLD: 2.5 K/UL (ref 0.9–3.6)
LYMPHOCYTES NFR BLD: 25 % (ref 21–52)
MCH RBC QN AUTO: 31 PG (ref 24–34)
MCHC RBC AUTO-ENTMCNC: 32.7 G/DL (ref 31–37)
MCV RBC AUTO: 94.8 FL (ref 74–97)
MONOCYTES # BLD: 0.7 K/UL (ref 0.05–1.2)
MONOCYTES NFR BLD: 7 % (ref 3–10)
NEUTS SEG # BLD: 6.2 K/UL (ref 1.8–8)
NEUTS SEG NFR BLD: 62 % (ref 40–73)
P-R INTERVAL, ECG05: 158 MS
PLATELET # BLD AUTO: 107 K/UL (ref 135–420)
PMV BLD AUTO: 9.2 FL (ref 9.2–11.8)
POTASSIUM SERPL-SCNC: 5.1 MMOL/L (ref 3.5–5.5)
PROT SERPL-MCNC: 6.5 G/DL (ref 6.4–8.2)
Q-T INTERVAL, ECG07: 388 MS
QRS DURATION, ECG06: 88 MS
QTC CALCULATION (BEZET), ECG08: 436 MS
RBC # BLD AUTO: 2.68 M/UL (ref 4.7–5.5)
SODIUM SERPL-SCNC: 134 MMOL/L (ref 136–145)
TROPONIN I SERPL-MCNC: <0.02 NG/ML (ref 0–0.04)
VENTRICULAR RATE, ECG03: 76 BPM
WBC # BLD AUTO: 10.1 K/UL (ref 4.6–13.2)

## 2020-01-09 PROCEDURE — 74011250637 HC RX REV CODE- 250/637: Performed by: EMERGENCY MEDICINE

## 2020-01-09 PROCEDURE — 71275 CT ANGIOGRAPHY CHEST: CPT

## 2020-01-09 PROCEDURE — 74011636320 HC RX REV CODE- 636/320: Performed by: EMERGENCY MEDICINE

## 2020-01-09 PROCEDURE — 80053 COMPREHEN METABOLIC PANEL: CPT

## 2020-01-09 PROCEDURE — 85025 COMPLETE CBC W/AUTO DIFF WBC: CPT

## 2020-01-09 PROCEDURE — 71046 X-RAY EXAM CHEST 2 VIEWS: CPT

## 2020-01-09 PROCEDURE — 99284 EMERGENCY DEPT VISIT MOD MDM: CPT

## 2020-01-09 PROCEDURE — 93971 EXTREMITY STUDY: CPT

## 2020-01-09 PROCEDURE — 74011000258 HC RX REV CODE- 258: Performed by: EMERGENCY MEDICINE

## 2020-01-09 PROCEDURE — 74011250636 HC RX REV CODE- 250/636: Performed by: INTERNAL MEDICINE

## 2020-01-09 PROCEDURE — 82550 ASSAY OF CK (CPK): CPT

## 2020-01-09 PROCEDURE — 93005 ELECTROCARDIOGRAM TRACING: CPT

## 2020-01-09 PROCEDURE — 90935 HEMODIALYSIS ONE EVALUATION: CPT

## 2020-01-09 RX ORDER — SODIUM CHLORIDE 9 MG/ML
100 INJECTION, SOLUTION INTRAVENOUS ONCE
Status: COMPLETED | OUTPATIENT
Start: 2020-01-09 | End: 2020-01-09

## 2020-01-09 RX ORDER — ACETAMINOPHEN 500 MG
1000 TABLET ORAL
Status: COMPLETED | OUTPATIENT
Start: 2020-01-09 | End: 2020-01-09

## 2020-01-09 RX ORDER — HEPARIN SODIUM 1000 [USP'U]/ML
1000 INJECTION, SOLUTION INTRAVENOUS; SUBCUTANEOUS
Status: DISCONTINUED | OUTPATIENT
Start: 2020-01-09 | End: 2020-01-09 | Stop reason: HOSPADM

## 2020-01-09 RX ORDER — ALBUTEROL SULFATE 90 UG/1
2 AEROSOL, METERED RESPIRATORY (INHALATION)
Qty: 1 INHALER | Refills: 5 | Status: SHIPPED | OUTPATIENT
Start: 2020-01-09

## 2020-01-09 RX ORDER — HEPARIN SODIUM 5000 [USP'U]/ML
1000 INJECTION, SOLUTION INTRAVENOUS; SUBCUTANEOUS
Status: ACTIVE | OUTPATIENT
Start: 2020-01-09 | End: 2020-01-09

## 2020-01-09 RX ADMIN — SODIUM CHLORIDE 93 ML: 900 INJECTION, SOLUTION INTRAVENOUS at 16:11

## 2020-01-09 RX ADMIN — IOPAMIDOL 80 ML: 755 INJECTION, SOLUTION INTRAVENOUS at 16:12

## 2020-01-09 RX ADMIN — HEPARIN SODIUM 4.3 UNITS: 1000 INJECTION INTRAVENOUS; SUBCUTANEOUS at 19:26

## 2020-01-09 RX ADMIN — ACETAMINOPHEN 1000 MG: 500 TABLET ORAL at 14:36

## 2020-01-09 NOTE — PROGRESS NOTES
Orders for dialysis after cta ordered, dialysis nurse notified. If d/darwin, f/u at op unit on [address].

## 2020-01-09 NOTE — ED NOTES
I performed a brief history of the patient here in triage and I have determined that pt will need further treatment and evaluation from the main side ER physician. I have placed initial orders based on the history to help in expediting patients care.        Visit Vitals  BP (!) 145/110 (BP 1 Location: Right arm, BP Patient Position: At rest)   Pulse 85   Temp 98.6 °F (37 °C)   Resp 16   Ht 5' 10\" (1.778 m)   Wt 104.3 kg (230 lb)   SpO2 98%   BMI 33.00 kg/m²      OCTAVIO De Jesus 12:33 PM
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
## 2020-01-09 NOTE — DIALYSIS
[facility] ACUTE HEMODIAL[facility]IS FLOW SHEET   PATIENT INFORMATION           Jose Montano          MRN: [id number]      [facility]:[id number]  [place]# Room#ER05/05          Hospital: [place] [address] DX: ESRD          []1st Time Acute  []Stat[x] Routine []Urgent []Chronic Unit          [x]Acute Room []Bedside  []ICU/CCU []ER         Isolation Precautions: [x]Dialysis[] Airborne []Contact []Droplet []Reverse          Special Considerations:    [] Blood Consent Verified  []N/A         Allergies:  [] NKA  [] Reviewed  Allergies   Allergen Reactions    Latex Other (comments)     blisters    Other Food Hives     Allergic to tomatoes and tomato sauce    Keflex [Cephalexin] Anaphylaxis    Codeine Nausea and Vomiting      Code Status [x]Full Code [] DNR  []Other        Diet:  Diabetic: []Yes []No      HEMODIALYSIS ORDERS         Physician: Rafat   Dialyzer Revaclear 300 Duration 2.5 BFR: 350 DFR:600    Dialysate: K 2 CA 2.5 Na 140 Bicarb 35        Dry Weight: UF Goal: 3000 ml         Heparin:  []Bolus   Units    []Hourly  Units      [x]None       BP Tx:  []NS  200ml/Bolus    []Other     []N/A       Labs: Pre:  Post: []N/A             Additional Orders: []N/A          [x]Signed Treatment Consent   [x] Verified   [x] Obtained    PRIMARY NURSE REPORT: FIRST INITIAL/ LAST NAME/TITLE        PRE DIALYSIS: JOHN PAUL Mckeon RN    TIME: 1600    ACCESS   CATHETER ACCESS:  [] N/A  [x] RIGHT  [] LEFT  [] IJ  [x] SUBCL [] FEM                          [] First use X-ray  [] Tunnel     [] Non-Tunneled                          [x] No S/S infection  [] Redness [] Drainage  [] Cultured [] Swelling                         [] Pain                          [x] Medical Aseptic [] Prep Dressing Changed                          [] Clotted [] Patent []      Flows: [x] Good [] Poor [] Reversed             If Access Problem Dr. Maryjane Clark: [] Yes [] No    Date:    [] N/A        GRAFT/FISTULA ACCESS:  [x] N/A  [] RIGHT  [] LEFT  [] UE  [] LE [] AVG  [] AVF [] BUTTONHOLE  [] +BRUIT/THRILL       [] MEDICAL ASEPTIC PREP   [] No S/S infection  [] Redness [] Drainage  [] Cultured [] Swelling                         [] Pain                          Needle Gauge                              Length: If Access Problem Dr. Phyllis Johnson: [] Yes [] No    Date:     [] N/A   GENERAL ASSESSMENT        LUNGS:  SaO2%  [x] Clear [] Coarse [] Crackles [] Wheezing               [] Diminished Location: [] RLL [] LLL [] RUL [] NIKOS         COUGH:  [] Productive [] Dry [x] N/A  RESPIRATIONS: [x] Easy [] Labored        THERAPY: [x] RA   [] NC     L/min    Mask: [] NRB [] Venti  O2%                  [] Ventilator [] Intubated [] Trach [] BiPap [] CPap       CARDIAC: [x] Regular [] Irregular [] Pericardial Rub [] JVD                [] Monitored Rhythm:        EDEMA: [] None [x] Generalized [] Facial [] Pedal [] UE [] LE             [] Pitting [] 1 [] 2 [] 3 [] 4    [] Right [] Left [] Bilateral       SKIN:    [x] Warm [] Hot [] Cold  [x] Dry [] Pale [] Diaphoretic              [] Flushed [] Jaundiced [] Cyanotic [] Rash [] Weeping         LOC:    [x] Alert  Oriented to: [x] Person [x] Place [x] Time             [] Confused [] Lethargic [] Medically sedated [] Non-responsive        GI/ABDOMEN: [] Flat [] Distended [x] Soft [] Firm [] Obese [] Diarrhea   [x] Bowel Sounds     []Nausea [] Vomiting         PAIN: [x] 0 [] 1 [] 2 [] 3 [] 4 [] 5 [] 6 [] 7 [] 8 [] 9 [] 10          Scale 1-10 Action/Follow Up        MOBILITY: [] Amb [] Amb/Assist [x] Bed  [] Wheelchair    CURRENT LABS   HBsAg ONLY: Date Drawn 11/28/19 [x] Negative [] Positive  [] Unknown.      HBsAb: Date Drawn 11/28/19 [x] Susceptible <10 [] Immune ?10 [] Unknown       Date of Current Labs:        Lab Results   Component Value Date/Time    Sodium 134 (L) 01/09/2020 01:41 PM    Potassium 5.1 01/09/2020 01:41 PM    Chloride 98 (L) 01/09/2020 01:41 PM    CO2 21 01/09/2020 01:41 PM    BUN 72 (H) 01/09/2020 01:41 PM    Creatinine 15.10 (H) 01/09/2020 01:41 PM    Calcium 7.9 (L) 01/09/2020 01:41 PM    Magnesium 2.7 (H) 04/18/2019 11:57 AM    Phosphorus 5.5 (H) 10/24/2018 06:26 AM    HCT 25.4 (L) 01/09/2020 01:41 PM    HGB 8.3 (L) 01/09/2020 01:41 PM    PLATELET 445 (L) 60/97/3409 01:41 PM    INR 1.0 03/04/2019 10:40 AM      EDUCATION        Person Educated: [x] Patient [] Family [] Other         Knowledge base: [] None [] Minimal [x] Substantial         Barriers to learning  [x] None         Preferred method of learning: [] Written [x] Oral [] Visual [] Hands on        Topic: [] Access Care [] S&S of infection [] Fluid Management [] K+                 [x] Procedural    [] Albumin [] Medications [] Tx Options [] Transplant                  [] Diet [] Other         Teaching Tools: [x] Explain [] Demonstration [] Handout [x] Arlette Mccabe [address] Serial #   [] # 1 [id number]   [] # 2 [id number]    [x] # 3 [id number]   [] # 4 [id number]   [] # 5 [id number]   [] # 4 [id number]   [] # 7 [id number]      Alarm Test: [x] Pass Time 1630      RO Serial #   [] [id number] (Large dual station RO)  [] # 1  [id number]  (Portable # 1)  [] # 2  [id number]  (Portable # 2)  [x] # 3  [id number]  (Portable # 3)  [] # 4  [id number]  (Portable # 4)  [] # 5  [id number]  (Portable # 5)       Lot #s: Dialyzer [id number] exp. 12/09/2022  Tubing 97P14-9 exp. 08/31/2024  Saline [id number] exp.07/21   [x] RO/Machine Log Complete    [x] Extracorporeal circuit Tested for integrity       Dialysate: pH 7.4 Temp.  36  Conductivity: Meter 14.1 HD Machine 14.3   CHLORINE TESTING- BEFORE EACH TREATMENT AND EVERY 4 HOURS       Total Chlorine: [x] Less than 0.1 ppm Time: 1600 2nd Check Time:   (If greater than 0.1 ppm from Primary then every 30 minutes from Secondary)   TREATMENT INIATION-WITH DIALYSIS PRECAUTIONS   [x] All Connections Secured   [x] Saline Line Double Clamped     [x] Venous Parameters Set    [x] Arterial Parameters Set    [x] Prime Given 250 ml            [x] Air Foam Detector Engaged        Liz[facility] Nurse Signature/Title_Mickey Walker RN__    PRE-TREATMENT   Time 1631 B/P 148/83 HR 83 Temp. 98.3 Resp Rate 16 Pre Wt   UF Calculations: Wt to lose:2500 ml(+) Oral:  ml(+) IV Meds/Fluids/Blood prods  ml(+)  Prime/Rinse 500 ml  (=)Total UF Goal 3000 mL   Scale Type:[] Bed scale [] Sling Scale [] Wheel Chair Scale [x]  Not Ordered [] Unable to obtain pt on stretcher   Tx Initiation Note: Patient arrived via stretcher from the ER. Awake alert and oriented x3. Patient said he wanted maximum of 2.5 hrs of HD and 2500 mls fluid removed against the MD order. Dr. Shey Neri notified, said to make a note it and follow what the patient said. [x] Time Out/Safety Check  Time: 1640    [facility]izeth Signature/Title_Mickey Walker RN   INTRADIALYTIC MONITORING  (SEE ATTACHED FLOWSHEET)   POST TREATMENT    Time 1921 B/P 146/71 HR 71 Temp  Resp. Rate 16 Post wt    Time Medication Dose Volume Route Initials                                   [facility] Signatures Title Initials Time   Andrea Juarez RN   RN EO 1925               L[facility] Signature/Title:_Mickey Walker RN_                                  Dialyzer cleared: [] Good [] Fair [] Poor    Blood Processed 50.6 Liters            Net UF Removed 2500 mL    Post Tx Access:    AVF/AVG: Bleeding Stop                   Art. min Ashok min []+bruit/thrill                             Catheter: Locking Solution Heparin     Art. 2.1 ml Ashok 2.2 ml    Post Assessment:  Skin: [x]Warm [x]Dry []Diaphoretic []Flushed [] Pale []Cyanotic          Lungs: [x]Clear []Coarse []Crackles []Wheezing          Cardiac: [x]Regular []Irregular  []Monitored rhythm          Edema: []None [x]General []Facial []Pedal  []UE []LE []RIGHT                      []LEFT    []Bilateral      Pain: [x]0 []1[]2 []3 []4 []5 []6 []7 []8 []9 []10   POST Tx Note: Patient completed and tolerated 2.5 hrs of HD. 2500 mls fluid removed.  Patient returned back to ER for discharge. Primary Nurse Report: First initial/Last name/Title    Post Dialysis: RAFAEL Maciel RN         [id number]    Abbreviations: AVG-arterial venous graft, AVF-arterial venous fistula, IJ-Internal Jugular,  Subcl-Subclavian, Fem-Femoral, Tx-treatment, AP/HR-apical heart rate, DFR-dialysate flow rate, BFR-blood flow rate, AP-arterial pressure, -venous pressure, UF-ultrafiltrate, TMP-transmembrane pressure, Ashok-Venous, Art-Arterial, RO-Reverse Osmosis

## 2020-01-09 NOTE — ED TRIAGE NOTES
Pt has left leg pain. Feels swollen and hurts to walk. Last had dialysis Saturday . Missed last two . Dx w/ pnuemonia. Feels SOB.

## 2020-01-10 NOTE — ED NOTES
1800 took signout from prior team.  Patient in dialysis at this time. ED Course as of Jan 09 1951   Veatrice Goodpasture Jan 09, 2020 1949 Patient without hemodialysis. Patient reassessed and is requesting discharge home. Patient feeling well. [DT]      ED Course User Index  [DT] Paola Brush MD     Patient is stable condition at time of discharge. Return precautions reviewed with the patient.

## 2020-01-10 NOTE — DIALYSIS
Patient said he will only run for 2-2.5 hrs of hemodialysis with a uf goal of 2500 ml, against MD order of 4 hrs and 4000 ml uf goal. Dr. Lachelle Mathews notified, said to make a note and do what the patient want. Patient completed and tolerated 2.5 hrs of HD with 2500 ml fluid removed. Pt. returned to ER for discharge.

## 2020-01-10 NOTE — DISCHARGE INSTRUCTIONS
Patient Education        Shortness of Breath in Children: Care Instructions  Your Care Instructions  Shortness of breath has many causes. Sometimes conditions such as anxiety can lead to shortness of breath. Some children get mild shortness of breath when they exercise. Trouble breathing also can be a symptom of a serious problem, such as asthma, lung disease, heart problems, and pneumonia. If your child's shortness of breath continues, he or she may need tests and treatment. Watch for any changes in your child's breathing and other symptoms. Follow-up care is a key part of your child's treatment and safety. Be sure to make and go to all appointments, and call your doctor if your child is having problems. It's also a good idea to know your child's test results and keep a list of the medicines your child takes. How can you care for your child at home? · Keep your child away from smoke. Do not smoke or let anyone else smoke around your child or in your house. · Make sure your child gets plenty of rest and sleep. · Have your child take medicines exactly as prescribed. Call your doctor if you think your child is having a problem with his or her medicine. · Help your child find healthy ways to deal with stress. ? Have your child exercise daily. ? Make sure your child gets plenty of sleep. ? Make sure your child eats regularly and well. When should you call for help? Call 911 anytime you think your child may need emergency care. For example, call if:    · Your child has severe trouble breathing. Symptoms may include:  ? Using the belly muscles to breathe. ? The chest sinking in or the nostrils flaring when your child struggles to breathe.    Call your doctor now or seek immediate medical care if:    · Your child's shortness of breath gets worse or your child starts to wheeze.  Wheezing is a high-pitched sound when your child breathes.     · Your child wakes up at night out of breath or has to prop up his or her head on several pillows to breathe.     · Your child is short of breath after only light activity or while at rest.    Watch closely for changes in your child's health, and be sure to contact your doctor if:    · Your child does not get better over the next 1 to 2 days. Where can you learn more? Go to http://berta-keyla.info/. Enter O687 in the search box to learn more about \"Shortness of Breath in Children: Care Instructions. \"  Current as of: June 9, 2019  Content Version: 12.2  © 3192-9794 [employer]. Care instructions adapted under license by [employer] (which disclaims liability or warranty for this information). If you have questions about a medical condition or this instruction, always ask your healthcare professional. [address] any warranty or liability for your use of this information. Patient Education        Kidney Dialysis: Care Instructions  Your Care Instructions    Dialysis is a process that filters wastes from the blood when your kidneys can no longer do the job. It is not a cure, but it can help you live longer and feel better. It is a lifesaving treatment when you have kidney failure. Normal kidneys work 24 hours a day to clean wastes from your blood. Your kidneys are not able to do this job, so a process called dialysis will do some of the work for your kidneys. You and your doctor will decide which type of dialysis you should have. Peritoneal dialysis uses the lining of your belly (peritoneum) to filter your blood. You can do it at home, on a daily basis. Hemodialysis uses a man-made filter called a dialyzer to clean your blood. Most people need to go to a hospital or clinic 3 days a week for several hours each time. Sometimes hemodialysis can be done at home. It is normal to have questions about your treatment, and you have a right to know what is happening to you.  Learning about dialysis can help you take an active role in your treatment. Dialysis does not cure kidney disease, but it can help you live longer and feel better. You will need to follow your diet and treatment schedule carefully. Follow-up care is a key part of your treatment and safety. Be sure to make and go to all appointments, and call your doctor if you are having problems. It's also a good idea to know your test results and keep a list of the medicines you take. What do you need to know about peritoneal dialysis? Peritoneal dialysis uses the lining of your belly (or peritoneal membrane) to filter your blood. Before you can begin peritoneal dialysis, your doctor will need to place a thin tube called a catheter in your belly. This is the dialysis access. · Peritoneal dialysis can be done at home or in any clean place. You may be able to do it while you sleep. · You can do it by yourself. You do not have to rely on help from others. · You can do it at the times you choose as long as you do the right number of treatments. · It has to be done every day of the week. · Some people find it hard to do all the required steps. · It increases your chance for a serious infection of the lining of the belly (peritoneum). What do you need to know about hemodialysis? Hemodialysis uses a man-made membrane called a dialyzer to clean your blood. You are connected to the dialyzer by tubes attached to your blood vessels. Before you start hemodialysis, your doctor will create a site where the blood can flow in and out of your body during your dialysis sessions. This site is called the vascular access. It may be a fistula, made by connecting an artery and a vein. Or it may be a graft, which is a tube implanted under your skin. · Hemodialysis is done mainly by trained health workers who can watch for any problems. · It allows you to be in contact with other people having dialysis. This can help provide emotional support.   · You can schedule your treatments in the evenings so you can keep working. · You may be able to do home hemodialysis, which gives you more control over your schedule. · It usually needs to be done on a set schedule 3 times a week. · It can cause side effects. The most common side effects are low blood pressure and muscle cramps. These can often be treated easily. · It requires needle sticks during every treatment, which bothers some people. Others get used to it and even do the needle sticks themselves. How can you care for yourself at home? · Be sure to have all of your dialysis sessions. Do not try to shorten or skip your sessions. You have a better chance of a longer and healthier life by getting your full treatment. · Your doctor or health care team will show you the steps you need to go through each day before, during, and after dialysis. Be sure to follow these steps. If you do not understand a step, talk to your team.  · Your doctor and dietitian will help you design menus that follow your diet. Be sure to follow your diet guidelines. ? You will need to limit fluids and certain foods that contain salt (sodium), potassium, and phosphorus. ? You may need to follow a heart-healthy diet to keep the fat (cholesterol) in your blood under control. ? You may need higher levels of protein in your diet. · Your doctor may recommend certain vitamins. But do not take any other medicine, including over-the-counter medicines, vitamins, and herbal products, without talking to your doctor first.  · Do not smoke. Smoking raises your risk of many health problems, including more kidney damage. If you need help quitting, talk to your doctor about stop-smoking programs and medicines. These can increase your chances of quitting for good. · Do not take ibuprofen (Advil, Motrin), naproxen (Aleve), or similar medicines, unless your doctor tells you to. These medicines may make kidney problems worse. When should you call for help?   Call your doctor now or seek immediate medical care if:    · You have a fever.     · You are dizzy or lightheaded, or you feel like you may faint.     · You are confused or cannot think clearly.     · You have new or worse nausea or vomiting.     · You have new or more blood in your urine.     · You have new swelling.    Watch closely for changes in your health, and be sure to contact your doctor if:    · You do not get better as expected. Where can you learn more? Go to http://berta-keyla.info/. Enter K797 in the search box to learn more about \"Kidney Dialysis: Care Instructions. \"  Current as of: October 31, 2018  Content Version: 12.2  © 6978-4109 [employer]. Care instructions adapted under license by [employer] (which disclaims liability or warranty for this information). If you have questions about a medical condition or this instruction, always ask your healthcare professional. [address] any warranty or liability for your use of this information.

## 2020-01-24 ENCOUNTER — HOSPITAL ENCOUNTER (EMERGENCY)
Age: 50
Discharge: OTHER INSTITUTION | End: 2020-01-24
Attending: EMERGENCY MEDICINE
Payer: MEDICARE

## 2020-01-24 VITALS
BODY MASS INDEX: 32.93 KG/M2 | OXYGEN SATURATION: 100 % | DIASTOLIC BLOOD PRESSURE: 86 MMHG | RESPIRATION RATE: 16 BRPM | SYSTOLIC BLOOD PRESSURE: 153 MMHG | TEMPERATURE: 98.6 F | HEART RATE: 93 BPM | WEIGHT: 230 LBS | HEIGHT: 70 IN

## 2020-01-24 DIAGNOSIS — H54.62 VISION LOSS OF LEFT EYE: Primary | ICD-10-CM

## 2020-01-24 LAB
ANION GAP SERPL CALC-SCNC: 10 MMOL/L (ref 3–18)
BASOPHILS # BLD: 0 K/UL (ref 0–0.1)
BASOPHILS NFR BLD: 1 % (ref 0–2)
BUN SERPL-MCNC: 49 MG/DL (ref 7–18)
BUN/CREAT SERPL: 3 (ref 12–20)
CALCIUM SERPL-MCNC: 8.8 MG/DL (ref 8.5–10.1)
CHLORIDE SERPL-SCNC: 104 MMOL/L (ref 100–111)
CO2 SERPL-SCNC: 24 MMOL/L (ref 21–32)
CREAT SERPL-MCNC: 14.7 MG/DL (ref 0.6–1.3)
DIFFERENTIAL METHOD BLD: ABNORMAL
EOSINOPHIL # BLD: 0.7 K/UL (ref 0–0.4)
EOSINOPHIL NFR BLD: 8 % (ref 0–5)
ERYTHROCYTE [DISTWIDTH] IN BLOOD BY AUTOMATED COUNT: 16.2 % (ref 11.6–14.5)
GLUCOSE SERPL-MCNC: 88 MG/DL (ref 74–99)
HCT VFR BLD AUTO: 25.2 % (ref 36–48)
HGB BLD-MCNC: 8 G/DL (ref 13–16)
LYMPHOCYTES # BLD: 2.8 K/UL (ref 0.9–3.6)
LYMPHOCYTES NFR BLD: 36 % (ref 21–52)
MCH RBC QN AUTO: 30.1 PG (ref 24–34)
MCHC RBC AUTO-ENTMCNC: 31.7 G/DL (ref 31–37)
MCV RBC AUTO: 94.7 FL (ref 74–97)
MONOCYTES # BLD: 0.6 K/UL (ref 0.05–1.2)
MONOCYTES NFR BLD: 7 % (ref 3–10)
NEUTS SEG # BLD: 3.9 K/UL (ref 1.8–8)
NEUTS SEG NFR BLD: 48 % (ref 40–73)
PLATELET # BLD AUTO: 129 K/UL (ref 135–420)
PMV BLD AUTO: 8.5 FL (ref 9.2–11.8)
POTASSIUM SERPL-SCNC: 5.2 MMOL/L (ref 3.5–5.5)
RBC # BLD AUTO: 2.66 M/UL (ref 4.7–5.5)
SODIUM SERPL-SCNC: 138 MMOL/L (ref 136–145)
WBC # BLD AUTO: 7.9 K/UL (ref 4.6–13.2)

## 2020-01-24 PROCEDURE — 80048 BASIC METABOLIC PNL TOTAL CA: CPT

## 2020-01-24 PROCEDURE — 85025 COMPLETE CBC W/AUTO DIFF WBC: CPT

## 2020-01-24 PROCEDURE — 99284 EMERGENCY DEPT VISIT MOD MDM: CPT

## 2020-01-24 NOTE — ED TRIAGE NOTES
Pt states left eye suddenly loss vision. Everything a big blur. 2 days ago. No injury or pain. No headache. Last haD DIALYSIS LAST Saturday. Left leg remains swollen. Has been seen before for same

## 2020-01-24 NOTE — ED NOTES
TRANSFER - OUT REPORT: 
 
Verbal report given to Nidia Sy Brannon on Willi Kol  being transferred to [facility]) for opthamology consult Report consisted of patients Situation, Background, Assessment and  
Recommendations(SBAR). Information from the following report(s) ED Summary and MAR was reviewed with the receiving nurse. Lines:  
Peripheral IV 01/24/20 Left Antecubital (Active) Site Assessment Clean, dry, & intact 1/24/2020 12:31 PM  
Phlebitis Assessment 0 1/24/2020 12:31 PM  
Infiltration Assessment 0 1/24/2020 12:31 PM  
Dressing Status Clean, dry, & intact 1/24/2020 12:31 PM  
Dressing Type Transparent 1/24/2020 12:31 PM  
Hub Color/Line Status Green 1/24/2020 12:31 PM  
  
 
Opportunity for questions and clarification was provided. Patient transported with: [facility]
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
## 2020-02-07 ENCOUNTER — HOSPITAL ENCOUNTER (EMERGENCY)
Age: 50
Discharge: LEFT BEFORE DISCHARGE | End: 2020-02-07
Payer: MEDICARE

## 2020-02-07 VITALS
HEART RATE: 90 BPM | TEMPERATURE: 98.1 F | BODY MASS INDEX: 32.93 KG/M2 | RESPIRATION RATE: 16 BRPM | SYSTOLIC BLOOD PRESSURE: 155 MMHG | DIASTOLIC BLOOD PRESSURE: 87 MMHG | WEIGHT: 230 LBS | HEIGHT: 70 IN | OXYGEN SATURATION: 94 %

## 2020-02-07 LAB — GLUCOSE BLD STRIP.AUTO-MCNC: 125 MG/DL (ref 70–110)

## 2020-02-07 PROCEDURE — 82962 GLUCOSE BLOOD TEST: CPT

## 2020-02-07 PROCEDURE — 75810000275 HC EMERGENCY DEPT VISIT NO LEVEL OF CARE

## 2020-02-07 NOTE — ED TRIAGE NOTES
Patient asking for blood sugar to be checked. Patient now states \"if my blood sugar is that good I dont want to see the doctor\". Patient left ED at this time without being seen.

## 2020-02-14 ENCOUNTER — HOSPITAL ENCOUNTER (EMERGENCY)
Age: 50
Discharge: HOME | End: 2020-02-14
Attending: EMERGENCY MEDICINE | Admitting: EMERGENCY MEDICINE
Payer: MEDICARE

## 2020-02-14 VITALS
DIASTOLIC BLOOD PRESSURE: 72 MMHG | HEIGHT: 70 IN | SYSTOLIC BLOOD PRESSURE: 144 MMHG | BODY MASS INDEX: 32.93 KG/M2 | TEMPERATURE: 97.9 F | RESPIRATION RATE: 18 BRPM | OXYGEN SATURATION: 96 % | WEIGHT: 230 LBS | HEART RATE: 73 BPM

## 2020-02-14 DIAGNOSIS — J81.0 ACUTE PULMONARY EDEMA (HCC): Primary | ICD-10-CM

## 2020-02-14 LAB
ANION GAP SERPL CALC-SCNC: 14 MMOL/L (ref 3–18)
BASOPHILS # BLD: 0 K/UL (ref 0–0.1)
BASOPHILS NFR BLD: 1 % (ref 0–2)
BUN SERPL-MCNC: 81 MG/DL (ref 7–18)
BUN/CREAT SERPL: 5 (ref 12–20)
CALCIUM SERPL-MCNC: 7.9 MG/DL (ref 8.5–10.1)
CHLORIDE SERPL-SCNC: 100 MMOL/L (ref 100–111)
CO2 SERPL-SCNC: 20 MMOL/L (ref 21–32)
CREAT SERPL-MCNC: 17.2 MG/DL (ref 0.6–1.3)
DIFFERENTIAL METHOD BLD: ABNORMAL
EOSINOPHIL # BLD: 0.4 K/UL (ref 0–0.4)
EOSINOPHIL NFR BLD: 5 % (ref 0–5)
ERYTHROCYTE [DISTWIDTH] IN BLOOD BY AUTOMATED COUNT: 16.8 % (ref 11.6–14.5)
GLUCOSE SERPL-MCNC: 88 MG/DL (ref 74–99)
HCT VFR BLD AUTO: 25.9 % (ref 36–48)
HGB BLD-MCNC: 8.1 G/DL (ref 13–16)
LYMPHOCYTES # BLD: 3.2 K/UL (ref 0.9–3.6)
LYMPHOCYTES NFR BLD: 38 % (ref 21–52)
MCH RBC QN AUTO: 30 PG (ref 24–34)
MCHC RBC AUTO-ENTMCNC: 31.3 G/DL (ref 31–37)
MCV RBC AUTO: 95.9 FL (ref 74–97)
MONOCYTES # BLD: 0.5 K/UL (ref 0.05–1.2)
MONOCYTES NFR BLD: 5 % (ref 3–10)
NEUTS SEG # BLD: 4.3 K/UL (ref 1.8–8)
NEUTS SEG NFR BLD: 51 % (ref 40–73)
PLATELET # BLD AUTO: 108 K/UL (ref 135–420)
PMV BLD AUTO: 9.2 FL (ref 9.2–11.8)
POTASSIUM SERPL-SCNC: 6 MMOL/L (ref 3.5–5.5)
RBC # BLD AUTO: 2.7 M/UL (ref 4.7–5.5)
SODIUM SERPL-SCNC: 134 MMOL/L (ref 136–145)
WBC # BLD AUTO: 8.4 K/UL (ref 4.6–13.2)

## 2020-02-14 PROCEDURE — 86706 HEP B SURFACE ANTIBODY: CPT

## 2020-02-14 PROCEDURE — 80048 BASIC METABOLIC PNL TOTAL CA: CPT

## 2020-02-14 PROCEDURE — 85025 COMPLETE CBC W/AUTO DIFF WBC: CPT

## 2020-02-14 PROCEDURE — 87340 HEPATITIS B SURFACE AG IA: CPT

## 2020-02-14 PROCEDURE — 93005 ELECTROCARDIOGRAM TRACING: CPT

## 2020-02-14 PROCEDURE — 90935 HEMODIALYSIS ONE EVALUATION: CPT

## 2020-02-14 PROCEDURE — 99285 EMERGENCY DEPT VISIT HI MDM: CPT
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
## 2020-02-14 NOTE — DIALYSIS
[facility] ACUTE HEMODIALYSIS FLOW SHEET 
 
 
HEMODIALYSIS ORDERS: Physician: Dr. Cindy Ramirez Dialyzer: revaclear   Duration: 4 hr  BFR: 350   DFR: 600 Dialysate:  Temp 36-37*C  K+   2    Ca+  2.5 Na 140 Bicarb 35 Weight:  104.3 kg    Patient Chart [x]     Unable to Obtain []   Dry weight/UF Goal: 4000 ml Access Right subclavian TDC Needle Gauge NA Heparin []  Bolus      Units    [] Hourly       Units    [x]None Catheter locking solution Heparin Pre BP:   162/101    Pulse:     68       Respirations: 18  Temperature:   98 Labs: Pre        Post:        [x] N/A Additional Orders(medications, blood products, hypotension management):       [x] N/A [x] [facility] Consent Verified CATHETER ACCESS: []N/A   [x]Right   []Left   []IJ     []Fem   [x]chest wall  
[] First use X-ray verified     [x]Tunnel                [] Non Tunneled [x]No S/S infection  []Redness  []Drainage []Cultured []Swelling []Pain  
[x]Medical Aseptic Prep Utilized   [x]Dressing Changed  [x] Biopatch  Date: 2/14/2020 []Clotted   [x]Patent   Flows: [x]Good  [x]Poor  []Reversed If access problem,  notified: []Yes    [x]N/A  Date:        
 
GRAFT/FISTULA ACCESS:  [x]N/A     []Right     []Left     []UE     []LE []AVG   []AVF        []Buttonhole    []Medical Aseptic Prep Utilized []No S/S infection  []Redness  []Drainage []Cultured []Swelling []Pain Bruit:   [] Strong    [] Weak       Thrill :   [] Strong    [] Weak Needle Gauge: NA   Length: NA If access problem,  notified: []Yes     [x]N/A  Date:       
Please describe access if present and not used:NA GENERAL ASSESSMENT:  
  
LUNGS:  Rate 18 SaO2%        [] N/A    [] Clear  [] Coarse  [] Crackles  [] Wheezing 
      [] Diminished     Location : []RLL   []LLL    []RUL  []NIKOS Cough: []Productive  []Dry  []N/A   Respirations:  [x]Easy  []Labored Therapy:   []RA  [x]NC 4 l/min    Mask: []NRB []Venti       O2% []Ventilator  []Intubated  [] Trach  [] BiPaP CARDIAC: [x]Regular      [] Irregular   [] Pericardial Rub  [] JVD []  Monitored  [] Bedside  [] Remotely monitored [] N/A  Rhythm: EDEMA: [] None  [x]Generalized  [] Pitting [] 1    [] 2    [] 3    [] 4 [] Facial  [] Pedal  []  UE  [] LE  
 
SKIN:   [x] Warm  [] Hot     [] Cold   [x] Dry     [] Pale   [] Diaphoretic    
             [] Flushed  [] Jaundiced  [] Cyanotic  [] Rash  [] Weeping LOC:    [x] Alert      [x]Oriented:    [x] Person     [x] Place  [x]Time 
             [] Confused  [] Lethargic  [] Medicated  [] Non-responsive GI / ABDOMEN:  [] Flat    [] Distended    [] Soft    [] Firm   [x]  Obese 
                           [] Diarrhea  [] Bowel Sounds  [] Nausea  [] Vomiting  / URINE ASSESSMENT:[] Voiding   [] Oliguria  [] Anuria   []  Alonzo [] Incontinent    []  Incontinent Brief      []  Bathroom Privileges PAIN: [x] 0 []1  []2   []3   []4   []5   []6   []7   []8   []9   []10 Scale 0-10  Action/Follow Up: MOBILITY:  [] Amb    [] Amb/Assist    [x] Bed    [] Wheelchair  [] Stretcher All Vitals and Treatment Details on Attached Flowsheet Hospital: [facility] Room # JORDAN/JORDAN [] 1st Time Acute  [] Stat  [x] Routine  [] Urgent [x] Acute Room  []  Bedside  [] ICU/CCU  [] ER Isolation Precautions: There are currently no Active Isolations Special Considerations:         [] Blood Consent Verified [x]N/A ALLERGIES:  
Allergies Allergen Reactions  Latex Other (comments)  
  blisters  Other Food Hives Allergic to tomatoes and tomato sauce  Keflex [Cephalexin] Anaphylaxis  Codeine Nausea and Vomiting Code Status:Prior Hepatitis Status:    2nd RN check:                    
Lab Results Component Value Date/Time  Hepatitis A, IgM NEGATIVE  07/07/2017 08:39 AM  
 Hepatitis B surface Ag <0.10 11/28/2019 09:15 AM  
 Hepatitis B surface Ab <3.10 (L) 11/28/2019 09:15 AM  
 Hepatitis B core, IgM NEGATIVE  07/07/2017 08:39 AM  
 Hepatitis C virus Ab 0.07 10/04/2018 05:30 AM  
   
 
            Current Labs:  
Lab Results Component Value Date/Time Sodium 134 (L) 02/14/2020 09:53 AM  
 Potassium 6.0 (H) 02/14/2020 09:53 AM  
 Chloride 100 02/14/2020 09:53 AM  
 CO2 20 (L) 02/14/2020 09:53 AM  
 Anion gap 14 02/14/2020 09:53 AM  
 Glucose 88 02/14/2020 09:53 AM  
 BUN 81 (H) 02/14/2020 09:53 AM  
 Creatinine 17.20 (H) 02/14/2020 09:53 AM  
 BUN/Creatinine ratio 5 (L) 02/14/2020 09:53 AM  
 GFR est AA 4 (L) 02/14/2020 09:53 AM  
 GFR est non-AA 3 (L) 02/14/2020 09:53 AM  
 Calcium 7.9 (L) 02/14/2020 09:53 AM  
  
Lab Results Component Value Date/Time WBC 8.4 02/14/2020 09:53 AM  
 Hemoglobin, POC 13.9 08/20/2017 05:57 AM  
 HGB 8.1 (L) 02/14/2020 09:53 AM  
 Hematocrit, POC 41 08/20/2017 05:57 AM  
 HCT 25.9 (L) 02/14/2020 09:53 AM  
 PLATELET 062 (L) 16/07/8972 09:53 AM  
 MCV 95.9 02/14/2020 09:53 AM  
  
  
 
                                                                         
DIET: None PRIMARY NURSE REPORT: First initial/Last name/Title Pre Dialysis: Saji Gonzales RN     Time: 2811 EDUCATION:   
[x] Patient [] Other         Knowledge Basis: []None [x]Minimal [] Substantial  
Barriers to learning  []N/A [x] Access Care     [] S&S of infection     [] Fluid Management     []K+     [x]Procedural   
[]Albumin     [] Medications     [x] Tx Options     [] Transplant     [] Diet     [] Other Teaching Tools:  [x] Explain  [] Demo  [] Handouts [] Video Patient response:   [x] Verbalized understanding  [] Teach back  [] Return demonstration [x] Requires follow up Inappropriate due to         
 
[x] Time Out/Safety Check  [x]Extracorporeal Circuit Tested for integrity RO/HEMODIALYSIS MACHINE SAFETY CHECKS  Before each treatment: Machine Number:                   [address] 
                                [] Unit Machine #  with centralized RO 
                                [] Portable Machine #1/RO serial # [id number] [] Portable Machine #2/RO serial # [id number] [] Portable Machine #4/RO serial # [id number] [address] 
                                [] Portable Machine #11/RO serial # [id number] [x] Portable Machine #2/RO serial # [id number] [] Portable Machine #13/RO serial #  [id number] Alarm Test:  Pass time 1000 [x] RO/Machine Log Complete Temp    36 Dialysate: pH  7.4 Conductivity: Meter   14.1     HD Machine   14.1                  TCD: 13.9 Dialyzer Lot # [id number]            Blood Tubing Lot # [id number]          Saline Lot #  [id number] CHLORINE TESTING-Before each treatment and every 4 hours Total Chlorine: [x] less than 0.1 ppm  Time: 1000 4 Hr/2nd Check Time: 1400  
(if greater than 0.1 ppm from Primary then every 30 minutes from Secondary) TREATMENT INITIATION  with Dialysis Precautions:  
[x] All Connections Secured                 [x] Saline Line Double Clamped  
[x] Venous Parameters Set                  [x] Arterial Parameters Set [x] Prime Given 250ml                          [x]Air Foam Detector Engaged Treatment Initiation Note:Patient was brought to HD unit on a stretcher by ER personnel. He is stable to begin HD treatment. His right subclavian TDC was accessed, and his treatment began. During Treatment Notes:Patient tolerating treatment well. He has removed his nasal cannula midway through his HD treatment. No S/S of respiratory distress. He is frequently resting with his eyes closed. Medication Dose Volume Route Time [facility] name Title Post Assessment:  
Dialyzer Cleared: [x] Good [] Fair  [] Poor Blood processed:  70.4 L 
UF Removed  4000 Ml POst BP:   144/72       Pulse: 73 Respirations: 18  Temperature: 97.9 Lungs: 
 
 [] Clear      [] Course         [] Crackles  
 [] Wheezing         [] Diminished Post Tx Vascular Access: N/A Cardiac:  
[x] Regular   [] Irregular   [] Monitor  [] N/A Rhythm:      
Catheter:  
Locking solution: Heparin 1:1000 Art. 2.1 ml  Ashok. 2.2 ml     
Skin:   Pain:   
[x] Warm  [x] Dry [] Diaphoretic    [] Flushed   
[] Pale [] Cyanotic [x]0  []1  []2   []3  []4   []5   []6   []7   []8   []9   []10 Post Treatment Note: 
 Patient's blood was returned at the conclusion of his treatment. He tolerated treatment well. His right subclavian TDC was locked with Heparin. He was then returned to his room by ER personnel. POST TREATMENT PRIMARY NURSE HANDOFF REPORT:  
 
First initial/Last name/Title Post Dialysis: Urszula Sands RN Time:  0548 Abbreviations: AVG-arterial venous graft, AVF-arterial venous fistula, IJ-Internal Jugular, Subcl-Subclavian, Fem-Femoral, Tx-treatment, AP/HR-apical heart rate, DFR-dialysate flow rate, BFR-blood flow rate, AP-arterial pressure, -venous pressure, UF-ultrafiltrate, TMP-transmembrane pressure, Ashok-Venous, Art-Arterial, RO-Reverse Osmosis

## 2020-02-14 NOTE — DISCHARGE INSTRUCTIONS
Patient Education        Pulmonary Edema: Care Instructions  Your Care Instructions    Pulmonary edema is the buildup of fluid in the lungs. It usually occurs when the heart does not pump blood through the body properly. Pulmonary edema can also be caused by another disease, such as liver or kidney failure. It can also happen at high altitudes, from a poisoning, or as a result of a near-drowning. If you have fluid in your lungs, you may have trouble breathing, be restless, have a fast heart rate, or cough up foamy pink fluid. Breathing problems may be worse when you lie down. Follow-up care is a key part of your treatment and safety. Be sure to make and go to all appointments, and call your doctor if you are having problems. It's also a good idea to know your test results and keep a list of the medicines you take. How can you care for yourself at home? Medicines    · Take your medicines exactly as prescribed. Call your doctor if you think you are having a problem with your medicine.     · Review all of your regular medicines with your doctor. Do not take any vitamins, over-the-counter medicines, or herbal products without talking to your doctor first.   Diet    · Eat a balanced diet. Make an appointment with a dietitian if you have questions about what type of diet might be best for you.     · Do not eat more than 2,000 milligrams (mg) of sodium each day. That is less than 1 teaspoon of salt a day, including all the salt you eat in prepared or packaged foods. ? Do not add salt while you are cooking or at the table. Flavor with garlic, lemon juice, onion, vinegar, herbs, and spices instead of salt. ? Eat fewer processed foods and foods from restaurants, including fast food. ? Use fresh or frozen foods instead of canned. ? Count and record how much sodium you eat each day. Check food labels for sodium. ?  Ask your doctor before using salt substitutes that have potassium, such as Lite Salt.    Lifestyle    · Stay out of air pollution; smog; cold, dry air; hot, humid air; and high altitudes.     · Learn breathing methods that help the airflow in and out of your lungs.     · Take rest breaks often. Schedule short rest breaks when doing housework and other activities. An occupational or physical therapist can help you find ways to do everyday activities with less effort.     · Start light exercise if your doctor says it is okay. Try to stay as active as possible. If you have not exercised in the past, start out slowly. Walking is a good way to start.     · Get enough rest at night. Sleeping with 1 or 2 pillows under your upper body and head may help you breathe easier at night.     · Discuss rehabilitation with your doctor. Find out what programs are available in your area.     · Do not smoke or use other tobacco products. Smoking can make your condition worse. If you need help quitting, talk to your doctor about stop-smoking programs and medicines. These can increase your chances of quitting for good.     · Do not use alcohol or illegal drugs. When should you call for help? Call 911 anytime you think you may need emergency care. For example, call if:    · You have severe trouble breathing.     · You passed out (lost consciousness).     · You have symptoms of a heart attack. These may include:  ? Chest pain or pressure, or a strange feeling in the chest.  ? Sweating. ? Shortness of breath. ? Nausea or vomiting. ? Pain, pressure, or a strange feeling in the back, neck, jaw, or upper belly or in one or both shoulders or arms. ? Lightheadedness or sudden weakness. ? A fast or irregular heartbeat. Pain that spreads from the chest to the neck, jaw, or one or both shoulders or arms.    After you call 911, the  may tell you to chew 1 adult-strength or 2 to 4 low-dose aspirin. Wait for an ambulance.  Do not try to drive yourself.   Call your doctor now or seek immediate medical care if:    · You have trouble breathing or have wheezing that is getting worse.     · You are coughing more deeply or more often.     · You cough up blood.     · You get a fever.     · You have more swelling in your legs or belly.     · Your symptoms are getting worse.    Watch closely for changes in your health, and be sure to contact your doctor if you have any problems. Where can you learn more? Go to http://berta-keyla.info/. Enter K318 in the search box to learn more about \"Pulmonary Edema: Care Instructions. \"  Current as of: June 9, 2019  Content Version: 12.2  © 6454-6170 [id number]. Care instructions adapted under license by [employer] (which disclaims liability or warranty for this information). If you have questions about a medical condition or this instruction, always ask your healthcare professional. [address] any warranty or liability for your use of this information.

## 2020-02-14 NOTE — PROGRESS NOTES
Noncompliant with dialysis schedule. Will dialyze at inpatient unit to address volume overload and hyperkalemia. After d/c patient to continue dialysis at op unit- next treatment is tomorrow.

## 2020-02-14 NOTE — ED NOTES
I have reviewed discharge instructions with the patient. The patient verbalized understanding. Patient armband removed and shredded Pt VSS, NAD, pain reassessed. Pt ambulatory to lobby

## 2020-02-15 LAB
ATRIAL RATE: 78 BPM
CALCULATED P AXIS, ECG09: 37 DEGREES
CALCULATED R AXIS, ECG10: -34 DEGREES
CALCULATED T AXIS, ECG11: 89 DEGREES
DIAGNOSIS, 93000: NORMAL
P-R INTERVAL, ECG05: 190 MS
Q-T INTERVAL, ECG07: 410 MS
QRS DURATION, ECG06: 96 MS
QTC CALCULATION (BEZET), ECG08: 467 MS
VENTRICULAR RATE, ECG03: 78 BPM

## 2020-03-25 PROBLEM — I50.9 CHF (CONGESTIVE HEART FAILURE) (HCC): Status: ACTIVE | Noted: 2020-01-01

## 2020-03-25 PROBLEM — J96.00 ACUTE RESPIRATORY FAILURE (HCC): Status: ACTIVE | Noted: 2020-01-01

## 2020-03-25 NOTE — ED NOTES
Dr. Ann Liu at bedside. Pt now reporting cough x2 days with runny nose and sore throat. Also states he had N/V this AM despite denying any of these s/s when I assessed him initially.

## 2020-03-25 NOTE — ED NOTES
Pt informed me that he is a heroin user, and has not used in almost 24 hours. He is feeling like he is goig into withdrawal, c/o s/s that include pain, nausea and feeling like he may have

## 2020-03-25 NOTE — ED NOTES
Patient was taken to the floor with this nurse, Faisal reid and RT Deanna. Patient was more alert while transporting, the propofol was increased to 30 prior to leaving the ED, assisted the ICU with placing restraints as the patient was pulling at tubing.
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
## 2020-03-25 NOTE — ED NOTES
TRANSFER - ED to INPATIENT REPORT: 
 
Verbal report given to Nga(name) on Oswaldo Bellamy  being transferred to 2600(unit) for routine progression of care Report consisted of patients Situation, Background, Assessment and  
Recommendations(SBAR). SBAR report made available to receiving floor on this patient being transferred to [facility] ICU [id number])  for routine progression of care Admitting diagnosis Acute respiratory failure ([facility] [address] [J96.00] CHF (congestive heart failure) ([facility] [address] [I50.9] Information from the following report(s) SBAR, ED Summary, [facility] and Recent Results was made available to receiving floor. Lines:  
Peripheral IV 03/25/20 Left Antecubital (Active) Site Assessment Clean, dry, & intact 3/25/2020  9:05 AM  
Phlebitis Assessment 0 3/25/2020  9:05 AM  
Infiltration Assessment 0 3/25/2020  9:05 AM  
Dressing Status Clean, dry, & intact 3/25/2020  9:05 AM  
Dressing Type Transparent 3/25/2020  9:05 AM  
Hub Color/Line Status Green 3/25/2020  9:05 AM  
   
Peripheral IV 03/25/20 Right Antecubital (Active) Site Assessment Clean, dry, & intact 3/25/2020  1:57 PM  
Phlebitis Assessment 0 3/25/2020  1:57 PM  
Infiltration Assessment 0 3/25/2020  1:57 PM  
Dressing Status Clean, dry, & intact 3/25/2020  1:57 PM  
Dressing Type Transparent 3/25/2020  1:57 PM  
Hub Color/Line Status Pink 3/25/2020  1:57 PM  
  
 
HCG Status for ALL Females under 55 y/o: NO Medication list unable to confirm Opportunity for questions and clarification was provided. Patient is oriented to time, place, person and situation Sepsis summary 0.6* Patient is  incontinent and non-ambulatory Valuables given to family at patients request patient does have phone and hat Patient transported with: 
 O2 @ vented liters Registered Nurse Tech  
RT 
 
MAP (Monitor): 71 =Monitored (most recent) Vitals w/ MEWS Score (last day) Date/Time MEWS Score Pulse Resp Temp BP Level of Consciousness SpO2 03/25/20 1535    66  17        100 % 03/25/20 1520    66    (!) 101.2 °F (38.4 °C)  117/57    100 % 03/25/20 1515    66    (!) 101.2 °F (38.4 °C)  115/55    100 % 03/25/20 1510    66    (!) 101.2 °F (38.4 °C)  117/56    100 % 03/25/20 1505    67    (!) 101.2 °F (38.4 °C)  118/56    100 % 03/25/20 1500    66    (!) 101.2 °F (38.4 °C)  118/57    100 % 03/25/20 1455    67    (!) 101.2 °F (38.4 °C)  119/57    100 % 03/25/20 1450    66    (!) 101.2 °F (38.4 °C)  115/59    99 % 03/25/20 1445    67    (!) 101.1 °F (38.4 °C)  105/60    100 % 03/25/20 1440    65    (!) 101.1 °F (38.4 °C)  104/50    99 % 03/25/20 1435    64    (!) 101.1 °F (38.4 °C)  104/48    99 % 03/25/20 1430    64    (!) 101.1 °F (38.4 °C)  104/50    98 % 03/25/20 1425    63    (!) 101.1 °F (38.4 °C)  104/48    99 % 03/25/20 1420    63    (!) 101.1 °F (38.4 °C)  101/50    99 % 03/25/20 1417    64    (!) 101.1 °F (38.4 °C)  97/45    100 % 03/25/20 1415    63    (!) 101.1 °F (38.4 °C)  102/45    100 % 03/25/20 1410    65    (!) 101 °F (38.3 °C)  97/43    100 % 03/25/20 1405    70    (!) 101 °F (38.3 °C)  106/47    100 % 03/25/20 1400    72    (!) 100.9 °F (38.3 °C)  112/51    99 % 03/25/20 1357    74    (!) 100.8 °F (38.2 °C)  110/52    99 % 03/25/20 1355    74    (!) 100.7 °F (38.2 °C)  107/55    99 % 03/25/20 1352    75    (!) 100.5 °F (38.1 °C)  111/53    99 % 03/25/20 1350    75    100.3 °F (37.9 °C)  112/53    99 % 03/25/20 1347    76    100.3 °F (37.9 °C)  109/52    98 % 03/25/20 1345    77    100 °F (37.8 °C)  110/51    98 % 03/25/20 1342    77    99.7 °F (37.6 °C)  112/52    98 % 03/25/20 1340    77    99.1 °F (37.3 °C)  116/54    98 % 03/25/20 1335    79      115/51    98 % 03/25/20 1330    83      131/58    99 % 03/25/20 1325    (!) 109      150/71    94 % 03/25/20 1320    88      (!) 164/138    98 % 03/25/20 1317    84      130/69      
 03/25/20 1316    85      140/70      
 03/25/20 1315    78      128/63    96 % 03/25/20 1312    89      141/74      
 03/25/20 1310    81      136/62    95 % 03/25/20 1305    85      130/69    95 % 03/25/20 1300    84      141/74    96 % 03/25/20 1259    79      (!) 205/113      
 03/25/20 1255    81          100 % 03/25/20 1250    79          100 % 03/25/20 1245    87          100 % 03/25/20 1242    (!) 115  22        99 % 03/25/20 1240    (!) 109          100 % 03/25/20 1235    (!) 115          100 % 03/25/20 1230    (!) 114          98 % 03/25/20 1225    (!) 117      (!) 215/44    96 % 03/25/20 1200    (!) 106  17        (!) 86 %  
 03/25/20 1145    96  21        90 % 03/25/20 1130    84  24    (!) 192/110    (!) 87 % 03/25/20 1115    79  25    (!) 183/109    93 % 03/25/20 1100    79  18    (!) 175/100    90 % 03/25/20 1045    82  19    (!) 172/92    91 %  
 03/25/20 1015    81  21    (!) 175/101    95 % 03/25/20 1000    81  18    (!) 184/106    95 % 03/25/20 0945    90  23    (!) 178/103    94 % 03/25/20 0930    83      (!) 172/94    97 % 03/25/20 0915    85  28    (!) 167/92    95 % 03/25/20 0900    88      167/88    99 % 03/25/20 0857    89      (!) 167/121      
 03/25/20 0845    86      (!) 167/121    100 % 03/25/20 0830    93      (!) 168/103    98 % 03/25/20 0827    97    99.1 °F (37.3 °C)  (!) 181/105  Alert  98 % 03/25/20 08:21:45              (!) 85 % Septic Patients:  
 
Lactic Acid Lab Results Component Value Date LAC 0.86 03/04/2019 LACPOC 1.4 10/28/2018  
 (Most recent on top) Repeat drawn: NO Time:NA ALL LACTIC ACIDS GREATER THAN 2 MUST BE REPEATED POC WITHIN 4 HOURS OR PRIOR TO ADMISSION Total Fluid Bolus initiated and documented on MAR: NO, patient is fluid overload All ordered antibiotics initiated within first 3 hours of TIME ZERO?   YES

## 2020-03-25 NOTE — ED NOTES
Wife in the room to visit the patient, was  Properly gowned. Wants the nursing staff to call when the patient has an ICU bed. [phone]. Wife is aware that once she leaves the room she cannot visit her  while he is in the ED, Wife stated understanding.

## 2020-03-25 NOTE — PROGRESS NOTES
Physical Exam 
Skin: 
 
    
 
Pt is on Droplet plus precaution. 1930 Bedside and Verbal shift change report given to Pamela Vásquez (oncoming nurse) by Stanley Alcantara RN 
 (offgoing nurse).  Report included the following information SBAR, Kardex, Intake/Output, MAR and Cardiac Rhythm SR.

## 2020-03-25 NOTE — ED NOTES
Dr. Otf Richardson at bedside. She offered pt option to try Bipap, and RT was called to come to ER ASAP. Skylar Hawthorne says he will be on his way.

## 2020-03-25 NOTE — ED NOTES
Pt reports increasing SOB and feeling as though he is gong into withdrawal. He states he will have to leave if he does not get something for the opiate withdrawal soon. Pt's O2 sats on 3LNC are fluctuating. He keeps removing his oxygen and his face mask. Pt was advised to keep both in place, and I will f/u with provider for further orders. Resp are notably labored at this time, and lung sounds are diminished with scattered faint wheezing t/o lung fields, but R>L.

## 2020-03-25 NOTE — ED NOTES
Pt states he is feeling a little better after NEB TX. RR is slightly improved, currently @28 per minute. All orders started, now awaiting results. Pt aware. He was provided warm blankets, call bell, and advised to use call bell for any needs that may arise.

## 2020-03-25 NOTE — CONSULTS
[facility] - Consult Note Consultation request by No admitting provider for patient encounter. for advice/opinion related to evaluating No admission diagnoses are documented for this encounter. Date of  Admission: 3/25/2020  8:15 AM  
Primary Care Physician:  None Assessment:  
 
-Acute respiratory distress requiring intubation. Likely from combination of pneumonia and CHF exacerbation and fluid overload. Patient missed dialysis and last dialysis was Saturday 
-Hypertensive urgency with initial systolic blood pressure of 180/110.  
-End-stage renal disease on dialysis - Coronary artery disease: LAD - 3.0 x 18mm VISION 7/13  
- Ischemic VF: 07/13 in setting of MI, DC cardioversion x4 - Ischemic cardiomyopathy: Last EF 35% in 10/2018 - S/p Medtronic single-chamber AICD 05/2014 (Dr. Kristyn Olmedo) - VT and ventricular fibrillation 10/27/15, approximately 20 shocks were administered via AICD. Interrogation at the time noted appropriate device function. Supposed to be on amiodarone - Hypertension - Dyslipidemia  
- JESSICA - Tobacco abuse - Apical mural thrombus ([address]): ECHO 3/14. Last echo in 10/2018 without any LV thrombus - Degenerative spine disease - Chronic back pain Plan:  
 
Patient with respiratory distress, multifactorial as mentioned above Doubt cardiac ischemia causing event Likely from fluid overload because of missing dialysis, hypertensive urgency, systolic dysfunction and right sided pneumonia 
 
-Trend biomarker 
-Hemodialysis likely today depending on renal team 
-Continue home cardiac medication after placing NG tube including Coreg, amiodarone, long-acting nitroglycerin, statin, aspirin as tolerated - Continue supportive care. History of Present Illness: This is a 44 y.o. male admitted for No admission diagnoses are documented for this encounter. Samson Cates Patient complains of:   
Respiratory distress Patient is currently intubated and sedated. No family member at bedside. History obtained from medical records and ER note According to documentation, patient came to hospital with shortness of breath. Patient states he missed dialysis yesterday. Last dialysis was March 21. Patient states the shortness of breath started last night. Progressively getting worse today. He states he used his inhaler at home. It helped a little bit but then shortness of breath came back. He states his temperature has been 99.1. Nothing over 100. Reports nasal congestion secondary to allergies at this time. Also reports a dry cough. Denies any abdominal pain. He states he usually runs constipated. Patient was in emergency department and started to get more shortness of breath and eventually needed to be intubated. Review of Symptoms:  Except as stated above include: 
Constitutional:  negative Respiratory:  negative Cardiovascular:  negative Gastrointestinal: negative Genitourinary:  negative Musculoskeletal:  Negative Neurological:  Negative Dermatological:  Negative Endocrinological: Negative Psychological:  Negative A comprehensive review of systems was negative except for that written in the HPI. Past Medical History:  
 
Past Medical History:  
Diagnosis Date  AICD MEDTRONIC (single chamber)  Apical mural thrombus ECHO 3/14  Cardiac arrest ([address] 10/15 VT / VF ( ~20 ICD shocks ). No obstructive CAD on cath  Chronic back pain  Chronic kidney disease  Chronic kidney disease, stage III (moderate) (HCC)  Coronary artery disease LAD - 3.0 x 18mm VISION 7/13  Degenerative spine disease  Dyslipidemia  Hypertension  Ischemic cardiomyopathy EF 30%(10/15) , 40-45% (03/15),  EF 30-35% (3/14)  Ischemic VF   
 07/13 in setting of MI, DC cardioversion x4  Narcotic dependence ([address]  Noncompliance  Obesity  Psoriasis  S/P cardiac cath 10/15  Secondary hyperparathyroidism (of renal origin)  Tobacco abuse Social History:  
 
Social History Socioeconomic History  Marital status: LEGALLY  Spouse name: Not on file  Number of children: Not on file  Years of education: Not on file  Highest education level: Not on file Tobacco Use  Smoking status: Former Smoker Packs/day: 1.00 Years: 30.00 Pack years: 30.00  Smokeless tobacco: Former User  Tobacco comment: Quit cigarettes after 1st MI. Smokes a cigars occasionally, \"Exposed to cigarette smoke in the home\" Substance and Sexual Activity  Alcohol use: No  
  Comment: Quit 8 years ago  Drug use: No  
Social History Narrative ** Merged History Encounter ** Family History:  
 
Family History Problem Relation Age of Onset  Heart Attack Father  Heart Disease Father  Hypertension Other  Asthma Other  Arthritis-osteo Other  Diabetes Other Medications: Allergies Allergen Reactions  Latex Other (comments)  
  blisters  Other Food Hives Allergic to tomatoes and tomato sauce  Keflex [Cephalexin] Anaphylaxis  Codeine Nausea and Vomiting Current Facility-Administered Medications Medication Dose Route Frequency  sodium chloride (NS) flush 5-10 mL  5-10 mL IntraVENous PRN  
 vancomycin (VANCOCIN) 2000 mg in  ml infusion  2,000 mg IntraVENous NOW  
 heparin (porcine) injection 1,000 Units  1,000 Units IntraVENous Q1H PRN  
 alteplase (CATHFLO) 2 mg in sterile water (preservative free) 2 mL injection  2 mg InterCATHeter ONCE PRN  
 heparin (porcine) 1,000 unit/mL injection 1,000 Units  1,000 Units InterCATHeter DIALYSIS PRN  
 0.9% sodium chloride infusion  25 mL/hr IntraVENous DIALYSIS PRN  
 metoclopramide HCl (REGLAN) injection 5 mg  5 mg IntraVENous Q6H  
 VANCOMYCIN INFORMATION NOTE 1 Each  1 Each Other Rx Dosing/Monitoring  albuterol-ipratropium (DUO-NEB) 2.5 mg-0.5 mg/3 ml nebulizer solution  albuterol-ipratropium (DUO-NEB) 2.5 MG-0.5 MG/3 ML  3 mL Nebulization NOW  rocuronium 10 mg/mL injection  nitroglycerin (TRIDIL) 400 mcg/ml infusion  0-20 mcg/min IntraVENous TITRATE Current Outpatient Medications Medication Sig  
 albuterol (PROVENTIL HFA, VENTOLIN HFA, PROAIR HFA) 90 mcg/actuation inhaler Take 2 Puffs by inhalation every four (4) hours as needed for Wheezing.  ondansetron hcl (ZOFRAN) 4 mg tablet Take 2 Tabs by mouth every eight (8) hours as needed for Nausea.  NOVOLOG FLEXPEN U-100 INSULIN 100 unit/mL inpn 5 m by IntraMUSCular route as needed. Pt on scale  glucose blood VI test strips (ASCENSIA AUTODISC VI, ONE TOUCH ULTRA TEST VI) strip Use to monitor blood glucose 3 times daily.  oxyCODONE IR (ROXICODONE) 10 mg tab immediate release tablet Take 1 Tab by mouth every eight (8) hours as needed. Max Daily Amount: 30 mg.  predniSONE (DELTASONE) 20 mg tablet Take 3 Tabs by mouth daily (with breakfast).  amLODIPine (NORVASC) 10 mg tablet Take 1 Tab by mouth daily.  calcium acetate (PHOSLO) 667 mg cap Take 2 Caps by mouth three (3) times daily (with meals).  epoetin natanael (EPOGEN;PROCRIT) 10,000 unit/mL injection 1 mL by SubCUTAneous route Every Tues, Thur & Sat.  hydrALAZINE (APRESOLINE) 50 mg tablet Take 1 Tab by mouth three (3) times daily.  pantoprazole (PROTONIX) 40 mg tablet Take 1 Tab by mouth Daily (before breakfast).  polyethylene glycol (MIRALAX) 17 gram packet Take 1 Packet by mouth daily.  isosorbide mononitrate ER (IMDUR) 60 mg CR tablet Take 1.5 Tabs by mouth every morning. (Patient taking differently: Take 30 mg by mouth every morning.)  atorvastatin (LIPITOR) 40 mg tablet Take 1 Tab by mouth daily. Indications: hypercholesterolemia  amiodarone (PACERONE) 400 mg tablet Take 1 Tab by mouth daily. Indications: LIFE-THREATENING VENTRICULAR TACHYCARDIA  lidocaine (LIDODERM) 5 % Apply patch to the affected area for 12 hours a day and remove for 12 hours a day.  aspirin 81 mg chewable tablet Take 162 mg by mouth two (2) times a day.  fluticasone (FLONASE) 50 mcg/actuation nasal spray 2 Sprays by Both Nostrils route daily as needed for Rhinitis. Physical Exam:  
 
Visit Vitals /69 Pulse 84 Temp 99.1 °F (37.3 °C) Resp 22 Ht 5' 10\" (1.778 m) Wt 230 lb (104.3 kg) SpO2 99% BMI 33.00 kg/m² BP Readings from Last 3 Encounters:  
03/25/20 130/69  
02/14/20 144/72  
02/07/20 155/87 Pulse Readings from Last 3 Encounters:  
03/25/20 84  
02/14/20 73  
02/07/20 90 Wt Readings from Last 3 Encounters:  
03/25/20 230 lb (104.3 kg) 02/14/20 230 lb (104.3 kg) 02/07/20 230 lb (104.3 kg) General: Intubated and sedated Neck:  {Unable to appreciate because of thick large neck Lungs: Decreased breath sound with rhonchi Heart:  {Regular rate rhythm, no significant murmur Abdomen:  {Soft and obese, nontender Extremities: Chronic skin changes with some edema Neuro: {Intubated and sedated Data Review:  
 
Recent Labs  
  03/25/20 
5128 WBC 8.8 HGB 8.8* HCT 27.7*  
 Recent Labs  
  03/25/20 
2581 *  
K 5.9*  
CL 99* CO2 22 GLU 91 BUN 57* CREA 13.70* CA 7.8*  
MG 2.0 ALB 3.0*  
SGOT 13 ALT 14* Results for orders placed or performed during the hospital encounter of 03/25/20 EKG, 12 LEAD, INITIAL Result Value Ref Range Ventricular Rate 84 BPM  
 Atrial Rate 84 BPM  
 P-R Interval 170 ms QRS Duration 92 ms Q-T Interval 394 ms QTC Calculation (Bezet) 465 ms Calculated P Axis 45 degrees Calculated R Axis -42 degrees Calculated T Axis 86 degrees Diagnosis Normal sinus rhythm Left axis deviation Anteroseptal infarct (cited on or before 01-DEC-2015) Abnormal ECG When compared with ECG of 14-FEB-2020 09:35, 
 Questionable change in initial forces of Anterior leads All Cardiac Markers in the last 24 hours:   
Lab Results Component Value Date/Time TROIQ 0.02 03/25/2020 08:58 AM  
 
 
Last Lipid:   
Lab Results Component Value Date/Time Cholesterol, total 136 04/21/2015 04:55 AM  
 HDL Cholesterol 28 (L) 04/21/2015 04:55 AM  
 LDL, calculated 60.2 04/21/2015 04:55 AM  
 Triglyceride 239 (H) 04/21/2015 04:55 AM  
 CHOL/HDL Ratio 4.9 04/21/2015 04:55 AM  
 
 
Signed By: Rama Chamorro MD   
 March 25, 2020

## 2020-03-25 NOTE — PROGRESS NOTES
Problem: Ventilator Management Goal: *Adequate oxygenation and ventilation Outcome: Progressing Towards Goal 
VENTILATOR CARE PLAN Problem: Ventilator Management Goal: *Adequate oxygenation/ ventilation/ and extubation Patient: 
   
 
Kelly Polo     44 y.o.   male     3/25/2020  1:24 PM 
Patient Active Problem List  
Diagnosis Code ST elevation (STEMI) myocardial infarction involving left anterior descending coronary artery ([facility]) I21.02 Cardiac arrest - ventricular fibrillation HTN (hypertension) I10 Hypertensive emergency I16.1 Hyperglycemia R73.9 Acute on chronic renal failure (HCC) N17.9, N18.9 Leukocytosis D72.829 Acute pulmonary edema ([facility]) J81.0 Contrast dye induced nephropathy N14.1, T50.8X5A  
 CKD (chronic kidney disease) N18.9 Arachnoiditis G03.9 Postlaminectomy syndrome, lumbar region M96.1 Psoriasis L40.9 Degeneration of lumbar or lumbosacral intervertebral disc M51.37 Encounter for long-term (current) use of high-risk medication Z79.899 Obesity E66.9 Pain in joint, lower leg M25.569 Psoriatic arthropathy ([address] L40.50 Chronic low back pain M54.5, G89.29 Chronic pain syndrome G89.4 Lumbosacral spondylosis without myelopathy M47.817 Enthesopathy of hip M76.899 Sacroiliitis ([address] M46.1 Coronary artery disease I25.10 JESSICA (obstructive sleep apnea) G47.33  
 UTI (lower urinary tract infection) N39.0 Cardiomyopathy ([address] I42.9 AICD (automatic cardioverter/defibrillator) present Z95.810 OLIVIA (acute kidney injury) ([address] N17.9 ESRD (end stage renal disease) ([address] N18.6 Chest pain R07.9 SOB (shortness of breath) R06.02 Bronchitis J40 Acute renal failure (ARF) ([facility]) N17.9 ARF (acute renal failure) ([facility]) N17.9 Severe sepsis ([facility]) A41.9, R65.20 Epigastric abdominal pain R10.13 Anxiety F41.9 Ventricular tachycardia ([facility]) I47.2  Ventricular fibrillation ([facility]) I49.01  
 ICD (implantable cardioverter-defibrillator) discharge Z45.02  
 CAD (coronary artery disease) I25.10 Obesity, morbid ([facility] E66.01 Bacteria in urine R82.71 Metabolic acidosis T46.0 Microscopic polyangiitis ([facility] M31.7 Suicide ideation R45.851 Suicidal ideation R45.851 Gastritis K29.70 No admission diagnoses are documented for this encounter. Reason patient intubated: Airway protection, Respiratory distress. Patient came in SOB. Ventilator day: 1 Ventilator settings: ACVC+. 14 / 500 / +5 / 60%. ETT Size/Placement: 7.5 / 24- lip. ABG: 
Date:3/25/2020 Lab Results Component Value Date/Time PHI 7.261 (L) 03/25/2020 12:59 PM  
 PCO2I 41.2 03/25/2020 12:59 PM  
 PO2I 230 (H) 03/25/2020 12:59 PM  
 HCO3I 18.5 (L) 03/25/2020 12:59 PM  
 FIO2I 1.0 03/25/2020 12:59 PM  
 
 
Chest X-ray: 
Date:3/25/2020 Results from [facility] [place] Encounter encounter on 03/25/20 XR CHEST PORT Narrative EXAM: One-view chest 
 
CLINICAL HISTORY: Respiratory distress , 
 
COMPARISON: None FINDINGS: 
 
Frontal view of the chest demonstrate opacities in the right lung base 
compatible with airspace disease and probable small pleural effusion. Right 
approach dialysis type catheter tip in lower SVC. ET tube tip approximately 5 cm 
from bill. Enteric tube tip in the distal esophagus. Cardiac silhouette is 
normal in size and contour. No acute bony or soft tissue abnormality. Impression IMPRESSION: Line/support tubes in place as described. Enteric tube tip in the 
esophagus, likely needs advancing. Increasing airspace disease in the right 
lower lobe and probable small effusion. Lab Test: 
MYLES:9/54/3819 WBC:  
Lab Results Component Value Date/Time WBC 8.8 03/25/2020 08:58 AM  
HGB:  
Lab Results Component Value Date/Time HGB 8.8 (L) 03/25/2020 08:58 AM  
 PLTS:  
Lab Results Component Value Date/Time  PLATELET 244 83/19/0003 08:58 AM  
 
 
SaO2%/flow: @OBURCLI9(2)@ 
 
 Vital Signs:   Patient Vitals for the past 8 hrs: 
 Temp Pulse Resp BP SpO2  
03/25/20 1317  84  130/69   
03/25/20 1316  85  140/70   
03/25/20 1312  89  141/74   
03/25/20 1259  79  (!) 205/113   
03/25/20 1242  (!) 115 22  99 % 03/25/20 1225  (!) 117  (!) 215/44 96 % 03/25/20 1200  (!) 106 17  (!) 86 %  
03/25/20 1145  96 21  90 % 03/25/20 1130  84 24 (!) 192/110 (!) 87 % 03/25/20 1115  79 25 (!) 183/109 93 % 03/25/20 1100  79 18 (!) 175/100 90 % 03/25/20 1045  82 19 (!) 172/92 91 %  
03/25/20 1015  81 21 (!) 175/101 95 % 03/25/20 1000  81 18 (!) 184/106 95 % 03/25/20 0945  90 23 (!) 178/103 94 % 03/25/20 0930  83  (!) 172/94 97 % 03/25/20 0915  85 28 (!) 167/92 95 % 03/25/20 0900  88  167/88 99 % 03/25/20 0857  89  (!) 167/121   
03/25/20 0845  86  (!) 167/121 100 % 03/25/20 0830  93  (!) 168/103 98 % 03/25/20 0827 99.1 °F (37.3 °C) 97  (!) 181/105 98 % 03/25/20 0821     (!) 85 % PLAN OF CARE: Maintain Vent support. GOAL: Adequate oxygenation and ventilation.

## 2020-03-25 NOTE — ED NOTES
Pt continues to show increasing SOB and WOB. sats are now sitting at 83-86% on 3LNC. Pt placed on NRB mask, and will be moved to bed 14 for RSI.

## 2020-03-25 NOTE — ED NOTES
Pt unable to maintain O2 sats, moved to 14 for intubation. All staff in appropriate PPE for [employer] . 7.5 ett, [email or web address]. Pt sats climbing. 20mg Etomidate only. Radiology aware of precautions

## 2020-03-25 NOTE — PROGRESS NOTES
New Admit to ICU Possible Pneumonia / Temp Assessment per Dr Hewitt Favre 
 : Acute hypoxic respiratory failure / ESRD on HD - missed HD /Acute pulmonary edema / Pneumonia / Sepsis due to pneumonia / HTN Received on vent settings as ordered : 
      
 :  
Mode: PRESSURE REGULATED VOLUME CONTROL   
PEEP 5.0 CM/H2O Rate: 14 Tidal Volume 500 FIO2 100% Plan : Isolation for assessment

## 2020-03-25 NOTE — ED TRIAGE NOTES
Pt presents with c/o SOB onset last PM. Pt also reports cough and increased temp at home, temp reading at home =99.6F. Missed dialysis yesterday.
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
## 2020-03-25 NOTE — ED NOTES
Pt presents with c/o cough and SOB x 12 hours approximately. Pt also reports increased temp at home, Tmax=99.6. Pt states he missed dialysis yesterday, then became SOB around midnight. Last dialyzed on Sat. Denies any s/s prior to this. Pt is A&O. Skin pale, but warm and dry. HR regular, SR-93 on monitor. BP elevated. Resp even and mildly labored. Tachypneic @ 40/mn. Lungs with scattered INT wheezes, and rhonchi t/o all lung fields. Dialysis cath noted to [facility]. Mild non-pitting edema noted RLE, +2 edema to LLE. Denies any other s/s, and no other acute assessment findings.

## 2020-03-25 NOTE — ED NOTES
Inquired with provider what the plan of care is for pt. Labs have been resulted with continued values consistent with CRF, and hyperkalemia. She advises pt will likely be dialyzed and she is currently reviewing results. Pt updated and v/u.

## 2020-03-25 NOTE — ED NOTES
Pt initially reported feeling slightly better after NEB, but then started to c/o increasing SOB again. Breath sounds still wheezing, but much more rhonchi noted t/o left lung fields than was noted earlier. Will have Dr. Oh  reassess pt.

## 2020-03-25 NOTE — ED NOTES
Verbal orders from Dr. Carmichael Locus given for 100mcg fentanyl and 2mg versed. Due to respiratory issues, patient is on strict precautions. Primary RN asked this RN to pull meds. Meds overridden due to need and prior to order being placed.

## 2020-03-25 NOTE — ED NOTES
Received verbal order for Dr. Moustapha Patten as runner nurse for 100 mcg fenatnyl IV, 2 mg versed IV and propofol drip for sedation with verbal read abck. This Rn is runner nurse due to patient being on droplet plus isolation due to intubation and respiratory problems. All meds overrode prior to order being placed

## 2020-03-26 PROBLEM — J18.9 PNEUMONIA: Status: ACTIVE | Noted: 2020-01-01

## 2020-03-26 NOTE — PROGRESS NOTES
VENTILATOR Care plan 
 
Problem: Ventilator Management Goal: *Adequate oxygenation/ ventilation/ and extubation Patient: 
   
 
Serjio Martinez     44 y.o.   male     3/26/2020  7:25 AM 
Patient Active Problem List  
Diagnosis Code  ST elevation (STEMI) myocardial infarction involving left anterior descending coronary artery ([facility]) I21.02  
 Cardiac arrest - ventricular fibrillation  HTN (hypertension) I10  
 Hypertensive emergency I16.1  Hyperglycemia R73.9  Acute on chronic renal failure (HCC) N17.9, N18.9  Leukocytosis D72.829  
 Acute pulmonary edema (HCC) J81.0  Contrast dye induced nephropathy N14.1, T50.8X5A  
 CKD (chronic kidney disease) N18.9  Arachnoiditis G03.9  Postlaminectomy syndrome, lumbar region M96.1  Psoriasis L40.9  Degeneration of lumbar or lumbosacral intervertebral disc M51.37  
 Encounter for long-term (current) use of high-risk medication Z79.899  Obesity E66.9  
 Pain in joint, lower leg M25.569  Psoriatic arthropathy ([facility] L40.50  Chronic low back pain M54.5, G89.29  Chronic pain syndrome G89.4  Lumbosacral spondylosis without myelopathy M47.817  Enthesopathy of hip M76.899  
 Sacroiliitis ([facility]) M46.1  Coronary artery disease I25.10  
 JESSICA (obstructive sleep apnea) G47.33  
 UTI (lower urinary tract infection) N39.0  Cardiomyopathy ([facility] I42.9  AICD (automatic cardioverter/defibrillator) present Z95.810  
 OLIVIA (acute kidney injury) ([facility] N17.9  ESRD (end stage renal disease) ([facility] N18.6  Chest pain R07.9  
 SOB (shortness of breath) R06.02  Bronchitis J40  Acute renal failure (ARF) ([facility]) N17.9  ARF (acute renal failure) ([facility]) N17.9  Severe sepsis ([facility]) A41.9, R65.20  Epigastric abdominal pain R10.13  
 Anxiety F41.9  Ventricular tachycardia ([facility]) I47.2  Ventricular fibrillation ([facility]) I49.01  
 ICD (implantable cardioverter-defibrillator) discharge Z45.02  
  CAD (coronary artery disease) I25.10  Obesity, morbid ([facility] [address] E66.01  
 Bacteria in urine Y80.94  
 Metabolic acidosis H60.8  Microscopic polyangiitis ([facility] [address] M31.7  Suicide ideation R45.851  Suicidal ideation R45.851  Gastritis K29.70  Acute respiratory failure ([facility]) J96.00  
 CHF (congestive heart failure) ([facility]) I50.9 Acute respiratory failure ([facility] [address] [J96.00] CHF (congestive heart failure) ([facility] [address] [I50.9] Reason patient intubated:  ARF / CHF Ventilator day: 2 Ventilator settings: Settings received as ordered ; 
  - Answer Comment Mode: PRESSURE REGULATED VOLUME CONTROL   
PEEP 5.0 CM/H2O Rate: 14 Tidal Volume 500 FIO2 100% ETT Size/Placement: 7.5 / placed on 3/25/2020 ABG: 
Date:3/26/2020 Lab Results Component Value Date/Time PHI 7.296 (L) 03/26/2020 03:45 AM  
 PCO2I 39.6 03/26/2020 03:45 AM  
 PO2I 70 (L) 03/26/2020 03:45 AM  
 HCO3I 19.1 (L) 03/26/2020 03:45 AM  
 FIO2I 40 03/26/2020 03:45 AM  
 
 
Chest X-ray: 
Date:3/26/2020 [place] [facility] [place] on 03/25/20 XR CHEST PORT Narrative EXAM: CHEST RADIOGRAPH 
 
CLINICAL INDICATION/HISTORY: et tube 
-Additional: None COMPARISON: March 25 TECHNIQUE: Portable frontal view of the chest 
 
_______________ FINDINGS: 
 
General comment: Poor quality examination. Beam centered at the cervicothoracic 
junction. Marked hypoinflation with truncation of the lung bases. SUPPORT DEVICES:  
  > ET tube tip estimated to be 3.8 cm above the bill.  
  > Tunneled dialysis catheter on the right, probably unchanged, tip likely in 
the right atrium. > Cardiac pacemaker/AICD partially imaged. HEART AND MEDIASTINUM: Probable mild cardiomegaly. LUNGS AND PLEURAL SPACES: Marked central vascular congestion with bilateral 
airspace disease. This all appears worse compared with yesterday. The lung bases 
are not evaluated. BONY THORAX AND SOFT TISSUES: Unremarkable. _______________ Impression IMPRESSION: 
 
Severely limited exam for reasons stated. However, bilateral airspace disease 
appears, if anything, worse compared with yesterday. Lab Test: 
Date:3/26/2020 WBC:  
Lab Results Component Value Date/Time WBC 8.8 03/25/2020 08:58 AM  
HGB:  
Lab Results Component Value Date/Time HGB 8.8 (L) 03/25/2020 08:58 AM  
 PLTS:  
Lab Results Component Value Date/Time PLATELET 785 37/28/0644 08:58 AM  
 
 
SaO2%/flow: @TNDNDQZ2(8)@ Vital Signs:    
Patient Vitals for the past 8 hrs: 
 Temp Pulse Resp BP SpO2  
03/26/20 0700  62 18 108/55 96 % 03/26/20 0600  61 16 107/55 97 % 03/26/20 0500  61 14 96/41 96 % 03/26/20 0400  65 15 122/64 99 % 03/26/20 0342 100.2 °F (37.9 °C) 65 16 106/56 99 % 03/26/20 0333  67 15  99 % 03/26/20 0300  60 16 93/47 95 % 03/26/20 0200  63 15 102/47 96 % 03/26/20 0100  66 16 113/54 98 % 03/26/20 0016 100.2 °F (37.9 °C) 66 16 101/54 98 % 03/26/20 0014     98 % 03/26/20 0000  63 15  96 % 03/25/20 2339  64 15  98 % Wean Screen Pass (Yes or No): Wean Screen Reason for Failure: 
Duration of Weaning Trial: 
Additional Comments: PLAN OF CARE: [place] [address] isolation work up / Ventilation support with wean per assessment / Oxygen to maintain SPO2 GOAL: Safe ventilation and extubation OUTCOME:

## 2020-03-26 NOTE — DIABETES MGMT
NUTRITIONAL ASSESSMENT GLYCEMIC CONTROL/ PLAN OF CARE Oralia Cobb           44 y.o.           3/25/2020 1. Acute pulmonary edema ([facility] [address] 2. Pneumonia of right lower lobe due to infectious organism [facility]) 3. ESRD (end stage renal disease) on dialysis ([facility] [address] 4. Acute hyperkalemia INTERVENTIONS/PLAN:  
Monitor feeding status, labs and weights. ASSESSMENT:  
Pt is 133% ideal weight;   BMI (calculated): 33.0 kg/m2 (obese weight classification). Pt appears well nourished at this time. Noted recurrent hypoglycemia today. Nutrition Diagnoses: Altered nutrition related labs due to ESRD/hypoglyecemia as evidenced by GFR - 4; K - 5.8; phos - 5.5/glu - 46. SUBJECTIVE/OBJECTIVE: Information obtained from: chart review 3/26:  Pt with history of ESRD on HD, CAD, CHF admitted presented to ED with SOB, missed dialysis, respiratory failure due to pulmonary edema/pnuemonia, was intubated 3/25, received HD this AM and now extubated. Pt known from previous admissions when he received diet education but with poor receptiveness. Pt with allergy to raw tomatoes and tomato sauce Diet: NPO No data found. Medications: [x]                Reviewed IVF:  NS D 10 at 25 ml/hr (providing 204 kcals/24 hours) Most Recent POC Glucose:  
Recent Labs  
  03/26/20 
0650 03/25/20 
0858 GLU 57* 91  
  
3/26:  Lab - 57; POC - 46, 64, 81, 58, 56, 89 Labs:  
Lab Results Component Value Date/Time Hemoglobin A1c 5.3 10/24/2018 06:26 AM  
 
Lab Results Component Value Date/Time Hemoglobin A1c 5.3 10/24/2018 06:26 AM  
 Hemoglobin A1c 5.8 (H) 01/11/2018 03:04 PM  
 Hemoglobin A1c 5.8 11/01/2015 05:00 AM  
 
Lab Results Component Value Date/Time  Sodium 132 (L) 03/26/2020 06:50 AM  
 Potassium 5.8 (H) 03/26/2020 06:50 AM  
 Chloride 101 03/26/2020 06:50 AM  
 CO2 16 (L) 03/26/2020 06:50 AM  
 Anion gap 15 03/26/2020 06:50 AM  
 Glucose 57 (L) 03/26/2020 06:50 AM  
 BUN 71 (H) 03/26/2020 06:50 AM  
 Creatinine 14.80 (H) 03/26/2020 06:50 AM  
 Calcium 7.5 (L) 03/26/2020 06:50 AM  
 Magnesium 2.0 03/25/2020 08:58 AM  
 Phosphorus 5.5 (H) 10/24/2018 06:26 AM  
 Albumin 3.0 (L) 03/25/2020 08:58 AM  
 
Anthropometrics: IBW : 78.2 kg (172 lb 6.4 oz), % IBW (Calculated): 133.41 %, BMI (calculated): 33 Wt Readings from Last 1 Encounters:  
03/25/20 107.4 kg (236 lb 12.4 oz) Ht Readings from Last 1 Encounters:  
03/25/20 5' 11\" (1.803 m) Estimated Nutrition Needs:  1839 Kcals/day, Protein (g): 94 g Based on:   [x]          Actual BW    []          ABW   []            Adjusted BW   
    
Nutrition Interventions: 
None at this time - just extubated Goal:  
Blood glucose will be within target range of  mg/dL by 3/29/20. Provision of adequate nutrition by 3/31/20. Nutrition Monitoring and Evaluation []     Monitor po intake on meal rounds 
[x]     Continue inpatient monitoring and intervention 
[]     Other: 
 
Nutrition Risk:  []   High     [x]  Moderate    []  Minimal/Uncompromised Latisha Jameson RD, CDE Office:  [phone] Long Range Pager:  [phone]

## 2020-03-26 NOTE — PROGRESS NOTES
SBT to Extubation Patient awake and seem anxious for extubation Plan : Dialysis then assess to extubate - Believe high possibly to extubate  
  - will place how on SBT  
  - no mask with shield available - eye shield given 
  - SBT procedure and plans explained to patient with good patient understanding To PS of 6 / SPO2 100 / HR 72    
 
1400 Stat to bedside - patient doing well on SBT / PS 6 
  - patient restless and anxious - Upon entree to room ET almost out with yas strap lose / how ? 
  - patient extubated with RN assistance / believe patient to be ready and for his safety. Patient with strong cough following / Patient very aware / Cough and clearing stressed Plan : O2 to keep SPO2 > 96% / 4 lpm by N/C/ Possible inhaler need
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
## 2020-03-26 NOTE — PROGRESS NOTES
Problem: Ventilator Management Goal: *Adequate oxygenation and ventilation Outcome: Progressing Towards Goal 
  
Problem: Non-Violent Restraints Goal: *Removal from restraints as soon as assessed to be safe Outcome: Progressing Towards Goal 
Goal: *No harm/injury to patient while restraints in use Outcome: Progressing Towards Goal 
Goal: *Patient's dignity will be maintained Outcome: Progressing Towards Goal 
  
Problem: Pressure Injury - Risk of 
Goal: *Prevention of pressure injury Description: Document Pranav Scale and appropriate interventions in the flowsheet. Outcome: Progressing Towards Goal 
Note: Pressure Injury Interventions: 
Sensory Interventions: Float heels Moisture Interventions: Apply protective barrier, creams and emollients Activity Interventions: Pressure redistribution bed/mattress(bed type) Mobility Interventions: Assess need for specialty bed Nutrition Interventions: Document food/fluid/supplement intake Friction and Shear Interventions: Foam dressings/transparent film/skin sealants Problem: Falls - Risk of 
Goal: *Absence of Falls Description: Document Mariana Morataya Fall Risk and appropriate interventions in the flowsheet. Outcome: Progressing Towards Goal 
Note: Fall Risk Interventions: 
  
 
Mentation Interventions: Bed/chair exit alarm Medication Interventions: Bed/chair exit alarm Elimination Interventions: Call light in reach

## 2020-03-26 NOTE — PROGRESS NOTES
[facility] - Rounding note Date of  Admission: 3/25/2020  8:15 AM  
Primary Care Physician:  Paul Otto MD 
 
 Assessment:  
 
-Acute respiratory distress requiring intubation. Likely from combination of pneumonia and CHF exacerbation and fluid overload. Patient missed dialysis and last dialysis was Saturday 
-Hypertensive urgency with initial systolic blood pressure of 180/110.  
-End-stage renal disease on dialysis - Coronary artery disease: LAD - 3.0 x 18mm VISION 7/13  
- Ischemic VF: 07/13 in setting of MI, DC cardioversion x4 - Ischemic cardiomyopathy: Last EF 35% in 10/2018 - S/p Medtronic single-chamber AICD 05/2014 (Dr. Trevin Rai) - VT and ventricular fibrillation 10/27/15, approximately 20 shocks were administered via AICD. Interrogation at the time noted appropriate device function. Supposed to be on amiodarone - Hypertension - Dyslipidemia  
- JESSICA - Tobacco abuse - Apical mural thrombus ([facility]): ECHO 3/14. Last echo in 10/2018 without any LV thrombus - Degenerative spine disease - Chronic back pain Plan:  
 
Patient with respiratory distress, multifactorial as mentioned above Doubt cardiac ischemia causing event. Only troponin available 0.02 at 9 AM yesterday Likely from fluid overload because of missing dialysis, hypertensive urgency, systolic dysfunction and right sided pneumonia 
 
-Will order additional troponin 
-Hemodialysis/nephrology following 
-Will restart amiodarone 400 mg daily and hydralazine. Patient on IV NTG 
- Continue supportive care. History of Present Illness: This is a 44 y.o. male admitted for Acute respiratory failure ([facility] [J96.00] CHF (congestive heart failure) ([facility] [I50.9]. Patient complains of:   
Respiratory distress Patient is currently intubated and sedated. No family member at bedside. History obtained from medical records and ER note According to documentation, patient came to hospital with shortness of breath. Patient states he missed dialysis yesterday. Last dialysis was March 21. Patient states the shortness of breath started last night. Progressively getting worse today. He states he used his inhaler at home. It helped a little bit but then shortness of breath came back. He states his temperature has been 99.1. Nothing over 100. Reports nasal congestion secondary to allergies at this time. Also reports a dry cough. Denies any abdominal pain. He states he usually runs constipated. Patient was in emergency department and started to get more shortness of breath and eventually needed to be intubated. Review of Symptoms:  Except as stated above include: 
Constitutional:  negative Respiratory:  negative Cardiovascular:  negative Gastrointestinal: negative Genitourinary:  negative Musculoskeletal:  Negative Neurological:  Negative Dermatological:  Negative Endocrinological: Negative Psychological:  Negative A comprehensive review of systems was negative except for that written in the HPI. Past Medical History:  
 
Past Medical History:  
Diagnosis Date  AICD MEDTRONIC (single chamber)  Apical mural thrombus ECHO 3/14  Cardiac arrest ([address] 10/15 VT / VF ( ~20 ICD shocks ). No obstructive CAD on cath  Chronic back pain  Chronic kidney disease  Coronary artery disease LAD - 3.0 x 18mm VISION 7/13  Degenerative spine disease  Dyslipidemia  ESRD (end stage renal disease) on dialysis ([address]   
 tts, [facility] on [address] Dr. Feliz Caballero  Hypertension  Ischemic cardiomyopathy EF 30%(10/15) , 40-45% (03/15),  EF 30-35% (3/14)  Ischemic VF   
 07/13 in setting of MI, DC cardioversion x4  Narcotic dependence ([address]  Noncompliance  Obesity  Psoriasis  S/P cardiac cath 10/15  Secondary hyperparathyroidism (of renal origin)  Tobacco abuse Medications: Allergies Allergen Reactions  Latex Other (comments)  
  blisters  Other Food Hives Allergic to tomatoes and tomato sauce  Keflex [Cephalexin] Anaphylaxis  Codeine Nausea and Vomiting Current Facility-Administered Medications Medication Dose Route Frequency  0.9% sodium chloride (MBP/ADV) infusion  glucose chewable tablet 16 g  4 Tab Oral PRN  
 glucagon (GLUCAGEN) injection 1 mg  1 mg IntraMUSCular PRN  
 dextrose 10 % infusion 125-250 mL  125-250 mL IntraVENous PRN  
 sodium chloride (NS) flush 5-10 mL  5-10 mL IntraVENous PRN  
 heparin (porcine) 1,000 unit/mL injection 1,000 Units  1,000 Units InterCATHeter DIALYSIS PRN  
 0.9% sodium chloride infusion  25 mL/hr IntraVENous DIALYSIS PRN  
 metoclopramide HCl (REGLAN) injection 5 mg  5 mg IntraVENous Q6H  
 VANCOMYCIN INFORMATION NOTE 1 Each  1 Each Other Rx Dosing/Monitoring  nitroglycerin (TRIDIL) 400 mcg/ml infusion  0-20 mcg/min IntraVENous TITRATE  propofoL (DIPRIVAN) 10 mg/mL injection  0-50 mcg/kg/min IntraVENous TITRATE  piperacillin-tazobactam (ZOSYN) 4.5 g in 0.9% sodium chloride (MBP/ADV) 100 mL MBP  4.5 g IntraVENous Q12H  chlorhexidine (PERIDEX) 0.12 % mouthwash 10 mL  10 mL Oral Q12H  pantoprazole (PROTONIX) tablet 40 mg  40 mg Oral ACB  heparin (porcine) injection 5,000 Units  5,000 Units SubCUTAneous Q8H  
 acetaminophen (TYLENOL) tablet 650 mg  650 mg Oral Q6H PRN Or  
 acetaminophen (TYLENOL) suppository 650 mg  650 mg Rectal Q6H PRN  
 [START ON 3/27/2020] VANCOMYCIN INFORMATION NOTE   Other ONCE Physical Exam:  
 
Visit Vitals /82 Pulse 75 Temp 97.3 °F (36.3 °C) (Axillary) Resp 19 Ht 5' 11\" (1.803 m) Wt 107.4 kg (236 lb 12.4 oz) SpO2 98% BMI 33.02 kg/m² BP Readings from Last 3 Encounters:  
03/26/20 168/82  
02/14/20 144/72  
02/07/20 155/87 Pulse Readings from Last 3 Encounters:  
03/26/20 75  
02/14/20 73  
02/07/20 90 Wt Readings from Last 3 Encounters: 03/25/20 107.4 kg (236 lb 12.4 oz) 02/14/20 104.3 kg (230 lb) 02/07/20 104.3 kg (230 lb) General: Intubated and sedated Neck:  {Unable to appreciate because of thick large neck Lungs: Decreased breath sound with rhonchi Heart:  {Regular rate rhythm, no significant murmur Abdomen:  {Soft and obese, nontender Extremities: Chronic skin changes with some edema Neuro: {Intubated and sedated Data Review:  
 
Recent Labs  
  03/26/20 
3270 03/25/20 
7166 WBC 8.7 8.8 HGB 7.3* 8.8* HCT 23.1* 27.7*  
* 150 Recent Labs  
  03/26/20 
0650 03/25/20 
9861 * 132*  
K 5.8* 5.9*  
 99* CO2 16* 22  
GLU 57* 91 BUN 71* 57* CREA 14.80* 13.70* CA 7.5* 7.8*  
MG  --  2.0 ALB  --  3.0*  
SGOT  --  13 ALT  --  14* Results for orders placed or performed during the hospital encounter of 03/25/20 EKG, 12 LEAD, INITIAL Result Value Ref Range Ventricular Rate 84 BPM  
 Atrial Rate 84 BPM  
 P-R Interval 170 ms QRS Duration 92 ms Q-T Interval 394 ms QTC Calculation (Bezet) 465 ms Calculated P Axis 45 degrees Calculated R Axis -42 degrees Calculated T Axis 86 degrees Diagnosis Normal sinus rhythm Left axis deviation Anteroseptal infarct (cited on or before 01-DEC-2015) Abnormal ECG When compared with ECG of 14-FEB-2020 09:35, 
Questionable change in initial forces of Anterior leads All Cardiac Markers in the last 24 hours:   
No results found for: CPK, CK, CKMMB, CKMB, RCK3, CKMBT, CKNDX, CKND1, MARQUES, TROPT, TROIQ, NOELLE, TROPT, TNIPOC, BNP, BNPP Last Lipid:   
Lab Results Component Value Date/Time Cholesterol, total 136 04/21/2015 04:55 AM  
 HDL Cholesterol 28 (L) 04/21/2015 04:55 AM  
 LDL, calculated 60.2 04/21/2015 04:55 AM  
 Triglyceride 239 (H) 04/21/2015 04:55 AM  
 CHOL/HDL Ratio 4.9 04/21/2015 04:55 AM  
 
 
Signed By: Ragini Bassett MD   
 March 26, 2020

## 2020-03-26 NOTE — DIALYSIS
Pnach[facility] Anthony ACUTE HEMODIALYSIS FLOW SHEET 
 
 
HEMODIALYSIS ORDERS: Physician: Vasquez Gastelum Dialyzer: revaclear   Duration: 4 hr  BFR: 400   DFR: 800 Dialysate:  Temp 36-37*C  K+   2    Ca+  2.5 Na 140 Bicarb 35 Weight:  107.4 kg    Patient Chart []     Unable to Obtain []   Dry weight/UF Goal: 4500 Access R Chest [address] Needle Gauge Heparin []  Bolus      Units    [] Hourly       Units    [x]None Catheter locking solution Heparin Pre BP:   116/55    Pulse:     74       Respirations: 20  Temperature:   97.3 Labs: Pre        Post:        [] N/A Additional Orders(medications, blood products, hypotension management):       [] N/A [x] [facility] Consent Verified CATHETER ACCESS: []N/A   [x]Right   []Left   [x]IJ     []Fem   []transhepatic  
[] First use X-ray verified     []Permanent                [] Temporary  
[x]No S/S infection  []Redness  []Drainage []Cultured []Swelling []Pain  
[x]Medical Aseptic Prep Utilized   [x]Dressing Changed  [x] Biopatch  Date: 3/25/20 []Clotted   []Patent   Flows: []Good  []Poor  []Reversed If access problem,  notified: []Yes    []N/A  Date:        
 
GRAFT/FISTULA ACCESS:  [x]N/A     []Right     []Left     []UE     []LE []AVG   []AVF        []Buttonhole    []Medical Aseptic Prep Utilized []No S/S infection  []Redness  []Drainage []Cultured []Swelling []Pain Bruit:   [] Strong    [] Weak       Thrill :   [] Strong    [] Weak Needle Gauge:    Length: If access problem,  notified: []Yes     []N/A  Date:       
Please describe access if present and not used:  
            
            GENERAL ASSESSMENT:  
  
LUNGS:  Rate 20 SaO2%       [] N/A    [] Clear  [x] Coarse  [] Crackles  [] Wheezing 
      [x] Diminished     Location : [x]RLL   [x]LLL    []RUL  []NIKOS Cough: []Productive  []Dry  []N/A   Respirations:  []Easy  []Labored Therapy:   []RA  []NC l/min    Mask: []NRB []Venti       O2% [x]Ventilator  [x]Intubated  [] Trach  [] BiPaP CARDIAC: [x]Regular      [] Irregular   [] Pericardial Rub  [] JVD []  Monitored  [] Bedside  [] Remotely monitored [] N/A  Rhythm: EDEMA: [] None  [x]Generalized  [] Pitting [] 1    [] 2    [] 3    [] 4 [] Facial  [] Pedal  []  UE  [] LE  
 
SKIN:   [x] Warm  [] Hot     [] Cold   [x] Dry     [] Pale   [] Diaphoretic    
             [] Flushed  [] Jaundiced  [] Cyanotic  [] Rash  [] Weeping LOC:    [x] Alert      [x]Oriented:    [x] Person     [] Place  []Time 
             [] Confused  [] Lethargic  [] Medicated  [] Non-responsive GI / ABDOMEN:  [] Flat    [] Distended    [x] Soft    [] Firm   []  Obese 
                           [] Diarrhea  [x] Bowel Sounds  [] Nausea  [] Vomiting  / URINE ASSESSMENT:[] Voiding   [] Oliguria  [] Anuria   [x]  Alonzo [] Incontinent    []  Incontinent Brief      []  Bathroom Privileges PAIN: [x] 0 []1  []2   []3   []4   []5   []6   []7   []8   []9   []10 Scale 0-10  Action/Follow Up: MOBILITY:  [] Amb    [] Amb/Assist    [x] Bed    [] Wheelchair  [] Stretcher All Vitals and Treatment Details on Attached Flowsheet Hospital: [facility] Room # 2607/01 [] 1st Time Acute  [] Stat  [] Routine  [] Urgent    
[] Acute Room  []  Bedside  [x] ICU/CCU  [] ER Isolation Precautions:  Droplet Plus Special Considerations:         [] Blood Consent Verified []N/A ALLERGIES:  
Allergies Allergen Reactions  Latex Other (comments)  
  blisters  Other Food Hives Allergic to tomatoes and tomato sauce  Keflex [Cephalexin] Anaphylaxis  Codeine Nausea and Vomiting Code Status:Full Code Hepatitis Status:    2nd RN check:                    
Lab Results Component Value Date/Time  Hepatitis A, IgM NEGATIVE  07/07/2017 08:39 AM  
 Hepatitis B surface Ag <0.10 02/14/2020 09:53 AM  
 Hepatitis B surface Ab <3.10 (L) 02/14/2020 09:53 AM  
 Hepatitis B core, IgM NEGATIVE  07/07/2017 08:39 AM  
 Hepatitis C virus Ab 0.07 10/04/2018 05:30 AM  
   
 
            Current Labs:  
Lab Results Component Value Date/Time Sodium 132 (L) 03/26/2020 06:50 AM  
 Potassium 5.8 (H) 03/26/2020 06:50 AM  
 Chloride 101 03/26/2020 06:50 AM  
 CO2 16 (L) 03/26/2020 06:50 AM  
 Anion gap 15 03/26/2020 06:50 AM  
 Glucose 57 (L) 03/26/2020 06:50 AM  
 BUN 71 (H) 03/26/2020 06:50 AM  
 Creatinine 14.80 (H) 03/26/2020 06:50 AM  
 BUN/Creatinine ratio 5 (L) 03/26/2020 06:50 AM  
 GFR est AA 4 (L) 03/26/2020 06:50 AM  
 GFR est non-AA 4 (L) 03/26/2020 06:50 AM  
 Calcium 7.5 (L) 03/26/2020 06:50 AM  
  
Lab Results Component Value Date/Time WBC 8.7 03/26/2020 06:50 AM  
 Hemoglobin, POC 13.9 08/20/2017 05:57 AM  
 HGB 7.3 (L) 03/26/2020 06:50 AM  
 Hematocrit, POC 41 08/20/2017 05:57 AM  
 HCT 23.1 (L) 03/26/2020 06:50 AM  
 PLATELET 307 (L) 11/51/5864 06:50 AM  
 MCV 92.8 03/26/2020 06:50 AM  
  
  
 
                                                                         
DIET: DIET NPO    
 
PRIMARY NURSE REPORT: First initial/Last name/Title Pre Dialysis: Carmita Deleon RN     Time: 0900 EDUCATION:   
[] Patient [] Other         Knowledge Basis: [x]None []Minimal [] Substantial  
Barriers to learning  []N/A  Intubation  
[] Access Care     [] S&S of infection     [] Fluid Management     []K+     []Procedural   
[]Albumin     [] Medications     [] Tx Options     [] Transplant     [] Diet     [] Other Teaching Tools:  [] Explain  [] Demo  [] Handouts [] Video Patient response:   [] Verbalized understanding  [] Teach back  [] Return demonstration [] Requires follow up due to intubation Inappropriate due to         
 
[x] Time Out/Safety Check  [x]Extracorporeal Circuit Tested for integrity RO/HEMODIALYSIS MACHINE SAFETY CHECKS  Before each treatment: Machine Number:                   [facility] 
                                [] Unit Machine #  with centralized RO 
                                [] Portable Machine #1/RO serial # [id number] [] Portable Machine #2/RO serial # [id number] [] Portable Machine #4/RO serial # [id number] [facility] 
                                [] Portable Machine #11/RO serial # [id number] [x] Portable Machine #2 /RO serial # [id number] [] Portable Machine #13/RO serial #  [id number] Alarm Test:  Pass time 7030 [x] RO/Machine Log Complete Temp    36 Dialysate: pH  7.4 Conductivity: Meter   14     HD Machine   14.1                  TCD: 14 
Dialyzer Lot # [id number]            Blood Tubing Lot # [id number]          Saline Lot #  [id number] CHLORINE TESTING-Before each treatment and every 4 hours Total Chlorine: [x] less than 0.1 ppm  Time: 0940 4 Hr/2nd Check Time: 1340  
(if greater than 0.1 ppm from Primary then every 30 minutes from Secondary) TREATMENT INITIATION  with Dialysis Precautions:  
[x] All Connections Secured                 [x] Saline Line Double Clamped  
[x] Venous Parameters Set                  [x] Arterial Parameters Set [x] Prime Given 250ml                          [x]Air Foam Detector Engaged Treatment Initiation Note:  Received patient in ICU bed intubated and on ventilator. Patient sedated. Right chest TDC access, HD initiated without difficulty. During Treatment Notes:1030 Primary care nurse discontinued sedation medicine and patient became alert and awake able to nod yes or no to questions. Medication Dose Volume Route Time [facility] name Title RN  
      RN  
      RN  
        RN Post Assessment: Dialyzer Cleared: [x] Good [] Fair  [] Poor Blood processed:  92.1 L 
UF Removed  4000 Ml POst BP:   132/92       Pulse: 93 Respirations: 18  Temperature: 98 Lungs: 
 
 [] Clear      [x] Course         [] Crackles  
 [] Wheezing         [] Diminished Post Tx Vascular Access: AVF/AVG: Bleeding stopped Art  min. Ashok. Min   N/A Cardiac:  
[] Regular   [] Irregular   [] Monitor  [] N/A Rhythm:      
Catheter:  
Locking solution: Heparin 1:1000 Art. 2.1  Ashok. 2.2  N/A Skin:   Pain:   
[x] Warm  [x] Dry [] Diaphoretic    [] Flushed   
[] Pale [] Cyanotic [x]0  []1  []2   []3  []4   []5   []6   []7   []8   []9   []10 Post Treatment Note: 
 patient completed and tolerated 4 hrs of Hd. 4000 mls fluid removed. POST TREATMENT PRIMARY NURSE HANDOFF REPORT:  
 
First initial/Last name/Title Post Dialysis: Lucia Rose Time:  6564 Abbreviations: AVG-arterial venous graft, AVF-arterial venous fistula, IJ-Internal Jugular, Subcl-Subclavian, Fem-Femoral, Tx-treatment, AP/HR-apical heart rate, DFR-dialysate flow rate, BFR-blood flow rate, AP-arterial pressure, -venous pressure, UF-ultrafiltrate, TMP-transmembrane pressure, Ashok-Venous, Art-Arterial, RO-Reverse Osmosis

## 2020-03-26 NOTE — PROGRESS NOTES
Call placed to patient spouse jessica at [phone]; voicemail left to return phone call regarding patient updates

## 2020-03-26 NOTE — PROGRESS NOTES
Problem: Discharge Planning Goal: *Discharge to safe environment Outcome: Progressing Towards Goal 
Plan is home vs home with home health

## 2020-03-26 NOTE — PROGRESS NOTES
Problem: Patient Education: Go to Patient Education Activity Goal: Patient/Family Education Outcome: Progressing Towards Goal 
  
Problem: Pressure Injury - Risk of 
Goal: *Prevention of pressure injury Description: Document Pranav Scale and appropriate interventions in the flowsheet. Outcome: Progressing Towards Goal 
Note: Pressure Injury Interventions: 
Sensory Interventions: Float heels Moisture Interventions: Apply protective barrier, creams and emollients Activity Interventions: Pressure redistribution bed/mattress(bed type) Mobility Interventions: Assess need for specialty bed Nutrition Interventions: Document food/fluid/supplement intake Friction and Shear Interventions: Foam dressings/transparent film/skin sealants Problem: Patient Education: Go to Patient Education Activity Goal: Patient/Family Education Outcome: Progressing Towards Goal 
  
Problem: Falls - Risk of 
Goal: *Absence of Falls Description: Document Chilango Mercedes Fall Risk and appropriate interventions in the flowsheet. Outcome: Progressing Towards Goal 
Note: Fall Risk Interventions: 
  
 
Mentation Interventions: Bed/chair exit alarm Medication Interventions: Bed/chair exit alarm Elimination Interventions: Call light in reach Problem: Patient Education: Go to Patient Education Activity Goal: Patient/Family Education Outcome: Progressing Towards Goal 
  
Problem: Pain Goal: *Control of Pain Outcome: Progressing Towards Goal 
Goal: *PALLIATIVE CARE:  Alleviation of Pain Outcome: Progressing Towards Goal 
  
Problem: Patient Education: Go to Patient Education Activity Goal: Patient/Family Education Outcome: Progressing Towards Goal 
  
Problem: Breathing Pattern - Ineffective Goal: *Absence of hypoxia Outcome: Progressing Towards Goal 
Goal: *Use of effective breathing techniques Outcome: Progressing Towards Goal 
Goal: *PALLIATIVE CARE:  Alleviation of Dyspnea Outcome: Progressing Towards Goal 
  
Problem: Patient Education: Go to Patient Education Activity Goal: Patient/Family Education Outcome: Progressing Towards Goal 
  
Problem: Risk for Spread of Infection Goal: Prevent transmission of infectious organism to others Description: Prevent the transmission of infectious organisms to other patients, staff members, and visitors. Outcome: Progressing Towards Goal 
  
Problem: Patient Education:  Go to Education Activity Goal: Patient/Family Education Outcome: Progressing Towards Goal 
  
Problem: General Medical Care Plan Goal: *Vital signs within specified parameters Outcome: Progressing Towards Goal 
Goal: *Labs within defined limits Outcome: Progressing Towards Goal 
Goal: *Absence of infection signs and symptoms Outcome: Progressing Towards Goal 
Goal: *Optimal pain control at patient's stated goal 
Outcome: Progressing Towards Goal 
Goal: *Skin integrity maintained Outcome: Progressing Towards Goal 
Goal: *Fluid volume balance Outcome: Progressing Towards Goal 
Goal: *Optimize nutritional status Outcome: Progressing Towards Goal 
Goal: *Anxiety reduced or absent Outcome: Progressing Towards Goal 
Goal: *Progressive mobility and function (eg: ADL's) Outcome: Progressing Towards Goal 
  
Problem: Patient Education: Go to Patient Education Activity Goal: Patient/Family Education Outcome: Progressing Towards Goal

## 2020-03-26 NOTE — CDMP QUERY
Please call the patient and set a hospital follow up for me. Please let him know that I will not be back in for another week and we will schedule him within 2 weeks. GML   This patient has been diagnosed with Sepsis BY pulmonary Do you concur? .  Please document in the progress notes the Clinical Indicators and any associated Acute Organ Dysfunction that supports this diagnosis or state that the diagnosis has been ruled out. Current documentation: WBC 8.8, Lactic acid WNL, Temp 101.3, diagnosis with pneumonia Sepsis  (BSV Approved definition) Documented Source of suspected or confirmed infection as manifested by 2 or more of the following, not easily explained by another co-existing condition:  Temperature:  >38C (100.9F)   -OR-  <36C  (96.8F)  Heart Rate:  > 90 bpm 
 Respiratory Rate:  > 20 / min  -OR-  PaCO2 < 32 
 WBC:  > 12K  -OR- < 4K  -OR- Bands > 10 Explicitly link all related/associated Acute Organ Dysfunction to Sepsis:     
 Encephalopathy  Sepsis-induced Hypotension (or)  Septic Shock         [SBP < 90 (or) MAP <65 (or) SBP drop > 40 points from baseline]  Hypoxemia (or)  Acute Respiratory Failure         [need for invasive or non-invasive ventilation OR- pO2 < 60 mmHg]  Acute Kidney Injury (or) Acute Renal Failure OR- Oliguria         [serum Creatinine > 2.0 OR- Urine Output < 0.5ml/kg/hr for 2 hours]  Ileus (absent bowel sounds)  Coagulopathy  DIC  Thrombocytopenia         [Platelet Count < 985,945 OR- INR > 1.5 OR- aPTT >60 sec]  Hyperbilirubinemia     [Bilirubin > 2 mg/dL]  Hyperlactatemia   [Lactic Acid > 2.0]  Critical-Illness Myopathy or Polyneuropathy Severe Sepsis = Sepsis with associated Acute Organ Dysfunction Septic Shock = Refractory hypotension refractory to aggressive volume replacement and often requiring vasopressor therapy like dopamine  -OR-  Lactic Acidosis  [Lactic Acid > 4.0]. Thank- You Victorino Leyva RN [facility] [phone]

## 2020-03-26 NOTE — PROGRESS NOTES
Problem: Discharge Planning Goal: *Discharge to safe environment Outcome: Progressing Towards Goal 
Plan is home vs home with home health Patient on Droplet Plus Isolation, interview will be by phone and will use past admission information. Reason for Admission:   Acute respiratory failure, Pneumonia, CHF and fluid overload RUR Score:   37 PCP: First and Last name:  Abigail García Name of Practice: 
 Are you a current patient: Yes/No: 
 Approximate date of last visit:  
          
Resources/supports as identified by patient/family:    
             
Top Challenges facing patient (as identified by patient/family and CM): Finances/Medication cost?    Pt with [employer] Medicare A and B Transportation? Support system or lack thereof? .  
Living arrangements? From the past notes, he lives with his wife. From when I called earlier to his wife, I might have heard a child in the background, uncertain. If we find a child lives in the home, may need to call CPS to see if has open case if she lives in home with active IV Drug Abuser. Self-care/ADLs/Cognition? In the past he used a cane. He has  dialysis at [facility] on [address] on TTS schedule- he missed Tuesday and may have contributed to fluid overload. Current Advanced Directive/Advance Care Plan:  no 
                      
Plan for utilizing home health:    TBD- 
              
Transition of Care Plan:     I Reviewed Chart   And called his wife [address] [place] [address],[address] at [phone]. She says she is also talking with pt right now. She asked me to call her back, which I will. 
5:41 I called pt's wife back but she is unable to talk with me- says she is on the road and can't talk now. I called the other number associated with pt- [phone] but no answer, no phone message to that number. ICU RN states pt wants to go home, but currently is on 4 l o2 per RT note. He does not have history of home o2. Currently does not have a stable chronic diagnosis to have insurance pay for o2. His plan is home. Wife Boston Crusdp- [phone]/ jane with phone number listed [phone] which has message of being a [employer] company. Care Management Interventions Palliative Care Criteria Met (RRAT>21 & CHF Dx)?: No 
Transition of Care Consult (CM Consult): Discharge Planning MyChart Signup: No 
Discharge Durable Medical Equipment: No 
Physical Therapy Consult: No 
Occupational Therapy Consult: No 
Speech Therapy Consult: No 
Current Support Network: Lives with Spouse The Plan for Transition of Care is Related to the Following Treatment Goals : Is able to breathe [employer] Information Provided?: No  
 
Yee Fabian. Nell Dorsey RN, BSN [facility] [phone], Pager [phone] 
[email or web address]

## 2020-03-26 NOTE — H&P
History and Physical 
 
Patient: Naseem Odonnell               Sex: male          DOA: 3/25/2020 YOB: 1970      Age:  44 y.o.        LOS:  LOS: 1 day HPI:  
 
Naseem Odonnell is a 44 y.o. male who presented to the ER with SOB. He did not have chest pain. He did not have fever at home. His SOB had been worsening for several days and he had dry cough. He did not have nausea vomiting or diarrhea. In the ER he had worsening SOB. He continued to decline and began to cough up blood. At this time the decision was made to intubate. He was intubated and will be admitted to the ICU. Past Medical History:  
Diagnosis Date  AICD MEDTRONIC (single chamber)  Apical mural thrombus ECHO 3/14  Cardiac arrest ([address] 10/15 VT / VF ( ~20 ICD shocks ). No obstructive CAD on cath  Chronic back pain  Chronic kidney disease  Chronic kidney disease, stage III (moderate) ([facility])  Coronary artery disease LAD - 3.0 x 18mm VISION 7/13  Degenerative spine disease  Dyslipidemia  Hypertension  Ischemic cardiomyopathy EF 30%(10/15) , 40-45% (03/15),  EF 30-35% (3/14)  Ischemic VF   
 07/13 in setting of MI, DC cardioversion x4  Narcotic dependence ([address]  Noncompliance  Obesity  Psoriasis  S/P cardiac cath 10/15  Secondary hyperparathyroidism (of renal origin)  Tobacco abuse Social History: 
 Tobacco use:  Unknown Alcohol use:  Unknown Occupation:  Unknown Family History: 
 Unknown Review of Systems Constitutional:  No fever or weight loss HEENT:  No headache or visual changes Cardiovascular:  No chest pain or diaphoresis Respiratory:  Dry cough as above with SOB 
GI:  No nausea or vomitting. No diarrhea :  No hematuria or dysuria Skin:  No rashes or moles Neuro:  No seizures or syncope Hematological:  No bruising or bleeding Endocrine:  Patient has known diabetes, no thyroid disease Physical Exam:  
  
Visit Vitals /55 Pulse 65 Temp 100.2 °F (37.9 °C) Resp 15 Ht 5' 11\" (1.803 m) Wt 107.4 kg (236 lb 12.4 oz) SpO2 99% BMI 33.02 kg/m² Physical Exam: 
 
Gen:  Quiet on the ventilator HEENT:  Normal cephalic atraumatic, patient is orally intubated Neck:  Supple, No JVD Lungs:  Clear bilaterally, no wheeze, no rales, normal effort Heart:  Regular Rate and Rhythm, normal S1 and S2, no edema Abdomen:  Soft, non tender, normal bowel sounds, no guarding. Extremities:  Well perfused, no cyanosis or edema Neurological:  Normal tone without seizure Skin:  No rashes or moles Mental Status:  Unable to assess Laboratory Studies: All lab results for the last 24 hours reviewed. Assessment/Plan Principal Problem: 
  Pneumonia (3/26/2020) Active Problems: 
  Acute pulmonary edema ([facility] (7/24/2013) Acute respiratory failure ([facility] (3/25/2020) HTN (hypertension) (7/24/2013) ESRD (end stage renal disease) ([facility] (9/3/2014) CAD (coronary artery disease) (10/29/2015) Overview: S/P PCI LAD 
 
  CHF (congestive heart failure) ([facility] (3/25/2020) AICD (automatic cardioverter/defibrillator) present (5/21/2014) Overview: Single chamber Medtronic aicd implant 5/21/14 for primary prevention. PLAN: 
 
Have discussed with Critical Care Continue antibiotics Continue supportive care on the Ventilator BP control Continue with dialysis Monitor respiratory status and Neuro status

## 2020-03-26 NOTE — PROGRESS NOTES
0700 Bedside and Verbal shift change report given to Turner Petit (oncoming nurse) by Miguelina Marquez RN  (offgoing nurse). Report included the following information SBAR, Kardex, ED Summary, Intake/Output, MAR, Recent Results and Med Rec Status Pt intubated on isolation, on propofol . Propofol stopped for sedation vacation. Mercedes Bee able to follow commands. Vital signs on monitor Within lucy limits . Alonzo catheter draining to gravity. Bed low with side rails up x 3, call light within reach. Will continue to monitor the pt.  
 
1330 Pt was extubated after having a successful SBT. Tolerated the procedure well with no respiratory distress noted. Able to manage his secretions and use Incentive spirometer with encouragement. He s on oxygen at 4 L/min via Nasal Cannula. 1640 t stated that he was agitated. Dr. Francenia Hodgkin  made aware. Ativan 1 mg ordered once. 1900 Bedside and Verbal shift change report given to Pamela Vásquez   (oncoming nurse) by Turner Petit (offgoing nurse). Report included the following information SBAR, Kardex, ED Summary, Intake/Output, MAR, Recent Results and Med Rec Status.
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
## 2020-03-26 NOTE — PROGRESS NOTES
1930-patient assessed; see flow sheet, remains intubated, VSS, elevated temp, remains in restraints. Remains on droplet plus precautions 2000- glover care performed, patient turned and repositioned,no distress noted,  
 
2200-resting in no distress. 2300- patient repositioned,  
 
0000- VSS,gown and linen changed, new leads applied, patient turned and repositioned, no signs of distress 0040- dialysis nurse present 0130- Dialysis nurse stated that patient did not receive dialysis due to machine not functioning correctly; Dialysis nurse stated he will informed MD  
 
0200-no distress noted 0250-call placed to pharmacy regarding vancomycin dose which is supposed to be given after dialysis; per pharmacy it is okay to give 0300 dose of vancomycin. 0400-patient turned and repositioned, /68, O2 96% 
0500-lab made aware of patient being hard sticks; nurses attempted x 2 
 
0600-no overnight event; Temp currently 98.2 axil

## 2020-03-26 NOTE — PROGRESS NOTES
conducted an initial consultation and Spiritual Assessment for Azael Marcus, who is a 44 y.o.,male. Patients Primary Language is: [place]. According to the patients EMR Oriental orthodox Affiliation is: Jacklyn Mathews.  
 
The reason the Patient came to the hospital is:  
Patient Active Problem List  
 Diagnosis Date Noted  ST elevation (STEMI) myocardial infarction involving left anterior descending coronary artery ([address] 07/24/2013 Priority: 1 - One  
 Hypertensive emergency 07/24/2013 Priority: 1 - One  
 Hyperglycemia 07/24/2013 Priority: 2 - Two  Acute pulmonary edema ([address] 07/24/2013 Priority: 2 - Two  Pneumonia 03/26/2020  Acute respiratory failure ([address] 03/25/2020  CHF (congestive heart failure) ([address] 03/25/2020  Gastritis 10/24/2018  Suicide ideation 10/23/2018  Suicidal ideation 10/23/2018  Microscopic polyangiitis ([address] 10/22/2018  Metabolic acidosis 21/23/8910  Bacteria in urine 10/03/2018  Obesity, morbid ([address] 01/11/2018  Anxiety 10/29/2015  Ventricular tachycardia ([address] 10/29/2015  Ventricular fibrillation ([address] 10/29/2015  ICD (implantable cardioverter-defibrillator) discharge 10/29/2015  CAD (coronary artery disease) 10/29/2015  Epigastric abdominal pain 05/22/2015  Severe sepsis ([address] 05/17/2015  Acute renal failure (ARF) ([address] 03/21/2015  ARF (acute renal failure) ([address] 03/21/2015  Chest pain 11/03/2014  
 SOB (shortness of breath) 11/03/2014  Bronchitis 11/03/2014  OLIVIA (acute kidney injury) ([address] 09/03/2014  ESRD (end stage renal disease) ([address] 09/03/2014  AICD (automatic cardioverter/defibrillator) present 05/21/2014  Cardiomyopathy ([address] 04/23/2014  UTI (lower urinary tract infection) 03/17/2014  JESSICA (obstructive sleep apnea) 12/17/2013  Coronary artery disease  Contrast dye induced nephropathy 07/26/2013  CKD (chronic kidney disease) 07/26/2013  Cardiac arrest - ventricular fibrillation 07/24/2013  
 HTN (hypertension) 07/24/2013  Acute on chronic renal failure ([address] [address] 07/24/2013  Leukocytosis 07/24/2013  Enthesopathy of hip 07/01/2013  Sacroiliitis ([address] 07/01/2013  Chronic pain syndrome 07/30/2012  Lumbosacral spondylosis without myelopathy 07/30/2012  Psoriatic arthropathy ([address] 07/23/2012  Chronic low back pain 07/23/2012  Obesity 01/29/2012  Pain in joint, lower leg 01/29/2012  Psoriasis 01/22/2012  Degeneration of lumbar or lumbosacral intervertebral disc 01/22/2012  Encounter for long-term (current) use of high-risk medication 01/22/2012  Arachnoiditis 01/17/2012  Postlaminectomy syndrome, lumbar region 01/17/2012 The  provided the following Interventions: 
Initiated a relationship of care and support. Explored issues of pita, belief, spirituality and Scientologist/ritual needs while hospitalized. Listened empathically. Provided chaplaincy education. Provided information about Spiritual Care Services. Offered prayer and assurance of continued prayers on patients behalf. Chart reviewed. The following outcomes were achieved: 
Patient shared limited information about both their medical narrative and spiritual journey/beliefs. Patient processed feeling about current hospitalization. Patient expressed gratitude for pastoral care visit. Assessment: 
Patient does not have any Scientologist/cultural needs that will affect patients preferences in health care. There are no further spiritual or Scientologist issues which require Spiritual Care Services interventions at this time.  conducted an initial consultation and Spiritual Assessment for Sedrick Renteria, who is a 44 y.o.,male. Patients Primary Language is: [place].   
According to the patients EMR Religion Affiliation is: [facility].  
 
The reason the Patient came to the hospital is:  
Patient Active Problem List  
 Diagnosis Date Noted  ST elevation (STEMI) myocardial infarction involving left anterior descending coronary artery ([facility] 07/24/2013 Priority: 1 - One  
 Hypertensive emergency 07/24/2013 Priority: 1 - One  
 Hyperglycemia 07/24/2013 Priority: 2 - Two  Acute pulmonary edema ([address] 07/24/2013 Priority: 2 - Two  Pneumonia 03/26/2020  Acute respiratory failure ([facility] 03/25/2020  CHF (congestive heart failure) ([facility] 03/25/2020  Gastritis 10/24/2018  Suicide ideation 10/23/2018  Suicidal ideation 10/23/2018  Microscopic polyangiitis ([address] 10/22/2018  Metabolic acidosis 83/45/4417  Bacteria in urine 10/03/2018  Obesity, morbid ([facility] 01/11/2018  Anxiety 10/29/2015  Ventricular tachycardia ([address] 10/29/2015  Ventricular fibrillation ([facility] 10/29/2015  ICD (implantable cardioverter-defibrillator) discharge 10/29/2015  CAD (coronary artery disease) 10/29/2015  Epigastric abdominal pain 05/22/2015  Severe sepsis ([address] 05/17/2015  Acute renal failure (ARF) ([facility] 03/21/2015  ARF (acute renal failure) ([facility] 03/21/2015  Chest pain 11/03/2014  
 SOB (shortness of breath) 11/03/2014  Bronchitis 11/03/2014  OLIVIA (acute kidney injury) ([facility] 09/03/2014  ESRD (end stage renal disease) ([facility] 09/03/2014  AICD (automatic cardioverter/defibrillator) present 05/21/2014  Cardiomyopathy ([address] 04/23/2014  UTI (lower urinary tract infection) 03/17/2014  JESSICA (obstructive sleep apnea) 12/17/2013  Coronary artery disease  Contrast dye induced nephropathy 07/26/2013  CKD (chronic kidney disease) 07/26/2013  Cardiac arrest - ventricular fibrillation 07/24/2013  
 HTN (hypertension) 07/24/2013  Acute on chronic renal failure ([address] 07/24/2013  Leukocytosis 07/24/2013  Enthesopathy of hip 07/01/2013  Sacroiliitis ([facility] 07/01/2013  Chronic pain syndrome 07/30/2012  Lumbosacral spondylosis without myelopathy 07/30/2012  Psoriatic arthropathy ([facility] 07/23/2012  Chronic low back pain 07/23/2012  Obesity 01/29/2012  Pain in joint, lower leg 01/29/2012  Psoriasis 01/22/2012  Degeneration of lumbar or lumbosacral intervertebral disc 01/22/2012  Encounter for long-term (current) use of high-risk medication 01/22/2012  Arachnoiditis 01/17/2012  Postlaminectomy syndrome, lumbar region 01/17/2012 The  provided the following Interventions: 
 attempted to Initiate  a relationship of care and support with patient in room 2607 this morning but found the patient now on life support and unable to communicate. Provided information about Spiritual Care Services. Offered prayer and assurance of continued prayers on patients behalf. Assessment: 
Patient does not have any Mu-ism/cultural needs that will affect patients preferences in health care. There are no further spiritual or Mu-ism issues which require Spiritual Care Services interventions at this time. [employer] will continue to follow on an as needed /request basis. Reiseñor 3 Board Certified [employer] Spiritual Care  
[phone]

## 2020-03-26 NOTE — PROGRESS NOTES
Received patient on vent support ACVC+ Rate 14,,PEEP 5 at 50%FIO2. ETT 7.5 secure @ 25 at the lip. Vent alarms are set and functioning. BVM available at the bedside.

## 2020-03-26 NOTE — CDMP QUERY
Pt admitted with SOB and has CHF documented. Please further specify type and acuity of CHF in the medical record. ? Acute on Chronic Systolic CHF ? Acute on Chronic Diastolic CHF ? Acute on Chronic Systolic and Diastolic CHF ? Acute Systolic CHF ? Acute Diastolic CHF ? Acute Systolic and Diastolic CHF ? Chronic Systolic CHF ? Chronic Diastolic CHF ? Chronic Systolic and Diastolic CHF 
? Other, please specify ? Clinically unable to determine The medical record reflects the following: 
  Risk Factors: missed diaysis Clinical Indicators: acute pulmonary edema, fluid overload, SOB Treatment: lasix 20 mg iv, dialysis Thank- You Tonie Abbott RN [facility] [phone]

## 2020-03-27 NOTE — PROGRESS NOTES
Extubated, doing well, sitting on side of bed. Off nitro gtt. On NC. Would complete coarse of abx with augmentin at discharge if cultures remain negative. COVID19 pending. If discharged need to continue home self-quarantine for 2 weeks. No further Pulmonary or Critical Care needs. Will sign-off.

## 2020-03-27 NOTE — PROGRESS NOTES
Progress Note Patient is Person of Interest for Covid 19. Test is sent and pending. Patient: Destiney Galvan               Sex: male          DOA: 3/25/2020 YOB: 1970      Age:  44 y.o.        LOS:  LOS: 1 day CHIEF COMPLAINT:  Acute respiratory failure, pneumonia, Subjective:  
 
Patient extubated, talking Objective:  
  
Visit Vitals /70 Pulse (!) 106 Temp 98.5 °F (36.9 °C) Resp 20 Ht 5' 11\" (1.803 m) Wt 107.4 kg (236 lb 12.4 oz) SpO2 100% BMI 33.02 kg/m² Physical Exam: 
Gen:  Patient is in no distress. No complaint HEENT and Neck:  PERRL, No cervical tenderness or masses Lungs:  Clear bilaterally. No rales, no wheeze. Normal effort. Heart:  Regular Rate and Rhythm. No murmur or gallop. No JVD. No edema. Abdomen:  Soft, non tender, no masses, no Hepatosplenomegally Extremities:  No digital clubbing, no cyanosis Neuro:  Alert and oriented, Normal affect, Cranial nerves intact, No sensory deficits Lab/Data Reviewed: 
BMP:  
Lab Results Component Value Date/Time  (L) 03/26/2020 06:50 AM  
 K 5.8 (H) 03/26/2020 06:50 AM  
  03/26/2020 06:50 AM  
 CO2 16 (L) 03/26/2020 06:50 AM  
 AGAP 15 03/26/2020 06:50 AM  
 GLU 57 (L) 03/26/2020 06:50 AM  
 BUN 71 (H) 03/26/2020 06:50 AM  
 CREA 14.80 (H) 03/26/2020 06:50 AM  
 GFRAA 4 (L) 03/26/2020 06:50 AM  
 GFRNA 4 (L) 03/26/2020 06:50 AM  
 
CBC:  
Lab Results Component Value Date/Time WBC 8.7 03/26/2020 06:50 AM  
 HGB 7.3 (L) 03/26/2020 06:50 AM  
 HCT 23.1 (L) 03/26/2020 06:50 AM  
  (L) 03/26/2020 06:50 AM  
 
 
 
 
Assessment/Plan Principal Problem: 
  Pneumonia (3/26/2020) Active Problems: 
  Acute pulmonary edema ([address] (7/24/2013) Acute respiratory failure ([facility] (3/25/2020) HTN (hypertension) (7/24/2013) ESRD (end stage renal disease) ([facility] (9/3/2014) CAD (coronary artery disease) (10/29/2015) Overview: S/P PCI LAD 
 
  CHF (congestive heart failure) ([facility] (3/25/2020) AICD (automatic cardioverter/defibrillator) present (5/21/2014) Overview: Single chamber Medtronic aicd implant 5/21/14 for primary prevention. Plan: 
Continue with antibiotics Follow respiratory status BP control Follow Covid 19 testing Continue HD Patient is Person of Interest for Covid 19. Test is sent and pending.

## 2020-03-27 NOTE — PROGRESS NOTES
Discharge/Transition Planning Care Management following and chart reviewed.  Plan is Home, COVID r/o

## 2020-03-27 NOTE — PROGRESS NOTES
0730  Assumed care of pt JOHN Stein. Awake, droplet plus isolation status maintained, VSS per monitor. Pt removes monitoring devices, wishes to go home. 0900  OOB with assistance of one to toilet for BM, catherine well. 1000  Restless; asking for pain med for back pain and withdrawal s/s. Declares wish to go home AMA. 1100  Dr. Denis Kee here. Alonzo cath d/cd 1700  OOB to toilet with minimal assistance. Appetite good 1930  Bedside and Verbal shift change report given to Enrrique Stockton RN (oncoming nurse) by Matt North RN (offgoing nurse). Report included the following information SBAR, Kardex, ED Summary, Intake/Output, MAR, Accordion, Recent Results, Cardiac Rhythm SR, Alarm Parameters  and Quality Measures.

## 2020-03-27 NOTE — PROGRESS NOTES
Problem: Pressure Injury - Risk of 
Goal: *Prevention of pressure injury Description: Document Pranav Scale and appropriate interventions in the flowsheet. Outcome: Progressing Towards Goal 
Note: Pressure Injury Interventions: 
Sensory Interventions: Float heels Moisture Interventions: Apply protective barrier, creams and emollients Activity Interventions: Pressure redistribution bed/mattress(bed type) Mobility Interventions: Assess need for specialty bed Nutrition Interventions: Document food/fluid/supplement intake Friction and Shear Interventions: Foam dressings/transparent film/skin sealants Problem: Falls - Risk of 
Goal: *Absence of Falls Description: Document Danney Mess Fall Risk and appropriate interventions in the flowsheet. Outcome: Progressing Towards Goal 
Note: Fall Risk Interventions: 
  
 
Mentation Interventions: Bed/chair exit alarm Medication Interventions: Bed/chair exit alarm Elimination Interventions: Call light in reach Problem: Pain Goal: *Control of Pain Outcome: Progressing Towards Goal 
  
Problem: Breathing Pattern - Ineffective Goal: *Absence of hypoxia Outcome: Progressing Towards Goal 
  
Problem: Risk for Spread of Infection Goal: Prevent transmission of infectious organism to others Description: Prevent the transmission of infectious organisms to other patients, staff members, and visitors.  
Outcome: Progressing Towards Goal

## 2020-03-27 NOTE — PROGRESS NOTES
[facility] - Rounding note Date of  Admission: 3/25/2020  8:15 AM  
Primary Care Physician:  Ruth Flynn MD 
 
Subjective: 
Patient denies any chest pain. Shortness of breath has improved significantly. He denies any fever or chills Assessment:  
 
-Acute respiratory distress requiring intubation. Likely from combination of pneumonia and CHF exacerbation and fluid overload. Patient missed dialysis and last dialysis was Saturday 
-Hypertensive urgency with initial systolic blood pressure of 180/110.  
-End-stage renal disease on dialysis - Coronary artery disease: LAD - 3.0 x 18mm VISION 7/13  
- Ischemic VF: 07/13 in setting of MI, DC cardioversion x4 - Ischemic cardiomyopathy: Last EF 35% in 10/2018 - S/p Medtronic single-chamber AICD 05/2014 (Dr. Paul Schwartz) - VT and ventricular fibrillation 10/27/15, approximately 20 shocks were administered via AICD. Interrogation at the time noted appropriate device function. Supposed to be on amiodarone - Hypertension - Dyslipidemia  
- JESSICA - Tobacco abuse - [facility]ical [facility] ECHO 3/14. Last echo in 10/2018 without any LV thrombus - Degenerative spine disease - Chronic back pain  
-Patient under isolation for COVID 19 rule out Plan:  
 
Patient with respiratory distress, multifactorial as mentioned above Doubt cardiac ischemia causing event. Troponin unremarkable for any ACS Likely from fluid overload because of missing dialysis, hypertensive urgency, systolic dysfunction and right sided pneumonia 
 
-Hemodialysis/nephrology following 
-We will start Coreg 6.25 mg twice daily 
-We will start Imdur 30 mg daily 
-Continue amiodarone and hydralazine and aspirin History of Present Illness: This is a 44 y.o. male admitted for Acute respiratory failure ([facility] [J96.00] CHF (congestive heart failure) ([facility] [I50.9]. Patient complains of:   
Respiratory distress Patient is currently intubated and sedated. No family member at bedside. History obtained from medical records and ER note According to documentation, patient came to hospital with shortness of breath. Patient states he missed dialysis yesterday. Last dialysis was March 21. Patient states the shortness of breath started last night. Progressively getting worse today. He states he used his inhaler at home. It helped a little bit but then shortness of breath came back. He states his temperature has been 99.1. Nothing over 100. Reports nasal congestion secondary to allergies at this time. Also reports a dry cough. Denies any abdominal pain. He states he usually runs constipated. Patient was in emergency department and started to get more shortness of breath and eventually needed to be intubated. A comprehensive review of systems was negative except for that written in the HPI. Past Medical History:  
 
Past Medical History:  
Diagnosis Date  AICD MEDTRONIC (single chamber)  Apical mural thrombus ECHO 3/14  Cardiac arrest ([address] 10/15 VT / VF ( ~20 ICD shocks ). No obstructive CAD on cath  Chronic back pain  Chronic kidney disease  Coronary artery disease LAD - 3.0 x 18mm VISION 7/13  Degenerative spine disease  Dyslipidemia  ESRD (end stage renal disease) on dialysis ([address]   
 tts, [facility] on [address] Dr. Carvajal Revels  Hypertension  Ischemic cardiomyopathy EF 30%(10/15) , 40-45% (03/15),  EF 30-35% (3/14)  Ischemic VF   
 07/13 in setting of MI, DC cardioversion x4  Narcotic dependence ([address]  Noncompliance  Obesity  Psoriasis  S/P cardiac cath 10/15  Secondary hyperparathyroidism (of renal origin)  Tobacco abuse Medications: Allergies Allergen Reactions  Latex Other (comments)  
  blisters  Other Food Hives Allergic to tomatoes and tomato sauce  Keflex [Cephalexin] Anaphylaxis  Codeine Nausea and Vomiting Current Facility-Administered Medications Medication Dose Route Frequency  glucose chewable tablet 16 g  4 Tab Oral PRN  
 glucagon (GLUCAGEN) injection 1 mg  1 mg IntraMUSCular PRN  
 dextrose 10 % infusion 125-250 mL  125-250 mL IntraVENous PRN  
 epoetin natanael-epbx (RETACRIT) injection 10,000 Units  10,000 Units IntraVENous Q TUE, THU & SAT  amiodarone (CORDARONE) tablet 400 mg  400 mg Oral DAILY  hydrALAZINE (APRESOLINE) tablet 50 mg  50 mg Oral TID  aspirin delayed-release tablet 81 mg  81 mg Oral DAILY  sodium chloride 0.9 % in dextrose 10% 1,040 mL infusion   IntraVENous CONTINUOUS  
 oxyCODONE-acetaminophen (PERCOCET) 5-325 mg per tablet 1 Tab  1 Tab Oral Q4H PRN  
 sodium chloride (NS) flush 5-10 mL  5-10 mL IntraVENous PRN  
 heparin (porcine) 1,000 unit/mL injection 1,000 Units  1,000 Units InterCATHeter DIALYSIS PRN  
 0.9% sodium chloride infusion  25 mL/hr IntraVENous DIALYSIS PRN  
 metoclopramide HCl (REGLAN) injection 5 mg  5 mg IntraVENous Q6H  
 VANCOMYCIN INFORMATION NOTE 1 Each  1 Each Other Rx Dosing/Monitoring  propofoL (DIPRIVAN) 10 mg/mL injection  0-50 mcg/kg/min IntraVENous TITRATE  piperacillin-tazobactam (ZOSYN) 4.5 g in 0.9% sodium chloride (MBP/ADV) 100 mL MBP  4.5 g IntraVENous Q12H  chlorhexidine (PERIDEX) 0.12 % mouthwash 10 mL  10 mL Oral Q12H  pantoprazole (PROTONIX) tablet 40 mg  40 mg Oral ACB  heparin (porcine) injection 5,000 Units  5,000 Units SubCUTAneous Q8H  
 acetaminophen (TYLENOL) tablet 650 mg  650 mg Oral Q6H PRN Or  
 acetaminophen (TYLENOL) suppository 650 mg  650 mg Rectal Q6H PRN Physical Exam:  
 
Visit Vitals BP (!) 156/93 (BP 1 Location: Right arm) Pulse (!) 104 Temp 97 °F (36.1 °C) Resp 27 Ht 5' 11\" (1.803 m) Wt 236 lb 12.4 oz (107.4 kg) SpO2 97% BMI 33.02 kg/m² BP Readings from Last 3 Encounters:  
03/27/20 (!) 156/93  
02/14/20 144/72 02/07/20 155/87 Pulse Readings from Last 3 Encounters:  
03/27/20 (!) 104  
02/14/20 73  
02/07/20 90 Wt Readings from Last 3 Encounters:  
03/25/20 236 lb 12.4 oz (107.4 kg) 02/14/20 230 lb (104.3 kg) 02/07/20 230 lb (104.3 kg)  
per chart General: extubated Neck:  No obviuos jvd 
Lungs: Decreased breath sound with rhonchi Heart:  {Regular rate rhythm, no significant murmur Abdomen:  Soft and obese, nontender Extremities: Chronic skin changes with some edema Data Review:  
 
Recent Labs  
  03/26/20 
4797 03/25/20 
4868 WBC 8.7 8.8 HGB 7.3* 8.8* HCT 23.1* 27.7*  
* 150 Recent Labs  
  03/27/20 
0200 03/26/20 
0650 03/25/20 
6777  132* 132* K 4.2 5.8* 5.9*  
 101 99* CO2 26 16* 22  
GLU 91 57* 91 BUN 31* 71* 57* CREA 8.07* 14.80* 13.70* CA 7.8* 7.5* 7.8*  
MG  --   --  2.0 PHOS 5.7*  --   --   
ALB 2.4*  --  3.0*  
SGOT  --   --  13 ALT  --   --  14* Results for orders placed or performed during the hospital encounter of 03/25/20 EKG, 12 LEAD, INITIAL Result Value Ref Range Ventricular Rate 84 BPM  
 Atrial Rate 84 BPM  
 P-R Interval 170 ms QRS Duration 92 ms Q-T Interval 394 ms QTC Calculation (Bezet) 465 ms Calculated P Axis 45 degrees Calculated R Axis -42 degrees Calculated T Axis 86 degrees Diagnosis Normal sinus rhythm Septal infarct Left anterior fascicular block Abnormal ECG When compared with ECG of 14-FEB-2020 09:35, No significant change was found Confirmed by Ruby Simpson ([id number]) on 3/26/2020 5:42:25 PM 
  
 
 
All Cardiac Markers in the last 24 hours:   
Lab Results Component Value Date/Time CPK 66 03/27/2020 02:00 AM  
 CKMB 2.8 03/27/2020 02:00 AM  
 CKND1 4.2 (H) 03/27/2020 02:00 AM  
 TROIQ 0.05 (H) 03/27/2020 02:00 AM  
 
 
Last Lipid:   
Lab Results Component Value Date/Time  Cholesterol, total 136 04/21/2015 04:55 AM  
 HDL Cholesterol 28 (L) 04/21/2015 04:55 AM  
 LDL, calculated 60.2 04/21/2015 04:55 AM  
 Triglyceride 239 (H) 04/21/2015 04:55 AM  
 CHOL/HDL Ratio 4.9 04/21/2015 04:55 AM  
 
 
Signed By: Jeff Diggs MD   
 March 27, 2020

## 2020-03-27 NOTE — PROGRESS NOTES
Renal Progress Note Follow up of ESRD Assessment/Plan: 1. ESRD. Volume overload improved with yesterday's dialysis, hyperkalemia, acidosis have been corrected. Next dialysis tomorrow and continue tts. 2. HTN, better controlled. Substitute losartan for hydralazine. 3. HFrEF. 4. Pneumonia, on abx. Is a covid r/o. BC ngtd. 5. Resp failure. Extubated. 6. Anemia of ESRD. Will give adonis. 7. Secondary hyperparathyroidism of ESRD. Will resume phos binder after d/c. Subjective: 
Patient complaints off: Extubated, alert, appropriate. Feels much better. Denies sob at rest, cp/n/v. Appetite is good. Patient Active Problem List  
Diagnosis Code  ST elevation (STEMI) myocardial infarction involving left anterior descending coronary artery ([facility]) I21.02  
 Cardiac arrest - ventricular fibrillation  HTN (hypertension) I10  
 Hypertensive emergency I16.1  Hyperglycemia R73.9  Acute on chronic renal failure (HCC) N17.9, N18.9  Leukocytosis D72.829  
 Acute pulmonary edema ([facility]) J81.0  Contrast dye induced nephropathy N14.1, T50.8X5A  
 CKD (chronic kidney disease) N18.9  Arachnoiditis G03.9  Postlaminectomy syndrome, lumbar region M96.1  Psoriasis L40.9  Degeneration of lumbar or lumbosacral intervertebral disc M51.37  
 Encounter for long-term (current) use of high-risk medication Z79.899  Obesity E66.9  
 Pain in joint, lower leg M25.569  Psoriatic arthropathy ([address] L40.50  Chronic low back pain M54.5, G89.29  Chronic pain syndrome G89.4  Lumbosacral spondylosis without myelopathy M47.817  Enthesopathy of hip M76.899  
 Sacroiliitis ([facility]) M46.1  Coronary artery disease I25.10  
 JESSICA (obstructive sleep apnea) G47.33  
 UTI (lower urinary tract infection) N39.0  Cardiomyopathy ([address] I42.9  AICD (automatic cardioverter/defibrillator) present Z95.810  
 OLIVIA (acute kidney injury) ([address] N17.9  ESRD (end stage renal disease) [facility] N18.6  Chest pain R07.9  
 SOB (shortness of breath) R06.02  Bronchitis J40  Acute renal failure (ARF) ([facility]) N17.9  ARF (acute renal failure) ([facility]) N17.9  Severe sepsis ([facility]) A41.9, R65.20  Epigastric abdominal pain R10.13  
 Anxiety F41.9  Ventricular tachycardia ([facility]) I47.2  Ventricular fibrillation ([facility]) I49.01  
 ICD (implantable cardioverter-defibrillator) discharge Z45.02  
 CAD (coronary artery disease) I25.10  Obesity, morbid ([facility] E66.01  
 Bacteria in urine C38.84  
 Metabolic acidosis R98.0  Microscopic polyangiitis ([facility] M31.7  Suicide ideation R45.851  Suicidal ideation R45.851  Gastritis K29.70  Acute respiratory failure ([facility]) J96.00  
 CHF (congestive heart failure) ([facility]) I50.9  Pneumonia J18.9 Current Facility-Administered Medications Medication Dose Route Frequency Provider Last Rate Last Dose  carvediloL (COREG) tablet 6.25 mg  6.25 mg Oral BID WITH MEALS Haroon Duncan MD      
 isosorbide mononitrate ER (IMDUR) tablet 30 mg  30 mg Oral DAILY Haroon Duncan MD      
 naloxone [facility]) injection 0.4 mg  0.4 mg IntraVENous PRN Vijay North MD      
 glucose chewable tablet 16 g  4 Tab Oral PRN Sharyle Drew, MD      
 glucagon (GLUCAGEN) injection 1 mg  1 mg IntraMUSCular PRN Sharyle Drew, MD      
 dextrose 10 % infusion 125-250 mL  125-250 mL IntraVENous PRN Sharyle Drew, MD      
 epoetin natanael-epbx (RETACRIT) injection 10,000 Units  10,000 Units IntraVENous Q TUE, THU & SAT Tee BTAEMAN MD      
 amiodarone (CORDARONE) tablet 400 mg  400 mg Oral DAILY Gagandeep Becerra MD   400 mg at 03/27/20 2875  hydrALAZINE (APRESOLINE) tablet 50 mg  50 mg Oral TID Gagandeep Becerra MD   50 mg at 03/27/20 7896  aspirin delayed-release tablet 81 mg  81 mg Oral DAILY Gagandeep Becerra MD   81 mg at 03/27/20 8826  oxyCODONE-acetaminophen (PERCOCET) 5-325 mg per tablet 1 Tab  1 Tab Oral Q4H PRN Jes George MD   1 Tab at 03/27/20 1048  sodium chloride (NS) flush 5-10 mL  5-10 mL IntraVENous PRN Yannick Mosquera MD      
 heparin (porcine) 1,000 unit/mL injection 1,000 Units  1,000 Units InterCATHeter DIALYSIS PRN Yannick Mosquera MD      
 0.9% sodium chloride infusion  25 mL/hr IntraVENous DIALYSIS PRN Yannick Mosquera MD      
 metoclopramide HCl (REGLAN) injection 5 mg  5 mg IntraVENous Q6H Yannick Mosquera MD   5 mg at 03/27/20 0502  VANCOMYCIN INFORMATION NOTE 1 Each  1 Each Other Rx Dosing/Monitoring Al Hall MD      
 piperacillin-tazobactam (ZOSYN) 4.5 g in 0.9% sodium chloride (MBP/ADV) 100 mL MBP  4.5 g IntraVENous Q12H Hector Allen MD 25 mL/hr at 03/27/20 0400 4.5 g at 03/27/20 0400  chlorhexidine (PERIDEX) 0.12 % mouthwash 10 mL  10 mL Oral Q12H Deshaun Jensen MD   10 mL at 03/26/20 9294  pantoprazole (PROTONIX) tablet 40 mg  40 mg Oral ACB Deshaun Jensen MD   40 mg at 03/27/20 9285  heparin (porcine) injection 5,000 Units  5,000 Units SubCUTAneous Thom Roper MD   5,000 Units at 03/27/20 9366  acetaminophen (TYLENOL) tablet 650 mg  650 mg Oral Q6H PRN Deshaun Jensen MD      
 Or  
 acetaminophen (TYLENOL) suppository 650 mg  650 mg Rectal Q6H PRN Deshaun Jensen MD      
 
 
 
 
 
Objective: 
Vitals:  
 03/27/20 0400 03/27/20 0501 03/27/20 0600 03/27/20 0700 BP:  (!) 156/93 Pulse: (!) 104 (!) 101 (!) 101 (!) 104 Resp: 20 13 15 27 Temp: 97 °F (36.1 °C) TempSrc:      
SpO2: 98% 100% 99% 97% Weight:      
Height:      
 
. Intake/Output Summary (Last 24 hours) at 3/27/2020 1112 Last data filed at 3/27/2020 0700 Gross per 24 hour Intake 1284.58 ml Output 5375 ml Net -4090.42 ml Admission weight:Weight: 104.3 kg (230 lb) (03/25/20 0827) Last Weight Metrics: 
Weight Loss Metrics 3/25/2020 2/14/2020 2/7/2020 1/24/2020 1/9/2020 12/28/2019 11/28/2019 Today's Wt 236 lb 12.4 oz 230 lb 230 lb 230 lb 230 lb 230 lb 233 lb BMI 33.02 kg/m2 33 kg/m2 33 kg/m2 33 kg/m2 33 kg/m2 33 kg/m2 33.43 kg/m2 Physical Exam:  
 
General: No acute distress. Neck: no jvd. LUNGS: good air entry, bl exp rhonchi. CVS EXM: S1, S2, no murmur, rub or gallop. Abdomen: Soft, Non Tender, non distended. Lower Extremities: 1+ Edema. Access: rt ij tdc. Labs: CBC w/Diff Recent Labs  
  03/26/20 
0650 03/25/20 
4724 WBC 8.7 8.8  
RBC 2.49* 2.96* HGB 7.3* 8.8* HCT 23.1* 27.7*  
* 150 GRANS 54 68 LYMPH 36 21 EOS 2 2 Chemistry Recent Labs  
  03/27/20 
0200 03/26/20 
0650 03/25/20 
5976 GLU 91 57* 91  132* 132* K 4.2 5.8* 5.9*  
 101 99* CO2 26 16* 22 BUN 31* 71* 57* CREA 8.07* 14.80* 13.70* CA 7.8* 7.5* 7.8* AGAP 8 15 11 BUCR 4* 5* 4* AP  --   --  64  
TP  --   --  6.4 ALB 2.4*  --  3.0*  
GLOB  --   --  3.4 AGRAT  --   --  0.9 PHOS 5.7*  --   --   
  
 
 
Lab Results Component Value Date/Time Iron 60 10/18/2018 08:10 AM  
 TIBC 184 (L) 10/18/2018 08:10 AM  
 Iron % saturation 33 10/18/2018 08:10 AM  
 Ferritin 325 10/18/2018 08:10 AM  
  
Lab Results Component Value Date/Time Calcium 7.8 (L) 03/27/2020 02:00 AM  
 Phosphorus 5.7 (H) 03/27/2020 02:00 AM  
  
 
 
Lexis Gonzalez M.D [facility] Office [phone] Pager [phone]

## 2020-03-27 NOTE — PROGRESS NOTES
1915-on coming report received 1930- patient assessed, view flow sheet. Patient  Resting with eyes closed, no distress noted, lungs sounds dim/ clear, bowel sounds active pulses palpable, NSR on the monitor, patient denies nausea, vomitting,  No tremors noted. Bed in lowest position, call light in reach. 2000- MD present, patient asking when can he go home; MD explained patient current health status,  MD stated it is okay for patient to eat. 2030- Box lunch given to patient, patient texting on phone, deneis pain or distress 2200-patient resting quietly 2300-patient resting quietly; no signs of withdrawal noted. 0000-patient c/o pain and \" being in withdrawal\", patient denies nausea or vomiting, MD paged. new order received for Percocet Q4hrs PRN 
 
0200-am labs drawn 
 
0300-  patient sleeping

## 2020-03-28 NOTE — DISCHARGE INSTRUCTIONS
DISCHARGE SUMMARY from Nurse    PATIENT INSTRUCTIONS:    After general anesthesia or intravenous sedation, for 24 hours or while taking prescription Narcotics:  · Limit your activities  · Do not drive and operate hazardous machinery  · Do not make important personal or business decisions  · Do  not drink alcoholic beverages  · If you have not urinated within 8 hours after discharge, please contact your surgeon on call. Report the following to your surgeon:  · Excessive pain, swelling, redness or odor of or around the surgical area  · Temperature over 100.5  · Nausea and vomiting lasting longer than 4 hours or if unable to take medications  · Any signs of decreased circulation or nerve impairment to extremity: change in color, persistent  numbness, tingling, coldness or increase pain  · Any questions    What to do at Home:      *  Please give a list of your current medications to your Primary Care Provider. *  Please update this list whenever your medications are discontinued, doses are      changed, or new medications (including over-the-counter products) are added. *  Please carry medication information at all times in case of emergency situations. These are general instructions for a healthy lifestyle:    No smoking/ No tobacco products/ Avoid exposure to second hand smoke  Surgeon General's Warning:  Quitting smoking now greatly reduces serious risk to your health. Obesity, smoking, and sedentary lifestyle greatly increases your risk for illness    A healthy diet, regular physical exercise & weight monitoring are important for maintaining a healthy lifestyle    You may be retaining fluid if you have a history of heart failure or if you experience any of the following symptoms:  Weight gain of 3 pounds or more overnight or 5 pounds in a week, increased swelling in our hands or feet or shortness of breath while lying flat in bed.   Please call your doctor as soon as you notice any of these symptoms; do not wait until your next office visit. The discharge information has been reviewed with the patient. The patient verbalized understanding. Discharge medications reviewed with the patient and appropriate educational materials and side effects teaching were provided. Patient armband removed and shreddedPatient Education   Coronavirus ([id number]): Care Instructions  Overview  The coronavirus disease (COVID-19) is caused by a virus. It was first found in [place], [place], in December 2019. It has since spread worldwide. This virus spreads person-to-person through droplets from coughing and sneezing. It can also spread when you are close to someone who is infected. And it can spread when you touch something that has the virus on it, such as a doorknob or a tabletop. The best way to protect yourself from getting sick is to avoid areas where there is an outbreak. Avoid people who may be infected. Stay away from crowds and try to stay at least 6 feet away from other people. Wash your hands often, especially after you cough or sneeze. Use soap and water, and scrub for at least 20 seconds. If soap and water aren't available, use an alcohol-based hand . Avoid touching your mouth, nose, and eyes. Symptoms include a cough, a fever, and shortness of breath. Most people only have mild symptoms or even none at all. People who are very sick are treated in a hospital. In severe cases, it can cause pneumonia and make it hard to breathe without help. It can cause death. The virus is diagnosed with a test that uses a swab of fluid from the nose or throat or sometimes uses sputum from the lungs. Other tests may be done, such as blood tests and CT scans of the lungs. But even if you don't have a test, a doctor may ask you questions and determine that you may have the virus. There is no medicine to fight the virus. If you have mild symptoms, you can care for yourself at home.  Your doctor may have you take acetaminophen (Tylenol) for a fever. Treatment in the hospital for more serious cases includes support, such as help with breathing. If you have been diagnosed with COVID-19 or are at high risk of having been exposed to the virus, you must take steps to keep from spreading it to others. Cover your mouth with a tissue when you cough or sneeze. Then throw the tissue in the trash and wash your hands right away. Wear a face mask if you are sick and are around other people. It can help stop the spread of the virus when you cough or sneeze. Wash your hands often, especially after you cough or sneeze. Use soap and water, and scrub for at least 20 seconds. If soap and water aren't available, use an alcohol-based hand . You will be told to isolate yourself for about 14 days, probably at home. Your doctor or another health professional will tell you when it's okay to come out of isolation. Leave your home only if you need to get medical care. But call the doctor's office first so they know you're coming, and wear a face mask when you go. Limit contact with pets and people in your home. If possible, stay in a separate bedroom and use a separate bathroom. Clean and disinfect your home every day. Use household  and disinfectant wipes or sprays. Take special care to clean things that you grab with your hands. These include doorknobs, remote controls, phones, and handles on your refrigerator and microwave. And don't forget countertops, tabletops, bathrooms, and computer keyboards. You can find the latest information from these sources:  U.S. Centers for Disease Control and Prevention (CDC). Go to the [facility] website at [facility] for updates about the disease and travel advice. The website also tells you ways to prevent the spread of infection. World Health Organization [facility]).  Go to the [facility] website at www.who.int for information about the virus, including updates on the outbreak and for travel advice. Follow-up care is a key part of your treatment and safety. Be sure to make and go to all appointments, and call your doctor if you are having problems. It's also a good idea to know your test results and keep a list of the medicines you take. How can you care for yourself at home? Stay home. Don't go to school, work, or public areas. And don't use public transportation. Leave your home only if you need to get medical care. But call the doctor's office first so they know you're coming, and wear a face mask when you go. Wear a face mask if you are sick and are around other people. It can help stop the spread of the virus when you cough or sneeze. Limit contact with people in your home. If possible, stay in a separate bedroom and use a separate bathroom. Avoid contact with pets and other animals. Cover your mouth and nose with a tissue when you cough or sneeze. Then throw it in the trash right away. Wash your hands often, especially after you cough or sneeze. Use soap and water, and scrub for at least 20 seconds. If soap and water aren't available, use an alcohol-based hand . Don't share personal household items. These include bedding, towels, cups and glasses, and eating utensils. Clean and disinfect your home every day. Use household  and disinfectant wipes or sprays. Take special care to clean things that you grab with your hands. These include doorknobs, remote controls, phones, and handles on your refrigerator and microwave. And don't forget countertops, tabletops, bathrooms, and computer keyboards. Be safe with medicines. Take your medicines exactly as prescribed. Call your doctor if you think you are having a problem with your medicine. Wait for your doctor or other health professional to tell you it's okay to leave isolation. Call them when the time is up and let them know how you're feeling. When should you call for help?    Call 911 anytime you think you may need emergency care. For example, call if:    You have severe trouble breathing.     You have severe dehydration. Symptoms of dehydration include:  Dry eyes and a dry mouth. Passing only a little urine. Feeling thirstier than usual.     You are extremely confused or not thinking clearly.     You pass out (lose consciousness).   If you have a high risk of having been exposed to this virus, or you have tested positive but don't have symptoms, call your doctor now if you develop symptoms such as:    Shortness of breath.     Fever.     Cough.     If you have been diagnosed with coronavirus disease and already have a cough, fever, or shortness of breath, call your doctor now if your symptoms get worse or you don't get better as expected.   Whether you have symptoms or not, call ahead to the doctor's office before you go. Make sure you wear a face mask when you go to the doctor, to prevent exposing other people to the virus. Current as of: March 20, 2020                Content Version: 12.4  © 4520-5538 [employer]. Care instructions adapted under license by your healthcare professional. If you have questions about a medical condition or this instruction, always ask your healthcare professional. [address] any warranty or liability for your use of this information.          ___________________________________________________________________________________________________________________________________

## 2020-03-28 NOTE — PROGRESS NOTES
Bedside shift change report given to Vielka Hooker (oncoming nurse) by Kia Chavez (offgoing nurse). Report included the following information SBAR, Kardex, Procedure Summary, Intake/Output, MAR, Accordion, Recent Results, Med Rec Status, Cardiac Rhythm NSR, Alarm Parameters  and Dual Neuro Assessment. 2030 Assessment. PT r/o covid, on droplet plus isolation. Chronic pain pt, requesting percocet q4 for management, given. VSS. 0000 Assessment. 0030 PT requesting melatonin for sleep. Dr. Mack Harada contacted, orders received for 4.5mg (5mg rounded per CC). 0300 requesting medication to help with sleep. Plan is to complete dialysis in AM and d/c home. Educated PT on plan. Requesting pain medication, not due until 0400. Zosyn started. Sexually inapropriate comments towards female nursing staff. Redirected. 0430 Assessment. 3593 PT states he is withdrawing. On last assessment PT was diaphoretic, hypertensive. agitated with repetitive picking at hands. He states earlier in the shift that he has a history of using street drugs to cope with pain. Per PT addiment request, Dr Mack Harada was paged. [phone] Dr. Mack Harada contacted unit. Update don PT condition, request, and history. PT to adhere to current pain medication schedule. Bedside shift change report given to [address] and [place] (oncoming nurse) by Kimberly White  (offgoing nurse). Report included the following information SBAR, Kardex, Procedure Summary, Intake/Output, MAR, Accordion, Recent Results, Med Rec Status, Cardiac Rhythm NSR, Alarm Parameters  and Dual Neuro Assessment.

## 2020-03-28 NOTE — PROGRESS NOTES
0720 [facility] awake and alert sitting up in bed; VSS on room air 
0845 dialysis nurse at bedside 
0900 brought pt breakfast tray; pt sitting up eating breakfast, no signs of distress 1145 dialysis nurse at bedside; brought pt lunch tray; VSS on room air 
1320 dialysis complete 1415 discharged pt via wheelchair, no signs of distress

## 2020-03-28 NOTE — PROGRESS NOTES
[facility] Rounding note Date of  Admission: 3/25/2020  8:15 AM  
Primary Care Physician:  Joan Dodson MD 
 
Subjective: 
Patient denies any chest pain. Dyspnea has resolved. Questioning about when he will be able to go home and questioning about dialysis if goes home Assessment:  
 
-Acute respiratory distress requiring intubation. Likely from combination of pneumonia and CHF exacerbation and fluid overload. Patient missed dialysis and last dialysis was Saturday 
-Hypertensive urgency with initial systolic blood pressure of 180/110.  
-End-stage renal disease on dialysis - Coronary artery disease: LAD - 3.0 x 18mm VISION 7/13  
- Ischemic VF: 07/13 in setting of MI, DC cardioversion x4 - Ischemic cardiomyopathy: Last EF 35% in 10/2018 - S/p Medtronic single-chamber AICD 05/2014 (Dr. Reuben Márquez) - VT and ventricular fibrillation 10/27/15, approximately 20 shocks were administered via AICD. Interrogation at the time noted appropriate device function. Supposed to be on amiodarone - Hypertension - Dyslipidemia  
- JESSICA - Tobacco abuse - Apical mural thrombus ([address]): ECHO 3/14. Last echo in 10/2018 without any LV thrombus - Degenerative spine disease - Chronic back pain  
-Patient under isolation for COVID 19 rule out Plan:  
 
Patient with respiratory distress, multifactorial as mentioned above Doubt cardiac ischemia causing event. Troponin unremarkable for any ACS Likely from fluid overload because of missing dialysis, hypertensive urgency, systolic dysfunction and right sided pneumonia 
 
-Hemodialysis/nephrology following 
-We will increase Coreg to 12.5 mg mg twice daily for blood pressure control 
-Continue Imdur 30 mg daily 
-Continue amiodarone and hydralazine and aspirin 
-Still contact plus isolation to rule out coronavirus. Per nursing report, possible discharge today after dialysis.   Defer to primary team. If discharged, he will need to come see us in office in 4 to 6 weeks History of Present Illness: This is a 44 y.o. male admitted for Acute respiratory failure ([facility] [J96.00] [facility]congest[facility] failure[facility] [I50.9]. Patient complains of:   
Respiratory distress Patient is currently intubated and sedated. No family member at bedside. History obtained from medical records and ER note According to documentation, patient came to hospital with shortness of breath. Patient states he missed dialysis yesterday. Last dialysis was March 21. Patient states the shortness of breath started last night. Progressively getting worse today. He states he used his inhaler at home. It helped a little bit but then shortness of breath came back. He states his temperature has been 99.1. Nothing over 100. Reports nasal congestion secondary to allergies at this time. Also reports a dry cough. Denies any abdominal pain. He states he usually runs constipated. Patient was in emergency department and started to get more shortness of breath and eventually needed to be intubated. A comprehensive review of systems was negative except for that written in the HPI. Past Medical History:  
 
Past Medical History:  
Diagnosis Date  AICD MEDTRONIC (single chamber)  Apical mural thrombus ECHO 3/14  Cardiac arrest ([facility] 10/15 VT / VF ( ~20 ICD shocks ). No obstructive CAD on cath  Chronic back pain  Chronic kidney disease  Coronary artery disease LAD - 3.0 x 18mm VISION 7/13  Degenerative spine disease  Dyslipidemia  ESRD (end stage renal disease) on dialysis ([facility]   
 tts, [facility] on [address] Dr. Kody Hammond  Hypertension  Ischemic cardiomyopathy EF 30%(10/15) , 40-45% (03/15),  EF 30-35% (3/14)  Ischemic VF   
 07/13 in setting of MI, DC cardioversion x4  Narcotic dependence ([facility]  Noncompliance  Obesity  Psoriasis  S/P cardiac cath 10/15  Secondary hyperparathyroidism (of renal origin)  Tobacco abuse Medications: Allergies Allergen Reactions  Latex Other (comments)  
  blisters  Other Food Hives Allergic to tomatoes and tomato sauce  Keflex [Cephalexin] Anaphylaxis  Codeine Nausea and Vomiting Current Facility-Administered Medications Medication Dose Route Frequency  melatonin tablet 4.5 mg  4.5 mg Oral QHS PRN  
 ondansetron (ZOFRAN) injection 4 mg  4 mg IntraVENous Q4H PRN  
 carvediloL (COREG) tablet 12.5 mg  12.5 mg Oral BID WITH MEALS  isosorbide mononitrate ER (IMDUR) tablet 30 mg  30 mg Oral DAILY  naloxone (NARCAN) injection 0.4 mg  0.4 mg IntraVENous PRN  
 losartan (COZAAR) tablet 100 mg  100 mg Oral DAILY  glucose chewable tablet 16 g  4 Tab Oral PRN  
 glucagon (GLUCAGEN) injection 1 mg  1 mg IntraMUSCular PRN  
 epoetin natanael-epbx (RETACRIT) injection 10,000 Units  10,000 Units IntraVENous Q TUE, THU & SAT  amiodarone (CORDARONE) tablet 400 mg  400 mg Oral DAILY  aspirin delayed-release tablet 81 mg  81 mg Oral DAILY  oxyCODONE-acetaminophen (PERCOCET) 5-325 mg per tablet 1 Tab  1 Tab Oral Q4H PRN  
 sodium chloride (NS) flush 5-10 mL  5-10 mL IntraVENous PRN  
 heparin (porcine) 1,000 unit/mL injection 1,000 Units  1,000 Units InterCATHeter DIALYSIS PRN  
 0.9% sodium chloride infusion  25 mL/hr IntraVENous DIALYSIS PRN  
 metoclopramide HCl (REGLAN) injection 5 mg  5 mg IntraVENous Q6H  
 VANCOMYCIN INFORMATION NOTE 1 Each  1 Each Other Rx Dosing/Monitoring  piperacillin-tazobactam (ZOSYN) 4.5 g in 0.9% sodium chloride (MBP/ADV) 100 mL MBP  4.5 g IntraVENous Q12H  pantoprazole (PROTONIX) tablet 40 mg  40 mg Oral ACB  heparin (porcine) injection 5,000 Units  5,000 Units SubCUTAneous Q8H  
 acetaminophen (TYLENOL) tablet 650 mg  650 mg Oral Q6H PRN  Or  
  acetaminophen (TYLENOL) suppository 650 mg  650 mg Rectal Q6H PRN Physical Exam:  
 
Visit Vitals BP (!) 160/91 Pulse 86 Temp 97.9 °F (36.6 °C) Resp 21 Ht 5' 11\" (1.803 m) Wt 239 lb (108.4 kg) SpO2 100% BMI 33.33 kg/m² BP Readings from Last 3 Encounters:  
03/28/20 (!) 160/91  
02/14/20 144/72  
02/07/20 155/87 Pulse Readings from Last 3 Encounters:  
03/28/20 86  
02/14/20 73  
02/07/20 90 Wt Readings from Last 3 Encounters:  
03/28/20 239 lb (108.4 kg) 02/14/20 230 lb (104.3 kg) 02/07/20 230 lb (104.3 kg)  
per chart General: extubated Neck:  No obviuos jvd 
Lungs: Decreased breath sound with rhonchi Heart:  Regular rate rhythm, no significant murmur Abdomen:  Soft and obese, nontender Extremities: Chronic skin changes with some edema Data Review:  
 
Recent Labs  
  03/26/20 
1230 WBC 8.7 HGB 7.3* HCT 23.1*  
* Recent Labs  
  03/28/20 
0115 03/27/20 
0200 03/26/20 
1028  139 132* K 3.7 4.2 5.8*  
 105 101 CO2 24 26 16* * 91 57* BUN 38* 31* 71* CREA 9.85* 8.07* 14.80* CA 7.6* 7.8* 7.5* PHOS 5.1* 5.7*  --   
ALB 2.6* 2.4*  --   
 
 
Results for orders placed or performed during the hospital encounter of 03/25/20 EKG, 12 LEAD, INITIAL Result Value Ref Range Ventricular Rate 84 BPM  
 Atrial Rate 84 BPM  
 P-R Interval 170 ms QRS Duration 92 ms Q-T Interval 394 ms QTC Calculation (Bezet) 465 ms Calculated P Axis 45 degrees Calculated R Axis -42 degrees Calculated T Axis 86 degrees Diagnosis Normal sinus rhythm Septal infarct Left anterior fascicular block Abnormal ECG When compared with ECG of 14-FEB-2020 09:35, No significant change was found Confirmed by Meg Torres ([id number]) on 3/26/2020 5:42:25 PM 
  
 
 
All Cardiac Markers in the last 24 hours:   
No results found for: CPK, CK, CKMMB, CKMB, RCK3, CKMBT, CKNDX, CKND1, MARQUES, TROPT, TROIQ, NOELLE, TROPT, TNIPOC, BNP, BNPP Last Lipid:   
Lab Results Component Value Date/Time Cholesterol, total 136 04/21/2015 04:55 AM  
 HDL Cholesterol 28 (L) 04/21/2015 04:55 AM  
 LDL, calculated 60.2 04/21/2015 04:55 AM  
 Triglyceride 239 (H) 04/21/2015 04:55 AM  
 CHOL/HDL Ratio 4.9 04/21/2015 04:55 AM  
 
 
Signed By: Deon Hatfield MD   
 March 28, 2020

## 2020-03-28 NOTE — PROGRESS NOTES
Pharmacy Dosing Services: Vancomycin Indication: Pneumonia (CAP) Day of therapy: 4 Other Antimicrobials (Include dose, start day & day of therapy): 
Piperacillin/Tazobactam (Zosyn) Loading dose (date given): 2000 mg 
Current Maintenance dose: 1000 mg IV post HD Goal Vancomycin Level: ~25 Pre-HD Vancomycin Level (if drawn):  
Recent Labs  
  20 
0200 VANCR 45.2* Significant Cultures:  
Results Procedure Component Value Units Date/Time CULTURE, RESPIRATORY/SPUTUM/BRONCH Margurite Bill STAIN [[id number]] Collected:  20 1600 Order Status:  Completed Specimen:  Sputum Updated:  20 195 CULTURE, BLOOD [[id number]] Collected:  20 1106 Order Status:  Completed Specimen:  Blood Updated:  20 7939 Special Requests: --     
  NO SPECIAL REQUESTS 
LEFT 
ARM Culture result: NO GROWTH 2 DAYS     
 CULTURE, BLOOD [[id number]] Collected:  20 1051 Order Status:  Completed Specimen:  Blood Updated:  20 3998 Special Requests: --     
  NO SPECIAL REQUESTS 
LEFT Antecubital 
  
  Culture result: NO GROWTH 2 DAYS Weight 108.4 kg (239 lb) Recent Labs  
  20 
0115 20 
0200 20 
0153 CREA 9.85* 8.07* 14.80* BUN 38* 31* 71* WBC  --   --  8.7 Temp (72hrs), Av.1 °F (37.8 °C), Min:97 °F (36.1 °C), Max:101.3 °F (38.5 °C) Estimated Creatinine Clearance Estimated Creatinine Clearance: 11.1 mL/min (A) (based on SCr of 9.85 mg/dL (H)). Estimated Creatinine Clearance (using IBW): 9.4 mL/min CAPD, Hemodialysis or Renal Replacement Therapy: Intermittent Hemodialysis (IHD) Renal function stable? (unstable defined as SCr increase of 0.5 mg/dL or > 50% increase from baseline, whichever is greater) (Y/N): N Regimen assessment:  
Schedule now set at TTS, assuming 10% elimination per 24 hr, patient would have pre dialysis level of 40.68, as such will decrease post dialysis dose to 750mg Maintenance dose: 750 mg IV post HD x1 dose Next scheduled level: 3/31 at 0400 Pharmacy will follow daily and adjust medications as appropriate for renal function and/or serum levels. Thank you, 
Ivett Bowden

## 2020-03-28 NOTE — PROGRESS NOTES
Problem: Discharge Planning Goal: *Discharge to safe environment Outcome: Resolved/Met Home Noted orders for discharge. No services ordered or indicated. Noted that nephrology indicated dialysis worked out shift for covid r/o  & he informed pt. Irene Nicholson RN,ext 1300. Care Management Interventions Palliative Care Criteria Met (RRAT>21 & CHF Dx)?: No 
Transition of Care Consult (CM Consult): Discharge Planning MyChart Signup: No 
Discharge Durable Medical Equipment: No 
Physical Therapy Consult: No 
Occupational Therapy Consult: No 
Speech Therapy Consult: No 
Current Support Network: Lives with Spouse The Plan for Transition of Care is Related to the Following Treatment Goals : Is able to breathe [employer] Information Provided?: No 
Discharge Location Discharge Placement: Home

## 2020-03-28 NOTE — PROGRESS NOTES
Progress Note Patient: Oralia Cobb               Sex: male          DOA: 3/25/2020 YOB: 1970      Age:  44 y.o.        LOS:  LOS: 2 days CHIEF COMPLAINT:  Pneumonia, ESRD, Person of Interest for Covid Subjective:  
 
Patient is awake and interactive No distress Objective:  
  
Visit Vitals /87 Pulse 93 Temp 98.5 °F (36.9 °C) Resp 19 Ht 5' 11\" (1.803 m) Wt 107.4 kg (236 lb 12.4 oz) SpO2 (!) 82% BMI 33.02 kg/m² Physical Exam: 
Gen:  Patient is in no distress. No complaint HEENT and Neck:  PERRL, No cervical tenderness or masses Lungs:  Clear bilaterally. No rales, no wheeze. Normal effort. Heart:  Regular Rate and Rhythm. No murmur or gallop. No JVD. No edema. Abdomen:  Soft, non tender, no masses, no Hepatosplenomegally Extremities:  No digital clubbing, no cyanosis Neuro:  Alert and oriented, Normal affect, Cranial nerves intact, No sensory deficits Lab/Data Reviewed: 
BMP:  
Lab Results Component Value Date/Time  03/27/2020 02:00 AM  
 K 4.2 03/27/2020 02:00 AM  
  03/27/2020 02:00 AM  
 CO2 26 03/27/2020 02:00 AM  
 AGAP 8 03/27/2020 02:00 AM  
 GLU 91 03/27/2020 02:00 AM  
 BUN 31 (H) 03/27/2020 02:00 AM  
 CREA 8.07 (H) 03/27/2020 02:00 AM  
 GFRAA 9 (L) 03/27/2020 02:00 AM  
 GFRNA 7 (L) 03/27/2020 02:00 AM  
 
 
 
Assessment/Plan Principal Problem: 
  Pneumonia (3/26/2020) Active Problems: 
  Acute pulmonary edema ([facility] (7/24/2013) Acute respiratory failure ([facility] [facility] (3/25/2020) HTN (hypertension) (7/24/2013) ESRD (end stage renal disease) ([facility] [facility] (9/3/2014) CAD (coronary artery disease) (10/29/2015) Overview: S/P PCI LAD 
 
  CHF (congestive heart failure) ([facility] [address] (3/25/2020) AICD (automatic cardioverter/defibrillator) present (5/21/2014) Overview: Single chamber [employer]onic aicd implant 5/21/14 for primary prevention. Plan: 
Continue with antibiotics Continue with dialysis Contact plus isolation Following Novel Coronavirus testing Dialysis is unclear as far quarantine goes if he were to be discharged.

## 2020-03-28 NOTE — PROGRESS NOTES
Problem: Pressure Injury - Risk of 
Goal: *Prevention of pressure injury Description: Document Pranav Scale and appropriate interventions in the flowsheet. Outcome: Progressing Towards Goal 
Note: Pressure Injury Interventions: 
Sensory Interventions: Maintain/enhance activity level, Keep linens dry and wrinkle-free, Float heels Moisture Interventions: Minimize layers Activity Interventions: Increase time out of bed Mobility Interventions: Float heels Nutrition Interventions: Document food/fluid/supplement intake, Discuss nutritional consult with provider, Offer support with meals,snacks and hydration Friction and Shear Interventions: Lift sheet, Minimize layers Problem: Patient Education: Go to Patient Education Activity Goal: Patient/Family Education Outcome: Progressing Towards Goal 
  
Problem: Falls - Risk of 
Goal: *Absence of Falls Description: Document Raghu Flow Fall Risk and appropriate interventions in the flowsheet. Outcome: Progressing Towards Goal 
Note: Fall Risk Interventions: 
Mobility Interventions: Assess mobility with egress test 
 
Mentation Interventions: Adequate sleep, hydration, pain control, Increase mobility, Reorient patient, Update white board Medication Interventions: Patient to call before getting OOB Elimination Interventions: Call light in reach, Urinal in reach Problem: Patient Education: Go to Patient Education Activity Goal: Patient/Family Education Outcome: Progressing Towards Goal 
  
Problem: Pain Goal: *Control of Pain Outcome: Progressing Towards Goal 
Goal: *PALLIATIVE CARE:  Alleviation of Pain Outcome: Progressing Towards Goal 
  
Problem: Patient Education: Go to Patient Education Activity Goal: Patient/Family Education Outcome: Progressing Towards Goal 
  
Problem: Breathing Pattern - Ineffective Goal: *Absence of hypoxia Outcome: Progressing Towards Goal 
Goal: *Use of effective breathing techniques Outcome: Progressing Towards Goal 
Goal: *PALLIATIVE CARE:  Alleviation of Dyspnea Outcome: Progressing Towards Goal 
  
Problem: Patient Education: Go to Patient Education Activity Goal: Patient/Family Education Outcome: Progressing Towards Goal 
  
Problem: Risk for Spread of Infection Goal: Prevent transmission of infectious organism to others Description: Prevent the transmission of infectious organisms to other patients, staff members, and visitors. Outcome: Progressing Towards Goal 
  
Problem: Patient Education:  Go to Education Activity Goal: Patient/Family Education Outcome: Progressing Towards Goal 
  
Problem: General Medical Care Plan Goal: *Vital signs within specified parameters Outcome: Progressing Towards Goal 
Goal: *Labs within defined limits Outcome: Progressing Towards Goal 
Goal: *Absence of infection signs and symptoms Outcome: Progressing Towards Goal 
Goal: *Optimal pain control at patient's stated goal 
Outcome: Progressing Towards Goal 
Goal: *Skin integrity maintained Outcome: Progressing Towards Goal 
Goal: *Fluid volume balance Outcome: Progressing Towards Goal 
Goal: *Optimize nutritional status Outcome: Progressing Towards Goal 
Goal: *Anxiety reduced or absent Outcome: Progressing Towards Goal 
Goal: *Progressive mobility and function (eg: ADL's) Outcome: Progressing Towards Goal 
  
Problem: Patient Education: Go to Patient Education Activity Goal: Patient/Family Education Outcome: Progressing Towards Goal 
  
Problem: Diabetes Maintenance:Admission Goal: *Blood glucose 80 to 180 mg/dl Outcome: Progressing Towards Goal 
Goal: *Adequate nutrition Outcome: Progressing Towards Goal 
Goal: *Aware of nutrition guidelines Outcome: Progressing Towards Goal 
  
Problem: Discharge Planning Goal: *Discharge to safe environment Outcome: Progressing Towards Goal

## 2020-03-28 NOTE — PROGRESS NOTES
Renal Progress Note Follow up of ESRD Assessment/Plan: 1. ESRD. Dialysis today with 3-4 liters uf. After d/c patient is to continue dialysis at op unit - [facility] He will be on designated covid r/o shift: mwf 5 pm. D/w patient. 2. HTN. Coreg increased. Losartan substituted  for hydralazine. 3. HFrEF. 4. Pneumonia, on abx. Choice of abx at d/c is deferred to primary team.  Is a covid r/o. BC ngtd. 5. Resp failure. Stable after extubation. 6. Anemia of ESRD. Was given adonis. 7. Secondary hyperparathyroidism of ESRD. Will resume phos binder after d/c. Subjective: 
Patient complaints off: Extubated, alert, appropriate. Feels much better. Denies sob at rest, cp/n/v. Appetite is good. Wants to go home. Patient Active Problem List  
Diagnosis Code  ST elevation (STEMI) myocardial infarction involving left anterior descending coronary artery ([facility]) I21.02  
 Cardiac arrest - ventricular fibrillation  HTN (hypertension) I10  
 Hypertensive emergency I16.1  Hyperglycemia R73.9  Acute on chronic renal failure (HCC) N17.9, N18.9  Leukocytosis D72.829  
 Acute pulmonary edema ([facility]) J81.0  Contrast dye induced nephropathy N14.1, T50.8X5A  
 CKD (chronic kidney disease) N18.9  Arachnoiditis G03.9  Postlaminectomy syndrome, lumbar region M96.1  Psoriasis L40.9  Degeneration of lumbar or lumbosacral intervertebral disc M51.37  
 Encounter for long-term (current) use of high-risk medication Z79.899  Obesity E66.9  
 Pain in joint, lower leg M25.569  Psoriatic arthropathy ([address] L40.50  Chronic low back pain M54.5, G89.29  Chronic pain syndrome G89.4  Lumbosacral spondylosis without myelopathy M47.817  Enthesopathy of hip M76.899  
 Sacroiliitis ([facility]) M46.1  Coronary artery disease I25.10  
 JESSICA (obstructive sleep apnea) G47.33  
 UTI (lower urinary tract infection) N39.0  Cardiomyopathy ([address] I42.9  AICD (automatic cardioverter/defibrillator) present Z95.810  
 OLIVIA (acute kidney injury) ([facility] N17.9  ESRD (end stage renal disease) ([facility] N18.6  Chest pain R07.9  
 SOB (shortness of breath) R06.02  Bronchitis J40  Acute renal failure (ARF) ([facility]) N17.9  ARF (acute renal failure) ([facility]) N17.9  Severe sepsis ([facility]) A41.9, R65.20  Epigastric abdominal pain R10.13  
 Anxiety F41.9  Ventricular tachycardia ([facility]) I47.2  Ventricular fibrillation ([facility]) I49.01  
 ICD (implantable cardioverter-defibrillator) discharge Z45.02  
 CAD (coronary artery disease) I25.10  Obesity, morbid ([facility] E66.01  
 Bacteria in urine M31.98  
 Metabolic acidosis R65.8  Microscopic polyangiitis ([facility] M31.7  Suicide ideation R45.851  Suicidal ideation R45.851  Gastritis K29.70  Acute respiratory failure ([facility]) J96.00  
 CHF (congestive heart failure) ([facility]) I50.9  Pneumonia J18.9 Current Facility-Administered Medications Medication Dose Route Frequency Provider Last Rate Last Dose  melatonin tablet 4.5 mg  4.5 mg Oral QHS PRN Elen Reyes MD   4.5 mg at 03/28/20 0101  
 ondansetron [facility]) injection 4 mg  4 mg IntraVENous Q4H PRN Jean-Pierre Roberto DO   4 mg at 03/28/20 7877  carvediloL (COREG) tablet 12.5 mg  12.5 mg Oral BID WITH MEALS Daxa Suarez MD      
 [START ON 3/31/2020] VANCOMYCIN INFORMATION NOTE   Other ONCE Torrie Horton MD      
 [START ON 3/29/2020] vancomycin (VANCOCIN) 750 mg in 0.9% sodium chloride (MBP/ADV) 250 mL ADV  750 mg IntraVENous ONCE Alayna Hall MD      
 isosorbide mononitrate ER (IMDUR) tablet 30 mg  30 mg Oral DAILY Kan BATEMAN MD   30 mg at 03/27/20 1301  
 naloxone (NARCAN) injection 0.4 mg  0.4 mg IntraVENous PRN Luc Bran MD      
 losartan (COZAAR) tablet 100 mg  100 mg Oral DAILY Gerald Douglass MD   100 mg at 03/27/20 1302  
 glucose chewable tablet 16 g  4 Tab Oral PRN Torrie Horton MD      
  glucagon (GLUCAGEN) injection 1 mg  1 mg IntraMUSCular PRN Christi Dawn MD      
 epoetin natanael-epbx (RETACRIT) injection 10,000 Units  10,000 Units IntraVENous Q TUE, THU & SAT Ev BATEMAN MD      
 amiodarone (CORDARONE) tablet 400 mg  400 mg Oral DAILY Ba Molina MD   400 mg at 03/27/20 5704  aspirin delayed-release tablet 81 mg  81 mg Oral DAILY Ba Molina MD   81 mg at 03/28/20 0825  
 oxyCODONE-acetaminophen (PERCOCET) 5-325 mg per tablet 1 Tab  1 Tab Oral Q4H PRN Hermann Chapman MD   1 Tab at 03/28/20 4211  sodium chloride (NS) flush 5-10 mL  5-10 mL IntraVENous PRN Christi Dawn MD      
 heparin (porcine) 1,000 unit/mL injection 1,000 Units  1,000 Units InterCATHeter DIALYSIS PRN Christi Dawn MD      
 0.9% sodium chloride infusion  25 mL/hr IntraVENous DIALYSIS PRN Christi Dawn MD      
 metoclopramide HCl (REGLAN) injection 5 mg  5 mg IntraVENous Q6H Christi Dawn MD   Stopped at 03/28/20 0600  
 VANCOMYCIN INFORMATION NOTE 1 Each  1 Each Other Rx Dosing/Monitoring Christi Dawn MD      
 piperacillin-tazobactam (ZOSYN) 4.5 g in 0.9% sodium chloride (MBP/ADV) 100 mL MBP  4.5 g IntraVENous Q12H Milagro Oleary MD 25 mL/hr at 03/28/20 0309 4.5 g at 03/28/20 0309  pantoprazole (PROTONIX) tablet 40 mg  40 mg Oral ACB Hector Allen MD   40 mg at 03/28/20 0825  
 heparin (porcine) injection 5,000 Units  5,000 Units SubCUTAneous Lyndon Sanchez MD   5,000 Units at 03/28/20 1184  acetaminophen (TYLENOL) tablet 650 mg  650 mg Oral Q6H PRN Milagro Oleary MD      
 Or  
 acetaminophen (TYLENOL) suppository 650 mg  650 mg Rectal Q6H PRN Milagro Oleary MD      
 
 
 
 
 
Objective: 
Vitals:  
 03/28/20 0915 03/28/20 0930 03/28/20 0945 03/28/20 1000 BP: 157/74 (!) 173/97 180/88 (!) 177/92 Pulse: 95 90 85 84 Resp: 28 17 20 14 Temp:      
TempSrc:      
SpO2: 97%   97% Weight:      
Height:      
 
. Intake/Output Summary (Last 24 hours) at 3/28/2020 1030 Last data filed at 3/28/2020 7262 Gross per 24 hour Intake 740 ml Output  Net 740 ml Admission weight:Weight: 104.3 kg (230 lb) (03/25/20 0827) Last Weight Metrics: 
Weight Loss Metrics 3/28/2020 2/14/2020 2/7/2020 1/24/2020 1/9/2020 12/28/2019 11/28/2019 Today's Wt 239 lb 230 lb 230 lb 230 lb 230 lb 230 lb 233 lb BMI 33.33 kg/m2 33 kg/m2 33 kg/m2 33 kg/m2 33 kg/m2 33 kg/m2 33.43 kg/m2 Physical Exam:  
 
General: No acute distress. Neck: no jvd. LUNGS: good air entry, bl exp rhonchi. CVS EXM: S1, S2, no murmur, rub or gallop. Abdomen: Soft, Non Tender, non distended. Lower Extremities: 1+ Edema. Access: rt ij tdc. Labs: CBC w/Diff Recent Labs  
  03/26/20 
0650 WBC 8.7  
RBC 2.49* HGB 7.3* HCT 23.1*  
* GRANS 54 LYMPH 36 EOS 2 Chemistry Recent Labs  
  03/28/20 
0115 03/27/20 
0200  03/26/20 
6522 * 91  --  57*  139  --  132* K 3.7 4.2  --  5.8*  
 105  --  101 CO2 24 26  --  16*  
BUN 38* 31*  --  71* CREA 9.85* 8.07*  --  14.80* CA 7.6* 7.8*  --  7.5* AGAP 9 8  --  15  
BUCR 4* 4*  --  5* ALB 2.6* 2.4*  --   --   
PHOS 5.1* 5.7*   < >  --   
 < > = values in this interval not displayed. Lab Results Component Value Date/Time Iron 60 10/18/2018 08:10 AM  
 TIBC 184 (L) 10/18/2018 08:10 AM  
 Iron % saturation 33 10/18/2018 08:10 AM  
 Ferritin 325 10/18/2018 08:10 AM  
  
Lab Results Component Value Date/Time Calcium 7.6 (L) 03/28/2020 01:15 AM  
 Phosphorus 5.1 (H) 03/28/2020 01:15 AM  
  
 
 
Mauricio Frank M.D [facility] Office [phone] Pager [phone]

## 2020-03-28 NOTE — DIALYSIS
Negin Rodríguez ACUTE HEMODIALYSIS FLOW SHEET 
 
 
HEMODIALYSIS ORDERS: Physician: Kerri Cee Dialyzer: revaclear   Duration: 4 hr  BFR: 400   DFR: 800 Dialysate:  Temp 36-37*C  K+   3   Ca+  2.5 Na 140 Bicarb 35 Weight:  108.4 kg    Patient Chart []     Unable to Obtain []   Dry weight/UF Goal: 4500 Access R Chest [facility] Needle Gauge Heparin []  Bolus      Units    [] Hourly       Units    [x]None Catheter locking solution Heparin Pre BP:   160/91    Pulse:    87       Respirations: 18  Temperature:   98.3 Labs: Pre        Post:        [] N/A Additional Orders(medications, blood products, hypotension management):       [] N/A [x] [facility] Consent Verified CATHETER ACCESS: []N/A   [x]Right   []Left   [x]IJ     []Fem   []transhepatic  
[] First use X-ray verified     []Permanent                [] Temporary  
[x]No S/S infection  []Redness  []Drainage []Cultured []Swelling []Pain  
[x]Medical Aseptic Prep Utilized   [x]Dressing Changed  [x] Biopatch  Date: 3/25/20 []Clotted   []Patent   Flows: []Good  []Poor  []Reversed If access problem,  notified: []Yes    []N/A  Date:        
 
GRAFT/FISTULA ACCESS:  [x]N/A     []Right     []Left     []UE     []LE []AVG   []AVF        []Buttonhole    []Medical Aseptic Prep Utilized []No S/S infection  []Redness  []Drainage []Cultured []Swelling []Pain Bruit:   [] Strong    [] Weak       Thrill :   [] Strong    [] Weak Needle Gauge:    Length: If access problem,  notified: []Yes     []N/A  Date:       
Please describe access if present and not used:  
            
            GENERAL ASSESSMENT:  
  
LUNGS:  Rate 20 SaO2%       [] N/A    [x] Clear  [] Coarse  [] Crackles  [] Wheezing 
      [] Diminished     Location : []RLL   []LLL    []RUL  []NIKOS Cough: []Productive  []Dry  [x]N/A   Respirations:  [x]Easy  []Labored Therapy:   [x]RA  []NC l/min    Mask: []NRB []Venti       O2% []Ventilator  []Intubated  [] Trach  [] BiPaP CARDIAC: [x]Regular      [] Irregular   [] Pericardial Rub  [] JVD []  Monitored  [] Bedside  [] Remotely monitored [] N/A  Rhythm: EDEMA: [] None  [x]Generalized  [] Pitting [] 1    [] 2    [] 3    [] 4 [] Facial  [] Pedal  []  UE  [] LE  
 
SKIN:   [x] Warm  [] Hot     [] Cold   [x] Dry     [] Pale   [] Diaphoretic    
             [] Flushed  [] Jaundiced  [] Cyanotic  [] Rash  [] Weeping LOC:    [x] Alert      [x]Oriented:    [x] Person     [x] Place  [x]Time 
             [] Confused  [] Lethargic  [] Medicated  [] Non-responsive GI / ABDOMEN:  [] Flat    [] Distended    [x] Soft    [] Firm   []  Obese 
                           [] Diarrhea  [x] Bowel Sounds  [] Nausea  [] Vomiting  / URINE ASSESSMENT:[x] Voiding   [] Oliguria  [] Anuria   []  Alonzo [] Incontinent    []  Incontinent Brief      []  Bathroom Privileges PAIN: [] 0 []1  []2   []3   []4   []5   []6   []7   []8   []9   []10 Scale 0-10  Action/Follow Up: MOBILITY:  [] Amb    [] Amb/Assist    [x] Bed    [] Wheelchair  [] Stretcher All Vitals and Treatment Details on Attached Flowsheet Hospital: [facility] Room # 2607/01 [] 1st Time Acute  [] Stat  [] Routine  [] Urgent    
[] Acute Room  []  Bedside  [x] ICU/CCU  [] ER Isolation Precautions:  Droplet Plus Special Considerations:         [] Blood Consent Verified []N/A ALLERGIES:  
Allergies Allergen Reactions  Latex Other (comments)  
  blisters  Other Food Hives Allergic to tomatoes and tomato sauce  Keflex [Cephalexin] Anaphylaxis  Codeine Nausea and Vomiting Code Status:Full Code Hepatitis Status:    2nd RN check:                    
Lab Results Component Value Date/Time  Hepatitis A, IgM NEGATIVE  07/07/2017 08:39 AM  
 Hepatitis B surface Ag <0.10 02/14/2020 09:53 AM  
 Hepatitis B surface Ab <3.10 (L) 02/14/2020 09:53 AM  
 Hepatitis B core, IgM NEGATIVE  07/07/2017 08:39 AM  
 Hepatitis C virus Ab 0.07 10/04/2018 05:30 AM  
   
 
            Current Labs:  
Lab Results Component Value Date/Time Sodium 137 03/28/2020 01:15 AM  
 Potassium 3.7 03/28/2020 01:15 AM  
 Chloride 104 03/28/2020 01:15 AM  
 CO2 24 03/28/2020 01:15 AM  
 Anion gap 9 03/28/2020 01:15 AM  
 Glucose 118 (H) 03/28/2020 01:15 AM  
 BUN 38 (H) 03/28/2020 01:15 AM  
 Creatinine 9.85 (H) 03/28/2020 01:15 AM  
 BUN/Creatinine ratio 4 (L) 03/28/2020 01:15 AM  
 GFR est AA 7 (L) 03/28/2020 01:15 AM  
 GFR est non-AA 6 (L) 03/28/2020 01:15 AM  
 Calcium 7.6 (L) 03/28/2020 01:15 AM  
  
Lab Results Component Value Date/Time WBC 8.7 03/26/2020 06:50 AM  
 Hemoglobin, POC 13.9 08/20/2017 05:57 AM  
 HGB 7.3 (L) 03/26/2020 06:50 AM  
 Hematocrit, POC 41 08/20/2017 05:57 AM  
 HCT 23.1 (L) 03/26/2020 06:50 AM  
 PLATELET 744 (L) 09/30/9540 06:50 AM  
 MCV 92.8 03/26/2020 06:50 AM  
  
  
 
                                                                         
DIET: DIET RENAL    
 
PRIMARY NURSE REPORT: First initial/Last name/Title Pre Dialysis:  Mirian Ro RN     Time: 9918 EDUCATION:   
[] Patient [] Other         Knowledge Basis: [x]None []Minimal [] Substantial  
Barriers to learning  []N/A  Intubation  
[] Access Care     [] S&S of infection     [] Fluid Management     []K+     [x]Procedural   
[]Albumin     [] Medications     [] Tx Options     [] Transplant     [] Diet     [] Other Teaching Tools:  [x] Explain  [] Demo  [] Handouts [] Video Patient response:   [] Verbalized understanding  [x] Teach back  [] Return demonstration [] Requires follow up due to intubation Inappropriate due to         
 
[x] Time Out/Safety Check  [x]Extracorporeal Circuit Tested for integrity RO/HEMODIALYSIS MACHINE SAFETY CHECKS  Before each treatment: Machine Number:                   [facility] 
                                [] Unit Machine #  with centralized RO 
                                [] Portable Machine #1/RO serial # [id number] [] Portable Machine #2/RO serial # [id number] [] Portable Machine #4/RO serial # [id number] [facility] [x] Portable Machine #1 /RO serial # [id number] [] Portable Machine #2 /RO serial # [id number] [] Portable Machine #13/RO serial #  [id number] Alarm Test:  Pass time 1728 [x] RO/Machine Log Complete Temp    36 Dialysate: pH  7.4 Conductivity: Meter   14     HD Machine   14.3                  TCD: 14 
Dialyzer Lot # [id number]            Blood Tubing Lot # [id number]          Saline Lot #  [id number] CHLORINE TESTING-Before each treatment and every 4 hours Total Chlorine: [x] less than 0.1 ppm  Time: 0900 4 Hr/2nd Check Time: 1300  
(if greater than 0.1 ppm from Primary then every 30 minutes from Secondary) TREATMENT INITIATION  with Dialysis Precautions:  
[x] All Connections Secured                 [x] Saline Line Double Clamped  
[x] Venous Parameters Set                  [x] Arterial Parameters Set [x] Prime Given 250ml                          [x]Air Foam Detector Engaged Treatment Initiation Note:  Received patient in ICU bed awake alert and oriented x3. COVID-19 precaution on going. Treatment delayed due to clogged drainage and flooding in the room. Maintenance personel notified, came and unclogged the drain. Right chest TDC access, HD initiated without further  difficulty.     
 
 
 
 During Treatment Notes: 
56- Dr. Kristal Garcia in to see patient, provided dialysis discharge follow-up treatment instruction to patient-   
 1130 Patient c/o sudden headache but resolved spontaneously. 1200- Patient c/o feeling cold, blank provided Medication Dose Volume Route Time [facility] name Title RN  
      RN  
      RN  
        RN Post Assessment:  
Dialyzer Cleared: [x] Good [] Fair  [] Poor Blood processed:  75 L 
UF Removed  3000 Ml POst BP:   181/89       Pulse: 93 Respirations: 18  Temperature: 98 Lungs: 
 
 [x] Clear      [] Course         [] Crackles  
 [] Wheezing         [] Diminished Post Tx Vascular Access: AVF/AVG: Bleeding stopped Art  min. Ashok. Min   N/A Cardiac:  
[] Regular   [] Irregular   [] Monitor  [] N/A Rhythm:      
Catheter:  
Locking solution: Heparin 1:1000 Art. 2.1  Ashok. 2.2  N/A Skin:   Pain:   
[x] Warm  [x] Dry [] Diaphoretic    [] Flushed   
[] Pale [] Cyanotic [x]0  []1  []2   []3  []4   []5   []6   []7   []8   []9   []10 Post Treatment Note: 
 patient completed and tolerated 4 hrs of Hd. 4000 mls fluid removed. POST TREATMENT PRIMARY NURSE HANDOFF REPORT:  
 
First initial/Last name/Title Post Dialysis: W. Neal Halsted Time:  5067 Abbreviations: AVG-arterial venous graft, AVF-arterial venous fistula, IJ-Internal Jugular, Subcl-Subclavian, Fem-Femoral, Tx-treatment, AP/HR-apical heart rate, DFR-dialysate flow rate, BFR-blood flow rate, AP-arterial pressure, -venous pressure, UF-ultrafiltrate, TMP-transmembrane pressure, Ashok-Venous, Art-Arterial, RO-Reverse Osmosis

## 2020-03-28 NOTE — DISCHARGE SUMMARY
[address] [place], [place] [address]ty Group Hospitalist Division Discharge Summary Patient: Denis Keyes MRN: [id number]  CSN: [id number] YOB: 1970  Age: 44 y.o. Sex: male DOA: 3/25/2020 LOS:  LOS: 3 days   Discharge Date: 03/28/20 PCP:  Joan Dodson MD 
 
Chief Complaint:   
Chief Complaint Patient presents with  Cough  Shortness of Breath Pneumonia Admission Diagnosis:  
Hospital Problems as of 3/28/2020 Date Reviewed: 3/26/2020 Codes Class Noted - Resolved POA Acute pulmonary edema (HCC) ICD-10-CM: J81.0 ICD-9-CM: 518.4  7/24/2013 - Present Yes * (Principal) Pneumonia ICD-10-CM: J18.9 ICD-9-CM: 769  3/26/2020 - Present Yes Acute respiratory failure ([facility] ICD-10-CM: J96.00 
ICD-9-CM: 518.81  3/25/2020 - Present Unknown CHF (congestive heart failure) (HCC) ICD-10-CM: I50.9 ICD-9-CM: 428.0  3/25/2020 - Present Unknown CAD (coronary artery disease) ICD-10-CM: I25.10 ICD-9-CM: 414.00  10/29/2015 - Present Yes Overview Signed 10/29/2015 10:11 AM by Tacho Hall MD  
  S/P PCI LAD 
  
  
   
 ESRD (end stage renal disease) ([facility] ICD-10-CM: N18.6 ICD-9-CM: 585.6  9/3/2014 - Present Yes AICD (automatic cardioverter/defibrillator) present ICD-10-CM: Z95.810 ICD-9-CM: V45.02  5/21/2014 - Present Yes Overview Signed 5/21/2014  2:35 PM by Doss Hammans, MD  
  Single chamber Medtronic aicd implant 5/21/14 for primary prevention. Coronary artery disease ICD-10-CM: I25.10 ICD-9-CM: 414.01  Unknown - Present Overview Signed 8/22/2013  1:39 PM by Deyvi Hearn MD  
  LAD - 3.0 x 18mm VISION 7/13 HTN (hypertension) (Chronic) ICD-10-CM: I10 
ICD-9-CM: 401.9  7/24/2013 - Present Yes Discharge Diagnoses:   
-Acute respiratory distress requiring intubation. Likely from combination of pneumonia and CHF exacerbation and fluid overload.   Patient missed dialysis and last dialysis was Saturday 
-Hypertensive urgency with initial systolic blood pressure of 180/110.  
-End-stage renal disease on dialysis - Coronary artery disease: LAD - 3.0 x 18mm VISION 7/13  
- Ischemic VF: 07/13 in setting of MI, DC cardioversion x4 - Ischemic cardiomyopathy: Last EF 35% in 10/2018 - S/p Medtronic single-chamber AICD 05/2014 (Dr. Carlin Donohue) - VT and ventricular fibrillation 10/27/15, approximately 20 shocks were administered via AICD. Interrogation at the time noted appropriate device function. Supposed to be on amiodarone - Hypertension - Dyslipidemia  
- JESSICA - Tobacco abuse - Apical mural thrombus ([address]): ECHO 3/14. Last echo in 10/2018 without any LV thrombus - Degenerative spine disease - Chronic back pain  
-Patient under isolation for COVID 19 rule out due to presentaion. No fever, no cough. Hospital Course:  
Patient admitted with respiratory distress due to fluid overload from missed dialisys and CHF with hypertensive urgency. Nephrology and cardiology consulted. No cardiac ischemia found. Troponin unremarkable for any ACSOne fluid diureses a left sided pneumonia was probible. Patient placed on vancomycin and zosyn, covid-19 was tested due to presentation and patient is currently under investigation however having no cough, fever or prolonged bilateral infiltrates. Cardiology started Coreg to 12.5 mg mg twice daily, and losartan 100mg for blood pressure control He will Continue Imdur 30 mg daily and Continue amiodarone and hydralazine and aspirin. On discharge he was off oxygen with normal exam. Completed HD on 3/28. He is Still contact plus isolation to rule out coronavirus. Will need to stay on home quarantine till test returns in next few days. This was also discussed with his wife. He is on alternate dialysis schedule for next 2 weeks due to his Covid-PUI status. Significant Diagnostic Studies: Xr Chest [facility] Result Date: 3/26/2020 IMPRESSION: Severely limited exam for reasons stated. However, bilateral airspace disease appears, if anything, worse compared with yesterday. Xr Chest [facility] Result Date: 3/25/2020 IMPRESSION: Line/support tubes in place as described. Enteric tube tip in the esophagus, likely needs advancing. Increasing airspace disease in the right lower lobe and probable small effusion. Xr Chest [facility] Result Date: 3/25/2020 IMPRESSION: Moderate pulmonary edema pattern as above including more focal consolidative opacity at the right middle lobe may represent a combination of CHF and pneumonia. Consults: 
Treatment Team: Attending Provider: Kunal Walton DO; Consulting Provider: Lance Da Silva MD; Consulting Provider: Pop Mccollum MD; Consulting Provider: Padilla Hidalgo MD; Consulting Provider: Jolynn Del Castillo MD; Utilization Review: Mohini Quiroz RN; Care Manager: Lenin Duran RN; Staff Nurse: Lizette Murrieta Operative Procedures: N/A Disposition: 
Home with quarantine. Diet: 
Cardiac Discharge Condition:  
Good Discharge Medications:   
Current Discharge Medication List  
  
START taking these medications Details  
amoxicillin-clavulanate (Augmentin) 500-125 mg per tablet Take 1 Tab by mouth daily for 4 days. Indications: pneumonia caused by bacteria Qty: 4 Tab, Refills: 0  
  
carvediloL (COREG) 12.5 mg tablet Take 1 Tab by mouth two (2) times daily (with meals). Indications: chronic heart failure, high blood pressure Qty: 180 Tab, Refills: 1  
  
losartan (COZAAR) 100 mg tablet Take 1 Tab by mouth daily. Qty: 90 Tab, Refills: 1 CONTINUE these medications which have CHANGED Details  
isosorbide mononitrate ER (IMDUR) 60 mg CR tablet Take 0.5 Tabs by mouth every morning. Qty: 1 Tab, Refills: 1 CONTINUE these medications which have NOT CHANGED  Details  
albuterol (PROVENTIL HFA, VENTOLIN HFA, PROAIR HFA) 90 mcg/actuation inhaler Take 2 Puffs by inhalation every four (4) hours as needed for Wheezing. Qty: 1 Inhaler, Refills: 5  
  
ondansetron hcl (ZOFRAN) 4 mg tablet Take 2 Tabs by mouth every eight (8) hours as needed for Nausea. Qty: 12 Tab, Refills: 0 NOVOLOG FLEXPEN U-100 INSULIN 100 unit/mL inpn 5 m by IntraMUSCular route as needed. Pt on scale  
  
glucose blood VI test strips (ASCENSIA AUTODISC VI, ONE TOUCH ULTRA TEST VI) strip Use to monitor blood glucose 3 times daily. Qty: 100 Strip, Refills: 3 Associated Diagnoses: Hyperglycemia  
  
epoetin natanael (EPOGEN;PROCRIT) 10,000 unit/mL injection 1 mL by SubCUTAneous route Every Tues, Thur & Sat. Qty: 12 Vial, Refills: 0  
  
hydrALAZINE (APRESOLINE) 50 mg tablet Take 1 Tab by mouth three (3) times daily. Qty: 90 Tab, Refills: 0  
  
atorvastatin (LIPITOR) 40 mg tablet Take 1 Tab by mouth daily. Indications: hypercholesterolemia Qty: 90 Tab, Refills: 3  
  
amiodarone (PACERONE) 400 mg tablet Take 1 Tab by mouth daily. Indications: LIFE-THREATENING VENTRICULAR TACHYCARDIA Qty: 90 Tab, Refills: 3  
  
lidocaine (LIDODERM) 5 % Apply patch to the affected area for 12 hours a day and remove for 12 hours a day. Qty: 7 Each, Refills: 1  
  
aspirin 81 mg chewable tablet Take 162 mg by mouth two (2) times a day. fluticasone (FLONASE) 50 mcg/actuation nasal spray 2 Sprays by Both Nostrils route daily as needed for Rhinitis. Qty: 1 Bottle, Refills: 1  
  
oxyCODONE IR (ROXICODONE) 10 mg tab immediate release tablet Take 1 Tab by mouth every eight (8) hours as needed. Max Daily Amount: 30 mg. 
Qty: 12 Tab, Refills: 0 Associated Diagnoses: Chronic midline low back pain with left-sided sciatica  
  
predniSONE (DELTASONE) 20 mg tablet Take 3 Tabs by mouth daily (with breakfast). Qty: 30 Tab, Refills: 0  
  
amLODIPine (NORVASC) 10 mg tablet Take 1 Tab by mouth daily. Qty: 30 Tab, Refills: 0 calcium acetate (PHOSLO) 667 mg cap Take 2 Caps by mouth three (3) times daily (with meals). Qty: 90 Cap, Refills: 0  
  
pantoprazole (PROTONIX) 40 mg tablet Take 1 Tab by mouth Daily (before breakfast). Qty: 30 Tab, Refills: 0  
  
polyethylene glycol (MIRALAX) 17 gram packet Take 1 Packet by mouth daily. Qty: 30 Packet, Refills: 0 Recent Results (from the past 24 hour(s)) RENAL FUNCTION PANEL Collection Time: 03/28/20  1:15 AM  
Result Value Ref Range Sodium 137 136 - 145 mmol/L Potassium 3.7 3.5 - 5.5 mmol/L Chloride 104 100 - 111 mmol/L  
 CO2 24 21 - 32 mmol/L Anion gap 9 3.0 - 18 mmol/L Glucose 118 (H) 74 - 99 mg/dL BUN 38 (H) 7.0 - 18 MG/DL Creatinine 9.85 (H) 0.6 - 1.3 MG/DL  
 BUN/Creatinine ratio 4 (L) 12 - 20 GFR est AA 7 (L) >60 ml/min/1.73m2 GFR est non-AA 6 (L) >60 ml/min/1.73m2 Calcium 7.6 (L) 8.5 - 10.1 MG/DL Phosphorus 5.1 (H) 2.5 - 4.9 MG/DL Albumin 2.6 (L) 3.4 - 5.0 g/dL Follow-Up And Discharge Instructions: Follow-up Information Follow up With Specialties Details Why Contact Info Ann Marie Kahn MD Internal Medicine On 4/23/2020 arrive at 10:45 am [address] [place] [id number] [phone] F/u Cardiology in 4-6 weeks Dr Lisha Cortes. Wound Care/Supplies: N/A 
 
 
Dr Zoey Marcial [address] [place], [place] [address]pecialty Group Hospitalist Division Time Spent:  35min Cc: Ann Marie Kahn MD

## 2020-03-29 NOTE — PROGRESS NOTES
Clarification:  Patient had acute on chronic systolic CHF with LVEF of 30% present on admission. Clarification:  Patient had sepsis ruled out during Hospitalization.

## 2020-03-30 NOTE — PROGRESS NOTES
COVID-19 Screening Initial Follow-up Note Patient contacted regarding COVID-19  risk. Care Transition Nurse/ Ambulatory Care Manager contacted the patient by telephone to perform post discharge assessment. Verified name and  with patient as identifiers. Provided introduction to self, and explanation of the CTN/ACM role, and reason for call due to risk factors for infection and/or exposure to COVID-19. Symptoms reviewed with patient who verbalized the following symptoms: no new/worsening symptoms Due to no new or worsening symptoms encounter was not routed to provider for escalation. Patient has following risk factors of: heart failure, COPD, pneumonia, sepsis, acute respiratory failure. CTN/ACM reviewed discharge instructions, medical action plan and red flags such as increased shortness of breath, increasing fever and signs of decompensation with patient who verbalized understanding. Discussed exposure protocols and quarantine with CDC Guidelines What to do if you are sick with coronavirus disease 2019 Patient who was given an opportunity for questions and concerns. The patient agrees to contact the [facility] exposure line [phone], local [facility] department [address]  ([phone] and PCP office for questions related to their healthcare. CTN/ACM provided contact information for future reference. Reviewed and educated patient on any new and changed medications related to discharge diagnosis Plan for follow-up call in 1-2 days based on severity of symptoms and risk factors

## 2020-04-01 NOTE — PROGRESS NOTES
COVID-19 Screening Follow-up Note Patient contacted regarding COVID-19 risk and screening. Care Transition Nurse/ Ambulatory Care Manager contacted the patient by telephone to perform follow-up assessment. Verified name and  with patient as identifiers. Patient has following risk factors of: pneumonia Symptoms reviewed with patient who verbalized the following symptoms: no new/worsening symptoms Patient states he is doing fine. He would like to know his test results for Covid 19. Patient made aware that the results are still pending. Patient has my contact information if he have any other questions or concerns. I will call patient tomorrow with update on results. Due to no new or worsening symptoms encounter was not routed to provider for escalation. Education provided regarding infection prevention, and signs and symptoms of COVID-19 and when to seek medical attention with patient who verbalized understanding. Discussed exposure protocols and quarantine from [address] you at higher risk for severe illness  and given an opportunity for questions and concerns. The patient agrees to contact the COVID-19 hotline [phone] or PCP office for questions related to their healthcare. CTN/ACM provided contact information for future reference. From CDC: Are you at higher risk for severe illness?  Wash your hands often.  Avoid close contact (6 feet, which is about two arm lengths) with people who are sick.  Put distance between yourself and other people if COVID-19 is spreading in your community.  Clean and disinfect frequently touched surfaces.  Avoid all cruise travel and non-essential air travel.  Call your healthcare professional if you have concerns about COVID-19 and your underlying condition or if you are sick. For more information on steps you can take to protect yourself, see CDC's How to Protect Yourself Plan for follow-up call in 1-2 days based on severity of symptoms and risk factors
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
## 2020-04-02 NOTE — DISCHARGE INSTRUCTIONS
Specific instructions from the ER physician who treated in emergency department today:  1. Return to the ER if worse  2. Make sure he did not miss hemodialysis appointments. 3.  Follow-up with your primary care doctor in the next 2 to 3 days for reevaluation. 4.  Follow-up with your nephrologist in the next couple days at your next hemodialysis appointment. Patient Education        Kidney Dialysis: Care Instructions  Your Care Instructions    Dialysis is a process that filters wastes from the blood when your kidneys can no longer do the job. It is not a cure, but it can help you live longer and feel better. It is a lifesaving treatment when you have kidney failure. Normal kidneys work 24 hours a day to clean wastes from your blood. Your kidneys are not able to do this job, so a process called dialysis will do some of the work for your kidneys. You and your doctor will decide which type of dialysis you should have. Peritoneal dialysis uses the lining of your belly (peritoneum) to filter your blood. You can do it at home, on a daily basis. Hemodialysis uses a man-made filter called a dialyzer to clean your blood. Most people need to go to a hospital or clinic 3 days a week for several hours each time. Sometimes hemodialysis can be done at home. It is normal to have questions about your treatment, and you have a right to know what is happening to you. Learning about dialysis can help you take an active role in your treatment. Dialysis does not cure kidney disease, but it can help you live longer and feel better. You will need to follow your diet and treatment schedule carefully. Follow-up care is a key part of your treatment and safety. Be sure to make and go to all appointments, and call your doctor if you are having problems. It's also a good idea to know your test results and keep a list of the medicines you take. What do you need to know about peritoneal dialysis?   Peritoneal dialysis uses the lining of your belly (or peritoneal membrane) to filter your blood. Before you can begin peritoneal dialysis, your doctor will need to place a thin tube called a catheter in your belly. This is the dialysis access. · Peritoneal dialysis can be done at home or in any clean place. You may be able to do it while you sleep. · You can do it by yourself. You do not have to rely on help from others. · You can do it at the times you choose as long as you do the right number of treatments. · It has to be done every day of the week. · Some people find it hard to do all the required steps. · It increases your chance for a serious infection of the lining of the belly (peritoneum). What do you need to know about hemodialysis? Hemodialysis uses a man-made membrane called a dialyzer to clean your blood. You are connected to the dialyzer by tubes attached to your blood vessels. Before you start hemodialysis, your doctor will create a site where the blood can flow in and out of your body during your dialysis sessions. This site is called the vascular access. It may be a fistula, made by connecting an artery and a vein. Or it may be a graft, which is a tube implanted under your skin. · Hemodialysis is done mainly by trained health workers who can watch for any problems. · It allows you to be in contact with other people having dialysis. This can help provide emotional support. · You can schedule your treatments in the evenings so you can keep working. · You may be able to do home hemodialysis, which gives you more control over your schedule. · It usually needs to be done on a set schedule 3 times a week. · It can cause side effects. The most common side effects are low blood pressure and muscle cramps. These can often be treated easily. · It requires needle sticks during every treatment, which bothers some people. Others get used to it and even do the needle sticks themselves. How can you care for yourself at home?   · Be sure to have all of your dialysis sessions. Do not try to shorten or skip your sessions. You have a better chance of a longer and healthier life by getting your full treatment. · Your doctor or health care team will show you the steps you need to go through each day before, during, and after dialysis. Be sure to follow these steps. If you do not understand a step, talk to your team.  · Your doctor and dietitian will help you design menus that follow your diet. Be sure to follow your diet guidelines. ? You will need to limit fluids and certain foods that contain salt (sodium), potassium, and phosphorus. ? You may need to follow a heart-healthy diet to keep the fat (cholesterol) in your blood under control. ? You may need higher levels of protein in your diet. · Your doctor may recommend certain vitamins. But do not take any other medicine, including over-the-counter medicines, vitamins, and herbal products, without talking to your doctor first.  · Do not smoke. Smoking raises your risk of many health problems, including more kidney damage. If you need help quitting, talk to your doctor about stop-smoking programs and medicines. These can increase your chances of quitting for good. · Do not take ibuprofen (Advil, Motrin), naproxen (Aleve), or similar medicines, unless your doctor tells you to. These medicines may make kidney problems worse. When should you call for help? Call your doctor now or seek immediate medical care if:    · You have a fever.     · You are dizzy or lightheaded, or you feel like you may faint.     · You are confused or cannot think clearly.     · You have new or worse nausea or vomiting.     · You have new or more blood in your urine.     · You have new swelling.    Watch closely for changes in your health, and be sure to contact your doctor if:    · You do not get better as expected. Where can you learn more?   Go to http://berta-keyla.info/  Enter Z438 in the search box to learn more about \"Kidney Dialysis: Care Instructions. \"  Current as of: August 11, 2019Content Version: 12.4  © 0290-9241 [employer]. Care instructions adapted under license by [employer] (which disclaims liability or warranty for this information). If you have questions about a medical condition or this instruction, always ask your healthcare professional. [address] any warranty or liability for your use of this information. Patient Education        Pulmonary Edema: Care Instructions  Your Care Instructions    Pulmonary edema is the buildup of fluid in the lungs. It usually occurs when the heart does not pump blood through the body properly. Pulmonary edema can also be caused by another disease, such as liver or kidney failure. It can also happen at high altitudes, from a poisoning, or as a result of a near-drowning. If you have fluid in your lungs, you may have trouble breathing, be restless, have a fast heart rate, or cough up foamy pink fluid. Breathing problems may be worse when you lie down. Follow-up care is a key part of your treatment and safety. Be sure to make and go to all appointments, and call your doctor if you are having problems. It's also a good idea to know your test results and keep a list of the medicines you take. How can you care for yourself at home? Medicines    · Take your medicines exactly as prescribed. Call your doctor if you think you are having a problem with your medicine.     · Review all of your regular medicines with your doctor. Do not take any vitamins, over-the-counter medicines, or herbal products without talking to your doctor first.   Diet    · Eat a balanced diet. Make an appointment with a dietitian if you have questions about what type of diet might be best for you.     · Do not eat more than 2,000 milligrams (mg) of sodium each day.  That is less than 1 teaspoon of salt a day, including all the salt you eat in prepared or packaged foods. ? Do not add salt while you are cooking or at the table. Flavor with garlic, lemon juice, onion, vinegar, herbs, and spices instead of salt. ? Eat fewer processed foods and foods from restaurants, including fast food. ? Use fresh or frozen foods instead of canned. ? Count and record how much sodium you eat each day. Check food labels for sodium. ? Ask your doctor before using salt substitutes that have potassium, such as Lite Salt.    Lifestyle    · Stay out of air pollution; smog; cold, dry air; hot, humid air; and high altitudes.     · Learn breathing methods that help the airflow in and out of your lungs.     · Take rest breaks often. Schedule short rest breaks when doing housework and other activities. An occupational or physical therapist can help you find ways to do everyday activities with less effort.     · Start light exercise if your doctor says it is okay. Try to stay as active as possible. If you have not exercised in the past, start out slowly. Walking is a good way to start.     · Get enough rest at night. Sleeping with 1 or 2 pillows under your upper body and head may help you breathe easier at night.     · Discuss rehabilitation with your doctor. Find out what programs are available in your area.     · Do not smoke or use other tobacco products. Smoking can make your condition worse. If you need help quitting, talk to your doctor about stop-smoking programs and medicines. These can increase your chances of quitting for good.     · Do not use alcohol or illegal drugs. When should you call for help? Call 911 anytime you think you may need emergency care. For example, call if:    · You have severe trouble breathing.     · You passed out (lost consciousness).     · You have symptoms of a heart attack. These may include:  ? Chest pain or pressure, or a strange feeling in the chest.  ? Sweating. ? Shortness of breath. ? Nausea or vomiting.   ? Pain, pressure, or a strange feeling in the back, neck, jaw, or upper belly or in one or both shoulders or arms. ? Lightheadedness or sudden weakness. ? A fast or irregular heartbeat. Pain that spreads from the chest to the neck, jaw, or one or both shoulders or arms.    After you call  911, the  may tell you to chew 1 adult-strength or 2 to 4 low-dose aspirin. Wait for an ambulance. Do not try to drive yourself.   Call your doctor now or seek immediate medical care if:    · You have trouble breathing or have wheezing that is getting worse.     · You are coughing more deeply or more often.     · You cough up blood.     · You get a fever.     · You have more swelling in your legs or belly.     · Your symptoms are getting worse.    Watch closely for changes in your health, and be sure to contact your doctor if you have any problems. Where can you learn more? Go to http://berta-keyla.info/  Enter B937 in the search box to learn more about \"Pulmonary Edema: Care Instructions. \"  Current as of: June 9, 2019Content Version: 12.4  © 6051-2844 [employer]. Care instructions adapted under license by [employer] (which disclaims liability or warranty for this information). If you have questions about a medical condition or this instruction, always ask your healthcare professional. [address] any warranty or liability for your use of this information.

## 2020-04-02 NOTE — ED NOTES
Pt in room, NAD, states he feels need for dialysis after multiple transport attempts unsuccessful last night.

## 2020-04-02 NOTE — DIALYSIS
[facility] ACUTE HEMODIALYSIS FLOW SHEET 
 
 
HEMODIALYSIS ORDERS: Physician: Sweetie Quijano Dialyzer: revaclear   Duration: 3.5 hr  BFR: 350   DFR: 800 Dialysate:  Temp 36-37*C  K+   2    Ca+  2.5 Na 140 Bicarb 35 Weight:  104.3 kg    Patient Chart []     Unable to Obtain []   Dry weight/UF Goal: 3500 Access L sub TDC Needle Gauge Heparin []  Bolus      Units    [] Hourly       Units    []None Catheter locking solution Heparin Pre BP:   158/97    Pulse:     60       Respirations: 18  Temperature:   98 Labs: Pre        Post:        [] N/A Additional Orders(medications, blood products, hypotension management):       [] N/A [x] [facility] Consent Verified CATHETER ACCESS: []N/A   [x]Right   []Left   [x]IJ     []Fem   []transhepatic  
[] First use X-ray verified     []Permanent                [] Temporary  
[x]No S/S infection  []Redness  []Drainage []Cultured []Swelling []Pain  
[x]Medical Aseptic Prep Utilized   []Dressing Changed  [] Biopatch  Date:      
[]Clotted   [x]Patent   Flows: [x]Good  []Poor  []Reversed If access problem,  notified: []Yes    [x]N/A  Date:        
 
GRAFT/FISTULA ACCESS:  []N/A     []Right     []Left     []UE     []LE []AVG   []AVF        []Buttonhole    []Medical Aseptic Prep Utilized []No S/S infection  []Redness  []Drainage []Cultured []Swelling []Pain Bruit:   [] Strong    [] Weak       Thrill :   [] Strong    [] Weak Needle Gauge:   Length: If access problem,  notified: []Yes     []N/A  Date:       
Please describe access if present and not used:  
            
            GENERAL ASSESSMENT:  
  
LUNGS:  Rate 18 SaO2%        [] N/A    [x] Clear  [] Coarse  [] Crackles  [] Wheezing 
      [] Diminished     Location : [x]RLL   [x]LLL    []RUL  []NIKOS Cough: []Productive  [x]Dry  []N/A   Respirations:  [x]Easy  []Labored Therapy:   [x]RA  []NC  l/min    Mask: []NRB []Venti       O2% []Ventilator  []Intubated  [] Trach  [] BiPaP CARDIAC: [x]Regular      [] Irregular   [] Pericardial Rub  [] JVD []  Monitored  [] Bedside  [] Remotely monitored [] N/A  Rhythm: EDEMA: [] None  [x]Generalized  [] Pitting [] 1    [x] 2    [] 3    [] 4 [] Facial  [] Pedal  []  UE  [] LE  
 
SKIN:   [x] Warm  [] Hot     [] Cold   [x] Dry     [] Pale   [] Diaphoretic    
             [] Flushed  [] Jaundiced  [] Cyanotic  [] Rash  [] Weeping LOC:    [x] Alert      [x]Oriented:    [x] Person     [x] Place  [x]Time 
             [] Confused  [] Lethargic  [] Medicated  [] Non-responsive GI / ABDOMEN:  [] Flat    [x] Distended    [] Soft    [] Firm   []  Obese 
                           [] Diarrhea  [x] Bowel Sounds  [] Nausea  [] Vomiting  / URINE ASSESSMENT:[] Voiding   [x] Oliguria  [] Anuria   []  Alonzo [] Incontinent    []  Incontinent Brief      []  Bathroom Privileges PAIN: [x] 0 []1  []2   []3   []4   []5   []6   []7   []8   []9   []10 Scale 0-10  Action/Follow Up: MOBILITY:  [] Amb    [] Amb/Assist    [x] Bed    [] Wheelchair  [] Stretcher All Vitals and Treatment Details on Attached Flowsheet Hospital: [facility] Room # FT6/F6 [] 1st Time Acute  [x] Stat  [] Routine  [] Urgent    
[] Acute Room  []  Bedside  [] ICU/CCU  [x] ER Isolation Precautions: There are currently no Active Isolations Special Considerations:         [] Blood Consent Verified []N/A ALLERGIES:  
Allergies Allergen Reactions  Latex Other (comments)  
  blisters  Other Food Hives Allergic to tomatoes and tomato sauce  Keflex [Cephalexin] Anaphylaxis  Codeine Nausea and Vomiting Code Status:Prior Hepatitis Status:    2nd RN check:                    
Lab Results Component Value Date/Time  Hepatitis A, IgM NEGATIVE  07/07/2017 08:39 AM  
 Hepatitis B surface Ag <0.10 02/14/2020 09:53 AM  
 Hepatitis B surface Ab <3.10 (L) 02/14/2020 09:53 AM  
 Hepatitis B core, IgM NEGATIVE  07/07/2017 08:39 AM  
 Hepatitis C virus Ab 0.07 10/04/2018 05:30 AM  
   
 
            Current Labs:  
Lab Results Component Value Date/Time Sodium 135 (L) 04/02/2020 09:15 AM  
 Potassium 4.3 04/02/2020 09:15 AM  
 Chloride 99 (L) 04/02/2020 09:15 AM  
 CO2 28 04/02/2020 09:15 AM  
 Anion gap 8 04/02/2020 09:15 AM  
 Glucose 94 04/02/2020 09:15 AM  
 BUN 29 (H) 04/02/2020 09:15 AM  
 Creatinine 9.82 (H) 04/02/2020 09:15 AM  
 BUN/Creatinine ratio 3 (L) 04/02/2020 09:15 AM  
 GFR est AA 7 (L) 04/02/2020 09:15 AM  
 GFR est non-AA 6 (L) 04/02/2020 09:15 AM  
 Calcium 9.0 04/02/2020 09:15 AM  
  
Lab Results Component Value Date/Time WBC 10.2 04/02/2020 09:15 AM  
 Hemoglobin, POC 13.9 08/20/2017 05:57 AM  
 HGB 8.2 (L) 04/02/2020 09:15 AM  
 Hematocrit, POC 41 08/20/2017 05:57 AM  
 HCT 26.3 (L) 04/02/2020 09:15 AM  
 PLATELET 316 32/25/3258 09:15 AM  
 MCV 95.6 04/02/2020 09:15 AM  
  
  
 
                                                                         
DIET: None PRIMARY NURSE REPORT: First initial/Last name/Title Pre Dialysis: Tabby Bess RN Time: 6536 EDUCATION:   
[] Patient [] Other         Knowledge Basis: []None [x]Minimal [] Substantial  
Barriers to learning  []N/A  
[] Access Care     [] S&S of infection     [] Fluid Management     []K+     [x]Procedural   
[]Albumin     [] Medications     [] Tx Options     [] Transplant     [] Diet     [] Other Teaching Tools:  [x] Explain  [] Demo  [] Handouts [] Video Patient response:   [] Verbalized understanding  [x] Teach back  [] Return demonstration [] Requires follow up Inappropriate due to         
 
[x] Time Out/Safety Check  [x]Extracorporeal Circuit Tested for integrity RO/HEMODIALYSIS MACHINE SAFETY CHECKS  Before each treatment: Machine Number:                   [address] 
                                [] Unit Machine #  with centralized RO 
                                [] Portable Machine #1/RO serial # [id number] [] Portable Machine #2/RO serial # [id number] [] Portable Machine #4/RO serial # [id number] [address] 
                                [] Portable Machine #11/RO serial # [id number] [] Portable Machine #12/RO serial # [id number] [x] Portable Machine #3/RO serial #  [id number] Alarm Test:  Pass time               
[x] RO/Machine Log Complete Temp    36 Dialysate: pH  7.4 Conductivity: Meter   14.4     HD Machine   14.5                  TCD: 14.2 Dialyzer Lot # [id number]            Blood Tubing Lot # [id number]          Saline Lot #  [id number] CHLORINE TESTING-Before each treatment and every 4 hours Total Chlorine: [x] less than 0.1 ppm  Time: 1100 4 Hr/2nd Check Time:   
(if greater than 0.1 ppm from Primary then every 30 minutes from Secondary) TREATMENT INITIATION  with Dialysis Precautions:  
[x] All Connections Secured                 [x] Saline Line Double Clamped  
[x] Venous Parameters Set                  [x] Arterial Parameters Set [x] Prime Given 250ml                          [x]Air Foam Detector Engaged Treatment Initiation Note:Patient came to ER for dialysis, on isolation room for suspected COVID-19 awaiting result. A/O x4, no complaint of acute distress. VSS. Right chest TDC accessed aspirates and flushes well. HD initiated without difficulty. Patient wanted only 3hrs of dialysis. During Treatment Notes:1200- Patient sitting up on the strecher watching video on his phone. Lines intact and in view. Medication Dose Volume Route Time [facility] name Title RN  
      RN  
      RN  
        RN Post Assessment:  
Dialyzer Cleared: [x] Good [] Fair  [] Poor Blood processed:  56.6 L 
UF Removed  3000 Ml POst BP:   165/97       Pulse: 63 Respirations: 16  Temperature: 98.1 Lungs: 
 
 [x] Clear      [] Course         [] Crackles  
 [] Wheezing         [] Diminished Post Tx Vascular Access: AVF/AVG: Bleeding stopped Art  min. Ashok. Min   N/A Cardiac:  
[x] Regular   [] Irregular   [] Monitor  [] N/A Rhythm:      
Catheter:  
Locking solution: Heparin 1:1000 Art. 2.1  Ashok. 2.2  N/A Skin:   Pain:   
[x] Warm  [x] Dry [] Diaphoretic    [] Flushed   
[] Pale [] Cyanotic [x]0  []1  []2   []3  []4   []5   []6   []7   []8   []9   []10 Post Treatment Note: 
 Patient tolerated and completed 3hrs of hemodialysis. 3000 mls of fluid removed. Report given to primary nurse. Patient remained in ER awaiting discharge home. POST TREATMENT PRIMARY NURSE HANDOFF REPORT:  
 
First initial/Last name/Title Post Dialysis: EMA Townsend RN Time:  7753 Abbreviations: AVG-arterial venous graft, AVF-arterial venous fistula, IJ-Internal Jugular, Subcl-Subclavian, Fem-Femoral, Tx-treatment, AP/HR-apical heart rate, DFR-dialysate flow rate, BFR-blood flow rate, AP-arterial pressure, -venous pressure, UF-ultrafiltrate, TMP-transmembrane pressure, Ashok-Venous, Art-Arterial, RO-Reverse Osmosis

## 2020-04-02 NOTE — ED TRIAGE NOTES
Pt missed dialysis last night when transportation failed to show up. Seen last week and intubated cause waited too long. Doesn't want that to happen again. . Feels need for dialysis

## 2020-04-03 NOTE — PROGRESS NOTES
Transition Of Care Follow Up Patient contacted. Patient doing well. Patient made aware that his Covid results are still pending. Seen in ED yesterday for hemodialysis. Hira Ospina

## 2020-04-04 NOTE — PROGRESS NOTES
Patient notified that his test for Covid 19 test is negative- covid not detected. This episode is resolved. COVID-19 Screening Follow-up Note Patient contacted regarding COVID-19 risk and screening. Care Transition Nurse/ Ambulatory Care Manager contacted the patient by telephone to perform follow-up assessment. Verified name and  with patient as identifiers. Patient has following risk factors of: heart failure, COPD, pneumonia, sepsis Symptoms reviewed with patient who verbalized the following symptoms: no new/worsening symptoms Due to no new or worsening symptoms encounter was not routed to provider for escalation. Education provided regarding infection prevention, and signs and symptoms of COVID-19 and when to seek medical attention with patient who verbalized understanding. Discussed exposure protocols and quarantine from [address] you at higher risk for severe illness  and given an opportunity for questions and concerns. The patient agrees to contact the COVID-19 hotline [phone] or PCP office for questions related to their healthcare. CTN/ACM provided contact information for future reference. From CDC: Are you at higher risk for severe illness?  Wash your hands often.  Avoid close contact (6 feet, which is about two arm lengths) with people who are sick.  Put distance between yourself and other people if COVID-19 is spreading in your community.  Clean and disinfect frequently touched surfaces.  Avoid all cruise travel and non-essential air travel.  Call your healthcare professional if you have concerns about COVID-19 and your underlying condition or if you are sick. For more information on steps you can take to protect yourself, see CDC's How to Protect Yourself
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
## 2020-06-18 NOTE — ED TRIAGE NOTES
Pt skipped dialysis yesterday because he wasn't feeling well today. Called EMS for nausea and lethargy

## 2020-06-19 NOTE — DIALYSIS
. 
Acute HEmodialysis flow sheet Patient information Triny Bundy MRN: [id number]      NSW:[id number]  [place]# Room#ED 05 Hospital: [place] [address] DX: Hyperkalemia []1st Time Acute  []Stat[x] Routine []Urgent []Chronic Unit  
       [x]Acute Room []Bedside  []ICU/CCU []ER Isolation Precautions: [x]Dialysis[] Airborne []Contact []Droplet []Reverse Special Considerations:    [] Blood Consent Verified  [x]N/A Allergies: 
[] NKA  [x] Reviewed Allergies Allergen Reactions  Latex Other (comments)  
  blisters  Other Food Hives Allergic to tomatoes and tomato sauce  Keflex [Cephalexin] Anaphylaxis  Codeine Nausea and Vomiting Code Status [x]Full Code [] DNR  []Other Diet: see chart Diabetic: [x]Yes []No 
  
 Hemodialysis Orders Physician: Marino Andino  
Dialyzer Revaclear 300 Duration 2 BFR: 350 DFR:800 Dialysate: K 1first hour; 2 CA 2.5 Na 140 Bicarb 35 Dry Weight: UF Goal: 1000 ml Heparin:  []Bolus   Units    []Hourly  Units      [x]None BP Tx:  []NS  200ml/Bolus    []Other     []N/A  
    Labs: Pre:  Post: []N/A Additional Orders: []N/A [x]Signed Treatment Consent   [] Verified   [x] Obtained Primary Nurse Report: First initial/ Last Name/Title Pre Dialysis: Shavon Amanda RN    Time: 2100 Access CATHETER ACCESS:  [] N/A  [x] RIGHT  [] LEFT  [x] IJ  [] SUBCL [] FEM [] First use X-ray  [] Tunnel     [] Non-Tunneled [x] No S/S infection  [] Redness [] Drainage  [] Cultured [] Swelling 
                       [] Pain  
                       [x] Medical Aseptic [] Prep Dressing Changed  
                       [] Clotted [] Patent []      Flows: [x] Good [] Poor [] Reversed          If Access Problem Dr. Trey Ferreira: [] Yes [] No    Date:   [x] N/A  
 GRAFT/FISTULA ACCESS:  [] N/A  [] RIGHT  [] LEFT  [] UE  [] LE   
                       [] AVG  [] AVF [] BUTTONHOLE  [] +BRUIT/THRILL 
     [] MEDICAL ASEPTIC PREP [] No S/S infection  [] Redness [] Drainage  [] Cultured [] Swelling 
                       [] Pain Needle Gauge                          Length: If Access Problem Dr. Ese Alberts: [] Yes [] No    Date:   [] N/A General Assessment LUNGS:  SaO2%  [] Clear [x] Coarse [] Crackles [] Wheezing  
            [] Diminished Location: [] RLL [] LLL [] RUL [] NIKOS   
     COUGH:  [] Productive [] Dry [x] N/A  RESPIRATIONS: [] Easy [] Labored THERAPY: [x] RA   [] NC     L/min    Mask: [] NRB [] Venti  O2%    
             [] Ventilator [] Intubated [] Trach [] BiPap [] CPap CARDIAC: [x] Regular [] Irregular [] Pericardial Rub [] JVD [] Monitored Rhythm: EDEMA: [] None [x] Generalized [] Facial [] Pedal [] UE [] LE 
           [] Pitting [] 1 [] 2 [] 3 [] 4    [] Right [] Left [] Bilateral  
    SKIN:    [] Warm [] Hot [x] Cold  [] Dry [x] Pale [] Diaphoretic  
           [] Flushed [] Jaundiced [] Cyanotic [] Rash [] Weeping LOC:    [] Alert  Oriented to: [] Person [] Place [] Time  
          [] Confused [x] Lethargic [] Medically sedated [] Non-responsive GI/ABDOMEN: [] Flat [] Distended [x] Soft [] Firm [] Obese [] Diarrhea [x] Bowel Sounds     []Nausea [] Vomiting PAIN: [x] 0 [] 1 [] 2 [] 3 [] 4 [] 5 [] 6 [] 7 [] 8 [] 9 [] 10 Scale 1-10 Action/Follow Up MOBILITY: [] Amb [] Amb/Assist [x] Bed  [] Wheelchair CUrrent LABS HBsAg ONLY: Date Drawn 2/14/2020 [x] Negative [] Positive  [] Unknown. HBsAb: Date Drawn 2/14/2020 [x] Susceptible <10 [] Immune ?10 [] Unknown Date of Current Labs:  
    
Lab Results Component Value Date/Time  Sodium 133 (L) 06/18/2020 05:20 PM  
 Potassium 7.1 ([facility]) 06/18/2020 05:20 PM  
 Chloride 100 06/18/2020 05:20 PM  
 CO2 20 (L) 06/18/2020 05:20 PM  
 BUN 53 (H) 06/18/2020 05:20 PM  
 Creatinine 14.00 (H) 06/18/2020 05:20 PM  
 Calcium 8.7 06/18/2020 05:20 PM  
 Magnesium 2.0 03/25/2020 08:58 AM  
 Phosphorus 5.1 (H) 03/28/2020 01:15 AM  
 HCT 38.8 06/18/2020 05:20 PM  
 HGB 12.5 (L) 06/18/2020 05:20 PM  
 PLATELET 757 23/14/4589 05:20 PM  
 INR 1.0 03/04/2019 10:40 AM  
  
Education Person Educated: [x] Patient [] Family [] Other Knowledge base: [] None [x] Minimal [] Substantial   
     Barriers to learning  [x] None Preferred method of learning: [] Written [x] Oral [] Visual [] Hands on Topic: [x] Access Care [] S&S of infection [] Fluid Management [x] K+ 
               [] Procedural    [] Albumin [] Medications [] Tx Options [] Transplant [x] Diet [] Other Teaching Tools: [x] Explain [] Demonstration [] Handout [] Teachback Ro/hemodiaylsis machine safety checks- before each treatment Machine Serial # [x] # 1 [id number] [] # 2 [id number] [] # 3 [id number] [] # 4 [id number] [] # 5 [id number] [] # [id number] [] # [phone] Alarm Test: [x] Pass Time  2044      RO Serial # [] [id number] (Large dual station RO) [x] # 1  [id number]  (Portable # 1) [] # 2  [id number]  (Portable # 2) [] # 3  [id number]  (Portable # 3) [] # 4  [id number]  (Portable # 4) [] # 5  [id number]  (Portable # 5) Lot #s: Dialyzer [id number] exp.4/25/2023 Tubing 55V94-8 exp. 11/30/2024  Saline -jt exp.12/1/2021 [x] RO/Machine Log Complete    [x] Extracorporeal circuit Tested for integrity Dialysate: pH 7.4 Temp. 36  Conductivity: Meter 14.2 HD Machine 14.2 chlorine testing- before each treatment and every 4 hours Total Chlorine: [x] Less than 0.1 ppm Time:2045 2nd Check Time: 
(If greater than 0.1 ppm from Primary then every 30 minutes from Secondary) treatment Iniation-with dialysis precautions [x] All Connections Secured   [x] Saline Line Double Clamped    
[x] Venous Parameters Set    [x] Arterial Parameters Set    [x] Prime Given 250 ml            [x] Air Foam Detector Engaged Lizeth Nurse Signature/Title_Luzma Mayberry Pre-Treatment Time 2129 B/P232/134 HR 66 Temp. 97.5 Resp Rate16 Pre Wt  
UF Calculations: Wt to lose:1000 ml(+) Oral:  ml(+) IV Meds/Fluids/Blood prods  ml(+) Prime/Rinse 500 ml 
(=)Total UF Goal 1500 mL Scale Type:[] Bed scale [] Sling Scale [] Wheel Chair Scale [x]  Not Ordered [] Unable to obtain pt on stretcher Tx Initiation Note: Report received from Ema Guillory RN. Pt with hyperkalemia, high Bp and episode of hypoglycemia resulting in delayed arrival to HD unit d/t needing stabilization at the ED. Pt lethargic, c/o feeling cold and not feeling well. R IJ TDC with no s/sx of infection. HD initiated with no complication. [x] Time Out/Safety Check  Time: 2120 Lizeth Signature/Title_Luzma Flowers RN  
INTRADIALYTIC MONITORING 
(SEE ATTACHED FLOWSHEET) POST TREATMENT Time 2347 B/P194/111 HR 61 Temp 97.5 Resp. Rate 16 Post wt Time Medication Dose Volume Route Initials 2330 Hydralazine 20 mg 1 ml IV cfd Lizeth Signatures Title Initials Time Luzma Rivas RN cfd 2154 Lizeth Signature/Title:_Luzma Rivas RN Dialyzer cleared: [] Good [] Fair [] Poor    Blood Processed 40.7 Liters Net UF Removed  1000 mL Post Tx Access: AVF/AVG: Bleeding Stop Catheter: Locking Solution  Heparin  Art. 2.1 ml Ashok 2.2 ml 
 
Post Assessment:  Skin: [x]Warm [x]Dry []Diaphoretic []Flushed [] Pale []Cyanotic Lungs: []Clear []Coarse []Crackles []Wheezing Cardiac: [x]Regular []Irregular  []Monitored rhythm Edema: [x]None []General []Facial []Pedal  []UE []LE []RIGHT []LEFT    []Bilateral  
   Pain: [x]0 []1[]2 []3 []4 []5 []6 []7 []8 []9 []10  
POST Tx Note: Completed 2 hours of HD with no adverse event. Pt voiced \" I feel a little better\". Catheter care done aseptically, lines saline flushed, heparin locked, capped. UF goal met. Hemodynamically stable during HD. Report given to charge nurse. Primary Nurse Report: First initial/Last name/Title Post Dialysis: Kate Butts Abbreviations: AVG-arterial venous graft, AVF-arterial venous fistula, IJ-Internal Jugular, Subcl-Subclavian, Fem-Femoral, Tx-treatment, AP/HR-apical heart rate, DFR-dialysate flow rate, BFR-blood flow rate, AP-arterial pressure, -venous pressure, UF-ultrafiltrate, TMP-transmembrane pressure, Ashok-Venous, Art-Arterial, RO-Reverse Osmosis

## 2020-06-20 NOTE — ED TRIAGE NOTES
Pt arrives in triage in wheelchair a/o x 4 with c/c of hypertension, n/v  x  few days. Pt reports blurry vision no other symptoms. Dialysis patient M,W,F. NIH negative.

## 2020-06-21 NOTE — DISCHARGE INSTRUCTIONS
SPECIFIC PATIENT INSTRUCTIONS FROM THE PHYSICIAN WHO TREATED YOU IN THE ER TODAY:  1. Zofran ODT for nausea and/or vomiting. 2. Over the counter imodium for diarrhea. 3. FOLLOW UP APPOINTMENT:  Your primary doctor in 3-4 days. Patient Education        Diarrhea: Care Instructions  Your Care Instructions        Diarrhea is loose, watery stools (bowel movements). The exact cause is often hard to find. Sometimes diarrhea is your body's way of getting rid of what caused an upset stomach. Viruses, food poisoning, and many medicines can cause diarrhea. Some people get diarrhea in response to emotional stress, anxiety, or certain foods. Almost everyone has diarrhea now and then. It usually isn't serious, and your stools will return to normal soon. The important thing to do is replace the fluids you have lost, so you can prevent dehydration. The doctor has checked you carefully, but problems can develop later. If you notice any problems or new symptoms, get medical treatment right away. Follow-up care is a key part of your treatment and safety. Be sure to make and go to all appointments, and call your doctor if you are having problems. It's also a good idea to know your test results and keep a list of the medicines you take. How can you care for yourself at home? · Watch for signs of dehydration, which means your body has lost too much water. Dehydration is a serious condition and should be treated right away. Signs of dehydration are:  ? Increasing thirst and dry eyes and mouth. ? Feeling faint or lightheaded. ? A smaller amount of urine than normal.  · To prevent dehydration, drink plenty of fluids. Choose water and other caffeine-free clear liquids until you feel better. If you have kidney, heart, or liver disease and have to limit fluids, talk with your doctor before you increase the amount of fluids you drink. · Begin eating small amounts of mild foods the next day, if you feel like it.   ? Try yogurt that has live cultures of Lactobacillus. (Check the label.)  ? Avoid spicy foods, fruits, alcohol, and caffeine until 48 hours after all symptoms are gone. ? Avoid chewing gum that contains sorbitol. ? Avoid dairy products (except for yogurt with Lactobacillus) while you have diarrhea and for 3 days after symptoms are gone. · The doctor may recommend that you take over-the-counter medicine, such as loperamide (Imodium), if you still have diarrhea after 6 hours. Read and follow all instructions on the label. Do not use this medicine if you have bloody diarrhea, a high fever, or other signs of serious illness. Call your doctor if you think you are having a problem with your medicine. When should you call for help? [id number] anytime you think you may need emergency care. For example, call if:  · You passed out (lost consciousness). · Your stools are maroon or very bloody. Call your doctor now or seek immediate medical care if:  · You are dizzy or lightheaded, or you feel like you may faint. · Your stools are black and look like tar, or they have streaks of blood. · You have new or worse belly pain. · You have symptoms of dehydration, such as:  ? Dry eyes and a dry mouth. ? Passing only a little dark urine. ? Feeling thirstier than usual.  · You have a new or higher fever. Watch closely for changes in your health, and be sure to contact your doctor if:  · Your diarrhea is getting worse. · You see pus in the diarrhea. · You are not getting better after 2 days (48 hours). Where can you learn more? Go to http://berta-keyla.info/  Enter [id number] in the search box to learn more about \"Diarrhea: Care Instructions. \"  Current as of: June 26, 2019               Content Version: 12.5  © 7873-3890 Healthwise, Incorporated. Care instructions adapted under license by [employer] (which disclaims liability or warranty for this information).  If you have questions about a medical condition or this instruction, always ask your healthcare professional. [address] any warranty or liability for your use of this information. Patient Education        DASH Diet: Care Instructions  Your Care Instructions     The DASH diet is an eating plan that can help lower your blood pressure. DASH stands for Dietary Approaches to Stop Hypertension. Hypertension is high blood pressure. The DASH diet focuses on eating foods that are high in calcium, potassium, and magnesium. These nutrients can lower blood pressure. The foods that are highest in these nutrients are fruits, vegetables, low-fat dairy products, nuts, seeds, and legumes. But taking calcium, potassium, and magnesium supplements instead of eating foods that are high in those nutrients does not have the same effect. The DASH diet also includes whole grains, fish, and poultry. The DASH diet is one of several lifestyle changes your doctor may recommend to lower your high blood pressure. Your doctor may also want you to decrease the amount of sodium in your diet. Lowering sodium while following the DASH diet can lower blood pressure even further than just the DASH diet alone. Follow-up care is a key part of your treatment and safety. Be sure to make and go to all appointments, and call your doctor if you are having problems. It's also a good idea to know your test results and keep a list of the medicines you take. How can you care for yourself at home? Following the DASH diet  · Eat 4 to 5 servings of fruit each day. A serving is 1 medium-sized piece of fruit, ½ cup chopped or canned fruit, 1/4 cup dried fruit, or 4 ounces (½ cup) of fruit juice. Choose fruit more often than fruit juice. · Eat 4 to 5 servings of vegetables each day. A serving is 1 cup of lettuce or raw leafy vegetables, ½ cup of chopped or cooked vegetables, or 4 ounces (½ cup) of vegetable juice. Choose vegetables more often than vegetable juice.   · Get 2 to 3 servings of low-fat and fat-free dairy each day. A serving is 8 ounces of milk, 1 cup of yogurt, or 1 ½ ounces of cheese. · Eat 6 to 8 servings of grains each day. A serving is 1 slice of bread, 1 ounce of dry cereal, or ½ cup of cooked rice, pasta, or cooked cereal. Try to choose whole-grain products as much as possible. · Limit lean meat, poultry, and fish to 2 servings each day. A serving is 3 ounces, about the size of a deck of cards. · Eat 4 to 5 servings of nuts, seeds, and legumes (cooked dried beans, lentils, and split peas) each week. A serving is 1/3 cup of nuts, 2 tablespoons of seeds, or ½ cup of cooked beans or peas. · Limit fats and oils to 2 to 3 servings each day. A serving is 1 teaspoon of vegetable oil or 2 tablespoons of salad dressing. · Limit sweets and added sugars to 5 servings or less a week. A serving is 1 tablespoon jelly or jam, ½ cup sorbet, or 1 cup of lemonade. · Eat less than 2,300 milligrams (mg) of sodium a day. If you limit your sodium to 1,500 mg a day, you can lower your blood pressure even more. Tips for success  · Start small. Do not try to make dramatic changes to your diet all at once. You might feel that you are missing out on your favorite foods and then be more likely to not follow the plan. Make small changes, and stick with them. Once those changes become habit, add a few more changes. · Try some of the following:  ? Make it a goal to eat a fruit or vegetable at every meal and at snacks. This will make it easy to get the recommended amount of fruits and vegetables each day. ? Try yogurt topped with fruit and nuts for a snack or healthy dessert. ? Add lettuce, tomato, cucumber, and onion to sandwiches. ? Combine a ready-made pizza crust with low-fat mozzarella cheese and lots of vegetable toppings. Try using tomatoes, squash, spinach, broccoli, carrots, cauliflower, and onions. ?  Have a variety of cut-up vegetables with a low-fat dip as an appetizer instead of chips and dip. ? Sprinkle sunflower seeds or chopped almonds over salads. Or try adding chopped walnuts or almonds to cooked vegetables. ? Try some vegetarian meals using beans and peas. Add garbanzo or kidney beans to salads. Make burritos and tacos with mashed wheeler beans or black beans. Where can you learn more? Go to http://berta-keyla.info/  Enter H967 in the search box to learn more about \"DASH Diet: Care Instructions. \"  Current as of: December 16, 2019               Content Version: 12.5  © 1404-8470 [employer]. Care instructions adapted under license by [employer] (which disclaims liability or warranty for this information). If you have questions about a medical condition or this instruction, always ask your healthcare professional. [address] any warranty or liability for your use of this information. Patient Education        High Blood Pressure: Care Instructions  Overview     It's normal for blood pressure to go up and down throughout the day. But if it stays up, you have high blood pressure. Another name for high blood pressure is hypertension. Despite what a lot of people think, high blood pressure usually doesn't cause headaches or make you feel dizzy or lightheaded. It usually has no symptoms. But it does increase your risk of stroke, heart attack, and other problems. You and your doctor will talk about your risks of these problems based on your blood pressure. Your doctor will give you a goal for your blood pressure. Your goal will be based on your health and your age. Lifestyle changes, such as eating healthy and being active, are always important to help lower blood pressure. You might also take medicine to reach your blood pressure goal.  Follow-up care is a key part of your treatment and safety. Be sure to make and go to all appointments, and call your doctor if you are having problems.  It's also a good idea to know your test results and keep a list of the medicines you take. How can you care for yourself at home? Medical treatment  · If you stop taking your medicine, your blood pressure will go back up. You may take one or more types of medicine to lower your blood pressure. Be safe with medicines. Take your medicine exactly as prescribed. Call your doctor if you think you are having a problem with your medicine. · Talk to your doctor before you start taking aspirin every day. Aspirin can help certain people lower their risk of a heart attack or stroke. But taking aspirin isn't right for everyone, because it can cause serious bleeding. · See your doctor regularly. You may need to see the doctor more often at first or until your blood pressure comes down. · If you are taking blood pressure medicine, talk to your doctor before you take decongestants or anti-inflammatory medicine, such as ibuprofen. Some of these medicines can raise blood pressure. · Learn how to check your blood pressure at home. Lifestyle changes  · Stay at a healthy weight. This is especially important if you put on weight around the waist. Losing even 10 pounds can help you lower your blood pressure. · If your doctor recommends it, get more exercise. Walking is a good choice. Bit by bit, increase the amount you walk every day. Try for at least 30 minutes on most days of the week. You also may want to swim, bike, or do other activities. · Avoid or limit alcohol. Talk to your doctor about whether you can drink any alcohol. · Try to limit how much sodium you eat to less than 2,300 milligrams (mg) a day. Your doctor may ask you to try to eat less than 1,500 mg a day. · Eat plenty of fruits (such as bananas and oranges), vegetables, legumes, whole grains, and low-fat dairy products. · Lower the amount of saturated fat in your diet. Saturated fat is found in animal products such as milk, cheese, and meat.  Limiting these foods may help you lose weight and also lower your risk for heart disease. · Do not smoke. Smoking increases your risk for heart attack and stroke. If you need help quitting, talk to your doctor about stop-smoking programs and medicines. These can increase your chances of quitting for good. When should you call for help? Call  911 anytime you think you may need emergency care. This may mean having symptoms that suggest that your blood pressure is causing a serious heart or blood vessel problem. Your blood pressure may be over 180/120. For example, call 911 if:  · You have symptoms of a heart attack. These may include:  ? Chest pain or pressure, or a strange feeling in the chest.  ? Sweating. ? Shortness of breath. ? Nausea or vomiting. ? Pain, pressure, or a strange feeling in the back, neck, jaw, or upper belly or in one or both shoulders or arms. ? Lightheadedness or sudden weakness. ? A fast or irregular heartbeat. · You have symptoms of a stroke. These may include:  ? Sudden numbness, tingling, weakness, or loss of movement in your face, arm, or leg, especially on only one side of your body. ? Sudden vision changes. ? Sudden trouble speaking. ? Sudden confusion or trouble understanding simple statements. ? Sudden problems with walking or balance. ? A sudden, severe headache that is different from past headaches. · You have severe back or belly pain. Do not wait until your blood pressure comes down on its own. Get help right away. Call your doctor now or seek immediate care if:  · Your blood pressure is much higher than normal (such as 180/120 or higher), but you don't have symptoms. · You think high blood pressure is causing symptoms, such as:  ? Severe headache.  ? Blurry vision. Watch closely for changes in your health, and be sure to contact your doctor if:  · Your blood pressure measures higher than your doctor recommends at least 2 times.  That means the top number is higher or the bottom number is higher, or both.  · You think you may be having side effects from your blood pressure medicine. Where can you learn more? Go to http://berta-keyla.info/  Enter [id number] in the search box to learn more about \"High Blood Pressure: Care Instructions. \"  Current as of: December 16, 2019               Content Version: 12.5  © 2869-2720 [employer]. Care instructions adapted under license by [employer] (which disclaims liability or warranty for this information). If you have questions about a medical condition or this instruction, always ask your healthcare professional. [address] any warranty or liability for your use of this information. Patient Education        Hyperkalemia: Care Instructions  Your Care Instructions     Hyperkalemia is too much potassium in the blood. Potassium helps keep the right mix of fluids in your body. It also helps your nerves and muscles work as they should. And it keeps your heartbeat in a normal rhythm. Some things can raise potassium levels. These include some health problems, medicines, and kidney problems. (Normally, your kidneys remove extra potassium.)  Too much potassium can cause nausea. It also can cause a heartbeat that isn't normal. But you may not have any symptoms. Too much potassium can be dangerous. That's why it's important to treat it. If you are taking any of the medicines that can raise your levels, your doctor will ask you to stop. You may get medicines to lower your levels. And you may have to limit or not eat foods that have a lot of potassium. Follow-up care is a key part of your treatment and safety. Be sure to make and go to all appointments, and call your doctor if you are having problems. It's also a good idea to know your test results and keep a list of the medicines you take. How can you care for yourself at home? · Take your medicines exactly as prescribed.  Call your doctor if you think you are having a problem with your medicine. · Stop taking certain medicines if your doctor asks you to. They may be causing your high potassium levels. If you have concerns about stopping medicine, talk with your doctor. · If you have kidney, heart, or liver disease and have to limit fluids, talk with your doctor before you increase the amount of fluids you drink. If the doctor says it's okay, drink plenty of fluids. This means drinking enough so that your urine is light yellow or clear like water. · Avoid strenuous exercise until your doctor tells you it is okay. · Potassium is in many foods, including vegetables, fruits, and milk products. Foods high in potassium include bananas, cantaloupe, broccoli, milk, potatoes, and tomatoes. · Low potassium foods include blueberries, raspberries, cucumber, white or brown rice, spaghetti, and macaroni. · Do not use a salt substitute without talking to your doctor first. Most of these are very high in potassium. · Be sure to tell your doctor about any prescription, over-the-counter, or herbal medicines you take. Some of these can raise potassium. When should you call for help? [id number] anytime you think you may need emergency care. For example, call if:  · You passed out (lost consciousness). · You have an unusual heartbeat. Your heart may beat fast or skip beats. Call your doctor now or seek immediate medical care if:  · You have muscle aches. · You feel very weak. Watch closely for changes in your health, and be sure to contact your doctor if:  · You do not get better as expected. Where can you learn more? Go to http://berta-keyla.info/  Enter G087 in the search box to learn more about \"Hyperkalemia: Care Instructions. \"  Current as of: August 12, 2019               Content Version: 12.5  © 2371-8030 Healthwise, Incorporated.    Care instructions adapted under license by [employer] (which disclaims liability or warranty for this information). If you have questions about a medical condition or this instruction, always ask your healthcare professional. [address] any warranty or liability for your use of this information. Patient Education        Nausea and Vomiting: Care Instructions  Your Care Instructions     When you are nauseated, you may feel weak and sweaty and notice a lot of saliva in your mouth. Nausea often leads to vomiting. Most of the time you do not need to worry about nausea and vomiting, but they can be signs of other illnesses. Two common causes of nausea and vomiting are stomach flu and food poisoning. Nausea and vomiting from viral stomach flu will usually start to improve within 24 hours. Nausea and vomiting from food poisoning may last from 12 to 48 hours. The doctor has checked you carefully, but problems can develop later. If you notice any problems or new symptoms, get medical treatment right away. Follow-up care is a key part of your treatment and safety. Be sure to make and go to all appointments, and call your doctor if you are having problems. It's also a good idea to know your test results and keep a list of the medicines you take. How can you care for yourself at home? · To prevent dehydration, drink plenty of fluids, enough so that your urine is light yellow or clear like water. Choose water and other caffeine-free clear liquids until you feel better. If you have kidney, heart, or liver disease and have to limit fluids, talk with your doctor before you increase the amount of fluids you drink. · Rest in bed until you feel better. · When you are able to eat, try clear soups, mild foods, and liquids until all symptoms are gone for 12 to 48 hours. Other good choices include dry toast, crackers, cooked cereal, and gelatin dessert, such as Jell-O. When should you call for help? [id number] anytime you think you may need emergency care.  For example, call if:  · You passed out (lost consciousness). Call your doctor now or seek immediate medical care if:  · You have symptoms of dehydration, such as:  ? Dry eyes and a dry mouth. ? Passing only a little dark urine. ? Feeling thirstier than usual.  · You have new or worsening belly pain. · You have a new or higher fever. · You vomit blood or what looks like coffee grounds. Watch closely for changes in your health, and be sure to contact your doctor if:  · You have ongoing nausea and vomiting. · Your vomiting is getting worse. · Your vomiting lasts longer than 2 days. · You are not getting better as expected. Where can you learn more? Go to http://berta-keyla.info/  Enter H591 in the search box to learn more about \"Nausea and Vomiting: Care Instructions. \"  Current as of: June 26, 2019               Content Version: 12.5  © 6111-5340 [email or web address]. Care instructions adapted under license by [employer] (which disclaims liability or warranty for this information). If you have questions about a medical condition or this instruction, always ask your healthcare professional. [address] any warranty or liability for your use of this information. [employer] Activation    Thank you for requesting access to [employer]. Please follow the instructions below to securely access and download your online medical record. [employer] allows you to send messages to your doctor, view your test results, renew your prescriptions, schedule appointments, and more. How Do I Sign Up? In your internet browser, go to [email or web address] Click on the First Time User? Click Here link in the Sign In box. You will see the New Member Sign Up page. Enter your [employer] Access Code exactly as it appears below. You will not need to use this code after you´ve completed the sign-up process. If you do not sign up before the expiration date, you must request a new code.     [employer] Access Code: [email or web address]  Expires: 3/28/2019  2:27 PM (This is the date your [facility] access code will )    Enter the last four digits of your Social Security Number (xxxx) and Date of Birth (mm/dd/yyyy) as indicated and click Submit. You will be taken to the next sign-up page. Create a [facility] ID. This will be your [facility] login ID and cannot be changed, so think of one that is secure and easy to remember. Create a [facility] password. You can change your password at any time. Enter your Password Reset Question and Answer. This can be used at a later time if you forget your password. Enter your e-mail address. You will receive e-mail notification when new information is available in [address] Click Sign Up. You can now view and download portions of your medical record. Click the [employer] link to download a portable copy of your medical information. Additional Information    If you have questions, please visit the Frequently Asked Questions section of the [facility] website at [email or web address] Remember, [facility] is NOT to be used for urgent needs. For medical emergencies, dial 911.
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
## 2020-06-21 NOTE — DIALYSIS
[facility] ACUTE HEMODIALYSIS FLOW SHEET 
 
 
HEMODIALYSIS ORDERS: Physician: Corrinne Sloop Dialyzer: revaclear   Duration: 2 hr  BFR: 300-350  DFR: 800 Dialysate:  Temp 36-37*C  K+   1.0    Ca+  2.5 Na 140 Bicarb 35 Weight: 240lb Wt Readings from Last 1 Encounters:  
06/20/20 108.9 kg (240 lb) UF Goal: 1 - 2L Access TDC Needle Gauge n/a [id number]/ml []  Bolus      Units    [] Hourly       Units    [x]None Catheter locking solution [id number]/ml Pre BP:  153/91   Pulse:    56      Respirations:14 Temperature:   98.0 Labs: Pre        Post:        [x] N/A Additional Orders(medications, blood products, hypotension management):       [x] N/A [x] [facility] Consent Verified CATHETER ACCESS: []N/A   [x]Right   []Left   []IJ     []Fem   []transhepatic  
[] First use X-ray verified     [x]Permanent: Reports has had this cath for about 1year               [] Temporary  
[x]No S/S infection  []Redness  []Drainage []Cultured []Swelling []Pain  
[x]Medical Aseptic Prep Utilized   [x]Dressing Changed  [x] Biopatch []Clotted   [x]Patent   Flows: []Good  [x]Poor  [x]Reversed If access problem,  notified: []Yes    [x]N/A  Date:        
 
GRAFT/FISTULA ACCESS:  [x]N/A     []Right     []Left     []UE     []LE []AVG   []AVF        []Buttonhole    []Medical Aseptic Prep Utilized []No S/S infection  []Redness  []Drainage []Cultured []Swelling []Pain Bruit:   [] Strong    [] Weak       Thrill :   [] Strong    [] Weak Needle Gauge:    Length: If access problem,  notified: []Yes     []N/A  Date:       
Please describe access if present and not used:  
            
            GENERAL ASSESSMENT:  
  
LUNGS:  Rate 14 SaO2%        [] N/A    [x] Clear  [] Coarse  [] Crackles  [] Wheezing 
      [] Diminished     Location : []RLL   []LLL    []RUL  []NIKOS Cough: []Productive  []Dry  [x]N/A   Respirations:  [x]Easy  []Labored Therapy:   [x]RA  []NC  l/min    Mask: []NRB []Venti       O2% []Ventilator  []Intubated  [] Trach  [] BiPaP CARDIAC: [x]Regular      [] Irregular   [] Pericardial Rub  [] JVD []  Monitored  [] Bedside  [] Remotely monitored [] N/A  Rhythm: EDEMA: [x] None  []Generalized  [] Pitting [] 1    [] 2    [] 3    [] 4 [] Facial  [] Pedal  []  UE  [] LE  
 
SKIN:   [x] Warm  [] Hot     [] Cold   [] Dry     [] Pale   [] Diaphoretic    
             [] Flushed  [] Jaundiced  [] Cyanotic  [] Rash  [] Weeping LOC:    [x] Alert      [x]Oriented:    [x] Person     [x] Place  [x]Time 
             [] Confused  [] Lethargic  [] Medicated  [] Non-responsive GI / ABDOMEN:  [] Flat    [] Distended    [] Soft    [] Firm   []  Obese 
                           [] Diarrhea  [x] Bowel Sounds  [x] Nausea  [] Vomiting  / URINE ASSESSMENT [] Voiding   [] Oliguria  [] Anuria   []  Alonzo [] Incontinent    []  Incontinent Brief      [x]  Bathroom Privileges PAIN: [x] 0 []1  []2   []3   []4   []5   []6   []7   []8   []9   []10 Scale 0-10  Action/Follow Up: MOBILITY:  [] Amb    [] Amb/Assist    [] Bed    [] Wheelchair  [x] [employer] All Vitals and Treatment Details on Attached Flowsheet Hospital: [facility] Room # ER/2 
  
[] 1st Time Acute  [] Stat  [] Routine  [x] Urgent [x] Acute Room  []  Bedside  [] ICU/CCU  [] ER Isolation Precautions: There are currently no Active Isolations Special Considerations:         [] Blood Consent Verified [x]N/A ALLERGIES:  
Allergies Allergen Reactions  Latex Other (comments)  
  blisters  Other Food Hives Allergic to tomatoes and tomato sauce  Keflex [Cephalexin] Anaphylaxis  Codeine Nausea and Vomiting Code Status:Prior Hepatitis Status: unknown   2nd RN check: n/a Lab Results Component Value Date/Time Hepatitis A, IgM NEGATIVE  07/07/2017 08:39 AM  
 Hepatitis B surface Ag <0.10 02/14/2020 09:53 AM  
 Hepatitis B surface Ab <3.10 (L) 02/14/2020 09:53 AM  
 Hepatitis B core, IgM NEGATIVE  07/07/2017 08:39 AM  
 Hepatitis C virus Ab 0.07 10/04/2018 05:30 AM  
   
 
            Current Labs:  
Lab Results Component Value Date/Time Sodium 133 (L) 06/20/2020 08:14 PM  
 Potassium 6.1 (HH) 06/20/2020 08:14 PM  
 Chloride 100 06/20/2020 08:14 PM  
 CO2 25 06/20/2020 08:14 PM  
 Anion gap 8 06/20/2020 08:14 PM  
 Glucose 90 06/20/2020 08:14 PM  
 BUN 50 (H) 06/20/2020 08:14 PM  
 Creatinine 13.00 (H) 06/20/2020 08:14 PM  
 BUN/Creatinine ratio 4 (L) 06/20/2020 08:14 PM  
 GFR est AA 5 (L) 06/20/2020 08:14 PM  
 GFR est non-AA 4 (L) 06/20/2020 08:14 PM  
 Calcium 8.4 (L) 06/20/2020 08:14 PM  
  
Lab Results Component Value Date/Time WBC 6.8 06/20/2020 08:14 PM  
 Hemoglobin, POC 13.9 08/20/2017 05:57 AM  
 HGB 11.7 (L) 06/20/2020 08:14 PM  
 Hematocrit, POC 41 08/20/2017 05:57 AM  
 HCT 37.2 06/20/2020 08:14 PM  
 PLATELET 465 (L) 19/98/5445 08:14 PM  
 MCV 94.7 06/20/2020 08:14 PM  
  
  
 
                                                                         
DIET: None PRIMARY NURSE REPORT: First initial/Last name/Title Pre Dialysi  EARLENE Mane RN Time: 8060 EDUCATION:   
[] Patient [] Other         Knowledge Basis: []None []Minimal [x] Substantial  
Barriers to learning  [x]N/A  
[] Access Care     [] S&S of infection     [] Fluid Management     []K+     [x]Procedural   
[]Albumin     [] Medications     [] Tx Options     [] Transplant     [] Diet     [] Other Teaching Tools:  [x] Explain  [] Demo  [] Handouts [] Video Patient response:   [x] Verbalized understanding  [] Teach back  [] Return demonstration [] Requires follow up Inappropriate due to         
 
[x] Time Out/Safety Check  [x]Extracorporeal Circuit Tested for integrity RO/HEMODIALYSIS MACHINE SAFETY CHECKS  Before each treatment:    
Machine Number:                   [address] 
                                [] Unit Machine #  with centralized RO 
                                [] Portable Machine #1/RO serial # [id number] [] Portable Machine #2/RO serial # [id number] [] Portable Machine #4/RO serial # [id number] [facility] [x] Portable Machine #11/RO serial # [id number] [] Portable Machine #12/RO serial # [id number] [] Portable Machine #13/RO serial #  [id number] Alarm Test:  Pass time 0009 [x] RO/Machine Log Complete Temp    36.5 Dialysate: pH 7.6 Conductivity: Meter   14.0    HD Machine  14.1                  TCD: 13.7 Dialyzer Lot # [id number]            Blood Tubing Lot # [id number]          Saline Lot #  [id number] CHLORINE TESTING-Before each treatment and every 4 hours Total Chlorine: [x] less than 0.1 ppm  Time:1155 4 Hr/2nd Check Time:   
(if greater than 0.1 ppm from Primary then every 30 minutes from Secondary) TREATMENT INITIATION  with Dialysis Precautions:  
[x] All Connections Secured                 [x] Saline Line Double Clamped  
[x] Venous Parameters Set                  [x] Arterial Parameters Set [x] Prime Given 250ml                          [x]Air Foam Detector Engaged Treatment Initiation Note: 
0005 Rec'd patient via stretcher from ER. Patient is alert, verbally responsive , denies pain. Reports some mild nausea. Patient rec'd Clonidine 0.2mg @ 2157. Patient denies any recent travel or contact with anyone with positive Covid-19 test. 
0020 Accessed TDC to R chest wall. Dressing change performed, cleansed visible length of cath of adhesive debris.  Insertion site free form s/s redness or drainage. TDC limbs: arterial sluggish to aspirate but able to pull, lines reversed to achieve BFR  350 and UFG initiated at 2500 During Treatment Notes: 
0030 UFG decreased to 2200 due to high arterial readings,-240 to -260-- improved with intervention to -170. Patient resting quietly with eyes closed. Easy to awaken. 0045 arterial limb  (receiving venous blood line) power flushed due to venous pressures 240 - 250; improved with intervention to 200 - 220. Patient resting quiet. y with eyes closed. Medication Dose Volume Route Time [facility] name Title None      RN Post Assessment:  
Dialyzer Cleared: [x] Good [] Fair  [] Poor Blood processed: 40.2 L 
UF Removed  1700 ML 
POst BP:   143/88      Pulse: 58 Respirations: 14  Temperature: 97.8 Lungs: 
 
 [x] Clear      [] Course         [] Crackles  
 [] Wheezing         [] Diminished Post Tx Vascular Access: AVF/AVG: Bleeding stopped Art n/a min. April Williamson   N/A Cardiac: 
[x] Regular   [] Irregular   [] Monitor  [] N/A Rhythm:      
Catheter:  
Locking solution: Heparin 1:1000 Art. 2.1 Ashok. 2.2  N/A Skin:  Pain  
[x] Warm  [] Dry [] Diaphoretic    [] Flushed   
[] Pale [] Cyanotic [x]0  []1  []2   []3  []4   []5   []6   []7   []8   []9   []10 Post Treatment Note: 
Completed 2 hours HD. Net removed 1700ml. Blood rinsed back, tdc limbs flushed with saline, locked with heparin, capped and secured with gauze and tape. Patient alert no c/o offered. Transported patient to ER, care endorsed to Primary nurse. POST TREATMENT PRIMARY NURSE HANDOFF REPORT:  
 
First initial/Last name/Title Post Dialysis: EARLENE Frye RN Time:  7401 Abbreviations: AVG-arterial venous graft, AVF-arterial venous fistula, IJ-Internal Jugular, Subcl-Subclavian, Fem-Femoral, Tx-treatment, AP/HR-apical heart rate, DFR-dialysate flow rate, BFR-blood flow rate, AP-arterial pressure, -venous pressure, UF-ultrafiltrate, TMP-transmembrane pressure, Ashok-Venous, Art-Arterial, RO-Reverse Osmosis Principal Discharge DX:	Sepsis, due to unspecified organism  Goal:	Remain free from infection  Instructions for follow-up, activity and diet:	Please complete antibiotics Ceftin as prescribed for total of 7 days.  Secondary Diagnosis:	Pneumonia  Goal:	complete antibiotics  Instructions for follow-up, activity and diet:	ID Dr White was following in the hospital. Treated with Ceftriaxone in the hospital, Azithromycin discontinued on 10/19 since urine Legionella negative. UA- neg, Urine Cx- neg, Blood Cx- neg. RVP- neg, Sputum culture with normal respiratory brant.  Secondary Diagnosis:	Essential hypertension  Goal:	Stable  Instructions for follow-up, activity and diet:	Continue monitoring blood pressure at home, continue taking medications as prescribed. Follow up with PCP as outpatient within a week for further management and treatment. Principal Discharge DX:	Sepsis, due to unspecified organism  Goal:	Remain free from infection  Instructions for follow-up, activity and diet:	Please complete antibiotics Ceftin/ Azithromycin as prescribed.  Please complete with your pcp within 1 week of discharge.  Please call to make an appointment within 1 week of discharge.  Secondary Diagnosis:	Pneumonia  Goal:	complete antibiotics  Instructions for follow-up, activity and diet:	ID Dr White was following in the hospital. Treated with Ceftriaxone in the hospital. Please complete antibiotics Ceftin/ Azithromycin as prescribed.  Please complete with your pcp within 1 week of discharge. urine Legionella negative. UA- neg, Urine Cx- neg, Blood Cx- neg. RVP- neg, Sputum culture with normal respiratory brant.  Secondary Diagnosis:	Essential hypertension  Goal:	maintain adequate blood pressure control.  Instructions for follow-up, activity and diet:	Continue monitoring blood pressure at home, continue taking medications as prescribed. Follow up with PCP as outpatient within a week for further management and treatment.

## 2020-06-21 NOTE — ED NOTES
Vital signs stable. I have reviewed discharge instructions with the patient. The patient verbalized understanding. No further questions at present. PT ambulated safely to the [facility] with steady gait.

## 2020-06-21 NOTE — ED NOTES
ED Course as of Jun 21 0313 Sat Jun 20, 2020 2220 Signout to me at 10 PM, dialysis patient with gastroenteritis awaiting dialysis and anticipate uneventful discharge  
 [CB] ED Course User Index 
[CB] Irina Degroot MD  
 
3:14 AM patient is now returned from uneventful run of dialysis, nurse had no acute complications and blood pressure is normal.  He is feeling well, blood pressure is much better he is ready for discharge. Encounter Diagnoses ICD-10-CM ICD-9-CM 1. Acute hyperkalemia E87.5 276.7 2. Non-intractable vomiting with nausea, unspecified vomiting type R11.2 787.01  
3. Diarrhea, unspecified type R19.7 787.91  
4. Abdominal pain, generalized R10.84 789.07  
5. Poorly-controlled hypertension I10 401.9

## 2020-06-23 NOTE — TELEPHONE ENCOUNTER
PCP: Jayda Varela MD    Last appt: 3/27/2019  No future appointments. Requested Prescriptions     Pending Prescriptions Disp Refills    isosorbide mononitrate ER (IMDUR) 30 mg tablet 90 Tab 0     Sig: Take 1 Tab by mouth three (3) times daily.  Overdue for appt         Other Comments:  Pt scheduled f/u. appt for 06/13/2020 at 10 am.

## 2020-06-23 NOTE — TELEPHONE ENCOUNTER
Requested Prescriptions     Pending Prescriptions Disp Refills    isosorbide mononitrate ER (IMDUR) 60 mg CR tablet 1 Tab 1     Sig: Take 0.5 Tabs by mouth every morning. Pt stated he has been out of medication for 4 days.

## 2020-06-24 NOTE — PROGRESS NOTES
Patient is currently enrolled in a remote symptom monitoring program.  Start day 6.24.2020; End Date 7.10.2020

## 2020-06-24 NOTE — PROGRESS NOTES
Patient contacted regarding recent discharge and COVID-19 risk. Discussed COVID-19 related testing which was not done at this time. Test results were not done. Patient informed of results, if available? n/a Care Transition Nurse/ Ambulatory Care Manager contacted the patient by telephone to perform post discharge assessment. Verified name and  with patient as identifiers. Patient has following risk factors of: heart failure, diabetes and chronic kidney disease. CTN/ACM reviewed discharge instructions, medical action plan and red flags related to discharge diagnosis. Reviewed and educated them on any new and changed medications related to discharge diagnosis. Advised obtaining a 90-day supply of all daily and as-needed medications. Education provided regarding infection prevention, and signs and symptoms of COVID-19 and when to seek medical attention with patient who verbalized understanding. Discussed exposure protocols and quarantine from [address] you at higher risk for severe illness  and given an opportunity for questions and concerns. The patient agrees to contact the COVID-19 hotline [phone] or PCP office for questions related to their healthcare. CTN/ACM provided contact information for future reference. From CDC: Are you at higher risk for severe illness?  Wash your hands often.  Avoid close contact (6 feet, which is about two arm lengths) with people who are sick.  Put distance between yourself and other people if COVID-19 is spreading in your community.  Clean and disinfect frequently touched surfaces.  Avoid all cruise travel and non-essential air travel.  Call your healthcare professional if you have concerns about COVID-19 and your underlying condition or if you are sick. For more information on steps you can take to protect yourself, see CDC's How to Protect Yourself Patient/family/caregiver given information for [employer] and agrees to enroll yes Patient's preferred e-mail:  [email or web address] Patient's preferred phone number: [phone] Based on Loop alert triggers, patient will be contacted by nurse care manager for worsening symptoms. Pt will be further monitored by COVID Loop Team based on severity of symptoms and risk factors.

## 2020-07-19 NOTE — ED TRIAGE NOTES
Pt rolled out of bed, knocked over table, and wax from candle fell into pt's R ear. Pt would like wax removed from ear.  Denies neck/head pain from fall
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
## 2020-07-20 NOTE — PROGRESS NOTES
Patient is currently enrolled in a remote symptom monitoring program.  Start day 7/21/2020;  End Date 8/5/2020

## 2020-07-20 NOTE — PROGRESS NOTES
Patient contacted regarding recent discharge and COVID-19 risk. Discussed COVID-19 related testing which was not done at this time. Test results were not done. Patient informed of results, if available? N/a      Care Transition Nurse/ Ambulatory Care Manager contacted the patient by telephone to perform post discharge assessment. Verified name and  with patient as identifiers. Patient has following risk factors of: heart failure and chronic kidney disease. CTN/ACM reviewed discharge instructions, medical action plan and red flags related to discharge diagnosis. Reviewed and educated them on any new and changed medications related to discharge diagnosis. Advised obtaining a 90-day supply of all daily and as-needed medications. Education provided regarding infection prevention, and signs and symptoms of COVID-19 and when to seek medical attention with patient who verbalized understanding. Discussed exposure protocols and quarantine from [address] you at higher risk for severe illness  and given an opportunity for questions and concerns. The patient agrees to contact the COVID-19 hotline [phone] or PCP office for questions related to their healthcare. CTN/ACM provided contact information for future reference. From CDC: Are you at higher risk for severe illness?  Wash your hands often.  Avoid close contact (6 feet, which is about two arm lengths) with people who are sick.  Put distance between yourself and other people if COVID-19 is spreading in your community.  Clean and disinfect frequently touched surfaces.  Avoid all cruise travel and non-essential air travel.  Call your healthcare professional if you have concerns about COVID-19 and your underlying condition or if you are sick.     For more information on steps you can take to protect yourself, see CDC's How to Protect Yourself      Patient/family/caregiver given information for Sujatha Mcgee and agrees to enroll yes  Patient's preferred e-mail:  [email or web address]  Patient's preferred phone number: [phone]  Based on Loop alert triggers, patient will be contacted by nurse care manager for worsening symptoms. Pt will be further monitored by COVID Loop Team based on severity of symptoms and risk factors.

## 2020-08-03 NOTE — PROGRESS NOTES
Patient resolved from Transition of Care episode on 8/3/20. Patient/family has been provided the following resources and education related to COVID-19:  
           
          Signs, symptoms and red flags related to COVID-19 CDC exposure and quarantine guidelines Conduit exposure contact - [phone] Contact for their local Department of Health Patient currently reports that their sx have improved or resolved. No further outreach scheduled with this CTN/ACM. Episode of Care resolved. Patient has this CTN/ACM contact information if future needs arise.
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
## 2020-09-10 PROBLEM — R10.11 RUQ PAIN: Status: ACTIVE | Noted: 2020-01-01

## 2020-09-10 PROBLEM — E11.9 DM (DIABETES MELLITUS) (HCC): Status: ACTIVE | Noted: 2020-01-01

## 2020-09-10 PROBLEM — E87.5 HYPERKALEMIA: Status: ACTIVE | Noted: 2020-01-01

## 2020-09-10 PROBLEM — J22 LOWER RESPIRATORY INFECTION: Status: ACTIVE | Noted: 2020-01-01

## 2020-09-10 NOTE — ED TRIAGE NOTES
Was suppposed to go to dialysis today, however he was short of breath and vomiting, thus never went. States his abdomen hurts due to vomiting. Watched him stand from stretcher to get into bed and he was out of breath. He is being treated with doxycycline for right lung infection from a [facility] care facility. Has a long list of medications he could not take due to throwing up. Has had several COVID tests and they have all been negative. He does vape and his thedevice hanging around his neck
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
## 2020-09-10 NOTE — PROGRESS NOTES
Renal Dosing Adjustment The following medication: levofloxacin 750 mg IV daily was automatically dose-adjusted per [facility] Protocol, with respect to renal function to levofloxacin 750 mg IV once, then levofloxacin 500 mg IV q48h. Pt Weight:  
Wt Readings from Last 1 Encounters:  
09/10/20 108.9 kg (240 lb) Serum Creatinine Lab Results Component Value Date/Time Creatinine 12.10 (H) 09/10/2020 10:40 AM  
   
Creatinine Clearance Estimated Creatinine Clearance: 8.8 mL/min (A) (based on SCr of 12.1 mg/dL (H)). BUN Lab Results Component Value Date/Time BUN 54 (H) 09/10/2020 10:40 AM  
   
 
Pharmacy to continue to monitor patient daily. Will make dosage adjustments based upon changing renal function. Munira Humphrey, PharmD

## 2020-09-11 NOTE — PROGRESS NOTES
6802-  Bedside and Verbal shift change report given to Lashaun Carlson RN (oncoming nurse) by Alyssia Vazquez RN (offgoing nurse). Report included the following information SBAR, Kardex, Intake/Output, MAR and Recent Results. 0730- Night shift RN gave 0700 Kayexalate but informed this RN that the dextrose to be given with the insulin and the Kayexalate was not loaded in the PYXIS. This RN called the pharmacy to inform them that a refill was needed. By 9:00 am the Dextrose had still not been loaded in the PYXIS. Informed Dr. Kaci Da Silva that Kayexalate had been given at 7:00 a.m. but insulin and dextrose had not been given. He advised to hold the insulin and dextrose at this time. 1983-  Patient angry that he is NPO until at least after 1400 today due to his scheduled test. He is also angry that he cannot get pain medicine until after the test per scan tech. Patient stating he is leaving and going to [facility]. Refusing to take his scheduled morning medications at this time. 2997-  Dialysis called informing that they are ready for patient to come to dialysis. Patient agreed to stay and get his dialysis at this time. 1000- Patient to dialysis. 1400- Patient returned from dialysis and immediately taken to Nuclear medicine for HIDA scan. 1636- Patient returned from [address] and blood sugar is 45. Encouraged patient to drink juice but he stated, \"I'll drink it when I'm ready\". Explained to patient that we were going to keep checking his blood sugar until it was above 80. He drank his juice. 1704-  Repeat blood sugar 49. Patient's dinner tray arrived. Will recheck blood sugar after he eats. 1758-  Repeat blood sugar 133. 
 
1915-  Bedside and Verbal shift change report given to Schuyler Garcia RN (oncoming nurse) by Lashaun Carlson RN (offgoing nurse). Report included the following information SBAR, Kardex, Intake/Output, MAR and Recent Results.

## 2020-09-11 NOTE — PROGRESS NOTES
Discharge/Transition Planning Problem: Discharge Planning Goal: *Discharge to safe environment Outcome: Progressing Towards Goal 
  
Reason for Admission:   Leukocytosis, unspecified type RUR Score:   37% PCP: First and Last name: Goes to [facility] on [facility] Name of Practice: 
 Are you a current patient: Yes/No:yes Approximate date of last visit:  
 Can you do a virtual visit with your PCP: no 
 Sees vascular , Kidney more often. Resources/supports as identified by patient/family:   Wife and sister Top Challenges facing patient (as identified by patient/family and CM):  Voiced no concern Finances/Medication cost?       
           
Transportation? Support system or lack thereof? Living arrangements? Self-care/ADLs/Cognition? Current Advanced Directive/Advance Care Plan:  none Plan for utilizing home health:    Not likely Transition of Care Plan: Interviewed patient. Verified demographics listed on face sheet with patient; all information correct. Medicare insurance  Patient lives with wife. Patient independent with ADLs prior to admission. No use of DME prior to admission. Has walker but doesn't need. Dialysis at [facility] [address] self Discharge plan is Home and wife will  Patient has designated ____spouse____________________ to participate in his/her discharge plan and to receive any needed information. Oz Councilman Spouse   [phone] Care Management Interventions PCP Verified by CM: Yes([facility] [address] [facility]) Mode of Transport at Discharge: Self Transition of Care Consult (CM Consult): Discharge Planning Current Support Network: Lives with Spouse Confirm Follow Up Transport: Self The Plan for Transition of Care is Related to the Following Treatment Goals : resolution of acute needs Discharge Location Discharge Placement: Home

## 2020-09-11 NOTE — DIALYSIS
[facility] ACUTE HEMODIALYSIS FLOW SHEET 
 
 
HEMODIALYSIS ORDERS: Physician: Dr. Sherie Linton. Wu Robins Dialyzer: revaclear   Duration: 4 hr  BFR: 300   DFR: 600 Dialysate:  Temp 36-37*C  K+   1K first two hours; 2K last two hours    Ca+  2.5 Na 140 Bicarb 35 Weight:  
Wt Readings from Last 1 Encounters:  
09/10/20 110.2 kg (243 lb) UF Goal: 4000 ml Access Right subclavian TDC Needle Gauge NA Heparin []  Bolus      Units    [] Hourly       Units    []None Catheter locking solution Heparin Pre BP:   135/112    Pulse:     55       Respirations: 18  Temperature:   98.8 Labs: Pre        Post:        [] N/A Additional Orders(medications, blood products, hypotension management):       [x] N/A [x] [facility] Consent Verified CATHETER ACCESS: []N/A   [x]Right subclavian  []Left   []IJ     []Fem   []transhepatic  
[] First use X-ray verified     []Permanent                [] Temporary  
[x]No S/S infection  []Redness  []Drainage []Cultured []Swelling []Pain  
[x]Medical Aseptic Prep Utilized   [x]Dressing Changed  [x] Biopatch  Date:    9/11/2020 []Clotted   [x]Patent   Flows: []Good  []Poor  [x]Reversed If access problem,  notified: []Yes    [x]N/A  Date:        
 
GRAFT/FISTULA ACCESS:  []N/A     []Right     []Left     []UE     []LE []AVG   []AVF        []Buttonhole    []Medical Aseptic Prep Utilized []No S/S infection  []Redness  []Drainage []Cultured []Swelling []Pain Bruit:   [] Strong    [] Weak       Thrill :   [] Strong    [] Weak Needle Gauge: NA   Length: NA If access problem,  notified: []Yes     []N/A  Date:       
Please describe access if present and not used:NA GENERAL ASSESSMENT:  
  
LUNGS:  Rate 18 SaO2%        [] N/A    [] Clear  [] Coarse  [] Crackles  [] Wheezing 
      [] Diminished     Location : []RLL   []LLL    []RUL  []NIKOS Cough: []Productive  []Dry  []N/A   Respirations:  [x]Easy  []Labored Therapy:   [x]RA  []NC  l/min    Mask: []NRB []Venti       O2% []Ventilator  []Intubated  [] Trach  [] BiPaP CARDIAC: [x]Regular      [] Irregular   [] Pericardial Rub  [] JVD []  Monitored  [] Bedside  [] Remotely monitored [] N/A  Rhythm: EDEMA: [] None  [x]Generalized  [] Pitting [] 1    [] 2    [] 3    [] 4 [] Facial  [] Pedal  []  UE  [] LE  
 
SKIN:   [x] Warm  [] Hot     [] Cold   [x] Dry     [] Pale   [] Diaphoretic    
             [] Flushed  [] Jaundiced  [] Cyanotic  [] Rash  [] Weeping LOC:    [x] Alert      [x]Oriented:    [x] Person     [x] Place  [x]Time 
             [] Confused  [] Lethargic  [] Medicated  [] Non-responsive GI / ABDOMEN:  [] Flat    [] Distended    [] Soft    [] Firm   [x]  Obese 
                           [] Diarrhea  [] Bowel Sounds  [] Nausea  [] Vomiting  / URINE ASSESSMENT:[] Voiding   [] Oliguria  [] Anuria   []  Alonzo [] Incontinent    []  Incontinent Brief      []  Bathroom Privileges PAIN: [] 0 []1  []2   []3   []4   []5   []6   []7   []8   []9   []10 Scale 0-10  Action/Follow Up: Patient reports continued abdominal pain. His primary nurse is aware. MOBILITY:  [] Amb    [] Amb/Assist    [x] Bed    [] Wheelchair  [] Stretcher All Vitals and Treatment Details on Attached Flowsheet Hospital: [facility] Room # 544 [id number] [] 1st Time Acute  [] Stat  [x] Routine  [] Urgent [x] Acute Room  []  Bedside  [] ICU/CCU  [] ER Isolation Precautions: There are currently no Active Isolations Special Considerations:         [] Blood Consent Verified [x]N/A ALLERGIES:  
Allergies Allergen Reactions  Latex Other (comments)  
  blisters  Other Food Hives Allergic to tomatoes and tomato sauce  Keflex [Cephalexin] Anaphylaxis  Codeine Nausea and Vomiting  Sulfa (Sulfonamide Antibiotics) Hives and Nausea and Vomiting Code Status:Full Code Hepatitis Status:    2nd RN check:                    
Lab Results Component Value Date/Time Hepatitis A, IgM NEGATIVE  07/07/2017 08:39 AM  
 Hepatitis B surface Ag 0.11 06/20/2020 08:14 PM  
 Hepatitis B surface Ab 20.06 06/20/2020 08:14 PM  
 Hepatitis B core, IgM NEGATIVE  07/07/2017 08:39 AM  
 Hepatitis C virus Ab 0.07 10/04/2018 05:30 AM  
   
 
            Current Labs:  
Lab Results Component Value Date/Time Sodium 133 (L) 09/11/2020 04:30 AM  
 Potassium 6.1 (HH) 09/11/2020 04:30 AM  
 Chloride 99 (L) 09/11/2020 04:30 AM  
 CO2 23 09/11/2020 04:30 AM  
 Anion gap 11 09/11/2020 04:30 AM  
 Glucose 70 (L) 09/11/2020 04:30 AM  
 BUN 65 (H) 09/11/2020 04:30 AM  
 Creatinine 12.70 (H) 09/11/2020 04:30 AM  
 BUN/Creatinine ratio 5 (L) 09/11/2020 04:30 AM  
 GFR est AA 5 (L) 09/11/2020 04:30 AM  
 GFR est non-AA 4 (L) 09/11/2020 04:30 AM  
 Calcium 7.8 (L) 09/11/2020 04:30 AM  
  
Lab Results Component Value Date/Time WBC 10.1 09/11/2020 04:30 AM  
 Hemoglobin, POC 13.9 08/20/2017 05:57 AM  
 HGB 9.9 (L) 09/11/2020 04:30 AM  
 Hematocrit, POC 41 08/20/2017 05:57 AM  
 HCT 31.7 (L) 09/11/2020 04:30 AM  
 PLATELET 476 41/57/8835 04:30 AM  
 .7 (H) 09/11/2020 04:30 AM  
  
  
 
                                                                         
DIET: DIET NPO    
 
PRIMARY NURSE REPORT: First initial/Last name/Title Pre Dialysis:   Lisa West RN Time: 8627 EDUCATION:   
[x] Patient [] Other         Knowledge Basis: []None []Minimal [x] Substantial  
Barriers to learning  []N/A [x] Access Care     [] S&S of infection     [] Fluid Management     []K+     [x]Procedural   
[]Albumin     [] Medications     [x] Tx Options     [] Transplant     [] Diet     [] Other Teaching Tools:  [x] Explain  [] Demo  [] Handouts [] Video Patient response:   [x] Verbalized understanding  [] Teach back  [] Return demonstration [x] Requires follow up Inappropriate due to         
 
[x] Time Out/Safety Check  [x]Extracorporeal Circuit Tested for integrity RO/HEMODIALYSIS MACHINE SAFETY CHECKS  Before each treatment:    
Machine Number:                   [facility]  
                                [] Unit Machine #  with centralized RO 
                                [] Portable Machine #1/RO serial # [id number] [] Portable Machine #2/RO serial # [id number] [] Portable Machine #4/RO serial # [id number] [facility] [x] Portable Machine #1/RO serial # [id number] [] Portable Machine #12/RO serial # [id number] [] Portable Machine #13/RO serial #  [id number] Alarm Test:  Pass time 1003 [x] RO/Machine Log Complete Temp    36.5 Dialysate: pH  7.4 Conductivity: Meter   14     HD Machine   14.2                  TCD: 13.7 Dialyzer Lot # [id number]            Blood Tubing Lot # [id number]          Saline Lot #  [id number] CHLORINE TESTING-Before each treatment and every 4 hours Total Chlorine: [x] less than 0.1 ppm  Time: 1000 4 Hr/2nd Check Time:   
(if greater than 0.1 ppm from Primary then every 30 minutes from Secondary) TREATMENT INITIATION  with Dialysis Precautions:  
[x] All Connections Secured                 [x] Saline Line Double Clamped  
[x] Venous Parameters Set                  [x] Arterial Parameters Set [x] Prime Given 250ml                          [x]Air Foam Detector Engaged Treatment Initiation Note:Patient was brought to HD suite by hospital transportation personnel, stable to begin HD treatment. His right subclavian catheter was accessed, and his treatment began. During Treatment Notes:Patient tolerating treatment well.   He was visited by a general surgeon during dialysis treatment. No signs or symptoms of any apparent distress. Patient mostly using his cell phone. Medication Dose Volume Route Time [facility] name Title Post Assessment:  
Dialyzer Cleared: [x] Good [] Fair  [] Poor Blood processed:  51 L 
UF Removed  3217 Ml POst BP:   192/84       Pulse: 56 Respirations: 18  Temperature:  Lungs: 
 
 [] Clear      [] Course         [] Crackles  
 [] Wheezing         [] Diminished Post Tx Vascular Access: N/A Cardiac:  
[x] Regular   [] Irregular   [] Monitor  [] N/A Rhythm:      
Catheter:  
Locking solution: Heparin 1:1000 Art. 2.2 ml  Ashok. 2.3 ml  N Skin:   Pain:   
[x] Warm  [x] Dry [] Diaphoretic    [] Flushed   
[] Pale [] Cyanotic [x]0  []1  []2   []3  []4   []5   []6   []7   []8   []9   []10 Post Treatment Note: 
Patient terminated the treatment with 40 minutes remaining. MD informed. Patient was in no apparent distress. His blood was returned, and his right subclavian TDC was locked with Heparin. POST TREATMENT PRIMARY NURSE HANDOFF REPORT:  
 
First initial/Last name/Title Post Dialysis:  Time:    
 
Abbreviations: AVG-arterial venous graft, AVF-arterial venous fistula, IJ-Internal Jugular, Subcl-Subclavian, Fem-Femoral, Tx-treatment, AP/HR-apical heart rate, DFR-dialysate flow rate, BFR-blood flow rate, AP-arterial pressure, -venous pressure, UF-ultrafiltrate, TMP-transmembrane pressure, Ashok-Venous, Art-Arterial, RO-Reverse Osmosis
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
## 2020-09-11 NOTE — PROGRESS NOTES
TRANSFER - IN REPORT: 
 
Verbal report received from JOHN Chandler(name) on Guicho Child  being received from ED(unit) for routine progression of care Report consisted of patients Situation, Background, Assessment and  
Recommendations(SBAR). Information from the following report(s) SBAR, Kardex, ED Summary, Intake/Output, MAR and Recent Results was reviewed with the receiving nurse. Opportunity for questions and clarification was provided. Assessment completed upon patients arrival to unit and care assumed. 2153 Oxycodone 10 mg administered orally for RUQ pain with pain level 8/10.  
 
2250 Patient ambulatory in room and hallway to relieve pain. States pain is not being relieve with the oral medication. 09/11/2020 
 
0039 Dr. Lucho Luna notified of RUQ not relieved by oral pain medication. 0125 Morphine 2 mg administered IV for complaint of RUQ pain with pain level 9/10. 
 
0155 Pain slightly relieved BP remains elevated, will notify Dr. Lucho Luna. 0300 Clonidine 0.2 mg administered orally for elevated BP. 
 
0400 Recheck /77. 
 
6866 Critical lab potassium 6.1 called by Vidal Driscoll. 8292 Dr. Lucho Luna notified, awaiting return call. 2408 New order for Dorena Cousin administered orally for elevated potassium. Pharmacy notified to refill D50W in pyxis. Communicated to Nilo Teresa RN oncoming nurse. 1604 Bedside and Verbal shift change report given to Harlin Habermann (oncoming nurse) by Rajat Hernandez RN (offgoing nurse). Report given with SBAR, Kardex, Intake/Output, MAR and Recent Results.
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
## 2020-09-11 NOTE — PROGRESS NOTES
Internal Medicine Progress Note NAME: Shayy Llanos :  1970 MRM:  [id number] Date/Time: 2020 ASSESSMENT/PLAN: 
 
Principal Problem: 
  RUQ pain (9/10/2020) Active Problems: 
  Obesity (2012) Chronic low back pain (2012) HTN (hypertension) (2013) Coronary artery disease () Overview: LAD - 3.0 x 18mm VISION  AICD (automatic cardioverter/defibrillator) present (2014) Overview: Single chamber Medtronic aicd implant 14 for primary prevention. ESRD (end stage renal disease) ([facility] (9/3/2014) Lower respiratory infection (9/10/2020) DM (diabetes mellitus) ([facility] (9/10/2020) Hyperkalemia (9/10/2020) # RUQ pains possible cholecystitis. HIDA scan per surgery team . Iv LVQ/flagyl. Clear liquids. Control pains and NV. Clear liquids and npo midnight # Pneumonia. recently diagnosed with Lower respiratory infection and still on Doxycycline. Abx as above. CXR # ESRD. Hyperkalemia. Hyponatremia Nephrology consulted. HD today # CAD. He follow with Dr Glynn Treviño # Ongoing tobacco smoking #  Obesity. Body mass index is 34.44 kg/m². 
  
  
# Continue home regimen / Medications when appropriate.  
  
-DVT prophylaxis :  heparin.  
- Code Status : FULL 
 
IP CONSULT TO GENERAL SURGERY 
IP CONSULT TO NEPHROLOGY 
IP CONSULT TO HOSPITALIST Lab Review:  
 
Recent Labs  
  20 
0430 09/10/20 
1040 WBC 10.1 15.1* HGB 9.9* 10.7* HCT 31.7* 33.6*  260 Recent Labs  
  20 
0921 20 
0430 09/10/20 
1040 NA  --  133* 133* K 6.2* 6.1* 5.8*  
CL  --  99* 100 CO2  --  23 25 GLU  --  70* 136* BUN  --  65* 54* CREA  --  12.70* 12.10* CA  --  7.8* 8.5 MG  --  2.4  --   
PHOS  --  8.5*  --   
ALB  --  2.7* 2.9* TBILI  --  0.3 0.3 ALT  --  10* 16 INR  --  1.2 1.1 Lab Results Component Value Date/Time  Glucose (POC) 87 09/10/2020 09:20 PM  
 Glucose (POC) 163 (H) 06/18/2020 09:16 PM  
 Glucose (POC) 48 (LL) 06/18/2020 08:45 PM  
 Glucose (POC) 46 (LL) 06/18/2020 08:44 PM  
 Glucose (POC) 114 (H) 03/26/2020 10:42 PM  
 Glucose,  08/20/2017 05:57 AM  
 Glucose,  (H) 10/27/2015 07:05 PM  
 Glucose,  (H) 05/14/2015 08:30 AM  
 Glucose,  (H) 04/09/2015 05:00 AM  
 Glucose,  (H) 09/03/2014 11:36 AM  
 
No results for input(s): PH, PCO2, PO2, HCO3, FIO2 in the last 72 hours. Recent Labs  
  09/11/20 
0430 09/10/20 
1040 INR 1.2 1.1 Lab Results Component Value Date/Time Specimen Description: BLOOD 03/20/2014 05:35 PM  
 Specimen Description: SPUTUM 03/14/2014 06:30 AM  
 Specimen Description: SPUTUM 03/13/2014 01:00 AM  
 
Lab Results Component Value Date/Time Culture result: NO GROWTH AFTER 13 HOURS 09/10/2020 12:37 PM  
 Culture result: NO GROWTH AFTER 13 HOURS 09/10/2020 12:15 PM  
 Culture result: LIGHT NORMAL RESPIRATORY LUDIN 03/27/2020 04:00 PM  
 
 
All Cardiac Markers in the last 24 hours:  
Lab Results Component Value Date/Time TROIQ <0.02 09/11/2020 09:21 AM  
 TROIQ <0.02 09/11/2020 04:30 AM  
 TROIQ <0.02 09/10/2020 10:30 PM  
 
 
 
 
Intervals noted Pt s/e @ bedside Subjective: Chief Complaint:     
RUQ pains uncontrolled. ROS: 
(bold if positive,otherwise negative) Fever/chills ,  Dysuria Cough , Sputum , SOB/TAPIA , Chest Pain Diarrhea ,Nausea/Vomit , Abd Pain , Constipation Objective:  
 
Vitals: 
Last 24hrs VS reviewed since prior progress note. Most recent are: 
 
Visit Vitals BP (!) 177/94 Pulse (!) 58 Temp 98.8 °F (37.1 °C) (Oral) Resp 18 Ht 5' 10\" (1.778 m) Wt 110.2 kg (243 lb) SpO2 92% BMI 34.87 kg/m² SpO2 Readings from Last 6 Encounters:  
09/11/20 92% 07/19/20 95% 06/21/20 96% 06/19/20 98% 04/02/20 97% 03/28/20 98% O2 Flow Rate (L/min): 2 l/min Intake/Output Summary (Last 24 hours) at 9/11/2020 1402 Last data filed at 9/10/2020 2337 Gross per 24 hour Intake 700 ml Output  Net 700 ml Physical Exam:  
Gen:  Appear stated age, Well-developed, well-nourished, in no acute distress HEENT:  Head atraumatic, normocephalic , hearing intact to voice Neck:   Trachea midline , No apparent JVD, Supple Resp:  No accessory muscle use,Bilateral BS present, clear breath sounds without wheezes rales or rhonchi 
Card: normal S1, S2 without Gallop . mild lower leg peripheral edema. Abd:  RUQ tenderness  Soft, non-distended, bowel sounds are present . Musc:  No cyanosis or clubbing. Skin: psoriatic lesions noted. No rashes or ulcers, skin turgor is good. Neuro:  Cranial nerves are grossly intact, no clear area of focal motor weakness, follows commands appropriately. Psych:    oriented to person, place and time, alert. Medications Reviewed: (see below) Lab Data Reviewed: (see below) 
 
______________________________________________________________________ Medications:  
 
Current Facility-Administered Medications Medication Dose Route Frequency  cloNIDine HCL (CATAPRES) tablet 0.2 mg  0.2 mg Oral BID  dextrose (D50W) injection syrg 25 g  25 g IntraVENous ONCE  
 insulin regular (NOVOLIN R, HUMULIN R) injection 10 Units  10 Units SubCUTAneous ONCE  
 dextrose (D50) infusion 25 g  25 g IntraVENous DAILY PRN  
 hydrALAZINE (APRESOLINE) 20 mg/mL injection 10 mg  10 mg IntraVENous Q6H PRN  
 ketorolac (TORADOL) injection 30 mg  30 mg IntraVENous NOW  naloxone (NARCAN) injection 0.4 mg  0.4 mg IntraVENous PRN  
 metroNIDAZOLE (FLAGYL) IVPB premix 500 mg  500 mg IntraVENous Q8H  
 [START ON 9/12/2020] levoFLOXacin (LEVAQUIN) 500 mg in D5W IVPB  500 mg IntraVENous Q48H  
 albuterol-ipratropium (DUO-NEB) 2.5 MG-0.5 MG/3 ML  3 mL Nebulization Q4H PRN  
 amiodarone (CORDARONE) tablet 400 mg  400 mg Oral DAILY  amLODIPine (NORVASC) tablet 10 mg  10 mg Oral DAILY  aspirin chewable tablet 162 mg  162 mg Oral BID  atorvastatin (LIPITOR) tablet 40 mg  40 mg Oral DAILY  calcium acetate(phosphat bind) (PHOSLO) capsule 1,334 mg  2 Cap Oral TID WITH MEALS  carbamide peroxide (DEBROX) 6.5 % otic solution 5 Drop  5 Drop Both Ears BID  carvediloL (COREG) tablet 25 mg  25 mg Oral BID WITH MEALS  fluticasone propionate (FLONASE) 50 mcg/actuation nasal spray 2 Spray  2 Spray Both Nostrils DAILY PRN  
 hydrALAZINE (APRESOLINE) tablet 50 mg  50 mg Oral TID  isosorbide mononitrate ER (IMDUR) tablet 30 mg  30 mg Oral TID  losartan (COZAAR) tablet 100 mg  100 mg Oral DAILY  ondansetron (ZOFRAN ODT) tablet 4 mg  4 mg Oral Q6H PRN  
 oxyCODONE IR (ROXICODONE) tablet 10 mg  10 mg Oral Q8H PRN  pantoprazole (PROTONIX) tablet 40 mg  40 mg Oral ACB  predniSONE (DELTASONE) tablet 50 mg  50 mg Oral DAILY WITH BREAKFAST  sodium chloride (NS) flush 5-40 mL  5-40 mL IntraVENous Q8H  
 sodium chloride (NS) flush 5-40 mL  5-40 mL IntraVENous PRN  
 acetaminophen (TYLENOL) tablet 650 mg  650 mg Oral Q6H PRN Or  
 acetaminophen (TYLENOL) suppository 650 mg  650 mg Rectal Q6H PRN  promethazine (PHENERGAN) tablet 12.5 mg  12.5 mg Oral Q6H PRN Or  
 ondansetron (ZOFRAN) injection 4 mg  4 mg IntraVENous Q6H PRN  
 heparin (porcine) injection 5,000 Units  5,000 Units SubCUTAneous Q8H  
 insulin lispro (HUMALOG) injection   SubCUTAneous AC&HS  
 glucose chewable tablet 16 g  4 Tab Oral PRN  
 glucagon (GLUCAGEN) injection 1 mg  1 mg IntraMUSCular PRN  
 dextrose (D50) infusion 12.5-25 g  25-50 mL IntraVENous PRN  
 [START ON 9/12/2020] epoetin natanael-epbx (RETACRIT) injection 10,000 Units  10,000 Units IntraVENous Q TUE, THU & SAT Total time spent with patient: 35 minutes Care Plan discussed with: Patient, Care Manager, Nursing Staff, Consultant/Specialist and >50% of time spent in counseling and coordination of care Discussed:  Care Plan Prophylaxis:  Hep SQ Disposition:  Home w/Family This document in whole or part of it has been produced using voice recognition software. Unrecognized errors in transcription may be present.  
 
Attending Physician: Carissa Farrell MD

## 2020-09-11 NOTE — PROGRESS NOTES
Problem: Falls - Risk of 
Goal: *Absence of Falls Description: Document Marlon Zarate Fall Risk and appropriate interventions in the flowsheet. Outcome: Progressing Towards Goal 
Note: Fall Risk Interventions: 
Mobility Interventions: Patient to call before getting OOB Medication Interventions: Patient to call before getting OOB Problem: Patient Education: Go to Patient Education Activity Goal: Patient/Family Education Outcome: Progressing Towards Goal

## 2020-09-11 NOTE — DIABETES MGMT
Glycemic Control: 
Recommend discontinuing order for  Lantus 22 units q 24 hrs.  Ana Maria RODRIGUEZ

## 2020-09-11 NOTE — PROGRESS NOTES
conducted an initial consultation and Spiritual Assessment for Kiara Bailey, who is a 44 y. o.,male. Patients Primary Language is: [place]. According to the patients EMR Alevism Affiliation is: [facility].  
 
The reason the Patient came to the hospital is:  
Patient Active Problem List  
 Diagnosis Date Noted  ST elevation (STEMI) myocardial infarction involving left anterior descending coronary artery ([address] 07/24/2013 Priority: 1 - One  
 Hypertensive emergency 07/24/2013 Priority: 1 - One  
 Hyperglycemia 07/24/2013 Priority: 2 - Two  Acute pulmonary edema ([address] 07/24/2013 Priority: 2 - Two  RUQ pain 09/10/2020  Lower respiratory infection 09/10/2020  DM (diabetes mellitus) ([address] 09/10/2020  Hyperkalemia 09/10/2020  Pneumonia 03/26/2020  Acute respiratory failure ([address] 03/25/2020  CHF (congestive heart failure) ([address] 03/25/2020  Gastritis 10/24/2018  Suicide ideation 10/23/2018  Suicidal ideation 10/23/2018  Microscopic polyangiitis ([address] 10/22/2018  Metabolic acidosis 10/80/6995  Bacteria in urine 10/03/2018  Obesity, morbid ([address] 01/11/2018  Anxiety 10/29/2015  Ventricular tachycardia ([address] 10/29/2015  Ventricular fibrillation ([address] 10/29/2015  ICD (implantable cardioverter-defibrillator) discharge 10/29/2015  CAD (coronary artery disease) 10/29/2015  Epigastric abdominal pain 05/22/2015  Severe sepsis ([address] 05/17/2015  Acute renal failure (ARF) ([address] 03/21/2015  ARF (acute renal failure) ([address] 03/21/2015  Chest pain 11/03/2014  
 SOB (shortness of breath) 11/03/2014  Bronchitis 11/03/2014  OLIVIA (acute kidney injury) ([address] 09/03/2014  ESRD (end stage renal disease) ([address] 09/03/2014  AICD (automatic cardioverter/defibrillator) present 05/21/2014  Cardiomyopathy ([address] 04/23/2014  UTI (lower urinary tract infection) 03/17/2014  JESSICA (obstructive sleep apnea) 12/17/2013  Coronary artery disease  Contrast dye induced nephropathy 07/26/2013  CKD (chronic kidney disease) 07/26/2013  Cardiac arrest - ventricular fibrillation 07/24/2013  
 HTN (hypertension) 07/24/2013  Acute on chronic renal failure ([facility] [address] 07/24/2013  Leukocytosis 07/24/2013  Enthesopathy of hip 07/01/2013  Sacroiliitis ([facility] [address] 07/01/2013  Chronic pain syndrome 07/30/2012  Lumbosacral spondylosis without myelopathy 07/30/2012  Psoriatic arthropathy ([facility] [address] [address] 07/23/2012  Chronic low back pain 07/23/2012  Obesity 01/29/2012  Pain in joint, lower leg 01/29/2012  Psoriasis 01/22/2012  Degeneration of lumbar or lumbosacral intervertebral disc 01/22/2012  Encounter for long-term (current) use of high-risk medication 01/22/2012  Arachnoiditis 01/17/2012  Postlaminectomy syndrome, lumbar region 01/17/2012 The  provided the following Interventions: 
Initiated a relationship of care and support with patient in room [phone] today. Found patient kind of with drawn and preoccupied with his phone. Patient was not very talkative or receptive at this time to a visit. .. Offered prayer and assurance of continued prayers on patients behalf. . 
 
The following outcomes were achieved: 
Patient shared limited information about his medical narrative. Patient processed feeling about current hospitalization. Patient expressed gratitude for pastoral care visit. Assessment: 
Patient does not have any Roman Catholic/cultural needs that will affect patients preferences in health care. There are no further spiritual or Roman Catholic issues which require Spiritual Care Services interventions at this time. Plan: 
Chaplains will continue to follow and will provide pastoral care on an as needed/requested basis Mika Blevins 3 Board Certified [employer] Spiritual Care  
[phone]

## 2020-09-11 NOTE — PROGRESS NOTES
-- Bedside, Verbal and Written shift change report given to [facility] (oncoming nurse) by Belen Meckel- RN (offgoing nurse). Report included the following information SBAR, Kardex, Intake/Output, MAR and Recent Results. Pt in bed, watching tv, pt denies any pain, frequentley used items within reach. 1944--CNA found a bunch of open pills in a a medicine cup. When asked, Pt States\" I don't take so much pills that's why I didn't take them when the nurse gave them to me\" This writer discarded the pills. --2200--Pt provided with frozen meal per request. 
 
--2217--PRN pain medication administered per Pt request. 
 
 5122-- PM medications administered, pt tolerated with ease. 2327 -- Shift reassessment, pt condition unchanged, will continue to monitor. --  Shift reassessment, pt sleeping between care, will continue to monitor. -- Bedside, Verbal and Written shift change report given to -Mike Gerardo RN (oncoming nurse) by Bobby Ramirez RN (offgoing nurse). Report included the following information SBAR, Kardex, Intake/Output, MAR and Recent Results. Skin assessment completed.

## 2020-09-12 NOTE — PROGRESS NOTES
Problem: Falls - Risk of 
Goal: *Absence of Falls Description: Document Anderson De La Fuente Fall Risk and appropriate interventions in the flowsheet. Outcome: Progressing Towards Goal 
Note: Fall Risk Interventions: 
Mobility Interventions: Communicate number of staff needed for ambulation/transfer, Patient to call before getting OOB Medication Interventions: Evaluate medications/consider consulting pharmacy, Patient to call before getting OOB Problem: Patient Education: Go to Patient Education Activity Goal: Patient/Family Education Outcome: Progressing Towards Goal 
  
Problem: Discharge Planning Goal: *Discharge to safe environment Outcome: Progressing Towards Goal 
  
Problem: Pain Goal: *Control of Pain Outcome: Progressing Towards Goal 
  
Problem: Patient Education: Go to Patient Education Activity Goal: Patient/Family Education Outcome: Progressing Towards Goal 
  
Problem: Hypertension Goal: *Blood pressure within specified parameters Outcome: Progressing Towards Goal 
Goal: *Fluid volume balance Outcome: Progressing Towards Goal 
Goal: *Labs within defined limits Outcome: Progressing Towards Goal 
  
Problem: Patient Education: Go to Patient Education Activity Goal: Patient/Family Education Outcome: Progressing Towards Goal 
  
Problem: Patient Education: Go to Patient Education Activity Goal: Patient/Family Education Outcome: Progressing Towards Goal 
  
Problem: Acute Renal Failure: Day 1 Goal: Off Pathway (Use only if patient is Off Pathway) Outcome: Progressing Towards Goal 
Goal: Activity/Safety Outcome: Progressing Towards Goal 
Goal: Consults, if ordered Outcome: Progressing Towards Goal 
Goal: Diagnostic Test/Procedures Outcome: Progressing Towards Goal 
Goal: Nutrition/Diet Outcome: Progressing Towards Goal 
Goal: Discharge Planning Outcome: Progressing Towards Goal 
Goal: Medications Outcome: Progressing Towards Goal 
Goal: Respiratory Outcome: Progressing Towards Goal 
Goal: Treatments/Interventions/Procedures Outcome: Progressing Towards Goal 
Goal: Psychosocial 
Outcome: Progressing Towards Goal 
Goal: *Optimal pain control at patient's stated goal 
Outcome: Progressing Towards Goal 
Goal: *Urinary output within identified parameters Outcome: Progressing Towards Goal 
Goal: *Hemodynamically stable Outcome: Progressing Towards Goal 
Goal: *Tolerating diet Outcome: Progressing Towards Goal 
  
Problem: Acute Renal Failure: Day 2 Goal: Off Pathway (Use only if patient is Off Pathway) Outcome: Progressing Towards Goal 
Goal: Activity/Safety Outcome: Progressing Towards Goal 
Goal: Consults, if ordered Outcome: Progressing Towards Goal 
Goal: Diagnostic Test/Procedures Outcome: Progressing Towards Goal 
Goal: Nutrition/Diet Outcome: Progressing Towards Goal 
Goal: Discharge Planning Outcome: Progressing Towards Goal 
Goal: Medications Outcome: Progressing Towards Goal 
Goal: Respiratory Outcome: Progressing Towards Goal 
Goal: Treatments/Interventions/Procedures Outcome: Progressing Towards Goal 
Goal: Psychosocial 
Outcome: Progressing Towards Goal 
Goal: *Optimal pain control at patient's stated goal 
Outcome: Progressing Towards Goal 
Goal: *Urinary output within identified parameters Outcome: Progressing Towards Goal 
Goal: *Hemodynamically stable Outcome: Progressing Towards Goal 
Goal: *Tolerating diet Outcome: Progressing Towards Goal 
Goal: *Lab values improving Outcome: Progressing Towards Goal 
  
Problem: Acute Renal Failure: Day 3 Goal: Off Pathway (Use only if patient is Off Pathway) Outcome: Progressing Towards Goal 
Goal: Activity/Safety Outcome: Progressing Towards Goal 
Goal: Consults, if ordered Outcome: Progressing Towards Goal 
Goal: Diagnostic Test/Procedures Outcome: Progressing Towards Goal 
Goal: Nutrition/Diet Outcome: Progressing Towards Goal 
Goal: Discharge Planning Outcome: Progressing Towards Goal 
Goal: Medications Outcome: Progressing Towards Goal 
Goal: Respiratory Outcome: Progressing Towards Goal 
Goal: Treatments/Interventions/Procedures Outcome: Progressing Towards Goal 
Goal: Psychosocial 
Outcome: Progressing Towards Goal 
Goal: *Optimal pain control at patient's stated goal 
Outcome: Progressing Towards Goal 
Goal: *Urinary output within identified parameters Outcome: Progressing Towards Goal 
Goal: *Hemodynamically stable Outcome: Progressing Towards Goal 
Goal: *Tolerating diet Outcome: Progressing Towards Goal 
Goal: *Lab values improving Outcome: Progressing Towards Goal 
  
Problem: Acute Renal Failure: Day 4 Goal: Off Pathway (Use only if patient is Off Pathway) Outcome: Progressing Towards Goal 
Goal: Activity/Safety Outcome: Progressing Towards Goal 
Goal: Consults, if ordered Outcome: Progressing Towards Goal 
Goal: Diagnostic Test/Procedures Outcome: Progressing Towards Goal 
Goal: Nutrition/Diet Outcome: Progressing Towards Goal 
Goal: Discharge Planning Outcome: Progressing Towards Goal 
Goal: Medications Outcome: Progressing Towards Goal 
Goal: Respiratory Outcome: Progressing Towards Goal 
Goal: Treatments/Interventions/Procedures Outcome: Progressing Towards Goal 
Goal: Psychosocial 
Outcome: Progressing Towards Goal 
Goal: *Optimal pain control at patient's stated goal 
Outcome: Progressing Towards Goal 
Goal: *Urinary output within identified parameters Outcome: Progressing Towards Goal 
Goal: *Hemodynamically stable Outcome: Progressing Towards Goal 
Goal: *Tolerating diet Outcome: Progressing Towards Goal 
Goal: *Lab values improving Outcome: Progressing Towards Goal 
  
Problem: Acute Renal Failure: Day 5 Goal: Off Pathway (Use only if patient is Off Pathway) Outcome: Progressing Towards Goal 
Goal: Activity/Safety Outcome: Progressing Towards Goal 
Goal: Diagnostic Test/Procedures Outcome: Progressing Towards Goal 
 Goal: Nutrition/Diet Outcome: Progressing Towards Goal 
Goal: Discharge Planning Outcome: Progressing Towards Goal 
Goal: Medications Outcome: Progressing Towards Goal 
Goal: Respiratory Outcome: Progressing Towards Goal 
Goal: Treatments/Interventions/Procedures Outcome: Progressing Towards Goal 
Goal: Psychosocial 
Outcome: Progressing Towards Goal 
Goal: *Optimal pain control at patient's stated goal 
Outcome: Progressing Towards Goal 
Goal: *Urinary output within identified parameters Outcome: Progressing Towards Goal 
Goal: *Hemodynamically stable Outcome: Progressing Towards Goal 
Goal: *Tolerating diet Outcome: Progressing Towards Goal 
Goal: *Lab values improving Outcome: Progressing Towards Goal 
  
Problem: Acute Renal Failure: Day 6 Goal: Off Pathway (Use only if patient is Off Pathway) Outcome: Progressing Towards Goal 
Goal: Activity/Safety Outcome: Progressing Towards Goal 
Goal: Diagnostic Test/Procedures Outcome: Progressing Towards Goal 
Goal: Nutrition/Diet Outcome: Progressing Towards Goal 
Goal: Discharge Planning Outcome: Progressing Towards Goal 
Goal: Medications Outcome: Progressing Towards Goal 
Goal: Respiratory Outcome: Progressing Towards Goal 
Goal: Treatments/Interventions/Procedures Outcome: Progressing Towards Goal 
Goal: Psychosocial 
Outcome: Progressing Towards Goal 
  
Problem: Acute Renal Failure: Discharge Outcomes Goal: *Optimal pain control at patient's stated goal 
Outcome: Progressing Towards Goal 
Goal: *Urinary output within identified parameters Outcome: Progressing Towards Goal 
Goal: *Hemodynamically stable Outcome: Progressing Towards Goal 
Goal: *Tolerating diet Outcome: Progressing Towards Goal 
Goal: *Lab values stabilized Outcome: Progressing Towards Goal 
Goal: *Verbalizes understanding and describes medication purposes and frequencies Outcome: Progressing Towards Goal 
Goal: *Medication reconciliation Outcome: Progressing Towards Goal

## 2020-09-12 NOTE — DIALYSIS
[facility] ACUTE HEMODIALYSIS FLOW SHEET 
 
 
HEMODIALYSIS ORDERS: Physician: Brady Gong Dialyzer: revaclear   Duration: 4 hr  BFR: 350   DFR: 600 Dialysate:  Temp 36-37*C  K+   2    Ca+  2.5 Na 140 Bicarb 35 Weight:  
Wt Readings from Last 1 Encounters:  
09/12/20 109.6 kg (241 lb 9.6 oz) UF Goal: 3500 Access  Needle Gauge Heparin []  Bolus      Units    [] Hourly       Units    []None Catheter locking solution Heaparin Pre BP:   119/100    Pulse:     48       Respirations: 18  Temperature:   98.4 Labs: Pre        Post:        [] N/A Additional Orders(medications, blood products, hypotension management):       [] N/A [x] [facility] Consent Verified CATHETER ACCESS: []N/A   [x]Right   []Left   [x]IJ     []Fem   []transhepatic  
[] First use X-ray verified     []Permanent                [] Temporary  
[x]No S/S infection  []Redness  []Drainage []Cultured []Swelling []Pain  
[x]Medical Aseptic Prep Utilized   []Dressing Changed  [] Biopatch  Date: 9/10/20 []Clotted   []Patent   Flows: []Good  []Poor  []Reversed If access problem,  notified: []Yes    []N/A  Date:        
 
GRAFT/FISTULA ACCESS:  []N/A     []Right     []Left     []UE     []LE []AVG   []AVF        []Buttonhole    []Medical Aseptic Prep Utilized []No S/S infection  []Redness  []Drainage []Cultured []Swelling []Pain Bruit:   [] Strong    [] Weak       Thrill :   [] Strong    [] Weak Needle Gauge:    Length: If access problem,  notified: []Yes     []N/A  Date:       
Please describe access if present and not used:  
            
            GENERAL ASSESSMENT:  
  
LUNGS:  Rate  SaO2%        [] N/A    [x] Clear  [] Coarse  [] Crackles  [] Wheezing 
      [] Diminished     Location : [x]RLL   [x]LLL    []RUL  []NIKOS Cough: []Productive  [x]Dry  []N/A   Respirations:  [x]Easy  []Labored Therapy:   [x]RA  []NC  l/min    Mask: []NRB []Venti       O2% []Ventilator  []Intubated  [] Trach  [] BiPaP CARDIAC: []Regular      [] Irregular   [] Pericardial Rub  [] JVD [x]  Monitored  [] Bedside  [x] Remotely monitored [] N/A  Rhythm: EDEMA: [] None  [x]Generalized  [] Pitting [] 1    [] 2    [] 3    [] 4 [] Facial  [] Pedal  []  UE  [] LE  
 
SKIN:   [x] Warm  [] Hot     [] Cold   [x] Dry     [] Pale   [] Diaphoretic    
             [] Flushed  [] Jaundiced  [] Cyanotic  [] Rash  [] Weeping LOC:    [x] Alert      [x]Oriented:    [x] Person     [x] Place  [x]Time 
             [] Confused  [] Lethargic  [] Medicated  [] Non-responsive GI / ABDOMEN:  [] Flat    [] Distended    [x] Soft    [] Firm   []  Obese 
                           [] Diarrhea  [] Bowel Sounds  [] Nausea  [] Vomiting  / URINE ASSESSMENT:[] Voiding   [] Oliguria  [] Anuria   []  Alonzo [] Incontinent    []  Incontinent Brief      []  Bathroom Privileges PAIN: [x] 0 []1  []2   []3   []4   []5   []6   []7   []8   []9   []10 Scale 0-10  Action/Follow Up: MOBILITY:  [] Amb    [] Amb/Assist    [x] Bed    [] Wheelchair  [] Stretcher All Vitals and Treatment Details on Attached Flowsheet Hospital: [facility] Room # 451 5853 [] 1st Time Acute  [] Stat  [] Routine  [] Urgent [x] Acute Room  []  Bedside  [] ICU/CCU  [] ER Isolation Precautions: There are currently no Active Isolations Special Considerations:         [] Blood Consent Verified []N/A ALLERGIES:  
Allergies Allergen Reactions  Latex Other (comments)  
  blisters  Other Food Hives Allergic to tomatoes and tomato sauce  Keflex [Cephalexin] Anaphylaxis  Codeine Nausea and Vomiting  Sulfa (Sulfonamide Antibiotics) Hives and Nausea and Vomiting Code Status:Full Code Hepatitis Status:    2nd RN check:                    
Lab Results Component Value Date/Time Hepatitis A, IgM NEGATIVE  07/07/2017 08:39 AM  
 Hepatitis B surface Ag 0.11 06/20/2020 08:14 PM  
 Hepatitis B surface Ab 20.06 06/20/2020 08:14 PM  
 Hepatitis B core, IgM NEGATIVE  07/07/2017 08:39 AM  
 Hepatitis C virus Ab 0.07 10/04/2018 05:30 AM  
   
 
            Current Labs:  
Lab Results Component Value Date/Time Sodium 136 09/12/2020 03:40 AM  
 Potassium 5.7 (H) 09/12/2020 03:40 AM  
 Chloride 99 (L) 09/12/2020 03:40 AM  
 CO2 29 09/12/2020 03:40 AM  
 Anion gap 8 09/12/2020 03:40 AM  
 Glucose 81 09/12/2020 03:40 AM  
 BUN 43 (H) 09/12/2020 03:40 AM  
 Creatinine 9.80 (H) 09/12/2020 03:40 AM  
 BUN/Creatinine ratio 4 (L) 09/12/2020 03:40 AM  
 GFR est AA 7 (L) 09/12/2020 03:40 AM  
 GFR est non-AA 6 (L) 09/12/2020 03:40 AM  
 Calcium 7.8 (L) 09/12/2020 03:40 AM  
  
Lab Results Component Value Date/Time WBC 6.9 09/12/2020 03:40 AM  
 Hemoglobin, POC 13.9 08/20/2017 05:57 AM  
 HGB 9.8 (L) 09/12/2020 03:40 AM  
 Hematocrit, POC 41 08/20/2017 05:57 AM  
 HCT 30.7 (L) 09/12/2020 03:40 AM  
 PLATELET 934 45/72/1169 03:40 AM  
 .6 (H) 09/12/2020 03:40 AM  
  
  
 
                                                                         
DIET: DIET CARDIAC PRIMARY NURSE REPORT: First initial/Last name/Title Pre Dialysis: KODY Church RN     Time: 1500 EDUCATION:   
[] Patient [] Other         Knowledge Basis: []None [x]Minimal [] Substantial  
Barriers to learning  [x]N/A  
[] Access Care     [] S&S of infection     [] Fluid Management     []K+     [x]Procedural   
[]Albumin     [] Medications     [] Tx Options     [] Transplant     [] Diet     [] Other Teaching Tools:  [x] Explain  [] Demo  [] Handouts [] Video Patient response:   [] Verbalized understanding  [x] Teach back  [] Return demonstration [] Requires follow up Inappropriate due to         
 
[x]1500 Time Out/Safety Check  [x]Extracorporeal Circuit Tested for integrity RO/HEMODIALYSIS MACHINE SAFETY CHECKS  Before each treatment:    
Machine Number:                   [facility]  
                                [] Unit Machine #  with centralized RO 
                                [] Portable Machine #1/RO serial # [id number] [] Portable Machine #2/RO serial # [id number] [] Portable Machine #4/RO serial # [id number] [facility] [x] Portable Machine #1 /RO serial # [id number] [] Portable Machine #12/RO serial # [id number] [] Portable Machine #13/RO serial #  [id number] Alarm Test:  Pass time 1520 [x] RO/Machine Log Complete Temp    36 Dialysate: pH  7.4 Conductivity: Meter   14.3     HD Machine   14                  TCD: 14 
Dialyzer Lot # [id number]            Blood Tubing Lot # [id number]          Saline Lot #  [id number] CHLORINE TESTING-Before each treatment and every 4 hours Total Chlorine: [] less than 0.1 ppm  Time: 1500 4 Hr/2nd Check Time: 1900  
(if greater than 0.1 ppm from Primary then every 30 minutes from Secondary) TREATMENT INITIATION  with Dialysis Precautions:  
[x] All Connections Secured                 [x] Saline Line Double Clamped  
[x] Venous Parameters Set                  [x] Arterial Parameters Set [x] Prime Given 250ml                          [x]Air Foam Detector Engaged Treatment Initiation Note: Patient arrived to the unit in stable condition. A/o x3 verbally responsive. VSS. Right subclavian TDC accessed flushes and aspirates well. HD started without difficulty. 1k bath started During Treatment Notes: 3604  Patient awake tolerating tx. Playing with his phone, face/access visible and connected. 1500-  Patient awake tolerating tx.  Playing with his phone, face/access visible and connected. 1530-  Patient awake tolerating tx. Playing with his phone, face/access visible and connected. 1600-  Patient awake tolerating tx. Playing with his phone, face/access visible and connected. 1615-  Patient awake tolerating tx. Playing with his phone, face/access visible and connected. 1630-  Patient awake tolerating tx. Playing with his phone, face/access visible and connected. 1700-  Patient awake tolerating tx. Playing with his phone, face/access visible and connected. 1730-  1k bath switched to 2k, patient tolerating treatment 1800-  Patients resting quietly eyes closed, face/access visible and connected 1815-  Patients resting quietly eyes closed, face/access visible and connected 1830- Patients awake listening to his phone tolerating tx, face/access visible and connected. 1845- Patients awake listening to his phone tolerating tx, face/access visible and connected. 1925- Treatment completed and terminated. Medication Dose Volume Route Time [facility] name Title RN  
      RN  
      RN  
        RN Post Assessment:  
Dialyzer Cleared: [x] Good [] Fair  [] Poor Blood processed:  75.7 L 
UF Removed  3000 Ml POst BP:   179/119       Pulse: 63 Respirations: 18  Temperature: 98 Lungs: 
 
 [x] Clear      [] Course         [] Crackles  
 [] Wheezing         [] Diminished Post Tx Vascular Access: AVF/AVG: Bleeding stopped Art  min. Ashok. Min   N/A Cardiac:  
[] Regular   [] Irregular   [x] Monitor  [] N/A Rhythm:      
Catheter:  
Locking solution: Heparin 1:1000 Art. 2.2  Ashok. 2.3  N/A Skin:   Pain:   
[x] Warm  [x] Dry [] Diaphoretic    [] Flushed   
[] Pale [] Cyanotic [x]0  []1  []2   []3  []4   []5   []6   []7   []8   []9   []10 Post Treatment Note: 
   1940- Patient completed and tolerated 4 hrs of HD, 3000 mls of fluid removed. Patient returned to his room in stable condition. POST TREATMENT PRIMARY NURSE HANDOFF REPORT:  
 
First initial/Last name/Title Post Dialysis: KODY Ricci RN Time:  80 Abbreviations: AVG-arterial venous graft, AVF-arterial venous fistula, IJ-Internal Jugular, Subcl-Subclavian, Fem-Femoral, Tx-treatment, AP/HR-apical heart rate, DFR-dialysate flow rate, BFR-blood flow rate, AP-arterial pressure, -venous pressure, UF-ultrafiltrate, TMP-transmembrane pressure, Ashok-Venous, Art-Arterial, RO-Reverse Osmosis
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
## 2020-09-12 NOTE — PROGRESS NOTES
Problem: Falls - Risk of 
Goal: *Absence of Falls Description: Document Coral Chun Fall Risk and appropriate interventions in the flowsheet. Outcome: Progressing Towards Goal 
Note: Fall Risk Interventions: 
Mobility Interventions: Communicate number of staff needed for ambulation/transfer, Patient to call before getting OOB Medication Interventions: Teach patient to arise slowly, Patient to call before getting OOB

## 2020-09-12 NOTE — PROGRESS NOTES
Rec'd patient in bed asleep. Bedside report from [address]  Meds scanned and given to patient placed them at bed side and pick and chose whatever medications  he wanted to take. Dialysis cath to right chest taped Dressing D/I Left forearm fistula 1000 offered medications he refused at this time meds removed from bedside . Will attempt to administer medications 1130 offered meds  Refused 1200 offered afternoon meds refused , disconnected IV abt. Educated risks  Patient denied states other nurse did it 1502 to dialysis Bedside and Verbal shift change report given to   (oncoming nurse) by Azael Jenkins ** (offgoing nurse). Report included the following information SBAR, Intake/Output, MAR, Recent Results, Cardiac Rhythm SB and Quality Measures.

## 2020-09-12 NOTE — PROGRESS NOTES
Internal Medicine Progress Note NAME: Harini Funez :  1970 MRM:  [id number] Date/Time: 2020 ASSESSMENT/PLAN: repeat CXR in am  
 
 
 
# RUQ pains . No clear evidence of  cholecystitis. HIDA scan report noted. D/C Iv LVQ / flagyl. Tolerating diet. Pains improved . Tolerating diet Possible  MS though had NV Control if any pains and NV. # Pneumonia. recently diagnosed with Lower respiratory infection , resume  Doxycycline. CXR # ESRD. Hyperkalemia. Hyponatremia Nephrology consulted. HD again  On   then TTS after D/C # CAD. He follows with Dr Maritza Dodson # Ongoing tobacco smoking #  Obesity. Body mass index is 34.44 kg/m².  
  
# Continue home regimen / Medications when appropriate.  
  
-DVT prophylaxis :  heparin.  
- Code Status : FULL 
 
IP CONSULT TO GENERAL SURGERY 
IP CONSULT TO NEPHROLOGY 
IP CONSULT TO HOSPITALIST Lab Review:  
 
Recent Labs  
  20 
03420 
0430 09/10/20 
1040 WBC 6.9 10.1 15.1* HGB 9.8* 9.9* 10.7* HCT 30.7* 31.7* 33.6*  
 225 260 Recent Labs  
  20 
0921 20 
0430 09/10/20 
1040   --  133* 133*  
K 5.7* 6.2* 6.1* 5.8*  
CL 99*  --  99* 100 CO2 29  --  23 25 GLU 81  --  70* 136* BUN 43*  --  65* 54* CREA 9.80*  --  12.70* 12.10* CA 7.8*  --  7.8* 8.5 MG  --   --  2.4  --   
PHOS  --   --  8.5*  --   
ALB 2.5*  --  2.7* 2.9* TBILI 0.3  --  0.3 0.3 ALT 12*  --  10* 16 INR  --   --  1.2 1.1 Lab Results Component Value Date/Time  Glucose (POC) 132 (H) 2020 12:01 PM  
 Glucose (POC) 110 2020 08:19 AM  
 Glucose (POC) 107 2020 09:05 PM  
 Glucose (POC) 133 (H) 2020 05:58 PM  
 Glucose (POC) 49 (LL) 2020 05:04 PM  
 Glucose,  2017 05:57 AM  
 Glucose,  (H) 10/27/2015 07:05 PM  
 Glucose,  (H) 2015 08:30 AM  
 Glucose,  (H) 2015 05:00 AM  
 Glucose,  (H) 09/03/2014 11:36 AM  
 
No results for input(s): PH, PCO2, PO2, HCO3, FIO2 in the last 72 hours. Recent Labs  
  09/11/20 
0430 09/10/20 
1040 INR 1.2 1.1 Lab Results Component Value Date/Time Specimen Description: BLOOD 03/20/2014 05:35 PM  
 Specimen Description: SPUTUM 03/14/2014 06:30 AM  
 Specimen Description: SPUTUM 03/13/2014 01:00 AM  
 
Lab Results Component Value Date/Time Culture result: NO GROWTH 2 DAYS 09/10/2020 12:37 PM  
 Culture result: NO GROWTH 2 DAYS 09/10/2020 12:15 PM  
 Culture result: LIGHT NORMAL RESPIRATORY LUDIN 03/27/2020 04:00 PM  
 
 
All Cardiac Markers in the last 24 hours:  
No results found for: CPK, CK, CKMMB, CKMB, RCK3, CKMBT, CKNDX, CKND1, MARQUES, TROPT, TROIQ, NOELLE, TROPT, TNIPOC, BNP, BNPP Intervals noted Pt s/e @ bedside Subjective: Chief Complaint:     
RUQ pains better, doesn't remember strained the area. ROS: 
(bold if positive,otherwise negative) Fever/chills ,  Dysuria Cough , Sputum , SOB/TAPIA , Chest Pain Diarrhea ,Nausea/Vomit , Abd Pain , Constipation Objective:  
 
Vitals: 
Last 24hrs VS reviewed since prior progress note. Most recent are: 
 
Visit Vitals BP (!) 147/88 Pulse (!) 57 Temp 98.1 °F (36.7 °C) Resp 19 Ht 5' 10\" (1.778 m) Wt 109.6 kg (241 lb 9.6 oz) SpO2 97% BMI 34.67 kg/m² SpO2 Readings from Last 6 Encounters:  
09/12/20 97% 07/19/20 95% 06/21/20 96% 06/19/20 98% 04/02/20 97% 03/28/20 98% O2 Flow Rate (L/min): 2 l/min Intake/Output Summary (Last 24 hours) at 9/12/2020 1332 Last data filed at 9/12/2020 1302 Gross per 24 hour Intake 480 ml Output 3217 ml Net -2737 ml Physical Exam:  
Gen:  Appear stated age, Well-developed, well-nourished, in no acute distress HEENT:  Head atraumatic, normocephalic , hearing intact to voice Neck:   Trachea midline , No apparent JVD, Supple Resp:  No accessory muscle use,Bilateral BS present, clear breath sounds without wheezes rales or rhonchi 
Card: normal S1, S2 without Gallop . mild lower leg peripheral edema. Abd:  RUQ tenderness improved. Soft, non-distended, bowel sounds are present . Musc:  No cyanosis or clubbing. Skin: psoriatic lesions noted. No rashes or ulcers, skin turgor is good. Neuro:  Cranial nerves are grossly intact, no clear area of focal motor weakness, follows commands appropriately. Psych:    oriented to person, place and time, alert. Medications Reviewed: (see below) Lab Data Reviewed: (see below) 
 
______________________________________________________________________ Medications:  
 
Current Facility-Administered Medications Medication Dose Route Frequency  cloNIDine HCL (CATAPRES) tablet 0.2 mg  0.2 mg Oral BID  hydrALAZINE (APRESOLINE) 20 mg/mL injection 10 mg  10 mg IntraVENous Q6H PRN  
 naloxone (NARCAN) injection 0.4 mg  0.4 mg IntraVENous PRN  
 metroNIDAZOLE (FLAGYL) IVPB premix 500 mg  500 mg IntraVENous Q8H  
 levoFLOXacin (LEVAQUIN) 500 mg in D5W IVPB  500 mg IntraVENous Q48H  
 albuterol-ipratropium (DUO-NEB) 2.5 MG-0.5 MG/3 ML  3 mL Nebulization Q4H PRN  
 amiodarone (CORDARONE) tablet 400 mg  400 mg Oral DAILY  amLODIPine (NORVASC) tablet 10 mg  10 mg Oral DAILY  aspirin chewable tablet 162 mg  162 mg Oral BID  atorvastatin (LIPITOR) tablet 40 mg  40 mg Oral DAILY  calcium acetate(phosphat bind) (PHOSLO) capsule 1,334 mg  2 Cap Oral TID WITH MEALS  carbamide peroxide (DEBROX) 6.5 % otic solution 5 Drop  5 Drop Both Ears BID  carvediloL (COREG) tablet 25 mg  25 mg Oral BID WITH MEALS  fluticasone propionate (FLONASE) 50 mcg/actuation nasal spray 2 Spray  2 Spray Both Nostrils DAILY PRN  
 hydrALAZINE (APRESOLINE) tablet 50 mg  50 mg Oral TID  isosorbide mononitrate ER (IMDUR) tablet 30 mg  30 mg Oral TID  losartan (COZAAR) tablet 100 mg  100 mg Oral DAILY  ondansetron (ZOFRAN ODT) tablet 4 mg  4 mg Oral Q6H PRN  
 oxyCODONE IR (ROXICODONE) tablet 10 mg  10 mg Oral Q8H PRN  pantoprazole (PROTONIX) tablet 40 mg  40 mg Oral ACB  predniSONE (DELTASONE) tablet 50 mg  50 mg Oral DAILY WITH BREAKFAST  sodium chloride (NS) flush 5-40 mL  5-40 mL IntraVENous Q8H  
 sodium chloride (NS) flush 5-40 mL  5-40 mL IntraVENous PRN  
 acetaminophen (TYLENOL) tablet 650 mg  650 mg Oral Q6H PRN Or  
 acetaminophen (TYLENOL) suppository 650 mg  650 mg Rectal Q6H PRN  promethazine (PHENERGAN) tablet 12.5 mg  12.5 mg Oral Q6H PRN Or  
 ondansetron (ZOFRAN) injection 4 mg  4 mg IntraVENous Q6H PRN  
 heparin (porcine) injection 5,000 Units  5,000 Units SubCUTAneous Q8H  
 insulin lispro (HUMALOG) injection   SubCUTAneous AC&HS  
 glucose chewable tablet 16 g  4 Tab Oral PRN  
 glucagon (GLUCAGEN) injection 1 mg  1 mg IntraMUSCular PRN  
 dextrose (D50) infusion 12.5-25 g  25-50 mL IntraVENous PRN Total time spent with patient: 35 minutes Care Plan discussed with: Patient, Care Manager, Nursing Staff, Consultant/Specialist and >50% of time spent in counseling and coordination of care dw surgery Discussed:  Care Plan Prophylaxis:  Hep SQ Disposition:  Home w/Family This document in whole or part of it has been produced using voice recognition software. Unrecognized errors in transcription may be present.  
 
Attending Physician: Judah Crane MD

## 2020-09-12 NOTE — PROGRESS NOTES
Renal Progress Note Follow up of ESRD Assessment/Plan: 1. ESRD. Moderately hypervolemic. Hyperkalemia improved with yesterday's dialysis. Next dialysis later today and continue tts (as op if d/darwin). 2. HTN. Better controlled. Continue current antihtn. 3. Anemia of ESRD. H/H is at goal. DINESH will be resumed at op unit. 4. Secondary hyperparathyroidism of ESRD. Continue phos binder. 5. Ruq abd pain. Resolved, hida scan neg for acute cholecystitis. Surgery s/o.  
6. Recent pneumonia. On abx. Will f/u on Monday if sill in the hospital.  
 
Subjective: 
Patient complaints off: Feels better, no abd pain. No SOB, chest pain, nausea or vomiting. Patient Active Problem List  
Diagnosis Code  ST elevation (STEMI) myocardial infarction involving left anterior descending coronary artery ([facility]) I21.02  
 Cardiac arrest - ventricular fibrillation  HTN (hypertension) I10  
 Hypertensive emergency I16.1  Hyperglycemia R73.9  Acute on chronic renal failure (HCC) N17.9, N18.9  Leukocytosis D72.829  
 Acute pulmonary edema ([facility]) J81.0  Contrast dye induced nephropathy N14.1, T50.8X5A  
 CKD (chronic kidney disease) N18.9  Arachnoiditis G03.9  Postlaminectomy syndrome, lumbar region M96.1  Psoriasis L40.9  Degeneration of lumbar or lumbosacral intervertebral disc M51.37  
 Encounter for long-term (current) use of high-risk medication Z79.899  Obesity E66.9  
 Pain in joint, lower leg M25.569  Psoriatic arthropathy ([facility] [address] L40.50  Chronic low back pain M54.5, G89.29  Chronic pain syndrome G89.4  Lumbosacral spondylosis without myelopathy M47.817  Enthesopathy of hip M76.899  
 Sacroiliitis ([facility]) M46.1  Coronary artery disease I25.10  
 JESSICA (obstructive sleep apnea) G47.33  
 UTI (lower urinary tract infection) N39.0  Cardiomyopathy ([address] I42.9  AICD (automatic cardioverter/defibrillator) present Z95.810  
  OLIVIA (acute kidney injury) ([facility] N17.9  ESRD (end stage renal disease) ([facility] N18.6  Chest pain R07.9  
 SOB (shortness of breath) R06.02  Bronchitis J40  Acute renal failure (ARF) ([facility]) N17.9  ARF (acute renal failure) ([facility]) N17.9  Severe sepsis ([facility]) A41.9, R65.20  Epigastric abdominal pain R10.13  
 Anxiety F41.9  Ventricular tachycardia ([facility]) I47.2  Ventricular fibrillation ([facility]) I49.01  
 ICD (implantable cardioverter-defibrillator) discharge Z45.02  
 CAD (coronary artery disease) I25.10  Obesity, morbid ([facility] E66.01  
 Bacteria in urine R25.14  
 Metabolic acidosis B57.6  Microscopic polyangiitis ([facility] M31.7  Suicide ideation R45.851  Suicidal ideation R45.851  Gastritis K29.70  Acute respiratory failure ([facility]) J96.00  
 CHF (congestive heart failure) ([facility]) I50.9  Pneumonia J18.9  RUQ pain R10.11  Lower respiratory infection J22  
 DM (diabetes mellitus) ([facility] E11.9  Hyperkalemia E87.5 Current Facility-Administered Medications Medication Dose Route Frequency Provider Last Rate Last Dose  cloNIDine HCL (CATAPRES) tablet 0.2 mg  0.2 mg Oral BID Portillo Lazaro MD   0.2 mg at 09/12/20 5227  hydrALAZINE (APRESOLINE) 20 mg/mL injection 10 mg  10 mg IntraVENous Q6H PRN Brittany Reinoso MD      
 naloxone [facility]) injection 0.4 mg  0.4 mg IntraVENous PRN Brittany Reinoso MD      
 metroNIDAZOLE (FLAGYL) IVPB premix 500 mg  500 mg IntraVENous Q8H Brittany Reinoso  mL/hr at 09/12/20 0850 500 mg at 09/12/20 0850  levoFLOXacin (LEVAQUIN) 500 mg in D5W IVPB  500 mg IntraVENous Q48H Rossana Lorenz MD      
 albuterol-ipratropium (DUO-NEB) 2.5 MG-0.5 MG/3 ML  3 mL Nebulization Q4H PRN Brittany Reinoso MD      
 amiodarone (CORDARONE) tablet 400 mg  400 mg Oral DAILY Brittany Reinoso MD   400 mg at 09/12/20 0947  
 amLODIPine (NORVASC) tablet 10 mg  10 mg Oral DAILY Rossana Lorenz, MD   10 mg at 09/12/20 7528  aspirin chewable tablet 162 mg  162 mg Oral BID Corazon Quintero MD   162 mg at 09/12/20 0946  
 atorvastatin (LIPITOR) tablet 40 mg  40 mg Oral DAILY Corazon Quintero MD   40 mg at 09/12/20 4162  calcium acetate(phosphat bind) (PHOSLO) capsule 1,334 mg  2 Cap Oral TID WITH MEALS Corazon Quintero MD   1,334 mg at 09/12/20 9647  carbamide peroxide (DEBROX) 6.5 % otic solution 5 Drop  5 Drop Both Ears BID Corazon Quintero MD   5 Drop at 09/11/20 1655  carvediloL (COREG) tablet 25 mg  25 mg Oral BID WITH MEALS Corazon Quintero MD   25 mg at 09/12/20 3250  fluticasone propionate (FLONASE) 50 mcg/actuation nasal spray 2 Spray  2 Spray Both Nostrils DAILY PRN Corazon Quintero MD      
 hydrALAZINE (APRESOLINE) tablet 50 mg  50 mg Oral TID Corazon Quintero MD   50 mg at 09/12/20 9757  isosorbide mononitrate ER (IMDUR) tablet 30 mg  30 mg Oral TID Corazon Quintero MD   30 mg at 09/12/20 9479  losartan (COZAAR) tablet 100 mg  100 mg Oral DAILY Corazon Quintero MD   100 mg at 09/12/20 3849  ondansetron (ZOFRAN ODT) tablet 4 mg  4 mg Oral Q6H PRN Corazon Quintero MD      
 oxyCODONE IR (ROXICODONE) tablet 10 mg  10 mg Oral Q8H PRN Corazon Quintero MD   10 mg at 09/12/20 0846  
 pantoprazole (PROTONIX) tablet 40 mg  40 mg Oral ACB Corazon Quintero MD   40 mg at 09/12/20 5427  predniSONE (DELTASONE) tablet 50 mg  50 mg Oral DAILY WITH Ariane Mireles MD   50 mg at 09/12/20 0763  sodium chloride (NS) flush 5-40 mL  5-40 mL IntraVENous Q8H Corazon Quintero MD   10 mL at 09/12/20 8050  sodium chloride (NS) flush 5-40 mL  5-40 mL IntraVENous PRN Corazon Quintero MD      
 acetaminophen (TYLENOL) tablet 650 mg  650 mg Oral Q6H PRN Corazon Quintero MD      
 Or  
 acetaminophen (TYLENOL) suppository 650 mg  650 mg Rectal Q6H PRN Corazon Quintero MD      
  promethazine (PHENERGAN) tablet 12.5 mg  12.5 mg Oral Q6H PRN Aliya Ball MD      
 Or  
 ondansetron (ZOFRAN) injection 4 mg  4 mg IntraVENous Q6H PRN Aliya Ball MD      
 heparin (porcine) injection 5,000 Units  5,000 Units SubCUTAneous Q8H Aliya Ball MD      
 insulin lispro (HUMALOG) injection   SubCUTAneous AC&HS Ailya Ball MD   Stopped at 09/10/20 2200  
 glucose chewable tablet 16 g  4 Tab Oral PRN Aliya Ball MD      
 glucagon (GLUCAGEN) injection 1 mg  1 mg IntraMUSCular PRN Aliya Ball MD      
 dextrose (D50) infusion 12.5-25 g  25-50 mL IntraVENous PRN Aliya Ball MD      
 epoetin natanael-epbx (RETACRIT) injection 10,000 Units  10,000 Units IntraVENous Q Jewell Jennings & SAT Aliya Ball MD      
 
 
 
 
 
Objective: 
Vitals:  
 09/12/20 0308 09/12/20 1365 09/12/20 4054 09/12/20 1053 BP: 108/64  132/68 (!) 147/88 Pulse: (!) 56  (!) 58 (!) 57 Resp: 16  16 19 Temp: 97.9 °F (36.6 °C)  98 °F (36.7 °C) 98.1 °F (36.7 °C) TempSrc:      
SpO2: 94%  98% 97% Weight:  109.6 kg (241 lb 9.6 oz) Height:      
 
. Intake/Output Summary (Last 24 hours) at 9/12/2020 1206 Last data filed at 9/12/2020 1053 Gross per 24 hour Intake 240 ml Output 3217 ml Net -2977 ml Admission weight:Weight: 108.9 kg (240 lb) (09/10/20 1015) Last Weight Metrics: 
Weight Loss Metrics 9/12/2020 7/19/2020 6/20/2020 4/2/2020 3/28/2020 2/14/2020 2/7/2020 Today's Wt 241 lb 9.6 oz 240 lb 240 lb 230 lb 239 lb 230 lb 230 lb BMI 34.67 kg/m2 34.44 kg/m2 34.44 kg/m2 33 kg/m2 33.33 kg/m2 33 kg/m2 33 kg/m2 Physical Exam:  
 
General: No acute distress. Neck: no jvd. LUNGS:Clear to Auscultation, No reales, rhonchi or wheezes. CVS EXM: S1, S2, no murmur, rub or gallop. Abdomen: Soft, Non Tender, non distended. Lower Extremities: 1+ Edema. Access: Rt ij tdc. Lt forearm avf. Labs: CBC w/Diff Recent Labs  
  09/12/20 0660 206 71 56 09/11/20 
0430 09/10/20 
1040 WBC 6.9 10.1 15.1*  
RBC 2.88* 3.00* 3.18* HGB 9.8* 9.9* 10.7* HCT 30.7* 31.7* 33.6*  
 225 260 GRANS 55 66 81* LYMPH 34 28 15* EOS 1 0 0 Chemistry Recent Labs  
  09/12/20 
0340 09/11/20 
0921 09/11/20 
0430 09/10/20 
1040 GLU 81  --  70* 136*   --  133* 133*  
K 5.7* 6.2* 6.1* 5.8*  
CL 99*  --  99* 100 CO2 29  --  23 25 BUN 43*  --  65* 54* CREA 9.80*  --  12.70* 12.10* CA 7.8*  --  7.8* 8.5 AGAP 8  --  11 8 BUCR 4*  --  5* 4* AP 57  --  56 77  
TP 5.8*  --  6.4 7.2 ALB 2.5*  --  2.7* 2.9*  
GLOB 3.3  --  3.7 4.3* AGRAT 0.8  --  0.7* 0.7* PHOS  --   --  8.5*  --   
  
 
 
Lab Results Component Value Date/Time Iron 60 10/18/2018 08:10 AM  
 TIBC 184 (L) 10/18/2018 08:10 AM  
 Iron % saturation 33 10/18/2018 08:10 AM  
 Ferritin 325 10/18/2018 08:10 AM  
  
Lab Results Component Value Date/Time Calcium 7.8 (L) 09/12/2020 03:40 AM  
 Phosphorus 8.5 (H) 09/11/2020 04:30 AM  
  
 
 
Blank Hubbard M.D [facility] Office [phone] Pager [phone]

## 2020-09-12 NOTE — PROGRESS NOTES
1293 -- Spoke with Keyanna Angel, dialysis nurse, patient wants pain meds. [phone] -- Nurse to dialysis to give pain meds, 8 out of 10 pain.

## 2020-09-12 NOTE — PROGRESS NOTES
Assumed care from Soheila Mallory rn 
Pt off floor for dialysis. Bedside shift change report given to christa Brown (oncoming nurse) by Florencio Olson (offgoing nurse). Report included the following information SBAR, Kardex, Intake/Output, MAR and Recent Results.

## 2020-09-12 NOTE — PROGRESS NOTES
Problem: Falls - Risk of 
Goal: *Absence of Falls Description: Document Yobany Gaming Fall Risk and appropriate interventions in the flowsheet. Outcome: Progressing Towards Goal 
Note: Fall Risk Interventions: 
Mobility Interventions: Communicate number of staff needed for ambulation/transfer, Patient to call before getting OOB Medication Interventions: Teach patient to arise slowly, Patient to call before getting OOB Problem: Patient Education: Go to Patient Education Activity Goal: Patient/Family Education Outcome: Progressing Towards Goal 
  
Problem: Discharge Planning Goal: *Discharge to safe environment Outcome: Progressing Towards Goal 
  
Problem: Pain Goal: *Control of Pain Outcome: Progressing Towards Goal 
  
Problem: Patient Education: Go to Patient Education Activity Goal: Patient/Family Education Outcome: Progressing Towards Goal 
  
Problem: Hypertension Goal: *Blood pressure within specified parameters Outcome: Progressing Towards Goal 
Goal: *Fluid volume balance Outcome: Progressing Towards Goal 
Goal: *Labs within defined limits Outcome: Progressing Towards Goal 
  
Problem: Patient Education: Go to Patient Education Activity Goal: Patient/Family Education Outcome: Progressing Towards Goal 
  
Problem: Patient Education: Go to Patient Education Activity Goal: Patient/Family Education Outcome: Progressing Towards Goal 
  
Problem: Acute Renal Failure: Day 1 Goal: Off Pathway (Use only if patient is Off Pathway) Outcome: Progressing Towards Goal 
Goal: Activity/Safety Outcome: Progressing Towards Goal 
Goal: Consults, if ordered Outcome: Progressing Towards Goal 
Goal: Diagnostic Test/Procedures Outcome: Progressing Towards Goal 
Goal: Nutrition/Diet Outcome: Progressing Towards Goal 
Goal: Discharge Planning Outcome: Progressing Towards Goal 
Goal: Medications Outcome: Progressing Towards Goal 
Goal: Respiratory Outcome: Progressing Towards Goal 
 Goal: Treatments/Interventions/Procedures Outcome: Progressing Towards Goal 
Goal: Psychosocial 
Outcome: Progressing Towards Goal 
Goal: *Optimal pain control at patient's stated goal 
Outcome: Progressing Towards Goal 
Goal: *Urinary output within identified parameters Outcome: Progressing Towards Goal 
Goal: *Hemodynamically stable Outcome: Progressing Towards Goal 
Goal: *Tolerating diet Outcome: Progressing Towards Goal 
  
Problem: Acute Renal Failure: Day 2 Goal: Off Pathway (Use only if patient is Off Pathway) Outcome: Progressing Towards Goal 
Goal: Activity/Safety Outcome: Progressing Towards Goal 
Goal: Consults, if ordered Outcome: Progressing Towards Goal 
Goal: Diagnostic Test/Procedures Outcome: Progressing Towards Goal 
Goal: Nutrition/Diet Outcome: Progressing Towards Goal 
Goal: Discharge Planning Outcome: Progressing Towards Goal 
Goal: Medications Outcome: Progressing Towards Goal 
Goal: Respiratory Outcome: Progressing Towards Goal 
Goal: Treatments/Interventions/Procedures Outcome: Progressing Towards Goal 
Goal: Psychosocial 
Outcome: Progressing Towards Goal 
Goal: *Optimal pain control at patient's stated goal 
Outcome: Progressing Towards Goal 
Goal: *Urinary output within identified parameters Outcome: Progressing Towards Goal 
Goal: *Hemodynamically stable Outcome: Progressing Towards Goal 
Goal: *Tolerating diet Outcome: Progressing Towards Goal 
Goal: *Lab values improving Outcome: Progressing Towards Goal 
  
Problem: Acute Renal Failure: Day 3 Goal: Off Pathway (Use only if patient is Off Pathway) Outcome: Progressing Towards Goal 
Goal: Activity/Safety Outcome: Progressing Towards Goal 
Goal: Consults, if ordered Outcome: Progressing Towards Goal 
Goal: Diagnostic Test/Procedures Outcome: Progressing Towards Goal 
Goal: Nutrition/Diet Outcome: Progressing Towards Goal 
Goal: Discharge Planning Outcome: Progressing Towards Goal 
Goal: Medications Outcome: Progressing Towards Goal 
Goal: Respiratory Outcome: Progressing Towards Goal 
Goal: Treatments/Interventions/Procedures Outcome: Progressing Towards Goal 
Goal: Psychosocial 
Outcome: Progressing Towards Goal 
Goal: *Optimal pain control at patient's stated goal 
Outcome: Progressing Towards Goal 
Goal: *Urinary output within identified parameters Outcome: Progressing Towards Goal 
Goal: *Hemodynamically stable Outcome: Progressing Towards Goal 
Goal: *Tolerating diet Outcome: Progressing Towards Goal 
Goal: *Lab values improving Outcome: Progressing Towards Goal 
  
Problem: Acute Renal Failure: Day 4 Goal: Off Pathway (Use only if patient is Off Pathway) Outcome: Progressing Towards Goal 
Goal: Activity/Safety Outcome: Progressing Towards Goal 
Goal: Consults, if ordered Outcome: Progressing Towards Goal 
Goal: Diagnostic Test/Procedures Outcome: Progressing Towards Goal 
Goal: Nutrition/Diet Outcome: Progressing Towards Goal 
Goal: Discharge Planning Outcome: Progressing Towards Goal 
Goal: Medications Outcome: Progressing Towards Goal 
Goal: Respiratory Outcome: Progressing Towards Goal 
Goal: Treatments/Interventions/Procedures Outcome: Progressing Towards Goal 
Goal: Psychosocial 
Outcome: Progressing Towards Goal 
Goal: *Optimal pain control at patient's stated goal 
Outcome: Progressing Towards Goal 
Goal: *Urinary output within identified parameters Outcome: Progressing Towards Goal 
Goal: *Hemodynamically stable Outcome: Progressing Towards Goal 
Goal: *Tolerating diet Outcome: Progressing Towards Goal 
Goal: *Lab values improving Outcome: Progressing Towards Goal 
  
Problem: Acute Renal Failure: Day 5 Goal: Off Pathway (Use only if patient is Off Pathway) Outcome: Progressing Towards Goal 
Goal: Activity/Safety Outcome: Progressing Towards Goal 
Goal: Diagnostic Test/Procedures Outcome: Progressing Towards Goal 
Goal: Nutrition/Diet Outcome: Progressing Towards Goal 
 Goal: Discharge Planning Outcome: Progressing Towards Goal 
Goal: Medications Outcome: Progressing Towards Goal 
Goal: Respiratory Outcome: Progressing Towards Goal 
Goal: Treatments/Interventions/Procedures Outcome: Progressing Towards Goal 
Goal: Psychosocial 
Outcome: Progressing Towards Goal 
Goal: *Optimal pain control at patient's stated goal 
Outcome: Progressing Towards Goal 
Goal: *Urinary output within identified parameters Outcome: Progressing Towards Goal 
Goal: *Hemodynamically stable Outcome: Progressing Towards Goal 
Goal: *Tolerating diet Outcome: Progressing Towards Goal 
Goal: *Lab values improving Outcome: Progressing Towards Goal 
  
Problem: Acute Renal Failure: Day 6 Goal: Off Pathway (Use only if patient is Off Pathway) Outcome: Progressing Towards Goal 
Goal: Activity/Safety Outcome: Progressing Towards Goal 
Goal: Diagnostic Test/Procedures Outcome: Progressing Towards Goal 
Goal: Nutrition/Diet Outcome: Progressing Towards Goal 
Goal: Discharge Planning Outcome: Progressing Towards Goal 
Goal: Medications Outcome: Progressing Towards Goal 
Goal: Respiratory Outcome: Progressing Towards Goal 
Goal: Treatments/Interventions/Procedures Outcome: Progressing Towards Goal 
Goal: Psychosocial 
Outcome: Progressing Towards Goal 
  
Problem: Acute Renal Failure: Discharge Outcomes Goal: *Optimal pain control at patient's stated goal 
Outcome: Progressing Towards Goal 
Goal: *Urinary output within identified parameters Outcome: Progressing Towards Goal 
Goal: *Hemodynamically stable Outcome: Progressing Towards Goal 
Goal: *Tolerating diet Outcome: Progressing Towards Goal 
Goal: *Lab values stabilized Outcome: Progressing Towards Goal 
Goal: *Verbalizes understanding and describes medication purposes and frequencies Outcome: Progressing Towards Goal 
Goal: *Medication reconciliation Outcome: Progressing Towards Goal

## 2020-09-13 NOTE — PROGRESS NOTES
rec'd patient this am awake alert offers no complaints . Bedside shift report Elisha Nunez Patient continues to refuse medications picks and chooses which medications to consume Focused on D/C today 1138 off unit for cxr prior to discharge per MD orders D/C instructions to patient given 3 rx  Oxycodone clonidine and carvedilol Patient understands D/C instructions Escorted out via W/C with staff

## 2020-09-13 NOTE — DISCHARGE SUMMARY
Discharge Summary [facility] service 
+ Patient ID: 
Cb Espinal, 44 y.o., male : 1970 Admit Date: 9/10/2020 Discharge Date:  2020 Length of stay: 3 day(s) PCP:  Emanuel Perez MD 
 
Chief Complaint Patient presents with  Shortness of Breath  Nausea  Vomiting Discharge Diagnosis:  
 
Hospital Problems  Date Reviewed: 3/26/2020 Codes Class Noted POA * (Principal) RUQ pain ICD-10-CM: R10.11 ICD-9-CM: 789.01  9/10/2020 Yes Lower respiratory infection ICD-10-CM: Munir [place] ICD-9-CM: 519.8  9/10/2020 Yes DM (diabetes mellitus) ([facility] ICD-10-CM: E11.9 ICD-9-CM: 250.00  9/10/2020 Yes Hyperkalemia ICD-10-CM: E87.5 ICD-9-CM: 276.7  9/10/2020 Yes ESRD (end stage renal disease) ([facility] ICD-10-CM: N18.6 ICD-9-CM: 585.6  9/3/2014 Yes AICD (automatic cardioverter/defibrillator) present ICD-10-CM: Z95.810 ICD-9-CM: V45.02  2014 Yes Overview Signed 2014  2:35 PM by Kelly Cuba MD  
  Single chamber Medtronic aicd implant 14 for primary prevention. Coronary artery disease ICD-10-CM: I25.10 ICD-9-CM: 414.01  Unknown Yes Overview Signed 2013  1:39 PM by Luh Lazo MD  
  LAD - 3.0 x 18mm VISION  HTN (hypertension) (Chronic) ICD-10-CM: I10 
ICD-9-CM: 401.9  2013 Yes Chronic low back pain ICD-10-CM: M54.5, G89.29 ICD-9-CM: 724.2, 338.29  2012 Yes Obesity ICD-10-CM: E66.9 ICD-9-CM: 278.00  2012 Yes Discharge instructions: #  Discharge Diet: 
          Diet: Resume previous diet # Discharge activity and restrictions: Activity as tolerated # Follow-up appointments: Follow-up Information Follow up With Specialties Details Why Contact Info Emanuel Perez MD Internal Medicine In 1 week  [id number] [id number] [phone] HPI on Admission (per admitting physician): 
 Mr. Angus Shetty is a 44 y.o.  male who is admitted for ad pains . Mr. Angus Shetty with past medical history as noted below , presented to the Emergency Department today  complaining of above. Triage and ER notes noted. ER MD wanted to admit the patient to the hospital for further management and called hospitalist to facilitate this process. He presented to ER with abdominal pains causing SOB to him also report he started on PO Abx as OP by urgent care clinic for lower respiratory infection , he has no current PCP. He has fever at ome used tylenol and also WBC elevated in ER, he felt weak and couldn't go to HD today. ER MD consulted with g surgery service and Surgeon as he told me he not sure if this is acute coleocystitis but will wait for HIDA scan. US reported GB wall thickening with around fluids. For further details and initial management please refer to H/P. Hospital Course:   
 
Still have some RUQ pains. Possible MS. No heart burns or acidity. Surgery released him for D/C from surgery side, no intervention or further work up plan for him. Will repeat CXR prior discharge. He requested some strong pain medicine to go home with as has nothing at home. By Problems :  
 
# RUQ pains . No clear evidence of  cholecystitis. HIDA scan report noted. D/C Iv LVQ / flagyl. Tolerating diet. Pains improved . Possible  MS type of pain.    
  
# Pneumonia. recently diagnosed with Lower respiratory infection treated with   Doxycycline and given prednisone by PCP. CXR repeat with no clear pneumonic consolidation reported.  
  
# ESRD. Hyperkalemia. Hyponatremia Nephrology consulted. HD again  On 9/12  then TTS after D/C. Renal function , K level better.  
  
# CAD. He follows with Dr Mariaa John 
  
# Ongoing tobacco smoking 
  
#  Obesity. Body mass index is 34.44 kg/m².  Discharge condition:  improved and stable Disposition:  Home Procedures: * No surgery found * Consultants: IP CONSULT TO GENERAL SURGERY 
IP CONSULT TO NEPHROLOGY 
IP CONSULT TO HOSPITALIST Physical Exam on Discharge: 
Visit Vitals BP (!) 157/82 Pulse (!) 58 Temp 98.5 °F (36.9 °C) Resp 18 Ht 5' 10\" (1.778 m) Wt 109.6 kg (241 lb 9.6 oz) SpO2 98% BMI 34.67 kg/m² Gen: Ashvin Hanson stated age, Well-developed, well-nourished, in no acute distress HEENT:  Head atraumatic, normocephalic , hearing intact to voice Neck:   Trachea midline , No apparent JVD, Supple Resp:  No accessory muscle use,Bilateral BS present, clear breath sounds without wheezes rales or rhonchi 
Card: normal S1, S2 without Gallop . mild lower leg peripheral edema. Abd:  RUQ tenderness improved.  Soft, non-distended, bowel sounds are present . Musc:  No cyanosis or clubbing. Skin: psoriatic lesions noted.   No rashes or ulcers, skin turgor is good. Neuro:  Cranial nerves are grossly intact, no clear area of focal motor weakness, follows commands appropriately. Psych:    oriented to person, place and time, alert Hospitalization and discharge instructions d/c in details with : patient , surgery , nephrology , Nursing/CM Discharge medications : 
Current Discharge Medication List  
  
START taking these medications Details  
cloNIDine HCL (CATAPRES) 0.2 mg tablet Take 1 Tab by mouth two (2) times a day for 30 days. Qty: 60 Tab, Refills: 0 CONTINUE these medications which have CHANGED Details  
oxyCODONE IR (ROXICODONE) 10 mg tab immediate release tablet Take 1 Tab by mouth every eight (8) hours as needed for Pain for up to 3 days. Max Daily Amount: 30 mg. 
Qty: 12 Tab, Refills: 0 Associated Diagnoses: Chronic midline low back pain with left-sided sciatica; Abdominal pain, right upper quadrant  
  
carvediloL (COREG) 25 mg tablet Take 1 Tab by mouth two (2) times daily (with meals) for 30 days. Indications: chronic heart failure, high blood pressure Qty: 60 Tab, Refills: 0 CONTINUE these medications which have NOT CHANGED Details  
isosorbide mononitrate ER (IMDUR) 30 mg tablet TAKE 1 TABLET BY MOUTH THREE TIMES DAILY Qty: 270 Tab, Refills: 3 Comments: **Patient requests 90 days supply**  
  
carbamide peroxide (Debrox) 6.5 % otic solution Administer 5 Drops into each ear two (2) times a day. Qty: 14.8 mL, Refills: 0  
  
calcium acetate (PHOSLO) 667 mg cap Take 2 Caps by mouth three (3) times daily (with meals). Qty: 90 Cap, Refills: 0  
  
hydrALAZINE (APRESOLINE) 50 mg tablet Take 1 Tab by mouth three (3) times daily. Qty: 90 Tab, Refills: 0  
  
atorvastatin (LIPITOR) 40 mg tablet Take 1 Tab by mouth daily. Indications: hypercholesterolemia Qty: 90 Tab, Refills: 3  
  
amiodarone (PACERONE) 400 mg tablet Take 1 Tab by mouth daily. Indications: LIFE-THREATENING VENTRICULAR TACHYCARDIA Qty: 90 Tab, Refills: 3  
  
aspirin 81 mg chewable tablet Take 162 mg by mouth two (2) times a day. ondansetron (Zofran ODT) 4 mg disintegrating tablet Take 1-2 tablets every 6-8 hours as needed for nausea and vomiting. Qty: 10 Tab, Refills: 0  
  
losartan (COZAAR) 100 mg tablet Take 1 Tab by mouth daily. Qty: 90 Tab, Refills: 1  
  
albuterol (PROVENTIL HFA, VENTOLIN HFA, PROAIR HFA) 90 mcg/actuation inhaler Take 2 Puffs by inhalation every four (4) hours as needed for Wheezing. Qty: 1 Inhaler, Refills: 5  
  
ondansetron hcl (ZOFRAN) 4 mg tablet Take 2 Tabs by mouth every eight (8) hours as needed for Nausea. Qty: 12 Tab, Refills: 0 NOVOLOG FLEXPEN U-100 INSULIN 100 unit/mL inpn 5 m by IntraMUSCular route as needed. Pt on scale  
  
glucose blood VI test strips (ASCENSIA AUTODISC VI, ONE TOUCH ULTRA TEST VI) strip Use to monitor blood glucose 3 times daily. Qty: 100 Strip, Refills: 3 Associated Diagnoses: Hyperglycemia  
  
amLODIPine (NORVASC) 10 mg tablet Take 1 Tab by mouth daily. Qty: 30 Tab, Refills: 0 epoetin natanael (EPOGEN;PROCRIT) 10,000 unit/mL injection 1 mL by SubCUTAneous route Every Tues, Thur & Sat. Qty: 12 Vial, Refills: 0  
  
pantoprazole (PROTONIX) 40 mg tablet Take 1 Tab by mouth Daily (before breakfast). Qty: 30 Tab, Refills: 0  
  
polyethylene glycol (MIRALAX) 17 gram packet Take 1 Packet by mouth daily. Qty: 30 Packet, Refills: 0  
  
lidocaine (LIDODERM) 5 % Apply patch to the affected area for 12 hours a day and remove for 12 hours a day. Qty: 7 Each, Refills: 1  
  
fluticasone (FLONASE) 50 mcg/actuation nasal spray 2 Sprays by Both Nostrils route daily as needed for Rhinitis. Qty: 1 Bottle, Refills: 1 STOP taking these medications  
  
 predniSONE (DELTASONE) 50 mg tablet Comments:  
Reason for Stopping:   
   
 predniSONE (DELTASONE) 20 mg tablet Comments:  
Reason for Stopping: Most Recent Labs: 
  
 
Recent Labs  
  09/12/20 
0340 09/11/20 
0430 WBC 6.9 10.1 HGB 9.8* 9.9*  
HCT 30.7* 31.7*  225 Recent Labs  
  09/13/20 
0320 09/12/20 
0340 09/11/20 
9559 09/11/20 
0430  136  --  133* K 4.9 5.7* 6.2* 6.1*  
 99*  --  99* CO2 25 29  --  23  
GLU 68* 81  --  70* BUN 22* 43*  --  65* CREA 5.95* 9.80*  --  12.70* CA 8.0* 7.8*  --  7.8*  
MG 2.2  --   --  2.4 PHOS 5.9*  --   --  8.5* ALB 2.8* 2.5*  --  2.7* TBILI 0.5 0.3  --  0.3 ALT 12* 12*  --  10* INR  --   --   --  1.2 Lab Results Component Value Date/Time Glucose (POC) 87 09/13/2020 08:18 AM  
 Glucose (POC) 230 (H) 09/12/2020 10:14 PM  
 Glucose (POC) 132 (H) 09/12/2020 12:01 PM  
 Glucose (POC) 110 09/12/2020 08:19 AM  
 Glucose (POC) 107 09/11/2020 09:05 PM  
 Glucose,  08/20/2017 05:57 AM  
 Glucose,  (H) 10/27/2015 07:05 PM  
 Glucose,  (H) 05/14/2015 08:30 AM  
 Glucose,  (H) 04/09/2015 05:00 AM  
 Glucose,  (H) 09/03/2014 11:36 AM  
 
No results for input(s): PH, PCO2, PO2, HCO3, FIO2 in the last 72 hours. Recent Labs  
  09/11/20 
0430 INR 1.2 Lab Results Component Value Date/Time Specimen Description: BLOOD 03/20/2014 05:35 PM  
 Specimen Description: SPUTUM 03/14/2014 06:30 AM  
 Specimen Description: SPUTUM 03/13/2014 01:00 AM  
 
Lab Results Component Value Date/Time Culture result: NO GROWTH 3 DAYS 09/10/2020 12:37 PM  
 Culture result: NO GROWTH 3 DAYS 09/10/2020 12:15 PM  
 Culture result: LIGHT NORMAL RESPIRATORY LUDIN 03/27/2020 04:00 PM  
 
 
All Cardiac Markers in the last 24 hours: No results found for: CPK, CK, CKMMB, CKMB, RCK3, CKMBT, CKNDX, CKND1, MARQUES, TROPT, TROIQ, NOELLE, TROPT, TNIPOC, BNP, BNPP 
 
XR Results: 
Results from Hospital Encounter encounter on 09/10/20 XR CHEST PORT Narrative EXAM: Portable upright chest radiograph. INDICATION: \"Cough. \" 
 
COMPARISON: June 20, 2020 
 
_______________ FINDINGS:   
 
   IMAGE QUALITY:  Large body habitus results in partial obscuration of the 
imaged structures. SUPPORT DEVICES:  Left subclavian AICD. Right internal jugular dialysis 
catheter. LUNGS: There is moderate hazy opacity in the bilateral perihilar regions 
mildly extending into the midlung regions without apparent focal consolidation. PLEURAL SPACE: 
         --Pleural effusion:  Suspected bibasilar pleural effusions. --Pneumothorax:  None detected. 
 
_______________ Impression IMPRESSION: 
 
Moderate indistinctness about both pulmonary leonides likely represents low-grade 
pulmonary edema. Suspected bibasilar effusions. _______________ CT Results: 
Results from Hospital Encounter encounter on 09/10/20 CT ABD PELV W CONT Narrative EXAM: CT ABD PELV W CONT 
 
CLINICAL INDICATION/HISTORY: Epigastric abdominal pain COMPARISON: Abdominal radiograph 6/20/2020; CT abdomen/pelvis 10/3/2018 TECHNIQUE:   CT of the abdomen and pelvis following intravenous contrast 
 administration. Coronal and sagittal reformats were generated and reviewed. One or more dose reduction techniques were used on this CT: automated exposure 
control, adjustment of the mAs and/or kVp according to patient size, and 
iterative reconstruction techniques. The specific techniques used on this CT 
exam have been documented in the patient's electronic medical record. Digital 
Imaging and Communications in Medicine (DICOM) format image data are available 
to nonaffiliated external healthcare facilities or entities on a secure, media 
free, reciprocally searchable basis with patient authorization for at least a 
12-month period after this study. _______________ FINDINGS: 
 
LOWER THORAX: Prominent circumferential mural thickening is seen throughout 
perihilar and left lower lobe bronchi with patchy opacity throughout most of the 
right lower lobe basilar segments. Focal dense consolidation is also seen 
throughout perihilar right middle lobe. Less prominent bronchial wall thickening 
throughout left lower lobe with a few regions of groundglass tree-in-bud 
alveolitis. No pleural effusion. Mild global cardiomegaly. No pericardial 
effusion. LIVER AND BILIARY: No suspicious liver lesions. No biliary dilation. Mild, 
nonspecific circumferential gallbladder wall thickening. Gallbladder is only 
mildly distended. No evident radiodense gallstones. SPLEEN:  Borderline splenomegaly. PANCREAS:  Mild diffuse pancreatic fatty atrophy. ADRENALS:  Normal. 
 
KIDNEYS:  Left and right renal cortical atrophy. LYMPH NODES:  No mesenteric or retroperitoneal lymphadenopathy. GI TRACT: Normal caliber small and large bowel loops. No morphology of bowel 
obstruction. No bowel wall thickening. Normal appendix. PELVIC ORGANS:  Bladder is normal in appearance. No acute abnormality. VASCULATURE: Normal caliber lower thoracic and abdominal aorta with moderate atherosclerotic vascular calcification. OTHER: Mild inflammatory stranding is seen throughout the mesenteric root. No 
ascites or free intraperitoneal air. Moderate-sized, fat filled periumbilical 
ventral hernia. OSSEOUS STRUCTURES:  No acute osseous abnormality. Moderate, asymmetric 
degenerative disc disease and facet arthropathy L4-S1. Prior posterior 
decompression at this level as well. 
 
_______________ Impression IMPRESSION: 
 
1. Acute bronchopneumonia throughout most of the right lower lobe basilar 
segments with circumferential bronchial wall thickening and multifocal dense 
consolidation. Dense consolidation is also seen separately within perihilar 
aspects of the right middle lobe. Alveolitis related to more central airway 
disease is seen throughout left lower lobe basilar segments. Recommend short 
interval follow-up chest CT in 6 months given somewhat masslike appearance of 
these regions of nodular opacity throughout the visualized lung bases. 2. Mild, nonspecific circumferential gallbladder wall thickening, raising 
potential for cholecystitis. Right upper quadrant ultrasound could better 
evaluate these findings. 3. Mild, nonspecific circumferential gallbladder wall thickening Mesenteric 
inflammatory stranding in the region of the mesenteric root is most suggestive 
of an acute panniculitis. MRI Results: 
Results from Hospital Encounter encounter on 01/18/11 MRI SPINE LUMBAR W AND W/O CONTRAST Narrative Ordering MD: Dominga Thomas MD 
Interpreted By: Salvador Bryan MD 
** FINAL ** 
--------------------------------------------------------------------- Procedure Date:  Jan 18 2011  5:56PM    Accession Number:  [id number] Order No: [id number] Procedure: MRD - L SPINE W/WO CONTRAST 
CPT Code: 04091 Reason: LOW BACK PAIN 
INTERPRETATION: 
HISTORY: Recent laminectomy and discectomy L4-S1 12/09/2010, with increasing low back pain and lower extremity pain and weakness. Previous studies not available for comparison. PROCEDURE: 
Sagittal spin echo T1 pre and post gadolinium sequences, sagittal  
MATEUS T2 and proton density sequences, and axial spin echo T1 pre and  
post gadolinium sequences and axial MATEUS T2 sequences were obtained  
of the lumbar spine. FINDINGS:  There is evidence of recent L4 and L5 laminotomies and  
likely partial discectomies with enhancing soft tissue changes  
dorsal to the canal in the surgical site and within the medial  
paraspinous musculature and overlying subcutaneous fat compatible  
with postop granulation. There is very small postop fluid collection  
deep in the laminectomy site dorsal to the canal measuring maximally 2.9 x 2.2 x 0.6 cm in greatest cephalocaudad transverse and AP  
dimensions. No other abnormal fluid collection. There is also mild  
increased T2 signal with enhancement involving the posterior aspect  
of the L4/5 and L5/S1 discs with slight endplate enhancement but no  
evidence of endplate destruction or irregularity and no other  
abnormal marrow edema or enhancement. There is normal alignment with  
normal vertebral body height with no evidence of fracture  
spondylolysis or spondylolisthesis. Mild fatty signal degenerative  
increased signal changes anterior/superior L4 endplate. No other  
abnormal marrow signal. Tip of the conus appears normal at the L1  
level. There are several enhancing nerve roots on both the right and  
left side of the cauda equina throughout its course without evidence  
of nerve root enlargement or significant clumping or separation of  
nerve roots. At L3/4, there is very mild facet arthropathy with slight increased T2 signal facet joints with no disc abnormality or significant  
stenosis. L4/5 shows mild disc space narrowing with enhancement in the  
anterior and right and left lateral epidural space compatible with postop fibrosis. There is also some enhancement surrounding the  
proximal exiting bilateral L5 nerve root sleeves. L5/S1 also shows enhancement in the anterior epidural space adjacent  
to proximal bilateral S1 nerve root sleeves and also adjacent to  
exiting proximal bilateral L5 nerve root sleeves in the foramen. There is disc space narrowing with residual mild bilateral foraminal  
stenosis with spurring and disc bulge extending into both foramen. Remaining lumbar and visualized lower thoracic disc levels  
unremarkable. IMPRESSION: 
1. Findings compatible with recent decompression and partial  
discectomy L4-S1, with very small nonspecific postop fluid  
collection dorsal to the canal and postop granulation in the  
additional posterior soft tissues. 2. No evidence of significant residual or recurrent disc protrusion  
or impingement at operative levels with enhancement posterior L4/5  
and L5/S1 discs and endplates and epidural enhancement at these  
levels and surrounding the exiting nerve root sleeves most likely  
related to postop granulation/fibrosis. Mild residual foraminal  
stenosis due to spurring and disc bulge bilateral L5/S1 foramen. 3. Abnormally enhancing nerve roots of cauda equina without nerve  
root enlargement or abnormal clumping or separation. Findings may be  
related to arachnoiditis/nonspecific neuritis. 4. Remaining disc levels unremarkable with mild facet arthropathy L3/4. Nuclear Medicine Results: 
Results from Hospital Encounter encounter on 09/10/20 NM HEPATOBILIARY W INTERVENTION Narrative EXAM: NM HEPATOBILIARY W INTERVENTION. CLINICAL INDICATION/HISTORY: RUQ PAIN. -Additional: None. COMPARISON: Correlation is made with the ultrasound of 09/10/2020.  
 
TECHNIQUE: Following the uneventful intravenous administration of 5.9 mCi Tc-99m 
mebrofenin, dynamic images of the right upper quadrant were acquired in the 
 anterior projection for 120 seconds per frame for 1 hour. Then, 0.02 mcg/kg of 
CCK was given intravenously, and an additional 30 minutes of imaging was 
obtained. Regions of interest were drawn around the gallbladder and gallbladder 
ejection fraction was calculated. 
_______________ FINDINGS:   
 
There is normal brisk radiotracer uptake by the liver, with prompt hepatic 
excretion of radiotracer and normal biliary to bowel transit. There is normal 
gallbladder visualization within the 1st hour of imaging. After CCK administration, there is no significant gallbladder contraction. The 
gallbladder ejection fraction is 4% (normal > 35%). _______________ Impression IMPRESSION:   
 
1. No evidence of acute cholecystitis. 2. Gallbladder ejection fraction of 4%, which is suggestive of biliary 
dyskinesis given normal visualization of the gallbladder within the 1st hour. 
 
_______________ US Results: 
Results from [place] encounter on 09/10/20 US ABD LTD Narrative EXAM: ULTRASOUND ABDOMEN LIMITED INDICATION: Epigastric and right upper quadrant abdominal pain. COMPARISON: CT abdomen/pelvis same day. TECHNIQUE: Real-time abdomen/right upper quadrant sonography in multiple planes 
was performed with image documentation. Grayscale, color flow Doppler imaging, 
and velocity spectral waveform analysis of the portal vein was performed (duplex 
imaging). _______________ FINDINGS: 
 
LIVER: Liver is borderline enlarged in size, largest dimension of the right 
hepatic lobe measuring 18.6 cm. Diffusely increased hepatic echotexture. No 
focal mass. Normal direction of blood flow in the portal vein. Portal vein 
measures 1.0 cm. Color and spectral flow analysis of the portal vein shows 
normal (hepatopedal) direction of flow. BILIARY SYSTEM: No intrahepatic biliary dilatation. Common bile duct is normal 
in caliber measuring 5 mm. GALLBLADDER: Moderate, heterogeneous circumferential gallbladder wall 
thickening, measuring up to 7 mm. There is also minimal adjacent pericholecystic 
fluid. No visualized gallstones. Negative sonographic Castro's sign. RIGHT KIDNEY: Normal in size, measuring 10.4 cm in length. No hydronephrosis or 
renal mass. No visible calculi. PANCREAS: Head and body are unremarkable in appearance though the tail is 
obscured by overlying bowel gas. IVC: Visualized portions are unremarkable in appearance. AORTA: Normal caliber. No aneurysm or dissection. OTHER: No free intraperitoneal fluid. 
 
_______________ Impression IMPRESSION: 
 
1. Moderate circumferential gallbladder wall thickening and minimal 
pericholecystic fluid are nonspecific given no visualized gallstones. Findings 
could potentially represent acalculus cystitis; more common etiologies such as 
third spacing, volume overload or reactive changes given adjacent chronic 
hepatic disease could also have a similar appearance. HIDA scan could better 
evaluate cystic duct obstruction if clinically suspected. 2. Hepatomegaly. Moderate hepatic steatosis. No focal hepatic mass. IR Results: 
Results from Hospital Encounter encounter on 08/16/11 IR SPINE Narrative Ordering MD: Carmen Josue MD 
Signed By: Fatmata Webb MD 
** FINAL ** 
--------------------------------------------------------------------- Procedure Date:  Aug 16 2011  9:42AM    Accession Number:  [id number] Order No: [id number] Procedure: SPD - SPINE 
CPT Code: 85333 Reason: DISCOGRAM LUMBAR LOW BACK PAIN 
INTERPRETATION: 
Lumbar diskograms and Post discogram Lumbar spine CT History: Low back pain, left extremity pain COMPARISON: Postoperative MR lumbar spine 01/18/2011 Technique: Informed written consent was obtained from the patient.    
Lengthy discussion of the procedure as well as the patient's typical  
 pain.  Patient describes typical pain severe aching quality low back  
pain along the middle of his back, typically rated at 5-6/10. When  
his pain becomes more severe, it may increase to 9-10/10. Often  
during the severe episodes, additional sharp quality back pain is  
noted which will intermittently extend down his left leg. Patient  
also complains of intermittent left leg numbness and disturbance of  
skin sensation. Prior to the procedure, patient rated his typical  
aching  quality back pain as 3-4/10. Following needle placement  
prior to contrast injection, his pain has increased to nearly 8/10. The patient was placed prone upon the fluoroscopy table. Patients  
back was prepped and draped in normal sterile fashion. One percent  
lidocaine buffered sodium bicarbonate was used for local anesthetic. Using fluoroscopic guidance and posterior oblique approach, a 21  
gauge 15 cm needle was advanced to the posterolateral aspect of the  
L3/4 disc. A 25 gauge curved needle was advanced into coaxial  
fashion through this needle and was positioned with its tip located  
centrally within the nucleus pulposus. Using fluoroscopic guidance  
and posterior oblique approach, a 21 gauge 20 cm needle was advanced  
to the posterolateral aspect of the L4/5 disc. A 25 gauge curved  
needle was advanced into coaxial fashion through this needle and was  
positioned with its tip located centrally within the nucleus  
pulposus. Using fluoroscopic guidance and posterior oblique  
approach, a 21 gauge 20 cm needle was advanced to the posterolateral  
aspect of the disc at the L5/S1 level. Manual curve placed along  
needle to assist with placement. A 25 gauge curved needle was  
advanced into coaxial fashion through this needle and was positioned  
with its tip located in the nucleus pulposus. Contrast was slowly  
injected into each disc under direct fluoroscopic  observation. Lengthy questioning of any induced pain and its correlation with  
patient's typical pain occurred. 2% intradiscal lidocaine was also  
injected L4-L5 and L5-S1. Multiple digital fluoroscopic images were  
obtained. Needles were removed. Hemostasis was achieved. Patient  
was sent for post discogram lumbar spine CT. Multiple axial CT images of the lumbar spine were performed after  
injection of contrast into multiple intervertebral discs during  
discography. Angled axial reconstructions parallel to disc levels  
were also performed. Sagittal and coronal reconstructions was also  
performed. Findings:  
Lumbar discography Normal lumbar vertebral alignment is present. No fracture or  
subluxation is seen. Patient has undergone prior L4 and L5  
laminectomies. At the L3/4 level,  the disc has also normal morphology with perhaps  
minimal inner annular irregularity anteriorly. Approximately 1.5 cc  
of contrast was injected into the disc which was met with firm   
resistance at the end of injection. The patient described no  
significant pain with injection, only mild pressure. At the L4/5 level, disc has abnormal and irregular morphology. Contrast is preferentially remaining in the central and right aspect  
of this postoperative disc. Slightly less than 1.5 of contrast was  
injected into this disc, met with moderate, irregular resistance at  
end of injection. During injection patient noted typical aching  
quality pain along the midline of his lower back. During injection  
there was a brief episode of atypical sharp quality back pain, which  
directly correlated with extension of contrast through an annular  
tear to the posterior aspect of the disc. This atypical sharp pain  
did not persist or recur with additional injection.  2% lidocaine  
injection showed no change in this aching quality back pain at this  
level, rated 9/10, but during this injection elicited brief, typical  
 left leg sharp quality pain. At the L5/S1 level, disc has abnormal and irregular morphology. Contrast is preferentially remaining in the central and right aspect  
of this postoperative disc. Approximately 1  cc of contrast was  
injected into this disc which was met with moderate and irregular  
resistance at the end of injection. There is severe, typical aching  
quality back pain was produced rated 10/10, greater than his typical  
back pain. 2% intradiscal lidocaine was injected, which showed no  
significant improvement. A few minutes following 2% lidocaine, no  
significant change was noted. Post discogram lumbar spine CT Normal alignment is present. No subluxation is seen. L3/4 through L5/S1 discs contain contrast from recent injection. Patient has  
undergone prior partial L4 until L5 laminectomy. Mild endplate  
osteophyte formation is seen in the lower lumbar spine. T12-L1 through L2-L3 levels are unremarkable. There is no central or  
foraminal stenosis. L3/4 disc:  Disc has a relatively normal morphology. There is no  
central or foraminal stenosis. L4/5 disc:  Disc is moderately irregular. Contrast preferentially  
remains in the left and central aspect of this postoperative disc. Contrast is seen extending through an annular tear to the posterior  
midline and right central aspect of the disc. There is slight mass  
effect on the thecal sac at this level. Portion of the disc not  
opacified with contrast correlates with the enhancing scar seen on  
prior MRI. There is a small amount of extravasation ventral epidural  
contrast at this level. Mild disc bulges suggest with slight  
foraminal narrowing. No central stenosis is seen at this  
postlaminectomy level. L5/S1 disc:  Disc is moderately irregular. Contrast preferentially  
remains in the left and central aspect of this postoperative disc. The portion of the disc not filling with contrast correlates with the enhancing scar seen on prior MRI. No central stenosis is seen  
at this level, but the thecal sac appears to be rapidly tapering. There is small and posterior endplate osteophyte formation mostly in  
the left central in location as well as along foramina. There is  
slight bilateral foraminal narrowing present. The visualized retroperitoneal structures are unremarkable. IMPRESSION: 
1. Status post partial L4-L5 laminectomies as well as partial  
discectomies at these 2 disc levels. 2. Moderately irregular postoperative L5-S1 disc. Very severe,  
concordant back pain is produced during injection of this disc. 3. Moderately irregular postoperative L4-L5 disc. Severe, concordant  
back pain is also produced during injection of this disc, with  
severity of pain not as great as the L5-S1 disc. Additional annular  
tear noted at this level with brief, atypical sharp  pain. 3. No substantial left lower extremity pain produced during  
injection of these discs, although there was brief typical sharp  
left leg pain during L4-L5 disc lidocaine injection. 4. No high-grade central or foraminal stenosis. VAS/US Results: 
Results from Hospital Encounter encounter on 05/17/15 DUPLEX ABD VISC ART ORGANS COMPLETE Total discharge time 35 minutes of which more than 50 % spent on counseling and coordination of care. This document in whole or part of it has been produced using voice recognition software. Unrecognized errors in transcription may be present. Linda Lockhart MD 
Hospitalist 
[facility]. medical group. Hospitalist Division.  
September 13, 2020 
11:24 AM

## 2020-09-13 NOTE — DISCHARGE INSTRUCTIONS
Patient Education   Patient Education   Influenza A Virus Vaccine, H1N1, Inactivated (By injection)   Influenza A Virus Vaccine, H1N1, Inactivated (in-floo-EN-za AY VYE-jeremías VAX-een, H1N1, in-AK-ti-vay-claribel)  Keeps you from getting sick with an influenza A (H1N1) 2009 virus (\"catching the flu\"). Brand Name(s):   There may be other brand names for this medicine. When This Medicine Should Not Be Used: You should not receive this vaccine if you have had an allergic reaction to influenza vaccine (a \"flu shot\"), eggs, egg products, chicken products, neomycin (Mycifradin®), or polymyxin. How to Use This Medicine:   Injectable  · Your doctor will prescribe your exact dose and tell you how often it should be given. This medicine is given as a shot into one of your muscles. · A nurse or other health provider will give you this medicine. If a dose is missed:   · Most people need only one dose of this medicine. · If your child needs a second dose of this medicine, it is very important for your child to receive the second dose on schedule. If you must cancel the appointment for the second dose, make another appointment as close to that date as possible. Drugs and Foods to Avoid:   Ask your doctor or pharmacist before using any other medicine, including over-the-counter medicines, vitamins, and herbal products. · Make sure your doctor knows if you are using any medicine that weakens the immune system (such as steroids, cancer treatments, or radiation). Warnings While Using This Medicine:   · Make sure your doctor knows if you are pregnant or breastfeeding, or if you have HIV or AIDS, cancer, problems with your immune system, or a history of Guillain-Barré syndrome (a severe nerve and muscle problem). · This vaccine may cause a serious type of allergic reaction called anaphylaxis. Anaphylaxis can be life-threatening and requires immediate medical attention.  Tell your doctor right away if you have a rash, itching, swelling of the tongue and throat, or trouble with breathing after you get the injection. · Avoid people who are sick or have infections. · This medicine will not treat flu symptoms if you already have the virus. Possible Side Effects While Using This Medicine:   Call your doctor right away if you notice any of these side effects:  · Allergic reaction: Itching or hives, swelling in your face or hands, swelling or tingling in your mouth or throat, chest tightness, trouble breathing  · Fever, chills, cough, sore throat, and body aches. · Severe headache. · Unusual tiredness or weakness. If you notice these less serious side effects, talk with your doctor:   · Back, muscle, or joint pain. · Diarrhea, nausea, or vomiting. · Mild headache. · Pain, redness, swelling, or tenderness where the shot was given. · Stuffy or runny nose  · Sweating. If you notice other side effects that you think are caused by this medicine, tell your doctor. Call your doctor for medical advice about side effects. You may report side effects to FDA at 3-455-FDA-9667  © 2015 [address] is for End User's use only and may not be sold, redistributed or otherwise used for commercial purposes. The above information is an  only. It is not intended as medical advice for individual conditions or treatments. Talk to your doctor, nurse or pharmacist before following any medical regimen to see if it is safe and effective for you. Learning How To Care for Someone Who Has COVID-19  Things to know  Most people who get COVID-19 will recover with time and home care. Here are some things to know if you're caring for someone who's sick. · Treat the symptoms. Common symptoms include a fever, coughing, and feeling short of breath. Urge the person to get extra rest and drink plenty of fluids to replace fluids lost from fever. To reduce a fever, offer acetaminophen (such as Tylenol).  It may also help with muscle aches. Read and follow all instructions on the label. · Watch for signs that the illness is getting worse. The person may need medical care if they're getting sicker (for example, if it's hard to breathe). But call the doctor's office before you go. They can tell you what to do. Call 911 or emergency services if the person has any of these symptoms:  ? Severe trouble breathing or shortness of breath. ? Constant pain or pressure in their chest.  ? Confusion, or trouble thinking clearly. ? A blue tint to their lips or face. Some people are more likely to get very sick and need medical care. Call the doctor as soon as symptoms start if the person you're caring for is over 72, smokes, or has a serious health problem, like asthma, heart disease, diabetes, or an immune system problem. · Protect yourself and others. The virus spreads easily from person to person, so take extra care to avoid catching or spreading the infection. ? Keep the sick person away from others as much as you can. § Have the person stay in one room. If you can, give them their own bathroom to use. § Have only one person take care of them. Keep other people--and pets--out of the sickroom. § Have the person wear a cloth face cover around other people. This includes when anyone is in the room with them or if they leave their room (for example, to go to the bathroom). If the face cover makes it harder for the sick person to breathe, the other person should wear a face cover. § Don't share personal items. These include dishes, cups, towels, and bedding. ? Wash your hands often and well. Use soap and water, and scrub for at least 20 seconds. This is especially important after you've been around the sick person or touched things they've touched. If soap and water aren't handy, use a hand  with at least 60% alcohol. ? Avoid touching your mouth, nose, and eyes. ? Take care with the person's laundry.  It's okay to wash the sick person's laundry with yours. If you have them, wear disposable gloves when handling their dirty laundry, and wash your hands well after you touch it. Wash items in the warmest water allowed for the fabric type, and dry them completely. ? Clean high-touch items every day and anytime the sick person touches them. These include doorknobs, light switches, toilets, counters, and remote controls. Use a household disinfectant or a homemade bleach solution. (Follow the directions on the label.) If the sick person has their own room, have them disinfect it every day. ? Limit visitors to your home. To help protect family and friends, stay in touch with them only by phone or computer. Current as of: July 10, 2020               Content Version: 12.6  © 2620-3917 [employer]. Care instructions adapted under license by [employer] (which disclaims liability or warranty for this information). If you have questions about a medical condition or this instruction, always ask your healthcare professional. [place] [address] any warranty or liability for your use of this information.

## 2020-09-13 NOTE — PROGRESS NOTES
Problem: Falls - Risk of 
Goal: *Absence of Falls Description: Document Donata Carrel Fall Risk and appropriate interventions in the flowsheet. Outcome: Progressing Towards Goal 
Note: Fall Risk Interventions: 
Mobility Interventions: Communicate number of staff needed for ambulation/transfer, Patient to call before getting OOB Medication Interventions: Teach patient to arise slowly, Patient to call before getting OOB Problem: Patient Education: Go to Patient Education Activity Goal: Patient/Family Education Outcome: Progressing Towards Goal 
  
Problem: Discharge Planning Goal: *Discharge to safe environment Outcome: Progressing Towards Goal 
  
Problem: Pain Goal: *Control of Pain Outcome: Progressing Towards Goal 
  
Problem: Patient Education: Go to Patient Education Activity Goal: Patient/Family Education Outcome: Progressing Towards Goal 
  
Problem: Hypertension Goal: *Blood pressure within specified parameters Outcome: Progressing Towards Goal 
Goal: *Fluid volume balance Outcome: Progressing Towards Goal 
Goal: *Labs within defined limits Outcome: Progressing Towards Goal 
  
Problem: Patient Education: Go to Patient Education Activity Goal: Patient/Family Education Outcome: Progressing Towards Goal 
  
Problem: Patient Education: Go to Patient Education Activity Goal: Patient/Family Education Outcome: Progressing Towards Goal 
  
Problem: Acute Renal Failure: Day 1 Goal: Off Pathway (Use only if patient is Off Pathway) Outcome: Progressing Towards Goal 
Goal: Activity/Safety Outcome: Progressing Towards Goal 
Goal: Consults, if ordered Outcome: Progressing Towards Goal 
Goal: Diagnostic Test/Procedures Outcome: Progressing Towards Goal 
Goal: Nutrition/Diet Outcome: Progressing Towards Goal 
Goal: Discharge Planning Outcome: Progressing Towards Goal 
Goal: Medications Outcome: Progressing Towards Goal 
Goal: Respiratory Outcome: Progressing Towards Goal 
 Goal: Treatments/Interventions/Procedures Outcome: Progressing Towards Goal 
Goal: Psychosocial 
Outcome: Progressing Towards Goal 
Goal: *Optimal pain control at patient's stated goal 
Outcome: Progressing Towards Goal 
Goal: *Urinary output within identified parameters Outcome: Progressing Towards Goal 
Goal: *Hemodynamically stable Outcome: Progressing Towards Goal 
Goal: *Tolerating diet Outcome: Progressing Towards Goal 
  
Problem: Acute Renal Failure: Day 2 Goal: Off Pathway (Use only if patient is Off Pathway) Outcome: Progressing Towards Goal 
Goal: Activity/Safety Outcome: Progressing Towards Goal 
Goal: Consults, if ordered Outcome: Progressing Towards Goal 
Goal: Diagnostic Test/Procedures Outcome: Progressing Towards Goal 
Goal: Nutrition/Diet Outcome: Progressing Towards Goal 
Goal: Discharge Planning Outcome: Progressing Towards Goal 
Goal: Medications Outcome: Progressing Towards Goal 
Goal: Respiratory Outcome: Progressing Towards Goal 
Goal: Treatments/Interventions/Procedures Outcome: Progressing Towards Goal 
Goal: Psychosocial 
Outcome: Progressing Towards Goal 
Goal: *Optimal pain control at patient's stated goal 
Outcome: Progressing Towards Goal 
Goal: *Urinary output within identified parameters Outcome: Progressing Towards Goal 
Goal: *Hemodynamically stable Outcome: Progressing Towards Goal 
Goal: *Tolerating diet Outcome: Progressing Towards Goal 
Goal: *Lab values improving Outcome: Progressing Towards Goal 
  
Problem: Acute Renal Failure: Day 3 Goal: Off Pathway (Use only if patient is Off Pathway) Outcome: Progressing Towards Goal 
Goal: Activity/Safety Outcome: Progressing Towards Goal 
Goal: Consults, if ordered Outcome: Progressing Towards Goal 
Goal: Diagnostic Test/Procedures Outcome: Progressing Towards Goal 
Goal: Nutrition/Diet Outcome: Progressing Towards Goal 
Goal: Discharge Planning Outcome: Progressing Towards Goal 
Goal: Medications Outcome: Progressing Towards Goal 
Goal: Respiratory Outcome: Progressing Towards Goal 
Goal: Treatments/Interventions/Procedures Outcome: Progressing Towards Goal 
Goal: Psychosocial 
Outcome: Progressing Towards Goal 
Goal: *Optimal pain control at patient's stated goal 
Outcome: Progressing Towards Goal 
Goal: *Urinary output within identified parameters Outcome: Progressing Towards Goal 
Goal: *Hemodynamically stable Outcome: Progressing Towards Goal 
Goal: *Tolerating diet Outcome: Progressing Towards Goal 
Goal: *Lab values improving Outcome: Progressing Towards Goal 
  
Problem: Acute Renal Failure: Day 4 Goal: Off Pathway (Use only if patient is Off Pathway) Outcome: Progressing Towards Goal 
Goal: Activity/Safety Outcome: Progressing Towards Goal 
Goal: Consults, if ordered Outcome: Progressing Towards Goal 
Goal: Diagnostic Test/Procedures Outcome: Progressing Towards Goal 
Goal: Nutrition/Diet Outcome: Progressing Towards Goal 
Goal: Discharge Planning Outcome: Progressing Towards Goal 
Goal: Medications Outcome: Progressing Towards Goal 
Goal: Respiratory Outcome: Progressing Towards Goal 
Goal: Treatments/Interventions/Procedures Outcome: Progressing Towards Goal 
Goal: Psychosocial 
Outcome: Progressing Towards Goal 
Goal: *Optimal pain control at patient's stated goal 
Outcome: Progressing Towards Goal 
Goal: *Urinary output within identified parameters Outcome: Progressing Towards Goal 
Goal: *Hemodynamically stable Outcome: Progressing Towards Goal 
Goal: *Tolerating diet Outcome: Progressing Towards Goal 
Goal: *Lab values improving Outcome: Progressing Towards Goal

## 2020-09-13 NOTE — PROGRESS NOTES
1900  -- Verbal and Written shift change report given to [id number] Arely Greenberg (oncoming nurse) by MICHAEL  (offgoing nurse). Allergy band placed on pt's wrist. Report included the following information SBAR, Kardex, Intake/Output, MAR and Recent Results. Pt is off unit at Dialysis. 2030 -- Pt returned to unit. 2222 -- All hypertension medications (Clonidine, Hydralazine and Imdur) held due to pt's vitals (see flowsheet 09/12/20 2222).    
2228 -- PM medications administered, pt tolerated with ease, will continue to monitor. 
  
0015 -- Shift reassessment, pt condition unchanged, will continue to monitor. 0240 -- PRN pain medication administered, pt tolerated with ease, will continue to monitor. 
   
0430 --  Shift reassessment, pt condition unchanged, will continue to monitor.   
   
0730  -- Bedside, Verbal and Written shift change report given to KARLIE  (oncoming nurse) by RAVEN (offgoing nurse). Report included the following information SBAR, Kardex, Intake/Output, MAR and Recent Results. Skin assessment completed.

## 2020-09-13 NOTE — PROGRESS NOTES
Discharge/Transition Planning Problem: Discharge Planning Goal: *Discharge to safe environment Outcome: Resolved/Met Care Management Interventions PCP Verified by CM: Yes([facility] on [address]) Mode of Transport at Discharge: Self Transition of Care Consult (CM Consult): Discharge Planning Current Support Network: Lives with Spouse Confirm Follow Up Transport: Self The Plan for Transition of Care is Related to the Following Treatment Goals : resolution of acute needs Discharge Location Discharge Placement: Home

## 2020-09-13 NOTE — PROGRESS NOTES
Problem: Falls - Risk of 
Goal: *Absence of Falls Description: Document Anderson De La Fuente Fall Risk and appropriate interventions in the flowsheet. 9/13/2020 1140 by Nelly Alejo Outcome: Resolved/Met Note: Fall Risk Interventions: 
Mobility Interventions: Communicate number of staff needed for ambulation/transfer, Patient to call before getting OOB Medication Interventions: Patient to call before getting OOB, Teach patient to arise slowly 9/13/2020 1140 by Nelly Alejo Outcome: Resolved/Not Met Note: Fall Risk Interventions: 
Mobility Interventions: Communicate number of staff needed for ambulation/transfer, Patient to call before getting OOB Medication Interventions: Patient to call before getting OOB, Teach patient to arise slowly

## 2020-09-14 NOTE — PROGRESS NOTES
Care Transitions Nurse ( CTN) attempted to contact patient via telephone call. A female answered and stated that patient is not available. CTN to attempt to call back on another day.

## 2020-09-15 NOTE — PROGRESS NOTES
Care Transitions Nurse ( CTN) attempted to contact patient via telephone call for Hospital discharge and Covid-19 risk information. A female answered the phone and stated that patient is not available and reports that patient is doing well. Episode of care resolved as CTN was unable to reach patient for follow up x2.

## 2020-10-22 NOTE — ED NOTES
4:44 AM 
10/22/20 Discharge instructions given to patient (name) with verbalization of understanding. Patient accompanied by self. Patient discharged with the following prescriptions Augment, Doxycycline, Mucinex, Prednisone. Patient discharged to home (destination).   
  
Rachel Becerra RN

## 2020-10-22 NOTE — DISCHARGE INSTRUCTIONS
Patient Education        Asthma in Adults: Care Instructions  Your Care Instructions     During an asthma attack, your airways swell and narrow as a reaction to certain things (triggers). This makes it hard to breathe. You may be able to prevent asthma attacks if you avoid the things that set off your asthma symptoms. Keeping your asthma under control and treating symptoms before they get bad can help you avoid severe attacks. If you can control your asthma, you may be able to do all of your normal daily activities. You may also avoid asthma attacks and trips to the hospital.  Follow-up care is a key part of your treatment and safety. Be sure to make and go to all appointments, and call your doctor if you are having problems. It's also a good idea to know your test results and keep a list of the medicines you take. How can you care for yourself at home? · Follow your asthma action plan so you can manage your symptoms at home. An asthma action plan will help you prevent and control airway reactions and will tell you what to do during an asthma attack. If you do not have an asthma action plan, work with your doctor to build one. · Take your asthma medicine exactly as prescribed. Medicine plays an important role in controlling asthma. Talk to your doctor right away if you have any questions about what to take and how to take it. ? Use your quick-relief medicine when you have symptoms of an attack. Quick-relief medicine often is an albuterol inhaler. Some people need to use quick-relief medicine before they exercise. ? Take your controller medicine every day, not just when you have symptoms. Controller medicine is usually an inhaled corticosteroid. The goal is to prevent problems before they occur. Do not use your controller medicine to try to treat an attack that has already started. It does not work fast enough to help.   ? If your doctor prescribed corticosteroid pills to use during an attack, take them as directed. They may take hours to work, but they may shorten the attack and help you breathe better. ? Keep your quick-relief medicine with you at all times. · Talk to your doctor before using other medicines. Some medicines, such as aspirin, can cause asthma attacks in some people. · Check yourself for asthma symptoms to know which step to follow in your action plan. Watch for things like being short of breath, having chest tightness, coughing, and wheezing. Also notice if symptoms wake you up at night or if you get tired quickly when you exercise. · If you have a peak flow meter, use it to check how well you are breathing. This can help you predict when an asthma attack is going to occur. Then you can take medicine to prevent the asthma attack or make it less severe. · See your doctor regularly. These visits will help you learn more about asthma and what you can do to control it. Your doctor will monitor your treatment to make sure the medicine is helping you. · Keep track of your asthma attacks and your treatment. After you have had an attack, write down what triggered it, what helped end it, and any concerns you have about your asthma action plan. Take your diary when you see your doctor. You can then review your asthma action plan and decide if it is working. · Do not smoke or allow others to smoke around you. Avoid smoky places. Smoking makes asthma worse. If you need help quitting, talk to your doctor about stop-smoking programs and medicines. These can increase your chances of quitting for good. · Learn what triggers an asthma attack for you, and avoid the triggers when you can. Common triggers include colds, smoke, air pollution, dust, pollen, mold, pets, cockroaches, stress, and cold air. · Avoid colds and the flu. Get a pneumococcal vaccine shot. If you have had one before, ask your doctor whether you need a second dose. Get a flu vaccine every fall.  If you must be around people with colds or the flu, wash your hands often. When should you call for help? Call 911 anytime you think you may need emergency care. For example, call if:    · You have severe trouble breathing. Call your doctor now or seek immediate medical care if:    · Your symptoms do not get better after you have followed your asthma action plan.     · You cough up yellow, dark brown, or bloody mucus (sputum). Watch closely for changes in your health, and be sure to contact your doctor if:    · Your coughing and wheezing get worse.     · You need to use quick-relief medicine on more than 2 days a week (unless it is just for exercise).     · You need help figuring out what is triggering your asthma attacks. Where can you learn more? Go to http://www.gray.com/  Enter P597 in the search box to learn more about \"Asthma in Adults: Care Instructions. \"  Current as of: February 24, 2020               Content Version: 12.6  © 1332-3280 [employer]. Care instructions adapted under license by [employer] (which disclaims liability or warranty for this information). If you have questions about a medical condition or this instruction, always ask your healthcare professional. [place] [address] any warranty or liability for your use of this information. Patient Education        Pneumonia: Care Instructions  Your Care Instructions     Pneumonia is an infection of the lungs. Most cases are caused by infections from bacteria or viruses. Pneumonia may be mild or very severe. If it is caused by bacteria, you will be treated with antibiotics. It may take a few weeks to a few months to recover fully from pneumonia, depending on how sick you were and whether your overall health is good. Follow-up care is a key part of your treatment and safety. Be sure to make and go to all appointments, and call your doctor if you are having problems.  It's also a good idea to know your test results and keep a list of the medicines you take. How can you care for yourself at home? · Take your antibiotics exactly as directed. Do not stop taking the medicine just because you are feeling better. You need to take the full course of antibiotics. · Take your medicines exactly as prescribed. Call your doctor if you think you are having a problem with your medicine. · Get plenty of rest and sleep. You may feel weak and tired for a while, but your energy level will improve with time. · To prevent dehydration, drink plenty of fluids, enough so that your urine is light yellow or clear like water. Choose water and other caffeine-free clear liquids until you feel better. If you have kidney, heart, or liver disease and have to limit fluids, talk with your doctor before you increase the amount of fluids you drink. · Take care of your cough so you can rest. A cough that brings up mucus from your lungs is common with pneumonia. It is one way your body gets rid of the infection. But if coughing keeps you from resting or causes severe fatigue and chest-wall pain, talk to your doctor. He or she may suggest that you take a medicine to reduce the cough. · Use a vaporizer or humidifier to add moisture to your bedroom. Follow the directions for cleaning the machine. · Do not smoke or allow others to smoke around you. Smoke will make your cough last longer. If you need help quitting, talk to your doctor about stop-smoking programs and medicines. These can increase your chances of quitting for good. · Take an over-the-counter pain medicine, such as acetaminophen (Tylenol), ibuprofen (Advil, Motrin), or naproxen (Aleve). Read and follow all instructions on the label. · Do not take two or more pain medicines at the same time unless the doctor told you to. Many pain medicines have acetaminophen, which is Tylenol. Too much acetaminophen (Tylenol) can be harmful.   · If you were given a spirometer to measure how well your lungs are working, use it as instructed. This can help your doctor tell how your recovery is going. · To prevent pneumonia in the future, talk to your doctor about getting a flu vaccine (once a year) and a pneumococcal vaccine (one time only for most people). When should you call for help? Call 911 anytime you think you may need emergency care. For example, call if:    · You have severe trouble breathing. Call your doctor now or seek immediate medical care if:    · You cough up dark brown or bloody mucus (sputum).     · You have new or worse trouble breathing.     · You are dizzy or lightheaded, or you feel like you may faint. Watch closely for changes in your health, and be sure to contact your doctor if:    · You have a new or higher fever.     · You are coughing more deeply or more often.     · You are not getting better after 2 days (48 hours).     · You do not get better as expected. Where can you learn more? Go to http://www.[employer].com/  Enter D336 in the search box to learn more about \"Pneumonia: Care Instructions. \"  Current as of: February 24, 2020               Content Version: 12.6  © 0789-5484 [employer]. Care instructions adapted under license by [employer] (which disclaims liability or warranty for this information). If you have questions about a medical condition or this instruction, always ask your healthcare professional. [address] any warranty or liability for your use of this information.

## 2020-10-22 NOTE — ED TRIAGE NOTES
Increasing SOB over past 2 days, cough producing green phlegm. Reports finishing course of Doxycycline 2 days ago. Denies fever, Denies chest pain.
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
## 2020-11-10 NOTE — PROGRESS NOTES
0800: Bedside and Verbal shift change report given to Mekhi Kaye RN and Juline Litten, RN (oncoming nurse) by Imani Valdez RN (offgoing nurse). Report included the following information SBAR, Kardex and MAR. Albendazole Counseling:  I discussed with the patient the risks of albendazole including but not limited to cytopenia, kidney damage, nausea/vomiting and severe allergy.  The patient understands that this medication is being used in an off-label manner.

## 2020-12-02 NOTE — TELEPHONE ENCOUNTER
PCP: None    Last appt: 08/12/2020  Future Appointments   Date Time Provider Radha Thakkar   2/17/2021  4:00 PM CSIROBINA [facility] NEHEMIAS CAMARGO   5/19/2021  1:20 PM CSI, ROBINA [facility] NEHEMIAS CAMARGO       Requested Prescriptions     Pending Prescriptions Disp Refills    cloNIDine HCL (CATAPRES) 0.1 mg tablet 180 Tab 0     Sig: Take 2 Tabs by mouth two (2) times a day. Request for a 30 or 90 day supply? Provider Discretion    Pharmacy: Confirmed    Other Comments:  Medication refill request via phone.  Medication prescribed at hospital.

## 2020-12-14 NOTE — TELEPHONE ENCOUNTER
PCP: None    Last appt: 08/12/2020  Future Appointments   Date Time Provider Radha Thakkar   2/17/2021  4:00 PM CSIROBINA NEHEMIAS CAMARGO   5/19/2021  1:20 PM CSI, ROBINA [facility] NEHEMIAS CAMARGO       Requested Prescriptions     Pending Prescriptions Disp Refills    carvediloL (COREG) 25 mg tablet 180 Tab 3     Sig: Take 1 Tab by mouth two (2) times daily (with meals). Request for a 30 or 90 day supply? Provider Discretion    Pharmacy: Confirmed     Other Comments:  Medication refill request via phone.

## 2021-01-01 ENCOUNTER — HOSPITAL ENCOUNTER (EMERGENCY)
Age: 51
Discharge: STILL ADMITTED OR ADMINISTRATIVE | End: 2021-02-22
Attending: EMERGENCY MEDICINE
Payer: MEDICARE

## 2021-01-01 ENCOUNTER — APPOINTMENT (OUTPATIENT)
Dept: GENERAL RADIOLOGY | Age: 51
End: 2021-01-01
Attending: EMERGENCY MEDICINE
Payer: MEDICARE

## 2021-01-01 ENCOUNTER — HOSPITAL ENCOUNTER (EMERGENCY)
Age: 51
End: 2021-02-22
Attending: EMERGENCY MEDICINE
Payer: MEDICARE

## 2021-01-01 VITALS
OXYGEN SATURATION: 85 % | HEART RATE: 95 BPM | BODY MASS INDEX: 37.16 KG/M2 | SYSTOLIC BLOOD PRESSURE: 75 MMHG | DIASTOLIC BLOOD PRESSURE: 13 MMHG | WEIGHT: 274 LBS

## 2021-01-01 DIAGNOSIS — E87.5 ACUTE HYPERKALEMIA: ICD-10-CM

## 2021-01-01 DIAGNOSIS — N18.6 ESRD ON HEMODIALYSIS (HCC): ICD-10-CM

## 2021-01-01 DIAGNOSIS — I46.9 CARDIAC ARREST (HCC): Primary | ICD-10-CM

## 2021-01-01 DIAGNOSIS — I50.1: ICD-10-CM

## 2021-01-01 DIAGNOSIS — Z99.2 ESRD ON HEMODIALYSIS (HCC): ICD-10-CM

## 2021-01-01 DIAGNOSIS — N17.9 ACUTE RENAL FAILURE, UNSPECIFIED ACUTE RENAL FAILURE TYPE (HCC): ICD-10-CM

## 2021-01-01 LAB
ALBUMIN SERPL-MCNC: 3 G/DL (ref 3.4–5)
ALBUMIN/GLOB SERPL: 0.8 {RATIO} (ref 0.8–1.7)
ALP SERPL-CCNC: 83 U/L (ref 45–117)
ALT SERPL-CCNC: 16 U/L (ref 16–61)
ANION GAP SERPL CALC-SCNC: 18 MMOL/L (ref 3–18)
APTT PPP: 31.2 SEC (ref 23–36.4)
AST SERPL-CCNC: 21 U/L (ref 10–38)
BACTERIA SPEC CULT: NORMAL
BACTERIA SPEC CULT: NORMAL
BASOPHILS # BLD: 0.1 K/UL (ref 0–0.1)
BASOPHILS NFR BLD: 1 % (ref 0–2)
BILIRUB SERPL-MCNC: 0.4 MG/DL (ref 0.2–1)
BNP SERPL-MCNC: ABNORMAL PG/ML (ref 0–450)
BUN SERPL-MCNC: 25 MG/DL (ref 7–18)
BUN/CREAT SERPL: 3 (ref 12–20)
CALCIUM SERPL-MCNC: 8.4 MG/DL (ref 8.5–10.1)
CHLORIDE SERPL-SCNC: 99 MMOL/L (ref 100–111)
CK SERPL-CCNC: 65 U/L (ref 39–308)
CO2 SERPL-SCNC: 20 MMOL/L (ref 21–32)
COVID-19 RAPID TEST, COVR: NOT DETECTED
CREAT SERPL-MCNC: 8.78 MG/DL (ref 0.6–1.3)
D DIMER PPP FEU-MCNC: 4.76 UG/ML(FEU)
DIFFERENTIAL METHOD BLD: ABNORMAL
EOSINOPHIL # BLD: 0.3 K/UL (ref 0–0.4)
EOSINOPHIL NFR BLD: 2 % (ref 0–5)
ERYTHROCYTE [DISTWIDTH] IN BLOOD BY AUTOMATED COUNT: 18.7 % (ref 11.6–14.5)
GLOBULIN SER CALC-MCNC: 3.9 G/DL (ref 2–4)
GLUCOSE BLD STRIP.AUTO-MCNC: 208 MG/DL (ref 70–110)
GLUCOSE SERPL-MCNC: 226 MG/DL (ref 74–99)
HCT VFR BLD AUTO: 35.8 % (ref 36–48)
HGB BLD-MCNC: 10.3 G/DL (ref 13–16)
INR PPP: 1.2 (ref 0.8–1.2)
LACTATE BLD-SCNC: 11.52 MMOL/L (ref 0.4–2)
LYMPHOCYTES # BLD: 5.3 K/UL (ref 0.9–3.6)
LYMPHOCYTES NFR BLD: 41 % (ref 21–52)
MAGNESIUM SERPL-MCNC: 2.5 MG/DL (ref 1.6–2.6)
MCH RBC QN AUTO: 33.4 PG (ref 24–34)
MCHC RBC AUTO-ENTMCNC: 28.8 G/DL (ref 31–37)
MCV RBC AUTO: 116.2 FL (ref 74–97)
MONOCYTES # BLD: 0.6 K/UL (ref 0.05–1.2)
MONOCYTES NFR BLD: 5 % (ref 3–10)
NEUTS SEG # BLD: 6.7 K/UL (ref 1.8–8)
NEUTS SEG NFR BLD: 51 % (ref 40–73)
PLATELET # BLD AUTO: 151 K/UL (ref 135–420)
PMV BLD AUTO: 10.3 FL (ref 9.2–11.8)
POTASSIUM SERPL-SCNC: 8 MMOL/L (ref 3.5–5.5)
PROT SERPL-MCNC: 6.9 G/DL (ref 6.4–8.2)
PROTHROMBIN TIME: 14.9 SEC (ref 11.5–15.2)
RBC # BLD AUTO: 3.08 M/UL (ref 4.7–5.5)
SARS-COV-2, COV2: NORMAL
SERVICE CMNT-IMP: NORMAL
SERVICE CMNT-IMP: NORMAL
SODIUM SERPL-SCNC: 137 MMOL/L (ref 136–145)
SOURCE, COVRS: NORMAL
TROPONIN I SERPL-MCNC: 1.34 NG/ML (ref 0–0.04)
WBC # BLD AUTO: 13 K/UL (ref 4.6–13.2)

## 2021-01-01 PROCEDURE — 31500 INSERT EMERGENCY AIRWAY: CPT

## 2021-01-01 PROCEDURE — 96374 THER/PROPH/DIAG INJ IV PUSH: CPT

## 2021-01-01 PROCEDURE — 96375 TX/PRO/DX INJ NEW DRUG ADDON: CPT

## 2021-01-01 PROCEDURE — 36600 WITHDRAWAL OF ARTERIAL BLOOD: CPT

## 2021-01-01 PROCEDURE — 74011250636 HC RX REV CODE- 250/636: Performed by: EMERGENCY MEDICINE

## 2021-01-01 PROCEDURE — 92950 HEART/LUNG RESUSCITATION CPR: CPT

## 2021-01-01 PROCEDURE — 74011000250 HC RX REV CODE- 250: Performed by: EMERGENCY MEDICINE

## 2021-01-01 PROCEDURE — 71045 X-RAY EXAM CHEST 1 VIEW: CPT

## 2021-01-01 PROCEDURE — 99284 EMERGENCY DEPT VISIT MOD MDM: CPT

## 2021-01-01 RX ORDER — SODIUM BICARBONATE 1 MEQ/ML
SYRINGE (ML) INTRAVENOUS
Status: DISCONTINUED | OUTPATIENT
Start: 2021-01-01 | End: 2021-01-01

## 2021-01-01 RX ORDER — EPINEPHRINE 0.1 MG/ML
INJECTION INTRACARDIAC; INTRAVENOUS
Status: DISCONTINUED | OUTPATIENT
Start: 2021-01-01 | End: 2021-01-01

## 2021-01-01 RX ORDER — EPINEPHRINE HCL IN DEXTROSE 5% 4MG/250ML
1-10 PLASTIC BAG, INJECTION (ML) INTRAVENOUS
Status: DISCONTINUED | OUTPATIENT
Start: 2021-01-01 | End: 2021-01-01 | Stop reason: HOSPADM

## 2021-01-01 RX ORDER — CALCIUM CHLORIDE INJECTION 100 MG/ML
INJECTION, SOLUTION INTRAVENOUS
Status: DISCONTINUED | OUTPATIENT
Start: 2021-01-01 | End: 2021-01-01

## 2021-01-01 RX ORDER — EPINEPHRINE HCL IN DEXTROSE 5% 4MG/250ML
PLASTIC BAG, INJECTION (ML) INTRAVENOUS
Status: DISCONTINUED
Start: 2021-01-01 | End: 2021-01-01 | Stop reason: HOSPADM

## 2021-01-01 RX ADMIN — EPINEPHRINE 1 MG: 0.1 INJECTION, SOLUTION ENDOTRACHEAL; INTRACARDIAC; INTRAVENOUS at 05:15

## 2021-01-01 RX ADMIN — EPINEPHRINE 1 MG: 0.1 INJECTION, SOLUTION ENDOTRACHEAL; INTRACARDIAC; INTRAVENOUS at 05:03

## 2021-01-01 RX ADMIN — CALCIUM CHLORIDE 1 G: 100 INJECTION, SOLUTION INTRAVENOUS at 05:01

## 2021-01-01 RX ADMIN — SODIUM BICARBONATE 50 MEQ: 84 INJECTION, SOLUTION INTRAVENOUS at 05:02

## 2021-01-01 RX ADMIN — EPINEPHRINE 1 MG: 0.1 INJECTION, SOLUTION ENDOTRACHEAL; INTRACARDIAC; INTRAVENOUS at 04:57

## 2021-01-01 RX ADMIN — SODIUM BICARBONATE 50 MEQ: 84 INJECTION, SOLUTION INTRAVENOUS at 05:00

## 2021-01-01 RX ADMIN — EPINEPHRINE 1 MG: 0.1 INJECTION, SOLUTION ENDOTRACHEAL; INTRACARDIAC; INTRAVENOUS at 05:24

## 2021-02-22 NOTE — PROGRESS NOTES
responded to Death of  Carlie Lechuga, who was a 48 y.o.,male, The  provided the following Interventions: 
Provided crisis pastoral care, pastoral support and grief interventions. Offered prayers on behalf of the patient. Chart reviewed. Plan: 
Plan to meet family at ED registration to visit with  pt. DAUGHTER IS MAKAYLA TAPIAIGHT [phone]. Chaplains will continue to follow and will provide pastoral care on an as needed/requested basis and grief support for the family. [facility], MDIV Clinical Certified  Spiritual Care  
[phone]

## 2021-02-22 NOTE — ED TRIAGE NOTES
Patient comes in by EMS for complaints of respiratory distress. EMS attempted to intubate patient with no success and inserted a Dionicio airway. Upon arrival to ER, Patient did not have a pulse and CPR was started.

## 2021-02-22 NOTE — ED PROVIDER NOTES
Kam Chris is a 48 y.o. male with extensive medical history to include coronary disease, transposition of the great vessels, diabetes, CHF who was noted to be in respiratory distress after wife called EMS. Upon arrival patient was in severe respiratory distress and was placed immediately on CPAP. Patient quickly became unresponsive and had a rapid sequence induction performed by EMS with failed intubation and then had a Dionicio airway placed. Patient had intraosseous IV placed and was given half milligram of epi per prehospital direction. Patient apparently been increasingly short of breath earlier in the evening per EMS report. Patient is nonverbal provide any history due to his current intubated The history is provided by the EMS personnel and medical records. Past Medical History:  
Diagnosis Date  CAD (coronary artery disease) L transposition of the GReat Vessels  Diabetes ([facility]  Diabetic neuropathy ([facility]  Hypertension  Type II diabetes mellitus with nephropathy ([facility] 4/11/2018 Past Surgical History:  
Procedure Laterality Date  HX ORTHOPAEDIC    
 left foot surgery remove 3rd metatarsal bone 1/2015 Family History:  
Problem Relation Age of Onset  Alzheimer Mother  Heart Attack Father 59  Asthma Brother Social History Socioeconomic History  Marital status: SINGLE Spouse name: Not on file  Number of children: Not on file  Years of education: Not on file  Highest education level: Not on file Occupational History  Not on file Social Needs  Financial resource strain: Not on file  Food insecurity Worry: Not on file Inability: Not on file  Transportation needs Medical: Not on file Non-medical: Not on file Tobacco Use  Smoking status: Never Smoker  Smokeless tobacco: Never Used Substance and Sexual Activity  Alcohol use: No  
 Drug use: No  
 Sexual activity: Yes  
 Partners: Female Birth control/protection: Condom Lifestyle  Physical activity Days per week: Not on file Minutes per session: Not on file  Stress: Not on file Relationships  Social connections Talks on phone: Not on file Gets together: Not on file Attends Church service: Not on file Active member of club or organization: Not on file Attends meetings of clubs or organizations: Not on file Relationship status: Not on file  Intimate partner violence Fear of current or ex partner: Not on file Emotionally abused: Not on file Physically abused: Not on file Forced sexual activity: Not on file Other Topics Concern  Dental Braces No  
 Endoscopic Camera Pill No  
 Metallic Foreign Body No  
 Medication Patches No  
 Taking Feraheme No  
 Claustrophobic Yes Comment: a little bit Social History Narrative  Not on file ALLERGIES: Patient has no known allergies. Review of Systems Unable to perform ROS: Intubated Vitals:  
 02/22/21 3836 02/22/21 2857 BP:  (!) 75/13 Pulse: 95 SpO2: (!) 85% Weight: 124.3 kg (274 lb) Physical Exam 
Vitals signs and nursing note reviewed. Constitutional:   
   General: He is in acute distress. Appearance: He is obese. He is ill-appearing and toxic-appearing. He is not diaphoretic. HENT:  
   Head: Normocephalic and atraumatic. Right Ear: External ear normal.  
   Left Ear: External ear normal.  
   Mouth/Throat:  
   Pharynx: No oropharyngeal exudate. Eyes:  
   Comments: Pupils are fixed and dilated Neck: Musculoskeletal: Normal range of motion. Cardiovascular:  
   Rate and Rhythm: Bradycardia present. Comments: Only proximal carotid pulses and femoral pulses palpated after epinephrine given. Initially no pulses were palpated Pulmonary:  
   Effort: Pulmonary effort is normal. No respiratory distress. Breath sounds: Normal breath sounds. Musculoskeletal:  
   Right lower leg: Edema present. Left lower leg: Edema present. Skin: 
   Capillary Refill: Capillary refill takes more than 3 seconds. Findings: Rash present. Neurological:  
   Mental Status: He is alert. Comments: GCS is 3 MDM Procedures Vitals: 
Patient Vitals for the past 12 hrs: 
 Pulse BP SpO2  
02/22/21 0513  (!) 75/13   
02/22/21 0511 95  (!) 85 % Medications ordered:  
Medications EPINEPHrine (ADRENALIN) 4 mg in 5% dextrose 250 mL infusion (PRE-MIX) (has no administration in time range) EPINEPHrine (ADRENALIN) 0.1 mg/mL syringe (1 mg IntraVENous Given 2/22/21 9517) Lab findings: 
Recent Results (from the past 12 hour(s)) CBC WITH AUTOMATED DIFF Collection Time: 02/22/21  5:00 AM  
Result Value Ref Range WBC 13.0 4.6 - 13.2 K/uL  
 RBC 3.08 (L) 4.70 - 5.50 M/uL  
 HGB 10.3 (L) 13.0 - 16.0 g/dL HCT 35.8 (L) 36.0 - 48.0 % .2 (H) 74.0 - 97.0 FL  
 MCH 33.4 24.0 - 34.0 PG  
 MCHC 28.8 (L) 31.0 - 37.0 g/dL  
 RDW 18.7 (H) 11.6 - 14.5 % PLATELET 346 413 - 229 K/uL MPV 10.3 9.2 - 11.8 FL  
 NEUTROPHILS 51 40 - 73 % LYMPHOCYTES 41 21 - 52 % MONOCYTES 5 3 - 10 % EOSINOPHILS 2 0 - 5 % BASOPHILS 1 0 - 2 %  
 ABS. NEUTROPHILS 6.7 1.8 - 8.0 K/UL  
 ABS. LYMPHOCYTES 5.3 (H) 0.9 - 3.6 K/UL  
 ABS. MONOCYTES 0.6 0.05 - 1.2 K/UL  
 ABS. EOSINOPHILS 0.3 0.0 - 0.4 K/UL  
 ABS. BASOPHILS 0.1 0.0 - 0.1 K/UL  
 DF AUTOMATED METABOLIC PANEL, COMPREHENSIVE Collection Time: 02/22/21  5:00 AM  
Result Value Ref Range Sodium 137 136 - 145 mmol/L Potassium 8.0 (HH) 3.5 - 5.5 mmol/L Chloride 99 (L) 100 - 111 mmol/L  
 CO2 20 (L) 21 - 32 mmol/L Anion gap 18 3.0 - 18 mmol/L Glucose 226 (H) 74 - 99 mg/dL BUN 25 (H) 7.0 - 18 MG/DL Creatinine 8.78 (H) 0.6 - 1.3 MG/DL  
 BUN/Creatinine ratio 3 (L) 12 - 20 GFR est AA 8 (L) >60 ml/min/1.73m2 GFR est non-AA 7 (L) >60 ml/min/1.73m2 Calcium 8.4 (L) 8.5 - 10.1 MG/DL Bilirubin, total 0.4 0.2 - 1.0 MG/DL  
 ALT (SGPT) 16 16 - 61 U/L  
 AST (SGOT) 21 10 - 38 U/L Alk. phosphatase 83 45 - 117 U/L Protein, total 6.9 6.4 - 8.2 g/dL Albumin 3.0 (L) 3.4 - 5.0 g/dL Globulin 3.9 2.0 - 4.0 g/dL A-G Ratio 0.8 0.8 - 1.7    
TROPONIN I Collection Time: 02/22/21  5:00 AM  
Result Value Ref Range Troponin-I, QT 1.34 (H) 0.0 - 0.045 NG/ML  
MAGNESIUM Collection Time: 02/22/21  5:00 AM  
Result Value Ref Range Magnesium 2.5 1.6 - 2.6 mg/dL SARS-COV-2 Collection Time: 02/22/21  5:00 AM  
Result Value Ref Range SARS-CoV-2 Please find results under separate order COVID-19 RAPID TEST Collection Time: 02/22/21  5:00 AM  
Result Value Ref Range Specimen source Nasopharyngeal    
 COVID-19 rapid test Not detected NOTD    
CK Collection Time: 02/22/21  5:00 AM  
Result Value Ref Range CK 65 39 - 308 U/L  
D DIMER Collection Time: 02/22/21  5:00 AM  
Result Value Ref Range D DIMER 4.76 (H) <0.46 ug/ml(FEU) PROTHROMBIN TIME + INR Collection Time: 02/22/21  5:00 AM  
Result Value Ref Range Prothrombin time 14.9 11.5 - 15.2 sec INR 1.2 0.8 - 1.2 PTT Collection Time: 02/22/21  5:00 AM  
Result Value Ref Range aPTT 31.2 23.0 - 36.4 SEC POC LACTIC ACID Collection Time: 02/22/21  5:21 AM  
Result Value Ref Range Lactic Acid (POC) 11.52 (HH) 0.40 - 2.00 mmol/L  
GLUCOSE, POC Collection Time: 02/22/21  5:29 AM  
Result Value Ref Range Glucose (POC) 208 (H) 70 - 110 mg/dL EKG interpretation by ED Physician: 
 
 
Cardiac monitor: Initially patient with bradycardia and wide complex on the monitor then went into sinus tach type pattern and subsequently went into V. fib and was shocked once with return of pulse. Last rhythm on monitor was asystole X-Ray, CT or other radiology findings or impressions: 
XR CHEST PORT    (Results Pending) Cardiomegaly with central venous catheter in place along with ICD. Significant bilateral pulmonary edema Progress notes, Consult notes or additional Procedure notes:  
Upon arrival the [facility] airway was removed and patient was intubated via video laryngoscope with 8 tube with direct realization of the tube going through the cords. Carbon dioxide detector was positive for yellow color. Equal breath sounds on both sides with rhonchi and rales. Confirmation via chest x-ray also obtained Prehospital patient had difficulty obtaining airway and a Dionicio airway had to be placed. Premedical direction patient was given 0.5 mg of epinephrine due to his high bradycardia and low blood pressure. Upon arrival patient was noted not to have a pulse and CPR was started. As there appeared to be a dialysis catheter in place and due to the wide-complex rhythm on the monitor there was some concern of hyperkalemia so calcium chloride and bicarb was given. Patient was given other doses of epinephrine and started on epinephrine drip but subsequently could not maintain her blood pressure and started having lower pulse. CPR was then started again. Patient with agonal rhythm on the monitor between asystole and PEA with no cardiac activity on the ultrasound. Given patient's extensive history and multiple doses of epinephrine further efforts was deemed futile and code was called at 0 529 The daughter did call after we wrote a message her through his phone at the bedside and I did inform her that her father had passed away and she hung up after that ED Critical Care Note System at risk for life threatening failure: Neuro, cardiac, pulmonary, renal 
Associated problems: Cardiac arrest, hypotension, acidosis, respiratory failure Critical Care services provided: Bedside management of cardiac arrest, hypotension, acidosis, respiratory failure, documentation Excluded procedures (time not included in critical care): Cardiac monitor interpretation and bedside ultrasound interpretation Total Critical Care Time (in minutes) 47 Reevaluation of patient:  
 Disposition: 
Diagnosis: 1. Cardiac arrest ([facility] 2. Acute hyperkalemia 3. Acute renal failure, unspecified acute renal failure type ([facility] Disposition:  Follow-up Information None Patient's Medications Start Taking No medications on file Continue Taking ASPIRIN DELAYED-RELEASE 81 MG TABLET    Take 1 Tab by mouth daily. LISINOPRIL (PRINIVIL, ZESTRIL) 2.5 MG TABLET    Take 1 Tab by mouth daily. METOPROLOL SUCCINATE (TOPROL-XL) 25 MG XL TABLET    Take 1 Tab by mouth daily. PRAVASTATIN (PRAVACHOL) 20 MG TABLET    Take 1 Tab by mouth nightly. These Medications have changed No medications on file Stop Taking No medications on file

## 2021-02-22 NOTE — PROGRESS NOTES
responded to Death of  Елена Castelan, who was a 44 y. o.,male,     The  provided the following Interventions:  Provided crisis pastoral care, pastoral support and grief interventions. Offered prayers on behalf of the patient. Chart reviewed. Spent time with family saying final good byes. No  home selected yet but likely [address] Will be calling [facility] once decided. Plan:  Chaplains will continue to follow and will provide pastoral care on an as needed/requested basis and grief support for the family.       [address] [place] [address] [address] Certified [address] [place] [address]   [phone]

## 2021-02-22 NOTE — ROUTINE PROCESS
Responded to ER to [facility]. Patient pulseless upon arrival and CPR started. Patient intubated with size 8 ETT and tube secured at 25 at lip. CPR continued and patient got pulse for short time. Placed on vent. Patient became pulseless again and CPR resumed.  CPR done until code called by MD.

## 2021-02-23 NOTE — ED NOTES
Joann Vaca is a 44 y.o. male   with extensive medical history to include esrd who was noted to be in respiratory distress after wife called EMS. Upon arrival patient was in severe respiratory distress and was placed immediately on CPAP. Patient quickly became unresponsive and had a rapid sequence induction performed by EMS with failed intubation and then had a Dionicio airway placed. Patient had intraosseous IV placed and was given half milligram of epi per prehospital direction. Patient apparently been increasingly short of breath earlier in the evening per EMS report.   Patient is nonverbal to provide any history due to his current intubated    Per report from his wife patient was dialyzed without any issues on this past Saturday.     The history is provided by the EMS personnel and medical records.         Past Medical History        Past Medical History:   Diagnosis Date    CAD (coronary artery disease)       L transposition of the GReat Vessels    Diabetes ([facility]      Diabetic neuropathy ([facility]      Hypertension      Type II diabetes mellitus with nephropathy ([facility] 4/11/2018            Past Surgical History         Past Surgical History:   Procedure Laterality Date    HX ORTHOPAEDIC         left foot surgery remove 3rd metatarsal bone 1/2015                   Family History:   Problem Relation Age of Onset    Alzheimer Mother      Heart Attack Father 59    Asthma Brother           Social History            Socioeconomic History    Marital status: SINGLE       Spouse name: Not on file    Number of children: Not on file    Years of education: Not on file    Highest education level: Not on file   Occupational History    Not on file   Social Needs    Financial resource strain: Not on file    Food insecurity       Worry: Not on file       Inability: Not on file    Transportation needs       Medical: Not on file       Non-medical: Not on file   Tobacco Use    Smoking status: Never Smoker    Smokeless tobacco: Never Used   Substance and Sexual Activity    Alcohol use: No    Drug use: No    Sexual activity: Yes       Partners: Female       Birth control/protection: Condom   Lifestyle    Physical activity       Days per week: Not on file       Minutes per session: Not on file    Stress: Not on file   Relationships    Social connections       Talks on phone: Not on file       Gets together: Not on file       Attends Druze service: Not on file       Active member of club or organization: Not on file       Attends meetings of clubs or organizations: Not on file       Relationship status: Not on file    Intimate partner violence       Fear of current or ex partner: Not on file       Emotionally abused: Not on file       Physically abused: Not on file       Forced sexual activity: Not on file   Other Topics Concern    Dental Braces No    Endoscopic Camera Pill No    Metallic Foreign Body No    Medication Patches No    Taking Feraheme No    Claustrophobic Yes       Comment: a little bit   Social History Narrative    Not on file            ALLERGIES: Patient has no known allergies.     Review of Systems   Unable to perform ROS: Intubated              Vitals:     02/22/21 0511 02/22/21 0513   BP:   (!) 75/13   Pulse: 95     SpO2: (!) 85%     Weight: 124.3 kg (274 lb)               Physical Exam  Vitals signs and nursing note reviewed. Constitutional:       General: He is in acute distress. Appearance: He is obese. He is ill-appearing and toxic-appearing. He is not diaphoretic. HENT:      Head: Normocephalic and atraumatic. Right Ear: External ear normal.      Left Ear: External ear normal.      Mouth/Throat:      Pharynx: No oropharyngeal exudate. Eyes:      Comments: Pupils are fixed and dilated   Neck:      Musculoskeletal: Normal range of motion. Cardiovascular:      Rate and Rhythm: Bradycardia present. Comments:  Only proximal carotid pulses and femoral pulses palpated after epinephrine given. Initially no pulses were palpated  Pulmonary:      Effort: Pulmonary effort is normal. No respiratory distress. Breath sounds: Normal breath sounds. Musculoskeletal:      Right lower leg: Edema present. Left lower leg: Edema present. Skin:     Capillary Refill: Capillary refill takes more than 3 seconds. Findings: Rash present. Neurological:      Mental Status: He is alert. Comments: GCS is 3          MDM     Procedures     Vitals:  Patient Vitals for the past 12 hrs:    Pulse BP SpO2   02/22/21 0513 -- (!) 75/13 --   02/22/21 0511 95 -- (!) 85 %            Medications ordered:   Medications   EPINEPHrine (ADRENALIN) 4 mg in 5% dextrose 250 mL infusion (PRE-MIX) (has no administration in time range)   EPINEPHrine (ADRENALIN) 0.1 mg/mL syringe (1 mg IntraVENous Given 2/22/21 0453)          Lab findings:  Recent Results         Recent Results (from the past 12 hour(s))   CBC WITH AUTOMATED DIFF     Collection Time: 02/22/21  5:00 AM   Result Value Ref Range     WBC 13.0 4.6 - 13.2 K/uL     RBC 3.08 (L) 4.70 - 5.50 M/uL     HGB 10.3 (L) 13.0 - 16.0 g/dL     HCT 35.8 (L) 36.0 - 48.0 %     .2 (H) 74.0 - 97.0 FL     MCH 33.4 24.0 - 34.0 PG     MCHC 28.8 (L) 31.0 - 37.0 g/dL     RDW 18.7 (H) 11.6 - 14.5 %     PLATELET 483 379 - 905 K/uL     MPV 10.3 9.2 - 11.8 FL     NEUTROPHILS 51 40 - 73 %     LYMPHOCYTES 41 21 - 52 %     MONOCYTES 5 3 - 10 %     EOSINOPHILS 2 0 - 5 %     BASOPHILS 1 0 - 2 %     ABS. NEUTROPHILS 6.7 1.8 - 8.0 K/UL     ABS. LYMPHOCYTES 5.3 (H) 0.9 - 3.6 K/UL     ABS. MONOCYTES 0.6 0.05 - 1.2 K/UL     ABS. EOSINOPHILS 0.3 0.0 - 0.4 K/UL     ABS.  BASOPHILS 0.1 0.0 - 0.1 K/UL     DF AUTOMATED     METABOLIC PANEL, COMPREHENSIVE     Collection Time: 02/22/21  5:00 AM   Result Value Ref Range     Sodium 137 136 - 145 mmol/L     Potassium 8.0 (HH) 3.5 - 5.5 mmol/L     Chloride 99 (L) 100 - 111 mmol/L     CO2 20 (L) 21 - 32 mmol/L     Anion gap 18 3.0 - 18 mmol/L     Glucose 226 (H) 74 - 99 mg/dL     BUN 25 (H) 7.0 - 18 MG/DL     Creatinine 8.78 (H) 0.6 - 1.3 MG/DL     BUN/Creatinine ratio 3 (L) 12 - 20       GFR est AA 8 (L) >60 ml/min/1.73m2     GFR est non-AA 7 (L) >60 ml/min/1.73m2     Calcium 8.4 (L) 8.5 - 10.1 MG/DL     Bilirubin, total 0.4 0.2 - 1.0 MG/DL     ALT (SGPT) 16 16 - 61 U/L     AST (SGOT) 21 10 - 38 U/L     Alk.  phosphatase 83 45 - 117 U/L     Protein, total 6.9 6.4 - 8.2 g/dL     Albumin 3.0 (L) 3.4 - 5.0 g/dL     Globulin 3.9 2.0 - 4.0 g/dL     A-G Ratio 0.8 0.8 - 1.7     TROPONIN I     Collection Time: 02/22/21  5:00 AM   Result Value Ref Range     Troponin-I, QT 1.34 (H) 0.0 - 0.045 NG/ML   MAGNESIUM     Collection Time: 02/22/21  5:00 AM   Result Value Ref Range     Magnesium 2.5 1.6 - 2.6 mg/dL   SARS-COV-2     Collection Time: 02/22/21  5:00 AM   Result Value Ref Range     SARS-CoV-2 Please find results under separate order     COVID-19 RAPID TEST     Collection Time: 02/22/21  5:00 AM   Result Value Ref Range     Specimen source Nasopharyngeal       COVID-19 rapid test Not detected NOTD     CK     Collection Time: 02/22/21  5:00 AM   Result Value Ref Range     CK 65 39 - 308 U/L   D DIMER     Collection Time: 02/22/21  5:00 AM   Result Value Ref Range     D DIMER 4.76 (H) <0.46 ug/ml(FEU)   PROTHROMBIN TIME + INR     Collection Time: 02/22/21  5:00 AM   Result Value Ref Range     Prothrombin time 14.9 11.5 - 15.2 sec     INR 1.2 0.8 - 1.2     PTT     Collection Time: 02/22/21  5:00 AM   Result Value Ref Range     aPTT 31.2 23.0 - 36.4 SEC   POC LACTIC ACID     Collection Time: 02/22/21  5:21 AM   Result Value Ref Range     Lactic Acid (POC) 11.52 (HH) 0.40 - 2.00 mmol/L   GLUCOSE, POC     Collection Time: 02/22/21  5:29 AM   Result Value Ref Range     Glucose (POC) 208 (H) 70 - 110 mg/dL            EKG interpretation by ED Physician:        Cardiac monitor: Initially patient with bradycardia and wide complex on the monitor then went into sinus tach type pattern and subsequently went into V. fib and was shocked once with return of pulse. Last rhythm on monitor was asystole     X-Ray, CT or other radiology findings or impressions:  XR CHEST PORT    (Results Pending)   Cardiomegaly with central venous catheter in place along with ICD. Significant bilateral pulmonary edema     Progress notes, Consult notes or additional Procedure notes:   Upon arrival the [place] airway was removed and patient was intubated via video laryngoscope with 8 tube with direct realization of the tube going through the cords. Carbon dioxide detector was positive for yellow color. Equal breath sounds on both sides with rhonchi and rales. Confirmation via chest x-ray also obtained     Prehospital patient had difficulty obtaining airway and a Dionicio airway had to be placed. Premedical direction patient was given 0.5 mg of epinephrine due to his high bradycardia and low blood pressure. Upon arrival patient was noted not to have a pulse and CPR was started. As there appeared to be a dialysis catheter in place and due to the wide-complex rhythm on the monitor there was some concern of hyperkalemia so calcium chloride and bicarb was given. Patient was given other doses of epinephrine and started on epinephrine drip but subsequently could not maintain her blood pressure and started having lower pulse. CPR was then started again. Patient with agonal rhythm on the monitor between asystole and PEA with no cardiac activity on the ultrasound.   Given patient's extensive history and multiple doses of epinephrine further efforts was deemed futile and code was called at 0 [phone]     The daughter did call after we wrote a message her through his phone at the bedside and I did inform her that her father had passed away and she hung up after that     ED Critical Care Note     System at risk for life threatening failure: Neuro, cardiac, pulmonary, renal  Associated problems: Cardiac arrest, hypotension, acidosis, respiratory failure     Critical Care services provided: Bedside management of cardiac arrest, hypotension, acidosis, respiratory failure, documentation  Excluded procedures (time not included in critical care): Cardiac monitor interpretation and bedside ultrasound interpretation     Total Critical Care Time (in minutes) 47        Please note that patient was originally identified as his brother Vincenzo Henry when he originally presented and was charted under that name.        Reevaluation of patient:        Disposition:  Diagnosis:   1. [facility]    2. Acute hyperkalemia    3. Acute renal failure, unspecified acute renal failure type ([facility]          Disposition:      Follow-up Information    None                  Patient's Medications   Start Taking     No medications on file   Continue Taking     ASPIRIN DELAYED-RELEASE 81 MG TABLET    Take 1 Tab by mouth daily.     LISINOPRIL (PRINIVIL, ZESTRIL) 2.5 MG TABLET    Take 1 Tab by mouth daily.     METOPROLOL SUCCINATE (TOPROL-XL) 25 MG XL TABLET    Take 1 Tab by mouth daily.     PRAVASTATIN (PRAVACHOL) 20 MG TABLET    Take 1 Tab by mouth nightly.    These Medications have changed     No medications on file   Stop Taking     No medications on file

## 2021-07-26 NOTE — ED TRIAGE NOTES
Pt arrived by EMS complaining of shortness of breath that started about an hour ago.  Pt arrived on 2L via [place]. 
 not applicable (Male)

## (undated) DEVICE — KENDALL 500 SERIES DIAPHORETIC FOAM MONITORING ELECTRODE - TEAR DROP SHAPE ( 5/PK): Brand: KENDALL

## (undated) DEVICE — COVER US PRB W15XL120CM W/ GEL RUBBERBAND TAPE STRP FLD GEN

## (undated) DEVICE — Device

## (undated) DEVICE — FORCEPS BX L240CM JAW DIA2.8MM L CAP W/ NDL MIC MESH TOOTH

## (undated) DEVICE — SUTURE ETHLN SZ 2-0 L18IN NONABSORBABLE BLK L19MM PS-2 PRIM 593H

## (undated) DEVICE — MEDI-VAC NON-CONDUCTIVE SUCTION TUBING: Brand: CARDINAL HEALTH

## (undated) DEVICE — CATH KT HD DL STR 15.5FRX24CM -- DURAFLOW

## (undated) DEVICE — STERILE POLYISOPRENE POWDER-FREE SURGICAL GLOVES: Brand: PROTEXIS

## (undated) DEVICE — SYR 50ML SLIP TIP NSAF LF STRL --

## (undated) DEVICE — DRSG PATCH ANTIMIC 1INX7.0MM -- CONVERT TO ITEM 356054

## (undated) DEVICE — SUTURE MCRYL SZ 4-0 L18IN ABSRB UD L19MM PS-2 3/8 CIR PRIM Y496G

## (undated) DEVICE — BASIN EMESIS 500CC ROSE 250/CS 60/PLT: Brand: MEDEGEN MEDICAL PRODUCTS, LLC

## (undated) DEVICE — KENDALL 500 SERIES DIAPHORETIC FOAM MONITORING ELECTRODE - TEAR DROP SHAPE ( 30/PK): Brand: KENDALL

## (undated) DEVICE — AIRLIFE™ ADULT CUSHION NASAL CANNULA 14 FOOT (4.3) CRUSH-RESISTANT OXYGEN TUBING, AND U/CONNECT-IT ADAPTER: Brand: AIRLIFE™

## (undated) DEVICE — APPLICATOR BNDG 1MM ADH PREMIERPRO EXOFIN

## (undated) DEVICE — DRAPE,THYROID,SOFT,STERILE: Brand: MEDLINE

## (undated) DEVICE — 3M™ TEGADERM™ TRANSPARENT FILM DRESSING FRAME STYLE, 1624W, 2-3/8 IN X 2-3/4 IN (6 CM X 7 CM), 100/CT 4CT/CASE: Brand: 3M™ TEGADERM™

## (undated) DEVICE — BITE BLK ENDOSCP AD 54FR GRN POLYETH ENDOSCP W STRP SLD

## (undated) DEVICE — CONTAINER PREFIL FRMLN 15ML --

## (undated) DEVICE — 3M™ STERI-STRIP™ COMPOUND BENZOIN TINCTURE 40 BAGS/CARTON 4 CARTONS/CASE C1544: Brand: 3M™ STERI-STRIP™

## (undated) DEVICE — (D)STRIP SKN CLSR 0.5X4IN WHT --

## (undated) DEVICE — SYR 10ML CTRL LR LCK NSAF LF --

## (undated) DEVICE — FLEX ADVANTAGE 1500CC: Brand: FLEX ADVANTAGE

## (undated) DEVICE — KIT COLON W/ 1.1OZ LUB AND 2 END